# Patient Record
Sex: MALE | Race: WHITE | NOT HISPANIC OR LATINO | Employment: OTHER | URBAN - METROPOLITAN AREA
[De-identification: names, ages, dates, MRNs, and addresses within clinical notes are randomized per-mention and may not be internally consistent; named-entity substitution may affect disease eponyms.]

---

## 2018-01-08 ENCOUNTER — OFFICE VISIT CONVERTED (OUTPATIENT)
Dept: CARDIOLOGY | Facility: CLINIC | Age: 78
End: 2018-01-08
Attending: INTERNAL MEDICINE

## 2018-01-08 ENCOUNTER — CONVERSION ENCOUNTER (OUTPATIENT)
Dept: CARDIOLOGY | Facility: CLINIC | Age: 78
End: 2018-01-08

## 2018-02-09 ENCOUNTER — OFFICE VISIT CONVERTED (OUTPATIENT)
Dept: CARDIOLOGY | Facility: CLINIC | Age: 78
End: 2018-02-09
Attending: INTERNAL MEDICINE

## 2018-02-12 ENCOUNTER — OFFICE VISIT CONVERTED (OUTPATIENT)
Dept: SURGERY | Facility: CLINIC | Age: 78
End: 2018-02-12
Attending: PHYSICIAN ASSISTANT

## 2018-03-16 ENCOUNTER — PROCEDURE VISIT CONVERTED (OUTPATIENT)
Dept: UROLOGY | Facility: CLINIC | Age: 78
End: 2018-03-16
Attending: UROLOGY

## 2018-04-13 ENCOUNTER — OFFICE VISIT CONVERTED (OUTPATIENT)
Dept: PULMONOLOGY | Facility: CLINIC | Age: 78
End: 2018-04-13
Attending: INTERNAL MEDICINE

## 2018-05-23 ENCOUNTER — CONVERSION ENCOUNTER (OUTPATIENT)
Dept: OTHER | Facility: HOSPITAL | Age: 78
End: 2018-05-23

## 2018-06-18 ENCOUNTER — CONVERSION ENCOUNTER (OUTPATIENT)
Dept: CARDIOLOGY | Facility: CLINIC | Age: 78
End: 2018-06-18

## 2018-06-18 ENCOUNTER — OFFICE VISIT CONVERTED (OUTPATIENT)
Dept: CARDIOLOGY | Facility: CLINIC | Age: 78
End: 2018-06-18
Attending: INTERNAL MEDICINE

## 2018-06-19 ENCOUNTER — OFFICE VISIT CONVERTED (OUTPATIENT)
Dept: CARDIOLOGY | Facility: CLINIC | Age: 78
End: 2018-06-19
Attending: INTERNAL MEDICINE

## 2018-07-16 ENCOUNTER — OFFICE VISIT CONVERTED (OUTPATIENT)
Dept: CARDIOLOGY | Facility: CLINIC | Age: 78
End: 2018-07-16
Attending: INTERNAL MEDICINE

## 2018-07-16 ENCOUNTER — CONVERSION ENCOUNTER (OUTPATIENT)
Dept: CARDIOLOGY | Facility: CLINIC | Age: 78
End: 2018-07-16

## 2018-10-22 ENCOUNTER — OFFICE VISIT CONVERTED (OUTPATIENT)
Dept: CARDIOLOGY | Facility: CLINIC | Age: 78
End: 2018-10-22
Attending: INTERNAL MEDICINE

## 2018-10-23 ENCOUNTER — OFFICE VISIT CONVERTED (OUTPATIENT)
Dept: PULMONOLOGY | Facility: CLINIC | Age: 78
End: 2018-10-23
Attending: INTERNAL MEDICINE

## 2018-11-08 ENCOUNTER — CONVERSION ENCOUNTER (OUTPATIENT)
Dept: OTHER | Facility: HOSPITAL | Age: 78
End: 2018-11-08

## 2018-11-08 ENCOUNTER — OFFICE VISIT CONVERTED (OUTPATIENT)
Dept: OTHER | Facility: HOSPITAL | Age: 78
End: 2018-11-08
Attending: NURSE PRACTITIONER

## 2018-11-12 ENCOUNTER — CONVERSION ENCOUNTER (OUTPATIENT)
Dept: OTHER | Facility: HOSPITAL | Age: 78
End: 2018-11-12

## 2018-11-12 ENCOUNTER — OFFICE VISIT CONVERTED (OUTPATIENT)
Dept: OTHER | Facility: HOSPITAL | Age: 78
End: 2018-11-12
Attending: NURSE PRACTITIONER

## 2018-12-10 ENCOUNTER — OFFICE VISIT CONVERTED (OUTPATIENT)
Dept: CARDIOLOGY | Facility: CLINIC | Age: 78
End: 2018-12-10
Attending: INTERNAL MEDICINE

## 2019-01-07 ENCOUNTER — OFFICE VISIT CONVERTED (OUTPATIENT)
Dept: CARDIOLOGY | Facility: CLINIC | Age: 79
End: 2019-01-07
Attending: INTERNAL MEDICINE

## 2019-01-08 ENCOUNTER — CONVERSION ENCOUNTER (OUTPATIENT)
Dept: SURGERY | Facility: CLINIC | Age: 79
End: 2019-01-08

## 2019-01-08 ENCOUNTER — OFFICE VISIT CONVERTED (OUTPATIENT)
Dept: SURGERY | Facility: CLINIC | Age: 79
End: 2019-01-08
Attending: PHYSICIAN ASSISTANT

## 2019-01-28 ENCOUNTER — OFFICE VISIT CONVERTED (OUTPATIENT)
Dept: OTHER | Facility: HOSPITAL | Age: 79
End: 2019-01-28
Attending: NURSE PRACTITIONER

## 2019-02-04 ENCOUNTER — OFFICE VISIT CONVERTED (OUTPATIENT)
Dept: OTHER | Facility: HOSPITAL | Age: 79
End: 2019-02-04
Attending: NURSE PRACTITIONER

## 2019-02-04 ENCOUNTER — CONVERSION ENCOUNTER (OUTPATIENT)
Dept: OTHER | Facility: HOSPITAL | Age: 79
End: 2019-02-04

## 2019-02-08 ENCOUNTER — OFFICE VISIT CONVERTED (OUTPATIENT)
Dept: OTHER | Facility: HOSPITAL | Age: 79
End: 2019-02-08
Attending: NURSE PRACTITIONER

## 2019-02-08 ENCOUNTER — CONVERSION ENCOUNTER (OUTPATIENT)
Dept: SURGERY | Facility: CLINIC | Age: 79
End: 2019-02-08

## 2019-02-08 ENCOUNTER — HOSPITAL ENCOUNTER (OUTPATIENT)
Dept: OTHER | Facility: HOSPITAL | Age: 79
Discharge: HOME | End: 2019-02-08
Attending: UROLOGY

## 2019-02-08 ENCOUNTER — OFFICE VISIT CONVERTED (OUTPATIENT)
Dept: UROLOGY | Facility: CLINIC | Age: 79
End: 2019-02-08
Attending: UROLOGY

## 2019-02-08 ENCOUNTER — HOSPITAL ENCOUNTER (OUTPATIENT)
Dept: SURGERY | Facility: CLINIC | Age: 79
Discharge: HOME | End: 2019-02-08
Attending: UROLOGY

## 2019-02-08 LAB
ANION GAP SERPL CALC-SCNC: 16 MMOL/L (ref 8–19)
APPEARANCE UR: ABNORMAL
BILIRUB UR QL: NEGATIVE
BUN SERPL-MCNC: 16 MG/DL (ref 5–25)
BUN/CREAT SERPL: 10 {RATIO} (ref 6–20)
CALCIUM SERPL-MCNC: 8.5 MG/DL (ref 8.7–10.4)
CHLORIDE SERPL-SCNC: 103 MMOL/L (ref 99–111)
COLOR UR: ABNORMAL
CONV BACTERIA: NEGATIVE
CONV CO2: 23 MMOL/L (ref 22–32)
CONV COLLECTION SOURCE (UA): ABNORMAL
CONV HYALINE CASTS IN URINE MICRO: ABNORMAL /[LPF]
CONV UROBILINOGEN IN URINE BY AUTOMATED TEST STRIP: 0.2 {EHRLICHU}/DL (ref 0.1–1)
CONV YEAST, UA: ABNORMAL
CREAT UR-MCNC: 1.61 MG/DL (ref 0.7–1.2)
GFR SERPLBLD BASED ON 1.73 SQ M-ARVRAT: 41 ML/MIN/{1.73_M2}
GLUCOSE SERPL-MCNC: 101 MG/DL (ref 70–99)
GLUCOSE UR QL: NEGATIVE MG/DL
HGB UR QL STRIP: ABNORMAL
KETONES UR QL STRIP: NEGATIVE MG/DL
LEUKOCYTE ESTERASE UR QL STRIP: ABNORMAL
NITRITE UR QL STRIP: NEGATIVE
OSMOLALITY SERPL CALC.SUM OF ELEC: 287 MOSM/KG (ref 273–304)
PH UR STRIP.AUTO: 5 [PH] (ref 5–8)
POTASSIUM SERPL-SCNC: 4.2 MMOL/L (ref 3.5–5.3)
PROT UR QL: 30 MG/DL
RBC #/AREA URNS HPF: ABNORMAL /[HPF]
SODIUM SERPL-SCNC: 138 MMOL/L (ref 135–147)
SP GR UR: 1.02 (ref 1–1.03)
WBC #/AREA URNS HPF: ABNORMAL /[HPF]

## 2019-02-10 LAB — BACTERIA UR CULT: NORMAL

## 2019-02-22 ENCOUNTER — HOSPITAL ENCOUNTER (OUTPATIENT)
Dept: CT IMAGING | Facility: HOSPITAL | Age: 79
Discharge: HOME | End: 2019-02-22
Attending: UROLOGY

## 2019-02-27 ENCOUNTER — PROCEDURE VISIT CONVERTED (OUTPATIENT)
Dept: UROLOGY | Facility: CLINIC | Age: 79
End: 2019-02-27
Attending: UROLOGY

## 2019-03-11 ENCOUNTER — HOSPITAL ENCOUNTER (OUTPATIENT)
Dept: PREADMISSION TESTING | Facility: HOSPITAL | Age: 79
Discharge: HOME | End: 2019-03-11
Attending: UROLOGY

## 2019-03-11 LAB
ANION GAP SERPL CALC-SCNC: 16 MMOL/L (ref 8–19)
BASOPHILS # BLD AUTO: 0.04 10*3/UL (ref 0–0.2)
BASOPHILS NFR BLD AUTO: 0.4 % (ref 0–3)
BUN SERPL-MCNC: 16 MG/DL (ref 5–25)
BUN/CREAT SERPL: 14 {RATIO} (ref 6–20)
CALCIUM SERPL-MCNC: 9 MG/DL (ref 8.7–10.4)
CHLORIDE SERPL-SCNC: 102 MMOL/L (ref 99–111)
CONV ABS IMM GRAN: 0.05 10*3/UL (ref 0–0.2)
CONV CO2: 26 MMOL/L (ref 22–32)
CONV IMMATURE GRAN: 0.4 % (ref 0–1.8)
CREAT UR-MCNC: 1.16 MG/DL (ref 0.7–1.2)
DEPRECATED RDW RBC AUTO: 51.9 FL (ref 35.1–43.9)
EOSINOPHIL # BLD AUTO: 0.2 10*3/UL (ref 0–0.7)
EOSINOPHIL # BLD AUTO: 1.8 % (ref 0–7)
ERYTHROCYTE [DISTWIDTH] IN BLOOD BY AUTOMATED COUNT: 16.7 % (ref 11.6–14.4)
GFR SERPLBLD BASED ON 1.73 SQ M-ARVRAT: >60 ML/MIN/{1.73_M2}
GLUCOSE SERPL-MCNC: 99 MG/DL (ref 70–99)
HBA1C MFR BLD: 13.5 G/DL (ref 14–18)
HCT VFR BLD AUTO: 43.3 % (ref 42–52)
LYMPHOCYTES # BLD AUTO: 2.33 10*3/UL (ref 1–5)
MCH RBC QN AUTO: 26.7 PG (ref 27–31)
MCHC RBC AUTO-ENTMCNC: 31.2 G/DL (ref 33–37)
MCV RBC AUTO: 85.7 FL (ref 80–96)
MONOCYTES # BLD AUTO: 0.86 10*3/UL (ref 0.2–1.2)
MONOCYTES NFR BLD AUTO: 7.6 % (ref 3–10)
NEUTROPHILS # BLD AUTO: 7.82 10*3/UL (ref 2–8)
NEUTROPHILS NFR BLD AUTO: 69.2 % (ref 30–85)
NRBC CBCN: 0 % (ref 0–0.7)
OSMOLALITY SERPL CALC.SUM OF ELEC: 291 MOSM/KG (ref 273–304)
PLATELET # BLD AUTO: 222 10*3/UL (ref 130–400)
PMV BLD AUTO: 10.1 FL (ref 9.4–12.4)
POTASSIUM SERPL-SCNC: 4.1 MMOL/L (ref 3.5–5.3)
RBC # BLD AUTO: 5.05 10*6/UL (ref 4.7–6.1)
SODIUM SERPL-SCNC: 140 MMOL/L (ref 135–147)
VARIANT LYMPHS NFR BLD MANUAL: 20.6 % (ref 20–45)
WBC # BLD AUTO: 11.3 10*3/UL (ref 4.8–10.8)

## 2019-03-19 ENCOUNTER — HOSPITAL ENCOUNTER (OUTPATIENT)
Dept: PERIOP | Facility: HOSPITAL | Age: 79
Setting detail: HOSPITAL OUTPATIENT SURGERY
Discharge: HOME | End: 2019-03-19
Attending: UROLOGY

## 2019-04-08 ENCOUNTER — CONVERSION ENCOUNTER (OUTPATIENT)
Dept: OTHER | Facility: HOSPITAL | Age: 79
End: 2019-04-08

## 2019-04-08 ENCOUNTER — OFFICE VISIT CONVERTED (OUTPATIENT)
Dept: CARDIOLOGY | Facility: CLINIC | Age: 79
End: 2019-04-08
Attending: INTERNAL MEDICINE

## 2019-04-10 ENCOUNTER — OFFICE VISIT CONVERTED (OUTPATIENT)
Dept: UROLOGY | Facility: CLINIC | Age: 79
End: 2019-04-10
Attending: UROLOGY

## 2019-06-03 ENCOUNTER — OFFICE VISIT CONVERTED (OUTPATIENT)
Dept: OTHER | Facility: HOSPITAL | Age: 79
End: 2019-06-03
Attending: NURSE PRACTITIONER

## 2019-06-03 ENCOUNTER — CONVERSION ENCOUNTER (OUTPATIENT)
Dept: OTHER | Facility: HOSPITAL | Age: 79
End: 2019-06-03

## 2019-06-07 ENCOUNTER — OFFICE VISIT CONVERTED (OUTPATIENT)
Dept: PULMONOLOGY | Facility: CLINIC | Age: 79
End: 2019-06-07
Attending: PHYSICIAN ASSISTANT

## 2019-06-18 ENCOUNTER — OFFICE VISIT CONVERTED (OUTPATIENT)
Dept: CARDIOLOGY | Facility: CLINIC | Age: 79
End: 2019-06-18
Attending: INTERNAL MEDICINE

## 2019-06-19 ENCOUNTER — OFFICE VISIT CONVERTED (OUTPATIENT)
Dept: GASTROENTEROLOGY | Facility: CLINIC | Age: 79
End: 2019-06-19
Attending: INTERNAL MEDICINE

## 2019-07-31 ENCOUNTER — OFFICE VISIT CONVERTED (OUTPATIENT)
Dept: UROLOGY | Facility: CLINIC | Age: 79
End: 2019-07-31
Attending: UROLOGY

## 2019-08-01 ENCOUNTER — CONVERSION ENCOUNTER (OUTPATIENT)
Dept: OTHER | Facility: HOSPITAL | Age: 79
End: 2019-08-01

## 2019-08-01 ENCOUNTER — OFFICE VISIT CONVERTED (OUTPATIENT)
Dept: OTHER | Facility: HOSPITAL | Age: 79
End: 2019-08-01
Attending: NURSE PRACTITIONER

## 2019-10-01 ENCOUNTER — OFFICE VISIT CONVERTED (OUTPATIENT)
Dept: OTHER | Facility: HOSPITAL | Age: 79
End: 2019-10-01
Attending: NURSE PRACTITIONER

## 2019-10-01 ENCOUNTER — CONVERSION ENCOUNTER (OUTPATIENT)
Dept: OTHER | Facility: HOSPITAL | Age: 79
End: 2019-10-01

## 2019-10-03 ENCOUNTER — OFFICE VISIT CONVERTED (OUTPATIENT)
Dept: PULMONOLOGY | Facility: CLINIC | Age: 79
End: 2019-10-03
Attending: INTERNAL MEDICINE

## 2019-10-11 ENCOUNTER — OFFICE VISIT CONVERTED (OUTPATIENT)
Dept: OTHER | Facility: HOSPITAL | Age: 79
End: 2019-10-11
Attending: NURSE PRACTITIONER

## 2019-10-11 ENCOUNTER — CONVERSION ENCOUNTER (OUTPATIENT)
Dept: OTHER | Facility: HOSPITAL | Age: 79
End: 2019-10-11

## 2019-10-14 ENCOUNTER — HOSPITAL ENCOUNTER (OUTPATIENT)
Dept: LAB | Facility: HOSPITAL | Age: 79
Discharge: HOME | End: 2019-10-14
Attending: INTERNAL MEDICINE

## 2019-10-14 ENCOUNTER — OFFICE VISIT CONVERTED (OUTPATIENT)
Dept: CARDIOLOGY | Facility: CLINIC | Age: 79
End: 2019-10-14
Attending: INTERNAL MEDICINE

## 2019-10-14 ENCOUNTER — OFFICE VISIT CONVERTED (OUTPATIENT)
Dept: GASTROENTEROLOGY | Facility: CLINIC | Age: 79
End: 2019-10-14
Attending: PHYSICIAN ASSISTANT

## 2019-10-14 ENCOUNTER — HOSPITAL ENCOUNTER (OUTPATIENT)
Dept: GENERAL RADIOLOGY | Facility: HOSPITAL | Age: 79
Discharge: HOME | End: 2019-10-14
Attending: INTERNAL MEDICINE

## 2019-10-14 LAB
ANION GAP SERPL CALC-SCNC: 22 MMOL/L (ref 8–19)
BASOPHILS # BLD AUTO: 0.03 10*3/UL (ref 0–0.2)
BASOPHILS NFR BLD AUTO: 0.4 % (ref 0–3)
BUN SERPL-MCNC: 9 MG/DL (ref 5–25)
BUN/CREAT SERPL: 6 {RATIO} (ref 6–20)
CALCIUM SERPL-MCNC: 8.7 MG/DL (ref 8.7–10.4)
CHLORIDE SERPL-SCNC: 99 MMOL/L (ref 99–111)
CONV ABS IMM GRAN: 0.03 10*3/UL (ref 0–0.2)
CONV CO2: 24 MMOL/L (ref 22–32)
CONV IMMATURE GRAN: 0.4 % (ref 0–1.8)
CREAT BLD-MCNC: 1.3 MG/DL (ref 0.6–1.4)
CREAT UR-MCNC: 1.39 MG/DL (ref 0.7–1.2)
DEPRECATED RDW RBC AUTO: 51.1 FL (ref 35.1–43.9)
EOSINOPHIL # BLD AUTO: 0.07 10*3/UL (ref 0–0.7)
EOSINOPHIL # BLD AUTO: 1 % (ref 0–7)
ERYTHROCYTE [DISTWIDTH] IN BLOOD BY AUTOMATED COUNT: 16.7 % (ref 11.6–14.4)
GFR SERPLBLD BASED ON 1.73 SQ M-ARVRAT: 48 ML/MIN/{1.73_M2}
GFR SERPLBLD BASED ON 1.73 SQ M-ARVRAT: 52 ML/MIN/{1.73_M2}
GLUCOSE SERPL-MCNC: 124 MG/DL (ref 70–99)
HCT VFR BLD AUTO: 43.6 % (ref 42–52)
HGB BLD-MCNC: 13.4 G/DL (ref 14–18)
LYMPHOCYTES # BLD AUTO: 2.24 10*3/UL (ref 1–5)
LYMPHOCYTES NFR BLD AUTO: 33.3 % (ref 20–45)
MAGNESIUM SERPL-MCNC: 2.02 MG/DL (ref 1.6–2.3)
MCH RBC QN AUTO: 25.9 PG (ref 27–31)
MCHC RBC AUTO-ENTMCNC: 30.7 G/DL (ref 33–37)
MCV RBC AUTO: 84.3 FL (ref 80–96)
MONOCYTES # BLD AUTO: 0.81 10*3/UL (ref 0.2–1.2)
MONOCYTES NFR BLD AUTO: 12 % (ref 3–10)
NEUTROPHILS # BLD AUTO: 3.55 10*3/UL (ref 2–8)
NEUTROPHILS NFR BLD AUTO: 52.9 % (ref 30–85)
NRBC CBCN: 0 % (ref 0–0.7)
OSMOLALITY SERPL CALC.SUM OF ELEC: 292 MOSM/KG (ref 273–304)
PLATELET # BLD AUTO: 200 10*3/UL (ref 130–400)
PMV BLD AUTO: 10.7 FL (ref 9.4–12.4)
POTASSIUM SERPL-SCNC: 3.6 MMOL/L (ref 3.5–5.3)
RBC # BLD AUTO: 5.17 10*6/UL (ref 4.7–6.1)
SODIUM SERPL-SCNC: 141 MMOL/L (ref 135–147)
WBC # BLD AUTO: 6.73 10*3/UL (ref 4.8–10.8)

## 2019-10-31 ENCOUNTER — OFFICE VISIT CONVERTED (OUTPATIENT)
Dept: OTHER | Facility: HOSPITAL | Age: 79
End: 2019-10-31
Attending: NURSE PRACTITIONER

## 2019-10-31 ENCOUNTER — CONVERSION ENCOUNTER (OUTPATIENT)
Dept: OTHER | Facility: HOSPITAL | Age: 79
End: 2019-10-31

## 2019-11-05 ENCOUNTER — CONVERSION ENCOUNTER (OUTPATIENT)
Dept: GASTROENTEROLOGY | Facility: CLINIC | Age: 79
End: 2019-11-05

## 2019-11-05 ENCOUNTER — OFFICE VISIT CONVERTED (OUTPATIENT)
Dept: GASTROENTEROLOGY | Facility: CLINIC | Age: 79
End: 2019-11-05
Attending: INTERNAL MEDICINE

## 2019-11-13 ENCOUNTER — HOSPITAL ENCOUNTER (OUTPATIENT)
Dept: PHYSICAL THERAPY | Facility: CLINIC | Age: 79
Setting detail: RECURRING SERIES
Discharge: HOME | End: 2019-12-11
Attending: INTERNAL MEDICINE

## 2019-11-20 ENCOUNTER — HOSPITAL ENCOUNTER (OUTPATIENT)
Dept: OTHER | Facility: HOSPITAL | Age: 79
Discharge: HOME | End: 2019-11-20

## 2019-11-20 ENCOUNTER — OFFICE VISIT CONVERTED (OUTPATIENT)
Dept: OTHER | Facility: HOSPITAL | Age: 79
End: 2019-11-20
Attending: NURSE PRACTITIONER

## 2019-11-20 ENCOUNTER — CONVERSION ENCOUNTER (OUTPATIENT)
Dept: OTHER | Facility: HOSPITAL | Age: 79
End: 2019-11-20

## 2019-11-20 LAB — PSA SERPL-MCNC: 1.29 NG/ML (ref 0–4)

## 2019-11-26 ENCOUNTER — HOSPITAL ENCOUNTER (OUTPATIENT)
Dept: OTHER | Facility: HOSPITAL | Age: 79
Discharge: HOME | End: 2019-11-26
Attending: INTERNAL MEDICINE

## 2019-11-26 LAB
ANION GAP SERPL CALC-SCNC: 18 MMOL/L (ref 8–19)
BUN SERPL-MCNC: 12 MG/DL (ref 5–25)
BUN/CREAT SERPL: 9 {RATIO} (ref 6–20)
CALCIUM SERPL-MCNC: 9.3 MG/DL (ref 8.7–10.4)
CHLORIDE SERPL-SCNC: 99 MMOL/L (ref 99–111)
CONV CO2: 25 MMOL/L (ref 22–32)
CREAT UR-MCNC: 1.28 MG/DL (ref 0.7–1.2)
GFR SERPLBLD BASED ON 1.73 SQ M-ARVRAT: 53 ML/MIN/{1.73_M2}
GLUCOSE SERPL-MCNC: 115 MG/DL (ref 70–99)
OSMOLALITY SERPL CALC.SUM OF ELEC: 287 MOSM/KG (ref 273–304)
POTASSIUM SERPL-SCNC: 3.8 MMOL/L (ref 3.5–5.3)
SODIUM SERPL-SCNC: 138 MMOL/L (ref 135–147)

## 2020-01-10 ENCOUNTER — OFFICE VISIT CONVERTED (OUTPATIENT)
Dept: OTOLARYNGOLOGY | Facility: CLINIC | Age: 80
End: 2020-01-10
Attending: OTOLARYNGOLOGY

## 2020-02-17 ENCOUNTER — OFFICE VISIT CONVERTED (OUTPATIENT)
Dept: CARDIOLOGY | Facility: CLINIC | Age: 80
End: 2020-02-17
Attending: INTERNAL MEDICINE

## 2020-02-20 ENCOUNTER — OFFICE VISIT CONVERTED (OUTPATIENT)
Dept: FAMILY MEDICINE CLINIC | Facility: CLINIC | Age: 80
End: 2020-02-20
Attending: PHYSICIAN ASSISTANT

## 2020-04-08 ENCOUNTER — OFFICE VISIT CONVERTED (OUTPATIENT)
Dept: PULMONOLOGY | Facility: CLINIC | Age: 80
End: 2020-04-08
Attending: INTERNAL MEDICINE

## 2020-08-10 ENCOUNTER — HOSPITAL ENCOUNTER (OUTPATIENT)
Dept: OTHER | Facility: HOSPITAL | Age: 80
Discharge: HOME | End: 2020-08-10
Attending: INTERNAL MEDICINE

## 2020-08-10 LAB
ALBUMIN SERPL-MCNC: 4 G/DL (ref 3.5–5)
ALBUMIN/GLOB SERPL: 1.3 {RATIO} (ref 1.4–2.6)
ALP SERPL-CCNC: 54 U/L (ref 56–155)
ALT SERPL-CCNC: 15 U/L (ref 10–40)
ANION GAP SERPL CALC-SCNC: 14 MMOL/L (ref 8–19)
AST SERPL-CCNC: 18 U/L (ref 15–50)
BILIRUB SERPL-MCNC: 0.4 MG/DL (ref 0.2–1.3)
BUN SERPL-MCNC: 11 MG/DL (ref 5–25)
BUN/CREAT SERPL: 7 {RATIO} (ref 6–20)
CALCIUM SERPL-MCNC: 9.6 MG/DL (ref 8.7–10.4)
CHLORIDE SERPL-SCNC: 102 MMOL/L (ref 99–111)
CHOLEST SERPL-MCNC: 138 MG/DL (ref 107–200)
CHOLEST/HDLC SERPL: 4.5 {RATIO} (ref 3–6)
CONV CO2: 29 MMOL/L (ref 22–32)
CONV TOTAL PROTEIN: 7.1 G/DL (ref 6.3–8.2)
CREAT UR-MCNC: 1.53 MG/DL (ref 0.7–1.2)
GFR SERPLBLD BASED ON 1.73 SQ M-ARVRAT: 43 ML/MIN/{1.73_M2}
GLOBULIN UR ELPH-MCNC: 3.1 G/DL (ref 2–3.5)
GLUCOSE SERPL-MCNC: 116 MG/DL (ref 70–99)
HDLC SERPL-MCNC: 31 MG/DL (ref 40–60)
LDLC SERPL CALC-MCNC: 77 MG/DL (ref 70–100)
OSMOLALITY SERPL CALC.SUM OF ELEC: 292 MOSM/KG (ref 273–304)
POTASSIUM SERPL-SCNC: 4.3 MMOL/L (ref 3.5–5.3)
SODIUM SERPL-SCNC: 141 MMOL/L (ref 135–147)
TRIGL SERPL-MCNC: 152 MG/DL (ref 40–150)
VLDLC SERPL-MCNC: 30 MG/DL (ref 5–37)

## 2020-08-17 ENCOUNTER — OFFICE VISIT CONVERTED (OUTPATIENT)
Dept: CARDIOLOGY | Facility: CLINIC | Age: 80
End: 2020-08-17
Attending: INTERNAL MEDICINE

## 2020-08-17 ENCOUNTER — HOSPITAL ENCOUNTER (OUTPATIENT)
Dept: LAB | Facility: HOSPITAL | Age: 80
Discharge: HOME | End: 2020-08-17
Attending: PHYSICIAN ASSISTANT

## 2020-08-17 LAB
25(OH)D3 SERPL-MCNC: 47.9 NG/ML (ref 30–100)
ALBUMIN SERPL-MCNC: 4 G/DL (ref 3.5–5)
ALBUMIN/GLOB SERPL: 1.4 {RATIO} (ref 1.4–2.6)
ALP SERPL-CCNC: 52 U/L (ref 56–155)
ALT SERPL-CCNC: 16 U/L (ref 10–40)
ANION GAP SERPL CALC-SCNC: 22 MMOL/L (ref 8–19)
APPEARANCE UR: CLEAR
AST SERPL-CCNC: 18 U/L (ref 15–50)
BASOPHILS # BLD AUTO: 0.04 10*3/UL (ref 0–0.2)
BASOPHILS NFR BLD AUTO: 0.6 % (ref 0–3)
BILIRUB SERPL-MCNC: 0.44 MG/DL (ref 0.2–1.3)
BILIRUB UR QL: NEGATIVE
BUN SERPL-MCNC: 15 MG/DL (ref 5–25)
BUN/CREAT SERPL: 10 {RATIO} (ref 6–20)
CALCIUM SERPL-MCNC: 9.5 MG/DL (ref 8.7–10.4)
CHLORIDE SERPL-SCNC: 104 MMOL/L (ref 99–111)
CHOLEST SERPL-MCNC: 129 MG/DL (ref 107–200)
CHOLEST/HDLC SERPL: 4.4 {RATIO} (ref 3–6)
COLOR UR: YELLOW
CONV ABS IMM GRAN: 0.02 10*3/UL (ref 0–0.2)
CONV CO2: 23 MMOL/L (ref 22–32)
CONV COLLECTION SOURCE (UA): NORMAL
CONV IMMATURE GRAN: 0.3 % (ref 0–1.8)
CONV TOTAL PROTEIN: 6.9 G/DL (ref 6.3–8.2)
CONV UROBILINOGEN IN URINE BY AUTOMATED TEST STRIP: 0.2 {EHRLICHU}/DL (ref 0.1–1)
CREAT UR-MCNC: 1.45 MG/DL (ref 0.7–1.2)
DEPRECATED RDW RBC AUTO: 50.8 FL (ref 35.1–43.9)
EOSINOPHIL # BLD AUTO: 0.33 10*3/UL (ref 0–0.7)
EOSINOPHIL # BLD AUTO: 5.1 % (ref 0–7)
ERYTHROCYTE [DISTWIDTH] IN BLOOD BY AUTOMATED COUNT: 15.4 % (ref 11.6–14.4)
GFR SERPLBLD BASED ON 1.73 SQ M-ARVRAT: 46 ML/MIN/{1.73_M2}
GLOBULIN UR ELPH-MCNC: 2.9 G/DL (ref 2–3.5)
GLUCOSE SERPL-MCNC: 121 MG/DL (ref 70–99)
GLUCOSE UR QL: NEGATIVE MG/DL
HCT VFR BLD AUTO: 45.5 % (ref 42–52)
HDLC SERPL-MCNC: 29 MG/DL (ref 40–60)
HGB BLD-MCNC: 14.1 G/DL (ref 14–18)
HGB UR QL STRIP: NEGATIVE
KETONES UR QL STRIP: NEGATIVE MG/DL
LDLC SERPL CALC-MCNC: 72 MG/DL (ref 70–100)
LEUKOCYTE ESTERASE UR QL STRIP: NEGATIVE
LYMPHOCYTES # BLD AUTO: 2.23 10*3/UL (ref 1–5)
LYMPHOCYTES NFR BLD AUTO: 34.4 % (ref 20–45)
MCH RBC QN AUTO: 28 PG (ref 27–31)
MCHC RBC AUTO-ENTMCNC: 31 G/DL (ref 33–37)
MCV RBC AUTO: 90.5 FL (ref 80–96)
MONOCYTES # BLD AUTO: 0.76 10*3/UL (ref 0.2–1.2)
MONOCYTES NFR BLD AUTO: 11.7 % (ref 3–10)
NEUTROPHILS # BLD AUTO: 3.11 10*3/UL (ref 2–8)
NEUTROPHILS NFR BLD AUTO: 47.9 % (ref 30–85)
NITRITE UR QL STRIP: NEGATIVE
NRBC CBCN: 0 % (ref 0–0.7)
OSMOLALITY SERPL CALC.SUM OF ELEC: 302 MOSM/KG (ref 273–304)
PH UR STRIP.AUTO: 6.5 [PH] (ref 5–8)
PLATELET # BLD AUTO: 200 10*3/UL (ref 130–400)
PMV BLD AUTO: 10.4 FL (ref 9.4–12.4)
POTASSIUM SERPL-SCNC: 4.3 MMOL/L (ref 3.5–5.3)
PROT UR QL: NEGATIVE MG/DL
RBC # BLD AUTO: 5.03 10*6/UL (ref 4.7–6.1)
SODIUM SERPL-SCNC: 145 MMOL/L (ref 135–147)
SP GR UR: 1.01 (ref 1–1.03)
T4 FREE SERPL-MCNC: 1.5 NG/DL (ref 0.9–1.8)
TRIGL SERPL-MCNC: 142 MG/DL (ref 40–150)
TSH SERPL-ACNC: 1.29 M[IU]/L (ref 0.27–4.2)
VLDLC SERPL-MCNC: 28 MG/DL (ref 5–37)
WBC # BLD AUTO: 6.49 10*3/UL (ref 4.8–10.8)

## 2020-08-18 LAB — URATE SERPL-MCNC: 8.7 MG/DL (ref 3.5–8.5)

## 2020-08-19 LAB
EST. AVERAGE GLUCOSE BLD GHB EST-MCNC: 126 MG/DL
HBA1C MFR BLD: 6 % (ref 3.5–5.7)

## 2020-08-27 ENCOUNTER — OFFICE VISIT CONVERTED (OUTPATIENT)
Dept: FAMILY MEDICINE CLINIC | Facility: CLINIC | Age: 80
End: 2020-08-27
Attending: PHYSICIAN ASSISTANT

## 2020-08-27 ENCOUNTER — OFFICE VISIT CONVERTED (OUTPATIENT)
Dept: UROLOGY | Facility: CLINIC | Age: 80
End: 2020-08-27
Attending: NURSE PRACTITIONER

## 2020-09-09 ENCOUNTER — HOSPITAL ENCOUNTER (OUTPATIENT)
Dept: CT IMAGING | Facility: HOSPITAL | Age: 80
Discharge: HOME | End: 2020-09-09
Attending: NURSE PRACTITIONER

## 2020-10-05 ENCOUNTER — OFFICE VISIT CONVERTED (OUTPATIENT)
Dept: ORTHOPEDIC SURGERY | Facility: CLINIC | Age: 80
End: 2020-10-05
Attending: ORTHOPAEDIC SURGERY

## 2020-10-16 ENCOUNTER — PROCEDURE VISIT CONVERTED (OUTPATIENT)
Dept: UROLOGY | Facility: CLINIC | Age: 80
End: 2020-10-16
Attending: UROLOGY

## 2021-02-04 ENCOUNTER — CONVERSION ENCOUNTER (OUTPATIENT)
Dept: FAMILY MEDICINE CLINIC | Facility: CLINIC | Age: 81
End: 2021-02-04

## 2021-02-04 ENCOUNTER — TELEMEDICINE CONVERTED (OUTPATIENT)
Dept: FAMILY MEDICINE CLINIC | Facility: CLINIC | Age: 81
End: 2021-02-04
Attending: PHYSICIAN ASSISTANT

## 2021-02-05 ENCOUNTER — OFFICE VISIT CONVERTED (OUTPATIENT)
Dept: PULMONOLOGY | Facility: CLINIC | Age: 81
End: 2021-02-05
Attending: NURSE PRACTITIONER

## 2021-02-08 ENCOUNTER — TELEMEDICINE CONVERTED (OUTPATIENT)
Dept: FAMILY MEDICINE CLINIC | Facility: CLINIC | Age: 81
End: 2021-02-08
Attending: PHYSICIAN ASSISTANT

## 2021-02-18 ENCOUNTER — TELEMEDICINE CONVERTED (OUTPATIENT)
Dept: FAMILY MEDICINE CLINIC | Facility: CLINIC | Age: 81
End: 2021-02-18
Attending: PHYSICIAN ASSISTANT

## 2021-02-18 ENCOUNTER — OFFICE VISIT CONVERTED (OUTPATIENT)
Dept: PULMONOLOGY | Facility: CLINIC | Age: 81
End: 2021-02-18
Attending: NURSE PRACTITIONER

## 2021-02-23 ENCOUNTER — CONVERSION ENCOUNTER (OUTPATIENT)
Dept: OTHER | Facility: HOSPITAL | Age: 81
End: 2021-02-23

## 2021-02-23 ENCOUNTER — HOSPITAL ENCOUNTER (OUTPATIENT)
Dept: GENERAL RADIOLOGY | Facility: HOSPITAL | Age: 81
Discharge: HOME | End: 2021-02-23
Attending: NURSE PRACTITIONER

## 2021-02-23 ENCOUNTER — OFFICE VISIT CONVERTED (OUTPATIENT)
Dept: CARDIOLOGY | Facility: CLINIC | Age: 81
End: 2021-02-23
Attending: INTERNAL MEDICINE

## 2021-02-23 ENCOUNTER — HOSPITAL ENCOUNTER (OUTPATIENT)
Dept: LAB | Facility: HOSPITAL | Age: 81
Discharge: HOME | End: 2021-02-23
Attending: INTERNAL MEDICINE

## 2021-02-23 LAB
ANION GAP SERPL CALC-SCNC: 17 MMOL/L (ref 8–19)
BNP SERPL-MCNC: 446 PG/ML (ref 0–1800)
BUN SERPL-MCNC: 19 MG/DL (ref 5–25)
BUN/CREAT SERPL: 16 {RATIO} (ref 6–20)
CALCIUM SERPL-MCNC: 8.9 MG/DL (ref 8.7–10.4)
CHLORIDE SERPL-SCNC: 103 MMOL/L (ref 99–111)
CONV CO2: 23 MMOL/L (ref 22–32)
CREAT UR-MCNC: 1.16 MG/DL (ref 0.7–1.2)
GFR SERPLBLD BASED ON 1.73 SQ M-ARVRAT: 60 ML/MIN/{1.73_M2}
GLUCOSE SERPL-MCNC: 117 MG/DL (ref 70–99)
MAGNESIUM SERPL-MCNC: 1.82 MG/DL (ref 1.6–2.3)
OSMOLALITY SERPL CALC.SUM OF ELEC: 291 MOSM/KG (ref 273–304)
POTASSIUM SERPL-SCNC: 3.6 MMOL/L (ref 3.5–5.3)
SODIUM SERPL-SCNC: 139 MMOL/L (ref 135–147)

## 2021-03-01 ENCOUNTER — TELEMEDICINE CONVERTED (OUTPATIENT)
Dept: FAMILY MEDICINE CLINIC | Facility: CLINIC | Age: 81
End: 2021-03-01
Attending: PHYSICIAN ASSISTANT

## 2021-03-03 ENCOUNTER — CONVERSION ENCOUNTER (OUTPATIENT)
Dept: CARDIOLOGY | Facility: CLINIC | Age: 81
End: 2021-03-03
Attending: INTERNAL MEDICINE

## 2021-03-18 ENCOUNTER — TELEMEDICINE CONVERTED (OUTPATIENT)
Dept: FAMILY MEDICINE CLINIC | Facility: CLINIC | Age: 81
End: 2021-03-18
Attending: PHYSICIAN ASSISTANT

## 2021-04-08 ENCOUNTER — CONVERSION ENCOUNTER (OUTPATIENT)
Dept: FAMILY MEDICINE CLINIC | Facility: CLINIC | Age: 81
End: 2021-04-08

## 2021-04-08 ENCOUNTER — OFFICE VISIT CONVERTED (OUTPATIENT)
Dept: FAMILY MEDICINE CLINIC | Facility: CLINIC | Age: 81
End: 2021-04-08
Attending: PHYSICIAN ASSISTANT

## 2021-04-09 ENCOUNTER — OFFICE VISIT CONVERTED (OUTPATIENT)
Dept: PULMONOLOGY | Facility: CLINIC | Age: 81
End: 2021-04-09
Attending: NURSE PRACTITIONER

## 2021-04-20 ENCOUNTER — HOSPITAL ENCOUNTER (OUTPATIENT)
Dept: GENERAL RADIOLOGY | Facility: HOSPITAL | Age: 81
Discharge: HOME | End: 2021-04-20
Attending: NURSE PRACTITIONER

## 2021-04-20 LAB
CREAT BLD-MCNC: 1.1 MG/DL (ref 0.6–1.4)
GFR SERPLBLD BASED ON 1.73 SQ M-ARVRAT: >60 ML/MIN/{1.73_M2}

## 2021-05-10 NOTE — PROCEDURES
Procedure Note      Patient Name: Layo Cee   Patient ID: [id number]   Sex: Male   YOB: 1940    Primary Care Provider: Sam Rhodes PA-C    Visit Date: October 16, 2020    Provider: Harley Linares MD   Location: [facility]   Location Address: [address] [place] [address]  [id number]   Location Phone: [phone]          Cystoscopy Procedure:  The patients urine was viewe d under a microscope during his clinical visit: no RBC present, no WBC present, no Bacteria present.          PROCEDURE: Flexible cystoscope was passed per urethra into the bladder without difficulty after proper consent.    4 cm prostate with a TUR defect.  There is some regrowth on the right lateral side and about a centimeter above the verumontanum.  Still pretty open    Mild trabeculations throughout.  No diverticulum or other abnormality     The bladder was inspected in a systematic meridian fashion. There were no tumors, lesions, stones, or other abnormalities noted within the bladder. Of note, there was no increased vascularity as well. Both ureteral orifices were identified and were normal in appearance. The flexible cystoscope was removed. The patient tolerated the procedure well.           Assessment  · Bladder wall thickening     596.89/N32.89      Plan  · Orders  o Cystoscopy (78917) - 596.89/N32.89 - 10/16/2020  · Medications  o Medications have been Reconciled  o Transition of Care or Provider Policy  · Instructions  o We will follow up in one year or sooner if needed.  o Electronically Identified Patient Education Materials Provided Electronically       He has been doing well, no gross hematuria for a few months.  Cystoscopy negative today.  Patient given reassurance.    At this time after discussion he will stay on Flomax and finasteride and follow-up in 1 year or less he starts having more trouble             Electronically Signed by: Harley Linares MD -Author on October 17, 2020  06:38:10 AM

## 2021-05-10 NOTE — H&P
History and Physical      Patient Name: Layo Cee   Patient ID: [id number]   Sex: Male   YOB: 1940    Primary Care Provider: Sam Rhodes PA-C    Visit Date: October 5, 2020    Provider: Douglas Pike MD   Location: [facility]   Location Address: [address] [place] [address]  [id number]   Location Phone: [phone]          Chief Complaint  · Bilateral knee pain       History Of Present Illness  Layo Cee is a 80 year old /White male who presents today to [facility].      Patient presents today with a chief complaint of bilateral knee pain. Patient states that bilateral knee pain started 2 weeks ago. Pain has been on and off for few months but recently flared up and has been causing him pain when trying to walk. Patient denies any trauma or injury. Patient states he had not done anything different to flare his knee up. Patient states that both knee bother him equally.                       Past Medical History  Arthritis; BPH; CAD (coronary artery disease); CHF (congestive heart failure); COPD; Diverticulitis; Dysphagia; Essential hypertension; GERD (gastroesophageal reflux disease); Gross hematuria; High blood pressure; High cholesterol; Lung disease; MI (myocardial infarction); Monitoring long-term use medication; OCD (obsessive compulsive disorder); Paroxysmal atrial fibrillation; Renal insufficiency; Rhinitis, Allergic; Screening for colon cancer; Sore throat; Stable angina; Vertigo         Past Surgical History  Bladder Surgery; CABG; Cardiac Catherization; Colonoscopy; Cystoscopy with bilateral retrograde pyelography; EGD; Heart Bypass; Prostate Surgery         Medication List  Arnuity Ellipta 200 mcg/actuation inhalation blister with device; Aspirin Low Dose 81 mg oral tablet,delayed release (DR/EC); azelastine 137 mcg (0.1 %) nasal aerosol,spray; Calcium 600 + D(3) 600 mg(1,500mg) -400 unit oral tablet; cetirizine 10 mg oral tablet; Co Q-10 50 mg  oral capsule; Entresto 24-26 mg oral tablet; famotidine 10 mg oral tablet; famotidine 40 mg oral tablet; finasteride 5 mg oral tablet; Flomax 0.4 mg oral capsule; furosemide 20 mg oral tablet; Havrix (PF) 1,440 DALILA unit/mL intramuscular suspension; isosorbide mononitrate 60 mg oral tablet extended release 24 hr; Livalo 1 mg oral tablet; metoprolol succinate 50 mg oral tablet extended release 24 hr; nitroglycerin 0.4 mg sublingual tablet, sublingual; pantoprazole 40 mg oral tablet,delayed release (DR/EC); Shingrix (PF) 50 mcg/0.5 mL intramuscular suspension for reconstitution; Stiolto Respimat 2.5-2.5 mcg/actuation inhalation mist; Tylenol Arthritis Pain 650 mg oral tablet extended release; Ventolin HFA 90 mcg/actuation inhalation HFA aerosol inhaler; vitamin B complex oral tablet; Xarelto 15 mg oral tablet         Allergy List  Levaquin; NSAIDS; Ranexa       Allergies Reconciled  Family Medical History  - No Family History of Colorectal Cancer; Family history of heart disease; Family history of Gastrointestinal Cancer         Social History  Alcohol (Never); Denies illicit substance abuse; Denies substance abuse; lives with spouse; ; No known infection risk; Retired; Tobacco (Former)         Immunizations  Name Date Admin   Influenza 10/08/2019   Influenza 10/01/2018         Review of Systems  · Constitutional  o Denies  o : fever, chills, weight loss  · Cardiovascular  o Denies  o : chest pain, shortness of breath  · Gastrointestinal  o Denies  o : liver disease, heartburn, nausea, blood in stools  · Genitourinary  o Denies  o : painful urination, blood in urine  · Integument  o Denies  o : rash, itching  · Neurologic  o Denies  o : headache, weakness, loss of consciousness  · Musculoskeletal  o Denies  o : painful, swollen joints  · Psychiatric  o Denies  o : drug/alcohol addiction, anxiety, depression      Vitals  Date Time BP Position Site L\R Cuff Size HR RR TEMP (F) WT  HT  BMI kg/m2 BSA m2 O2 Sat FR  "L/min FiO2 HC       10/05/2020 09:58 AM      64 - R   234lbs 2oz 5'  10\" 33.59 2.29 96 %            Physical Examination  · Constitutional  o Appearance  o : well developed, well-nourished, no obvious deformities present  · Head and Face  o Head  o :   § Inspection  § : normocephalic  o Face  o :   § Inspection  § : no facial lesions  · Eyes  o Conjunctivae  o : conjunctivae normal  o Sclerae  o : sclerae white  · Ears, Nose, Mouth and Throat  o Ears  o :   § External Ears  § : appearance within normal limits  § Hearing  § : intact  o Nose  o :   § External Nose  § : appearance normal  · Neck  o Inspection/Palpation  o : normal appearance  o Range of Motion  o : full range of motion  · Respiratory  o Respiratory Effort  o : breathing unlabored  o Inspection of Chest  o : normal appearance  o Auscultation of Lungs  o : no audible wheezing or rales  · Cardiovascular  o Heart  o : regular rate  · Gastrointestinal  o Abdominal Examination  o : soft and non-tender  · Skin and Subcutaneous Tissue  o General Inspection  o : intact, no rashes  · Psychiatric  o General  o : Alert and oriented x3  o Judgement and Insight  o : judgment and insight intact  o Mood and Affect  o : mood normal, affect appropriate  · Extremities  o Extremities  o : BILATERAL KNEES: Sensation grossly intact. Neurovascular intact. Pulses normal. Stable gait. Skin intact. Stable to valgus/varus stress. Good strength in quadriceps, hamstrings, dorsiflexors, and plantar flexors. Patella tendon tenderness. Non-tender medial joint line. Non-tender lateral joint line. No swelling, skin discoloration or atrophy.   · Injection Note/Aspiration Note  o Site  o : bilateral knees   o Procedure  o : Procedure: After educating the patient, patient gave consent for procedure. After using Chloraprep, the joint space was injected. The patient tolerated the procedure well.   o Medication  o : 80 mg of DepoMedrol with 9cc of 1% Lidocaine  · In Office " Procedures  o View  o : LAT/SUNRISE/STANDING  o Site  o : bilateral, knee  o Indication  o : Bilateral knee pain   o Study  o : X-rays ordered, taken in the office, and reviewed today.  o Xray  o : No acute osseous abnormality. Moderate degenerative changes are present.  o Comparative Data  o : No comparative data found          Assessment  · Primary osteoarthritis of right knee     715.16/M17.11  · Primary osteoarthritis of left knee     715.16/M17.12  · Pain in both knees, unspecified chronicity       Pain in right knee     719.46/M25.561  Pain in left knee     719.46/M25.562      Plan  · Orders  o 2 - Depo-Medrol injection 80mg () - - 10/05/2020   Lot [id number] Exp 08 2021 [employer] Administered by DAKOTAH Pike MD  o 2 - Knee Intra-articular Injection without US Guidance [facility] (42310) - - 10/05/2020   Lot [id number] Exp 08 01 2021 [employer] Administered by DAKOTAH Pike MD  o Knee (Left) [facility] (90737-QT) - 719.46/M25.562 - 10/05/2020  o Knee (Right) [facility] (27305-XI) - 719.46/M25.561 - 10/05/2020  · Medications  o Medications have been Reconciled  o Transition of Care or Provider Policy  · Instructions  o Dr. Pike saw and examined the patient and agrees with plan.   o X-rays reviewed by Dr. Pike.  o Reviewed the patient's Past Medical, Social, and Family history as well as the ROS at today's visit, no changes.  o Call or return if worsening symptoms.  o Follow Up PRN.  o This note was transcribed by Talya Connolly. cornelia  o Discussed diagnosis and treatment options with the patient. Patient opted for an injection in bilateral knees and tolerated it well. If pain is persistent or worsens patient will consider getting an MRI.            Electronically Signed by: Talya Connolly-, Other -Author on October 8, 2020 09:54:37 AM  Electronically Co-signed by: Douglas Pike MD -Reviewer on October 9, 2020 06:30:15 PM

## 2021-05-10 NOTE — PROCEDURES
"   Procedure Note      Patient Name: Layo Cee   Patient ID: [id number]   Sex: Male   YOB: 1940    Primary Care Provider: Sam Rhodes PA-C    Visit Date: March 3, 2021    Provider: Darnell Stevenson MD   Location: [facility]   Location Address: [address] [place] [address], [address]   [place], [place]  [id number]   Location Phone: [phone]          FINAL REPORT   TRANSTHORACIC ECHOCARDIOGRAM REPORT    Diagnosis: Shortness of breath and CAD   Height: 5'10\" Weight: 221 B/P: 170/90 BSA: 2.2   Tech: BNS   MEASUREMENTS:  RVID (Diastole) : RVID. (NORMAL: 0.7 to 2.4 cm max)   LVID (Systole): 2.5 cm (Diastole): 4.1 cm . (NORMAL: 3.7 - 5.4 cm)   Posterior Wall Thickness (Diastole): 1.5 cm. (NORMAL: 0.8 - 1.1 cm)   Septal Thickness (Diastole): 1.5 cm. (NORMAL: 0.7 - 1.2 cm)   LAID (Systole): 4.3 cm. (NORMAL: 1.9 - 3.8 cm)   Aortic Root Diameter (Diastole): 3.4 cm. (NORMAL: 2.0 - 3.7 cm)   COMMENTS:  The patient underwent 2-D, M-Mode, and Doppler examination, including pulse-wave, continuous-wave, and color-flow analysis; the study is technically adequate.   FINDINGS:  MITRAL VALVE: There is mild mitral annular calcification, leaflets open well. No evidence of mitral valve prolapse. No mitral stenosis. There is trace mitral regurgitation.   AORTIC VALVE: Mild calcification of aortic valve leaflets noted. Trileaflet aortic valve, which appears to open well. No hemodynamically significant aortic stenosis or regurgitation noted.   TRICUSPID VALVE: Normal in appearance, opens well. Trace tricuspid regurgitation. Unable to calcualte pulmonary artery systolic pressure due to incomplete TR Doppler envelope.   PULMONIC VALVE: Grossly normal. Trace pulmonic insufficiency noted.   AORTIC ROOT: Normal in size with adequate motion.   LEFT ATRIUM: Normal in size. No intracavitary masses or clots seen. LA volume index is 23 mL/m2.   LEFT VENTRICLE: The left ventricular chamber size is normal. There is mild concentric left " ventricular hypertrophy. Left ventricular systolic function is normal. Estimated LV ejection fraction is 55%. There are no regional wall motion abnormalities. There is grade 1 diastolic dysfunction, impaired relaxation of the left ventricle.   RIGHT VENTRICLE: Normal size and function.   RIGHT ATRIUM: Normal in size.   PERICARDIUM: No evidence of pericardial effusion.   INFERIOR VENA CAVA: Measures 2.1 cm in diameter and there is more than 50% collapse during inspiration.   DOPPLER: E/A ratio is 0.5.DT= 176 msec. IVRT is 85 msec. E/E' is 8.   Faxed: 03/14/2021      CONCLUSION:  1.  Preserved left ventricular systolic function with estimated LV ejection fraction of 55%.   2.  Mild concentric left ventricular hypertrophy.   3.  Grade 1 diastolic dysfunction of the left ventricle.   4.  Aortic valvular sclerosis with no hemodynamically significant stenosis.    Darnell Stevenson MD   JV/rt                 Electronically Signed by: Berna Gil-, Other -Author on March 14, 2021 01:52:51 PM  Electronically Co-signed by: Darnell Stevenson MD -Reviewer on March 14, 2021 02:25:10 PM

## 2021-05-13 NOTE — PROGRESS NOTES
Progress Note      Patient Name: Layo Cee   Patient ID: [id number]   Sex: Male   YOB: 1940    Primary Care Provider: Sam Rhodes PA-C    Visit Date: August 27, 2020    Provider: Sam Rhodes PA-C   Location: [facility]   Location Address: [address] [place] [address], [address]  [place], [place]  [id number]   Location Phone: [phone]          Chief Complaint  · 6 month follow up  · lab results      History Of Present Illness  Layo Cee is a 80 year old /White male who presents for evaluation and treatment of: 6 month follow up.      pt presents today for 6 month follow up and to go over recent lab.    no new issues or complaints to discuss    labs 8/20  cln 8/11    no refills needed today    Renal insuff.  Discussed at length    Cardio - decreased his Lasix.       Past Medical History  Disease Name Date Onset Notes   Arthritis --  --    BPH --  --    CAD (coronary artery disease) --  --    CHF (congestive heart failure) --  --    COPD --  --    Diverticulitis 10/11/2019 --    Dysphagia --  --    Essential hypertension 08/27/2020 --    GERD (gastroesophageal reflux disease) --  --    Gross hematuria --  --    High blood pressure --  --    High cholesterol --  --    Lung disease --  --    MI (myocardial infarction) 7/2016 --    Monitoring long-term use medication --  --    OCD (obsessive compulsive disorder) --  --    Paroxysmal atrial fibrillation 08/12/2019 --    Renal insufficiency 08/27/2020 --    Rhinitis, Allergic 06/05/2018 --    Screening for colon cancer [facility]   Sore throat --  --    Stable angina --  --    Vertigo --  --          Past Surgical History  Procedure Name Date Notes   Bladder Surgery --  --    CABG --  --    Cardiac Catherization 7/2016 --    Colonoscopy 2011 --    Cystoscopy with bilateral retrograde pyelography 3-19-19 --    EGD 2016 --    Heart Bypass --  --    Prostate Surgery --  --          Medication List  Name Date Started Instructions   Arnuity Ellipta  200 mcg/actuation inhalation blister with device  inhale 1 puff (200 mcg) by inhalation route once daily at the same time each day   Aspirin Low Dose 81 mg oral tablet,delayed release (DR/EC)  take 1 tablet (81 mg) by oral route once daily   azelastine 137 mcg (0.1 %) nasal aerosol,spray  spray 2 sprays in each nostril by intranasal route 2 times per day   Calcium 600 + D(3) 600 mg(1,500mg) -400 unit oral tablet  take 1 tablet by oral route daily   cetirizine 10 mg oral tablet  take 1 tablet (10 mg) by oral route once daily   Co Q-10 50 mg oral capsule  take 1 capsule by oral route once a day (in the morning)   Entresto 24-26 mg oral tablet 05/20/2020 take 1 tablet by oral route 2 times a day for 30 days   famotidine 10 mg oral tablet  take 1 tablet (10 mg) by oral route once daily as needed   famotidine 40 mg oral tablet 06/02/2020 take 1 tablet (40 mg) by oral route once daily at bedtime for 30 days   finasteride 5 mg oral tablet 03/03/2020 take 1 tablet (5 mg) by oral route once daily for 90 days   Flomax 0.4 mg oral capsule 02/07/2020 Take 1 capsule (0.4 mg) by oral route once daily 1/2 hour following the same meal each day for 30 days   furosemide 20 mg oral tablet 08/27/2020 take 1 tablet (20 mg) by oral route once daily for 30 days   Havrix (PF) 1,440 DALILA unit/mL intramuscular suspension 02/20/2020 Inject 1 milliliter by intramuscular route today; repeat in 6 months   isosorbide mononitrate 60 mg oral tablet extended release 24 hr 10/23/2018 Take 1 tablet by mouth once daily for heart   Livalo 1 mg oral tablet  take 1 tablet (1 mg) by oral route once daily   metoprolol succinate 50 mg oral tablet extended release 24 hr  take 1 tablet (50 mg) by oral route once daily   nitroglycerin 0.4 mg sublingual tablet, sublingual 06/03/2019 place 1 tablet (0.4 mg) by buccal route at the first sign of an attack; no more than 3 tabs are recommended within a 15 minute period. for 30 days   pantoprazole 40 mg oral  tablet,delayed release (DR/EC) 06/19/2019 Take 1 tablet by mouth every day for 30 days   Shingrix (PF) 50 mcg/0.5 mL intramuscular suspension for reconstitution 02/20/2020 inject 0.5 milliliter (50 mcg) by intramuscular route once repeat 0.5 mL dose 2 to 6 months after the first dose (total of 2 doses)   Stiolto Respimat 2.5-2.5 mcg/actuation inhalation mist  inhale 2 puffs by inhalation route once daily at the same time each day   Tylenol Arthritis Pain 650 mg oral tablet extended release  take 2 tablets (1,300 mg) by oral route every 8 hours swallowing whole with water. Do not break, crush, dissolve and/or chew.   Ventolin HFA 90 mcg/actuation inhalation HFA aerosol inhaler  inhale 2 puffs (180 mcg) by inhalation route every 4-6 hours as needed   vitamin B complex oral tablet  take 1 tablet by oral route daily   Xarelto 15 mg oral tablet  take 1 tablet (15 mg) by oral route once daily with the evening meal         Allergy List  Allergen Name Date Reaction Notes   Levaquin --  taken with Rocephin causes SOA and Pruritis --    Ranexa --  dizziness --          Family Medical History  Disease Name Relative/Age Notes   - No Family History of Colorectal Cancer  --    Family history of heart disease Father/   Father   Family history of Gastrointestinal Cancer Brother/   Brother         Social History  Finding Status Start/Stop Quantity Notes   Alcohol Never --/-- --  does not drink  5/23/2018   Denies illicit substance abuse --  --/-- --  5/23/2018   Denies substance abuse --  --/-- --  5/23/2018   lives with spouse --  --/-- --  --     --  --/-- --  --    No known infection risk --  --/-- --  --    Retired --  --/-- --  --    Tobacco Former 15/56 0.5 PPD former smoker         Immunizations  NameDate Admin Mfg Trade Name Lot Number Route Inj VIS Given VIS Publication   Wouznhbwf19/08/2019 [facility] Fluzone Quadrivalent [id number] IM LD 10/08/2019    Comments: patient tolerated well   Qhwzvnkql62/01/2018 NE Not Entered  NE  "NE     Comments:          Review of Systems  · Constitutional  o Denies  o : fever, fatigue, weight loss, weight gain  · Cardiovascular  o Denies  o : lower extremity edema, claudication, chest pressure, palpitations  · Respiratory  o Denies  o : shortness of breath, wheezing, cough, hemoptysis, dyspnea on exertion  · Gastrointestinal  o Denies  o : nausea, vomiting, diarrhea, constipation, abdominal pain      Vitals  Date Time BP Position Site L\R Cuff Size HR RR TEMP (F) WT  HT  BMI kg/m2 BSA m2 O2 Sat        08/27/2020 01:25 /68 Sitting    97 - R   227lbs 2oz 5'  10\" 32.59 2.26 76 %          Physical Examination  · Constitutional  o Appearance  o : well developed, well-nourished, no acute distress  · Head and Face  o Head  o : normocephalic, atraumatic  · Neck  o Inspection/Palpation  o : normal appearance, no masses or tenderness, trachea midline  o Thyroid  o : gland size normal, nontender, no nodules or masses present on palpation  · Respiratory  o Respiratory Effort  o : breathing unlabored  o Inspection of Chest  o : chest rise symmetric bilaterally  o Auscultation of Lungs  o : clear to auscultation bilaterally throughout inspiration and expiration  · Cardiovascular  o Heart  o :   § Auscultation of Heart  § : regular rate and rhythm, no murmurs, gallops or rubs  o Peripheral Vascular System  o :   § Extremities  § : mild lower extremity edema present, no cyanosis, no distal hair loss, normal capillary refill  · Lymphatic  o Neck  o : no cervical lymphadenopathy, no supraclavicular lymphadenopathy  · Psychiatric  o Mood and Affect  o : mood normal, affect appropriate          Assessment  · CHF (congestive heart failure)     428.0/I50.9  · COPD (chronic obstructive pulmonary disease)     496/J44.9  · Essential hypertension     401.9/I10  · GERD (gastroesophageal reflux disease)     530.81/K21.9  · Renal insufficiency     593.9/N28.9      Plan  · Orders  o ACO-39: Current medications updated and " reviewed () - - 08/27/2020  o ACO-15: Pneumococcal Vaccine Administered or Previously Received (4040F) - - 08/27/2020  o ACO-14: Influenza immunization administered or previously received () - - 08/27/2020  · Medications  o furosemide 20 mg oral tablet   SIG: take 1 tablet (20 mg) by oral route once daily for 30 days   DISP: (30) tablets with 0 refills  Adjusted on 08/27/2020     o Medications have been Reconciled  o Transition of Care or Provider Policy  · Instructions  o Patient advised to monitor blood pressure (B/P) at home and journal readings. Patient informed that a B/P reading at home of more than 130/80 is considered hypertension. For readings greater rlft500/90 or higher patient is advised to follow up in the office with readings for management. Patient advised to limit sodium intake.  o Maintain a healthy weight. Avoid tight fitting clothes. Avoid fried, fatty foods, tomato sauce, chocolate, mint, garlic, onion, alcohol. caffeine. Eat smaller meals, dont lie down after a meal, dont smoke. Elevate the head of your bed 6-9 inches.  o Take all medications as prescribed/directed.  o Patient instructed/educated on their diet and exercise program.  o Patient was educated/instructed on their diagnosis, treatment and medications prior to discharge from the clinic today.  o Patient counseled to reduce calorie intake.  o Patient was instructed to exercise regularly.  o Discussed Covid-19 precautions including, but not limited to, social distancing, avoid touching your face, and hand washing.   · Disposition  o Call or Return if symptoms worsen or persist.  o F/U in 4-6 months  o Care Transition            Electronically Signed by: Sam Rhodes PA-C -Author on August 27, 2020 09:55:58 PM

## 2021-05-13 NOTE — PROGRESS NOTES
Progress Note      Patient Name: Layo Cee   Patient ID: [id number]   Sex: Male   YOB: 1940    Primary Care Provider: Sam Rhodes PA-C   Referring Provider: Sam Rhodes PA-C    Visit Date: August 17, 2020    Provider: Darnell Stevenson MD   Location: [facility]   Location Address: [address] [place] [address], [address]   [place], [place]  [id number]   Location Phone: [phone]          Chief Complaint     Followup visit for coronary artery disease and atrial flutter.       History Of Present Illness  REFERRING CARE PROVIDER: Sam Rhodes PA-C   Layo Cee is a 80 year old /White male with coronary artery disease, previous CABG, paroxysmal atrial flutter status post ablation, and chronic kidney disease who is here for a followup visit. He had 1 episode of skipped beats and fluttering associated with chest tightness, which happened 2 months back. Overall feeling fine. No exertional angina-like chest discomfort. Shortness of breath is at baseline. He has dizziness, but much improved since previous visit. No recent falls. No symptoms suggestive of orthopnea or PND.   PAST MEDICAL HISTORY: 1) Coronary artery disease, status post coronary artery bypass grafting in the past. Last cardiac catheterization done in July 2016 showed patent left internal mammary graft to the LAD and occluded saphenous venous graft. Native LAD is 100% occluded in the mid portion. Native circumflex artery has no significant disease. Native right coronary artery is also 100% occluded and it is a chronic total occlusion. The right coronary artery is reconstituted with collaterals from the left side; 2) Ischemic cardiomyopathy with recovery of LV function. Ejection fraction is 60% per echocardiogram in June 2018; 3) Chronic kidney disease, stage 3; 4) Chronic obstructive pulmonary disease, not an exacerbation; 5) Hypertension; 6) Hyperlipidemia; 7) Very frequent PVCs, constituting 11.8% of all the beats  "per 24-hour Holter monitor study in June of 2018; 8) Typical atrial flutter status post radiofrequency ablation by Dr. Aguilar on 11/30/2018.   PSYCHOSOCIAL HISTORY: Previously smoked, but quit. Denies alcohol use. Admits mood changes and depression.   CURRENT MEDICATIONS: Isosorbide mononitrate 60 mg daily; Entresto 24-26 mg b.i.d.; metoprolol succinate 50 mg daily; Xarelto 15 mg daily; famotidine 10 mg 2 q. h.s.; furosemide 40 mg daily; Livalo 1 mg q. h.s.; aspirin 81 mg daily; B complex daily; Co-Q10 daily; pantoprazole 40 mg daily; Arnuity Ellipta 200 mg d; d Tylenol Arthritis p.r.n.; Stiolto Respimat; Azelastine nasal spray; Flomax 0.4 mg daily; finasteride 5 mg daily; calcium +D 600 mg daily; cetirizine 10 mg daily.       Review of Systems  · Cardiovascular  o Admits  o : palpitations (fast, fluttering, or skipping beats), swelling (feet, ankles, hands), shortness of breath while walking or lying flat  o Denies  o : chest pain or angina pectoris   · Respiratory  o Denies  o : chronic or frequent cough, asthma or wheezing      Vitals  Date Time BP Position Site L\R Cuff Size HR RR TEMP (F) WT  HT  BMI kg/m2 BSA m2 O2 Sat HC       08/17/2020 10:50 /60 Sitting    86 - R   227lbs 0oz 5'  10\" 32.57 2.26           Physical Examination  · Respiratory  o Auscultation of Lungs  o : Clear to auscultation bilaterally. No crackles or rhonchi.  · Cardiovascular  o Heart  o : Tachycardic, regular. No murmur, rubs, or gallops.   · Gastrointestinal  o Abdominal Examination  o : Soft, nontender, nondistended. No free fluid. Bowel sounds heard in all four quadrants.  · Extremities  o Extremities  o : Trace pitting pedal edema bilaterally. Distal pulses present.  · Labs  o Labs  o : 08/10/2020, BUN 11, creatinine 1.53, sodium 141, potassium 4.3, chloride 102, bicarb 29, total protein 7.1, albumin 4.0, globulin 3.1, AST 18, ALT 15, triglycerides 152, total cholesterol 138, HDL 31, LDL 77, bilirubin " 0.40.              Assessment     ASSESSMENT & PLAN:     1.  Coronary artery disease.  Previous bypass grafting stable with no angina.  Preserved LV function.  Continue        aspirin, statin, and beta-blocker at the current dose.    2.  Cardiomyopathy.  Recovery of LV function on medical therapy.  Recent labs showed a creatinine of 1.5,        which is above his baseline.  Will decrease Lasix to 20 mg daily.  Continue Entresto and metoprolol as they        are.    3.  Hyperlipidemia.  Continue Livalo.  LDL 77, close to goal.  4.  Paroxysmal atrial flutter.  One episode of palpitations.  Previous ablation.  Currently in sinus rhythm.         Continue metoprolol and Xarelto.    5.  Dizziness.  Long-standing problem.  Significantly improved over the past several months.  No recent falls.  6.  Follow up in 6 months.        MD JACEY Huston:vm             Electronically Signed by: Keyona Yuan-, Other -Author on August 24, 2020 09:17:47 AM  Electronically Co-signed by: Darnell Stevenson MD -Reviewer on August 24, 2020 12:44:01 PM

## 2021-05-13 NOTE — PROGRESS NOTES
Progress Note      Patient Name: Layo Cee   Patient ID: [id number]   Sex: Male   YOB: 1940    Primary Care Provider: Sam Rhodes PA-C    Visit Date: August 27, 2020    Provider: ARACELI Russell   Location: [facility]   Location Address: [address] [place] [address]  [id number]   Location Phone: [phone]          Chief Complaint  · pt here for urological concerns      History Of Present Illness     80-year-old  male Patient presents for an annual follow-up on BPH.  The patient continues to take Flomax and finasteride his urinary symptoms are unchanged from previous.      He continues with a good stream and no straining to void.    He restarted his blood thinners after his last visit with urology on 7/31/2019.  The patient reports that he has had approximately 3-4 episodes of gross hematuria with blood clots last anywhere from 2 to 3 days.    Patient has a history of a CABG with atrial fibrillation.    The patient is a non-smoker    The patient is currently on Xarelto and 81 mg aspirin.    Cystoscopy in 3/ 2019 with bilateral retrogrades revealed TUR defect with some mild prostatic regrowth, large bladder with some moderate trabeculations.  Normal bladder otherwise.  Bilateral retrogrades normal.    Patient was admitted in 2019 with gross hematuria and clot retention and was on CBI for 48 hours.    PSA 11/2019 1.29         Past Medical History  Arthritis; BPH; CAD (coronary artery disease); CHF (congestive heart failure); COPD; Diverticulitis; Dysphagia; Essential hypertension; GERD (gastroesophageal reflux disease); Gross hematuria; High blood pressure; High cholesterol; Lung disease; MI (myocardial infarction); Monitoring long-term use medication; OCD (obsessive compulsive disorder); Paroxysmal atrial fibrillation; Renal insufficiency; Rhinitis, Allergic; Screening for colon cancer; Sore throat; Stable angina; Vertigo         Past Surgical History  Bladder Surgery;  CABG; Cardiac Catherization; Colonoscopy; Cystoscopy with bilateral retrograde pyelography; EGD; Heart Bypass; Prostate Surgery         Medication List  Arnuity Ellipta 200 mcg/actuation inhalation blister with device; Aspirin Low Dose 81 mg oral tablet,delayed release (DR/EC); azelastine 137 mcg (0.1 %) nasal aerosol,spray; Calcium 600 + D(3) 600 mg(1,500mg) -400 unit oral tablet; cetirizine 10 mg oral tablet; Co Q-10 50 mg oral capsule; Entresto 24-26 mg oral tablet; famotidine 10 mg oral tablet; famotidine 40 mg oral tablet; finasteride 5 mg oral tablet; Flomax 0.4 mg oral capsule; furosemide 20 mg oral tablet; Havrix (PF) 1,440 DALILA unit/mL intramuscular suspension; isosorbide mononitrate 60 mg oral tablet extended release 24 hr; Livalo 1 mg oral tablet; metoprolol succinate 50 mg oral tablet extended release 24 hr; nitroglycerin 0.4 mg sublingual tablet, sublingual; pantoprazole 40 mg oral tablet,delayed release (DR/EC); Shingrix (PF) 50 mcg/0.5 mL intramuscular suspension for reconstitution; Stiolto Respimat 2.5-2.5 mcg/actuation inhalation mist; Tylenol Arthritis Pain 650 mg oral tablet extended release; Ventolin HFA 90 mcg/actuation inhalation HFA aerosol inhaler; vitamin B complex oral tablet; Xarelto 15 mg oral tablet         Allergy List  Levaquin; NSAIDS; Ranexa         Family Medical History  - No Family History of Colorectal Cancer; Family history of heart disease; Family history of Gastrointestinal Cancer         Social History  Alcohol (Never); Denies illicit substance abuse; Denies substance abuse; lives with spouse; ; No known infection risk; Retired; Tobacco (Former)         Immunizations  Name Date Admin   Influenza    Influenza          Review of Systems  · Constitutional  o Denies  o : chills, fever  · Gastrointestinal  o Denies  o : nausea, vomiting, flank pain  · Genitourinary  o Admits  o : blood in urine  o Denies  o : burning with urination, frequency of urine, urgency with urine,  "urinary leakage      Vitals  Date Time BP Position Site L\R Cuff Size HR RR TEMP (F) WT  HT  BMI kg/m2 BSA m2 O2 Sat HC       08/27/2020 01:25 /68 Sitting    97 - R   227lbs 2oz 5'  10\" 32.59 2.26 76 %    08/27/2020 02:24 PM         227lbs 0oz 5'  7\" 35.55 2.21           Physical Examination  · Constitutional  o Appearance  o : well-nourished, well developed, alert, in no acute distress  · Head and Face  o Head  o :   § Inspection  § : atraumatic, normocephalic  o Face  o :   § Inspection  § : no facial lesions  · Eyes  o Sclerae  o : sclerae white  · Ears, Nose, Mouth and Throat  o Ears  o :   § External Ears  § : appearance within normal limits, no lesions present  o Nose  o :   § External Nose  § : appearance normal  · Neck  o Inspection/Palpation  o : normal appearance, trachea midline  · Respiratory  o Respiratory Effort  o : breathing unlabored  o Inspection of Chest  o : normal appearance, no retractions  · Skin and Subcutaneous Tissue  o General Inspection  o : no rashes or lesions present, no lesions present, no areas of discoloration  · Neurologic  o Mental Status Examination  o :   § Orientation  § : grossly oriented to person, place and time  § Speech/Language  § : communication ability within normal limits  o Gait and Station  o : normal gait, able to stand without difficulty  · Psychiatric  o Judgement and Insight  o : judgment and insight intact, judgement for everyday activities and social situations within normal limits, insight intact  o Mood and Affect  o : mood normal, affect appropriate          Results  · In-Office Procedures  o Lab procedure  § Automated dipstick urinalysis with microscopy (70810)   § Color Ur: Yellow   § Clarity Ur: Clear   § Glucose Ur Ql Strip: Negative   § Bilirub Ur Ql Strip: Negative   § Ketones Ur Ql Strip: Negative   § Sp Gr Ur Qn: 1.010   § Hgb Ur Ql Strip: Trace-Intact   § pH Ur-LsCnc: 5.0   § Prot Ur Ql Strip: Negative   § Urobilinogen Ur Strip-mCnc: 0.2 " E.U./dL   § Nitrite Ur Ql Strip: Negative   § WBC Est Ur Ql Strip: Negative   § RBC UrnS Qn HPF: 1-2   § WBC UrnS Qn HPF: 0   § Bacteria UrnS Qn HPF: 0   § Crystals UrnS Qn HPF: 0   § Epithelial Cells (non renal): 0 /HPF  § Epithelial Cells (renal): 0   o Surgical procedure  § IOP - Bladder Scan/Residual Urine (74909)   § Specimen vol Ur: 8       Assessment  · BPH     600.00  · Gross hematuria     599.71/R31.0    Problems Reconciled  Plan  · Orders  o CT Abdomen and Pelvis (with and without Contrast) with Bosniak Class and delayed images (71453-ZD) - 600.00, 599.71/R31.0 - 08/28/2020  o Cystoscopy (92841) - 600.00, 599.71/R31.0 - 08/28/2020  · Medications  o Medications have been Reconciled  o Transition of Care or Provider Policy  · Instructions  o hematuria: Case discussed with DR. Linares. As patient continued to have gross hematuria may need repeat imaging and hematuria workup. patient is agreeable and will proceed with CT scan with and without IV contrast urologic protocol and in office cystoscopy  o BPH: Patient doing well in regards to symptoms. Dr. Linares had discussed previously with the patient repeating TURP although patient is still not agreeable with this plan as Dr. Linares had explained it may or may not help his hematuria episodes.            Electronically Signed by: ARACELI Russell -Author on August 28, 2020 01:27:26 PM

## 2021-05-14 VITALS — BODY MASS INDEX: 33.52 KG/M2 | WEIGHT: 234.12 LBS | HEIGHT: 70 IN | OXYGEN SATURATION: 96 % | HEART RATE: 64 BPM

## 2021-05-14 VITALS
WEIGHT: 233.19 LBS | HEART RATE: 88 BPM | OXYGEN SATURATION: 94 % | HEIGHT: 70 IN | BODY MASS INDEX: 33.38 KG/M2 | DIASTOLIC BLOOD PRESSURE: 68 MMHG | SYSTOLIC BLOOD PRESSURE: 128 MMHG

## 2021-05-14 VITALS
SYSTOLIC BLOOD PRESSURE: 144 MMHG | HEIGHT: 70 IN | BODY MASS INDEX: 35.63 KG/M2 | WEIGHT: 227.12 LBS | DIASTOLIC BLOOD PRESSURE: 68 MMHG | HEART RATE: 97 BPM | HEIGHT: 67 IN | WEIGHT: 227 LBS | BODY MASS INDEX: 32.51 KG/M2 | OXYGEN SATURATION: 76 %

## 2021-05-14 VITALS
WEIGHT: 221 LBS | HEART RATE: 74 BPM | DIASTOLIC BLOOD PRESSURE: 90 MMHG | SYSTOLIC BLOOD PRESSURE: 170 MMHG | HEIGHT: 70 IN | BODY MASS INDEX: 31.64 KG/M2

## 2021-05-14 VITALS — OXYGEN SATURATION: 96 %

## 2021-05-14 NOTE — PROGRESS NOTES
Progress Note      Patient Name: Layo Cee   Patient ID: [id number]   Sex: Male   YOB: 1940    Primary Care Provider: Sam Rhodes PA-C    Visit Date: February 8, 2021    Provider: Sam Rhodes PA-C   Location: [facility]   Location Address: [address] [place] [address], [address]  [place], [place]  [id number]   Location Phone: [phone]          Chief Complaint  · follow up on Covid-19      History Of Present Illness  Video Conferencing Visit  Layo Cee is a 80 year old /White male who is presenting for evaluation via video conferencing via [employer]. Verbal consent obtained before beginning visit.   The following staff were present during this visit: Kayli Coe CMA/Sam Rhodes PA-C   Layo Cee is a 80 year old /White male who presents for evaluation and treatment of: follow up on Covid-19.      pt presents today for follow up on Covid-19 via [employer].    pt tested positive for Covid on 1/22/21 @ [facility].  pt states he is feeling much better. pt states he is still feeling weak, fatigue and some coughing off and on.    pt states he does monitor his O2 and it has been running between 93-96.    Pt is sleeping good.    Pt is drinking well, staying hydrating.    Coughing, non prod  Using nebulizer - Duoneb    Some SOA, but improving  No fever, chills         Past Medical History  Disease Name Date Onset Notes   Arthritis --  --    BPH --  --    CAD (coronary artery disease) --  --    CHF (congestive heart failure) --  --    COPD --  --    COVID-19 02/04/2021 --    Diverticulitis 10/11/2019 --    Dysphagia --  --    Essential hypertension 08/27/2020 --    GERD (gastroesophageal reflux disease) --  --    Gross hematuria --  --    High blood pressure --  --    High cholesterol --  --    Lung disease --  --    MI (myocardial infarction) 7/2016 --    Monitoring long-term use medication --  --    OCD (obsessive compulsive disorder) --  --     Paroxysmal atrial fibrillation 08/12/2019 --    Renal insufficiency 08/27/2020 --    Rhinitis, Allergic 06/05/2018 --    Screening for colon cancer [facility]   Sore throat --  --    Stable angina --  --    Vertigo --  --          Past Surgical History  Procedure Name Date Notes   Bladder Surgery --  --    CABG --  --    Cardiac Catherization 7/2016 --    Colonoscopy 2011 --    Cystoscopy with bilateral retrograde pyelography 3-19-19 --    EGD 2016 --    Heart Bypass --  --    Prostate Surgery --  --          Medication List  Name Date Started Instructions   Arnuity Ellipta 200 mcg/actuation inhalation blister with device  inhale 1 puff (200 mcg) by inhalation route once daily at the same time each day   Aspirin Low Dose 81 mg oral tablet,delayed release (DR/EC)  take 1 tablet (81 mg) by oral route once daily   azelastine 137 mcg (0.1 %) nasal aerosol,spray  spray 2 sprays in each nostril by intranasal route 2 times per day   Calcium 600 + D(3) 600 mg(1,500mg) -400 unit oral tablet  take 1 tablet by oral route daily   cetirizine 10 mg oral tablet  take 1 tablet (10 mg) by oral route once daily   Co Q-10 50 mg oral capsule  take 1 capsule by oral route once a day (in the morning)   dexamethasone oral  --    Entresto 24-26 mg oral tablet 11/20/2020 Take 1 tablet by mouth twice daily   famotidine 10 mg oral tablet  take 1 tablet (10 mg) by oral route once daily as needed   famotidine 40 mg oral tablet 06/02/2020 take 1 tablet (40 mg) by oral route once daily at bedtime for 30 days   finasteride 5 mg oral tablet 09/01/2020 take 1 tablet (5 mg) by oral route once daily for 90 days   Flomax 0.4 mg oral capsule 09/15/2020 Take 1 capsule (0.4 mg) by oral route once daily 1/2 hour following the same meal each day for 30 days   furosemide 20 mg oral tablet 08/27/2020 take 1 tablet (20 mg) by oral route once daily for 30 days   isosorbide mononitrate 60 mg oral tablet extended release 24 hr 10/23/2018 Take 1 tablet by mouth  once daily for heart   Livalo 1 mg oral tablet  take 1 tablet (1 mg) by oral route once daily   metoprolol succinate 50 mg oral tablet extended release 24 hr 12/29/2020 take 1 tablet (50 mg) by oral route once daily   nitroglycerin 0.4 mg sublingual tablet, sublingual 06/03/2019 place 1 tablet (0.4 mg) by buccal route at the first sign of an attack; no more than 3 tabs are recommended within a 15 minute period. for 30 days   pantoprazole 40 mg oral tablet,delayed release (DR/EC) 06/19/2019 Take 1 tablet by mouth every day for 30 days   Stiolto Respimat 2.5-2.5 mcg/actuation inhalation mist  inhale 2 puffs by inhalation route once daily at the same time each day   Tylenol Arthritis Pain 650 mg oral tablet extended release  take 2 tablets (1,300 mg) by oral route every 8 hours swallowing whole with water. Do not break, crush, dissolve and/or chew.   Ventolin HFA 90 mcg/actuation inhalation HFA aerosol inhaler  inhale 2 puffs (180 mcg) by inhalation route every 4-6 hours as needed   vitamin B complex oral tablet  take 1 tablet by oral route daily   Xarelto 15 mg oral tablet  take 1 tablet (15 mg) by oral route once daily with the evening meal         Allergy List  Allergen Name Date Reaction Notes   Levaquin --  taken with Rocephin causes SOA and Pruritis --    NSAIDS --  --  --    Ranexa --  dizziness --          Family Medical History  Disease Name Relative/Age Notes   - No Family History of Colorectal Cancer  --    Family history of heart disease Father/   Father   Family history of Gastrointestinal Cancer Brother/   Brother         Social History  Finding Status Start/Stop Quantity Notes   Alcohol Never --/-- --  does not drink  5/23/2018   Denies illicit substance abuse --  --/-- --  5/23/2018   Denies substance abuse --  --/-- --  5/23/2018   lives with spouse --  --/-- --  --     --  --/-- --  --    No known infection risk --  --/-- --  --    Retired --  --/-- --  --    Tobacco Former 15/56 0.5 PPD  former smoker         Immunizations  NameDate Admin Mfg Trade Name Lot Number Route Inj VIS Given VIS Publication   [id number]/08/2019 [facility] Fluzone Quadrivalent [id number] IM LD 10/08/2019    Comments: patient tolerated well         Review of Systems  · Constitutional  o Admits  o : fatigue  o Denies  o : fever, weight loss, weight gain  · Cardiovascular  o Denies  o : lower extremity edema, claudication, chest pressure, palpitations  · Respiratory  o Admits  o : shortness of breath, cough  o Denies  o : wheezing, hemoptysis, dyspnea on exertion  · Gastrointestinal  o Denies  o : nausea, vomiting, diarrhea, constipation, abdominal pain      Physical Examination  · Constitutional  o Appearance  o : well-nourished, no acute distress  · Head and Face  o Head  o :   § Inspection  § : atraumatic, normocephalic  · Respiratory  o Respiratory Effort  o : breathing comfortably, cough  · Skin and Subcutaneous Tissue  o General Inspection  o : no visible rash, no lesions  · Neurologic  o Mental Status Examination  o :   § Orientation  § : grossly oriented to person, place and time, no facial droop          Assessment  · Cough     786.2/R05  · Fatigue     780.79/R53.83  · Need for influenza vaccination     V04.81/Z23  · COVID-19     079.89/U07.1      Plan  · Orders  o ACO-14: Influenza immunization was not administered for reasons documented () - V04.81/Z23 - 02/08/2021   telehealth  o ACO-39: Current medications updated and reviewed (, 1159F) - - 02/08/2021  · Medications  o Medications have been Reconciled  o Transition of Care or Provider Policy  · Instructions  o Flu vaccine declined.  o Patient was educated/instructed on their diagnosis, treatment and medications prior to discharge from the clinic today.            Electronically Signed by: Sam Rhodes PA-C -Author on February 8, 2021 11:44:38 AM

## 2021-05-14 NOTE — PROGRESS NOTES
Progress Note      Patient Name: Layo Cee   Patient ID: [id number]   Sex: Male   YOB: 1940    Primary Care Provider: Sam Rhodes PA-C    Visit Date: February 23, 2021    Provider: Darnell Stevenson MD   Location: [facility]   Location Address: [address] [place] [address], [facility]   [place], [place]  [id number]   Location Phone: [phone]          Chief Complaint     Follow-up visit. The patient reports shortness of breath and some chest discomfort.       History Of Present Illness  REFERRING CARE PROVIDER: Sam Rhodes PA-C   Layo Cee is a 80 year old /White male with coronary artery disease, previous bypass grafting and paroxysmal atrial fibrillation, status post ablation, chronic kidney disease who is here for follow-up visit. The patient had a COVID 19 infection 2 months back which was treated as outpatient He had a visit to the emergency room at [facility] during the infection. He is recovering slowly but still significantly short of breath on minimal exertion. He also feels some chest tightness, palpitations are worse than before. Dizziness is completely resolved. No swelling of the feet. He is going to have a pulmonology appointment later this week.   PAST MEDICAL HISTORY: (1) Coronary artery disease, status post coronary artery bypass grafting in the past. Last cardiac catheterization done in July 2016 showed patent left internal mammary graft to the LAD and occluded saphenous venous graft. Native LAD is 100% occluded in the mid portion. Native circumflex artery has no significant disease. Native right coronary artery is also 100% occluded and it is a chronic total occlusion. The right coronary artery is reconstituted with collaterals from the left side. (2) Ischemic cardiomyopathy with recovery of cardiac function. Last SPECT stress test in August 2016 showed an LV ejection fraction of 68%. (3) Chronic kidney disease, stage 3. (4) Chronic obstructive pulmonary disease, not  "an exacerbation. (5) Hypertension. (6) Hyperlipidemia. 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 (8) Typical atrial flutter status post radiofrequency ablation by Dr. Aguilar on 11/30/2018   PSYCHOSOCIAL HISTORY: Denies alcohol use. Previous use of tobacco, but quit.   CURRENT MEDICATIONS: Medication list was reviewed and is as documented.      ALLERGIES: No known drug allergies.       Review of Systems  · Cardiovascular  o Admits  o : palpitations (fast, fluttering, or skipping beats), shortness of breath while walking or lying flat, chest pain or angina pectoris   o Denies  o : swelling (feet, ankles, hands)  · Respiratory  o Admits  o : chronic or frequent cough  o Denies  o : asthma or wheezing      Vitals  Date Time BP Position Site L\R Cuff Size HR RR TEMP (F) WT  HT  BMI kg/m2 BSA m2 O2 Sat FR L/min FiO2 HC       02/23/2021 11:00 /90 Sitting    74 - R   221lbs 0oz 5'  10\" 31.71 2.23       02/23/2021 11:00 /78 Sitting    74 - R                   Physical Examination  · Respiratory  o Auscultation of Lungs  o : Bilateral faint wheezing, no crackles.  · Cardiovascular  o Heart  o : S1, S2 is normally heard. No S3. No murmur, rubs, or gallops.  · Gastrointestinal  o Abdominal Examination  o : Soft, nontender, nondistended. No free fluid. Bowel sounds heard in all four quadrants.  · Extremities  o Extremities  o : Warm and well perfused. Trace pitting pedal edema bilaterally. Distal pulses present.  · Labs  o Labs  o : Done at [facility] on 02/17 shows BUN of 15, creatinine of 1.25, sodium 138, potassium is 4.4, AST is 20, ALT is 20, total cholesterol is 125, triglycerides 121, LDL is 72, HDL is 29.           Assessment     1.  Shortness of breath/chest tightness. Recent COVID-19 infection, previous on oxygen but he weaned himself off. He has a follow-up appointment with pulmonology. Of note, the patient has a history of " cardiomyopathy and left ventricular function, recovered since then. From a cardiac standpoint we will start the workup with an echocardiogram to reassess the left ventricular function. Continue Lasix 40 mg p.o. daily. We will do proBNP levels. He is going to have a chest x-ray with pulmonology, which we will follow-up as well.   2.  Coronary artery disease. Previous bypass grafting. Currently, the patient reports some chest tightness when he is short of breath. We will start the workup with echocardiogram and if unremarkable the patient will eventually need another stress testing but we will wait for another 2-3 weeks until the patient recovers well from the recent respiratory illness.   3.  Paroxysmal atrial flutter: Status post ablation, currently in sinus rhythm. Continue Xarelto for anticoagulation along with the metoprolol.   4.  Cardiomyopathy. Recovery of left ventricular function with medical therapy. Continue Entresto and metoprolol.     FOLLOW-UP:   We will follow with echocardiogram and lab reports.        Darnell Stevenson MD  JANA/vh               Electronically Signed by: Darnell Stevenson MD -Author on March 7, 2021 03:54:51 PM

## 2021-05-14 NOTE — PROGRESS NOTES
Progress Note      Patient Name: Layo Cee   Patient ID: [id number]   Sex: Male   YOB: 1940    Primary Care Provider: Sam Rhodes PA-C    Visit Date: March 1, 2021    Provider: Sam Rhodes PA-C   Location: [facility]   Location Address: [address] [place] [address], [address]  [place], [place]  [id number]   Location Phone: [phone]          Chief Complaint  · 6 month follow up      History Of Present Illness  Video Conferencing Visit  Layo Cee is a 80 year old /White male who is presenting for evaluation via video conferencing via [employer]. Verbal consent obtained before beginning visit.   The following staff were present during this visit: Kayli Coe CMA/Sam Rhodes PA-C   Layo Cee is a 80 year old /White male who presents for evaluation and treatment of: 6 month follow up.      pt presents today for 6 month follow up.    pt had recent appt on 2/18 for gout flare up. pt states his Lt toe is feeling much better, still has some soreness but nothing like it was.    pt was recently Covid positive, pt states he is doing well, is still having some soa.    pt recently had CT chest 2/23/21 by Dr. Groves and would like to discuss results.    requesting refill of furosemide 20mg    Labs 2/21  flu 10/20    Gout - colcrys  and prednisone  CT Chest - covid pneumonia  Pulse ox - 93-95    ECHO - Wednesday  Reviewed BNP - normal         Past Medical History  Disease Name Date Onset Notes   Arthritis --  --    BPH --  --    CAD (coronary artery disease) --  --    CHF (congestive heart failure) --  --    COPD --  --    COVID-19 02/04/2021 --    Diverticulitis 10/11/2019 --    Dysphagia --  --    Essential hypertension 08/27/2020 --    GERD (gastroesophageal reflux disease) --  --    Gross hematuria --  --    High blood pressure --  --    High cholesterol --  --    Lung disease --  --    MI (myocardial infarction) 7/2016 --    Monitoring long-term use medication --   --    OCD (obsessive compulsive disorder) --  --    Paroxysmal atrial fibrillation 08/12/2019 --    Renal insufficiency 08/27/2020 --    Rhinitis, Allergic 06/05/2018 --    Screening for colon cancer [facility]   Sore throat --  --    Stable angina --  --    Vertigo --  --          Past Surgical History  Procedure Name Date Notes   Bladder Surgery --  --    CABG --  --    Cardiac Catherization 7/2016 --    Colonoscopy 2011 --    Cystoscopy with bilateral retrograde pyelography 3-19-19 --    EGD 2016 --    Heart Bypass --  --    Prostate Surgery --  --          Medication List  Name Date Started Instructions   Arnuity Ellipta 200 mcg/actuation inhalation blister with device  inhale 1 puff (200 mcg) by inhalation route once daily at the same time each day   Aspirin Low Dose 81 mg oral tablet,delayed release (DR/EC)  take 1 tablet (81 mg) by oral route once daily   azelastine 137 mcg (0.1 %) nasal aerosol,spray  spray 2 sprays in each nostril by intranasal route 2 times per day   Calcium 600 + D(3) 600 mg(1,500mg) -400 unit oral tablet  take 1 tablet by oral route daily   cetirizine 10 mg oral tablet  take 1 tablet (10 mg) by oral route once daily   Co Q-10 50 mg oral capsule  take 1 capsule by oral route once a day (in the morning)   colchicine 0.6 mg oral tablet 02/18/2021 take 2 tablets (1.2 mg) by oral route initially, then take 1 tab (0.6 mg ) in 1 hr   dexamethasone oral  --    Entresto 24-26 mg oral tablet 11/20/2020 Take 1 tablet by mouth twice daily   famotidine 10 mg oral tablet  take 1 tablet (10 mg) by oral route once daily as needed   famotidine 40 mg oral tablet 06/02/2020 take 1 tablet (40 mg) by oral route once daily at bedtime for 30 days   finasteride 5 mg oral tablet 09/01/2020 take 1 tablet (5 mg) by oral route once daily for 90 days   Flomax 0.4 mg oral capsule 09/15/2020 Take 1 capsule (0.4 mg) by oral route once daily 1/2 hour following the same meal each day for 30 days   furosemide 20 mg oral  tablet 03/01/2021 take 1 tablet (20 mg) by oral route once daily for 30 days   isosorbide mononitrate 60 mg oral tablet extended release 24 hr 10/23/2018 Take 1 tablet by mouth once daily for heart   Livalo 1 mg oral tablet  take 1 tablet (1 mg) by oral route once daily   metoprolol succinate 50 mg oral tablet extended release 24 hr 12/29/2020 take 1 tablet (50 mg) by oral route once daily   nitroglycerin 0.4 mg sublingual tablet, sublingual 06/03/2019 place 1 tablet (0.4 mg) by buccal route at the first sign of an attack; no more than 3 tabs are recommended within a 15 minute period. for 30 days   pantoprazole 40 mg oral tablet,delayed release (/EC) 06/19/2019 Take 1 tablet by mouth every day for 30 days   Stiolto Respimat 2.5-2.5 mcg/actuation inhalation mist  inhale 2 puffs by inhalation route once daily at the same time each day   triamcinolone acetonide 0.1 % topical ointment 02/18/2021 apply a thin layer to the affected area(s) by topical route 2 times per day   Tylenol Arthritis Pain 650 mg oral tablet extended release  take 2 tablets (1,300 mg) by oral route every 8 hours swallowing whole with water. Do not break, crush, dissolve and/or chew.   Ventolin HFA 90 mcg/actuation inhalation HFA aerosol inhaler  inhale 2 puffs (180 mcg) by inhalation route every 4-6 hours as needed   vitamin B complex oral tablet  take 1 tablet by oral route daily   Xarelto 15 mg oral tablet  take 1 tablet (15 mg) by oral route once daily with the evening meal         Allergy List  Allergen Name Date Reaction Notes   Levaquin --  taken with Rocephin causes SOA and Pruritis --    NSAIDS --  --  --    Ranexa --  dizziness --        Allergies Reconciled  Family Medical History  Disease Name Relative/Age Notes   - No Family History of Colorectal Cancer  --    Family history of heart disease Father/   Father   Family history of Gastrointestinal Cancer Brother/   Brother         Social History  Finding Status Start/Stop Quantity Notes    Alcohol Never --/-- --  does not drink  5/23/2018   Denies illicit substance abuse --  --/-- --  5/23/2018   Denies substance abuse --  --/-- --  5/23/2018   lives with spouse --  --/-- --  --     --  --/-- --  --    No known infection risk --  --/-- --  --    Retired --  --/-- --  --    Tobacco Former 15/56 0.5 PPD former smoker         Immunizations  NameDate Admin Mfg Trade Name Lot Number Route Inj VIS Given VIS Publication   [id number]/2019 [facility] Fluzone Quadrivalent [id number] IM LD 10/08/2019    Comments: patient tolerated well         Review of Systems  · Constitutional  o Denies  o : fever, fatigue, weight loss, weight gain  · Cardiovascular  o Denies  o : lower extremity edema, claudication, chest pressure, palpitations  · Respiratory  o Denies  o : shortness of breath, wheezing, cough, hemoptysis, dyspnea on exertion  · Gastrointestinal  o Denies  o : nausea, vomiting, diarrhea, constipation, abdominal pain      Physical Examination  · Constitutional  o Appearance  o : well-nourished, no acute distress  · Head and Face  o Head  o :   § Inspection  § : atraumatic, normocephalic  · Respiratory  o Respiratory Effort  o : breathing comfortably, no cough  · Skin and Subcutaneous Tissue  o General Inspection  o : no visible rash, no lesions  · Neurologic  o Mental Status Examination  o :   § Orientation  § : grossly oriented to person, place and time, no facial droop          Assessment  · Essential hypertension     401.9/I10  · Hyperlipidemia     272.4/E78.5  · Need for influenza vaccination     V04.81/Z23  · COVID-19     079.89/U07.1  · Paroxysmal atrial fibrillation     427.31/I48.0  · Renal insufficiency     593.9/N28.9      Plan  · Orders  o ACO-14: Influenza immunization was not administered for reasons documented () - V04.81/Z23 - 03/01/2021   rcvd 10/20  o ACO-39: Current medications updated and reviewed (1159F, ) - - 03/01/2021  · Medications  o furosemide 20 mg oral tablet   SIG:  take 1 tablet (20 mg) by oral route once daily for 30 days   DISP: (30) Tablet with 2 refills  Refilled on 03/01/2021     o Medications have been Reconciled  o Transition of Care or Provider Policy  · Instructions  o Patient advised to monitor blood pressure (B/P) at home and journal readings. Patient informed that a B/P reading at home of more than 130/80 is considered hypertension. For readings greater [id number]/90 or higher patient is advised to follow up in the office with readings for management. Patient advised to limit sodium intake.  o Patient was educated and given low cholesterol diet information.  o Recommended exercise program to assist with cholesterol, weight loss and overall health improvement.  o Advised that cheeses and other sources of dairy fats, animal fats, fast food, and the extras (candy, pastries, pies, doughnuts and cookies) all contain LDL raising nutrients. Advised to increase fruits, vegetables, whole grains, and to monitor portion sizes.   o Flu vaccine declined.  o Take all medications as prescribed/directed.  o Patient instructed/educated on their diet and exercise program.  o Rest. Increase Fluids.  o Patient was educated/instructed on their diagnosis, treatment and medications prior to discharge from the clinic today.  o Discussed Covid-19 precautions including, but not limited to, social distancing, avoid touching your face, and hand washing.   · Disposition  o Call or Return if symptoms worsen or persist.  o F/U 2 weeks  o Care Transition            Electronically Signed by: Sam Rhodes PA-C -Author on March 3, 2021 07:33:59 PM

## 2021-05-14 NOTE — PROGRESS NOTES
Progress Note      Patient Name: Layo Cee   Patient ID: [id number]   Sex: Male   YOB: 1940    Primary Care Provider: Sam Rhodes PA-C    Visit Date: April 8, 2021    Provider: Sam Rhodes PA-C   [place] [facility] [place] [address] [place] [address], [address]  [place]  [id number]   Location Phone: [phone]          Chief Complaint  · Follow up  · Gout  · SOA      History Of Present Illness  Layo Cee is a 81 year old /White male who presents for evaluation and treatment of: follow up      Pt presents today for a follow up.     Pt c/o Gout flare up on and off since Feb in (L) foot. Pt notes it has gotten better at times then comes back, he says his toe is very sore.     Pt also c/o soa since Jan. Pt tested positive for COVID-19 in Jan. He notes he has an appt with Dr. Luna ([facility]) tomorrow.     Labs-2/2021  Colonoscopy-8/2011    Pt is due his annual wellness today    ECHO normal stable with cardio  Seeing pulmo tomorrow    He cont to have SOA, pmh of COPD, seems worsened since having covid.  Renal insuff - fair control  Gout - fair control           Past Medical History  Disease Name Date Onset Notes   Arthritis --  --    BPH --  --    CAD (coronary artery disease) --  --    CHF (congestive heart failure) --  --    COPD --  --    COVID-19 02/04/2021 --    Diverticulitis 10/11/2019 --    Dysphagia --  --    Essential hypertension 08/27/2020 --    GERD (gastroesophageal reflux disease) --  --    Gross hematuria --  --    High blood pressure --  --    High cholesterol --  --    Lung disease --  --    MI (myocardial infarction) 7/2016 --    Monitoring long-term use medication --  --    OCD (obsessive compulsive disorder) --  --    Paroxysmal atrial fibrillation 08/12/2019 --    Renal insufficiency 08/27/2020 --    Rhinitis, Allergic 06/05/2018 --    Screening for colon cancer 2011 [facility]   Sore throat --  --    Stable angina --  --    Vertigo --   --          Past Surgical History  Procedure Name Date Notes   Bladder Surgery --  --    CABG --  --    Cardiac Catherization 7/2016 --    Colonoscopy 2011 --    Cystoscopy with bilateral retrograde pyelography 3-19-19 --    EGD 2016 --    Heart Bypass --  --    Prostate Surgery --  --          Medication List  Name Date Started Instructions   Arnuity Ellipta 200 mcg/actuation inhalation blister with device  inhale 1 puff (200 mcg) by inhalation route once daily at the same time each day   Aspirin Low Dose 81 mg oral tablet,delayed release (DR/EC)  take 1 tablet (81 mg) by oral route once daily   azelastine 137 mcg (0.1 %) nasal aerosol,spray  spray 2 sprays in each nostril by intranasal route 2 times per day   Calcium 600 + D(3) 600 mg(1,500mg) -400 unit oral tablet  take 1 tablet by oral route daily   cetirizine 10 mg oral tablet  take 1 tablet (10 mg) by oral route once daily   Co Q-10 50 mg oral capsule  take 1 capsule by oral route once a day (in the morning)   colchicine 0.6 mg oral tablet 04/08/2021 take 2 tablets (1.2 mg) by oral route initially, then take 1 tab (0.6 mg ) in 1 hr   Entresto 24-26 mg oral tablet 11/20/2020 Take 1 tablet by mouth twice daily   famotidine 10 mg oral tablet  take 1 tablet (10 mg) by oral route once daily as needed   famotidine 40 mg oral tablet 06/02/2020 take 1 tablet (40 mg) by oral route once daily at bedtime for 30 days   finasteride 5 mg oral tablet 09/01/2020 take 1 tablet (5 mg) by oral route once daily for 90 days   Flomax 0.4 mg oral capsule 09/15/2020 Take 1 capsule (0.4 mg) by oral route once daily 1/2 hour following the same meal each day for 30 days   furosemide 20 mg oral tablet 03/01/2021 take 1 tablet (20 mg) by oral route once daily for 30 days   isosorbide mononitrate 60 mg oral tablet extended release 24 hr 10/23/2018 Take 1 tablet by mouth once daily for heart   Livalo 1 mg oral tablet  take 1 tablet (1 mg) by oral route once daily   metoprolol succinate 50  mg oral tablet extended release 24 hr 12/29/2020 take 1 tablet (50 mg) by oral route once daily   nitroglycerin 0.4 mg sublingual tablet, sublingual 06/03/2019 place 1 tablet (0.4 mg) by buccal route at the first sign of an attack; no more than 3 tabs are recommended within a 15 minute period. for 30 days   pantoprazole 40 mg oral tablet,delayed release (DR/EC) 06/19/2019 Take 1 tablet by mouth every day for 30 days   Stiolto Respimat 2.5-2.5 mcg/actuation inhalation mist  inhale 2 puffs by inhalation route once daily at the same time each day   Tylenol Arthritis Pain 650 mg oral tablet extended release  take 2 tablets (1,300 mg) by oral route every 8 hours swallowing whole with water. Do not break, crush, dissolve and/or chew.   Ventolin HFA 90 mcg/actuation inhalation HFA aerosol inhaler  inhale 2 puffs (180 mcg) by inhalation route every 4-6 hours as needed   vitamin B complex oral tablet  take 1 tablet by oral route daily   Xarelto 15 mg oral tablet  take 1 tablet (15 mg) by oral route once daily with the evening meal         Allergy List  Allergen Name Date Reaction Notes   Levaquin --  taken with Rocephin causes SOA and Pruritis --    NSAIDS --  --  --    Ranexa --  dizziness --          Family Medical History  Disease Name Relative/Age Notes   - No Family History of Colorectal Cancer  --    Family history of heart disease Father/   Father   Family history of Gastrointestinal Cancer Brother/   Brother         Social History  Finding Status Start/Stop Quantity Notes   Alcohol Never --/-- --  does not drink  5/23/2018   Denies illicit substance abuse --  --/-- --  5/23/2018   Denies substance abuse --  --/-- --  5/23/2018   lives with spouse --  --/-- --  --     --  --/-- --  --    No known infection risk --  --/-- --  --    Retired --  --/-- --  --    Tobacco Former 15/56 0.5 PPD former smoker         Immunizations  NameDate Admin Mfg Trade Name Lot Number Route Inj VIS Given VIS Publication  "  [id number]/08/2019 [facility] Fluzone Quadrivalent [id number] IM LD 10/08/2019    Comments: patient tolerated well         Review of Systems  · Constitutional  o Denies  o : fever, fatigue, weight loss, weight gain  · Cardiovascular  o Denies  o : lower extremity edema, claudication, chest pressure, palpitations  · Respiratory  o Denies  o : shortness of breath, wheezing, cough, hemoptysis, dyspnea on exertion  · Gastrointestinal  o Denies  o : nausea, vomiting, diarrhea, constipation, abdominal pain      Vitals  Date Time BP Position Site L\R Cuff Size HR RR TEMP (F) WT  HT  BMI kg/m2 BSA m2 O2 Sat FR L/min FiO2 HC       04/08/2021 10:08 /68 Sitting    88 - R   233lbs 3.2oz 5'  10\" 33.46 2.29 94 %            Physical Examination  · Constitutional  o Appearance  o : overweight, well developed, alert, in no acute distress  · Head and Face  o Head  o : normocephalic, atraumatic  · Neck  o Inspection/Palpation  o : normal appearance, no masses or tenderness, trachea midline  o Thyroid  o : gland size normal, nontender, no nodules or masses present on palpation  · Respiratory  o Respiratory Effort  o : breathing unlabored  o Inspection of Chest  o : chest rise symmetric bilaterally  o Auscultation of Lungs  o : diminished breath sounds present    · Cardiovascular  o Heart  o :   § Auscultation of Heart  § : regular rate and rhythm, no murmurs, gallops or rubs  o Peripheral Vascular System  o :   § Extremities  § : no edema  · Lymphatic  o Neck  o : no cervical lymphadenopathy, no supraclavicular lymphadenopathy  · Psychiatric  o Mood and Affect  o : mood normal, affect appropriate     LEFT Great toe - erythematous, swollen, tender to touch, full rom           Assessment  · Essential hypertension     401.9/I10  · GERD (gastroesophageal reflux disease)     530.81/K21.9  · Renal insufficiency     593.9/N28.9  · Rhinitis, Allergic     477.9/J30.9  · COPD     496  · MI (myocardial infarction)     410.90/I21.3  · CAD (coronary " artery disease)     414.00/I25.10  · Shortness of breath     786.05/R06.02  · Gout     274.9/M10.9      Plan  · Orders  o Uric Acid (Serum) (60778) - 401.9/I10 - 04/08/2021  o ACO-13: Fall Risk Screening with 2 or more falls in past year or any fall with injury in the past year (1100F) - - 04/08/2021  o ACO - Pt declines to or was not able to provide an Advance Care Plan or name a Surrogate Decision Maker (1124F) - - 04/08/2021  o ACO-39: Current medications updated and reviewed (1159F, ) - - 04/08/2021  · Medications  o allopurinol 100 mg oral tablet   SIG: take 1 tablet (100 mg) by oral route once daily   DISP: (30) Tablet with 2 refills  Prescribed on 04/08/2021     o colchicine 0.6 mg oral tablet   SIG: take 2 tablets (1.2 mg) by oral route initially, then take 1 tab (0.6 mg ) in 1 hr   DISP: (6) Tablet with 0 refills  Refilled on 04/08/2021     o dexamethasone oral   SIG: ---   DISP: (0) Tablet with 0 refills  Discontinued on 04/08/2021     o triamcinolone acetonide 0.1 % topical ointment   SIG: apply a thin layer to the affected area(s) by topical route 2 times per day   DISP: (1) Tube with 0 refills  Discontinued on 04/08/2021     o Medications have been Reconciled  o Transition of Care or Provider Policy  · Instructions  o Patient advised to monitor blood pressure (B/P) at home and journal readings. Patient informed that a B/P reading at home of more than 130/80 is considered hypertension. For readings greater ziwt133/90 or higher patient is advised to follow up in the office with readings for management. Patient advised to limit sodium intake.  o Maintain a healthy weight. Avoid tight fitting clothes. Avoid fried, fatty foods, tomato sauce, chocolate, mint, garlic, onion, alcohol. caffeine. Eat smaller meals, dont lie down after a meal, dont smoke. Elevate the head of your bed 6-9 inches.  o Take all medications as prescribed/directed.  o Patient instructed/educated on their diet and exercise  program.  o Patient was educated/instructed on their diagnosis, treatment and medications prior to discharge from the clinic today.  o Patient counseled to reduce calorie intake.  o Patient was instructed to exercise regularly.  o Discussed Covid-19 precautions including, but not limited to, social distancing, avoid touching your face, and hand washing.   o Low purine diet  · Disposition  o Call or Return if symptoms worsen or persist.  o F/U in clinic in 1 month.  o F/U in 3 months.  o Care Transition            Electronically Signed by: Sam Rhodes PA-C -Author on April 8, 2021 09:26:19 PM

## 2021-05-14 NOTE — PROGRESS NOTES
Progress Note      Patient Name: Layo Cee   Patient ID: [id number]   Sex: Male   YOB: 1940    Primary Care Provider: Sam Rhodes PA-C    Visit Date: March 18, 2021    Provider: Sam Rhodes PA-C   Location: [facility]   Location Address: [address] [place] [address], [address]  [place], [place]  [id number]   Location Phone: [phone]          Chief Complaint  · 2 week follow up      History Of Present Illness  Video Conferencing Visit  Layo Cee is a 81 year old /White male who is presenting for evaluation via video conferencing via [employer]. Verbal consent obtained before beginning visit.   The following staff were present during this visit: Michael Cullen MA/Sam Rhodes PA-C   Layo Cee is a 81 year old /White male who presents for evaluation and treatment of: 2 week follow up      Pt presents today via [employer] for a 2 week follow up.     Pt was seen for Gout flare up 2/18/21, he notes the (L) toe is feeling much better but still has some soreness on occasion.     Pt tested positive for COVID-19 1/2021, he is still having soa. Pt has follow up appt with Dr. Luna 4/2021.    Pt notes he had recent ECHO with Dr. Stevenson, per pt the echo was normal. Pt stated Dr. Stevenson said the soa was not from cardiac stand point.     Labs-2/2021  Flu-10/2020 [facility]- [place]    CT Chest - subtle ground glass bilat classic for covid    Gout - doing better, he is feeling better overall    Still coughing, non prod at times productive, no fever, chills  COPD - fair control - seeing pulmo       Past Medical History  Disease Name Date Onset Notes   Arthritis --  --    BPH --  --    CAD (coronary artery disease) --  --    CHF (congestive heart failure) --  --    COPD --  --    COVID-19 02/04/2021 --    Diverticulitis 10/11/2019 --    Dysphagia --  --    Essential hypertension 08/27/2020 --    GERD (gastroesophageal reflux disease) --  --    Gross hematuria --  --     High blood pressure --  --    High cholesterol --  --    Lung disease --  --    MI (myocardial infarction) 7/2016 --    Monitoring long-term use medication --  --    OCD (obsessive compulsive disorder) --  --    Paroxysmal atrial fibrillation 08/12/2019 --    Renal insufficiency 08/27/2020 --    Rhinitis, Allergic 06/05/2018 --    Screening for colon cancer 2011 [facility]   Sore throat --  --    Stable angina --  --    Vertigo --  --          Past Surgical History  Procedure Name Date Notes   Bladder Surgery --  --    CABG --  --    Cardiac Catherization 7/2016 --    Colonoscopy 2011 --    Cystoscopy with bilateral retrograde pyelography 3-19-19 --    EGD 2016 --    Heart Bypass --  --    Prostate Surgery --  --          Medication List  Name Date Started Instructions   Arnuity Ellipta 200 mcg/actuation inhalation blister with device  inhale 1 puff (200 mcg) by inhalation route once daily at the same time each day   Aspirin Low Dose 81 mg oral tablet,delayed release (DR/EC)  take 1 tablet (81 mg) by oral route once daily   azelastine 137 mcg (0.1 %) nasal aerosol,spray  spray 2 sprays in each nostril by intranasal route 2 times per day   Calcium 600 + D(3) 600 mg(1,500mg) -400 unit oral tablet  take 1 tablet by oral route daily   cetirizine 10 mg oral tablet  take 1 tablet (10 mg) by oral route once daily   Co Q-10 50 mg oral capsule  take 1 capsule by oral route once a day (in the morning)   colchicine 0.6 mg oral tablet 02/18/2021 take 2 tablets (1.2 mg) by oral route initially, then take 1 tab (0.6 mg ) in 1 hr   dexamethasone oral  --    Entresto 24-26 mg oral tablet 11/20/2020 Take 1 tablet by mouth twice daily   famotidine 10 mg oral tablet  take 1 tablet (10 mg) by oral route once daily as needed   famotidine 40 mg oral tablet 06/02/2020 take 1 tablet (40 mg) by oral route once daily at bedtime for 30 days   finasteride 5 mg oral tablet 09/01/2020 take 1 tablet (5 mg) by oral route once daily for 90 days   Flomax  0.4 mg oral capsule 09/15/2020 Take 1 capsule (0.4 mg) by oral route once daily 1/2 hour following the same meal each day for 30 days   furosemide 20 mg oral tablet 03/01/2021 take 1 tablet (20 mg) by oral route once daily for 30 days   isosorbide mononitrate 60 mg oral tablet extended release 24 hr 10/23/2018 Take 1 tablet by mouth once daily for heart   Livalo 1 mg oral tablet  take 1 tablet (1 mg) by oral route once daily   metoprolol succinate 50 mg oral tablet extended release 24 hr 12/29/2020 take 1 tablet (50 mg) by oral route once daily   nitroglycerin 0.4 mg sublingual tablet, sublingual 06/03/2019 place 1 tablet (0.4 mg) by buccal route at the first sign of an attack; no more than 3 tabs are recommended within a 15 minute period. for 30 days   pantoprazole 40 mg oral tablet,delayed release (DR/EC) 06/19/2019 Take 1 tablet by mouth every day for 30 days   Stiolto Respimat 2.5-2.5 mcg/actuation inhalation mist  inhale 2 puffs by inhalation route once daily at the same time each day   triamcinolone acetonide 0.1 % topical ointment 02/18/2021 apply a thin layer to the affected area(s) by topical route 2 times per day   Tylenol Arthritis Pain 650 mg oral tablet extended release  take 2 tablets (1,300 mg) by oral route every 8 hours swallowing whole with water. Do not break, crush, dissolve and/or chew.   Ventolin HFA 90 mcg/actuation inhalation HFA aerosol inhaler  inhale 2 puffs (180 mcg) by inhalation route every 4-6 hours as needed   vitamin B complex oral tablet  take 1 tablet by oral route daily   Xarelto 15 mg oral tablet  take 1 tablet (15 mg) by oral route once daily with the evening meal         Allergy List  Allergen Name Date Reaction Notes   Levaquin --  taken with Rocephin causes SOA and Pruritis --    NSAIDS --  --  --    Ranexa --  dizziness --          Family Medical History  Disease Name Relative/Age Notes   - No Family History of Colorectal Cancer  --    Family history of heart disease  Father/   Father   Family history of Gastrointestinal Cancer Brother/   Brother         Social History  Finding Status Start/Stop Quantity Notes   Alcohol Never --/-- --  does not drink  5/23/2018   Denies illicit substance abuse --  --/-- --  5/23/2018   Denies substance abuse --  --/-- --  5/23/2018   lives with spouse --  --/-- --  --     --  --/-- --  --    No known infection risk --  --/-- --  --    Retired --  --/-- --  --    Tobacco Former 15/56 0.5 PPD former smoker         Immunizations  NameDate Admin Mfg Trade Name Lot Number Route Inj VIS Given VIS Publication   [id number]/08/2019 [facility] Fluzone Quadrivalent [id number] IM LD 10/08/2019    Comments: patient tolerated well         Review of Systems  · Constitutional  o Denies  o : fever, fatigue, weight loss, weight gain  · Cardiovascular  o Denies  o : lower extremity edema, claudication, chest pressure, palpitations  · Respiratory  o Denies  o : shortness of breath, wheezing, cough, hemoptysis, dyspnea on exertion  · Gastrointestinal  o Denies  o : nausea, vomiting, diarrhea, constipation, abdominal pain      Physical Examination  · Constitutional  o Appearance  o : well-nourished, no acute distress  · Head and Face  o Head  o :   § Inspection  § : atraumatic, normocephalic  · Respiratory  o Respiratory Effort  o : breathing unlabored  · Skin and Subcutaneous Tissue  o General Inspection  o : no visible rash, no lesions  · Neurologic  o Mental Status Examination  o :   § Orientation  § : grossly oriented to person, place and time, no facial droop          Assessment  · CHF (congestive heart failure)     428.0/I50.9  · Essential hypertension     401.9/I10  · Need for influenza vaccination     V04.81/Z23  · COVID-19     079.89/U07.1  · Renal insufficiency     593.9/N28.9  · COPD     496  · CAD (coronary artery disease)     414.00/I25.10  · History of COVID-19     V12.09/Z86.16      Plan  · Orders  o ACO-14: Influenza immunization administered or  previously received () - V04.81/Z23 - 03/18/2021   10/2020 Christo Henry  o ACO-39: Current medications updated and reviewed (, 1159F) - - 03/18/2021  · Medications  o Medications have been Reconciled  o Transition of Care or Provider Policy  · Instructions  o Patient advised to monitor blood pressure (B/P) at home and journal readings. Patient informed that a B/P reading at home of more than 130/80 is considered hypertension. For readings greater gyuk486/90 or higher patient is advised to follow up in the office with readings for management. Patient advised to limit sodium intake.  o Patient was educated/instructed on their diagnosis, treatment and medications prior to discharge from the clinic today.  o Discussed Covid-19 precautions including, but not limited to, social distancing, avoid touching your face, and hand washing.   · Disposition  o Call or Return if symptoms worsen or persist.  o F/U in clinic in 1 month.  o Care Transition            Electronically Signed by: Sam Rhodes PA-C -Author on March 18, 2021 09:23:26 AM

## 2021-05-14 NOTE — PROGRESS NOTES
Progress Note      Patient Name: Layo Cee   Patient ID: [id number]   Sex: Male   YOB: 1940    Primary Care Provider: Sam Rhodes PA-C    Visit Date: February 4, 2021    Provider: Sam Rhodes PA-C   Location: [facility]   Location Address: [address] [place] [address], [address]  [place], [place]  [id number]   Location Phone: [phone]          Chief Complaint  · COVID-19      History Of Present Illness  Video Conferencing Visit  Layo Cee is a 80 year old /White male who is presenting for evaluation via video conferencing via [employer]. Verbal consent obtained before beginning visit.   The following staff were present during this visit: Michael Cullen MA/Sam Rhodes PA-C   Layo Cee is a 80 year old /White male who presents for evaluation and treatment of: COVID-19      Pt presents today to follow up on COVID-19.     Pt was dx with COVID-19 1/22/21 at [facility] in [place].     Pt c/o productive cough with clear sputum, fatigue, weakness, soa. Pt notes he had loss of taste but it is starting to come back.     Pt went to ER at [facility] 2/2/21, per Pt CXR showed fluid on lungs. Requested records from [facility]. Pt was given Z Pack and Dexamethasone. He stated he is starting to feel better but gets soa easily.    Pt went to ER after urgent care  EKG, CT Chest, D-dimer - normal per pt    Fluid in his lungs per pt  Some chest heaviness  96% - Pulse ox  CHF - poor control       Past Medical History  Disease Name Date Onset Notes   Arthritis --  --    BPH --  --    CAD (coronary artery disease) --  --    CHF (congestive heart failure) --  --    COPD --  --    Diverticulitis 10/11/2019 --    Dysphagia --  --    Essential hypertension 08/27/2020 --    GERD (gastroesophageal reflux disease) --  --    Gross hematuria --  --    High blood pressure --  --    High cholesterol --  --    Lung disease --  --    MI (myocardial infarction) 7/2016 --     Monitoring long-term use medication --  --    OCD (obsessive compulsive disorder) --  --    Paroxysmal atrial fibrillation 08/12/2019 --    Renal insufficiency 08/27/2020 --    Rhinitis, Allergic 06/05/2018 --    Screening for colon cancer [facility]   Sore throat --  --    Stable angina --  --    Vertigo --  --          Past Surgical History  Procedure Name Date Notes   Bladder Surgery --  --    CABG --  --    Cardiac Catherization 7/2016 --    Colonoscopy 2011 --    Cystoscopy with bilateral retrograde pyelography 3-19-19 --    EGD 2016 --    Heart Bypass --  --    Prostate Surgery --  --          Medication List  Name Date Started Instructions   Arnuity Ellipta 200 mcg/actuation inhalation blister with device  inhale 1 puff (200 mcg) by inhalation route once daily at the same time each day   Aspirin Low Dose 81 mg oral tablet,delayed release (DR/EC)  take 1 tablet (81 mg) by oral route once daily   azelastine 137 mcg (0.1 %) nasal aerosol,spray  spray 2 sprays in each nostril by intranasal route 2 times per day   Calcium 600 + D(3) 600 mg(1,500mg) -400 unit oral tablet  take 1 tablet by oral route daily   cetirizine 10 mg oral tablet  take 1 tablet (10 mg) by oral route once daily   Co Q-10 50 mg oral capsule  take 1 capsule by oral route once a day (in the morning)   dexamethasone oral  --    Entresto 24-26 mg oral tablet 11/20/2020 Take 1 tablet by mouth twice daily   famotidine 10 mg oral tablet  take 1 tablet (10 mg) by oral route once daily as needed   famotidine 40 mg oral tablet 06/02/2020 take 1 tablet (40 mg) by oral route once daily at bedtime for 30 days   finasteride 5 mg oral tablet 09/01/2020 take 1 tablet (5 mg) by oral route once daily for 90 days   Flomax 0.4 mg oral capsule 09/15/2020 Take 1 capsule (0.4 mg) by oral route once daily 1/2 hour following the same meal each day for 30 days   furosemide 20 mg oral tablet 08/27/2020 take 1 tablet (20 mg) by oral route once daily for 30 days    isosorbide mononitrate 60 mg oral tablet extended release 24 hr 10/23/2018 Take 1 tablet by mouth once daily for heart   Livalo 1 mg oral tablet  take 1 tablet (1 mg) by oral route once daily   metoprolol succinate 50 mg oral tablet extended release 24 hr 12/29/2020 take 1 tablet (50 mg) by oral route once daily   nitroglycerin 0.4 mg sublingual tablet, sublingual 06/03/2019 place 1 tablet (0.4 mg) by buccal route at the first sign of an attack; no more than 3 tabs are recommended within a 15 minute period. for 30 days   pantoprazole 40 mg oral tablet,delayed release (DR/EC) 06/19/2019 Take 1 tablet by mouth every day for 30 days   Stiolto Respimat 2.5-2.5 mcg/actuation inhalation mist  inhale 2 puffs by inhalation route once daily at the same time each day   Tylenol Arthritis Pain 650 mg oral tablet extended release  take 2 tablets (1,300 mg) by oral route every 8 hours swallowing whole with water. Do not break, crush, dissolve and/or chew.   Ventolin HFA 90 mcg/actuation inhalation HFA aerosol inhaler  inhale 2 puffs (180 mcg) by inhalation route every 4-6 hours as needed   vitamin B complex oral tablet  take 1 tablet by oral route daily   Xarelto 15 mg oral tablet  take 1 tablet (15 mg) by oral route once daily with the evening meal   Zithromax Z-Massimo 250 mg oral tablet  take 2 tablets (500 mg) by oral route once daily for 1 day then 1 tablet (250 mg) by oral route once daily for 4 days         Allergy List  Allergen Name Date Reaction Notes   Levaquin --  taken with Rocephin causes SOA and Pruritis --    NSAIDS --  --  --    Ranexa --  dizziness --          Family Medical History  Disease Name Relative/Age Notes   - No Family History of Colorectal Cancer  --    Family history of heart disease Father/   Father   Family history of Gastrointestinal Cancer Brother/   Brother         Social History  Finding Status Start/Stop Quantity Notes   Alcohol Never --/-- --  does not drink  5/23/2018   Denies illicit  substance abuse --  --/-- --  5/23/2018   Denies substance abuse --  --/-- --  5/23/2018   lives with spouse --  --/-- --  --     --  --/-- --  --    No known infection risk --  --/-- --  --    Retired --  --/-- --  --    Tobacco Former 15/56 0.5 PPD former smoker         Immunizations  NameDate Admin Mfg Trade Name Lot Number Route Inj VIS Given VIS Publication   Oroclurdv04/08/2019 [employer] Fluzone Quadrivalent [id number] IM LD 10/08/2019    Comments: patient tolerated well         Review of Systems  · Constitutional  o Admits  o : fatigue  o Denies  o : fever, weight loss, weight gain  · Cardiovascular  o Denies  o : lower extremity edema, claudication, chest pressure, palpitations  · Respiratory  o Admits  o : shortness of breath, wheezing, cough  o Denies  o : hemoptysis, dyspnea on exertion  · Gastrointestinal  o Denies  o : nausea, vomiting, diarrhea, constipation, abdominal pain      Vitals  Date Time BP Position Site L\R Cuff Size HR RR TEMP (F) WT  HT  BMI kg/m2 BSA m2 O2 Sat FR L/min FiO2 HC       02/04/2021 09:31 AM             96 %            Physical Examination  · Constitutional  o Appearance  o : well-nourished, no acute distress  · Head and Face  o Head  o :   § Inspection  § : atraumatic, normocephalic  · Respiratory  o Respiratory Effort  o : breathing comfortably, cough  · Skin and Subcutaneous Tissue  o General Inspection  o : no visible rash, no lesions  · Neurologic  o Mental Status Examination  o :   § Orientation  § : grossly oriented to person, place and time, no facial droop          Assessment  · CHF (congestive heart failure)     428.0/I50.9  · Cough     786.2/R05  · Fatigue     780.79/R53.83  · COVID-19     079.89/U07.1  1/22/20  · Shortness of breath     786.05/R06.02  · Weakness     780.79/R53.1      Plan  · Orders  o ACO-39: Current medications updated and reviewed (1159F, ) - - 02/04/2021  · Medications  o Medications have been Reconciled  o Transition of Care or Provider  Policy  · Instructions  o Take all medications as prescribed/directed.  o Patient instructed/educated on their diet and exercise program.  o Rest. Increase Fluids.  o Patient was educated/instructed on their diagnosis, treatment and medications prior to discharge from the clinic today.  o Discussed Covid-19 precautions including, but not limited to, social distancing, avoid touching your face, and hand washing.   o Increase Lasix to 40mg for 3-4 days  · Disposition  o Call or Return if symptoms worsen or persist.  o F/U 1 week  o Care Transition            Electronically Signed by: Sam Rhodes PA-C -Author on February 4, 2021 09:53:16 AM

## 2021-05-14 NOTE — PROGRESS NOTES
Progress Note      Patient Name: Layo Cee   Patient ID: [id number]   Sex: Male   YOB: 1940    Primary Care Provider: Sam Rhodes PA-C    Visit Date: April 8, 2021    Provider: Sam Rhodes PA-C   Location: [facility]   Location Address: [address] [place] [address], [address]  [place], [place]  [id number]   Location Phone: [phone]          Chief Complaint  · Annual Wellness Exam      History Of Present Illness  The patient is a 81 year old /White male who has come to this office for his Annual Wellness Visit.   His Primary Care Provider is Sam Rhodes PA-C. His comprehensive Care Team list, including suppliers, has been updated on the Facesheet. His medical/family history, height, weight, BMI, and blood pressure have been reviewed and are in the chart. The Health Risk Assessment has been completed and scanned in the chart.   Medications are listed in the medication list.   The active problem list includes: Arthritis, BPH, CAD (coronary artery disease), CHF (congestive heart failure), COPD, COVID-19, Diverticulitis, Dysphagia, Essential hypertension, GERD (gastroesophageal reflux disease), Gross hematuria, High blood pressure, High cholesterol, Lung disease, MI (myocardial infarction), Monitoring long-term use medication, OCD (obsessive compulsive disorder), Paroxysmal atrial fibrillation, Renal insufficiency, Rhinitis, Allergic, Screening for colon cancer, Sore throat, Stable angina, and Vertigo   The patient does not have a history of substance use.   Patient reports his diet is adequate.   The Mini-Cog has been administered and is scanned in chart. The results are negative. His cognitive function is without limitation.   A hearing loss screen was completed today and the result is negative.   Patient does not have any risk factors for depression. Patient completed the PHQ-9 today and it has been scanned in the chart. The total score is 1-4.   The Get Up and Go screen  was administered today and the result is negative.   The Hernandez Index of [place] in ADLs indicated full function (score of 6).   A Falls Risk Assessment has been completed, including a review of home fall hazards and medication review.   Overall, the patient's functional ability is noted by this provider to be within normal limits. His level of safety is noted to be within normal limits. His balance/gait is within normal limits. There have been no falls in the past year. Patient-specific home safety recommendations have been reviewed and a copy has been given to patient.   He denies issues with leaking urine.   There are no additional risk factors identified.   Living Will/Advanced Directive has not previously been completed.   Personalized health advice was given to the patient and a written health screening schedule was established; see Plan for details.   Layo Cee is a 81 year old /White male who presents for evaluation and treatment of:       Past Medical History  Disease Name Date Onset Notes   Arthritis --  --    BPH --  --    CAD (coronary artery disease) --  --    CHF (congestive heart failure) --  --    COPD --  --    COVID-19 02/04/2021 --    Diverticulitis 10/11/2019 --    Dysphagia --  --    Essential hypertension 08/27/2020 --    GERD (gastroesophageal reflux disease) --  --    Gross hematuria --  --    High blood pressure --  --    High cholesterol --  --    Lung disease --  --    MI (myocardial infarction) 7/2016 --    Monitoring long-term use medication --  --    OCD (obsessive compulsive disorder) --  --    Paroxysmal atrial fibrillation 08/12/2019 --    Renal insufficiency 08/27/2020 --    Rhinitis, Allergic 06/05/2018 --    Screening for colon cancer 2011 [facility]   Sore throat --  --    Stable angina --  --    Vertigo --  --          Past Surgical History  Procedure Name Date Notes   Bladder Surgery --  --    CABG --  --    Cardiac Catherization 7/2016 --    Colonoscopy 2011 --     Cystoscopy with bilateral retrograde pyelography 3-19-19 --    EGD 2016 --    Heart Bypass --  --    Prostate Surgery --  --          Medication List  Name Date Started Instructions   allopurinol 100 mg oral tablet 04/08/2021 take 1 tablet (100 mg) by oral route once daily   Arnuity Ellipta 200 mcg/actuation inhalation blister with device  inhale 1 puff (200 mcg) by inhalation route once daily at the same time each day   Aspirin Low Dose 81 mg oral tablet,delayed release (DR/EC)  take 1 tablet (81 mg) by oral route once daily   azelastine 137 mcg (0.1 %) nasal aerosol,spray  spray 2 sprays in each nostril by intranasal route 2 times per day   Calcium 600 + D(3) 600 mg(1,500mg) -400 unit oral tablet  take 1 tablet by oral route daily   cetirizine 10 mg oral tablet  take 1 tablet (10 mg) by oral route once daily   Co Q-10 50 mg oral capsule  take 1 capsule by oral route once a day (in the morning)   colchicine 0.6 mg oral tablet 04/08/2021 take 2 tablets (1.2 mg) by oral route initially, then take 1 tab (0.6 mg ) in 1 hr   Entresto 24-26 mg oral tablet 11/20/2020 Take 1 tablet by mouth twice daily   famotidine 10 mg oral tablet  take 1 tablet (10 mg) by oral route once daily as needed   famotidine 40 mg oral tablet 06/02/2020 take 1 tablet (40 mg) by oral route once daily at bedtime for 30 days   finasteride 5 mg oral tablet 09/01/2020 take 1 tablet (5 mg) by oral route once daily for 90 days   Flomax 0.4 mg oral capsule 09/15/2020 Take 1 capsule (0.4 mg) by oral route once daily 1/2 hour following the same meal each day for 30 days   furosemide 20 mg oral tablet 03/01/2021 take 1 tablet (20 mg) by oral route once daily for 30 days   isosorbide mononitrate 60 mg oral tablet extended release 24 hr 10/23/2018 Take 1 tablet by mouth once daily for heart   Livalo 1 mg oral tablet  take 1 tablet (1 mg) by oral route once daily   metoprolol succinate 50 mg oral tablet extended release 24 hr 12/29/2020 take 1 tablet (50  mg) by oral route once daily   nitroglycerin 0.4 mg sublingual tablet, sublingual 06/03/2019 place 1 tablet (0.4 mg) by buccal route at the first sign of an attack; no more than 3 tabs are recommended within a 15 minute period. for 30 days   pantoprazole 40 mg oral tablet,delayed release (DR/EC) 06/19/2019 Take 1 tablet by mouth every day for 30 days   Stiolto Respimat 2.5-2.5 mcg/actuation inhalation mist  inhale 2 puffs by inhalation route once daily at the same time each day   Tylenol Arthritis Pain 650 mg oral tablet extended release  take 2 tablets (1,300 mg) by oral route every 8 hours swallowing whole with water. Do not break, crush, dissolve and/or chew.   Ventolin HFA 90 mcg/actuation inhalation HFA aerosol inhaler  inhale 2 puffs (180 mcg) by inhalation route every 4-6 hours as needed   vitamin B complex oral tablet  take 1 tablet by oral route daily   Xarelto 15 mg oral tablet  take 1 tablet (15 mg) by oral route once daily with the evening meal         Allergy List  Allergen Name Date Reaction Notes   Levaquin --  taken with Rocephin causes SOA and Pruritis --    NSAIDS --  --  --    Ranexa --  dizziness --          Family Medical History  Disease Name Relative/Age Notes   - No Family History of Colorectal Cancer  --    Family history of heart disease Father/   Father   Family history of Gastrointestinal Cancer Brother/   Brother         Social History  Finding Status Start/Stop Quantity Notes   Alcohol Never --/-- --  does not drink  5/23/2018   Denies illicit substance abuse --  --/-- --  5/23/2018   Denies substance abuse --  --/-- --  5/23/2018   lives with spouse --  --/-- --  --     --  --/-- --  --    No known infection risk --  --/-- --  --    Retired --  --/-- --  --    Tobacco Former 15/56 0.5 PPD former smoker         Immunizations  NameDate Admin Mfg Trade Name Lot Number Route Inj VIS Given VIS Publication   Daypkpcea34/08/2019 [facility] Fluzone Quadrivalent [id number] IM LD 10/08/2019   "  Comments: patient tolerated well         Vitals  Date Time BP Position Site L\R Cuff Size HR RR TEMP (F) WT  HT  BMI kg/m2 BSA m2 O2 Sat FR L/min FiO2 HC       04/08/2021 10:08 /68 Sitting    88 - R   233lbs 3.2oz 5'  10\" 33.46 2.29 94 %            Physical Examination  · Head and Face  o Head  o : normocephalic, atraumatic  · Ears, Nose, Mouth and Throat  o Ears  o :   § External Ears  § : external auditory canal appearance normal, no discharge present  § Otoscopic Examination  § : tympanic membranes pearly white/gray bilaterally  o Nose  o :   § External Nose  § : no lesions noted  § Nasopharynx  § : no discharge present  o Oral Cavity  o :   § Oral Mucosa  § : oral mucosa light pink  o Throat  o :   § Oropharynx  § : tonsils without exudate, no palatal petechiae  · Neck  o Inspection/Palpation  o : normal appearance, no masses or tenderness, trachea midline  o Thyroid  o : gland size normal, nontender, no nodules or masses present on palpation  · Respiratory  o Respiratory Effort  o : breathing unlabored  o Inspection of Chest  o : chest rise symmetric bilaterally  o Auscultation of Lungs  o : clear to auscultation bilaterally throughout inspiration and expiration  · Cardiovascular  o Heart  o :   § Auscultation of Heart  § : regular rate and rhythm, no murmurs, gallops or rubs  o Peripheral Vascular System  o :   § Extremities  § : no edema  · Lymphatic  o Neck  o : no cervical lymphadenopathy, no supraclavicular lymphadenopathy  · Psychiatric  o Mood and Affect  o : mood normal, affect appropriate          Assessment  · Encounter for Medicare annual wellness exam     V70.0/Z00.00  · Screening for depression     V79.0/Z13.89  · Screening for alcoholism     V79.1/Z13.39  · Advanced care planning/counseling discussion     V65.49/Z71.89  · Annual physical exam     V70.0/Z00.00  · Need for pneumococcal vaccination     V03.82/Z23  · Renal insufficiency     593.9/N28.9  · High blood " pressure     401.9/I10  · High cholesterol     272.0/E78.0  · COPD     496  · MI (myocardial infarction)     410.90/I21.3  · CAD (coronary artery disease)     414.00/I25.10  · OCD (obsessive compulsive disorder)     300.3/F42.9  · Lung disease     518.89/J98.4  · Arthritis     V13.4/V13.4      Plan  · Orders  o Falls Risk Assessment Completed (3288F) - V70.0/Z00.00 - 04/08/2021  o Brief hearing screening (written) [facility] () - V70.0/Z00.00 - 04/08/2021  o Annual Wellness Visit-includes a Personalized Prevention Plan of Service (PPS), SUBSEQUENT VISIT (Medicare) () - V70.0/Z00.00 - 04/08/2021  o Presence or absence of urinary incontinence assessed (ANKUR) (1090F) - V70.0/Z00.00 - 04/08/2021  o Annual depression screening using the PHQ-9 tool, 15 minutes (, [address]18) - V79.0/Z13.89 - 04/08/2021  o Annual alcohol screening using the AUDIT-C tool, 15 minutes [facility] ([address], ) - V79.1/Z13.39 - 04/08/2021  o Negative alcohol screening () - V79.1/Z13.39 - 04/08/2021  o Advance care planning, 30 minutes [facility] ([address]) - V65.49/Z71.89 - 04/08/2021  o ACO-19: Colorectal cancer screening results documented and reviewed (3017F) - - 04/08/2021  o Influenza immunization was not ordered or administered for reasons documented by clinician () - - 04/08/2021  o ACO-15: Pneumococcal Vaccine Administered or Previously Received (4040F) - - 04/08/2021  o ACO-39: Current medications updated and reviewed (, 1159F) - - 04/08/2021  o ACO-18: Negative screen for clinical depression using a standardized tool () - - 04/08/2021  o ACO-13: Fall Risk Screening with no falls in past year or only one fall without injury in the past year (1101F) - - 04/08/2021  o ACO - Pt declines to or was not able to provide an Advance Care Plan or name a Surrogate Decision Maker (1124F) - - 04/08/2021  · Medications  o Medications have been Reconciled  o Transition of Care or Provider Policy  · Instructions  o Health Risk Assessment has  been reviewed with the patient.  o Written health screening schedule for next 5-10 years was established with patient; information scanned in chart and given/mailed to patient.  o Fall prevention methods discussed and a copy of recommendations given/mailed to patient.  o Today's PHQ-9 score is: __4_  o Depression Screen completed and scanned into the EMR under the designated folder within the patient's documents.  o Audit-C Questionnaire completed and scanned into the EMR under the designated folder within the patient's documents.  o Audit-C score of 0-4 - Negative Screen - Brief Discussion  o Face-to-face Advanced Care Planning discussed for a minimum of 16 minutes.  o Reviewed health maintenance flowsheet and updated information. Orders were placed and/or patient's response was documented.  o Patient was educated/instructed on their diagnosis, treatment and medications prior to discharge from the clinic today.            Electronically Signed by: Sam Rhodes PA-C -Author on April 8, 2021 07:52:58 PM

## 2021-05-14 NOTE — PROGRESS NOTES
Progress Note      Patient Name: Layo Cee   Patient ID: [id number]   Sex: Male   YOB: 1940    Primary Care Provider: Sam Rhodes PA-C    Visit Date: February 18, 2021    Provider: Sam Rhodes PA-C   Location: [facility]   Location Address: [address] [place] [address], [address]  [place], [place]  [id number]   Location Phone: [phone]          Chief Complaint  · Toe swelling left      History Of Present Illness  Video Conferencing Visit  Layo Cee is a 80 year old /White male who is presenting for evaluation via video conferencing via [employer]. Verbal consent obtained before beginning visit.   The following staff were present during this visit: hector   Layo Cee is a 80 year old /White male who presents for evaluation and treatment of:      Left Great toe started swelling, turning erythematous very painful began yesterday  No trauma    Recent hx of covid  Some fatigue    No fever, chills    Unable to put on socks or shoes.    Renal insuff - stable, likely cause of elevated uric acid       Review of Systems  · Constitutional  o Denies  o : fever, fatigue, weight loss, weight gain  · Cardiovascular  o Denies  o : pedal edema, claudication, chest pressure, palpitations  · Respiratory  o Denies  o : shortness of breath, wheezing, cough, hemoptysis, dyspnea on exertion  · Gastrointestinal  o Denies  o : nausea, vomiting, diarrhea, constipation, abdominal pain      Physical Examination  · Constitutional  o Appearance  o : well-nourished, no acute distress  · Head and Face  o Head  o :   § Inspection  § : atraumatic, normocephalic  · Respiratory  o Respiratory Effort  o : breathing comfortably, no cough  · Skin and Subcutaneous Tissue  o General Inspection  o : no visible rash, no lesions  · Neurologic  o Mental Status Examination  o :   § Orientation  § : grossly oriented to person, place and time, no facial droop     Left great toe - edematous, erythema            Assessment  · Gout attack     274.01/M10.9  · COVID-19     079.89/U07.1  · Renal insufficiency     593.9/N28.9      Plan  · Orders  o ACO-39: Current medications updated and reviewed (, 1159F) - - 02/18/2021  · Medications  o triamcinolone acetonide 0.1 % topical ointment   SIG: apply a thin layer to the affected area(s) by topical route 2 times per day   DISP: (1) Tube with 0 refills  Prescribed on 02/18/2021     o colchicine 0.6 mg oral tablet   SIG: take 2 tablets (1.2 mg) by oral route initially, then take 1 tab (0.6 mg ) in 1 hr   DISP: (6) Tablet with 0 refills  Prescribed on 02/18/2021     o Medications have been Reconciled  o Transition of Care or Provider Policy  · Instructions  o Take all medications as prescribed/directed.  o Patient instructed/educated on their diet and exercise program.  o Patient was educated/instructed on their diagnosis, treatment and medications prior to discharge from the clinic today.  o Low purine diet  · Disposition  o Call or Return if symptoms worsen or persist.  o F/U 1 week  o Care Transition            Electronically Signed by: Sam Rhodes PA-C -Author on February 18, 2021 07:22:14 PM

## 2021-05-15 VITALS
OXYGEN SATURATION: 94 % | SYSTOLIC BLOOD PRESSURE: 146 MMHG | DIASTOLIC BLOOD PRESSURE: 94 MMHG | WEIGHT: 229 LBS | TEMPERATURE: 97.8 F | RESPIRATION RATE: 18 BRPM | BODY MASS INDEX: 33.92 KG/M2 | HEIGHT: 69 IN | HEART RATE: 73 BPM

## 2021-05-15 VITALS
DIASTOLIC BLOOD PRESSURE: 67 MMHG | HEART RATE: 75 BPM | TEMPERATURE: 97.9 F | OXYGEN SATURATION: 96 % | HEIGHT: 69 IN | SYSTOLIC BLOOD PRESSURE: 128 MMHG | WEIGHT: 233 LBS | BODY MASS INDEX: 34.51 KG/M2 | RESPIRATION RATE: 16 BRPM

## 2021-05-15 VITALS
SYSTOLIC BLOOD PRESSURE: 144 MMHG | TEMPERATURE: 100.2 F | WEIGHT: 231 LBS | HEIGHT: 70 IN | DIASTOLIC BLOOD PRESSURE: 70 MMHG | HEART RATE: 96 BPM | RESPIRATION RATE: 20 BRPM | OXYGEN SATURATION: 94 % | BODY MASS INDEX: 33.07 KG/M2

## 2021-05-15 VITALS
BODY MASS INDEX: 32.78 KG/M2 | SYSTOLIC BLOOD PRESSURE: 140 MMHG | HEIGHT: 70 IN | OXYGEN SATURATION: 98 % | HEART RATE: 96 BPM | RESPIRATION RATE: 18 BRPM | WEIGHT: 229 LBS | DIASTOLIC BLOOD PRESSURE: 50 MMHG | TEMPERATURE: 97.6 F

## 2021-05-15 VITALS
OXYGEN SATURATION: 98 % | WEIGHT: 228.19 LBS | SYSTOLIC BLOOD PRESSURE: 150 MMHG | HEART RATE: 74 BPM | BODY MASS INDEX: 33.8 KG/M2 | RESPIRATION RATE: 12 BRPM | HEIGHT: 69 IN | DIASTOLIC BLOOD PRESSURE: 64 MMHG

## 2021-05-15 VITALS
HEIGHT: 70 IN | DIASTOLIC BLOOD PRESSURE: 68 MMHG | WEIGHT: 234 LBS | SYSTOLIC BLOOD PRESSURE: 136 MMHG | BODY MASS INDEX: 33.5 KG/M2 | HEART RATE: 76 BPM

## 2021-05-15 VITALS
OXYGEN SATURATION: 97 % | BODY MASS INDEX: 34.8 KG/M2 | TEMPERATURE: 97.8 F | DIASTOLIC BLOOD PRESSURE: 66 MMHG | WEIGHT: 235 LBS | HEIGHT: 69 IN | HEART RATE: 77 BPM | SYSTOLIC BLOOD PRESSURE: 148 MMHG | RESPIRATION RATE: 20 BRPM

## 2021-05-15 VITALS
BODY MASS INDEX: 33.64 KG/M2 | HEART RATE: 86 BPM | HEIGHT: 70 IN | DIASTOLIC BLOOD PRESSURE: 65 MMHG | WEIGHT: 235 LBS | SYSTOLIC BLOOD PRESSURE: 150 MMHG | OXYGEN SATURATION: 99 %

## 2021-05-15 VITALS
BODY MASS INDEX: 32.5 KG/M2 | WEIGHT: 227 LBS | HEART RATE: 86 BPM | SYSTOLIC BLOOD PRESSURE: 138 MMHG | DIASTOLIC BLOOD PRESSURE: 60 MMHG | HEIGHT: 70 IN

## 2021-05-15 VITALS
DIASTOLIC BLOOD PRESSURE: 64 MMHG | SYSTOLIC BLOOD PRESSURE: 110 MMHG | BODY MASS INDEX: 33.64 KG/M2 | WEIGHT: 235 LBS | HEIGHT: 70 IN | HEART RATE: 102 BPM

## 2021-05-15 VITALS
DIASTOLIC BLOOD PRESSURE: 72 MMHG | WEIGHT: 233 LBS | BODY MASS INDEX: 33.36 KG/M2 | HEART RATE: 90 BPM | SYSTOLIC BLOOD PRESSURE: 126 MMHG | HEIGHT: 70 IN

## 2021-05-15 VITALS
BODY MASS INDEX: 33.64 KG/M2 | DIASTOLIC BLOOD PRESSURE: 78 MMHG | SYSTOLIC BLOOD PRESSURE: 150 MMHG | HEART RATE: 84 BPM | WEIGHT: 235 LBS | HEIGHT: 70 IN

## 2021-05-15 VITALS
TEMPERATURE: 98.4 F | DIASTOLIC BLOOD PRESSURE: 60 MMHG | RESPIRATION RATE: 20 BRPM | BODY MASS INDEX: 33.36 KG/M2 | HEIGHT: 70 IN | SYSTOLIC BLOOD PRESSURE: 124 MMHG | HEART RATE: 70 BPM | OXYGEN SATURATION: 94 % | WEIGHT: 233 LBS

## 2021-05-15 VITALS — DIASTOLIC BLOOD PRESSURE: 80 MMHG | SYSTOLIC BLOOD PRESSURE: 142 MMHG

## 2021-05-15 VITALS
DIASTOLIC BLOOD PRESSURE: 70 MMHG | RESPIRATION RATE: 18 BRPM | WEIGHT: 231 LBS | BODY MASS INDEX: 33.07 KG/M2 | HEIGHT: 70 IN | TEMPERATURE: 97.8 F | SYSTOLIC BLOOD PRESSURE: 164 MMHG | OXYGEN SATURATION: 97 % | HEART RATE: 101 BPM

## 2021-05-15 VITALS — WEIGHT: 233 LBS | BODY MASS INDEX: 33.36 KG/M2 | HEIGHT: 70 IN | RESPIRATION RATE: 14 BRPM

## 2021-05-15 VITALS
HEART RATE: 99 BPM | DIASTOLIC BLOOD PRESSURE: 55 MMHG | BODY MASS INDEX: 32.93 KG/M2 | WEIGHT: 230 LBS | HEIGHT: 70 IN | SYSTOLIC BLOOD PRESSURE: 139 MMHG | OXYGEN SATURATION: 98 %

## 2021-05-15 VITALS — SYSTOLIC BLOOD PRESSURE: 140 MMHG | DIASTOLIC BLOOD PRESSURE: 74 MMHG | HEART RATE: 84 BPM

## 2021-05-16 VITALS
DIASTOLIC BLOOD PRESSURE: 50 MMHG | HEIGHT: 70 IN | BODY MASS INDEX: 33.07 KG/M2 | SYSTOLIC BLOOD PRESSURE: 114 MMHG | HEART RATE: 50 BPM | WEIGHT: 231 LBS

## 2021-05-16 VITALS
HEART RATE: 72 BPM | SYSTOLIC BLOOD PRESSURE: 116 MMHG | HEIGHT: 70 IN | WEIGHT: 232 LBS | BODY MASS INDEX: 33.21 KG/M2 | DIASTOLIC BLOOD PRESSURE: 58 MMHG

## 2021-05-16 VITALS
RESPIRATION RATE: 20 BRPM | SYSTOLIC BLOOD PRESSURE: 112 MMHG | DIASTOLIC BLOOD PRESSURE: 61 MMHG | TEMPERATURE: 97.3 F | OXYGEN SATURATION: 96 % | WEIGHT: 233.06 LBS | HEIGHT: 70 IN | HEART RATE: 49 BPM | BODY MASS INDEX: 33.36 KG/M2

## 2021-05-16 VITALS
SYSTOLIC BLOOD PRESSURE: 124 MMHG | TEMPERATURE: 97.8 F | DIASTOLIC BLOOD PRESSURE: 60 MMHG | RESPIRATION RATE: 18 BRPM | OXYGEN SATURATION: 96 % | WEIGHT: 238 LBS | HEIGHT: 69 IN | BODY MASS INDEX: 35.25 KG/M2 | HEART RATE: 78 BPM

## 2021-05-16 VITALS
BODY MASS INDEX: 33.93 KG/M2 | DIASTOLIC BLOOD PRESSURE: 68 MMHG | HEART RATE: 72 BPM | WEIGHT: 237 LBS | SYSTOLIC BLOOD PRESSURE: 134 MMHG | HEIGHT: 70 IN

## 2021-05-16 VITALS
DIASTOLIC BLOOD PRESSURE: 82 MMHG | WEIGHT: 239.12 LBS | OXYGEN SATURATION: 96 % | HEIGHT: 70 IN | HEART RATE: 107 BPM | RESPIRATION RATE: 16 BRPM | TEMPERATURE: 98.4 F | BODY MASS INDEX: 34.23 KG/M2 | SYSTOLIC BLOOD PRESSURE: 155 MMHG

## 2021-05-16 VITALS
WEIGHT: 233 LBS | HEART RATE: 105 BPM | TEMPERATURE: 97.6 F | OXYGEN SATURATION: 96 % | BODY MASS INDEX: 33.36 KG/M2 | HEIGHT: 70 IN | DIASTOLIC BLOOD PRESSURE: 62 MMHG | SYSTOLIC BLOOD PRESSURE: 128 MMHG | RESPIRATION RATE: 18 BRPM

## 2021-05-16 VITALS — HEIGHT: 70 IN | BODY MASS INDEX: 32.94 KG/M2 | RESPIRATION RATE: 12 BRPM | WEIGHT: 230.12 LBS

## 2021-05-16 VITALS
BODY MASS INDEX: 33.36 KG/M2 | SYSTOLIC BLOOD PRESSURE: 126 MMHG | WEIGHT: 233 LBS | HEIGHT: 70 IN | HEART RATE: 76 BPM | DIASTOLIC BLOOD PRESSURE: 66 MMHG

## 2021-05-16 VITALS
HEIGHT: 70 IN | BODY MASS INDEX: 33.79 KG/M2 | WEIGHT: 236 LBS | DIASTOLIC BLOOD PRESSURE: 60 MMHG | HEART RATE: 76 BPM | SYSTOLIC BLOOD PRESSURE: 98 MMHG

## 2021-05-16 VITALS
BODY MASS INDEX: 33.79 KG/M2 | SYSTOLIC BLOOD PRESSURE: 122 MMHG | WEIGHT: 236 LBS | TEMPERATURE: 97.4 F | HEART RATE: 90 BPM | DIASTOLIC BLOOD PRESSURE: 50 MMHG | RESPIRATION RATE: 18 BRPM | OXYGEN SATURATION: 96 % | HEIGHT: 70 IN

## 2021-05-16 VITALS
SYSTOLIC BLOOD PRESSURE: 102 MMHG | WEIGHT: 235 LBS | HEIGHT: 70 IN | BODY MASS INDEX: 33.64 KG/M2 | DIASTOLIC BLOOD PRESSURE: 78 MMHG | HEART RATE: 70 BPM

## 2021-05-16 VITALS
WEIGHT: 236 LBS | DIASTOLIC BLOOD PRESSURE: 60 MMHG | OXYGEN SATURATION: 97 % | RESPIRATION RATE: 20 BRPM | SYSTOLIC BLOOD PRESSURE: 118 MMHG | HEART RATE: 59 BPM | TEMPERATURE: 98.2 F | HEIGHT: 70 IN | BODY MASS INDEX: 33.79 KG/M2

## 2021-05-16 VITALS — BODY MASS INDEX: 34.07 KG/M2 | WEIGHT: 238 LBS | RESPIRATION RATE: 14 BRPM | HEIGHT: 70 IN

## 2021-05-16 VITALS — RESPIRATION RATE: 16 BRPM | WEIGHT: 238 LBS | BODY MASS INDEX: 34.07 KG/M2 | HEIGHT: 70 IN

## 2021-05-16 VITALS
BODY MASS INDEX: 34.5 KG/M2 | WEIGHT: 241 LBS | HEART RATE: 60 BPM | HEIGHT: 70 IN | SYSTOLIC BLOOD PRESSURE: 130 MMHG | DIASTOLIC BLOOD PRESSURE: 56 MMHG

## 2021-05-23 ENCOUNTER — TRANSCRIBE ORDERS (OUTPATIENT)
Dept: ADMINISTRATIVE | Facility: HOSPITAL | Age: 81
End: 2021-05-23

## 2021-05-23 DIAGNOSIS — U07.1 DYSPNEA DUE TO COVID-19: ICD-10-CM

## 2021-05-23 DIAGNOSIS — J44.9 CHRONIC OBSTRUCTIVE PULMONARY DISEASE, UNSPECIFIED COPD TYPE (HCC): Primary | ICD-10-CM

## 2021-05-23 DIAGNOSIS — R06.00 DYSPNEA DUE TO COVID-19: ICD-10-CM

## 2021-05-28 VITALS
BODY MASS INDEX: 46.42 KG/M2 | SYSTOLIC BLOOD PRESSURE: 142 MMHG | HEART RATE: 47 BPM | HEIGHT: 60 IN | DIASTOLIC BLOOD PRESSURE: 84 MMHG | SYSTOLIC BLOOD PRESSURE: 148 MMHG | DIASTOLIC BLOOD PRESSURE: 73 MMHG | OXYGEN SATURATION: 99 % | TEMPERATURE: 98.7 F | HEIGHT: 70 IN | DIASTOLIC BLOOD PRESSURE: 77 MMHG | BODY MASS INDEX: 33.82 KG/M2 | OXYGEN SATURATION: 98 % | OXYGEN SATURATION: 96 % | RESPIRATION RATE: 14 BRPM | TEMPERATURE: 98.2 F | TEMPERATURE: 98.2 F | WEIGHT: 236.25 LBS | HEART RATE: 71 BPM | RESPIRATION RATE: 12 BRPM | HEIGHT: 70 IN | BODY MASS INDEX: 32.58 KG/M2 | SYSTOLIC BLOOD PRESSURE: 117 MMHG | WEIGHT: 236.44 LBS | HEART RATE: 70 BPM | RESPIRATION RATE: 14 BRPM | WEIGHT: 227.56 LBS

## 2021-05-28 VITALS
BODY MASS INDEX: 34.28 KG/M2 | RESPIRATION RATE: 15 BRPM | HEART RATE: 88 BPM | WEIGHT: 231.44 LBS | TEMPERATURE: 98.3 F | OXYGEN SATURATION: 97 % | HEIGHT: 69 IN | SYSTOLIC BLOOD PRESSURE: 107 MMHG | DIASTOLIC BLOOD PRESSURE: 62 MMHG

## 2021-05-28 VITALS
WEIGHT: 233 LBS | TEMPERATURE: 98.3 F | OXYGEN SATURATION: 96 % | HEIGHT: 70 IN | DIASTOLIC BLOOD PRESSURE: 72 MMHG | RESPIRATION RATE: 14 BRPM | BODY MASS INDEX: 33.36 KG/M2 | HEART RATE: 80 BPM | SYSTOLIC BLOOD PRESSURE: 164 MMHG

## 2021-05-28 NOTE — PROGRESS NOTES
Patient: LUISA WHITING     Acct: [id number]     Report: #[id number]  UNIT #: [id number]     : 1940    Encounter Date:2019  PRIMARY CARE: [facility]  ***Signed***  --------------------------------------------------------------------------------------------------------------------  Chief Complaint      Encounter Date      2019            Primary Care Provider      TAMMY ROBERTS [facility]            Referring Provider      KLAUDIA VIERA            Patient Complaint      Patient is complaining of      Patient here today for [facility] follow up            VITALS      Height 5 ft 9 in / 175.26 cm      Weight 231 lbs 7 oz / 104.740874 kg      BSA 2.20 m2      BMI 34.2 kg/m2      Temperature 98.3 F / 36.83 C - Oral      Pulse 88      Respirations 15      Blood Pressure 107/62 Sitting, Left Arm      Pulse Oximetry 97%, room air            HPI      The patient is a very pleasant 79 year old white male patient of Dr. Luna's     who was recently hospitalized at [facility] with worsening dyspnea    and chest pain. He was seen by Dr. Eli and Dr. Luna during his     hospitalization. He had a CT scan of the chest that was grossly negative for any    acute infiltrate but did show some old calcified granulomas. He was treated for     bronchitis with antibiotics, steroids and breathing treatments and gradually     improved. His sputum culture only grew yeast. The patient states he is improved     and is barely coughing at all. He denies any productive cough or  purulent     sputum production. He denies hemoptysis, fever or chills. His dyspnea has     improved and he is almost back to his baseline. He was discharged on Brovana and    Pulmicort nebulizer which he has been using as prescribed but he prefers to use     inhalers. He was on Stiolto and arnuity previously.             I reviewed her Review of Systems, medical, surgical and family history and agree    with those as entered.      Copies  To:   AURORA FARMER ;            LINCOLN      Constitutional:  Denies: Fatigue, Fever, Weight gain, Weight loss, Chills,     Insomnia, Other      Respiratory/Breathing:  Complains of: Shortness of air; Denies: Wheezing, Cough,    Hemoptysis, Pleuritic pain, Other      Endocrine:  Denies: Polydipsia, Polyuria, Heat/cold intolerance, Diabetes, Other      Eyes:  Denies: Blurred vision, Vision Changes, Other      Ears, nose, mouth, throat:  Denies: Congestion, Dysphagia, Hearing Changes, Nose    Bleeding, Nasal Discharge, Throat pain, Tinnitus, Other      Gastrointestinal:  Denies: Abdominal pain/cramping, Bloody stools, Constipation,    Diarrhea, Melena, Nausea, Vomiting, Other      Genitourinary:  Denies: Dysuria, Urinary frequency, Incontinence, Hematuria,     Urgency, Other      Musculoskeletal:  Denies: Joint Pain, Joint Stiffness, Joint Swelling, Myalgias,    Other      Hematologic/lymphatic:  DENIES: Lymphadenopathy, Bruising, Bleeding tendencies,     Other      Neurologic:  Denies: Headache, Numbness, Weakness, Seizures, Other      Psychiatric:  Denies: Anxiety, Appropriate Effect, Depression, Other      Sleep:  No: Excessive daytime sleep, Morning Headache?, Snoring, Insomnia?, Stop    breathing at sleep?, Other      Integumentary:  Denies: Rash, Dry skin, Skin Warm to Touch, Other            FAMILY/SOCIAL/MEDICAL HX      Surgical History:  Yes: Appendectomy (CHILDHOOD), Bladder Surgery (TURP ), Bowel    Surgery (COLONOSCOPY), CABG (1999 4 VESSEL ), Head Surgery (ALICIA. CATARACT     EXTRACTION W/ LENS IMPLANT), Other Surgeries; No: Abdominal Surgery, Cholecy    stectomy, Oral Surgery, Orthopedic Surgery, Vascular Surgery      Heart - Family Hx:  Father      Is Father Still Living?:  No      Is Mother Still Living?:  No       Family History:  Yes      Social History:  No Tobacco Use, No Alcohol Use, No Recreational Drug use      Smoking status:  Former smoker (.5 ppd x 40 y quit 1989)      Anticoagulation Therapy:   Yes      Antibiotic Prophylaxis:  No      Medical History:  Yes: Arthritis (KNEES/BACK), Chemotherapy/Cancer (SKIN CANCER     ON HEAD AND EAR DIAGNOSED 3/2016- RESOLVED ), Chronic Bronchitis/COPD (CHRONIC     SOA MAINLY WITH EXERTION ), Heart Attack (2016, HX 4 VESSEL CABG 1999),     Hemorrhoids/Rectal Prob (ACID REFLUX, H. PYLORI, DIVERTICULITIS), High Blood     Pressure (ON MEDS), Shortness Of Breath, Miscellaneous Medical/oth (prostate     problems); No: Asthma, Blood Disease, Congestive Heart Failu, Deafness or     Ringing Ears, Diabetes, Seizures, Sinus Trouble      Psychiatric History      none            PREVENTION      Hx Influenza Vaccination:  Yes      Date Influenza Vaccine Given:  Oct 23, 2018      Influenza Vaccine Declined:  No      2 or More Falls Past Year?:  No      Fall Past Year with Injury?:  No      Hx Pneumococcal Vaccination:  Yes      Encouraged to follow-up with:  PCP regarding preventative exams.      Chart initiated by      Renay Dickey CMA            ALLERGIES/MEDICATIONS      Allergies:        Coded Allergies:             NO KNOWN DRUG ALLERGIES (Verified  Allergy, Unknown, 6/7/19)      Medications    Last Reconciled on 6/7/19 08:53 by DARIAN BERTRAND      Fluticasone Furoate (Arnuity Ellipta) 200 Mcg Blst.w.dev      1 PUFF INH RTQDAY, #1 INH 11 Refills         Prov: Brenda Grant PA-C         6/7/19       Tiotropium Br/Olodaterol HCl (Stiolto Respimat Inhal Spray) 4 Gm Mist.inhal      2 PUFFS INH RTQDAY, #1 INH 11 Refills         Prov: Brenda Grant PA-C         6/7/19       Al Hydrox/Mg Hydrox/Simeth (Al-Mag Hydrox-Simeth) 30 Ml Oral.susp      30 ML PO QID PRN for INDIGESTION/HEARTBURN, #12 OZ         Prov: Badiwala,Anesh         5/30/19       Metoprolol Succinate (Metoprolol Succinate*) 50 Mg Tab.er.24h      50 MG PO QDAY for 30 Days, #30 TAB.ER         Prov: Badiwala,Anesh         5/30/19       Albuterol/Ipratropium (Duoneb) 3 Ml Ampul.neb      3 ML INH Q6H PRN for  DYSPNEA/WHEEZING, #120 NEB         Prov: Abhishek Laurent         5/30/19       MDI-Albuterol (Ventolin HFA) 8 Gm Hfa.aer.ad      1 PUFFS INH Q4H PRN for SHORTNESS OF BREATH, #1 MDI 0 Refills         Reported         3/11/19       Calcium Carb/Vit D3 (CALCIUM 600-VIT D3 400 TABLET) 1 Each Tablet      1 EACH PO QDAY, #60 TAB 0 Refills         Reported         3/11/19       Aspirin Chew (Aspirin Baby) 81 Mg Tab.chew      81 MG PO QDAY, #30 TAB.CHEW 0 Refills         Reported         3/11/19       Isosorbide Mononitrate ER (Isosorbide Mononitrate ER) 30 Mg Tab.er.24h      60 MG PO QDAY, TAB.ER.24H         Reported         3/11/19       Cetirizine Hcl (CETIRIZINE HCL) 10 Mg Tablet      10 MG PO HS, #30 TAB 0 Refills         Reported         11/29/18       Azelastine Hcl (Azelastine Nasal) 137 Mcg/0.137 Ml Spray.pump      1 PUFFS NARE EACH PRN for CONGESTION, #1 BOTTLE         Reported         11/29/18       Acetaminophen Er (ARTHRITIS PAIN RELIEF) 650 Mg Tablet.er      1300 MG PO PRN for PAIN, #100 TAB.ER         Reported         11/29/18       Furosemide* (Lasix*) 40 Mg Tablet      40 MG PO QAM, #30 TAB 0 Refills         Reported         11/29/18       Pravastatin Sod (Pravastatin*) 10 Mg Tablet      10 MG PO MoFr MDD MONDAY AND FRIDAY, TAB         Reported         4/13/18       Tamsulosin HCL (Tamsulosin) 0.4 Mg Cap.er.24h      0.4 MG PO HS, #30 CAP 0 Refills         Reported         4/13/18       Finasteride (Finasteride*) 5 Mg Tablet      5 MG PO QDAY, #30 TAB 0 Refills         Reported         4/13/18       Rivaroxaban (Xarelto) 15 Mg Tablet      15 MG PO QPM, #30 TAB 5 Refills         Reported         11/1/17       Ranitidine Hcl (Zantac*) 300 Mg Tablet      300 MG PO HS for 30 Days, #30 TAB         Reported         11/1/17       Sacubitril/Valsartan (Entresto 24/26 mg) 1 Tab Tablet      1 TAB PO BID, #60 TAB 5 Refills         Prov: RISHI MCGEE         8/6/16       Ubidecarenone (Co Q-10) 50 Mg Cap      50 MG PO QAM,  CAP         Reported         16       Pantoprazole (Protonix*) 40 Mg Tablet      40 MG PO QDAY@07, #30 TAB 5 Refills         Prov: Cas Patel         16       Vitamin B Complex (B Complex-Vitamin B-12) 1 Tab Tablet      1 TAB PO QAM, #30 TAB         Reported         3/24/16      Current Medications      Current Medications Reviewed 19            EXAM      Vital Signs Reviewed      Gen: WDWN, Alert, NAD.        HEENT:  PERRL, EOMI.  OP, nares clear, no sinus tenderness.      Neck:  Supple, no JVD, no thyromegaly.      Lymph: No axillary, cervical, supraclavicular lymphadenopathy noted bilaterally.      Chest:  Grossly clear to auscultation, no wheezes, rhonchi or crackles     appreciated, normal work of breathing noted.        CV:  RRR, no MGR, pulses 2+, equal.      Abd:  Soft, NT, ND, + BS, no HSM.      EXT:  No clubbing, no cyanosis, no edema, no joint tenderness.       Neuro:  A  Skin: No rashes or lesions.      Vitals      Vitals:             Height 5 ft 9 in / 175.26 cm           Weight 231 lbs 7 oz / 104.322387 kg           BSA 2.20 m2           BMI 34.2 kg/m2           Temperature 98.3 F / 36.83 C - Oral           Pulse 88           Respirations 15           Blood Pressure 107/62 Sitting, Left Arm           Pulse Oximetry 97%, room air            REVIEW      Results Reviewed      PCCS Results Reviewed?:  Yes Prev Lab Results, Yes Prev Radiology Results, Yes     Previous Mecial Records (I personally reviewed the patient's most recent p    ulmonary consultation, progress notes and discharge summary.)      Radiographic Results               [facility]                PACS RADIOLOGY REPORT            Patient: LUISA WHITING   Acct: #[id number]   Report: #[id number]            UNIT #: [id number]    DOS: 19 1045      INSURANCE:MEDICARE PART A   LOCATION:[facility]  220   : 1940            PROVIDERS      ADMITTING:   Abhishek Laurent   ATTENDING: Abhishek Laurent      FAMILY:  NONE,MD   ORDERING:  Abhishek Laurent         OTHER:    DICTATING:  Darek Alejo MD, IV            REQ #:[id number]   EXAM:[facility] - CT CHEST without CONTRAST      REASON FOR EXAM:  fever, chest pain      REASON FOR VISIT:  COPD EXACERBATION,CHEST PAIN            *******Signed******         PROCEDURE:   CT CHEST WITHOUT CONTRAST             COMPARISON:   [facility], , CHEST AP/PA 1 VIEW, 5/25/2019,     23:00.             INDICATIONS:   fever, chest pain             PROTOCOL:     Standard imaging protocol performed                RADIATION:     DLP: 566mGy*cm          Automated exposure control was utilized to minimize radiation dose.              CONTRAST:   100cc Isovue 370 I.V.             TECHNIQUE:   Axial images of the chest without intravenous contrast.             FINDINGS:      Scattered calcified granulomas are present.  No evidence of pneumonia.  No     evidence of pneumothorax       or pleural effusion.  There are no enlarged mediastinal, axillary or hilar lymph    nodes.  The       patient is status post median sternotomy and CABG procedure.  Degenerative     changes are present in       the thoracic spine.  Images of the upper abdomen are unremarkable.             IMPRESSION:  No acute findings are identified.             DAREK ALEJO MD             Electronically Signed and Approved By: DAREK ALEJO MD on 5/27/2019 at 12:31                           Until signed, this is an unconfirmed preliminary report that may contain      errors and is subject to change.                                              DOMINIKJE:      D:05/27/19 1231            Assessment      Moderate COPD (chronic obstructive pulmonary disease) - J44.9            Notes      New Medications      * Fluticasone Furoate (Arnuity Ellipta) 200 MCG BLST.W.DEV: 1 PUFF INH RTQDAY #1      * Tiotropium Br/Olodaterol HCl (Stiolto Respimat Inhal Spray) 4 GM MIST.INHAL           Sample - Qty 2      Renewed Medications      * Tiotropium Br/Olodaterol HCl (Stiolto Respimat Inhal [place]) 4 GM MIST.INHAL: 2      PUFFS INH RTQDAY #1      Discontinued Medications      * ARFORMOTEROL TARTRATE (Brovana) 15 MCG/2 ML VIAL.NEB: 15 MCG INH RTBID 30 Days      #60         Instructions: DIAGNOSIS CODE REQUIRED PRIOR TO PRESCRIBING.         Dx: Moderate COPD (chronic obstructive pulmonary disease) - J44.9      * Neb-Budesonide (Budesonide) 0.5 MG/2 ML AMPUL.NEB: 0.5 MG INH RTBID 30 Days       #60         Instructions: DIAGNOSIS CODE REQUIRED PRIOR TO PRESCRIBING.         Dx: Moderate COPD (chronic obstructive pulmonary disease) - J44.9      * predniSONE* 20 MG TABLET: 40 MG PO QDAY 2 Days #4      * CEFDINIR 300 MG CAP: 300 MG PO BID 4 Days #8      New Office Procedures      * Pneumovax-23, As Soon As Possible         Pneumococcal Vaccine Polyvalen (Pneumovax-23) 25 MCG/0.5 ML VIAL: 25 MI       CROGRAM INTRAMUSCULARLY Qty 25 MCG         Dx: Moderate COPD (chronic obstructive pulmonary disease) - J44.9      ASSESSMENT:       1. Recent bronchitis clinically resolving.       2. Moderate chronic obstructive pulmonary disease with recent acute exacerbation    resolving.         3. Seasonal allergies well controlled.       4. Tobacco abuse of cigarettes in remission.      5. 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       6. Obesity with BMI 34.2.                 PLAN:      1. I have discussed with the patient about his current nebulizer regimen and wh    ile his preference would be on staying on nebulizers versus switching back to     inhalers. He prefers to be on inhalers so after he finishes his current month     supply of Brovana and Pulmicort nebulizer he will switch back to  Arnuity and     Stiolto. I have given him samples and showed him how to use them.        2. Continue Singulair and allergy shots.       3. Up to date with flu, Prevnar, and I will give him Pneumovax today.      4. I have  discussed pulmonary rehab in detail with him today and offered that to    him as I think he would benefit from this to help increase his exercise     tolerance. He feels like he has busy month coming up and after that he will call    our office if he wants to be referred to pulmonary rehab. I have encouraged him     to be as active as possible and try to lose weight using diet and exercise.  I     discussed this for 3 minutes with him today.       5. Follow up in 4-6 months with Dr. Luna, sooner if needed.            Patient Education      Patient Education Provided:  COPD, How to use an Inhaler, How to use a Nebulizer      Time Spent:  > 50% /[facility]                 Disclaimer: Converted document may not contain table formatting or lab diagrams. Please see [email or web address] for the authenticated document.

## 2021-05-28 NOTE — PROGRESS NOTES
Patient: LUISA WHITING     Acct: [id number]     Report: #[id number]  UNIT #: [id number]     : 1940    Encounter Date:2021  PRIMARY CARE: MILDRED NORTON  ***Signed***  --------------------------------------------------------------------------------------------------------------------  History of Present Illness            Chief Complaint: Pt here for 2w f/u, Joannrobert Whiting is presenting for evaluation via Telehealth visit. Verbal consent    obtained before beginning visit.            PAST MEDICAL HISTORY/OVERVIEW OF PATIENT SYMPTOMS            Symptoms: Soa            Any known Exposure to COVID-19: Covid +            Provider spent 12 minutes with the patient during telehealth visit.            The following staff were present during this visit: Senait Izquierdo MA, Maribell CHARLES            Former smoker (.5 ppd x 40 y quit )            [facility]            The patient is a 80 year old male patient of Dr. Luna's with a history of     systolic heart failure, COPD and COVID19 pneumonia who presents for a TeleHealth    follow up visit today.  The patient did have a TeleHealth visit with me on     2021 after being diagnosed with COVID19 two weeks prior.  The patient st    ates that since last TeleHealth visit his breathing has improved, however he is     still short of breath that is worse with exertion, moderate in severity and     improved with rest. The patient states he is still having fatigue.  The patient     states he is not able to taste and smell.  The patient currently denies any     wheezing, fever, chills, night sweats, hemoptysis, purulent sputum production,     swollen glands in the head and neck, chest pain, chest tightness, abdominal     pain, nausea, vomiting, diarrhea. The patient denies any headaches or myalgias.     The patient denies any leg swelling, orthopnea or paroxysmal nocturnal dyspnea.     The patient denies any nocturnal awakenings.  The patient states he is scheduled     to see Dr. Stevenson next Tuesday, 02/23/2021 and he does have a history of     congestive heart failure and coronary artery disease as well as a four vessel     bypass.  The patient states he is taking Arnuity and Stiolto everyday as     prescribed and is using DuoNebs as needed. The patient states she is able to     perform ADLs without difficulty.            I have personally reviewed the review of systems, past family, social, surgical     and medical histories and I agree with those as entered in the chart.              Physical exam deferred due to TeleHealth visit.              I reviewed my last TeleHealth visit note.              Allergies and Medications      Allergies:        Coded Allergies:             NO KNOWN DRUG ALLERGIES (Verified  Allergy, Unknown, 2/18/21)      Medications    Last Reconciled on 2/18/21 12:09 by LEONARDO LYN       Albuterol/Ipratropium (Duoneb) 3 Ml Ampul.neb      3 ML INH Q4H PRN for SHORTNESS OF BREATH, #120 NEB 5 Refills         Prov: LEONARDO LYN PCCS         2/5/21       Tiotropium Br/Olodaterol HCl (Stiolto Respimat Inhal Spray) 4 Gm Mist.inhal      2 PUFFS INH QDAY, #1 INH 11 Refills         Prov: AURORA FARMER         10/28/20       Fluticasone Furoate (Arnuity Ellipta) 200 Mcg Blst.w.dev      1 PUFF INH RTQDAY, #1 INH 11 Refills         Prov: AURORA FARMER         10/28/20       Rivaroxaban (Xarelto) 15 Mg Tablet      15 MG PO QDAY, #30 TAB 5 Refills         Reported         10/14/19       Aspirin Chew (Aspirin Baby) 81 Mg Tab.chew      81 MG PO QDAY, #30 TAB.CHEW 0 Refills         Reported         10/14/19       Isosorbide Mononitrate ER (Isosorbide Mononitrate ER) 60 Mg Tab.er.24h      60 MG PO QDAY, TAB.ER.24H         Reported         10/14/19       Pitavastatin Calcium (Livalo) 1 Mg Tab      1 MG PO MOWEFR, TAB         Reported         7/26/19       Al Hydrox/Mg Hydrox/Simeth (Al-Mag Hydrox-Simeth) 30 Ml Oral.susp       30 ML PO QID PRN for INDIGESTION/HEARTBURN, #12 OZ         Prov: Abhishek Laurent         5/30/19       Metoprolol Succinate (Metoprolol Succinate) 50 Mg Tab.er.24h      50 MG PO QDAY for 30 Days, #30 TAB.ER         Prov: Abhishek Laurent         5/30/19       MDI-Albuterol (Ventolin HFA) 8 Gm Hfa.aer.ad      1 PUFFS INH Q4H PRN for SHORTNESS OF BREATH, #1 MDI 0 Refills         Reported         3/11/19       Calcium Carbonate/Vitamin D3 (Calcium 600-Vit D3 400 Tablet) 1 Each Tablet      1 EACH PO QDAY, #60 TAB 0 Refills         Reported         3/11/19       Cetirizine Hcl (CETIRIZINE HCL) 10 Mg Tablet      10 MG PO HS PRN for ALLERGIES, #30 TAB 0 Refills         Reported         11/29/18       Azelastine Hcl (Azelastine Nasal) 137 Mcg/0.137 Ml Spray.pump      1 PUFFS NARE EACH PRN for CONGESTION, #1 BOTTLE         Reported         11/29/18       Acetaminophen Er (ARTHRITIS PAIN RELIEF) 650 Mg Tablet.er      1300 MG PO PRN for PAIN, #100 TAB.ER         Reported         11/29/18       Furosemide (Lasix) 40 Mg Tablet      40 MG PO QAM, #30 TAB 0 Refills         Reported         11/29/18       Tamsulosin HCL (Tamsulosin HCL) 0.4 Mg Cap.er.24h      0.4 MG PO HS, #30 CAP 0 Refills         Reported         4/13/18       Finasteride (Finasteride*) 5 Mg Tablet      5 MG PO QDAY, #30 TAB 0 Refills         Reported         4/13/18       raNITIdine HCL (raNITIdine HCL) 300 Mg Tablet      300 MG PO HS for 30 Days, #30 TAB         Reported         11/1/17       Sacubitril/Valsartan (Entresto 24/26 mg) 1 Tab Tablet      1 TAB PO BID, #60 TAB 5 Refills         Prov: RISHI MCGEE         8/6/16       Ubidecarenone (Co Q-10) 50 Mg Cap      50 MG PO QAM, CAP         Reported         8/5/16       Pantoprazole (Protonix) 40 Mg Tablet      40 MG PO QDAY@07, #30 TAB 5 Refills         Prov: Cas Patel         4/4/16       Vitamin B Complex (B Complex-Vitamin B-12) 1 Tab Tablet      1 TAB PO QAM, #30 TAB         Reported         3/24/16             Assessment      Shortness of Air  R06.02            Plan      Orders:  Phone Eval 11-20 mi [id number]      Instructions      * Chronic conditions reviewed and taken in consideration for today's treatment       plan.      * Plan Of Care: ()      * Patient instructed to seek medical attention urgently for new or worsening       symptoms.      * Patient was educated/instructed on their diagnosis, treatment and medications       today.      * Recommend self monitoring. Instructions given.      * Recommend self quarantine for 14 days.      * Recommend self quarantine until without fever for 72 hours without using fever       reducing medications.      * Recommends over the counter medications for symptom management.            ASSESSMENT:       1. COVID19 pneumonia diagnosed on 01/26/2021 and ER visit at [facility]     in [place].      2. Dyspnea.      3. Cough.      4.  Wheezing, resolved.      5. Seasonal allergies.      6. Allergic rhinitis.      7. Chronic compensated systolic congestive heart failure and coronary artery     disease, patient under the care of Dr. Stevenson.      8. Known granulomatous disease.      9. Tobacco abuse with cigarettes in remission.            PLAN:      1. The patient to continue Arnuity and Stiolto everyday as prescribed and rinse     his mouth out after each use.      2. The patient to continue albuterol inhaler and DuoNebs as needed.      3. I will check a chest CT scan to insure resolution of COVID19 pneumonia as     patient is still short of breath.       4. Up-to-date with flu, Prevnar and Pneumovax.  The patient is advised to     continue to follow CDC recommendations of social distancing, wearing a mask and     washing hands for at least 20 seconds.        5.  Patient is advised to call the office, 911 or go to the ER with any new or     worsening symptoms.      6.  Follow up in 3-4 weeks, sooner if needed.            Electronically signed by LEONARDO LYN PCCS   02/22/2021 13:21       Disclaimer: Converted document may not contain table formatting or lab diagrams. Please see [employer] for the authenticated document.

## 2021-05-28 NOTE — PROGRESS NOTES
Patient: LUISA WHITING     Acct: [id number]     Report: #[id number]  UNIT #: [id number]     : 1940    Encounter Date:10/03/2019  PRIMARY CARE: MILDRED NORTON  ***Signed***  --------------------------------------------------------------------------------------------------------------------  Chief Complaint      Encounter Date      Oct 3, 2019            Primary Care Provider      TAMMY ROBERTS M[facility] with [facility]            Referring Provider      KLAUDIA VIERA            Patient Complaint      Patient is complaining of      F/U COPD            VITALS      Height 5 ft 10 in / 177.8 cm      Weight 227 lbs 9 oz / 103.86289 kg      BSA 2.21 m2      BMI 32.7 kg/m2      Temperature 98.2 F / 36.78 C - Oral      Pulse 47      Respirations 14      Blood Pressure 148/73 Sitting, Right Arm      Pulse Oximetry 98%, ROOMAIR            HPI      The patient is a very pleasant 79 year old  male with chronic systolic     heart failure and COPD here for follow up. Since last visit he has been doing we    ll. He is back on Stiolto with albuterol as needed.  His heart failure is well-    controlled. He gets short of breath with walking about 600 feet. 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 He is doing well and is at baseline.  He is     trying to be more active. He denies any leg swelling, orthopnea or paroxysmals     nocturnal dyspnea. His dyspnea is moderate in severity, worse with exertion and     relieved with rest. He has no associated coughing, wheezing, headaches, chest     pain or hemoptysis.  He is able to perform ADLs without difficulty.  Denies any     swollen glands or lymph nodes of the head and neck.            I have personally reviewed the review of systems, past family, social, surgical     and medical histories and I agree with the findings.            ROS      Constitutional:  Denies: Fatigue, Fever, Weight gain, Weight  loss, Chills,     Insomnia, Other      Respiratory/Breathing:  Complains of: Shortness of air, Wheezing, Cough; Denies:    Hemoptysis, Pleuritic pain, Other      Endocrine:  Denies: Polydipsia, Polyuria, Heat/cold intolerance, Diabetes, Other      Ears, nose, mouth, throat:  Denies: Congestion, Dysphagia, Hearing Changes, Nose    Bleeding, Nasal Discharge, Throat pain, Tinnitus, Other      Cardiovascular:  Denies: Chest Pain, Exertional dyspnea, Peripheral Edema,     Palpitations, Syncope, Wake up Gasping for air, Orthopnea, Tachycardia, Other      Gastrointestinal:  Denies: Abdominal pain/cramping, Bloody stools, Constipation,    Diarrhea, Melena, Nausea, Vomiting, Other      Genitourinary:  Denies: Dysuria, Urinary frequency, Incontinence, Hematuria,     Urgency, Other      Musculoskeletal:  Denies: Joint Pain, Joint Stiffness, Joint Swelling, Myalgias,    Other      Hematologic/lymphatic:  DENIES: Lymphadenopathy, Bruising, Bleeding tendencies,     Other      Neurologic:  Denies: Headache, Numbness, Weakness, Seizures, Other      Psychiatric:  Denies: Anxiety, Appropriate Effect, Depression, Other      Sleep:  No: Excessive daytime sleep, Morning Headache?, Snoring, Insomnia?, Stop    breathing at sleep?, Other            FAMILY/SOCIAL/MEDICAL HX      Surgical History:  Yes: Appendectomy (CHILDHOOD), Bladder Surgery (TURP ), Bowel    Surgery (COLONOSCOPY), CABG (1999 4 VESSEL ), Head Surgery (ALICIA. CATARACT     EXTRACTION W/ LENS IMPLANT), Other Surgeries; No: Abdominal Surgery,     Cholecystectomy, Oral Surgery, Orthopedic Surgery, Vascular Surgery      Heart - Family Hx:  Father      Is Father Still Living?:  No      Is Mother Still Living?:  No       Family History:  Yes      Social History:  No Tobacco Use, No Alcohol Use, No Recreational Drug use      Smoking status:  Former smoker (.5 ppd x 40 y quit 1989)      Anticoagulation Therapy:  Yes      Antibiotic Prophylaxis:  No      Medical History:  Yes:  Arthritis (KNEES/BACK), Chemotherapy/Cancer (SKIN CANCER     ON HEAD AND EAR DIAGNOSED 3/2016- RESOLVED ), Chronic Bronchitis/COPD (CHRONIC     SOA MAINLY WITH EXERTION ), Heart Attack (2016, HX 4 VESSEL CABG 1999),     Hemorrhoids/Rectal Prob (ACID REFLUX, H. PYLORI, DIVERTICULITIS), High Blood     Pressure (ON MEDS), Shortness Of Breath, Miscellaneous Medical/oth (prostate     problems); No: Asthma, Blood Disease, Congestive Heart Failu, Deafness or     Ringing Ears, Diabetes, Seizures, Sinus Trouble      Psychiatric History      NONE            PREVENTION      Hx Influenza Vaccination:  Yes      Date Influenza Vaccine Given:  Oct 23, 2018      Influenza Vaccine Declined:  No      2 or More Falls Past Year?:  No      Fall Past Year with Injury?:  No      Hx Pneumococcal Vaccination:  Yes      Encouraged to follow-up with:  PCP regarding preventative exams.      Chart initiated by      DYLAN            ALLERGIES/MEDICATIONS      Allergies:        Coded Allergies:             NO KNOWN DRUG ALLERGIES (Verified  Allergy, Unknown, 10/3/19)      Medications    Last Reconciled on 10/3/19 11:47 by AURORA FARMER MD      MDI-Albuterol (Ventolin HFA) 18 Gm Hfa.aer.ad      2 PUFFS INH QID PRN for DYSPNEA/WHEEZING, #1 MDI 3 Refills         Prov: AURORA FARMER         10/3/19       Tiotropium Br/Olodaterol HCl (Stiolto Respimat Inhal Spray) 4 Gm Mist.inhal      2 PUFFS INH QDAY, #1 INH 4 Refills         Prov: AURORA FARMER         10/3/19       Fluticasone Furoate (Arnuity Ellipta) 200 Mcg Blst.w.dev      1 PUFF INH RTQDAY, #1 INH 11 Refills         Prov: AURORA FARMER         10/3/19       Fluticasone Furoate (Arnuity Ellipta) 100 Mcg Blst.w.dev               Prov: AURORA FARMER         9/10/19       Pitavastatin Calcium (Livalo) 1 Mg Tab      1 MG PO MOWEFR, TAB         Reported         7/26/19       Tiotropium Br/Olodaterol HCl (Stiolto Respimat Inhal Spray) 4 Gm Mist.inhal               Prov: Brenda Grant  NAHID         6/7/19       Al Hydrox/Mg Hydrox/Simeth (Al-Mag Hydrox-Simeth) 30 Ml Oral.susp      30 ML PO QID PRN for INDIGESTION/HEARTBURN, #12 OZ         Prov: Abhishek Laurent         5/30/19       Metoprolol Succinate (Metoprolol Succinate) 50 Mg Tab.er.24h      50 MG PO QDAY for 30 Days, #30 TAB.ER         Prov: Abhishek Laurent         5/30/19       Albuterol/Ipratropium (Duoneb) 3 Ml Ampul.neb      3 ML INH Q6H PRN for DYSPNEA/WHEEZING, #120 NEB         Prov: Abhishek Laurent         5/30/19       MDI-Albuterol (Ventolin HFA) 8 Gm Hfa.aer.ad      1 PUFFS INH Q4H PRN for SHORTNESS OF BREATH, #1 MDI 0 Refills         Reported         3/11/19       Calcium Carb/Vit D3 (CALCIUM 600-VIT D3 400 TABLET) 1 Each Tablet      1 EACH PO QDAY, #60 TAB 0 Refills         Reported         3/11/19       Isosorbide Mononitrate ER (Isosorbide Mononitrate ER) 30 Mg Tab.er.24h      60 MG PO QDAY, TAB.ER.24H         Reported         3/11/19       Cetirizine Hcl (CETIRIZINE HCL) 10 Mg Tablet      10 MG PO HS PRN for ALLERGIES, #30 TAB 0 Refills         Reported         11/29/18       Azelastine Hcl (Azelastine Nasal) 137 Mcg/0.137 Ml Spray.pump      1 PUFFS NARE EACH PRN for CONGESTION, #1 BOTTLE         Reported         11/29/18       Acetaminophen Er (ARTHRITIS PAIN RELIEF) 650 Mg Tablet.er      1300 MG PO PRN for PAIN, #100 TAB.ER         Reported         11/29/18       Furosemide* (Lasix*) 40 Mg Tablet      40 MG PO QAM, #30 TAB 0 Refills         Reported         11/29/18       Tamsulosin HCL (Tamsulosin HCL) 0.4 Mg Cap.er.24h      0.4 MG PO HS, #30 CAP 0 Refills         Reported         4/13/18       Finasteride (Finasteride*) 5 Mg Tablet      5 MG PO QDAY, #30 TAB 0 Refills         Reported         4/13/18       Ranitidine HCl (Ranitidine HCl) 300 Mg Tablet      300 MG PO HS for 30 Days, #30 TAB         Reported         11/1/17       Sacubitril/Valsartan (Entresto 24/26 mg) 1 Tab Tablet      1 TAB PO BID, #60 TAB 5 Refills          Prov: RISHI MCGEE         8/6/16       Ubidecarenone (Co Q-10) 50 Mg Cap      50 MG PO QAM, CAP         Reported         8/5/16       Pantoprazole (Protonix*) 40 Mg Tablet      40 MG PO QDAY@07, #30 TAB 5 Refills         Prov: Cas Patel         4/4/16       Vitamin B Complex (B Complex-Vitamin B-12) 1 Tab Tablet      1 TAB PO QAM, #30 TAB         Reported         3/24/16      Current Medications      Current Medications Reviewed 10/3/19            EXAM      Vital Signs Reviewed.      General:  WDWN, Alert, NAD.      HEENT: PERRL, EOMI.  OP, nares clear, no sinus tenderness.      Chest: Good aeration, clear to auscultation bilaterally, tympanic to percussion     bilaterally, no work of breathing noted.      CV: RRR, no MGR, pulses 2+, equal.        Abd: Obese, soft, NT, ND, +BS, no HSM.      EXT: No clubbing, no cyanosis, no edema.        Neuro:  A  Skin: No rashes or lesions.      Vitals      Vitals:             Height 5 ft 10 in / 177.8 cm           Weight 227 lbs 9 oz / 103.06307 kg           BSA 2.21 m2           BMI 32.7 kg/m2           Temperature 98.2 F / 36.78 C - Oral           Pulse 47           Respirations 14           Blood Pressure 148/73 Sitting, Right Arm           Pulse Oximetry 98%, ROOMAIR            REVIEW      Results Reviewed      PCCS Results Reviewed?:  Yes Prev Lab Results, Yes Prev Radiology Results, Yes     Previous Mecial Records      Lab Results      I reviewed my last office note. I also personally reviewed Samara Rausch     last office note.            Assessment      Notes      New Medications      * MDI-Albuterol (Ventolin HFA) 18 GM HFA.AER.AD: 2 PUFFS INH QID PRN       DYSPNEA/WHEEZING #1      * Tiotropium Br/Olodaterol HCl (Stiolto Respimat Inhal [place]) 4 GM MIST.INHAL: 2      PUFFS INH QDAY #1      Renewed Medications      * Fluticasone Furoate (Arnuity Ellipta) 200 MCG BLST.W.DEV: 1 PUFF INH RTQDAY #1      IMPRESSION:      1.  Moderate COPD, alpha 1 antitrypsin testing  unremarkable. COPD assessment     test score is 7 signifying great control of underlying disease on current     therapies. He has Gold stage C COPD.      2. Seasonal allergies, well-controlled.      3. Tobacco abuse with cigarettes in remission.      4. Chronic systolic congestive heart failure, well-compensated.      5. Granulomatous lung disease.      6.  Obesity, BMI 32.7.            PLAN:      1.  Continue Arnuity and Stiolto with albuterol as needed.      2. Continue Singulair.      3. Up-to-date with flu, Prevnar and Pneumovax.      4. Still refuses pulmonary rehab.      5.  I spent 4 minutes counseling the patient on diet and exercise.  I     recommended 30 minutes of daily exercise and a 1800 calorie a day low fat diet.     The patient verbalized understanding and will make attempts to lose weight.        6. Follow up with me in one year.            Patient Education      ACO BMI High above 25:  Counseling Given, Encouraged weight loss, Encourage     dietary changes      Patient Education Provided:  COPD                 Disclaimer: Converted document may not contain table formatting or lab diagrams. Please see [employer] for the authenticated document.

## 2021-05-28 NOTE — PROGRESS NOTES
Patient: LUISA WHITING     Acct: [id number]     Report: #[id number]  UNIT #: [id number]     : 1940    Encounter Date:2018  PRIMARY CARE: MILDRED NORTON  ***Signed***  --------------------------------------------------------------------------------------------------------------------  Chief Complaint      Encounter Date      2018            Referring Provider      KLAUDIA VIERA            Patient Complaint      Patient is complaining of      f/u            VITALS      Height 5 ft 0 in / 152.4 cm      Weight 236 lbs 7 oz / 107.172478 kg      BSA 2.20 m2      BMI 46.2 kg/m2      Temperature 98.7 F / 37.06 C - Oral      Pulse 70      Respirations 14      Blood Pressure 117/84 Sitting, Left Arm      Pulse Oximetry 96%, roomair            HPI      The patient is a very pleasant 78 year old  male former cigarette     smoker with heart failure with reduced ejection fraction and moderate chronic     obstructive pulmonary disease here for follow up.             He has no change since his last office visit on Singulair, arnuity and Anoro.     He gets short of breath walking about 500-600 feet.  Inhalers help but he is     wondering if he could try Stiolto to see if that would help him more than the     Anoro. He denies any coughing or wheezing. 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 He denies any orthopnea    , paroxysmal nocturnal dyspnea or weight gain. He states for the last 1-2 weeks     he has had some dizziness. He has no hypoxia with exertion. He states his blood     pressures have been stable. He is going to discuss his dizziness with Dr. Stevenson.  He denies any loss of consciousness or room spinning, most just kind     of tunnel vision and presyncopal symptoms. He denies any nausea and vomiting,     fevers or chills, headaches or hemoptysis or chest pain. He is able to perform     all his activities of daily living without  difficulty and denies any swollen     lymph nodes or glands in her head and neck.             I have personally reviewed the Review of Systems, past family, social, surgical     and medical histories and I agree with the findings.            ROS      Constitutional:  Denies: Fatigue, Fever, Weight gain, Weight loss, Chills,     Insomnia, Other      Respiratory/Breathing:  Complains of: Shortness of air, Denies: Wheezing, Cough    , Hemoptysis, Pleuritic pain, Other      Endocrine:  Denies: Polydipsia, Polyuria, Heat/cold intolerance, Diabetes, Other      Eyes:  Denies: Blurred vision, Vision Changes, Other      Ears, nose, mouth, throat:  Denies: Mouth lesions, Thrush, Throat pain,     Hoarseness, Allergies/Hay Fever, Post Nasal Drip, Headaches, Recent Head Injury    , Nose Bleeding, Neck Stiffness, Thyroid Mass, Hearing Loss, Ear Fullness, Dry     Mouth, Nasal or Sinus Pain, Dry Lips, Nasal discharge, Nasal congestion, Other      Cardiovascular:  Denies: Palpitations, Syncope, Claudication, Chest Pain, Wake     up Gasping for air, Leg Swelling, Irregular Heart Rate, Cyanosis, Dyspnea on     Exertion, Other      Gastrointestinal:  Denies: Nausea, Constipation, Diarrhea, Abdominal pain,     Vomiting, Difficulty Swallowing, Reflux/Heartburn, Dysphagia, Jaundice, Bloating    , Melena, Bloody stools, Other      Genitourinary:  Denies: Urinary frequency, Incontinence, Hematuria, Urgency,     Nocturia, Dysuria, Testicular problems, Other      Musculoskeletal:  Denies: Joint Pain, Joint Stiffness, Joint Swelling, Myalgias    , Other      Hematologic/lymphatic:  DENIES: Lymphadenopathy, Bruising, Bleeding tendencies,     Other      Neurological:  Complains of: Other (dizziness), Denies: Headache, Numbness,     Weakness, Seizures      Psychiatric:  Denies: Anxiety, Appropriate Effect, Depression, Other      Sleep:  No: Excessive daytime sleep, Morning Headache?, Snoring, Insomnia?,     Stop breathing at sleep?, Other       Integumentary:  Denies: Rash, Dry skin, Skin Warm to Touch, Other      Immunologic/Allergic:  Denies: Latex allergy, Seasonal allergies, Asthma,     Urticaria, Eczema, Other      Immunization status:  No: Up to date            FAMILY/SOCIAL/MEDICAL HX      Surgical History:  Yes: Appendectomy (CHILDHOOD), Bladder Surgery (PROSTATE SX)    , Bowel Surgery (COLONOSCOPY), CABG (2000 4 vessel), Other Surgeries, No:     Abdominal Surgery, Cholecystectomy, Head Surgery, Oral Surgery, Orthopedic     Surgery, Vascular Surgery      Heart - Family Hx:  Father      Is Father Still Living?:  No      Is Mother Still Living?:  No       Family History:  Yes      Social History:  No Tobacco Use, No Alcohol Use, No Recreational Drug use      Smoking status:  Former smoker (.5 ppd x 40 y quit 1989)      Anticoagulation Therapy:  Yes      Antibiotic Prophylaxis:  No      Medical History:  Yes: Arthritis (KNEES/BACK), Chemotherapy/Cancer (SKIN CANCER     ON HEAD AND EAR DIAGNOSED 3/2016), Heart Attack, Hemorrhoids/Rectal Prob (HX H.     PYLORI, ACID REFLUX), High Blood Pressure, Shortness Of Breath (SOA),     Miscellaneous Medical/oth (prostate problems), No: Asthma, Blood Disease,     Chronic Bronchitis/COPD, Congestive Heart Failu, Deafness or Ringing Ears,     Diabetes, Seizures, Sinus Trouble      Psychiatric History      none            PREVENTION      Hx Influenza Vaccination:  Yes (NOVEMBER 2015)      Date Influenza Vaccine Given:  Oct 1, 2017      Influenza Vaccine Declined:  No      2 or More Falls Past Year?:  No      Fall Past Year with Injury?:  No      Hx Pneumococcal Vaccination:  No      Encouraged to follow-up with:  PCP regarding preventative exams.      Chart initiated by      shana/ ma            ALLERGIES/MEDICATIONS      Allergies:        Coded Allergies:             NO KNOWN DRUG ALLERGIES (Verified  Allergy, Unknown, 4/13/18)      Medications    Last Reconciled on 4/13/18 08:39 by AURORA FARMER MD       Fluticasone Furoate (Arnuity Ellipta) 100 Mcg Blst.w.dev      1 PUFF INH RTQDAY, #1 INH 5 Refills         Prov: AURORA FARMER         4/13/18       Umeclidinium/Vilanterol 62.5-25 Mcg Inh (Anoro Ellipta 62.5-25 Mcg Inh) 1 Each     Blst.w.dev      1 PUFF INH QDAY for 30 Days, #1 INH 6 Refills         Prov: AURORA FARMER         4/13/18       Pravastatin Sod (Pravastatin*) 10 Mg Tablet      10 MG PO HS, TAB         Reported         4/13/18       Fluticasone Furoate (Arnuity Ellipta) 200 Mcg Blst.w.dev      1 PUFF INH RTQDAY, #1 INH         Reported         4/13/18       Tamsulosin HCL (Tamsulosin*) 0.4 Mg Cap.er.24h      0.4 MG PO HS, #30 CAP 0 Refills         Reported         4/13/18       Finasteride (Finasteride*) 5 Mg Tablet      5 MG PO QDAY, #30 TAB 0 Refills         Reported         4/13/18       Metoprolol Succinate (Metoprolol Succinate*) 50 Mg Tab.er.24h      25 MG PO BID, #30 TAB.SR.24H 0 Refills         Reported         4/13/18       Rivaroxaban (Xarelto) 15 Mg Tablet      15 MG PO QDAY, #30 TAB 5 Refills         Reported         11/1/17       Acetaminophen* (Tylenol Extra Strength*) 500 Mg Tablet      1000 MG PO Q4-6H Y for PAIN OR FEVER, #100 TAB 0 Refills         Reported         11/1/17       Ranitidine Hcl (Zantac*) 300 Mg Tablet      300 MG PO HS for 30 Days, #30 TAB         Reported         11/1/17       MDI-Albuterol (Ventolin HFA*) 18 Gm Hfa.aer.ad      2 PUFFS INH Q4-6H Y for SHORTNESS OF BREATH, #1 MDI 6 Refills         Prov: AURORA FARMER         2/7/17       Furosemide (Furosemide) 40 Mg Tablet      40 MG PO QDAY, #30 TAB 5 Refills         Prov: RISHI MCGEE         8/6/16       Sacubitril/Valsartan (Entresto 24/26 mg) 1 Tab Tablet      1 TAB PO BID, #60 TAB 5 Refills         Prov: RISHI MCGEE         8/6/16       Ubidecarenone (Co Q-10) 50 Mg Cap      50 MG PO QDAY, CAP         Reported         8/5/16       Ranolazine (Ranexa) 500 Mg Tab      500 MG PO BID, TAB.SR         Reported          7/24/16       Isosorbide Mononitrate ER (Isosorbide Mononitrate ER) 60 Mg Tab.er.24h      60 MG PO QDAY, TAB         Reported         7/24/16       Pantoprazole (Protonix*) 40 Mg Tablet      40 MG PO QDAY@07, #30 TAB 5 Refills         Prov: Cas Patel         4/4/16       Aspirin (Aspirin*) 81 Mg Tab.chew      81 MG PO QDAY, #30 TAB.CHEW 0 Refills         Reported         4/4/16       Calcium Carb/Vit D3 (Calcium Carb 600 + D) 600 Mg Tablet      600 MG PO QDAY, #30 TAB 0 Refills         Reported         3/24/16       Vitamin B Complex (B Complex-Vitamin B-12*) 1 Tab Tablet      1 TAB PO QDAY, #30 TAB         Reported         3/24/16       amLODIPine (Norvasc) 10 Mg Tablet      10 MG PO HS, TAB         Reported         3/24/16      Current Medications      Current Medications Reviewed 4/13/18            EXAM      Vital Signs Reviewed      Gen: WDWN, Alert, NAD.        HEENT:  PERRL, EOMI.  OP, nares clear, no sinus tenderness.      Neck:  Supple, no JVD, no thyromegaly.      Lymph: No axillary, cervical, supraclavicular lymphadenopathy noted bilaterally.      Chest:  Good aeration, clear to auscultation bilaterally, tympanic to     percussion bilaterally, no work of breathing noted.      CV:  RRR, no MGR, pulses 2+, equal.      Abd:  Soft, NT, ND, + BS, no HSM.      EXT:  No clubbing, no cyanosis, no edema, no joint tenderness.       Neuro:  A  Skin: No rashes or lesions.      Vtials      Vitals:             Height 5 ft 0 in / 152.4 cm           Weight 236 lbs 7 oz / 107.657236 kg           BSA 2.20 m2           BMI 46.2 kg/m2           Temperature 98.7 F / 37.06 C - Oral           Pulse 70           Respirations 14           Blood Pressure 117/84 Sitting, Left Arm           Pulse Oximetry 96%, roomair            REVIEW      Results Reviewed      PCCS Results Reviewed?:  Yes Previous [facility] (I personally reviewed Dr. Stevenson's office notes.  )            PLAN      Assessment      Moderate COPD (chronic  obstructive pulmonary disease) - J44.9            Notes      New Medications      * Metoprolol Succinate (Metoprolol Succinate*) 50 MG TAB.ER.24H: 25 MG PO BID #    30      * Finasteride (Finasteride*) 5 MG TABLET: 5 MG PO QDAY #30      * Tamsulosin HCL (Tamsulosin*) 0.4 MG CAP.ER.24H: 0.4 MG PO HS #30      * Fluticasone Furoate (Arnuity Ellipta) 200 MCG BLST.W.DEV: 1 PUFF INH RTQDAY #1      * Pravastatin Sod (Pravastatin*) 10 MG TABLET: 10 MG PO HS      * Fluticasone Furoate (Arnuity Ellipta) 100 MCG BLST.W.DEV: 1 PUFF INH RTQDAY #1      Renewed Medications      * Umeclidinium/Vilanterol 62.5-25 Mcg Inh (Anoro Ellipta 62.5-25 Mcg Inh) 1     EACH BLST.W.DEV: 1 PUFF INH QDAY 30 Days #1       Instructions: to replace stiolto      Discontinued Medications      * Beclomethasone Dipropionate (QVAR 80 Mcg) 8.7 GM AER.W.ADAP: 1 PUFFS INH BID #    1      New Office Procedures      * Prevnar 0.5 PCV13, As Soon As Possible       Pneumoc 13-Citlaly Conj-Dip CRm/Pf (Prevnar 13 Syringe) 0.5 ML SYRINGE: 0.5     MILLILITER INTRAMUSCULARLY Qty 1 SYRINGE       Dx: Moderate COPD (chronic obstructive pulmonary disease) - J44.9      IMPRESSION:      1. Dyspnea on exertion unchanged.       2. Moderate chronic obstructive pulmonary disease with FEV1 625 predicted. No     desaturations on six minute walk test. Alpha-1 antitrypsin genotype MM.      Chronic obstructive pulmonary disease assessment test score is 12 today     signifying borderline control he would greatly benefit from pulmonary     rehabilitation.       3. Seasonal allergies well controlled with Singulair and allergy immunotherapy.       4.Dizziness            PLAN:       1. Continue Arnuity at current dose. We will try 2 months of Stiolto instead of     Anoro and see which one works best. Whichever one works best he will prescribe.       2. Again, I offered pulmonary rehabilitation but he declined. He lives near     [facility] and it is not offered there and he does not  want to come to     [place].       3. Continue allergy shots and Singulair.       4. Up to date with flu vaccine. We will do Prevnar today and Pneumovax in 1     year.       5. Follow up in 6 months.            Patient Education            Patient Education:        COPD                 Disclaimer: Converted document may not contain table formatting or lab diagrams. Please see [employer] for the authenticated document.

## 2021-05-28 NOTE — PROGRESS NOTES
Patient: LUISA WHITING     [id number]     Report: #[id number]  UNIT #: [id number]     : 1940    Encounter Date:2021  PRIMARY CARE: MILDRED NORTON  ***Signed***  --------------------------------------------------------------------------------------------------------------------  History of Present Illness      Chief Complaint: Covid +            Luisa Whiting is presenting for evaluation via Telehealth visit. Verbal consent    obtained before beginning visit.            PAST MEDICAL HISTORY/OVERVIEW OF PATIENT SYMPTOMS            Hx: Covid (+), Alpha 1 deficiency, COPD            Former smoker (.5 ppd x 40 y quit )            Flu vaccine- current            Pneumonia vaccine- current            Symptoms: SOA, cough, wheezing, fatigue            Provider spent 14 minutes with the patient during telehealth visit.            The following staff were present during this visit: Maribell CHARLES, Brandi Lu CMA            The patient is a 80 year old male patient of Dr. Luna's with history of     systolic heart failure and chronic obstructive pulmonary disease  who presents     for Telehealth visit today. The patient states 2 weeks ago he was diagnosed with    COVID-19. The patient states he started having worsening shortness of breath,     coughing and wheezing and went to the ER in [place] this past Tuesday on     21 and was prescribed steroids and a Z-pack. The patient states he is on     his second Z-pack and he is starting to feel better. The patient states he still    short of air that is moderate in severity, worse with exertion, improved with     rest. The patient states he has been checking his oxygen saturation at home and     they are running between 93-96% on room air. The patient states he is now able     to smell and taste better however they are not fully back yet. The patient     states he has a productive cough with clear sputum and still has fatigue and      body aches. The patient denies any fever or chills, night sweats, hemoptysis,      purulent sputum production, swollen glands in head and neck, unintentional     weight loss, chest pain or chest tightness, abdominal pain, nausea or vomiting     or diarrhea. The patient denies any headaches, leg swelling, orthopnea or     paroxysmal nocturnal dyspnea.  The patient states he is under the care of Dr. Stevenson for history of congestive heart failure and coronary artery disease as     well as 4 vessel bypass. The patient states he is taking arnuity and Stiolto     everyday as prescribed and was using some old nebulizer treatments. The patient     states he is able to perform his activities of daily living.             I reviewed the Review of Systems, medical, surgical and family history and agree    with those as entered.               Physical exam is deferred due to Telehealth visit.            I personally reviewed Dr. Luna's last Telehealth visit note.             Allergies and Medications      Allergies:        Coded Allergies:             NO KNOWN DRUG ALLERGIES (Verified  Allergy, Unknown, 2/5/21)      Medications    Last Reconciled on 2/5/21 08:39 by LEONARDO LYN       Albuterol/Ipratropium (Duoneb) 3 Ml Ampul.neb      3 ML INH Q4H PRN for SHORTNESS OF BREATH, #120 NEB 5 Refills         Prov: LEONARDO LYN PCCS         2/5/21       Tiotropium Br/Olodaterol HCl (Stiolto Respimat Inhal Spray) 4 Gm Mist.inhal      2 PUFFS INH QDAY, #1 INH 11 Refills         Prov: AURORA LUNA         10/28/20       Fluticasone Furoate (Arnuity Ellipta) 200 Mcg Blst.w.dev      1 PUFF INH RTQDAY, #1 INH 11 Refills         Prov: AURORA LUNA         10/28/20       Rivaroxaban (Xarelto) 15 Mg Tablet      15 MG PO QDAY, #30 TAB 5 Refills         Reported         10/14/19       Aspirin Chew (Aspirin Baby) 81 Mg Tab.chew      81 MG PO QDAY, #30 TAB.CHEW 0 Refills         Reported         10/14/19       Isosorbide  Mononitrate ER (Isosorbide Mononitrate ER) 60 Mg Tab.er.24h      60 MG PO QDAY, TAB.ER.24H         Reported         10/14/19       Pitavastatin Calcium (Livalo) 1 Mg Tab      1 MG PO MOWEFR, TAB         Reported         7/26/19       Al Hydrox/Mg Hydrox/Simeth (Al-Mag Hydrox-Simeth) 30 Ml Oral.susp      30 ML PO QID PRN for INDIGESTION/HEARTBURN, #12 OZ         Prov: Alla Laurentsh         5/30/19       Metoprolol Succinate (Metoprolol Succinate) 50 Mg Tab.er.24h      50 MG PO QDAY for 30 Days, #30 TAB.ER         Prov: Abhishek Laurent         5/30/19       MDI-Albuterol (Ventolin HFA) 8 Gm Hfa.aer.ad      1 PUFFS INH Q4H PRN for SHORTNESS OF BREATH, #1 MDI 0 Refills         Reported         3/11/19       Calcium Carbonate/Vitamin D3 (Calcium 600-Vit D3 400 Tablet) 1 Each Tablet      1 EACH PO QDAY, #60 TAB 0 Refills         Reported         3/11/19       Cetirizine Hcl (CETIRIZINE HCL) 10 Mg Tablet      10 MG PO HS PRN for ALLERGIES, #30 TAB 0 Refills         Reported         11/29/18       Azelastine Hcl (Azelastine Nasal) 137 Mcg/0.137 Ml Spray.pump      1 PUFFS NARE EACH PRN for CONGESTION, #1 BOTTLE         Reported         11/29/18       Acetaminophen Er (ARTHRITIS PAIN RELIEF) 650 Mg Tablet.er      1300 MG PO PRN for PAIN, #100 TAB.ER         Reported         11/29/18       Furosemide* (Lasix*) 40 Mg Tablet      40 MG PO QAM, #30 TAB 0 Refills         Reported         11/29/18       Tamsulosin HCL (Tamsulosin HCL) 0.4 Mg Cap.er.24h      0.4 MG PO HS, #30 CAP 0 Refills         Reported         4/13/18       Finasteride (Finasteride*) 5 Mg Tablet      5 MG PO QDAY, #30 TAB 0 Refills         Reported         4/13/18       raNITIdine HCL (raNITIdine HCL) 300 Mg Tablet      300 MG PO HS for 30 Days, #30 TAB         Reported         11/1/17       Sacubitril/Valsartan (Entresto 24/26 mg) 1 Tab Tablet      1 TAB PO BID, #60 TAB 5 Refills         Prov: MCGEE,RISHI         8/6/16       Ubidecarenone (Co Q-10) 50 Mg Cap       50 MG PO QAM, CAP         Reported         8/5/16       Pantoprazole (Protonix) 40 Mg Tablet      40 MG PO QDAY@07, #30 TAB 5 Refills         Prov: Cas Patel         4/4/16       Vitamin B Complex (B Complex-Vitamin B-12) 1 Tab Tablet      1 TAB PO QAM, #30 TAB         Reported         3/24/16            Assessment      Fatigue R53.83, Shortness of Air  R06.02            Plan      Orders:  Phone Eval 11-20 mi [id number]      Instructions      * Chronic conditions reviewed and taken in consideration for today's treatment       plan.      * Plan Of Care: ()      * Patient instructed to seek medical attention urgently for new or worsening       symptoms.      * Patient was educated/instructed on their diagnosis, treatment and medications       today.      * Recommend self monitoring. Instructions given.      * Recommend self quarantine for 14 days.      * Recommend self quarantine until without fever for 72 hours without using fever       reducing medications.      * Recommends over the counter medications for symptom management.            ASSESSMENT:      1. COVID-19 pneumonia diagnosed 2 weeks ago. The patient also had an ER visit at     [facility] in [place].       2. Dyspnea.       3. Cough.       4. Wheezing.       5. Seasonal allergies.       6. Chronic compensated systolic congestive heart failure and coronary artery     disease under the care of Dr. Stevenson.       7. Known granulomatous lung disease.       8. Tobacco abuse of cigarettes in remission.            PLAN:      1. Continue Z-pack and prednisone prescribed by the ER.       2. Continue Stiolto and arnuity everyday as prescribed and rinse his mouth after     each use.        3. I will send a prescription for DuoNeb for the patient to use up to 4 times a     day as needed.       4. The patient reports he is up to date with  flu, Prevnar and Pneumovax. The     patient is advised to follow CDC recommendations such as social distancing,      wearing a mask and washing hand for at least 20 seconds.      5. Follow up for Telehealth visit in 2 weeks, sooner if needed.            Electronically signed by LEONARDO LYN [facility]  02/08/2021 16:32       Disclaimer: Converted document may not contain table formatting or lab diagrams. Please see [employer] System for the authenticated document.

## 2021-05-28 NOTE — PROGRESS NOTES
Patient: LUISA WHITING     Acct: [id number]     Report: #[id number]  UNIT #: [id number]     : 1940    Encounter Date:2021  PRIMARY CARE: MILDRED NORTON  ***Signed***  --------------------------------------------------------------------------------------------------------------------  Chief Complaint      Encounter Date      2021            Primary Care Provider      MILDRED NORTON            Referring Provider      MILDRED NORTON            Patient Complaint      Patient is complaining of      Patient is here today for a 1 year follow up            HPI      The patient is a 81 year old male patient of Dr. Luna's with history of     systolic heart failure, chronic obstructive pulmonary disease, COVID-19     pneumonia who presents for follow up today. The patient's son is present in the     office today.             The patient had COVID-19 diagnosed in 2021 and had a telehealth visit     with myself after diagnosis. The patient states he is still short of breath that    is moderate in severity, worse with exertion, improved with rest. The patient     denies any wheezing,  fever or chills, night sweats, hemoptysis,  purulent sp    utum production, swollen glands in head and neck, unintentional weight loss,     chest pain or chest tightness, abdominal pain, nausea or vomiting or diarrhea.     The patient denies  any headaches, myalgias, changes in sense of taste and smell    any coronavirus or flu like symptoms. The patient denies any leg swelling,     orthopnea or paroxysmal nocturnal dyspnea. The patient denies any history of any    blood clotting disorders. The patient denies any recent travel or any recent     surgeries. The patient states he is active and does not lead a sedentary     lifestyle. The patient states ever since starting DuoNeb it has helped his lung     pain. The patient states he is taking arnuity and Stiolto everyday as prescribed    and uses albuterol inhaler and  DuoNeb as needed. The patient had a CT scan of     the chest on 02/23/21 that showed subtle faint peripheral ground glass opacities    representing typical findings of COVID-19 pneumonia. The patient is scheduled     for a follow up CT scan of the chest on 04/21/21 at [facility]. The patient states he     was seen by Dr. Stevenson cardiologist 3 weeks ago and had an echocardiogram and     was told his echocardiogram was normal unfortunately he does not have a copy of     that report with him today. The patient denies he is able to perform his     activities of daily living.             I reviewed the Review of Systems, medical, surgical and family history and agree    with those as entered.            ROS      Constitutional:  Denies: Fatigue, Fever, Weight gain, Weight loss, Chills,     Insomnia, Other      Respiratory/Breathing:  Complains of: Shortness of air, Cough; Denies: Wheezing,    Hemoptysis, Pleuritic pain, Other      Endocrine:  Denies: Polydipsia, Polyuria, Heat/cold intolerance, Diabetes, Other      Eyes:  Denies: Blurred vision, Vision Changes, Other      Ears, nose, mouth, throat:  Denies: Mouth lesions, Thrush, Throat pain,     Hoarseness, Allergies/Hay Fever, Post Nasal Drip, Headaches, Recent Head Injury,    Nose Bleeding, Neck Stiffness, Thyroid Mass, Hearing Loss, Ear Fullness, Dry     Mouth, Nasal or Sinus Pain, Dry Lips, Nasal discharge, Nasal congestion, Other      Cardiovascular:  Denies: Palpitations, Syncope, Claudication, Chest Pain, Wake     up Gasping for air, Leg Swelling, Irregular Heart Rate, Cyanosis, Dyspnea on     Exertion, Other      Gastrointestinal:  Denies: Nausea, Constipation, Diarrhea, Abdominal pain,     Vomiting, Difficulty Swallowing, Reflux/Heartburn, Dysphagia, Jaundice,     Bloating, Melena, Bloody stools, Other      Genitourinary:  Denies: Urinary frequency, Incontinence, Hematuria, Urgency,     Nocturia, Dysuria, Testicular problems, Other      Musculoskeletal:  Denies:  Joint Pain, Joint Stiffness, Joint Swelling, Myalgias,    Other      Hematologic/lymphatic:  DENIES: Lymphadenopathy, Bruising, Bleeding tendencies,     Other      Neurological:  Denies: Headache, Numbness, Weakness, Seizures, Other      Psychiatric:  Denies: Anxiety, Appropriate Effect, Depression, Other      Sleep:  No: Excessive daytime sleep, Morning Headache?, Snoring, Insomnia?, Stop    breathing at sleep?, Other      Integumentary:  Denies: Rash, Dry skin, Skin Warm to Touch, Other      Immunologic/Allergic:  Denies: Latex allergy, Seasonal allergies, Asthma,     Urticaria, Eczema, Other      Immunization status:  No: Up to date            VITALS      Height 5 ft 10 in / 177.8 cm      Weight 233 lbs 0 oz / 105.814721 kg      BSA 2.23 m2      BMI 33.4 kg/m2      Temperature 98.3 F / 36.83 C - Tympanic      Pulse 80      Respirations 14      Blood Pressure 164/72 Sitting, Left Arm      Pulse Oximetry 96%, room air            FAMILY/SOCIAL/MEDICAL HX      Surgical History:  Yes: Appendectomy (CHILDHOOD), Bladder Surgery (TURP ), Bowel    Surgery (COLONOSCOPY), CABG (1999 4 VESSEL ), Head Surgery (ALICIA. CATARACT     EXTRACTION W/ LENS IMPLANT), Other Surgeries; No: AAA Repair, Abdominal Surgery,    Adenoids, Angioplasty, Back Surgery, Breast Surgery, Carotid Stenosis,     Cholecystectomy, Ear Surgery, Eye Surgery, Hernia Surgery, Kidney Surgery, Nose     Surgery, Oral Surgery, Orthopedic Surgery, Prostatectomy, Rectal Surgery, Spinal    Surgery, Testicular Surgery, Throat Surgery, Tonsils, Valve Replacement,     Vascular Surgery      Heart - Family Hx:  Father       Family History:  Yes      Social History:  No Tobacco Use, No Alcohol Use, No Recreational Drug use      Smoking status:  Former smoker      Anticoagulation Therapy:  Yes      Medical History:  Yes: Arthritis (KNEES/BACK), Chemotherapy/Cancer (SKIN CANCER     ON HEAD AND EAR DIAGNOSED 3/2016- RESOLVED ), Chronic Bronchitis/COPD (CHRONIC     SOA MAINLY  WITH EXERTION ), Heart Attack (2016, HX 4 VESSEL CABG 1999),     Hemorrhoids/Rectal Prob (ACID REFLUX, H. PYLORI, DIVERTICULITIS), High Blood     Pressure (ON MEDS), Shortness Of Breath, Miscellaneous Medical/oth (prostate     problems); No: Alcoholism, Allergies, Anemia, Asthma, Atrial Fibrillation, Blood    Disease, Broken Bones, Cataracts, Chemical Dependency, Emphysema, Chronic Liver     Disease, Colon Trouble, Colitis, Diverticulitis, Congestive Heart Failu,     Deafness or Ringing Ears, Convulsions, Depression, Anxiety, Bipolar Disorder,     PTSD, Diabetes, Epilepsy, Seizures, Forgetfullness, Glaucoma, Gall Stones, Gout,    Head Injury, Heart Murmur, GERD, Hepatitis, Hiatal Hernia, High Cholesterol, HIV    (Do not ask - volu, Jaundice, Kidney or Bladder Disease, Kidney Stones, Migrane     Headaches, Mitral Valve Prolapse, Night sweats, Phlebitis, Psychiatric Care,     Reflux Disease, Rheumatic Fever, Sexually Transmitted Dis, Sinus Trouble, Skin     Disease/Psoriais/Ecz, Stroke, Thyroid Problem, Tuberculosis or Pos TB Te      Psychiatric History      none            PREVENTION      Hx Influenza Vaccination:  Yes      Date Influenza Vaccine Given:  Oct 1, 2021      Influenza Vaccine Declined:  No      2 or More Falls in Past Year?:  No      Fall Past Year with Injury?:  No      Hx Pneumococcal Vaccination:  Yes      Encouraged to follow-up with:  PCP regarding preventative exams.      Chart initiated by      Renay Wong MA            ALLERGIES/MEDICATIONS      Allergies:        Coded Allergies:             NO KNOWN DRUG ALLERGIES (Verified  Allergy, Unknown, 4/9/21)      Medications    Last Reconciled on 4/9/21 11:29 by LEONARDO LYN       Albuterol/Ipratropium (Duoneb) 3 Ml Ampul.neb      3 ML INH Q4H PRN for SHORTNESS OF BREATH, #120 NEB 5 Refills         Prov: LEONARDO LYN PCCS         2/5/21       Tiotropium Br/Olodaterol HCl (Stiolto Respimat Inhal Spray) 4 Gm Mist.inhal      2 PUFFS INH  QDAY, #1 INH 11 Refills         Prov: AURORA FARMER         10/28/20       Fluticasone Furoate (Arnuity Ellipta) 200 Mcg Blst.w.dev      1 PUFF INH RTQDAY, #1 INH 11 Refills         Prov: AURORA FARMER         10/28/20       Rivaroxaban (Xarelto) 15 Mg Tablet      15 MG PO QDAY, #30 TAB 5 Refills         Reported         10/14/19       Aspirin Chew (Aspirin Baby) 81 Mg Tab.chew      81 MG PO QDAY, #30 TAB.CHEW 0 Refills         Reported         10/14/19       Isosorbide Mononitrate ER (Isosorbide Mononitrate ER) 60 Mg Tab.er.24h      60 MG PO QDAY, TAB.ER.24H         Reported         10/14/19       Pitavastatin Calcium (Livalo) 1 Mg Tab      1 MG PO MOWEFR, TAB         Reported         7/26/19       Al Hydrox/Mg Hydrox/Simeth (Al-Mag Hydrox-Simeth) 30 Ml Oral.susp      30 ML PO QID PRN for INDIGESTION/HEARTBURN, #12 OZ         Prov: Abhishek Laurent         5/30/19       Metoprolol Succinate (Metoprolol Succinate) 50 Mg Tab.er.24h      50 MG PO QDAY for 30 Days, #30 TAB.ER         Prov: Abhishek Laurent         5/30/19       MDI-Albuterol (Ventolin HFA) 8 Gm Hfa.aer.ad      1 PUFFS INH Q4H PRN for SHORTNESS OF BREATH, #1 MDI 0 Refills         Reported         3/11/19       Calcium Carbonate/Vitamin D3 (Calcium 600-Vit D3 400 Tablet) 1 Each Tablet      1 EACH PO QDAY, #60 TAB 0 Refills         Reported         3/11/19       Cetirizine Hcl (CETIRIZINE HCL) 10 Mg Tablet      10 MG PO HS PRN for ALLERGIES, #30 TAB 0 Refills         Reported         11/29/18       Azelastine Hcl (Azelastine Nasal) 137 Mcg/0.137 Ml Spray.pump      1 PUFFS NARE EACH PRN for CONGESTION, #1 BOTTLE         Reported         11/29/18       Acetaminophen Er (ARTHRITIS PAIN RELIEF) 650 Mg Tablet.er      1300 MG PO PRN for PAIN, #100 TAB.ER         Reported         11/29/18       Furosemide (Lasix) 40 Mg Tablet      40 MG PO QAM, #30 TAB 0 Refills         Reported         11/29/18       Tamsulosin HCL (Tamsulosin HCL) 0.4 Mg Cap.er.24h      0.4 MG PO  HS, #30 CAP 0 Refills         Reported         4/13/18       Finasteride (Finasteride*) 5 Mg Tablet      5 MG PO QDAY, #30 TAB 0 Refills         Reported         4/13/18       raNITIdine HCL (raNITIdine HCL) 300 Mg Tablet      300 MG PO HS for 30 Days, #30 TAB         Reported         11/1/17       Sacubitril/Valsartan (Entresto 24/26 mg) 1 Tab Tablet      1 TAB PO BID, #60 TAB 5 Refills         Prov: RISHI MCGEE         8/6/16       Ubidecarenone (Co Q-10) 50 Mg Cap      50 MG PO QAM, CAP         Reported         8/5/16       Pantoprazole (Protonix) 40 Mg Tablet      40 MG PO QDAY@07, #30 TAB 5 Refills         Prov: Cas Patel         4/4/16       Vitamin B Complex (B Complex-Vitamin B-12) 1 Tab Tablet      1 TAB PO QAM, #30 TAB         Reported         3/24/16      Current Medications      Current Medications Reviewed 4/9/21            EXAM      Vital Signs Reviewed      Gen: WDWN, Alert, NAD.        HEENT:  PERRL, EOMI.  OP, nares clear, no sinus tenderness.      Neck:  Supple, no JVD, no thyromegaly.      Lymph: No axillary, cervical, supraclavicular lymphadenopathy noted bilaterally.      Chest: Mildly decreased breath sounds throughout, no wheezes, rhonchi or     crackles, normal work of breathing noted.  The patient is able to speak full     sentences without difficulty.       CV:  RRR, no MGR, pulses 2+, equal.      Abd:  Soft, NT, ND, + BS, no HSM.      EXT:  No clubbing, no cyanosis, no edema, no joint tenderness.       Neuro:  A  Skin: No rashes or lesions.      Vitals      Vitals:             Height 5 ft 10 in / 177.8 cm           Weight 233 lbs 0 oz / 105.565451 kg           BSA 2.23 m2           BMI 33.4 kg/m2           Temperature 98.3 F / 36.83 C - Tympanic           Pulse 80           Respirations 14           Blood Pressure 164/72 Sitting, Left Arm           Pulse Oximetry 96%, room air            REVIEW      Results Reviewed      PCCS Results Reviewed?:  Yes Prev Lab Results, Yes Prev Radiology  Results, Yes     Previous Mecial Records      Lab Results      I personally reviewed my last Telehealth visit note.      Radiographic Results               [facility] Diagnostic Img                PACS RADIOLOGY REPORT            Patient: LUISA WHITING   Acct: #[id number]   Report: #[id number]            UNIT #: [id number]    DOS: 21 1545      INSURANCE:MEDICARE PART A   LOCATION:OPAL     : 1940            PROVIDERS      ADMITTING:     ATTENDING: LEONARDO LYN      FAMILY:  MILDRED NORTON   ORDERING:  LEONARDO LYN         OTHER:    DICTATING:  Jun Camacho MD            REQ #:[id number]   EXAM:CH[facility] - CT CHEST without CONTRAST      REASON FOR EXAM:        REASON FOR VISIT:  HX OF COVID 19/ DIZZINESS            *******Signed******         PROCEDURE:   CT CHEST WITHOUT CONTRAST             COMPARISON:   [facility], CT, CHEST W/O CONTRAST, 2019,     12:02.             INDICATIONS:   FATIGUE AND SHORTNESS OF BREATH SINCE COVID POSITIVE IN JANUARY             TECHNIQUE:   CT images were created without the administration of contrast     material.               PROTOCOL:     Standard imaging protocol performed                RADIATION:     DLP: 545.4mGy*cm          Automated exposure control was utilized to minimize radiation dose.              FINDINGS:         Lung window images reveal subtle faint peripheral ground-glass opacities in the     lungs bilaterally       representing typical findings of COVID-19 pneumonia.             No consolidation or mass is evident.  Scattered tiny calcified granulomas are     unchanged.             Mediastinal windows reveal no mediastinal, hilar, or axillary adenopathy.      Extensive coronary       artery calcifications are evident.  Changes of coronary artery bypass are     evident.             Mild degenerative spurring is seen in the thoracic spine.             CONCLUSION:         CT  scan of the chest without IV contrast demonstrating subtle faint peripheral     ground-glass       opacities representing typical findings of COVID-19 pneumonia              SHILPI HSU MD             Electronically Signed and Approved By: SHILPI HSU MD on 2/23/2021 at 16:01                                  Until signed, this is an unconfirmed preliminary report that may contain      errors and is subject to change.                                              COUST:      D:02/23/21 1601            Assessment      Moderate COPD (chronic obstructive pulmonary disease) - J44.9            Notes      New Diagnostics      * PFT Comp PrePost DLCO BodBx sx, 1 DAY         Dx: Moderate COPD (chronic obstructive pulmonary disease) - J44.9      * Chest W/ Cont CT, SCHEDULED PROCEDURE         Dx: Dyspnea - R06.00      ASSESSMENT:      1. COVID-19 pneumonia diagnosed on 01/26/21 in the ER at [facility].       2. Dyspnea.       3. Cough.       4. Wheezing resolved.       5. Seasonal allergies/allergic rhinitis.       6. Chronic compensated systolic heart failure and coronary artery disease under     the care of Dr. Stevenson.       7. Known granulomatous disease.        8. Tobacco abuse of cigarettes in remission.             PLAN:      1. The patient is scheduled to have a follow up CT scan of the chest. I will     switch that to with contrast as the patient is still having shortness of breath.    I offered to have this scheduled sooner however the patient declines stating his    wife is having knee replacement surgery. Risks of delaying CT scan of the chest     was discussed and the patient verbalized understanding.       2. I will order pulmonary function test.       3. 6 minute walk test was performed in the office today. The patient's resting     SPO2 is 97% on room air and upon ambulation it dropped to 94% therefore the     patient does not quality for oxygen.       4. Continue arnuity and Stiolto everyday as  prescribed and rinse his mouth after    each use.       5. Continue albuterol inhaler and DuoNeb as needed.       6. The patient is advised to call the office, call 911 or go to the ER for any     new or worsening symptoms.       7. I will request a copy of an echocardiogram done at Dr. Stevenson's office.       8. The patient reports he is up to date with  flu, Prevnar and Pneumovax. The     patient is advised to receive the COVID-19 vaccine when available.  The patient     is advised to follow CDC recommendations such as social distancing, wearing a     mask and washing hand for at least 20 seconds.      9. Follow up with Dr. Luna as scheduled, sooner if needed.            Patient Education      Patient Education Provided:  COPD      Time Spent:  > 50% /Coord Care            Electronically signed by LEONARDO LYN [facility]  04/14/2021 09:03       Disclaimer: Converted document may not contain table formatting or lab diagrams. Please see [employer] for the authenticated document.

## 2021-05-28 NOTE — PROGRESS NOTES
Patient: LUISA WHITING     Acct: [id number]     Report: #[id number]  UNIT #: [id number]     : 1940    Encounter Date:10/23/2018  PRIMARY CARE: MILDRED NORTON  ***Signed***  --------------------------------------------------------------------------------------------------------------------  Chief Complaint      Encounter Date      Oct 23, 2018            Primary Care Provider      [facility]            Referring Provider      KLAUDIA VIERA            Patient Complaint      Patient is complaining of      f/u copd            VITALS      Height 5 ft 10 in / 177.8 cm      Weight 236 lbs 4 oz / 107.020741 kg      BSA 2.24 m2      BMI 33.9 kg/m2      Temperature 98.2 F / 36.78 C - Oral      Pulse 71      Respirations 12      Blood Pressure 142/77 Sitting, Left Arm      Pulse Oximetry 99%, room air            HPI      The patient is a very pleasant 78 year old  male former cigarette     smoker with heart failure with reduced ejection fraction and moderate chronic     obstructive pulmonary disease here for follow up.             He has been doing great since his last office visit. He is on Singulair, Stiolto    and Arnuity. He gets short of breath walking about 1000 feet, worse with     exertion, mild to moderate in severity and relieved with rest. He denies any     wheezing, coughing, chest pain or hemoptysis. He denies any other nausea and     vomiting, fevers or chills or headaches. He denies any leg swelling, orthopnea,     paroxysmal nocturnal dyspnea and Dr. Stevenson is managing his heart failure. He     quit smoking in . He denies any other respiratory symptoms. He denies any     chest pain or hemoptysis. He is able to perform his activities of daily living     without difficulty and denies any swollen lymph nodes or glands in his head and     neck.             I have personally reviewed the Review of Systems, past family, social, surgical     and medical histories and I agree with the  findings.            ROS      Constitutional:  Denies: Fatigue, Fever, Weight gain, Weight loss, Chills,     Insomnia, Other      Respiratory/Breathing:  Complains of: Cough; Denies: Shortness of air, Wheezing,    Hemoptysis, Pleuritic pain, Other      Endocrine:  Denies: Polydipsia, Polyuria, Heat/cold intolerance, Diabetes, Other      Eyes:  Denies: Blurred vision, Vision Changes, Other      Ears, nose, mouth, throat:  Denies: Congestion, Dysphagia, Hearing Changes, Nose    Bleeding, Nasal Discharge, Throat pain, Tinnitus, Other      Cardiovascular:  Denies: Chest Pain, Exertional dyspnea, Peripheral Edema,     Palpitations, Syncope, Wake up Gasping for air, Orthopnea, Tachycardia, Other      Gastrointestinal:  Denies: Abdominal pain/cramping, Bloody stools, Constipation,    Diarrhea, Melena, Nausea, Vomiting, Other      Genitourinary:  Denies: Dysuria, Urinary frequency, Incontinence, Hematuria,     Urgency, Other      Musculoskeletal:  Denies: Joint Pain, Joint Stiffness, Joint Swelling, Myalgias,    Other      Hematologic/lymphatic:  DENIES: Lymphadenopathy, Bruising, Bleeding tendencies,     Other      Neurologic:  Denies: Headache, Numbness, Weakness, Seizures, Other      Psychiatric:  Denies: Anxiety, Appropriate Effect, Depression, Other      Sleep:  No: Excessive daytime sleep, Morning Headache?, Snoring, Insomnia?, Stop    breathing at sleep?, Other      Integumentary:  Denies: Rash, Dry skin, Skin Warm to Touch, Other            FAMILY/SOCIAL/MEDICAL HX      Surgical History:  Yes: Appendectomy (CHILDHOOD), Bladder Surgery (PROSTATE SX),    Bowel Surgery (COLONOSCOPY), CABG (2000 4 vessel), Other Surgeries; No:     Abdominal Surgery, Cholecystectomy, Head Surgery, Oral Surgery, Orthopedic     Surgery, Vascular Surgery      Heart - Family Hx:  Father      Is Father Still Living?:  No      Is Mother Still Living?:  No       Family History:  Yes      Social History:  No Tobacco Use, No Alcohol Use, No  Recreational Drug use      Smoking status:  Former smoker (.5 ppd x 40 y quit 1989)      Anticoagulation Therapy:  Yes      Antibiotic Prophylaxis:  No      Medical History:  Yes: Arthritis (KNEES/BACK), Chemotherapy/Cancer (SKIN CANCER     ON HEAD AND EAR DIAGNOSED 3/2016), Heart Attack, Hemorrhoids/Rectal Prob (HX H.     PYLORI, ACID REFLUX), High Blood Pressure, Shortness Of Breath (SOA),     Miscellaneous Medical/oth (prostate problems); No: Asthma, Blood Disease,     Chronic Bronchitis/COPD, Congestive Heart Failu, Deafness or Ringing Ears, Diabe    atruro, Seizures, Sinus Trouble      Psychiatric History      none            PREVENTION      Hx Influenza Vaccination:  Yes (NOVEMBER 2015)      Date Influenza Vaccine Given:  Oct 23, 2018      Influenza Vaccine Declined:  No      2 or More Falls Past Year?:  No      Fall Past Year with Injury?:  No      Hx Pneumococcal Vaccination:  No      Encouraged to follow-up with:  PCP regarding preventative exams.      Chart initiated by      shana/ ma            ALLERGIES/MEDICATIONS      Allergies:        Coded Allergies:             NO KNOWN DRUG ALLERGIES (Verified  Allergy, Unknown, 10/23/18)      Medications    Last Reconciled on 10/23/18 11:19 by AURORA FARMER MD      Fluticasone Furoate (Arnuity Ellipta) 100 Mcg Blst.w.dev      1 PUFF INH RTQDAY, #1 INH 5 Refills         Prov: AURORA FARMER         10/23/18       Umeclidinium/Vilanterol 62.5-25 Mcg Inh (Anoro Ellipta 62.5-25 Mcg Inh) 1 Each     Blst.w.dev      1 PUFF INH QDAY for 30 Days, #1 INH 6 Refills         Prov: AURORA FARMER         10/23/18       MDI-Albuterol (Ventolin HFA*) 18 Gm Hfa.aer.ad      2 PUFFS INH Q4-6H PRN for SHORTNESS OF BREATH, #1 MDI 6 Refills         Prov: AURORA FARMER         10/23/18       Pravastatin Sod (Pravastatin*) 10 Mg Tablet      10 MG PO HS, TAB         Reported         4/13/18       Fluticasone Furoate (Arnuity Ellipta) 200 Mcg Blst.w.dev      1 PUFF INH RTQDAY, #1 INH          Reported         4/13/18       Tamsulosin HCL (Tamsulosin) 0.4 Mg Cap.er.24h      0.4 MG PO HS, #30 CAP 0 Refills         Reported         4/13/18       Finasteride (Finasteride*) 5 Mg Tablet      5 MG PO QDAY, #30 TAB 0 Refills         Reported         4/13/18       Metoprolol Succinate (Metoprolol Succinate*) 50 Mg Tab.er.24h      25 MG PO BID, #30 TAB.SR.24H 0 Refills         Reported         4/13/18       Rivaroxaban (Xarelto) 15 Mg Tablet      15 MG PO QDAY, #30 TAB 5 Refills         Reported         11/1/17       Acetaminophen Es (Tylenol Extra Strength) 500 Mg Tablet      1000 MG PO Q4-6H PRN for PAIN OR FEVER, #100 TAB 0 Refills         Reported         11/1/17       Ranitidine Hcl (Zantac*) 300 Mg Tablet      300 MG PO HS for 30 Days, #30 TAB         Reported         11/1/17       Furosemide (Furosemide) 40 Mg Tablet      40 MG PO QDAY, #30 TAB 5 Refills         Prov: RISHI MCGEE         8/6/16       Sacubitril/Valsartan (Entresto 24/26 mg) 1 Tab Tablet      1 TAB PO BID, #60 TAB 5 Refills         Prov: RISHI MCGEE         8/6/16       Ubidecarenone (Co Q-10) 50 Mg Cap      50 MG PO QDAY, CAP         Reported         8/5/16       Ranolazine (Ranexa) 500 Mg Tab      500 MG PO BID, TAB.SR         Reported         7/24/16       Isosorbide Mononitrate ER (Isosorbide Mononitrate ER) 60 Mg Tab.er.24h      60 MG PO QDAY, TAB         Reported         7/24/16       Pantoprazole (Protonix*) 40 Mg Tablet      40 MG PO QDAY@07, #30 TAB 5 Refills         Prov: Cas Patel         4/4/16       Aspirin Chew (Aspirin Chew) 81 Mg Tab.chew      81 MG PO QDAY, #30 TAB.CHEW 0 Refills         Reported         4/4/16       Calcium Carb/Vit D3 (CALCIUM 600-VIT D3 400 TABLET) 600 Mg Tablet      600 MG PO QDAY, #30 TAB 0 Refills         Reported         3/24/16       Vitamin B Complex (B Complex-Vitamin B-12) 1 Tab Tablet      1 TAB PO QDAY, #30 TAB         Reported         3/24/16       amLODIPine (Norvasc) 10 Mg Tablet       10 MG PO HS, TAB         Reported         3/24/16      Current Medications      Current Medications Reviewed 10/23/18            EXAM      Vital Signs Reviewed      Gen: WDWN, Alert, NAD.        HEENT:  PERRL, EOMI.  OP, nares clear.      Neck:  Supple, no JVD, no thyromegaly.      Lymph: No axillary, cervical, supraclavicular lymphadenopathy noted bilaterally.      Chest:  Good aeration, clear to auscultation bilaterally, tympanic to percussion    bilaterally, no work of breathing noted.      CV:  RRR, no MGR, pulses 2+, equal.      Abd:  Soft, NT, ND, + BS, no HSM. Obese.        EXT:  No clubbing, no cyanosis, no edema.       Neuro:  A  Skin: No rashes or lesions.      Vitals      Vitals:             Height 5 ft 10 in / 177.8 cm           Weight 236 lbs 4 oz / 107.986320 kg           BSA 2.24 m2           BMI 33.9 kg/m2           Temperature 98.2 F / 36.78 C - Oral           Pulse 71           Respirations 12           Blood Pressure 142/77 Sitting, Left Arm           Pulse Oximetry 99%, room air            REVIEW      Results Reviewed      [facility] Reviewed?:  [facility]            Assessment      Notes      Renewed Medications      * MDI-Albuterol (Ventolin HFA*) 18 GM HFA.AER.AD: 2 PUFFS INH Q4-6H PRN       SHORTNESS OF BREATH #1      * Umeclidinium/Vilanterol 62.5-25 Mcg Inh (Anoro Ellipta 62.5-25 Mcg Inh) 1 EACH      BLST.W.DEV: 1 PUFF INH QDAY 30 Days #1         Instructions: to replace stiolto      * Fluticasone Furoate (Arnuity Ellipta) 100 MCG BLST.W.DEV: 1 PUFF INH RTQDAY #1      New Office Procedures      * Fluzone Vaccine High-Dose, As Soon As Possible         Flu Vacc [id number](65Yr Up)/Pf (Fluzone High-Dose 2018-19 Syringe) 180        MCG/0.5 ML SYRINGE: 180 MICROGRAM INTRAMUSCULARLY Qty 1 SYRINGE      IMPRESSION:      1. Dyspnea on exertion improved.       2. Moderate chronic obstructive pulmonary disease with FEV1 62% predicted.     Alpha-1 antitrypsin genotype MM. Chronic  obstructive pulmonary disease     assessment test score is 8 today on triple inhaler therapy. He still refuses     pulmonary rehabilitation.        3. Seasonal allergies well controlled.       4. Tobacco abuse of cigarettes in remission.      5. Obesity with BMI 33.9.               PLAN:      1.  Continue Arnuity and Stiolto at current doses.       2. The patient refuses pulmonary rehabilitation.       3. Continue allergy shots and Singulair.       4. Up to date with flu, Prevnar, he will need Pneumovax in 6 months.       5. I spent 4 minutes today counseling the patient on diet and exercise today.  I    discussed 9053-4307 calorie a day low fat diet and 30 minutes of daily exercise.    The patient verbalized understanding and will make attempts to do this.       6. Follow up in 1 year.            Patient Education      ACO BMI High above 25:  Counseling Given, Encouraged weight loss, Encourage     dietary changes      Patient Education Provided:  COPD                 Disclaimer: Converted document may not contain table formatting or lab diagrams. Please see [employer] [facility] for the authenticated document.

## 2021-05-28 NOTE — PROGRESS NOTES
"Patient: LUISA WHITING     Acct: [id number]     Report: #[id number]  UNIT #: [id number]     : 1940    Encounter Date:2020  PRIMARY CARE: MILDRED NORTON  ***Signed***  --------------------------------------------------------------------------------------------------------------------  TELEHEALTH NOTE      History of Present Illness      Chief Complaint: f/u copd            Luisa Whiting is presenting for evaluation via Telehealth visit. Verbal consent    obtained before beginning visit.            Provider spent 13 minutes with the patient during telehealth visit including     discussing the case with the patient over the phone and personally reviewing all    pertinent labs, imaging and provider notes             The following staff were present during the visit: shana/ ailyn boyd/ md            The patient is a 80 year old  male with chronic systolic heart failure     here for follow up. He has been doing well with Stiolto and albuterol as needed.    He denies any leg swelling, orthopnea or paroxysmal nocturnal dyspnea. He denies    any coughing, wheezing, headaches and hemoptysis or chest pain. His dyspnea is     at baseline. 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 He usually can talk and walk for about 20 minutes     and has to take a 10 minute break. He denies any fever or chills, night sweats,     weight loss or hemoptysis. He denies any sick contacts. He is able to perform     his ADL's without difficulty and denies any swollen glands in his lymph nodes,     head or neck.            I personally reviewed Review of Systems, family, social, surgical and medical     history and agree with their findings.               Physical exam is deferred due to Telehealth visit.            I personally reviewed office notes from his last office visit.                          Overview of Symptoms      pt states \" I am soa " "all the time and still coughing and wheezing\"            Plan/Instructions      * Plan Of Care: ()            * Chronic conditions reviewed and taken into consideration for today's treatment       plan.      * Patient instructed to seek medical attention urgently for new or worsening       symptoms.      * Patient was educated/instructed on their diagnosis, treatment and medications       prior to discharge from the clinic today.            IMPRESSION:      1. Moderate chronic obstructive pulmonary disease. Alpha 1 antitrypsin testing     unremarkable. Chronic obstructive pulmonary disease assessment test score is 6     today doing very well on Stiolto. GOLD stage C chronic obstructive pulmonary     disease.       2. Chronic dyspnea.       3. Seasonal allergies well controlled.       4. Tobacco abuse of cigarettes in remission.       5. Chronic compensated systolic and congestive heart failure.       6. Known granulomatous lung disease.             PLAN:      1. Continue Stiolto, arnuity and albuterol as needed.       2. Continue Singulair.       3. Up to date with  flu, Prevnar and Pneumovax.         4. Refuses pulmonary rehab but encouraged ambulation.       5. Follow up with us annually.      Codes:  Phone Eval 11-20 mi [id number]            Electronically signed by AURORA FARMER  05/05/2020 12:51       Disclaimer: Converted document may not contain table formatting or lab diagrams. Please see [email or web address] [facility] for the authenticated document.  "

## 2021-06-04 RX ORDER — TAMSULOSIN HYDROCHLORIDE 0.4 MG/1
1 CAPSULE ORAL DAILY
COMMUNITY
End: 2021-10-19 | Stop reason: SDUPTHER

## 2021-06-04 RX ORDER — FAMOTIDINE 10 MG
10 TABLET ORAL NIGHTLY PRN
COMMUNITY
End: 2022-06-02 | Stop reason: SDUPTHER

## 2021-06-04 RX ORDER — ASPIRIN 81 MG/1
81 TABLET ORAL DAILY
COMMUNITY
End: 2021-08-23

## 2021-06-04 RX ORDER — NITROGLYCERIN 0.4 MG/1
0.4 TABLET SUBLINGUAL
COMMUNITY
End: 2021-10-28

## 2021-06-04 RX ORDER — PHENOL 1.4 %
600 AEROSOL, SPRAY (ML) MUCOUS MEMBRANE NIGHTLY
COMMUNITY

## 2021-06-04 RX ORDER — METOPROLOL SUCCINATE 50 MG/1
50 TABLET, EXTENDED RELEASE ORAL
COMMUNITY
Start: 2021-08-23 | End: 2022-01-19 | Stop reason: SDUPTHER

## 2021-06-04 RX ORDER — GLUCOSAMINE HCL 500 MG
100 TABLET ORAL DAILY
COMMUNITY

## 2021-06-04 RX ORDER — ISOSORBIDE MONONITRATE 60 MG/1
60 TABLET, EXTENDED RELEASE ORAL NIGHTLY
COMMUNITY
End: 2022-06-01

## 2021-06-04 RX ORDER — FINASTERIDE 5 MG/1
5 TABLET, FILM COATED ORAL DAILY
COMMUNITY
End: 2021-09-03 | Stop reason: SDUPTHER

## 2021-06-04 RX ORDER — AZELASTINE 1 MG/ML
2 SPRAY, METERED NASAL 2 TIMES DAILY
COMMUNITY
End: 2021-10-28

## 2021-06-04 RX ORDER — CETIRIZINE HYDROCHLORIDE 10 MG/1
10 TABLET ORAL DAILY
COMMUNITY
End: 2021-08-23

## 2021-06-04 RX ORDER — FUROSEMIDE 20 MG/1
20 TABLET ORAL DAILY
COMMUNITY
End: 2021-07-12

## 2021-06-21 ENCOUNTER — OFFICE VISIT (OUTPATIENT)
Dept: CARDIOLOGY | Facility: CLINIC | Age: 81
End: 2021-06-21

## 2021-06-21 VITALS
HEIGHT: 70 IN | BODY MASS INDEX: 33.5 KG/M2 | DIASTOLIC BLOOD PRESSURE: 58 MMHG | SYSTOLIC BLOOD PRESSURE: 154 MMHG | HEART RATE: 78 BPM | WEIGHT: 234 LBS

## 2021-06-21 DIAGNOSIS — I48.92 PAROXYSMAL ATRIAL FLUTTER (HCC): ICD-10-CM

## 2021-06-21 DIAGNOSIS — R07.9 CHRONIC CHEST PAIN WITH HIGH RISK FOR CAD: Primary | ICD-10-CM

## 2021-06-21 DIAGNOSIS — Z91.89 CHRONIC CHEST PAIN WITH HIGH RISK FOR CAD: Primary | ICD-10-CM

## 2021-06-21 DIAGNOSIS — I20.9 ANGINA PECTORIS (HCC): ICD-10-CM

## 2021-06-21 DIAGNOSIS — G89.29 CHRONIC CHEST PAIN WITH HIGH RISK FOR CAD: Primary | ICD-10-CM

## 2021-06-21 PROCEDURE — 99214 OFFICE O/P EST MOD 30 MIN: CPT | Performed by: INTERNAL MEDICINE

## 2021-06-21 RX ORDER — TIOTROPIUM BROMIDE AND OLODATEROL 3.124; 2.736 UG/1; UG/1
2 SPRAY, METERED RESPIRATORY (INHALATION) DAILY
COMMUNITY
Start: 2021-03-17 | End: 2021-10-28

## 2021-06-21 RX ORDER — ACETAMINOPHEN 325 MG/1
650 TABLET ORAL EVERY 6 HOURS PRN
COMMUNITY

## 2021-06-21 RX ORDER — RIVAROXABAN 15 MG/1
15 TABLET, FILM COATED ORAL DAILY
COMMUNITY
Start: 2021-06-15 | End: 2022-03-21

## 2021-06-21 NOTE — PROGRESS NOTES
CARDIOLOGY FOLLOW-UP PROGRESS NOTE        Chief Complaint  Follow-up, Coronary Artery Disease, Atrial Fibrillation (paroxysmal), and Rapid Heart Rate    Subjective            Layo Cee presents to [facility] CARDIOLOGY  History of Present Illness  This is a 81-year-old male coronary artery disease, previous bypass grafting, atrial flutter, status post ablation, COPD, recent COVID-19 infection who is here for follow-up visit.  He was seen in the office in February because of worsening shortness of breath.  Echocardiogram done since then showed preserved LV function.  Today, he reports episodes of chest pain.  The pain is more when he is getting up in the morning.  He also gets similar symptoms during activities.  He also noticed that his heart rate is usually above 100 when he gets the chest pain.  Shortness of breath is not improving he is not on oxygen at this time.  Denies having any cough, fever or chills.  The dizziness is improved from before and currently denies having any syncopal episodes.    Past History:    (1) Coronary artery disease, status post coronary artery bypass grafting in the past. Last cardiac catheterization done in July 2016 showed patent left internal mammary graft to the LAD and occluded saphenous venous graft. Native LAD is 100% occluded in the mid portion. Native circumflex artery has no significant disease. Native right coronary artery is also 100% occluded and it is a chronic total occlusion. The right coronary artery is reconstituted with collaterals from the left side. (2) Ischemic cardiomyopathy with recovery of cardiac function. Last SPECT stress test in August 2016 showed an LV ejection fraction of 68%. (3) Chronic kidney disease, stage 3. (4) Chronic obstructive pulmonary disease, not an exacerbation. (5) Hypertension. (6) Hyperlipidemia. 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 (8) Typical  atrial flutter status post radiofrequency ablation by Dr. Aguilar on 11/30/2018     Medical History: has a past medical history of Arthritis, BPH (benign prostatic hyperplasia), CAD (coronary artery disease), CHF (congestive heart failure) (CMS/[facility]), COVID-19 (02/04/2021), Diverticulitis (10/11/2019), Dysphagia, Essential hypertension (08/27/2020), GERD (gastroesophageal reflux disease), Gross hematuria, HBP (high blood pressure), High cholesterol, Long-term use of high-risk medication, Lung disease, Myocardial infarction (CMS/[facility]) (07/2016), OCD (obsessive compulsive disorder), Paroxysmal atrial fibrillation (CMS/[facility]) (08/12/2019), Renal insufficiency (08/27/2020), Rhinitis, allergic (06/05/2018), Sore throat, Stable angina (CMS/HCC), and Vertigo.     Surgical History: has a past surgical history that includes Bladder surgery; Coronary artery bypass graft; Cardiac catheterization (07/2016); Colonoscopy (2011); Cystoscopy (03/19/2019); Esophagogastroduodenoscopy (2016); Cardiac surgery; and Prostate surgery.     Family History: family history includes Heart disease in his father; Other in his brother.     Social History: reports that he has quit smoking. His smoking use included cigarettes. He smoked 0.50 packs per day. He has never used smokeless tobacco. He reports previous alcohol use. He reports that he does not use drugs.    Allergies: Patient has no known allergies.    Current Outpatient Medications on File Prior to Visit   Medication Sig   • acetaminophen (TYLENOL) 325 MG tablet Take 650 mg by mouth Every 6 (Six) Hours As Needed for Mild Pain .   • aspirin 81 MG EC tablet Take 81 mg by mouth Daily.   • azelastine (ASTELIN) 0.1 % nasal spray 2 sprays into the nostril(s) as directed by provider 2 (Two) Times a Day. Use in each nostril as directed   • calcium carbonate (OS-LIANNA) 600 MG tablet Take 600 mg by mouth Daily.   • cetirizine (zyrTEC) 10 MG tablet Take 10 mg by mouth Daily.   • coenzyme Q10 50 MG capsule  "capsule Take 50 mg by mouth Daily.   • famotidine (PEPCID) 10 MG tablet Take 10 mg by mouth Daily.   • finasteride (PROSCAR) 5 MG tablet Take 5 mg by mouth Daily.   • Fluticasone Furoate 200 MCG/ACT aerosol powder  Inhale 200 mcg Daily.   • furosemide (LASIX) 20 MG tablet Take 20 mg by mouth Daily.   • isosorbide mononitrate (IMDUR) 60 MG 24 hr tablet Take 60 mg by mouth Daily.   • metoprolol succinate XL (TOPROL-XL) 50 MG 24 hr tablet Take 50 mg by mouth Daily.   • nitroglycerin (NITROSTAT) 0.4 MG SL tablet Place 0.4 mg under the tongue Every 5 (Five) Minutes As Needed. Take no more than 3 doses in 15 minutes.   • pitavastatin calcium (LIVALO) 1 MG tablet tablet Take 1 mg by mouth Every Night.   • sacubitril-valsartan (ENTRESTO) 24-26 MG tablet Take 1 tablet by mouth 2 (Two) Times a Day.   • Stiolto Respimat 2.5-2.5 MCG/ACT aerosol solution inhaler    • tamsulosin (FLOMAX) 0.4 MG capsule 24 hr capsule Take 1 capsule by mouth Daily.   • VITAMIN B COMPLEX-C PO Take  by mouth.   • Xarelto 15 MG tablet Take 15 mg by mouth Daily.     No current facility-administered medications on file prior to visit.          Review of Systems   Respiratory: Positive for shortness of breath. Negative for cough and wheezing.    Cardiovascular: Positive for chest pain. Negative for palpitations and leg swelling.   Gastrointestinal: Negative for nausea and vomiting.   Neurological: Negative for dizziness and syncope.        Objective     /58   Pulse 78   Ht 177.8 cm (70\")   Wt 106 kg (234 lb)   BMI 33.58 kg/m²       Physical Exam  General : Alert, awake, no acute distress  CVS : Regular rate and rhythm, no murmur, rubs or gallops  Lungs: Bilateral faint wheezing heard, no crackles.  Abdomen: Soft, nontender, bowel sounds heard in all 4 quadrants  Extremities: Warm, well-perfused, no pedal edema    Result Review :     The following data was reviewed by: Darnell Stevenson MD on 06/21/2021:    CMP    CMP 8/10/20 8/17/20 2/23/21 "   Glucose 116 (A) 121 (A) 117 (A)   BUN 11 15 19   Creatinine 1.53 (A) 1.45 (A) 1.16   Sodium 141 145 139   Potassium 4.3 4.3 3.6   Chloride 102 104 103   Calcium 9.6 9.5 8.9   Albumin 4.0 4.0    Total Bilirubin 0.40 0.44    Alkaline Phosphatase 54 (A) 52 (A)    AST (SGOT) 18 18    ALT (SGPT) 15 16    (A) Abnormal value            CBC    CBC 8/17/20   WBC 6.49   RBC 5.03   Hemoglobin 14.1   Hematocrit 45.5   MCV 90.5   MCH 28.0   MCHC 31.0 (A)   RDW 15.4 (A)   Platelets 200   (A) Abnormal value            TSH    TSH 8/17/20   TSH 1.290           Lipid Panel    Lipid Panel 8/10/20 8/17/20   Total Cholesterol 138 129   Triglycerides 152 (A) 142   HDL Cholesterol 31 (A) 29 (A)   VLDL Cholesterol 30 28   LDL Cholesterol  77 72   (A) Abnormal value       Comments are available for some flowsheets but are not being displayed.                     Assessment and Plan        Diagnoses and all orders for this visit:    1. Chronic chest pain with high risk for CAD (Primary)  -     Stress Test With Myocardial Perfusion One Day; Future    2. Angina pectoris (CMS/HCC)    Symptoms are new and concerning for angina.  No ischemic evaluation has been performed over the past 5 years.  He has history of previous bypass grafting.  We will proceed with a SPECT stress test to rule out reversible ischemia SPECT as needed since patient unable to exercise due to COPD and poor exercise tolerance.  Recommend to continue current medication which includes aspirin, statin and beta-blocker.  We will increase the dose of metoprolol for antianginal effects.    3. Paroxysmal atrial flutter (CMS/HCC)    Currently in sinus rhythm.  Reports episodes of tachycardia associated with chest pain.  Will change the dose of metoprolol succinate ER to 25 mg in the morning and 50 mg at night.    We will follow closely with SPECT stress reports.  He is advised to go to the ER for any worsening or recurrent chest pain while waiting for the test.      Follow Up      Return will make f/u appointment once stress test is done and after revewing the results.    Patient was given instructions and counseling regarding his condition or for health maintenance advice. Please see specific information pulled into the AVS if appropriate.

## 2021-06-25 ENCOUNTER — TRANSCRIBE ORDERS (OUTPATIENT)
Dept: ADMINISTRATIVE | Facility: HOSPITAL | Age: 81
End: 2021-06-25

## 2021-06-25 DIAGNOSIS — J44.9 CHRONIC OBSTRUCTIVE PULMONARY DISEASE, UNSPECIFIED COPD TYPE (HCC): Primary | ICD-10-CM

## 2021-07-08 ENCOUNTER — HOSPITAL ENCOUNTER (OUTPATIENT)
Dept: NUCLEAR MEDICINE | Facility: HOSPITAL | Age: 81
Discharge: HOME | End: 2021-07-08

## 2021-07-08 DIAGNOSIS — R07.9 CHRONIC CHEST PAIN WITH HIGH RISK FOR CAD: ICD-10-CM

## 2021-07-08 DIAGNOSIS — G89.29 CHRONIC CHEST PAIN WITH HIGH RISK FOR CAD: ICD-10-CM

## 2021-07-08 DIAGNOSIS — Z91.89 CHRONIC CHEST PAIN WITH HIGH RISK FOR CAD: ICD-10-CM

## 2021-07-08 PROCEDURE — A9502 TC99M TETROFOSMIN: HCPCS | Performed by: INTERNAL MEDICINE

## 2021-07-08 PROCEDURE — 93016 CV STRESS TEST SUPVJ ONLY: CPT | Performed by: NURSE PRACTITIONER

## 2021-07-08 PROCEDURE — 78452 HT MUSCLE IMAGE SPECT MULT: CPT | Performed by: INTERNAL MEDICINE

## 2021-07-08 PROCEDURE — 0 TECHNETIUM TETROFOSMIN KIT: Performed by: INTERNAL MEDICINE

## 2021-07-08 PROCEDURE — 78452 HT MUSCLE IMAGE SPECT MULT: CPT

## 2021-07-08 PROCEDURE — 25010000002 REGADENOSON 0.4 MG/5ML SOLUTION

## 2021-07-08 PROCEDURE — 93017 CV STRESS TEST TRACING ONLY: CPT

## 2021-07-08 PROCEDURE — 93018 CV STRESS TEST I&R ONLY: CPT | Performed by: INTERNAL MEDICINE

## 2021-07-08 RX ADMIN — REGADENOSON 0.4 MG: 0.08 INJECTION, SOLUTION INTRAVENOUS at 09:44

## 2021-07-08 RX ADMIN — TETROFOSMIN 1 DOSE: 1.38 INJECTION, POWDER, LYOPHILIZED, FOR SOLUTION INTRAVENOUS at 09:48

## 2021-07-08 RX ADMIN — TETROFOSMIN 1 DOSE: 1.38 INJECTION, POWDER, LYOPHILIZED, FOR SOLUTION INTRAVENOUS at 08:14

## 2021-07-09 LAB
BH CV IMMEDIATE POST RECOVERY TECH DATA SYMPTOMS: NORMAL
BH CV IMMEDIATE POST TECH DATA BLOOD PRESSURE: NORMAL MMHG
BH CV IMMEDIATE POST TECH DATA HEART RATE: 103 BPM
BH CV IMMEDIATE POST TECH DATA OXYGEN SATS: 98 %
BH CV REST NUCLEAR ISOTOPE DOSE: 10.2 MCI
BH CV SIX MINUTE RECOVERY TECH DATA BLOOD PRESSURE: NORMAL
BH CV SIX MINUTE RECOVERY TECH DATA HEART RATE: 81 BPM
BH CV SIX MINUTE RECOVERY TECH DATA OXYGEN SATURATION: 98 %
BH CV STRESS BP STAGE 1: NORMAL
BH CV STRESS COMMENTS STAGE 1: NORMAL
BH CV STRESS DOSE REGADENOSON STAGE 1: 0.4
BH CV STRESS DURATION MIN STAGE 1: 0
BH CV STRESS DURATION SEC STAGE 1: 10
BH CV STRESS HR STAGE 1: 71
BH CV STRESS NUCLEAR ISOTOPE DOSE: 38.1 MCI
BH CV STRESS O2 STAGE 1: 96
BH CV STRESS PROTOCOL 1: NORMAL
BH CV STRESS RECOVERY BP: NORMAL MMHG
BH CV STRESS RECOVERY HR: 81 BPM
BH CV STRESS RECOVERY O2: 98 %
BH CV STRESS STAGE 1: 1
BH CV THREE MINUTE POST TECH DATA BLOOD PRESSURE: NORMAL MMHG
BH CV THREE MINUTE POST TECH DATA HEART RATE: 94 BPM
BH CV THREE MINUTE POST TECH DATA OXYGEN SATURATION: 98 %
BH CV THREE MINUTE RECOVERY TECH DATA SYMPTOM: NORMAL
LV EF NUC BP: 49 %
MAXIMAL PREDICTED HEART RATE: 141 BPM
PERCENT MAX PREDICTED HR: 72.34 %
STRESS BASELINE BP: NORMAL MMHG
STRESS BASELINE HR: 76 BPM
STRESS O2 SAT REST: 94 %
STRESS PERCENT HR: 85 %
STRESS POST O2 SAT PEAK: 98 %
STRESS POST PEAK BP: NORMAL MMHG
STRESS POST PEAK HR: 102 BPM
STRESS TARGET HR: 120 BPM

## 2021-07-12 ENCOUNTER — TELEPHONE (OUTPATIENT)
Dept: CARDIOLOGY | Facility: CLINIC | Age: 81
End: 2021-07-12

## 2021-07-12 DIAGNOSIS — R60.9 WATER RETENTION: Primary | ICD-10-CM

## 2021-07-12 DIAGNOSIS — M10.9 GOUT, UNSPECIFIED CAUSE, UNSPECIFIED CHRONICITY, UNSPECIFIED SITE: ICD-10-CM

## 2021-07-12 RX ORDER — ALLOPURINOL 100 MG/1
TABLET ORAL
Qty: 30 TABLET | Refills: 1 | Status: SHIPPED | OUTPATIENT
Start: 2021-07-12 | End: 2021-07-13 | Stop reason: SDUPTHER

## 2021-07-12 RX ORDER — FUROSEMIDE 20 MG/1
TABLET ORAL
Qty: 30 TABLET | Refills: 1 | Status: SHIPPED | OUTPATIENT
Start: 2021-07-12 | End: 2021-07-13 | Stop reason: SDUPTHER

## 2021-07-12 NOTE — TELEPHONE ENCOUNTER
----- Message from Darnell Stevenson MD sent at 7/10/2021  5:58 PM EDT -----  The test shows some segments of the heart wall not getting enough blood supply. This likely represents some blockages. However, it is in the bottom wall of the heart. From cardiac cath in 2016, we know that graft to bottom wall of the heart is not working well.     A cardiac cath is certainly an option at this time if he continues to have CP/SOB. We are unable to increase medications at this time due to dizziness (stopped ranexa previously due to severe dizziness).     If he prefers to have cardiac cath , will plan for first week of August.

## 2021-07-12 NOTE — TELEPHONE ENCOUNTER
S/W patient regarding results of SPECT. Discussed option of cardiac cath for continued CP/SOB. Patient would like to proceed with cardiac cath. Advised Dr. Stevenson could complete on August 5th. Patient is in agreement.     Patient has not been vaccinated against COVID. Advised will need COVID screen prior to heart cath.     Advised patient that Dr. Stevenson is out of the office but once he returns, orders will be entered and will call with exact date/times of procedure. Patient verbalized understanding.    Please enter cardiac cath orders.

## 2021-07-13 ENCOUNTER — HOSPITAL ENCOUNTER (OUTPATIENT)
Dept: RESPIRATORY THERAPY | Facility: HOSPITAL | Age: 81
Discharge: HOME | End: 2021-07-13

## 2021-07-13 ENCOUNTER — LAB (OUTPATIENT)
Dept: LAB | Facility: HOSPITAL | Age: 81
End: 2021-07-13

## 2021-07-13 ENCOUNTER — OFFICE VISIT (OUTPATIENT)
Dept: FAMILY MEDICINE CLINIC | Facility: CLINIC | Age: 81
End: 2021-07-13

## 2021-07-13 VITALS
SYSTOLIC BLOOD PRESSURE: 154 MMHG | OXYGEN SATURATION: 97 % | WEIGHT: 238.8 LBS | HEIGHT: 70 IN | BODY MASS INDEX: 34.19 KG/M2 | HEART RATE: 66 BPM | DIASTOLIC BLOOD PRESSURE: 70 MMHG

## 2021-07-13 DIAGNOSIS — J44.9 CHRONIC OBSTRUCTIVE PULMONARY DISEASE, UNSPECIFIED COPD TYPE (HCC): ICD-10-CM

## 2021-07-13 DIAGNOSIS — Z20.822 CLOSE EXPOSURE TO COVID-19 VIRUS: ICD-10-CM

## 2021-07-13 DIAGNOSIS — M79.10 MYALGIA: ICD-10-CM

## 2021-07-13 DIAGNOSIS — M10.9 GOUT, UNSPECIFIED CAUSE, UNSPECIFIED CHRONICITY, UNSPECIFIED SITE: ICD-10-CM

## 2021-07-13 DIAGNOSIS — R60.9 WATER RETENTION: ICD-10-CM

## 2021-07-13 DIAGNOSIS — M10.9 GOUT, UNSPECIFIED CAUSE, UNSPECIFIED CHRONICITY, UNSPECIFIED SITE: Primary | ICD-10-CM

## 2021-07-13 DIAGNOSIS — I50.9 HEART FAILURE, UNSPECIFIED HF CHRONICITY, UNSPECIFIED HEART FAILURE TYPE (HCC): ICD-10-CM

## 2021-07-13 LAB
CK SERPL-CCNC: 88 U/L (ref 20–200)
URATE SERPL-MCNC: 6.6 MG/DL (ref 3.4–7)

## 2021-07-13 PROCEDURE — 86769 SARS-COV-2 COVID-19 ANTIBODY: CPT

## 2021-07-13 PROCEDURE — 94060 EVALUATION OF WHEEZING: CPT

## 2021-07-13 PROCEDURE — 99213 OFFICE O/P EST LOW 20 MIN: CPT | Performed by: PHYSICIAN ASSISTANT

## 2021-07-13 PROCEDURE — 84550 ASSAY OF BLOOD/URIC ACID: CPT

## 2021-07-13 PROCEDURE — 36415 COLL VENOUS BLD VENIPUNCTURE: CPT | Performed by: PHYSICIAN ASSISTANT

## 2021-07-13 PROCEDURE — 82550 ASSAY OF CK (CPK): CPT | Performed by: PHYSICIAN ASSISTANT

## 2021-07-13 PROCEDURE — 94726 PLETHYSMOGRAPHY LUNG VOLUMES: CPT

## 2021-07-13 PROCEDURE — 94060 EVALUATION OF WHEEZING: CPT | Performed by: INTERNAL MEDICINE

## 2021-07-13 PROCEDURE — 94729 DIFFUSING CAPACITY: CPT | Performed by: INTERNAL MEDICINE

## 2021-07-13 PROCEDURE — 94726 PLETHYSMOGRAPHY LUNG VOLUMES: CPT | Performed by: INTERNAL MEDICINE

## 2021-07-13 PROCEDURE — 94729 DIFFUSING CAPACITY: CPT

## 2021-07-13 RX ORDER — ALLOPURINOL 100 MG/1
100 TABLET ORAL DAILY
Qty: 30 TABLET | Refills: 3 | Status: SHIPPED | OUTPATIENT
Start: 2021-07-13 | End: 2021-08-23

## 2021-07-13 RX ORDER — FUROSEMIDE 20 MG/1
20 TABLET ORAL DAILY
Qty: 30 TABLET | Refills: 1 | Status: SHIPPED | OUTPATIENT
Start: 2021-07-13 | End: 2021-11-15

## 2021-07-13 RX ORDER — ALBUTEROL SULFATE 2.5 MG/3ML
2.5 SOLUTION RESPIRATORY (INHALATION) ONCE
Status: COMPLETED | OUTPATIENT
Start: 2021-07-13 | End: 2021-07-13

## 2021-07-13 RX ADMIN — ALBUTEROL SULFATE 2.5 MG: 2.5 SOLUTION RESPIRATORY (INHALATION) at 16:00

## 2021-07-13 NOTE — PROGRESS NOTES
"Chief Complaint  Hypertension (3 month follow up)    Subjective          Layo Cee presents to [facility]  Patient is here for a three month follow up. He is doing okay, but having the issue with the gout. He does need a refill on his allopurinol and Furosemide.       Pt is seeing cardio and pulmo    He is scheduled for stress test, cardiac cath by cardio  He is having some fatigue.    Pt had covid this past winter  3:00 today - PFT by pulmo  CT Chest repeat in August 2021    Gout - fair control with allopurinol 100mg QD  CHF - stable with lasix, toprol, imdur    Objective   Vital Signs:   /67   Pulse 66   Ht 177.8 cm (70\")   Wt 108 kg (238 lb 12.8 oz)   SpO2 97%   BMI 34.26 kg/m²     Physical Exam  Vitals and nursing note reviewed.   Constitutional:       Appearance: Normal appearance. He is obese.   HENT:      Head: Normocephalic and atraumatic.   Cardiovascular:      Rate and Rhythm: Normal rate and regular rhythm.   Pulmonary:      Effort: Pulmonary effort is normal.      Breath sounds: Normal breath sounds.   Musculoskeletal:      Cervical back: Neck supple.   Neurological:      Mental Status: He is alert.   Psychiatric:         Mood and Affect: Mood normal.         Behavior: Behavior normal.        Result Review :     Common labs    Common Labsle 8/10/20 8/10/20 8/17/20 8/17/20 8/17/20 8/17/20 2/23/21    1041 1041 0945 0945 0945 0945    Glucose 116 (A)   121 (A)   117 (A)   BUN 11   15   19   Creatinine 1.53 (A)   1.45 (A)   1.16   Sodium 141   145   139   Potassium 4.3   4.3   3.6   Chloride 102   104   103   Calcium 9.6   9.5   8.9   Albumin 4.0   4.0      Total Bilirubin 0.40   0.44      Alkaline Phosphatase 54 (A)   52 (A)      AST (SGOT) 18   18      ALT (SGPT) 15   16      WBC   6.49       Hemoglobin   14.1       Hematocrit   45.5       Platelets   200       Total Cholesterol  138  129      Triglycerides  152 (A)  142      HDL Cholesterol  31 (A)  29 (A)  "     LDL Cholesterol   77  72      Hemoglobin A1C      6.0 (A)    Uric Acid     8.7 (A)     (A) Abnormal value       Comments are available for some flowsheets but are not being displayed.                     Assessment and Plan    Diagnoses and all orders for this visit:    1. Gout, unspecified cause, unspecified chronicity, unspecified site (Primary)  -     Ambulatory Referral to Chronic Care Management  -     Uric acid; Future  -     allopurinol (ZYLOPRIM) 100 MG tablet; Take 1 tablet by mouth Daily.  Dispense: 30 tablet; Refill: 3    2. Chronic obstructive pulmonary disease, unspecified COPD type (CMS/[facility])  -     Ambulatory Referral to Chronic Care Management    3. Heart failure, unspecified HF chronicity, unspecified heart failure type (CMS/[facility])  -     Ambulatory Referral to Chronic Care Management    4. Myalgia  -     CK    5. Close exposure to COVID-19 virus  -     SARS-CoV-2 Antibodies (Roche); Future    6. Water retention  -     furosemide (LASIX) 20 MG tablet; Take 1 tablet by mouth Daily.  Dispense: 30 tablet; Refill: 1        Follow Up   Return in about 3 months (around 10/13/2021).  Patient was given instructions and counseling regarding his condition or for health maintenance advice. Please see specific information pulled into the AVS if appropriate.

## 2021-07-14 ENCOUNTER — REFERRAL TRIAGE (OUTPATIENT)
Dept: CASE MANAGEMENT | Facility: OTHER | Age: 81
End: 2021-07-14

## 2021-07-14 ENCOUNTER — TELEPHONE (OUTPATIENT)
Dept: FAMILY MEDICINE CLINIC | Facility: CLINIC | Age: 81
End: 2021-07-14

## 2021-07-14 ENCOUNTER — TELEPHONE (OUTPATIENT)
Dept: CASE MANAGEMENT | Facility: OTHER | Age: 81
End: 2021-07-14

## 2021-07-14 LAB — SARS-COV-2 AB SERPL QL IA: POSITIVE

## 2021-07-14 NOTE — TELEPHONE ENCOUNTER
I reviewed patients chart and attempted to call patient for CCM services. He did not answer so I LMTRC.      Conchita Rodriguez RN  Ambulatory Case Management    7/14/2021, 14:13 EDT

## 2021-07-14 NOTE — TELEPHONE ENCOUNTER
----- Message from DARIAN Short sent at 7/14/2021 12:44 PM EDT -----  Normal uric acid  Positive covid antibodies.

## 2021-07-19 ENCOUNTER — PATIENT OUTREACH (OUTPATIENT)
Dept: CASE MANAGEMENT | Facility: OTHER | Age: 81
End: 2021-07-19

## 2021-07-19 DIAGNOSIS — J44.9 CHRONIC OBSTRUCTIVE PULMONARY DISEASE, UNSPECIFIED COPD TYPE (HCC): Primary | ICD-10-CM

## 2021-07-19 PROCEDURE — 99490 CHRNC CARE MGMT STAFF 1ST 20: CPT | Performed by: PHYSICIAN ASSISTANT

## 2021-07-19 NOTE — OUTREACH NOTE
Ambulatory Case Management Note      Care Plan: COPD   Updates made since 7/19/2021 12:00 AM      Problem: INSUFFICIENT KNOWLEDGE ABOUT COPD       Goal: Patient will have sufficient knowledge about COPD       Task: Educate patient on the COPD disease process    Responsible User: Conchita Rodriguez RN      Task: Patient is able to teach back an understanding of COPD and how to manage symptoms with diet modifications, medications, and lifestyle choices    Responsible User: Conchita Rodriguez RN      Task: Review more information about COPD and the stoplight tool available at lung.org (American Lung Association)    Responsible User: Conchita Rodriguez RN      Problem: COPD IS NOT SELF-MANAGED       Goal: Patient will adhere to COPD action plan and avoid hospitalizations for the next 6 months.       Task: Schedule a follow up provider appointment if patient has not been seen within the past 6 months    Responsible User: Conchita Rodriguez RN      Task: Encourage patient to work with provider to create an action plan for monitoring and managing symptoms of COPD    Responsible User: Conchita Rodriguez RN      Task: Discuss qualification for pulmonary rehabilitation with provider and/or patient. Pulmonary rehabilitation consists of exercise, education, and support for learning about the disease    Responsible User: Conchita Rodriguez RN      Task: Task: Refer patient to remote patient monitoring program if provider is agreeable    Responsible User: Conchita Rodriguez RN      Problem: INABILITY TO PERFORM ADLS       Goal: Maintain ability to perform ADLs without difficulty       Task: Refer to OT/PT if provider is agreeable    Responsible User: Conchita Rodriguez RN      Task: Refer to pulmonary rehab if provider is agreeable    Responsible User: Conchita Rodriguez RN      Task: Provide resources for help in the home    Responsible User: Conchita Rodriguez RN      Task: Provide education on regular exercise to keep the body healthy    Responsible User: Conchita Rodriguez RN       Task: Provide education on daily breathing exercises to provide symptom relief    Responsible User: Conchita Rodriguez RN      Task: Refer to Home Care if provider is agreeable    Responsible User: Conchita Rodriguez RN      Problem: HOSPITAL ADMISSIONS       Goal: Reduce hospital admissions for COPD exacerbation       Task: Teach use of stop light tool (American Lung Association)    Responsible User: Conchita Rodriguez RN      Task: Teach to report any increased inhaler use as this may indicate worsening progression of COPD    Responsible User: Conchita Rodriguez RN      Task: Review exacerbation history with patient with severity    Responsible User: Conchita Rodriguez RN      Task: Educate on COPD triggers    Responsible User: Conchita Rodriguez RN      Task: Educate patient on appropriate use of fast acting versus maintenance inhalers for symptom management    Responsible User: Conchita Rodriguez RN      Task: Discuss advanced care planning    Responsible User: Conchita Rodriguez RN      Task: RN and patient review stop light tool    Responsible User: Conchita Rodriguez RN      Task: Consider referral for pulmonary rehabilitation    Responsible User: Conchita Rodriguez RN      Problem: MEDICATION ADHERENCE       Goal: Patient will adhere to medication regimen       Task: RN to educate inhaler technique with patient    Responsible User: Conchita Rodriguez RN      Task: Perform a full medication reconciliation    Responsible User: Conchita Rodriguez RN      Task: Assess affordability of medications    Responsible User: Conchita Rodriguez RN      Task: Assess and treat any underlying depression    Responsible User: Conchita Rodriguez RN      Task: Assist patients in obtaining medications    Responsible User: Conchita Rodriguez RN      Task: Provide education on the importance of medication adherence    Responsible User: Conchita Rodriguez RN      Problem: EXACERBATION OF COPD SYMPTOMS       Goal: Symptom management       Task: Review the stoplight tool available at lung.org (American Lung  Association)    Responsible User: Conchita Rodriguez RN      Task: Provide education on daily breathing exercises to provide symptom relief    Responsible User: Conchita Rodriguez RN      Task: Review pursed lip breathing and diaphragmatic breathing techniques    Responsible User: Conchita Rodriguez RN      Task: Provide education on proactive hand-washing and hand  use, especially when in public    Responsible User: Conchita Rodriguez RN      Task: Provide education on avoiding crowds during flu season and asking family/friends to stay away when sick, keeping breathing equipment clean, and avoiding outside activity during weather alerts    Responsible User: Conchita Rodriguez RN      Task: Yearly influenza vaccination    Responsible User: Conchita Rodriguez RN      Task: Pneumococcal vaccination    Responsible User: Conchita Rodriguez RN      Problem: USE OF TOBACCO PRODUCTS       Goal: Tobacco cessation       Task: Assess tobacco use Completed 7/19/2021   Responsible User: Conchita Rodriguez RN        CCM Interim Update    I called patient to discuss CCM services with him. He did end up consenting. We discussed care plans and SDOH. Our main focus right now includes focusing on his breathing and COPD. He states that he also has to have a cardiac cath next week to see if there is a cardiac reason he is having breathing issues. 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 He states he will work for a few hours and it takes a couple of days to recover. He denied any needs at this time.     Chart and labs reviewed, care plan initiated, Plan of care sent to [facility] for review.     Conchita Rodriguez RN  Ambulatory Case Management    7/19/2021, 10:33 EDT

## 2021-07-26 ENCOUNTER — PATIENT OUTREACH (OUTPATIENT)
Dept: CASE MANAGEMENT | Facility: OTHER | Age: 81
End: 2021-07-26

## 2021-07-26 DIAGNOSIS — J44.9 CHRONIC OBSTRUCTIVE PULMONARY DISEASE, UNSPECIFIED COPD TYPE (HCC): Primary | ICD-10-CM

## 2021-07-26 DIAGNOSIS — I50.9 CHRONIC CONGESTIVE HEART FAILURE, UNSPECIFIED HEART FAILURE TYPE (HCC): ICD-10-CM

## 2021-07-26 NOTE — OUTREACH NOTE
Ambulatory Case Management Note    [facility] End of Month Documentation    This Chronic Medical Management Care Plan for Layo Cee, 81 y.o. male, has been a new plan of care implemented and a new plan of care implemented for the month of July.  A cumulative time of 22  minutes was spent on this patient record this month, including chart review;phone call with patient;electronic communication with primary care provider.    Regarding the patient's problems: does not have a problem list on file., the following items were addressed: medical records;changes to medical care and any changes can be found within the plan section of the note.  A detailed listing of time spent for chronic care management is tracked within each outreach encounter.  Current medications include:  has a current medication list which includes the following prescription(s): acetaminophen, allopurinol, aspirin, azelastine, calcium carbonate, cetirizine, coenzyme q10, famotidine, finasteride, fluticasone furoate, furosemide, isosorbide mononitrate, metoprolol succinate xl, nitroglycerin, pitavastatin calcium, sacubitril-valsartan, stiolto respimat, tamsulosin, b complex-c, and xarelto. and the patient is reported to be patient is compliant with medication protocol,  Medications are reported to be effective.  Regarding these diagnoses, no new referrals were made at this time.  All notes on chart for PCP to review.    The patient was monitored remotely for activity level;mood & behavior, breathing.    The patient's physical needs include:  needs met, Patient is followed by cardiology and pulmonology.     The patient's mental support needs include:  needs met    The patient's cognitive support needs include:  needs met    The patient's psychosocial support needs include:  needs met    The patient's functional needs include: needs met    The patient's environmental needs include:  No data recorded    Care Plan overall comments:  No data recorded    Refer  to previous outreach notes for more information on the areas listed above.    Monthly Billing Diagnoses  (J44.9) Chronic obstructive pulmonary disease, unspecified COPD type (CMS/[facility])    (I50.9) Chronic congestive heart failure, unspecified heart failure type (CMS/[facility])    Medications   · Medications have been reconciled    Care Plan progress this month:      Recently Modified Care Plans Updates made since 6/25/2021 12:00 AM     COPD         Problem Priority Last Modified     INSUFFICIENT KNOWLEDGE ABOUT COPD --  7/19/2021 10:24 AM by Conchita Rodriguez RN              Goal Recent Progress Last Modified     Patient will have sufficient knowledge about COPD --  7/19/2021 10:24 AM by Conchita Rodriguez RN              Task Due Date Last Modified     Educate patient on the COPD disease process --  7/19/2021 10:24 AM by Conchita Rodriguez RN        Patient is able to teach back an understanding of COPD and how to manage symptoms with diet modifications, medications, and lifestyle choices --  7/19/2021 10:24 AM by Conchita Rodriguez RN        Review more information about COPD and the stoplight tool available at lung.org (American Lung Association) --  7/19/2021 10:24 AM by Conchita Rodriguez RN              Problem Priority Last Modified     COPD IS NOT SELF-MANAGED --  7/19/2021 10:24 AM by Conchita Rodriguez RN              Goal Recent Progress Last Modified     Patient will adhere to COPD action plan and avoid hospitalizations for the next 6 months. --  7/19/2021 10:24 AM by Conchita Rodriguez RN              Task Due Date Last Modified     Schedule a follow up provider appointment if patient has not been seen within the past 6 months --  7/19/2021 10:24 AM by Conchita Rodriguez RN        Encourage patient to work with provider to create an action plan for monitoring and managing symptoms of COPD --  7/19/2021 10:24 AM by Conchita Rodriguez RN        Discuss qualification for pulmonary rehabilitation with provider and/or patient. Pulmonary rehabilitation  consists of exercise, education, and support for learning about the disease --  7/19/2021 10:24 AM by Conchita Rodriguez RN        Task: Refer patient to remote patient monitoring program if provider is agreeable --  7/19/2021 10:24 AM by Conchita Rodriguez RN              Problem Priority Last Modified     INABILITY TO PERFORM ADLS --  7/19/2021 10:24 AM by Conchita Rodriguez RN              Goal Recent Progress Last Modified     Maintain ability to perform ADLs without difficulty --  7/19/2021 10:24 AM by Conchita Rodriguez RN              Task Due Date Last Modified     Refer to OT/PT if provider is agreeable --  7/19/2021 10:24 AM by Conchita Rodriguez RN        Refer to pulmonary rehab if provider is agreeable --  7/19/2021 10:24 AM by Conchita Rodriguez RN        Provide resources for help in the home --  7/19/2021 10:24 AM by Conchita Rodriguez RN        Provide education on regular exercise to keep the body healthy --  7/19/2021 10:24 AM by Conchita Rodriguez RN        Provide education on daily breathing exercises to provide symptom relief --  7/19/2021 10:24 AM by Conchita Rodriguez RN        Refer to Home Care if provider is agreeable --  7/19/2021 10:24 AM by Conchita Rodriguez RN              Problem Priority Last Modified     HOSPITAL ADMISSIONS --  7/19/2021 10:24 AM by Conchita Rodriguez RN              Goal Recent Progress Last Modified     Reduce hospital admissions for COPD exacerbation --  7/19/2021 10:24 AM by Conchita Rodriguez RN              Task Due Date Last Modified     Teach use of stop light tool (American Lung Association) --  7/19/2021 10:24 AM by Conchita Rodriguez RN        Teach to report any increased inhaler use as this may indicate worsening progression of COPD --  7/19/2021 10:24 AM by Conchita Rodriguez RN        Review exacerbation history with patient with severity --  7/19/2021 10:24 AM by Conchita Rodriguez RN        Educate on COPD triggers --  7/19/2021 10:24 AM by Conchita Rodriguez, BRENDON        Educate patient on appropriate use of fast acting versus  maintenance inhalers for symptom management --  7/19/2021 10:24 AM by Conchita Rodriguez RN        Discuss advanced care planning --  7/19/2021 10:24 AM by Conchita Rodriguez RN        RN and patient review stop light tool --  7/19/2021 10:24 AM by Conchita Rodriguez RN        Consider referral for pulmonary rehabilitation --  7/19/2021 10:24 AM by Conchita Rodriguez RN              Problem Priority Last Modified     MEDICATION ADHERENCE --  7/19/2021 10:24 AM by Conchita Rodriguez RN              Goal Recent Progress Last Modified     Patient will adhere to medication regimen --  7/19/2021 10:24 AM by Conchita Rodriguez RN              Task Due Date Last Modified     RN to educate inhaler technique with patient --  7/19/2021 10:24 AM by Conchita Rodriguez RN        Perform a full medication reconciliation --  7/19/2021 10:24 AM by Conchita Rodriguez RN        Assess affordability of medications --  7/19/2021 10:24 AM by Conchita Rodriguez RN        Assess and treat any underlying depression --  7/19/2021 10:24 AM by Conchita Rodriguez RN        Assist patients in obtaining medications --  7/19/2021 10:24 AM by Conchita Rodriguez RN        Provide education on the importance of medication adherence --  7/19/2021 10:24 AM by Cnochita Rodriguez RN              Problem Priority Last Modified     EXACERBATION OF COPD SYMPTOMS --  7/19/2021 10:24 AM by Conchita Rodriguez RN              Goal Recent Progress Last Modified     Symptom management --  7/19/2021 10:24 AM by Conchita Rodriguez RN              Task Due Date Last Modified     Review the stoplight tool available at lung.org (American Lung Association) --  7/19/2021 10:24 AM by Conchita Rodriguez RN        Provide education on daily breathing exercises to provide symptom relief --  7/19/2021 10:24 AM by Conchita Rodriguez RN        Review pursed lip breathing and diaphragmatic breathing techniques --  7/19/2021 10:24 AM by Conchita Rodriguez, BRENDON        Provide education on proactive hand-washing and hand  use, especially when in public --   7/19/2021 10:24 AM by Conchita Rodriguez RN        Provide education on avoiding crowds during flu season and asking family/friends to stay away when sick, keeping breathing equipment clean, and avoiding outside activity during weather alerts --  7/19/2021 10:24 AM by Conchita Rodriguez RN        Yearly influenza vaccination --  7/19/2021 10:24 AM by Conchita Rodriguez RN        Pneumococcal vaccination --  7/19/2021 10:24 AM by Conchita Rodriguez RN              Problem Priority Last Modified     USE OF TOBACCO PRODUCTS --  7/19/2021 10:24 AM by Conchita Rodriguez RN              Goal Recent Progress Last Modified     Tobacco cessation --  7/19/2021 10:24 AM by Conchita Rodriguez RN              Task Due Date Last Modified     Assess tobacco use --  7/19/2021 10:25 AM by Conchita Rodriguez RN                      · Current Specialty Plan of Care Status signed by both patient and provider    Instructions   · Patient was provided an electronic copy of care plan  · CCM services were explained and offered and patient has accepted these services.  · Patient has given their written consent to receive CCM services and understands that this includes the authorization of electronic communication of medical information with the other treating providers.  · Patient understands that they may stop CCM services at any time and these changes will be effective at the end of the calendar month and will effectively revocate the agreement of CCM services.  · Patient understands that only one practitioner can furnish and be paid for CCM services during one calendar month.  Patient also understands that there may be co-payment or deductible fees in association with CCM services.  · Patient will continue with at least monthly follow-up calls with the Nurse Navigator.    Rajani Mccauley RN  Ambulatory Case Management    7/26/2021, 16:21 EDT    There are no recently modified care plans to display for this patient.      Rajani Mccauley RN  Ambulatory Case  Management    7/26/2021, 16:21 EDT

## 2021-07-27 ENCOUNTER — TRANSCRIBE ORDERS (OUTPATIENT)
Dept: ADMINISTRATIVE | Facility: HOSPITAL | Age: 81
End: 2021-07-27

## 2021-07-27 ENCOUNTER — PREP FOR SURGERY (OUTPATIENT)
Dept: OTHER | Facility: HOSPITAL | Age: 81
End: 2021-07-27

## 2021-07-27 DIAGNOSIS — Z01.818 OTHER SPECIFIED PRE-OPERATIVE EXAMINATION: Primary | ICD-10-CM

## 2021-07-27 DIAGNOSIS — I20.0 UNSTABLE ANGINA (HCC): Primary | ICD-10-CM

## 2021-07-27 RX ORDER — SODIUM CHLORIDE 9 MG/ML
75 INJECTION, SOLUTION INTRAVENOUS CONTINUOUS
Status: CANCELLED | OUTPATIENT
Start: 2021-07-27

## 2021-07-27 RX ORDER — NITROGLYCERIN 0.4 MG/1
0.4 TABLET SUBLINGUAL
Status: CANCELLED | OUTPATIENT
Start: 2021-07-27

## 2021-08-02 ENCOUNTER — LAB (OUTPATIENT)
Dept: LAB | Facility: HOSPITAL | Age: 81
End: 2021-08-02

## 2021-08-02 ENCOUNTER — APPOINTMENT (OUTPATIENT)
Dept: LAB | Facility: HOSPITAL | Age: 81
End: 2021-08-02

## 2021-08-02 DIAGNOSIS — I20.0 UNSTABLE ANGINA (HCC): ICD-10-CM

## 2021-08-02 PROCEDURE — U0005 INFEC AGEN DETEC AMPLI PROBE: HCPCS

## 2021-08-02 PROCEDURE — U0003 INFECTIOUS AGENT DETECTION BY NUCLEIC ACID (DNA OR RNA); SEVERE ACUTE RESPIRATORY SYNDROME CORONAVIRUS 2 (SARS-COV-2) (CORONAVIRUS DISEASE [COVID-19]), AMPLIFIED PROBE TECHNIQUE, MAKING USE OF HIGH THROUGHPUT TECHNOLOGIES AS DESCRIBED BY CMS-2020-01-R: HCPCS

## 2021-08-02 PROCEDURE — C9803 HOPD COVID-19 SPEC COLLECT: HCPCS

## 2021-08-03 LAB — SARS-COV-2 RNA RESP QL NAA+PROBE: NOT DETECTED

## 2021-08-05 ENCOUNTER — HOSPITAL ENCOUNTER (OUTPATIENT)
Facility: HOSPITAL | Age: 81
Setting detail: HOSPITAL OUTPATIENT SURGERY
Discharge: HOME | End: 2021-08-05
Attending: INTERNAL MEDICINE | Admitting: INTERNAL MEDICINE

## 2021-08-05 VITALS
TEMPERATURE: 98.3 F | DIASTOLIC BLOOD PRESSURE: 75 MMHG | RESPIRATION RATE: 16 BRPM | WEIGHT: 230 LBS | HEIGHT: 70 IN | HEART RATE: 60 BPM | SYSTOLIC BLOOD PRESSURE: 173 MMHG | BODY MASS INDEX: 32.93 KG/M2 | OXYGEN SATURATION: 95 %

## 2021-08-05 DIAGNOSIS — I20.0 UNSTABLE ANGINA (HCC): ICD-10-CM

## 2021-08-05 LAB
ANION GAP SERPL CALCULATED.3IONS-SCNC: 7.3 MMOL/L (ref 5–15)
APTT PPP: 25.3 SECONDS (ref 22.2–34.2)
BASOPHILS # BLD AUTO: 0.05 10*3/MM3 (ref 0–0.2)
BASOPHILS NFR BLD AUTO: 0.6 % (ref 0–1.5)
BUN SERPL-MCNC: 16 MG/DL (ref 8–23)
BUN/CREAT SERPL: 12.3 (ref 7–25)
CALCIUM SPEC-SCNC: 9.3 MG/DL (ref 8.6–10.5)
CHLORIDE SERPL-SCNC: 104 MMOL/L (ref 98–107)
CO2 SERPL-SCNC: 27.7 MMOL/L (ref 22–29)
CREAT SERPL-MCNC: 1.3 MG/DL (ref 0.76–1.27)
DEPRECATED RDW RBC AUTO: 50.3 FL (ref 37–54)
EOSINOPHIL # BLD AUTO: 0.4 10*3/MM3 (ref 0–0.4)
EOSINOPHIL NFR BLD AUTO: 4.9 % (ref 0.3–6.2)
ERYTHROCYTE [DISTWIDTH] IN BLOOD BY AUTOMATED COUNT: 15.3 % (ref 12.3–15.4)
GFR SERPL CREATININE-BSD FRML MDRD: 53 ML/MIN/1.73
GLUCOSE SERPL-MCNC: 115 MG/DL (ref 65–99)
HCT VFR BLD AUTO: 43.1 % (ref 37.5–51)
HGB BLD-MCNC: 14.1 G/DL (ref 13–17.7)
IMM GRANULOCYTES # BLD AUTO: 0.03 10*3/MM3 (ref 0–0.05)
IMM GRANULOCYTES NFR BLD AUTO: 0.4 % (ref 0–0.5)
INR PPP: 0.9 (ref 2–3)
LYMPHOCYTES # BLD AUTO: 2.85 10*3/MM3 (ref 0.7–3.1)
LYMPHOCYTES NFR BLD AUTO: 34.7 % (ref 19.6–45.3)
MCH RBC QN AUTO: 29 PG (ref 26.6–33)
MCHC RBC AUTO-ENTMCNC: 32.7 G/DL (ref 31.5–35.7)
MCV RBC AUTO: 88.5 FL (ref 79–97)
MONOCYTES # BLD AUTO: 0.92 10*3/MM3 (ref 0.1–0.9)
MONOCYTES NFR BLD AUTO: 11.2 % (ref 5–12)
NEUTROPHILS NFR BLD AUTO: 3.96 10*3/MM3 (ref 1.7–7)
NEUTROPHILS NFR BLD AUTO: 48.2 % (ref 42.7–76)
NRBC BLD AUTO-RTO: 0 /100 WBC (ref 0–0.2)
PLATELET # BLD AUTO: 184 10*3/MM3 (ref 140–450)
PMV BLD AUTO: 9.9 FL (ref 6–12)
POTASSIUM SERPL-SCNC: 4.4 MMOL/L (ref 3.5–5.2)
PROTHROMBIN TIME: 10 SECONDS (ref 9.4–12)
RBC # BLD AUTO: 4.87 10*6/MM3 (ref 4.14–5.8)
SODIUM SERPL-SCNC: 139 MMOL/L (ref 136–145)
WBC # BLD AUTO: 8.21 10*3/MM3 (ref 3.4–10.8)

## 2021-08-05 PROCEDURE — 80048 BASIC METABOLIC PNL TOTAL CA: CPT | Performed by: INTERNAL MEDICINE

## 2021-08-05 PROCEDURE — 85025 COMPLETE CBC W/AUTO DIFF WBC: CPT | Performed by: INTERNAL MEDICINE

## 2021-08-05 PROCEDURE — 85730 THROMBOPLASTIN TIME PARTIAL: CPT | Performed by: INTERNAL MEDICINE

## 2021-08-05 PROCEDURE — 93005 ELECTROCARDIOGRAM TRACING: CPT | Performed by: INTERNAL MEDICINE

## 2021-08-05 PROCEDURE — 85610 PROTHROMBIN TIME: CPT | Performed by: INTERNAL MEDICINE

## 2021-08-05 PROCEDURE — G0463 HOSPITAL OUTPT CLINIC VISIT: HCPCS

## 2021-08-05 RX ORDER — SODIUM CHLORIDE 9 MG/ML
75 INJECTION, SOLUTION INTRAVENOUS CONTINUOUS
Status: DISCONTINUED | OUTPATIENT
Start: 2021-08-05 | End: 2021-08-05 | Stop reason: HOSPADM

## 2021-08-05 RX ORDER — NITROGLYCERIN 0.4 MG/1
0.4 TABLET SUBLINGUAL
Status: DISCONTINUED | OUTPATIENT
Start: 2021-08-05 | End: 2021-08-05 | Stop reason: HOSPADM

## 2021-08-05 NOTE — PERIOPERATIVE NURSING NOTE
Procedure cancelled due to patient taking blood thinner medication yesterday. Procedure rescheduled for Monday August 9th.

## 2021-08-06 ENCOUNTER — PREP FOR SURGERY (OUTPATIENT)
Dept: OTHER | Facility: HOSPITAL | Age: 81
End: 2021-08-06

## 2021-08-06 DIAGNOSIS — I20.0 UNSTABLE ANGINA (HCC): Primary | ICD-10-CM

## 2021-08-06 RX ORDER — NITROGLYCERIN 0.4 MG/1
0.4 TABLET SUBLINGUAL
Status: CANCELLED | OUTPATIENT
Start: 2021-08-06

## 2021-08-06 RX ORDER — SODIUM CHLORIDE 9 MG/ML
75 INJECTION, SOLUTION INTRAVENOUS CONTINUOUS
Status: CANCELLED | OUTPATIENT
Start: 2021-08-06

## 2021-08-09 ENCOUNTER — HOSPITAL ENCOUNTER (OUTPATIENT)
Facility: HOSPITAL | Age: 81
Discharge: HOME | End: 2021-08-10
Attending: INTERNAL MEDICINE | Admitting: INTERNAL MEDICINE

## 2021-08-09 DIAGNOSIS — I20.0 UNSTABLE ANGINA (HCC): ICD-10-CM

## 2021-08-09 PROBLEM — I25.118 CORONARY ARTERY DISEASE OF NATIVE ARTERY OF NATIVE HEART WITH STABLE ANGINA PECTORIS: Status: ACTIVE | Noted: 2021-08-09

## 2021-08-09 PROBLEM — Z95.5 S/P CORONARY ARTERY STENT PLACEMENT: Status: ACTIVE | Noted: 2021-08-09

## 2021-08-09 LAB
ACT BLD: 142 SECONDS (ref 89–137)
ACT BLD: 208 SECONDS (ref 89–137)
ACT BLD: 241 SECONDS (ref 89–137)
ANION GAP SERPL CALCULATED.3IONS-SCNC: 9.9 MMOL/L (ref 5–15)
APTT PPP: 26.4 SECONDS (ref 22.2–34.2)
BASOPHILS # BLD AUTO: 0.04 10*3/MM3 (ref 0–0.2)
BASOPHILS NFR BLD AUTO: 0.5 % (ref 0–1.5)
BUN SERPL-MCNC: 12 MG/DL (ref 8–23)
BUN/CREAT SERPL: 8.8 (ref 7–25)
CALCIUM SPEC-SCNC: 9.1 MG/DL (ref 8.6–10.5)
CHLORIDE SERPL-SCNC: 102 MMOL/L (ref 98–107)
CO2 SERPL-SCNC: 27.1 MMOL/L (ref 22–29)
CREAT SERPL-MCNC: 1.36 MG/DL (ref 0.76–1.27)
DEPRECATED RDW RBC AUTO: 49.7 FL (ref 37–54)
EOSINOPHIL # BLD AUTO: 0.37 10*3/MM3 (ref 0–0.4)
EOSINOPHIL NFR BLD AUTO: 4.6 % (ref 0.3–6.2)
ERYTHROCYTE [DISTWIDTH] IN BLOOD BY AUTOMATED COUNT: 15.2 % (ref 12.3–15.4)
GFR SERPL CREATININE-BSD FRML MDRD: 51 ML/MIN/1.73
GLUCOSE SERPL-MCNC: 124 MG/DL (ref 65–99)
HCT VFR BLD AUTO: 46.3 % (ref 37.5–51)
HGB BLD-MCNC: 15.1 G/DL (ref 13–17.7)
IMM GRANULOCYTES # BLD AUTO: 0.05 10*3/MM3 (ref 0–0.05)
IMM GRANULOCYTES NFR BLD AUTO: 0.6 % (ref 0–0.5)
INR PPP: 0.89 (ref 2–3)
LYMPHOCYTES # BLD AUTO: 2.78 10*3/MM3 (ref 0.7–3.1)
LYMPHOCYTES NFR BLD AUTO: 34.7 % (ref 19.6–45.3)
MCH RBC QN AUTO: 29.4 PG (ref 26.6–33)
MCHC RBC AUTO-ENTMCNC: 32.6 G/DL (ref 31.5–35.7)
MCV RBC AUTO: 90.1 FL (ref 79–97)
MONOCYTES # BLD AUTO: 0.84 10*3/MM3 (ref 0.1–0.9)
MONOCYTES NFR BLD AUTO: 10.5 % (ref 5–12)
NEUTROPHILS NFR BLD AUTO: 3.94 10*3/MM3 (ref 1.7–7)
NEUTROPHILS NFR BLD AUTO: 49.1 % (ref 42.7–76)
NRBC BLD AUTO-RTO: 0 /100 WBC (ref 0–0.2)
PLATELET # BLD AUTO: 185 10*3/MM3 (ref 140–450)
PMV BLD AUTO: 10.2 FL (ref 6–12)
POTASSIUM SERPL-SCNC: 4.5 MMOL/L (ref 3.5–5.2)
PROTHROMBIN TIME: 9.9 SECONDS (ref 9.4–12)
RBC # BLD AUTO: 5.14 10*6/MM3 (ref 4.14–5.8)
SODIUM SERPL-SCNC: 139 MMOL/L (ref 136–145)
WBC # BLD AUTO: 8.02 10*3/MM3 (ref 3.4–10.8)

## 2021-08-09 PROCEDURE — 85347 COAGULATION TIME ACTIVATED: CPT

## 2021-08-09 PROCEDURE — 93459 L HRT ART/GRFT ANGIO: CPT | Performed by: INTERNAL MEDICINE

## 2021-08-09 PROCEDURE — C1874 STENT, COATED/COV W/DEL SYS: HCPCS | Performed by: INTERNAL MEDICINE

## 2021-08-09 PROCEDURE — C1769 GUIDE WIRE: HCPCS | Performed by: INTERNAL MEDICINE

## 2021-08-09 PROCEDURE — 85730 THROMBOPLASTIN TIME PARTIAL: CPT | Performed by: INTERNAL MEDICINE

## 2021-08-09 PROCEDURE — 80048 BASIC METABOLIC PNL TOTAL CA: CPT | Performed by: INTERNAL MEDICINE

## 2021-08-09 PROCEDURE — C9600 PERC DRUG-EL COR STENT SING: HCPCS | Performed by: INTERNAL MEDICINE

## 2021-08-09 PROCEDURE — C1894 INTRO/SHEATH, NON-LASER: HCPCS | Performed by: INTERNAL MEDICINE

## 2021-08-09 PROCEDURE — 25010000002 ONDANSETRON PER 1 MG: Performed by: INTERNAL MEDICINE

## 2021-08-09 PROCEDURE — 25010000002 MORPHINE PER 10 MG: Performed by: INTERNAL MEDICINE

## 2021-08-09 PROCEDURE — 99152 MOD SED SAME PHYS/QHP 5/>YRS: CPT | Performed by: INTERNAL MEDICINE

## 2021-08-09 PROCEDURE — 99153 MOD SED SAME PHYS/QHP EA: CPT | Performed by: INTERNAL MEDICINE

## 2021-08-09 PROCEDURE — 85610 PROTHROMBIN TIME: CPT | Performed by: INTERNAL MEDICINE

## 2021-08-09 PROCEDURE — 0 IOPAMIDOL PER 1 ML: Performed by: INTERNAL MEDICINE

## 2021-08-09 PROCEDURE — 25010000002 HEPARIN (PORCINE) PER 1000 UNITS: Performed by: INTERNAL MEDICINE

## 2021-08-09 PROCEDURE — C1725 CATH, TRANSLUMIN NON-LASER: HCPCS | Performed by: INTERNAL MEDICINE

## 2021-08-09 PROCEDURE — 25010000002 HYDRALAZINE PER 20 MG: Performed by: INTERNAL MEDICINE

## 2021-08-09 PROCEDURE — 25010000002 FENTANYL CITRATE (PF) 50 MCG/ML SOLUTION: Performed by: INTERNAL MEDICINE

## 2021-08-09 PROCEDURE — 25010000003 MEPERIDINE PER 100 MG: Performed by: INTERNAL MEDICINE

## 2021-08-09 PROCEDURE — 92978 ENDOLUMINL IVUS OCT C 1ST: CPT | Performed by: INTERNAL MEDICINE

## 2021-08-09 PROCEDURE — 93005 ELECTROCARDIOGRAM TRACING: CPT | Performed by: INTERNAL MEDICINE

## 2021-08-09 PROCEDURE — 92928 PRQ TCAT PLMT NTRAC ST 1 LES: CPT | Performed by: INTERNAL MEDICINE

## 2021-08-09 PROCEDURE — 25010000002 MIDAZOLAM PER 1MG: Performed by: INTERNAL MEDICINE

## 2021-08-09 PROCEDURE — C1887 CATHETER, GUIDING: HCPCS | Performed by: INTERNAL MEDICINE

## 2021-08-09 PROCEDURE — 85025 COMPLETE CBC W/AUTO DIFF WBC: CPT | Performed by: INTERNAL MEDICINE

## 2021-08-09 PROCEDURE — 93571 IV DOP VEL&/PRESS C FLO 1ST: CPT | Performed by: INTERNAL MEDICINE

## 2021-08-09 DEVICE — XIENCE SIERRA™ EVEROLIMUS ELUTING CORONARY STENT SYSTEM 2.75 MM X 33 MM / RAPID-EXCHANGE
Type: IMPLANTABLE DEVICE | Status: FUNCTIONAL
Brand: XIENCE SIERRA™

## 2021-08-09 RX ORDER — SODIUM CHLORIDE 9 MG/ML
75 INJECTION, SOLUTION INTRAVENOUS CONTINUOUS
Status: DISCONTINUED | OUTPATIENT
Start: 2021-08-09 | End: 2021-08-10 | Stop reason: HOSPADM

## 2021-08-09 RX ORDER — NITROGLYCERIN 0.4 MG/1
0.4 TABLET SUBLINGUAL
Status: DISCONTINUED | OUTPATIENT
Start: 2021-08-09 | End: 2021-08-09 | Stop reason: HOSPADM

## 2021-08-09 RX ORDER — FENTANYL CITRATE 50 UG/ML
INJECTION, SOLUTION INTRAMUSCULAR; INTRAVENOUS AS NEEDED
Status: DISCONTINUED | OUTPATIENT
Start: 2021-08-09 | End: 2021-08-09 | Stop reason: HOSPADM

## 2021-08-09 RX ORDER — SODIUM CHLORIDE 9 MG/ML
200 INJECTION, SOLUTION INTRAVENOUS CONTINUOUS
Status: ACTIVE | OUTPATIENT
Start: 2021-08-09 | End: 2021-08-09

## 2021-08-09 RX ORDER — NALOXONE HCL 0.4 MG/ML
0.4 VIAL (ML) INJECTION
Status: DISCONTINUED | OUTPATIENT
Start: 2021-08-09 | End: 2021-08-10 | Stop reason: HOSPADM

## 2021-08-09 RX ORDER — MORPHINE SULFATE 2 MG/ML
1 INJECTION, SOLUTION INTRAMUSCULAR; INTRAVENOUS EVERY 4 HOURS PRN
Status: DISCONTINUED | OUTPATIENT
Start: 2021-08-09 | End: 2021-08-10 | Stop reason: HOSPADM

## 2021-08-09 RX ORDER — NITROGLYCERIN 5 MG/ML
INJECTION, SOLUTION INTRAVENOUS AS NEEDED
Status: DISCONTINUED | OUTPATIENT
Start: 2021-08-09 | End: 2021-08-09 | Stop reason: HOSPADM

## 2021-08-09 RX ORDER — ASPIRIN 81 MG/1
81 TABLET ORAL DAILY
Status: DISCONTINUED | OUTPATIENT
Start: 2021-08-10 | End: 2021-08-10 | Stop reason: HOSPADM

## 2021-08-09 RX ORDER — LIDOCAINE HYDROCHLORIDE 20 MG/ML
INJECTION, SOLUTION INFILTRATION; PERINEURAL AS NEEDED
Status: DISCONTINUED | OUTPATIENT
Start: 2021-08-09 | End: 2021-08-09 | Stop reason: HOSPADM

## 2021-08-09 RX ORDER — MIDAZOLAM HYDROCHLORIDE 2 MG/2ML
INJECTION, SOLUTION INTRAMUSCULAR; INTRAVENOUS AS NEEDED
Status: DISCONTINUED | OUTPATIENT
Start: 2021-08-09 | End: 2021-08-09 | Stop reason: HOSPADM

## 2021-08-09 RX ORDER — MIDAZOLAM HYDROCHLORIDE 2 MG/2ML
2 INJECTION, SOLUTION INTRAMUSCULAR; INTRAVENOUS ONCE
Status: COMPLETED | OUTPATIENT
Start: 2021-08-09 | End: 2021-08-09

## 2021-08-09 RX ORDER — MEPERIDINE HYDROCHLORIDE 25 MG/ML
25 INJECTION INTRAMUSCULAR; INTRAVENOUS; SUBCUTANEOUS EVERY 6 HOURS PRN
Status: DISPENSED | OUTPATIENT
Start: 2021-08-09 | End: 2021-08-10

## 2021-08-09 RX ORDER — HEPARIN SODIUM 1000 [USP'U]/ML
INJECTION, SOLUTION INTRAVENOUS; SUBCUTANEOUS AS NEEDED
Status: DISCONTINUED | OUTPATIENT
Start: 2021-08-09 | End: 2021-08-09 | Stop reason: HOSPADM

## 2021-08-09 RX ORDER — ONDANSETRON 4 MG/1
4 TABLET, FILM COATED ORAL EVERY 6 HOURS PRN
Status: DISCONTINUED | OUTPATIENT
Start: 2021-08-09 | End: 2021-08-10 | Stop reason: HOSPADM

## 2021-08-09 RX ORDER — HYDRALAZINE HYDROCHLORIDE 20 MG/ML
INJECTION INTRAMUSCULAR; INTRAVENOUS AS NEEDED
Status: DISCONTINUED | OUTPATIENT
Start: 2021-08-09 | End: 2021-08-09 | Stop reason: HOSPADM

## 2021-08-09 RX ORDER — ACETAMINOPHEN 325 MG/1
650 TABLET ORAL EVERY 4 HOURS PRN
Status: DISCONTINUED | OUTPATIENT
Start: 2021-08-09 | End: 2021-08-10 | Stop reason: HOSPADM

## 2021-08-09 RX ORDER — ONDANSETRON 2 MG/ML
4 INJECTION INTRAMUSCULAR; INTRAVENOUS EVERY 6 HOURS PRN
Status: DISCONTINUED | OUTPATIENT
Start: 2021-08-09 | End: 2021-08-10 | Stop reason: HOSPADM

## 2021-08-09 RX ORDER — MEPERIDINE HYDROCHLORIDE 50 MG/ML
50 INJECTION INTRAMUSCULAR; INTRAVENOUS; SUBCUTANEOUS ONCE
Status: DISCONTINUED | OUTPATIENT
Start: 2021-08-09 | End: 2021-08-09

## 2021-08-09 RX ORDER — CLOPIDOGREL BISULFATE 75 MG/1
75 TABLET ORAL DAILY
Status: DISCONTINUED | OUTPATIENT
Start: 2021-08-10 | End: 2021-08-10 | Stop reason: HOSPADM

## 2021-08-09 RX ORDER — ONDANSETRON 2 MG/ML
INJECTION INTRAMUSCULAR; INTRAVENOUS AS NEEDED
Status: DISCONTINUED | OUTPATIENT
Start: 2021-08-09 | End: 2021-08-09 | Stop reason: HOSPADM

## 2021-08-09 RX ADMIN — MORPHINE SULFATE 1 MG: 2 INJECTION, SOLUTION INTRAMUSCULAR; INTRAVENOUS at 18:02

## 2021-08-09 RX ADMIN — MEPERIDINE HYDROCHLORIDE 25 MG: 25 INJECTION INTRAMUSCULAR; INTRAVENOUS; SUBCUTANEOUS at 20:58

## 2021-08-09 RX ADMIN — MIDAZOLAM HYDROCHLORIDE 2 MG: 1 INJECTION, SOLUTION INTRAMUSCULAR; INTRAVENOUS at 20:58

## 2021-08-09 RX ADMIN — SODIUM CHLORIDE 75 ML/HR: 9 INJECTION, SOLUTION INTRAVENOUS at 20:57

## 2021-08-09 NOTE — PLAN OF CARE
Goal Outcome Evaluation:  Plan of Care Reviewed With: patient, spouse. REMAIN ON BEDREST 6 HOURS POST HEMOSTASIS. VITAL SIGNS MONITORED Q 15 MINUTES UNTIL 1 HOUR POST HEMOSTASIS.

## 2021-08-09 NOTE — H&P
[facility]   HISTORY AND PHYSICAL    Patient Name: Layo Cee  : 1940  MRN: [id number]  Primary Care Physician:  Kevyn Rhodes PA  Date of admission: 2021    Subjective   Subjective     Chief Complaint: chest pain    HPI:  Layo Cee is a 81 y.o. male with a history of CAD s/p CABG, atrial flutter s/p RFA, COPD, and COVID-19 infection several months ago.  He is followed chronically by Dr. Stevenson.  During his last office visit, the patient complained of substernal chest pain with activity, so a SPECT study was obtained.  It was abnormal and showed an inferior infarct with significant mansoor-infarct ischemia and a calculated ejection fraction of 49%.  Accordingly, a LHC/graft study was recommended.  His last LHC in  apparently showed a patent LIMA-LAD and an occluded SVG.  He does not report any new symptoms today.     Review of Systems   Constitutional: Positive for fatigue. Negative for activity change, chills, fever and unexpected weight change.   HENT: Negative for hearing loss, sore throat and trouble swallowing.    Eyes: Negative for pain and visual disturbance.   Respiratory: Positive for shortness of breath. Negative for chest tightness and wheezing.    Cardiovascular: Positive for chest pain. Negative for palpitations and leg swelling.   Gastrointestinal: Negative for abdominal distention, abdominal pain, blood in stool and vomiting.   Endocrine: Negative for cold intolerance and heat intolerance.   Genitourinary: Negative for dysuria and hematuria.   Musculoskeletal: Positive for arthralgias. Negative for joint swelling.   Skin: Negative for color change, pallor and rash.   Neurological: Negative for syncope, speech difficulty, light-headedness and headaches.   Hematological: Negative for adenopathy. Does not bruise/bleed easily.        Personal History     Past Medical History:   Diagnosis Date   • Arthritis    • BPH (benign prostatic hyperplasia)    • CAD (coronary artery disease)     • CHF (congestive heart failure) (CMS/[facility])    • COPD (chronic obstructive pulmonary disease) (CMS/[facility])    • COVID-19 02/04/2021   • Diverticulitis 10/11/2019   • Dysphagia    • Elevated cholesterol    • Essential hypertension 08/27/2020   • GERD (gastroesophageal reflux disease)    • Gross hematuria    • HBP (high blood pressure)    • High cholesterol    • Long-term use of high-risk medication    • Lung disease    • Myocardial infarction (CMS/[facility]) 07/2016   • OCD (obsessive compulsive disorder)    • Paroxysmal atrial fibrillation (CMS/[facility]) 08/12/2019   • Renal insufficiency 08/27/2020   • Rhinitis, allergic 06/05/2018   • Sore throat    • Stable angina (CMS/[facility])    • Vertigo        Past Surgical History:   Procedure Laterality Date   • BLADDER SURGERY     • CARDIAC CATHETERIZATION  07/2016   • CARDIAC SURGERY      HEART BYPASS   • COLONOSCOPY  2011   • CORONARY ARTERY BYPASS GRAFT     • CYSTOSCOPY  03/19/2019    WITH BILATERAL RETROGRADE PYELOGRAPHY   • ENDOSCOPY  2016   • PROSTATE SURGERY         Family History: family history includes Heart disease in his father; Other in his brother. Otherwise pertinent FHx was reviewed and not pertinent to current issue.    Social History:  reports that he has quit smoking. His smoking use included cigarettes. He has a 20.00 pack-year smoking history. He has never used smokeless tobacco. He reports previous alcohol use. He reports that he does not use drugs.    Home Medications:  B Complex-C, Fluticasone Furoate, acetaminophen, allopurinol, aspirin, azelastine, calcium carbonate, cetirizine, coenzyme Q10, famotidine, finasteride, furosemide, isosorbide mononitrate, metoprolol succinate XL, nitroglycerin, pitavastatin calcium, rivaroxaban, sacubitril-valsartan, tamsulosin, and tiotropium bromide-olodaterol    Allergies:  No Known Allergies    Objective    Objective     Vitals:   Temp:  [97.3 °F (36.3 °C)] 97.3 °F (36.3 °C)  Heart Rate:  [77] 77  Resp:  [16] 16  BP: (171)/(81)  171/81    Physical Exam  Constitutional:       General: He is not in acute distress.     Appearance: Normal appearance.   HENT:      Head: Atraumatic.      Mouth/Throat:      Mouth: Mucous membranes are moist.      Pharynx: Oropharynx is clear. No oropharyngeal exudate.   Eyes:      General: No scleral icterus.     Conjunctiva/sclera: Conjunctivae normal.   Neck:      Vascular: No carotid bruit or JVD.   Cardiovascular:      Rate and Rhythm: Normal rate and regular rhythm.      Chest Wall: PMI is not displaced.      Pulses: Normal pulses.           Radial pulses are 2+ on the right side and 2+ on the left side.      Heart sounds: S1 normal and S2 normal. No murmur heard.   No friction rub. No gallop.    Pulmonary:      Effort: Pulmonary effort is normal. No tachypnea or respiratory distress.      Breath sounds: No decreased breath sounds, wheezing, rhonchi or rales.   Chest:      Chest wall: No tenderness.   Abdominal:      General: Bowel sounds are normal. There is no distension.      Palpations: Abdomen is soft. There is no mass.      Tenderness: There is no abdominal tenderness.      Comments: Obese.   Musculoskeletal:         General: No swelling, tenderness or deformity.      Cervical back: Neck supple. No tenderness.      Right lower leg: No edema.      Left lower leg: No edema.   Skin:     General: Skin is warm and dry.      Coloration: Skin is not jaundiced.      Findings: No erythema or rash.      Nails: There is no clubbing.   Neurological:      General: No focal deficit present.      Mental Status: He is alert and oriented to person, place, and time.      Motor: No weakness.   Psychiatric:         Mood and Affect: Mood normal.         Behavior: Behavior normal.         Result Review    Result Review:  I have personally reviewed the results from the time of this admission to 8/9/2021 09:53 EDT and agree with these findings:  [x]  Laboratory  []  Microbiology  []  Radiology  [x]  EKG/Telemetry   []   Cardiology/Vascular   []  Pathology  []  Old records  []  Other:  Most notable findings include: Cr=1.36 (chronic baseline is 1.2-1.3), glucose = 124    Assessment/Plan   Assessment / Plan     Brief Patient Summary:  Layo Cee is a 81 y.o. male with:  -CAD s/p CABG and recurrent stable angina  -Recent abnormal SPECT study, as detailed above  -Paroxysmal atrial flutter, on chronic anticoagulation   -Hyperlipidemia  -Essential hypertension    Active Hospital Problems:  Active Hospital Problems    Diagnosis    • **Unstable angina (CMS/[facility])      Added automatically from request for surgery [id number]         Plan:   -Will proceed with LHC/graft study (and possible PCI) today for definitive evaluation.  The risks (death, heart attack, stroke, loss of limb, infection, arterial dissection, coronary artery perforation and pericardial tamponade, severe bleeding and potential need for blood transfusion, renal failure and potential need for permanent dialysis, anaphylaxis) and benefits of the procedure were reviewed in detail with Mr. Cee.  He expressed understanding and wishes to proceed.  Continue current medications pending results of the procedure.    DVT prophylaxis:  No DVT prophylaxis order currently exists.    CODE STATUS:    Full code       Electronically signed by Jack Ladd MD, 08/09/21, 9:53 AM EDT.

## 2021-08-10 ENCOUNTER — READMISSION MANAGEMENT (OUTPATIENT)
Dept: CALL CENTER | Facility: HOSPITAL | Age: 81
End: 2021-08-10

## 2021-08-10 VITALS
HEART RATE: 83 BPM | BODY MASS INDEX: 32.93 KG/M2 | HEIGHT: 70 IN | SYSTOLIC BLOOD PRESSURE: 160 MMHG | WEIGHT: 230 LBS | OXYGEN SATURATION: 97 % | TEMPERATURE: 98.78 F | RESPIRATION RATE: 16 BRPM | DIASTOLIC BLOOD PRESSURE: 77 MMHG

## 2021-08-10 LAB
ANION GAP SERPL CALCULATED.3IONS-SCNC: 10 MMOL/L (ref 5–15)
BUN SERPL-MCNC: 12 MG/DL (ref 8–23)
BUN/CREAT SERPL: 10.4 (ref 7–25)
CALCIUM SPEC-SCNC: 8.4 MG/DL (ref 8.6–10.5)
CHLORIDE SERPL-SCNC: 106 MMOL/L (ref 98–107)
CO2 SERPL-SCNC: 22 MMOL/L (ref 22–29)
CREAT SERPL-MCNC: 1.15 MG/DL (ref 0.76–1.27)
DEPRECATED RDW RBC AUTO: 49.5 FL (ref 37–54)
ERYTHROCYTE [DISTWIDTH] IN BLOOD BY AUTOMATED COUNT: 15.4 % (ref 12.3–15.4)
GFR SERPL CREATININE-BSD FRML MDRD: 61 ML/MIN/1.73
GLUCOSE SERPL-MCNC: 107 MG/DL (ref 65–99)
HCT VFR BLD AUTO: 41.2 % (ref 37.5–51)
HGB BLD-MCNC: 13.7 G/DL (ref 13–17.7)
MCH RBC QN AUTO: 29.5 PG (ref 26.6–33)
MCHC RBC AUTO-ENTMCNC: 33.3 G/DL (ref 31.5–35.7)
MCV RBC AUTO: 88.8 FL (ref 79–97)
PLATELET # BLD AUTO: 163 10*3/MM3 (ref 140–450)
PMV BLD AUTO: 10 FL (ref 6–12)
POTASSIUM SERPL-SCNC: 4.2 MMOL/L (ref 3.5–5.2)
QT INTERVAL: 391 MS
QT INTERVAL: 421 MS
QT INTERVAL: 435 MS
RBC # BLD AUTO: 4.64 10*6/MM3 (ref 4.14–5.8)
SODIUM SERPL-SCNC: 138 MMOL/L (ref 136–145)
WBC # BLD AUTO: 8.83 10*3/MM3 (ref 3.4–10.8)

## 2021-08-10 PROCEDURE — 63710000001 ISOSORBIDE MONONITRATE 30 MG TABLET SUSTAINED-RELEASE 24 HOUR: Performed by: INTERNAL MEDICINE

## 2021-08-10 PROCEDURE — A9270 NON-COVERED ITEM OR SERVICE: HCPCS | Performed by: INTERNAL MEDICINE

## 2021-08-10 PROCEDURE — 63710000001 CETIRIZINE 10 MG TABLET: Performed by: INTERNAL MEDICINE

## 2021-08-10 PROCEDURE — 63710000001 FINASTERIDE 5 MG TABLET: Performed by: INTERNAL MEDICINE

## 2021-08-10 PROCEDURE — 85027 COMPLETE CBC AUTOMATED: CPT | Performed by: INTERNAL MEDICINE

## 2021-08-10 PROCEDURE — 63710000001 FAMOTIDINE 10 MG TABLET: Performed by: INTERNAL MEDICINE

## 2021-08-10 PROCEDURE — 63710000001 ALLOPURINOL 100 MG TABLET: Performed by: INTERNAL MEDICINE

## 2021-08-10 PROCEDURE — 63710000001 METOPROLOL SUCCINATE XL 50 MG TABLET SUSTAINED-RELEASE 24 HOUR: Performed by: INTERNAL MEDICINE

## 2021-08-10 PROCEDURE — 63710000001 CLOPIDOGREL 75 MG TABLET: Performed by: INTERNAL MEDICINE

## 2021-08-10 PROCEDURE — 63710000001 SACUBITRIL-VALSARTAN 24-26 MG TABLET: Performed by: INTERNAL MEDICINE

## 2021-08-10 PROCEDURE — 63710000001 ASPIRIN 81 MG TABLET DELAYED-RELEASE: Performed by: INTERNAL MEDICINE

## 2021-08-10 PROCEDURE — 99217 PR OBSERVATION CARE DISCHARGE MANAGEMENT: CPT | Performed by: INTERNAL MEDICINE

## 2021-08-10 PROCEDURE — 80048 BASIC METABOLIC PNL TOTAL CA: CPT | Performed by: INTERNAL MEDICINE

## 2021-08-10 PROCEDURE — 93005 ELECTROCARDIOGRAM TRACING: CPT | Performed by: INTERNAL MEDICINE

## 2021-08-10 RX ORDER — METOPROLOL SUCCINATE 50 MG/1
50 TABLET, EXTENDED RELEASE ORAL DAILY
Status: DISCONTINUED | OUTPATIENT
Start: 2021-08-10 | End: 2021-08-10 | Stop reason: HOSPADM

## 2021-08-10 RX ORDER — ISOSORBIDE MONONITRATE 30 MG/1
60 TABLET, EXTENDED RELEASE ORAL DAILY
Status: DISCONTINUED | OUTPATIENT
Start: 2021-08-10 | End: 2021-08-10 | Stop reason: HOSPADM

## 2021-08-10 RX ORDER — PANTOPRAZOLE SODIUM 40 MG/1
40 TABLET, DELAYED RELEASE ORAL DAILY
Status: ON HOLD | COMMUNITY
End: 2021-08-10

## 2021-08-10 RX ORDER — CLOPIDOGREL BISULFATE 75 MG/1
75 TABLET ORAL DAILY
Qty: 90 TABLET | Refills: 3 | Status: SHIPPED | OUTPATIENT
Start: 2021-08-10 | End: 2022-08-15

## 2021-08-10 RX ORDER — ALLOPURINOL 100 MG/1
100 TABLET ORAL DAILY
Status: DISCONTINUED | OUTPATIENT
Start: 2021-08-10 | End: 2021-08-10 | Stop reason: HOSPADM

## 2021-08-10 RX ORDER — CETIRIZINE HYDROCHLORIDE 10 MG/1
10 TABLET ORAL DAILY
Status: DISCONTINUED | OUTPATIENT
Start: 2021-08-10 | End: 2021-08-10 | Stop reason: HOSPADM

## 2021-08-10 RX ORDER — FAMOTIDINE 10 MG
10 TABLET ORAL DAILY
Status: DISCONTINUED | OUTPATIENT
Start: 2021-08-10 | End: 2021-08-10 | Stop reason: HOSPADM

## 2021-08-10 RX ORDER — FINASTERIDE 5 MG/1
5 TABLET, FILM COATED ORAL DAILY
Status: DISCONTINUED | OUTPATIENT
Start: 2021-08-10 | End: 2021-08-10 | Stop reason: HOSPADM

## 2021-08-10 RX ADMIN — FINASTERIDE 5 MG: 5 TABLET, FILM COATED ORAL at 08:50

## 2021-08-10 RX ADMIN — ISOSORBIDE MONONITRATE 60 MG: 30 TABLET ORAL at 08:49

## 2021-08-10 RX ADMIN — METOPROLOL SUCCINATE 50 MG: 50 TABLET, EXTENDED RELEASE ORAL at 08:50

## 2021-08-10 RX ADMIN — CLOPIDOGREL BISULFATE 75 MG: 75 TABLET ORAL at 08:50

## 2021-08-10 RX ADMIN — ALLOPURINOL 100 MG: 100 TABLET ORAL at 08:51

## 2021-08-10 RX ADMIN — FAMOTIDINE 10 MG: 10 TABLET ORAL at 08:50

## 2021-08-10 RX ADMIN — CETIRIZINE HYDROCHLORIDE 10 MG: 10 TABLET, FILM COATED ORAL at 08:51

## 2021-08-10 RX ADMIN — SACUBITRIL AND VALSARTAN 1 TABLET: 24; 26 TABLET, FILM COATED ORAL at 08:51

## 2021-08-10 RX ADMIN — ASPIRIN 81 MG: 81 TABLET, COATED ORAL at 08:49

## 2021-08-10 NOTE — OUTREACH NOTE
Prep Survey      Responses   [facility]?  [facility]   Is LACE score < 7 ?  Yes   Emergency Room discharge w/ pulse ox?  No   Eligibility  [facility] Back   Date of Admission  08/09/21   Date of Discharge  08/10/21   Discharge Disposition  Home or Self Care   Discharge diagnosis  Unstable angina/Heart cath   Does the patient have one of the following disease processes/diagnoses(primary or secondary)?  Other   Prep survey completed?  Yes          Kimmy Sanz RN

## 2021-08-10 NOTE — PLAN OF CARE
Goal Outcome Evaluation:  Plan of Care Reviewed With: patient, spouse        Progress: improving

## 2021-08-10 NOTE — SIGNIFICANT NOTE
08/10/21 0900   Plan   Final Discharge Disposition Code 01 - home or self-care   Final Note Met with patient and spouse.  Plans to return home with no needs.  Spouse will provide transportation

## 2021-08-10 NOTE — NURSING NOTE
Cardiac Rehab Phase I - Occurrence #1    Education Topics:  Cardiac Rehab yes   No Phase I need assessed no     Topic Components:  Exercise yes     Teaching Aids:  Phase 2 Brochure yes     Teaching Method:  Written Instruction yes     Teaching Recipient:  Patient yes       Recovery/Discharge Instructions:  Cardiac Rehab Phase 2 yes     Cardiac Rehab Phase I Comm   Phase 2 cardiac rehab information mailed to patient.Cardiac Rehab Phase I - Occurrence #1    Education Topics:  Cardiac Rehab yes   No Phase I need assessed no     Topic Components:  Exercise yes     Teaching Aids:  Phase 2 Brochure yes     Teaching Method:  Written Instruction yes     Teaching Recipient:  Patient yes       Recovery/Discharge Instructions:  Cardiac Rehab Phase 2 yes     Cardiac Rehab Phase I Comm   Phase 2 cardiac rehab information mailed to patient.

## 2021-08-10 NOTE — PLAN OF CARE
Goal Outcome Evaluation:  Plan of Care Reviewed With: patient, spouse. PATIENT STATES HE FEELS GOOD THIS MORNING AND IS HOPING TO GO HOME. HOME MEDICATIONS RESTARTED THIS MORNING

## 2021-08-10 NOTE — DISCHARGE SUMMARY
[facility]         CARDIOLOGY DISCHARGE SUMMARY    Patient Name: Layo Cee  : 1940  MRN: [id number]    Date of Admission: 2021  Date of Discharge: 8/10/2021  Primary Care Physician: Kevyn Rhodes PA      Presenting Problem:   Unstable angina (CMS/HCC) [I20.0], elective cardiac catheterization    Active and Resolved Hospital Problems:  Active Hospital Problems    Diagnosis POA   • **Unstable angina (CMS/HCC) [I20.0] Unknown   • S/P coronary artery stent placement [Z95.5] Not Applicable   • Coronary artery disease of native artery of native heart with stable angina pectoris (CMS/HCC) [I25.118] Yes      Resolved Hospital Problems   No resolved problems to display.     Procedures performed during hospital stay    1. Cardiac catheterization and coronary angiography  2.  Angioplasty and drug-eluting stent placement to diagonal branch    Hospital Course     Hospital Course:  Layo Cee is a 81 y.o. male with coronary artery disease, previous bypass grafting, hypertension, proximal atrial fibrillation.  Patient is complaining of exertional chest discomfort and also significant increase in shortness of breath for the past several weeks.  As part of work-up, a SPECT stress test was performed as an outpatient which showed mansoor-infarct ischemia involving the inferior wall.  He is already on maximal medical therapy.  He was brought to the hospital for elective cardiac catheterization.  Cardiac cath done on 2021 showed tight stenosis involving the diagonal branch.  Other lesions were similar to his previous angiogram in 2016.  Patient underwent angioplasty and stent placement to the diagonal branch by drug-eluting stent without complications.  He was observed overnight with gentle IV fluid hydration.  Rest of the hospital stay was uneventful.  He remained chest pain-free.  There were no femoral arterial access site issues.  There were no events noted on telemetry as well.  He was  continued on dual endplate therapy.  Xarelto was restarted the next day.  He was discharged home in a stable condition on 8/10/2021.  He will be on aspirin Plavix and Xarelto for first 1 month after which aspirin will be discontinued.  Other cardiac medications will be continued without any changes.        DISCHARGE Follow Up Recommendations for labs and diagnostics: None      Day of Discharge     Vital Signs:  Temp:  [97.3 °F (36.3 °C)-98.4 °F (36.9 °C)] 98.4 °F (36.9 °C)  Heart Rate:  [] 100  Resp:  [16-18] 16  BP: (137-171)/(59-81) 146/63  Flow (L/min):  [2] 2  Physical Exam:    General : Alert, awake, no acute distress  HEENT: No pallor, anicteric, moist mucous membranes  Neck: Supple, there is no JVD, no carotid bruit  CVS : Regular rate and rhythm, no murmur, rubs or gallops  Lungs: Clear to auscultation bilaterally, no crackles or rhonchi  Abdomen: Soft, nontender, bowel sounds heard in all 4 quadrants  Right groin: No palpable hematoma, no audible bruit  Extremities: Warm, well-perfused, no pedal edema  Neuro: Alert, awake, oriented x3, no focal logical deficits  Skin: No skin rashes    Pertinent  and/or Most Recent Results     LAB RESULTS:      Lab 08/10/21  0442 08/09/21  0810 08/05/21  0730   WBC 8.83 8.02 8.21   HEMOGLOBIN 13.7 15.1 14.1   HEMATOCRIT 41.2 46.3 43.1   PLATELETS 163 185 184   NEUTROS ABS  --  3.94 3.96   IMMATURE GRANS (ABS)  --  0.05 0.03   LYMPHS ABS  --  2.78 2.85   MONOS ABS  --  0.84 0.92*   EOS ABS  --  0.37 0.40   MCV 88.8 90.1 88.5   PROTIME  --  9.9 10.0   APTT  --  26.4 25.3         Lab 08/10/21  0442 08/09/21  0810 08/05/21  0730   SODIUM 138 139 139   POTASSIUM 4.2 4.5 4.4   CHLORIDE 106 102 104   CO2 22.0 27.1 27.7   ANION GAP 10.0 9.9 7.3   BUN 12 12 16   CREATININE 1.15 1.36* 1.30*   GLUCOSE 107* 124* 115*   CALCIUM 8.4* 9.1 9.3             Lab 08/09/21  0810 08/05/21  0730   PROTIME 9.9 10.0   INR 0.89* 0.90*                               Discharge Details         Discharge Medications      New Medications      Instructions Start Date   clopidogrel 75 MG tablet  Commonly known as: PLAVIX   75 mg, Oral, Daily         Continue These Medications      Instructions Start Date   acetaminophen 325 MG tablet  Commonly known as: TYLENOL   650 mg, Oral, Every 6 Hours PRN      allopurinol 100 MG tablet  Commonly known as: ZYLOPRIM   100 mg, Oral, Daily      aspirin 81 MG EC tablet   81 mg, Oral, Daily      azelastine 0.1 % nasal spray  Commonly known as: ASTELIN   2 sprays, Nasal, 2 Times Daily, Use in each nostril as directed      calcium carbonate 600 MG tablet  Commonly known as: OS-LIANNA   600 mg, Oral, Daily      cetirizine 10 MG tablet  Commonly known as: zyrTEC   10 mg, Oral, Daily      coenzyme Q10 50 MG capsule capsule   50 mg, Oral, Daily      famotidine 10 MG tablet  Commonly known as: PEPCID   10 mg, Oral, Daily      finasteride 5 MG tablet  Commonly known as: PROSCAR   5 mg, Oral, Daily      Fluticasone Furoate 200 MCG/ACT aerosol powder    200 mcg, Inhalation, Daily      furosemide 20 MG tablet  Commonly known as: LASIX   20 mg, Oral, Daily      isosorbide mononitrate 60 MG 24 hr tablet  Commonly known as: IMDUR   60 mg, Oral, Daily      metoprolol succinate XL 50 MG 24 hr tablet  Commonly known as: TOPROL-XL   50 mg, Oral, Daily, Half QAM and half QHS      nitroglycerin 0.4 MG SL tablet  Commonly known as: NITROSTAT   0.4 mg, Sublingual, Every 5 Minutes PRN, Take no more than 3 doses in 15 minutes.      pitavastatin calcium 1 MG tablet tablet  Commonly known as: LIVALO   1 mg, Oral, Nightly      sacubitril-valsartan 24-26 MG tablet  Commonly known as: ENTRESTO   1 tablet, Oral, 2 Times Daily      Stiolto Respimat 2.5-2.5 MCG/ACT aerosol solution inhaler  Generic drug: tiotropium bromide-olodaterol   No dose, route, or frequency recorded.      tamsulosin 0.4 MG capsule 24 hr capsule  Commonly known as: FLOMAX   1 capsule, Oral, Daily      VITAMIN B COMPLEX-C PO   Oral       Xarelto 15 MG tablet  Generic drug: rivaroxaban   15 mg, Oral, Daily             No Known Allergies      Discharge Disposition:  Home or Self Care    Diet:  Hospital:  Diet Order   Procedures   • Diet Regular; Cardiac         Discharge Activity:   Activity Instructions     Activity as Tolerated              CODE STATUS:  There are no questions and answers to display.         Future Appointments   Date Time Provider Department [place]   10/25/2021  2:00 PM [facility] [place] [address] [facility] ETWCT [facility] [place]   10/28/2021 10:45 AM Kolton Brink MD [facility] PCC ETW [facility] [place]   10/28/2021  2:15 PM Kevyn Rhodes PA [facility] PC CSPRG [facility] [place]       Additional Instructions for the Follow-ups that You Need to Schedule     Discharge Follow-up with PCP   As directed       Currently Documented PCP:    Kevyn Rhodes PA    PCP Phone Number:    [phone]     Follow Up Details: 2 weeks               Time spent on Discharge including face to face service:  25 minutes    Electronically signed by Darnell Stevenson MD, 08/10/21, 7:58 AM EDT.

## 2021-08-11 ENCOUNTER — TRANSITIONAL CARE MANAGEMENT TELEPHONE ENCOUNTER (OUTPATIENT)
Dept: CALL CENTER | Facility: HOSPITAL | Age: 81
End: 2021-08-11

## 2021-08-11 NOTE — OUTREACH NOTE
Call Center TCM Note      Responses   [facility] patient discharged from?  [place]   Does the patient have one of the following disease processes/diagnoses(primary or secondary)?  Other   TCM attempt successful?  Yes   Call start time  1234   Call end time  1242   Discharge diagnosis  Unstable angina/Heart cath   Medication alerts for this patient  Plavix   Meds reviewed with patient/caregiver?  Yes   Is the patient having any side effects they believe may be caused by any medication additions or changes?  No   Does the patient have all medications ordered at discharge?  Yes   Is the patient taking all medications as directed (includes completed medication regime)?  Yes   Comments regarding appointments  Cardiology 8/23/21   Does the patient have a primary care provider?   Yes   Does the patient have an appointment with their PCP within 7 days of discharge?  Yes   Comments regarding PCP  Hospital D/C follow-up scheduled for 8/23/21@11:45am   Has the patient kept scheduled appointments due by today?  N/A   Has home health visited the patient within 72 hours of discharge?  N/A   Psychosocial issues?  No   Did the patient receive a copy of their discharge instructions?  Yes   Nursing interventions  Reviewed instructions with patient   What is the patient's perception of their health status since discharge?  Same [Soa but normal for him..no chest pain.  Noted that pt on Lasix/Entresto..disc'd QD weights, pt unaware of need to weigh. Reviewed weight gain parameters of 3# day/5 # week require MD notification-encouraged to weigh daily and record to take to MD appts ]   Is the patient/caregiver able to teach back signs and symptoms related to disease process for when to call PCP?  Yes   Is the patient/caregiver able to teach back signs and symptoms related to disease process for when to call 911?  Yes   If the patient is a current smoker, are they able to teach back resources for cessation?  Not a smoker   Additional  teach back comments  Pt was aware of need to comply with low sodium diet   TCM call completed?  Yes          Carly Marte RN    8/11/2021, 12:42 EDT

## 2021-08-11 NOTE — OUTREACH NOTE
Call Center TCM Note      Responses   [facility] discharged from?  [facility]   Does the patient have one of the following disease processes/diagnoses(primary or secondary)?  Other   TCM attempt successful?  No [verbal release on file]   Unsuccessful attempts  Attempt 1          Carly Marte RN    8/11/2021, 09:44 EDT

## 2021-08-16 ENCOUNTER — PATIENT OUTREACH (OUTPATIENT)
Dept: CASE MANAGEMENT | Facility: OTHER | Age: 81
End: 2021-08-16

## 2021-08-16 DIAGNOSIS — J44.9 CHRONIC OBSTRUCTIVE PULMONARY DISEASE, UNSPECIFIED COPD TYPE (HCC): ICD-10-CM

## 2021-08-16 DIAGNOSIS — I20.0 UNSTABLE ANGINA (HCC): Primary | ICD-10-CM

## 2021-08-16 PROCEDURE — 99490 CHRNC CARE MGMT STAFF 1ST 20: CPT | Performed by: PHYSICIAN ASSISTANT

## 2021-08-16 NOTE — OUTREACH NOTE
Ambulatory Case Management Note    CCM Interim Update    Called patient for his monthly CCM outreach. Patient had Angioplasty and stent placement on 8/9, he will be on ASA, Plavix and Xarelto for one month, then stop the ASA. Reviewed DC summary, and reviewed medications, no other changes noted.   Asked patient if he has had his pneumonia vaccine and he said he thought he did, it wasn't in [employer], but found it in [employer]. Patient said that he is till debating on getting the COVID vaccine, his PCP did an antibody test and he was positive in July, so he wanted to wait. Patient states that he still has some slight chest discomfort and very fatigued. He said that his breathing is a little better, he said  wasn't sure if this procedure would help with the SOA related to his COPD, but it has. Susie said that he has been getting plenty of rest, fluids and getting well.  He said that he wasn't supposed to be lifting anything for at least one week, but in a couple days [place] and try to do more. Patient aware of his upcoming appointments and told his if he is still as fatigued next Monday to mention Cardiac Rehab to cardiologist or PCP and see if they thought it would be a good idea. Updated contacts, speciality providers, general assessment, and care evaluation.Denies any needs at this time.     Care Evaluation    Questions/Answers      Most Recent Value   Suggested Appointments  -- [N/A]   Annual Wellness Visit:   Patient Has Completed [4/8/21]   Care Gaps Addressed  Colon Cancer Screening, Pneumonia Vaccine   Colon Cancer Screening Type  Colonoscopy   Colonoscopy Status  Up to Date (< 10 yrs)   Colon Cancer Screening Completion at [facility] or Other  [facility]   Colon Cancer Screening Comments  8/11/2011-    Pneumonia Vaccine Status  -- [Needs both]   Other Patient Education/Resources   -- [N/A]   Advanced Directives:  Send Materials   Medication Adherence  Medications  understood   Healthy Lifestyle (Self-Efficacy)  correctly recognizes signs/symptoms of pulmonary distress, self-monitors vital signs appropriately, correctly recognizes signs/symptoms of cardiac distress, recognizes when to stop activity, recognizes when to contact medical assistance, self-reports important symptoms to medical professional        General & Health Literacy Assessment    Questions/Answers      Most Recent Value   Assessment Completed With  Patient   Living Arrangement  Spouse   Type of Residence  Private Residence   Home Care Services  No   Equiptment Used at Home  None   Communication Device  No   Other Issues  -- [Physical]   Bed or Wheelchair Confined  No   Difficulty Keeping Appointments  No   Tenriism or Spiritual Beliefs that Impact Treatment  No   How often do you have someone help you read hospital materials?  Never   How often do you have problems learning about your medical condition because of difficulty understanding written information?  Never   How often do you have a problem understanding what is told to you about your medical condition?  Never   How confident are you filling out medical forms by yourself?  Extremely   Health Literacy  Good        There are no recently modified care plans to display for this patient.      Rajani Mccauley RN  Ambulatory Case Management    8/16/2021, 11:31 EDT

## 2021-08-23 ENCOUNTER — OFFICE VISIT (OUTPATIENT)
Dept: FAMILY MEDICINE CLINIC | Facility: CLINIC | Age: 81
End: 2021-08-23

## 2021-08-23 ENCOUNTER — OFFICE VISIT (OUTPATIENT)
Dept: CARDIOLOGY | Facility: CLINIC | Age: 81
End: 2021-08-23

## 2021-08-23 VITALS
OXYGEN SATURATION: 97 % | HEIGHT: 70 IN | HEART RATE: 67 BPM | BODY MASS INDEX: 33.9 KG/M2 | SYSTOLIC BLOOD PRESSURE: 119 MMHG | WEIGHT: 236.8 LBS | DIASTOLIC BLOOD PRESSURE: 71 MMHG

## 2021-08-23 VITALS
HEIGHT: 70 IN | DIASTOLIC BLOOD PRESSURE: 58 MMHG | WEIGHT: 234 LBS | HEART RATE: 70 BPM | SYSTOLIC BLOOD PRESSURE: 132 MMHG | BODY MASS INDEX: 33.5 KG/M2

## 2021-08-23 DIAGNOSIS — Z95.5 S/P CORONARY ARTERY STENT PLACEMENT: Primary | Chronic | ICD-10-CM

## 2021-08-23 DIAGNOSIS — R06.02 SOB (SHORTNESS OF BREATH) ON EXERTION: ICD-10-CM

## 2021-08-23 DIAGNOSIS — M10.9 GOUT, UNSPECIFIED CAUSE, UNSPECIFIED CHRONICITY, UNSPECIFIED SITE: ICD-10-CM

## 2021-08-23 DIAGNOSIS — I10 ESSENTIAL HYPERTENSION: ICD-10-CM

## 2021-08-23 DIAGNOSIS — I49.3 FREQUENT PVCS: ICD-10-CM

## 2021-08-23 DIAGNOSIS — E66.09 CLASS 1 OBESITY DUE TO EXCESS CALORIES WITH SERIOUS COMORBIDITY AND BODY MASS INDEX (BMI) OF 33.0 TO 33.9 IN ADULT: Chronic | ICD-10-CM

## 2021-08-23 DIAGNOSIS — E78.2 MIXED HYPERLIPIDEMIA: ICD-10-CM

## 2021-08-23 DIAGNOSIS — I48.3 TYPICAL ATRIAL FLUTTER (HCC): ICD-10-CM

## 2021-08-23 DIAGNOSIS — Z95.5 S/P CORONARY ARTERY STENT PLACEMENT: Primary | ICD-10-CM

## 2021-08-23 DIAGNOSIS — J44.9 CHRONIC OBSTRUCTIVE PULMONARY DISEASE, UNSPECIFIED COPD TYPE (HCC): ICD-10-CM

## 2021-08-23 PROBLEM — E66.811 CLASS 1 OBESITY DUE TO EXCESS CALORIES WITH SERIOUS COMORBIDITY AND BODY MASS INDEX (BMI) OF 33.0 TO 33.9 IN ADULT: Chronic | Status: ACTIVE | Noted: 2021-08-23

## 2021-08-23 PROCEDURE — 99214 OFFICE O/P EST MOD 30 MIN: CPT | Performed by: INTERNAL MEDICINE

## 2021-08-23 PROCEDURE — 99496 TRANSJ CARE MGMT HIGH F2F 7D: CPT | Performed by: PHYSICIAN ASSISTANT

## 2021-08-23 PROCEDURE — 1111F DSCHRG MED/CURRENT MED MERGE: CPT | Performed by: PHYSICIAN ASSISTANT

## 2021-08-23 RX ORDER — BUDESONIDE, GLYCOPYRROLATE, AND FORMOTEROL FUMARATE 160; 9; 4.8 UG/1; UG/1; UG/1
2 AEROSOL, METERED RESPIRATORY (INHALATION) 2 TIMES DAILY
Qty: 10.7 G | Refills: 5 | COMMUNITY
Start: 2021-08-23 | End: 2021-10-28 | Stop reason: SDUPTHER

## 2021-08-23 RX ORDER — ALLOPURINOL 100 MG/1
200 TABLET ORAL DAILY
Qty: 30 TABLET | Refills: 3 | Status: SHIPPED | OUTPATIENT
Start: 2021-08-23 | End: 2021-08-23 | Stop reason: SDUPTHER

## 2021-08-23 RX ORDER — ALLOPURINOL 100 MG/1
200 TABLET ORAL DAILY
Qty: 60 TABLET | Refills: 5 | Status: SHIPPED | OUTPATIENT
Start: 2021-08-23 | End: 2021-10-28

## 2021-08-23 NOTE — PROGRESS NOTES
Transitional Care Follow Up Visit  Subjective     Layo Cee is a 81 y.o. male who presents for a transitional care management visit.    Within 48 business hours after discharge our office contacted him via telephone to coordinate his care and needs.      I reviewed and discussed the details of that call along with the discharge summary, hospital problems, inpatient lab results, inpatient diagnostic studies, and consultation reports with Layo   Pt was admitted into [facility] on 8/9/-8/10 for cardiac cath by . pt rcvd 1 stent.   Pt states in some ways he is feeling better. Pt states he doesn't have the chest pressure as much, however he is still SOA.   Pt had a f/u with  this morning. He changed his metoprolol to 1/2 twice a day.    Nov f/u with pulmo    Pt is s/p cardiac stent placement    Adjusted the metoprolol to 1/2 tab BID    Pt is seeing Kaini - pulmo in October  Pt has not felt well since having covid.     Current outpatient and discharge medications have been reconciled for the patient.  Reviewed by: DARIAN Short      Date of TCM Phone Call 8/10/2021   [facility]   Date of Admission 8/9/2021   Date of Discharge 8/10/2021   Discharge Disposition Home or Self Care     Risk for Readmission (LACE) Score: 1 (8/10/2021  6:01 AM)      History of Present Illness   Course During Hospital Stay:  Pt was admitted for CAD - had cardiac stent placement, the CP is better but his SOA continues.       Objective   Physical Exam  Vitals and nursing note reviewed.   Constitutional:       Appearance: Normal appearance.   HENT:      Head: Normocephalic and atraumatic.   Cardiovascular:      Rate and Rhythm: Normal rate and regular rhythm.   Pulmonary:      Effort: Pulmonary effort is normal.      Breath sounds: Normal breath sounds.   Musculoskeletal:      Cervical back: Neck supple.   Neurological:      Mental Status: He is alert.   Psychiatric:         Mood and Affect: Mood normal.          Behavior: Behavior normal.       Assessment/Plan       Diagnoses and all orders for this visit:    1. S/P coronary artery stent placement (Primary)  Comments:  Stable, seeing cardio.    2. Gout, unspecified cause, unspecified chronicity, unspecified site  -     Discontinue: allopurinol (ZYLOPRIM) 100 MG tablet; Take 2 tablets by mouth Daily. (200mg daily)  Dispense: 30 tablet; Refill: 3  -     allopurinol (ZYLOPRIM) 100 MG tablet; Take 2 tablets by mouth Daily. (200mg daily)  Dispense: 60 tablet; Refill: 5    3. SOB (shortness of breath) on exertion    4. Class 1 obesity due to excess calories with serious comorbidity and body mass index (BMI) of 33.0 to 33.9 in adult  Comments:  Disc diet and exercise  Overview:  Disc diet and exercise      5. Chronic obstructive pulmonary disease, unspecified COPD type (CMS/HCC)  -     Budeson-Glycopyrrol-Formoterol (Breztri Aerosphere) 160-9-4.8 MCG/ACT aerosol inhaler; Inhale 2 puffs 2 (Two) Times a Day.  Dispense: 10.7 g; Refill: 5  Pt will see Kaini - Pulmo   Breztri (srikanth)    I have reviewed information obtained and documented by others and I have confirmed the accuracy of this documented note.    F/U 4 mos PRN    DARIAN Short

## 2021-08-24 DIAGNOSIS — K21.9 GASTROESOPHAGEAL REFLUX DISEASE, UNSPECIFIED WHETHER ESOPHAGITIS PRESENT: Primary | ICD-10-CM

## 2021-08-24 RX ORDER — PANTOPRAZOLE SODIUM 40 MG/1
40 TABLET, DELAYED RELEASE ORAL DAILY
Qty: 30 TABLET | Refills: 3 | Status: SHIPPED | OUTPATIENT
Start: 2021-08-24 | End: 2021-12-27

## 2021-08-24 RX ORDER — PANTOPRAZOLE SODIUM 40 MG/1
40 TABLET, DELAYED RELEASE ORAL DAILY
COMMUNITY
End: 2021-08-24 | Stop reason: SDUPTHER

## 2021-08-28 PROBLEM — I10 ESSENTIAL HYPERTENSION: Status: ACTIVE | Noted: 2021-08-28

## 2021-08-28 PROBLEM — E78.2 MIXED HYPERLIPIDEMIA: Status: ACTIVE | Noted: 2021-08-28

## 2021-08-28 PROBLEM — I49.3 FREQUENT PVCS: Status: ACTIVE | Noted: 2021-08-28

## 2021-08-28 PROBLEM — I48.3 TYPICAL ATRIAL FLUTTER: Status: ACTIVE | Noted: 2021-08-28

## 2021-08-28 NOTE — PROGRESS NOTES
CARDIOLOGY FOLLOW-UP PROGRESS NOTE        Chief Complaint  Hospital Follow Up Visit and Coronary Artery Disease, recent angioplasty    Subjective            Layo Cee presents to [facility] CARDIOLOGY  History of Present Illness    This is a 81-year-old male coronary artery disease, previous bypass grafting, atrial flutter status post ablation, COPD, recent COVID-19 infection is here for follow-up visit.  During most recent visit in July of this year, he reported having chest tightness on activities.  Subsequent SPECT stress test was positive for ischemia.  Patient subsequently underwent cardiac catheterization on 8/9/2021 was noted to have tight lesion involving diagonal branch for which he underwent angioplasty and stent placement.  Today patient reports that the chest tightness and pain are much better but the shortness of breath still persisting.  He feels very weak and tired.  Symptoms has not really improved ever since he had Covid infection.  No major dizziness.  Patient and wife are asking whether some of his medications can be cut down.       Past History:    (1) Coronary artery disease, status post coronary artery bypass grafting in the past. Last cardiac catheterization done in July 2016 showed patent left internal mammary graft to the LAD and occluded saphenous venous graft. Native LAD is 100% occluded in the mid portion. Native circumflex artery has no significant disease. Native right coronary artery is also 100% occluded and it is a chronic total occlusion. The right coronary artery is reconstituted with collaterals from the left side.  Patient underwent repeat cardiac catheterization in June, 2021 and underwent angioplasty stent placement to diagonal branch.  (2) Ischemic cardiomyopathy with recovery of cardiac function. Last SPECT stress test in August 2016 showed an LV ejection fraction of 68%. (3) Chronic kidney disease, stage 3. (4) Chronic obstructive pulmonary disease,  not an exacerbation. (5) Hypertension. (6) Hyperlipidemia. 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 (8) Typical atrial flutter status post radiofrequency ablation by Dr. Aguilar on 11/30/2018     Medical History:  Past Medical History:   Diagnosis Date   • Arthritis    • BPH (benign prostatic hyperplasia)    • CAD (coronary artery disease)    • CHF (congestive heart failure) (CMS/[facility])    • COPD (chronic obstructive pulmonary disease) (CMS/[facility])    • COVID-19 02/04/2021   • Diverticulitis 10/11/2019   • Dysphagia    • Essential hypertension 08/27/2020   • Frequent PVCs 8/28/2021   • GERD (gastroesophageal reflux disease)    • Gross hematuria    • HBP (high blood pressure)    • Long-term use of high-risk medication    • Lung disease    • Mixed hyperlipidemia 8/28/2021   • Myocardial infarction (CMS/[facility]) 07/2016   • OCD (obsessive compulsive disorder)    • Renal insufficiency 08/27/2020   • Rhinitis, allergic 06/05/2018   • Sore throat    • Stable angina (CMS/[facility])    • Typical atrial flutter (CMS/[facility]) 11/30/2018    s/p ablation by Dr. Aguilar    • Vertigo        Surgical History: has a past surgical history that includes Bladder surgery; Coronary artery bypass graft; Cardiac catheterization (07/2016); Colonoscopy (2011); Cystoscopy (03/19/2019); Esophagogastroduodenoscopy (2016); Cardiac surgery; Prostate surgery; and Cardiac catheterization (N/A, 8/9/2021).     Family History: family history includes Heart disease in his father; Other in his brother.     Social History: reports that he has quit smoking. His smoking use included cigarettes. He has a 20.00 pack-year smoking history. He has never used smokeless tobacco. He reports previous alcohol use. He reports that he does not use drugs.    Allergies: Levaquin [levofloxacin]    Current Outpatient Medications on File Prior to Visit   Medication Sig   • acetaminophen (TYLENOL) 325 MG tablet Take 650 mg by  "mouth Every 6 (Six) Hours As Needed for Mild Pain .   • azelastine (ASTELIN) 0.1 % nasal spray 2 sprays into the nostril(s) as directed by provider 2 (Two) Times a Day. Use in each nostril as directed   • calcium carbonate (OS-LIANNA) 600 MG tablet Take 600 mg by mouth Daily.   • clopidogrel (PLAVIX) 75 MG tablet Take 1 tablet by mouth Daily.   • Coenzyme Q10 100 MG tablet Take 100 mg by mouth Daily.   • famotidine (PEPCID) 10 MG tablet Take 10 mg by mouth Daily.   • finasteride (PROSCAR) 5 MG tablet Take 5 mg by mouth Daily.   • Fluticasone Furoate 200 MCG/ACT aerosol powder  Inhale 200 mcg As Needed.   • furosemide (LASIX) 20 MG tablet Take 1 tablet by mouth Daily.   • isosorbide mononitrate (IMDUR) 60 MG 24 hr tablet Take 60 mg by mouth Daily.   • metoprolol succinate XL (TOPROL-XL) 50 MG 24 hr tablet Take 150 mg by mouth Daily. Taking 1/2 twice a day   • nitroglycerin (NITROSTAT) 0.4 MG SL tablet Place 0.4 mg under the tongue Every 5 (Five) Minutes As Needed. Take no more than 3 doses in 15 minutes.   • pitavastatin calcium (LIVALO) 1 MG tablet tablet Take 1 mg by mouth Every Night.   • Stiolto Respimat 2.5-2.5 MCG/ACT aerosol solution inhaler Inhale 2 puffs Daily.   • tamsulosin (FLOMAX) 0.4 MG capsule 24 hr capsule Take 1 capsule by mouth Daily. 1/2 hours following the same meal each day   • VITAMIN B COMPLEX-C PO Take 1 tablet by mouth Daily.   • Xarelto 15 MG tablet Take 15 mg by mouth Daily.     No current facility-administered medications on file prior to visit.          Review of Systems   Respiratory: Positive for shortness of breath. Negative for cough and wheezing.    Cardiovascular: Negative for chest pain, palpitations and leg swelling.   Gastrointestinal: Negative for nausea and vomiting.   Neurological: Positive for dizziness (Mild). Negative for syncope.        Objective     /58   Pulse 70   Ht 177.8 cm (70\")   Wt 106 kg (234 lb)   BMI 33.58 kg/m²       Physical Exam    General : Alert, " awake, no acute distress  Neck : Supple, no carotid bruit, no jugular venous distention  CVS : Regular rate and rhythm, no murmur, rubs or gallops  Lungs: Clear to auscultation bilaterally, no crackles or rhonchi  Abdomen: Soft, nontender, bowel sounds heard in all 4 quadrants  Extremities: Warm, well-perfused, no pedal edema    Result Review :     The following data was reviewed by: Darnell Stevenson MD on 08/23/2021:    CMP    CMP 8/5/21 8/9/21 8/10/21   Glucose 115 (A) 124 (A) 107 (A)   BUN 16 12 12   Creatinine 1.30 (A) 1.36 (A) 1.15   eGFR Non African Am 53 (A) 51 (A) 61   Sodium 139 139 138   Potassium 4.4 4.5 4.2   Chloride 104 102 106   Calcium 9.3 9.1 8.4 (A)   (A) Abnormal value       Comments are available for some flowsheets but are not being displayed.           CBC    CBC 8/5/21 8/9/21 8/10/21   WBC 8.21 8.02 8.83   RBC 4.87 5.14 4.64   Hemoglobin 14.1 15.1 13.7   Hematocrit 43.1 46.3 41.2   MCV 88.5 90.1 88.8   MCH 29.0 29.4 29.5   MCHC 32.7 32.6 33.3   RDW 15.3 15.2 15.4   Platelets 184 185 163                      Data reviewed: Cardiology studies     Echo on March 14, 2021 showed    1.  Preserved left ventricular systolic function with estimated LV ejection fraction of 55%.   2.  Mild concentric left ventricular hypertrophy.   3.  Grade 1 diastolic dysfunction of the left ventricle.   4.  Aortic valvular sclerosis with no hemodynamically significant stenosis.    Cardiac cath done on 8/9/2021    1. Successful iFR-guided PCI to the diagonal branch of the LAD using a Xience Salome 2.75/33 mm BAKARI, post-dilated with a NC Trek 3.0/20 mm balloon up to 16 elli.  Pre-procedure diffuse 70% stenosis angiographically with iFR=0.79, post-procedure 0% residual stenosis.  FRED-3 flow was present pre- and post-procedure.  Type C lesion.  No evidence of complications.  2. Widely patent LIMA-LAD.  Chronically occluded saphenous vein grafts.  Residual 70% stenosis in the proximal segment of the left-sided PDA.  Severe  diffuse disease in the native RCA with chronic occlusion distally.  3. Normal left ventricular systolic function with an estimated ejection fraction of 65-70% and mild inferior wall hypokinesis.  4. Normal LVEDP.             Assessment and Plan        Diagnoses and all orders for this visit:    1. S/P coronary artery stent placement (Primary)    Recent angioplasty.  Currently no anginal-like chest pain.  Continue Plavix statin beta-blocker.  He is not on aspirin, which was discontinued after 10 days of angioplasty to avoid triple therapy.    2. Typical atrial flutter (CMS/HCC)    He is currently in sinus rhythm.  Had previous ablation in 2018.  We will decrease the dose of metoprolol to 50 mg half tablet twice daily since he is reporting significant weakness and tiredness along with shortness of breath.    3. Frequent PVCs    No significant PVC burden on auscultation.  Metoprolol dose change as mentioned above    4. Essential hypertension/cardiomyopathy    Well-controlled.  He has history of cardiomyopathy with recovery of LV function on medical therapy.  Continue metoprolol and Entresto.  Continue low-dose Lasix as well.      5. Mixed hyperlipidemia    Continue pitavastatin.  Will check lipid panel before next visit.    Other orders  -     sacubitril-valsartan (ENTRESTO) 24-26 MG tablet; Take 1 tablet by mouth 2 (Two) Times a Day.  Dispense: 60 tablet; Refill: 11      Follow Up     Return in about 4 weeks (around 9/20/2021) for Recheck.    Patient was given instructions and counseling regarding his condition or for health maintenance advice. Please see specific information pulled into the AVS if appropriate.

## 2021-08-31 ENCOUNTER — PATIENT OUTREACH (OUTPATIENT)
Dept: CASE MANAGEMENT | Facility: OTHER | Age: 81
End: 2021-08-31

## 2021-08-31 DIAGNOSIS — J44.9 CHRONIC OBSTRUCTIVE PULMONARY DISEASE, UNSPECIFIED COPD TYPE (HCC): Primary | ICD-10-CM

## 2021-08-31 DIAGNOSIS — I50.9 CHRONIC CONGESTIVE HEART FAILURE, UNSPECIFIED HEART FAILURE TYPE (HCC): ICD-10-CM

## 2021-08-31 NOTE — OUTREACH NOTE
[facility] End of Month Documentation    This Chronic Medical Management Care Plan for Layo Cee, 81 y.o. male, has been monitored and managed;reviewed and a new plan of care implemented for the month of August.  A cumulative time of 20  minutes was spent on this patient record this month, including chart review;phone call with patient.    Regarding the patient's problems: has Unstable angina (CMS/HCC); S/P coronary artery stent placement; Coronary artery disease of native artery of native heart with stable angina pectoris (CMS/HCC); Class 1 obesity due to excess calories with serious comorbidity and body mass index (BMI) of 33.0 to 33.9 in adult; Gout; Typical atrial flutter (CMS/HCC); Essential hypertension; Frequent PVCs; and Mixed hyperlipidemia on their problem list., the following items were addressed: medical records;changes to medical care;medications and any changes can be found within the plan section of the note.  A detailed listing of time spent for chronic care management is tracked within each outreach encounter.  Current medications include:  has a current medication list which includes the following prescription(s): acetaminophen, allopurinol, azelastine, breztri aerosphere, calcium carbonate, clopidogrel, coenzyme q10, famotidine, finasteride, fluticasone furoate, furosemide, isosorbide mononitrate, metoprolol succinate xl, nitroglycerin, pantoprazole, pitavastatin calcium, sacubitril-valsartan, stiolto respimat, tamsulosin, b complex-c, and xarelto. and the patient is reported to be patient is compliant with medication protocol,  Medications are reported to be effective.  Regarding these diagnoses, no new referrals were made.  All notes on chart for PCP to review.    The patient was monitored remotely for activity level;mood & behavior;medications;pain.    The patient's physical needs include:  needs met;physical healthcare;medication education.     The patient's mental support needs include:  needs  met    The patient's cognitive support needs include:  needs met    The patient's psychosocial support needs include:  needs met    The patient's functional needs include: needs met;medication education;physical healthcare    The patient's environmental needs include:  No data recorded    Care Plan overall comments:  N/A    Refer to previous outreach notes for more information on the areas listed above.    Monthly Billing Diagnoses  (J44.9) Chronic obstructive pulmonary disease, unspecified COPD type (CMS/[facility])    (I50.9) Chronic congestive heart failure, unspecified heart failure type (CMS/[facility])    Medications   · Medications have been reconciled    Care Plan progress this month:      Recently Modified Care Plans Updates made since 7/31/2021 12:00 AM    No recently modified care plans.        Instructions   · Patient was provided an electronic copy of care plan  · CCM services were explained and offered and patient has accepted these services.  · Patient has given their written consent to receive CCM services and understands that this includes the authorization of electronic communication of medical information with the other treating providers.  · Patient understands that they may stop CCM services at any time and these changes will be effective at the end of the calendar month and will effectively revocate the agreement of CCM services.  · Patient understands that only one practitioner can furnish and be paid for CCM services during one calendar month.  Patient also understands that there may be co-payment or deductible fees in association with CCM services.  · Patient will continue with at least monthly follow-up calls with the Nurse Navigator.    Rajani Mccauley RN  Ambulatory Case Management    8/31/2021, 10:32 EDT

## 2021-09-03 ENCOUNTER — TELEPHONE (OUTPATIENT)
Dept: UROLOGY | Facility: CLINIC | Age: 81
End: 2021-09-03

## 2021-09-03 DIAGNOSIS — N40.0 BENIGN PROSTATIC HYPERPLASIA, UNSPECIFIED WHETHER LOWER URINARY TRACT SYMPTOMS PRESENT: Primary | ICD-10-CM

## 2021-09-03 RX ORDER — FINASTERIDE 5 MG/1
5 TABLET, FILM COATED ORAL DAILY
Qty: 60 TABLET | Refills: 0 | Status: SHIPPED | OUTPATIENT
Start: 2021-09-03 | End: 2021-10-26 | Stop reason: SDUPTHER

## 2021-09-03 NOTE — TELEPHONE ENCOUNTER
Pt called requesting a refill on his Finesteride.  [facility] in [place] is the pharmacy he uses.

## 2021-09-21 ENCOUNTER — OFFICE VISIT (OUTPATIENT)
Dept: ORTHOPEDIC SURGERY | Facility: CLINIC | Age: 81
End: 2021-09-21

## 2021-09-21 VITALS — HEART RATE: 80 BPM | BODY MASS INDEX: 33.79 KG/M2 | HEIGHT: 70 IN | WEIGHT: 236 LBS | OXYGEN SATURATION: 97 %

## 2021-09-21 DIAGNOSIS — M75.50 BURSITIS AND TENDINITIS OF SHOULDER REGION: ICD-10-CM

## 2021-09-21 DIAGNOSIS — M25.512 LEFT SHOULDER PAIN, UNSPECIFIED CHRONICITY: ICD-10-CM

## 2021-09-21 DIAGNOSIS — M75.90 BURSITIS AND TENDINITIS OF SHOULDER REGION: ICD-10-CM

## 2021-09-21 DIAGNOSIS — M77.10 LATERAL EPICONDYLITIS, UNSPECIFIED LATERALITY: ICD-10-CM

## 2021-09-21 DIAGNOSIS — M25.522 LEFT ELBOW PAIN: Primary | ICD-10-CM

## 2021-09-21 PROCEDURE — 20550 NJX 1 TENDON SHEATH/LIGAMENT: CPT | Performed by: ORTHOPAEDIC SURGERY

## 2021-09-21 PROCEDURE — 20610 DRAIN/INJ JOINT/BURSA W/O US: CPT | Performed by: ORTHOPAEDIC SURGERY

## 2021-09-21 PROCEDURE — 99213 OFFICE O/P EST LOW 20 MIN: CPT | Performed by: ORTHOPAEDIC SURGERY

## 2021-09-21 RX ADMIN — LIDOCAINE HYDROCHLORIDE 1 ML: 10 INJECTION, SOLUTION INFILTRATION; PERINEURAL at 13:49

## 2021-09-21 RX ADMIN — METHYLPREDNISOLONE ACETATE 80 MG: 80 INJECTION, SUSPENSION INTRA-ARTICULAR; INTRALESIONAL; INTRAMUSCULAR; SOFT TISSUE at 13:48

## 2021-09-21 RX ADMIN — LIDOCAINE HYDROCHLORIDE 9 ML: 10 INJECTION, SOLUTION INFILTRATION; PERINEURAL at 13:48

## 2021-09-21 RX ADMIN — METHYLPREDNISOLONE ACETATE 80 MG: 80 INJECTION, SUSPENSION INTRA-ARTICULAR; INTRALESIONAL; INTRAMUSCULAR; SOFT TISSUE at 13:49

## 2021-09-21 NOTE — PROGRESS NOTES
"Chief Complaint  Pain and Initial Evaluation of the Left Shoulder and Pain and Initial Evaluation of the Left Elbow     Subjective      Layo Cee presents to [facility] for evaluation of left shoulder and left elbow pain. Reports pain started about a 10 days ago. Elbow started hurting first and then shoulder started a week later. Retired farmer. Denies any repetitive movement or trauma that would have caused the pain. Denies diabetes. Previous right bicep tendon repair.     Allergies   Allergen Reactions   • Levaquin [Levofloxacin] Unknown - Low Severity        Social History     Socioeconomic History   • Marital status:      Spouse name: Not on file   • Number of children: Not on file   • Years of education: Not on file   • Highest education level: Not on file   Tobacco Use   • Smoking status: Former Smoker     Packs/day: 0.50     Years: 40.00     Pack years: 20.00     Types: Cigarettes   • Smokeless tobacco: Never Used   Vaping Use   • Vaping Use: Never used   Substance and Sexual Activity   • Alcohol use: Not Currently   • Drug use: Never   • Sexual activity: Defer        Review of Systems     Objective   Vital Signs:   Pulse 80   Ht 177.8 cm (70\")   Wt 107 kg (236 lb)   SpO2 97%   BMI 33.86 kg/m²       Physical Exam  Constitutional:       Appearance: Normal appearance. The patient is well-developed and normal weight.   HENT:      Head: Normocephalic.      Right Ear: Hearing and external ear normal.      Left Ear: Hearing and external ear normal.      Nose: Nose normal.   Eyes:      Conjunctiva/sclera: Conjunctivae normal.   Cardiovascular:      Rate and Rhythm: Normal rate.   Pulmonary:      Effort: Pulmonary effort is normal.      Breath sounds: No wheezing or rales.   Abdominal:      Palpations: Abdomen is soft.      Tenderness: There is no abdominal tenderness.   Musculoskeletal:      Cervical back: Normal range of motion.   Skin:     Findings: No rash. "   Neurological:      Mental Status: The patient is alert and oriented to person, place, and time.   Psychiatric:         Mood and Affect: Mood and affect normal.         Judgment: Judgment normal.       Ortho Exam      Left upper extremity: Full active shoulder elevation. 5/5 rotator cuff testing. Full elbow flexion and extension. Nontender over acromioclavicular joint. Sensation intact in the axillary, median, radial, ulnar nerve distributions. Palpable radial pulse.      Large Joint Arthrocentesis  Date/Time: 9/21/2021 1:48 PM  Consent given by: patient  Site marked: site marked  Timeout: Immediately prior to procedure a time out was called to verify the correct patient, procedure, equipment, support staff and site/side marked as required   Supporting Documentation  Indications: pain   Procedure Details  Location: shoulder (LEFT) -   Needle gauge: 21 G.  Medications administered: 9 mL lidocaine 1 %; 80 mg methylPREDNISolone acetate 80 MG/ML  Patient tolerance: patient tolerated the procedure well with no immediate complications    Small Joint Arthrocentesis  Consent given by: patient  Site marked: site marked  Timeout: Immediately prior to procedure a time out was called to verify the correct patient, procedure, equipment, support staff and site/side marked as required   Supporting Documentation  Indications: pain   Procedure Details  Location: LEFT ELBOW.  Preparation: Patient was prepped and draped in the usual sterile fashion  Needle gauge: 23 G.  Medications administered: 1 mL lidocaine 1 %; 80 mg methylPREDNISolone acetate 80 MG/ML  Patient tolerance: patient tolerated the procedure well with no immediate complications          Imaging Results (Most Recent)     Procedure Component Value Units Date/Time    XR Elbow 2 View Left [[id number]] Resulted: 09/21/21 1307     Updated: 09/21/21 1314    XR Scapula Left [[id number]] Resulted: 09/21/21 1307     Updated: 09/21/21 1314           Result Review :       No  results found.      X-Ray Report:  Left shoulder(s) X-Ray  Indication: Evaluation of left shoulder pain  AP and Lateral view(s)  Findings: moderate acromioclavicular joint arthritis  Prior studies available for comparison: no        Assessment and Plan     DX: Rotator cuff bursitis. lateral epicondylitis     Discussed nonoperative management. Discsused injections and therapy exercises. Patient expressed wanting to proceed with left shoulder injection and left elbow injection. Patient received a left shoulder steroid injectoin and left elbow injection today. Patient tolerated both injections well with no complications. Discussed potential MRI if symptoms persist.       Follow Up     3-4 weeks      Patient was given instructions and counseling regarding his condition or for health maintenance advice. Please see specific information pulled into the AVS if appropriate.     Scribed for Douglas Pike MD by Paula Marcelino MA.  09/21/21   13:25 EDT    I have personally performed the services described in this document as scribed by the above individual and it is both accurate and complete. Douglas Pike MD 09/23/21

## 2021-09-23 RX ORDER — LIDOCAINE HYDROCHLORIDE 10 MG/ML
9 INJECTION, SOLUTION INFILTRATION; PERINEURAL
Status: COMPLETED | OUTPATIENT
Start: 2021-09-21 | End: 2021-09-21

## 2021-09-23 RX ORDER — LIDOCAINE HYDROCHLORIDE 10 MG/ML
1 INJECTION, SOLUTION INFILTRATION; PERINEURAL
Status: COMPLETED | OUTPATIENT
Start: 2021-09-21 | End: 2021-09-21

## 2021-09-23 RX ORDER — METHYLPREDNISOLONE ACETATE 80 MG/ML
80 INJECTION, SUSPENSION INTRA-ARTICULAR; INTRALESIONAL; INTRAMUSCULAR; SOFT TISSUE
Status: COMPLETED | OUTPATIENT
Start: 2021-09-21 | End: 2021-09-21

## 2021-09-27 ENCOUNTER — TELEPHONE (OUTPATIENT)
Dept: FAMILY MEDICINE CLINIC | Facility: CLINIC | Age: 81
End: 2021-09-27

## 2021-09-27 NOTE — TELEPHONE ENCOUNTER
Caller: Layo Cee    Relationship to patient: Self    Best call back number: [phone]    Chief complaint: HEAD CONGESTION AND COUGHING WITH PHLEGM     Type of visit: OFFICE    Requested date: ASAP     Additional notes:THE PATIENT STATED HE HAS BEEN HAVING CONGESTION FOR ABOUT FIVE DAYS AND OVER THE COUNTER MEDICATION IS NOT HELPING. HE WOULD LIKE A CALL BACK ASAP TO SCHEDULE WITH PCP

## 2021-09-29 ENCOUNTER — PATIENT OUTREACH (OUTPATIENT)
Dept: CASE MANAGEMENT | Facility: OTHER | Age: 81
End: 2021-09-29

## 2021-09-29 DIAGNOSIS — J44.9 CHRONIC OBSTRUCTIVE PULMONARY DISEASE, UNSPECIFIED COPD TYPE (HCC): Primary | ICD-10-CM

## 2021-09-29 PROCEDURE — 99490 CHRNC CARE MGMT STAFF 1ST 20: CPT | Performed by: PHYSICIAN ASSISTANT

## 2021-09-29 NOTE — OUTREACH NOTE
Ambulatory Case Management Note    CCM Interim Update    Reviewed chart prior to calling for CCM outreach. Patient was scheduled for an appointment with PCP yesterday for cough, congestion for 5 days, however it was cancelled. Called his house and his wife said that he was out getting his allergy injection. She said that he went to an acute care clinic in [place] instead and they gave him a Rocephin injection along with a prescription for Amoxicillin. Riana his wife said that he was feeling some better.   Asked if he had or was planning on getting the COVID vaccine and she said that they did lab work yesterday to see if he still had the antibodies, and wanted to wait and see what  thought about getting it. I noticed a letter in patients chart about rescheduling his appointment pulmonologist, and she said that they were not aware of that, and was a little upset by it. I said I could try to get a sooner appointment with the APRN possible, but she said that she would talk with him about it, because they were looking forward to seeing .   Asked Riana if they had a living will or any advance directives and she said no, mailed info in August.  Patients wife said she could not think of anything he needed at this time.  There are no recently modified care plans to display for this patient.    Rajani Mccauley RN  Ambulatory Case Management  9/29/2021, 09:28 EDT

## 2021-09-29 NOTE — TELEPHONE ENCOUNTER
Updating medication list per 8/23 cardiology OV note    Typical atrial flutter (CMS/HCC)  He is currently in sinus rhythm.  Had previous ablation in 2018. We will decrease the dose of metoprolol to 50 mg half tablet twice daily since he is reporting significant weakness and tiredness along with shortness of breath.

## 2021-09-30 ENCOUNTER — PATIENT OUTREACH (OUTPATIENT)
Dept: CASE MANAGEMENT | Facility: OTHER | Age: 81
End: 2021-09-30

## 2021-09-30 DIAGNOSIS — I10 ESSENTIAL HYPERTENSION: ICD-10-CM

## 2021-09-30 DIAGNOSIS — J44.9 CHRONIC OBSTRUCTIVE PULMONARY DISEASE, UNSPECIFIED COPD TYPE (HCC): Primary | ICD-10-CM

## 2021-09-30 NOTE — OUTREACH NOTE
[facility] End of Month Documentation    This Chronic Medical Management Care Plan for Layo Cee, 81 y.o. male, has been monitored and managed;reviewed and a new plan of care implemented for the month of September.  A cumulative time of 20  minutes was spent on this patient record this month, including chart review;phone call with relative.    Regarding the patient's problems: has Unstable angina (CMS/HCC); S/P coronary artery stent placement; Coronary artery disease of native artery of native heart with stable angina pectoris (CMS/HCC); Class 1 obesity due to excess calories with serious comorbidity and body mass index (BMI) of 33.0 to 33.9 in adult; Gout; Typical atrial flutter (CMS/HCC); Essential hypertension; Frequent PVCs; Mixed hyperlipidemia; Rotator cuff Bursitis of shoulder region; and Lateral epicondylitis on their problem list., the following items were addressed: medical records;changes to medical care;medications and any changes can be found within the plan section of the note.  A detailed listing of time spent for chronic care management is tracked within each outreach encounter.  Current medications include:  has a current medication list which includes the following prescription(s): acetaminophen, allopurinol, azelastine, breztri aerosphere, calcium carbonate, clopidogrel, coenzyme q10, famotidine, finasteride, fluticasone furoate, furosemide, isosorbide mononitrate, metoprolol succinate xl, nitroglycerin, pantoprazole, pitavastatin calcium, sacubitril-valsartan, stiolto respimat, tamsulosin, b complex-c, and xarelto. and the patient is reported to be patient is compliant with medication protocol,  Medications are reported to be effective.  Regarding these diagnoses no new referrals were made.  All notes on chart for PCP to review.    The patient was monitored remotely for mood & behavior;medications;pain.    The patient's physical needs include:  needs met;physical healthcare;medication education.      The patient's mental support needs include:  needs met    The patient's cognitive support needs include:  needs met    The patient's psychosocial support needs include:  needs met    The patient's functional needs include: medication education;physical healthcare    The patient's environmental needs include:  N/A    Care Plan overall comments:  N/A    Refer to previous outreach notes for more information on the areas listed above.    Monthly Billing Diagnoses  (J44.9) Chronic obstructive pulmonary disease, unspecified COPD type (HCC)    (I10) Essential hypertension    Medications   · Medications have been reconciled    Care Plan progress this month:      Recently Modified Care Plans Updates made since 8/30/2021 12:00 AM    No recently modified care plans.        Instructions   · Patient was provided an electronic copy of care plan  · CCM services were explained and offered and patient has accepted these services.  · Patient has given their written consent to receive CCM services and understands that this includes the authorization of electronic communication of medical information with the other treating providers.  · Patient understands that they may stop CCM services at any time and these changes will be effective at the end of the calendar month and will effectively revocate the agreement of CCM services.  · Patient understands that only one practitioner can furnish and be paid for CCM services during one calendar month.  Patient also understands that there may be co-payment or deductible fees in association with CCM services.  · Patient will continue with at least monthly follow-up calls with the Nurse Navigator.    Rajani Mccauley RN  Ambulatory Case Management    9/30/2021, 12:20 EDT

## 2021-10-19 DIAGNOSIS — N20.0 KIDNEY STONES: Primary | ICD-10-CM

## 2021-10-19 RX ORDER — TAMSULOSIN HYDROCHLORIDE 0.4 MG/1
1 CAPSULE ORAL DAILY
Qty: 30 CAPSULE | Refills: 3 | Status: SHIPPED | OUTPATIENT
Start: 2021-10-19 | End: 2021-10-26 | Stop reason: SDUPTHER

## 2021-10-25 ENCOUNTER — OFFICE VISIT (OUTPATIENT)
Dept: ORTHOPEDIC SURGERY | Facility: CLINIC | Age: 81
End: 2021-10-25

## 2021-10-25 ENCOUNTER — HOSPITAL ENCOUNTER (OUTPATIENT)
Dept: CT IMAGING | Facility: HOSPITAL | Age: 81
Discharge: HOME | End: 2021-10-25
Admitting: NURSE PRACTITIONER

## 2021-10-25 VITALS — RESPIRATION RATE: 16 BRPM | WEIGHT: 231 LBS | HEIGHT: 70 IN | BODY MASS INDEX: 33.07 KG/M2

## 2021-10-25 DIAGNOSIS — J44.9 CHRONIC OBSTRUCTIVE PULMONARY DISEASE, UNSPECIFIED COPD TYPE (HCC): ICD-10-CM

## 2021-10-25 DIAGNOSIS — M75.50 BURSITIS AND TENDINITIS OF SHOULDER REGION: ICD-10-CM

## 2021-10-25 DIAGNOSIS — R06.00 DYSPNEA DUE TO COVID-19: ICD-10-CM

## 2021-10-25 DIAGNOSIS — U07.1 DYSPNEA DUE TO COVID-19: ICD-10-CM

## 2021-10-25 DIAGNOSIS — M75.90 BURSITIS AND TENDINITIS OF SHOULDER REGION: ICD-10-CM

## 2021-10-25 DIAGNOSIS — M77.10 LATERAL EPICONDYLITIS, UNSPECIFIED LATERALITY: Primary | ICD-10-CM

## 2021-10-25 PROBLEM — R35.0 BENIGN PROSTATIC HYPERPLASIA WITH URINARY FREQUENCY: Status: ACTIVE | Noted: 2021-10-25

## 2021-10-25 PROBLEM — Z87.448 HISTORY OF HEMATURIA: Status: ACTIVE | Noted: 2021-10-25

## 2021-10-25 PROBLEM — N40.1 BENIGN PROSTATIC HYPERPLASIA WITH URINARY FREQUENCY: Status: ACTIVE | Noted: 2021-10-25

## 2021-10-25 PROCEDURE — 99212 OFFICE O/P EST SF 10 MIN: CPT | Performed by: PHYSICIAN ASSISTANT

## 2021-10-25 PROCEDURE — 71250 CT THORAX DX C-: CPT

## 2021-10-25 NOTE — PROGRESS NOTES
Chief Complaint    Urologic complaint    Subjective          Layo Cee presents to [facility] UROLOGY  History of Present Illness     81-year-old  male Patient presents for an annual follow-up on BPH.    Patient also with a history of    Patient had no gross hematuria/dysuria for the last 6 months.    Patient is voiding okay with tamsulosin 0.4 mg and finasteride 5 mg daily.  Patient can tell a difference if he misses doses.  As long as he takes his he does okay.  Nocturia x1.  Not bothered.     history of a CABG with atrial fibrillation.  Had coronary stent placed in 7/21 and is back on Plavix currently.  He supposed to be on this until 7/22, also on aspirin 81.    Patient with a hydrocele on the left side.  He has has gotten just a little bigger.    1/19 scrotal ultrasound-large left hydrocele, normal testicles, no testicular torsion.    Hematuria last year    10/20 cystoscopy-4 cm prostate with a TUR defect.  Some regrowth on the right lateral side about a centimeter above the verumontanum.  Still pretty open.  Mild trabeculations throughout.  Negative otherwise  9/20 CT urogram-bladder wall thickening along the base and right lateral wall.  Likely related to prostate.  Bilateral simple renal cyst.  Negative otherwise    non-smoker    Previous    History of TURP around 2015, Dr. Shi    Patient was admitted in 2019 with gross hematuria and clot retention and was on CBI for 48 hours.    PSA 11/2019 1.29    Past History:  Medical History: has a past medical history of Arthritis, BPH (benign prostatic hyperplasia), CAD (coronary artery disease), CHF (congestive heart failure) (CMS/[facility]), COPD (chronic obstructive pulmonary disease) (CMS/[facility]), COVID-19 (02/04/2021), Diverticulitis (10/11/2019), Dysphagia, Essential hypertension (08/27/2020), Frequent PVCs (8/28/2021), GERD (gastroesophageal reflux disease), Gross hematuria, HBP (high blood pressure), Long-term use of high-risk  medication, Lung disease, Mixed hyperlipidemia (8/28/2021), Myocardial infarction (CMS/HCC) (07/2016), OCD (obsessive compulsive disorder), Renal insufficiency (08/27/2020), Rhinitis, allergic (06/05/2018), Sore throat, Stable angina (CMS/[facility]), Typical atrial flutter (CMS/[facility]) (11/30/2018), and Vertigo.   Surgical History: has a past surgical history that includes Bladder surgery; Coronary artery bypass graft; Cardiac catheterization (07/2016); Colonoscopy (2011); Cystoscopy (03/19/2019); Esophagogastroduodenoscopy (2016); Cardiac surgery; Prostate surgery; and Cardiac catheterization (N/A, 8/9/2021).   Family History: family history includes Heart disease in his father; Other in his brother.   Social History: reports that he has quit smoking. His smoking use included cigarettes. He has a 20.00 pack-year smoking history. He has never used smokeless tobacco. He reports previous alcohol use. He reports that he does not use drugs.  Allergies: Levaquin [levofloxacin]       Current Outpatient Medications:   •  acetaminophen (TYLENOL) 325 MG tablet, Take 650 mg by mouth Every 6 (Six) Hours As Needed for Mild Pain ., Disp: , Rfl:   •  allopurinol (ZYLOPRIM) 100 MG tablet, Take 2 tablets by mouth Daily. (200mg daily), Disp: 60 tablet, Rfl: 5  •  azelastine (ASTELIN) 0.1 % nasal spray, 2 sprays into the nostril(s) as directed by provider 2 (Two) Times a Day. Use in each nostril as directed, Disp: , Rfl:   •  Budeson-Glycopyrrol-Formoterol (Breztri Aerosphere) 160-9-4.8 MCG/ACT aerosol inhaler, Inhale 2 puffs 2 (Two) Times a Day., Disp: 10.7 g, Rfl: 5  •  calcium carbonate (OS-LIANNA) 600 MG tablet, Take 600 mg by mouth Daily., Disp: , Rfl:   •  clopidogrel (PLAVIX) 75 MG tablet, Take 1 tablet by mouth Daily., Disp: 90 tablet, Rfl: 3  •  Coenzyme Q10 100 MG tablet, Take 100 mg by mouth Daily., Disp: , Rfl:   •  famotidine (PEPCID) 10 MG tablet, Take 10 mg by mouth Daily., Disp: , Rfl:   •  finasteride (PROSCAR) 5 MG tablet, Take  1 tablet by mouth Daily., Disp: 60 tablet, Rfl: 0  •  Fluticasone Furoate 200 MCG/ACT aerosol powder , Inhale 200 mcg As Needed., Disp: , Rfl:   •  furosemide (LASIX) 20 MG tablet, Take 1 tablet by mouth Daily., Disp: 30 tablet, Rfl: 1  •  isosorbide mononitrate (IMDUR) 60 MG 24 hr tablet, Take 60 mg by mouth Daily., Disp: , Rfl:   •  metoprolol succinate XL (TOPROL-XL) 50 MG 24 hr tablet, Take 50 mg by mouth 2 (Two) Times a Day. Taking 1/2 twice a day, Disp: , Rfl:   •  nitroglycerin (NITROSTAT) 0.4 MG SL tablet, Place 0.4 mg under the tongue Every 5 (Five) Minutes As Needed. Take no more than 3 doses in 15 minutes., Disp: , Rfl:   •  pantoprazole (PROTONIX) 40 MG EC tablet, Take 1 tablet by mouth Daily., Disp: 30 tablet, Rfl: 3  •  pitavastatin calcium (LIVALO) 1 MG tablet tablet, Take 1 mg by mouth Every Night., Disp: , Rfl:   •  sacubitril-valsartan (ENTRESTO) 24-26 MG tablet, Take 1 tablet by mouth 2 (Two) Times a Day., Disp: 60 tablet, Rfl: 11  •  Stiolto Respimat 2.5-2.5 MCG/ACT aerosol solution inhaler, Inhale 2 puffs Daily., Disp: , Rfl:   •  tamsulosin (FLOMAX) 0.4 MG capsule 24 hr capsule, Take 1 capsule by mouth Daily. 1/2 hours following the same meal each day, Disp: 30 capsule, Rfl: 3  •  VITAMIN B COMPLEX-C PO, Take 1 tablet by mouth Daily., Disp: , Rfl:   •  Xarelto 15 MG tablet, Take 15 mg by mouth Daily., Disp: , Rfl:      Physical exam       Alert and orient x3  Well appearing, well developed, in no acute distress   Unlabored respirations  Nontender/nondistended    Uncircumcised phallus, normal foreskin retracts easily.  Right side descended testicle without mass or tenderness.  Left side scrotum enlarged with hydrocele unable to palpate the testicle very easily.  No masses.    Grossly oriented to person, place and time, judgment is intact, normal mood and affect         Objective     Vital Signs:   There were no vitals taken for this visit.             Assessment and Plan    Diagnoses and all  orders for this visit:    1. History of hematuria (Primary)    2. Benign prostatic hyperplasia with urinary frequency      BPH    Continue Flomax and finasteride, refilled today      History of gross hematuria    Doing well currently.  Continue finasteride      Left hydrocele    Patient moderately bothered, it is getting bigger.  Because he had a recent coronary stent placed he cannot stop anticoagulation until next year so we would not consider doing anything about this until next year.   he will let me know if he is having more trouble in the future    Risk and benefits of hydrocelectomy were discussed today.  Patient understands he never has to have this treated if he does not get any worse.        Follow-up in 1 year with nurse practitioner for med refill    PVR at f/u

## 2021-10-25 NOTE — PATIENT INSTRUCTIONS
Elbow and shoulder pain greatly improved following steroid injection in the elbow and shoulder.  Patient would like to continue with home exercises and Tylenol as needed at this time.  If his shoulder or elbow symptoms return or worsen he may consider another injection.  Patient may need shoulder MRI if no improvement in right shoulder pain.

## 2021-10-25 NOTE — PROGRESS NOTES
"Chief Complaint  Pain of the Left Shoulder and Pain of the Left Elbow    Subjective          Layo Cee presents to [facility] for follow-up on left elbow and left shoulder pain.  At his last visit he was diagnosed with left shoulder bursitis and left lateral epicondylitis.  He had steroid injections in the shoulder and elbow that he states greatly helped with his pain, he is not currently having any pain.  He states he is moving his shoulder and elbow better.  He occasionally takes Tylenol as needed.  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  He has a history of right bicep tendon repair.    Objective   Allergies   Allergen Reactions   • Levaquin [Levofloxacin] Unknown - Low Severity       Vital Signs:   Resp 16   Ht 177.8 cm (70\")   Wt 105 kg (231 lb)   BMI 33.15 kg/m²       Physical Exam  Constitutional:       Appearance: Normal appearance. Patient is well-developed and normal weight.   HENT:      Head: Normocephalic.      Right Ear: Hearing and external ear normal.      Left Ear: Hearing and external ear normal.      Nose: Nose normal.   Eyes:      Conjunctiva/sclera: Conjunctivae normal.   Cardiovascular:      Rate and Rhythm: Normal rate.   Pulmonary:      Effort: Pulmonary effort is normal.      Breath sounds: No wheezing or rales.   Abdominal:      Palpations: Abdomen is soft.      Tenderness: There is no abdominal tenderness.   Musculoskeletal:      Cervical back: Normal range of motion.   Skin:     Findings: No rash.   Neurological:      Mental Status: Patient is alert and oriented to person, place, and time.   Psychiatric:         Mood and Affect: Mood and affect normal.         Judgment: Judgment normal.     Ortho Exam  Left shoulder: Skin intact without swelling, no tenderness to palpation, flexion 150 abduction 140 and internal rotation to the jeans pocket.  Sensation light touch intact, good range of motion left elbow wrist and digits, radial pulses " 2+.  Left elbow: Mild tenderness over the lateral epicondyle, no swelling, skin intact, full flexion extension supination and pronation, radial and ulnar pulses 2+, good range of motion of the wrist and able to make a tight fist.  No pain with resisted wrist flexion or extension.  Negative Tinel's at the wrist and elbow.  Result Review :            Imaging Results (Most Recent)     None                Assessment and Plan    Problem List Items Addressed This Visit        Musculoskeletal and Injuries    Lateral epicondylitis - Primary       Other    Rotator cuff Bursitis of shoulder region    Current Assessment & Plan     Elbow and shoulder pain greatly improved following steroid injection in the elbow and shoulder.  Patient would like to continue with home exercises and Tylenol as needed at this time.  If his shoulder or elbow symptoms return or worsen he may consider another injection.  Patient may need shoulder MRI if no improvement in left shoulder pain.               Follow Up   Return if symptoms worsen or fail to improve.  Patient Instructions   Elbow and shoulder pain greatly improved following steroid injection in the elbow and shoulder.  Patient would like to continue with home exercises and Tylenol as needed at this time.  If his shoulder or elbow symptoms return or worsen he may consider another injection.  Patient may need shoulder MRI if no improvement in right shoulder pain.    Patient was given instructions and counseling regarding his condition or for health maintenance advice. Please see specific information pulled into the AVS if appropriate.

## 2021-10-25 NOTE — ASSESSMENT & PLAN NOTE
Elbow and shoulder pain greatly improved following steroid injection in the elbow and shoulder.  Patient would like to continue with home exercises and Tylenol as needed at this time.  If his shoulder or elbow symptoms return or worsen he may consider another injection.  Patient may need shoulder MRI if no improvement in left shoulder pain.

## 2021-10-26 ENCOUNTER — OFFICE VISIT (OUTPATIENT)
Dept: UROLOGY | Facility: CLINIC | Age: 81
End: 2021-10-26

## 2021-10-26 VITALS — WEIGHT: 232.4 LBS | HEIGHT: 70 IN | BODY MASS INDEX: 33.27 KG/M2

## 2021-10-26 DIAGNOSIS — N20.0 KIDNEY STONES: ICD-10-CM

## 2021-10-26 DIAGNOSIS — R35.0 BENIGN PROSTATIC HYPERPLASIA WITH URINARY FREQUENCY: ICD-10-CM

## 2021-10-26 DIAGNOSIS — N43.3 HYDROCELE IN ADULT: ICD-10-CM

## 2021-10-26 DIAGNOSIS — N40.0 BENIGN PROSTATIC HYPERPLASIA, UNSPECIFIED WHETHER LOWER URINARY TRACT SYMPTOMS PRESENT: ICD-10-CM

## 2021-10-26 DIAGNOSIS — N40.1 BENIGN PROSTATIC HYPERPLASIA WITH URINARY FREQUENCY: ICD-10-CM

## 2021-10-26 DIAGNOSIS — Z87.448 HISTORY OF HEMATURIA: Primary | ICD-10-CM

## 2021-10-26 LAB
BILIRUB BLD-MCNC: NEGATIVE MG/DL
CLARITY, POC: CLEAR
COLOR UR: YELLOW
EXPIRATION DATE: 1122
GLUCOSE UR STRIP-MCNC: NEGATIVE MG/DL
KETONES UR QL: NEGATIVE
LEUKOCYTE EST, POC: NEGATIVE
Lab: ABNORMAL
NITRITE UR-MCNC: NEGATIVE MG/ML
PH UR: 6 [PH] (ref 5–8)
PROT UR STRIP-MCNC: NEGATIVE MG/DL
RBC # UR STRIP: ABNORMAL /UL
SP GR UR: 1.02 (ref 1–1.03)
UROBILINOGEN UR QL: NORMAL

## 2021-10-26 PROCEDURE — 81003 URINALYSIS AUTO W/O SCOPE: CPT | Performed by: UROLOGY

## 2021-10-26 PROCEDURE — 99214 OFFICE O/P EST MOD 30 MIN: CPT | Performed by: UROLOGY

## 2021-10-26 RX ORDER — TAMSULOSIN HYDROCHLORIDE 0.4 MG/1
1 CAPSULE ORAL DAILY
Qty: 90 CAPSULE | Refills: 4 | Status: SHIPPED | OUTPATIENT
Start: 2021-10-26 | End: 2022-12-15

## 2021-10-26 RX ORDER — FINASTERIDE 5 MG/1
5 TABLET, FILM COATED ORAL DAILY
Qty: 90 TABLET | Refills: 4 | Status: SHIPPED | OUTPATIENT
Start: 2021-10-26 | End: 2022-05-03 | Stop reason: ALTCHOICE

## 2021-10-28 ENCOUNTER — OFFICE VISIT (OUTPATIENT)
Dept: PULMONOLOGY | Facility: CLINIC | Age: 81
End: 2021-10-28

## 2021-10-28 VITALS
HEIGHT: 70 IN | DIASTOLIC BLOOD PRESSURE: 83 MMHG | WEIGHT: 232 LBS | BODY MASS INDEX: 33.21 KG/M2 | SYSTOLIC BLOOD PRESSURE: 138 MMHG | HEART RATE: 80 BPM | RESPIRATION RATE: 14 BRPM | OXYGEN SATURATION: 99 %

## 2021-10-28 DIAGNOSIS — R05.9 COUGH: ICD-10-CM

## 2021-10-28 DIAGNOSIS — Z86.16 HISTORY OF COVID-19: Primary | ICD-10-CM

## 2021-10-28 DIAGNOSIS — R06.00 DYSPNEA, UNSPECIFIED TYPE: ICD-10-CM

## 2021-10-28 DIAGNOSIS — F17.201 TOBACCO ABUSE, IN REMISSION: ICD-10-CM

## 2021-10-28 DIAGNOSIS — J30.2 SEASONAL ALLERGIES: ICD-10-CM

## 2021-10-28 DIAGNOSIS — J44.9 CHRONIC OBSTRUCTIVE PULMONARY DISEASE, UNSPECIFIED COPD TYPE (HCC): ICD-10-CM

## 2021-10-28 DIAGNOSIS — J30.9 ALLERGIC RHINITIS, UNSPECIFIED SEASONALITY, UNSPECIFIED TRIGGER: ICD-10-CM

## 2021-10-28 DIAGNOSIS — I50.22 CHRONIC SYSTOLIC CONGESTIVE HEART FAILURE (HCC): ICD-10-CM

## 2021-10-28 PROCEDURE — 99214 OFFICE O/P EST MOD 30 MIN: CPT | Performed by: NURSE PRACTITIONER

## 2021-10-28 RX ORDER — ASPIRIN 81 MG/81MG
CAPSULE ORAL
COMMUNITY
End: 2021-11-01

## 2021-10-28 RX ORDER — BUDESONIDE, GLYCOPYRROLATE, AND FORMOTEROL FUMARATE 160; 9; 4.8 UG/1; UG/1; UG/1
2 AEROSOL, METERED RESPIRATORY (INHALATION) 2 TIMES DAILY
Qty: 10.7 G | Refills: 11 | COMMUNITY
Start: 2021-10-28 | End: 2021-11-02 | Stop reason: SDUPTHER

## 2021-10-28 RX ORDER — IPRATROPIUM BROMIDE AND ALBUTEROL SULFATE 2.5; .5 MG/3ML; MG/3ML
3 SOLUTION RESPIRATORY (INHALATION) EVERY 4 HOURS PRN
COMMUNITY
End: 2022-05-12 | Stop reason: HOSPADM

## 2021-10-28 RX ORDER — ALBUTEROL SULFATE 90 UG/1
2 AEROSOL, METERED RESPIRATORY (INHALATION) EVERY 4 HOURS PRN
COMMUNITY
End: 2022-06-02 | Stop reason: ALTCHOICE

## 2021-10-28 NOTE — PATIENT INSTRUCTIONS
Things to Know about the COVID-19 Pandemic  Things to Know about the COVID-19 Pandemic  Important Ways to Slow the Spread  · Wear a mask that covers your nose and mouth to help protect yourself and others.  · Stay 6 feet apart from others who don't live with you.  · Get a COVID-19 vaccine when it is available to you.  · Avoid crowds and poorly ventilated indoor spaces.  · Wash your hands often with soap and water. Use hand  if soap and water aren't available.  If you are at risk of getting very sick  · People of any age, even healthy young adults and children, can get COVID-19.  · People who are older or have certain underlying medical conditions are at higher risk of getting very sick from COVID-19.  · Other groups may be at higher risk for getting COVID-19 or having more severe illness.  Getting a COVID-19 Vaccine  · Authorized COVID-19 vaccines can help protect you from COVID-19.  · You should get a COVID-19 vaccine when it is available to you.  · Once you are fully vaccinated, you may be able to start doing some things that you had stopped doing because of the pandemic.  What to do if you're sick  · Stay home except to get medical care. If you have symptoms of COVID-19, contact your healthcare provider and get tested.  · Isolate yourself from others, including those living in your household, to prevent spread to them and the people that they may have contact with, like grandparents.  · Call 911 if you are having emergency warning signs, like trouble breathing, pain or pressure in chest.  How to get a test for current infection  · Visit your state, [place], local, and territorial health department'swebsite to look for the latest local information on testing.  · Talk to your healthcare provider about getting tested. You and your healthcare provider might consider either in-person testing, an at-home collection kit, or an at-home test.  · If you have symptoms of COVID-19, or if you have not been vaccinated  and have been in close contact with someone with COVID-19, it is still important to stay home even if you are not tested.  What symptoms to watch for  The most common symptoms of COVID-19 are  · Fever  · Cough  · Headaches  · Fatigue  · Muscle or body aches  · Loss of taste or smell  · Sore throat  · Nausea  · Diarrhea  Other symptoms are signs of serious illness. If someone has trouble breathing, chest pain or pressure, or difficulty staying awake, get medical care immediately.  I wear a mask because...  CDC staff give their reasons for wearing a mask.  Wear a mask because  Content source: National Center for Immunization and Respiratory Diseases (NCIRD), Division of Viral Diseases  03/17/2021  This information is not intended to replace advice given to you by your health care provider. Make sure you discuss any questions you have with your health care provider.  Document Revised: 04/19/2021 Document Reviewed: 04/19/2021  Elsevier Patient Education © 2021 [employer]

## 2021-10-28 NOTE — PROGRESS NOTES
Primary Care Provider  Kevyn Rhodes PA     Referring Provider  No ref. provider found     Chief Complaint  Results (chest ct) and Follow-up (6month follow up )    Subjective          History of Presenting Illness  Patient is a 81-year-old male, patient of Dr. Kitchen of Dr. Downey with a history of systolic heart failure, chronic obstructive pulmonary disease, COVID-19 pneumonia who presents for follow-up visit today.  Patient states that since last visit his breathing is at baseline. Patient states that he will get short of breath that is worse with exertion, moderate severity, and improved with rest.  Patient states that his primary care provider gave him samples of Breztri and he feels that helps him better than the Stiolto and the Arnuity.  Patient like to have refills of the Breztri today. Since last office visit patient had chest CT scan completed on 10/25/2021.  Chest CT scan showed no significant change in comparison to 4/20/2021.  Patient also had pulmonary function test completed on 7/13/2021.  Pulmonary function test report showed mild airflow obstruction with no bronchodilator response present.  Air trapping present.  Diffusion capacity moderately reduced.  Compared to testing done in January 2017, there has been a significant increase in the diffusing capacity by 17%.  Otherwise, no significant changes. Patient denies fever, chills, night sweats, swollen glands in the head and neck, unintentional weight loss, hemoptysis, purulent sputum production, dysphagia, chest pain, palpitations, chest tightness, abdominal pain, nausea, vomiting, and diarrhea.  Patient also denies any myalgias, changes in sense of taste and/or smell, sore throat, any other coronavirus or flu-like symptoms.  Patient denies any leg swelling, orthopnea, paroxysmal nocturnal dyspnea.  Patient is able to perform activities of daily living.    Review of Systems   Constitutional: Negative for activity change, appetite change, chills,  diaphoresis, fatigue, fever, unexpected weight gain and unexpected weight loss.        Negative for Insomnia   HENT: Negative for congestion (Nasal), mouth sores, nosebleeds, postnasal drip, sore throat, swollen glands and trouble swallowing.         Negative for Thrush  Negative for Hoarseness  Negative for Allergies/Hay Fever  Negative for Recent Head injury  Negative for Ear Fullness  Negative for Nasal or Sinus pain  Negative for Dry lips  Negative for Nasal discharge   Respiratory: Positive for shortness of breath. Negative for apnea, cough, chest tightness and wheezing.         Negative for Hemoptysis  Negative for Pleuritic pain   Cardiovascular: Negative for chest pain, palpitations and leg swelling.        Negative for Claudication  Negative for Cyanosis  Negative for Dyspnea on exertion   Gastrointestinal: Negative for abdominal pain, diarrhea, nausea, vomiting and GERD.   Musculoskeletal: Negative for joint swelling and myalgias.        Negative for Joint pain  Negative for Joint stiffness   Skin: Negative for color change, dry skin, pallor and rash.   Neurological: Negative for syncope, weakness and headache.   Hematological: Negative for adenopathy. Does not bruise/bleed easily.        Family History   Problem Relation Age of Onset   • Heart disease Father    • Other Brother         GASTROINTESTINAL CANCER        Social History     Socioeconomic History   • Marital status:    Tobacco Use   • Smoking status: Former Smoker     Packs/day: 0.50     Years: 40.00     Pack years: 20.00     Types: Cigarettes   • Smokeless tobacco: Never Used   Vaping Use   • Vaping Use: Never used   Substance and Sexual Activity   • Alcohol use: Not Currently   • Drug use: Never   • Sexual activity: Defer        Past Medical History:   Diagnosis Date   • Arthritis    • BPH (benign prostatic hyperplasia)    • CAD (coronary artery disease)    • CHF (congestive heart failure) [facility])    • COPD (chronic obstructive pulmonary  disease) ([facility])    • COVID-19 02/04/2021   • Diverticulitis 10/11/2019   • Dysphagia    • Essential hypertension 08/27/2020   • Frequent PVCs 8/28/2021   • GERD (gastroesophageal reflux disease)    • Gross hematuria    • HBP (high blood pressure)    • Long-term use of high-risk medication    • Lung disease    • Mixed hyperlipidemia 8/28/2021   • Myocardial infarction ([facility]) 07/2016   • OCD (obsessive compulsive disorder)    • Renal insufficiency 08/27/2020   • Rhinitis, allergic 06/05/2018   • Sore throat    • Stable angina ([facility])    • Typical atrial flutter ([facility]) 11/30/2018    s/p ablation by Dr. Aguilar    • Vertigo         Immunization History   Administered Date(s) Administered   • Flu Vaccine Quad PF >18YRS 10/08/2019   • Fluzone High-Dose 65+yrs 09/28/2021   • Influenza Quad Vaccine (Inpatient) 10/08/2019   • Influenza, Unspecified 10/08/2019   • Shingrix 04/29/2021       Allergies   Allergen Reactions   • Levaquin [Levofloxacin] Unknown - Low Severity          Current Outpatient Medications:   •  acetaminophen (TYLENOL) 325 MG tablet, Take 650 mg by mouth Every 6 (Six) Hours As Needed for Mild Pain ., Disp: , Rfl:   •  albuterol sulfate  (90 Base) MCG/ACT inhaler, Inhale 2 puffs Every 4 (Four) Hours As Needed., Disp: , Rfl:   •  Aspirin (Vazalore) 81 MG capsule, Take  by mouth., Disp: , Rfl:   •  calcium carbonate (OS-LIANNA) 600 MG tablet, Take 600 mg by mouth Daily., Disp: , Rfl:   •  clopidogrel (PLAVIX) 75 MG tablet, Take 1 tablet by mouth Daily., Disp: 90 tablet, Rfl: 3  •  Coenzyme Q10 100 MG tablet, Take 100 mg by mouth Daily., Disp: , Rfl:   •  famotidine (PEPCID) 10 MG tablet, Take 10 mg by mouth Daily., Disp: , Rfl:   •  finasteride (PROSCAR) 5 MG tablet, Take 1 tablet by mouth Daily., Disp: 90 tablet, Rfl: 4  •  furosemide (LASIX) 20 MG tablet, Take 1 tablet by mouth Daily., Disp: 30 tablet, Rfl: 1  •  ipratropium-albuterol (DUO-NEB) 0.5-2.5 mg/3 ml nebulizer, Take 3 mL by nebulization Every 4 (Four)  "Hours As Needed., Disp: , Rfl:   •  isosorbide mononitrate (IMDUR) 60 MG 24 hr tablet, Take 60 mg by mouth Daily., Disp: , Rfl:   •  metoprolol succinate XL (TOPROL-XL) 50 MG 24 hr tablet, Take 50 mg by mouth 2 (Two) Times a Day. Taking 1/2 twice a day, Disp: , Rfl:   •  pantoprazole (PROTONIX) 40 MG EC tablet, Take 1 tablet by mouth Daily., Disp: 30 tablet, Rfl: 3  •  pitavastatin calcium (LIVALO) 1 MG tablet tablet, Take 1 mg by mouth Every Night., Disp: , Rfl:   •  sacubitril-valsartan (ENTRESTO) 24-26 MG tablet, Take 1 tablet by mouth 2 (Two) Times a Day., Disp: 60 tablet, Rfl: 11  •  tamsulosin (FLOMAX) 0.4 MG capsule 24 hr capsule, Take 1 capsule by mouth Daily. 1/2 hours following the same meal each day, Disp: 90 capsule, Rfl: 4  •  VITAMIN B COMPLEX-C PO, Take 1 tablet by mouth Daily., Disp: , Rfl:   •  Xarelto 15 MG tablet, Take 15 mg by mouth Daily., Disp: , Rfl:   •  Budeson-Glycopyrrol-Formoterol (Breztri Aerosphere) 160-9-4.8 MCG/ACT aerosol inhaler, Inhale 2 puffs 2 (Two) Times a Day. Rinse mouth out after each use, Disp: 10.7 g, Rfl: 11     Objective     Physical Exam  Vital Signs Reviewed  WDWN, Alert, NAD.    HEENT:  PERRL, EOMI.  OP, nares clear, no sinus tenderness  Neck:  Supple, no JVD, no thyromegaly.  Lymph: no axillary, cervical, supraclavicular lymphadenopathy noted bilaterally  Chest: Mildly decreased breath sounds throughout. No wheezes, rales, or rhonchi appreciated.  Normal work of breathing noted.  Patient is able speak full sentences without difficulty.  CV: RRR, no MGR, pulses 2+, equal.  Abd:  Soft, NT, ND, + BS, no HSM  EXT:  no clubbing, no cyanosis, no edema, no joint tenderness  Neuro:  A&Ox3, CN grossly intact, no focal deficits.  Skin: No rashes or lesions noted.    Vital Signs:   /83   Pulse 80   Resp 14   Ht 177.8 cm (70\")   Wt 105 kg (232 lb)   SpO2 99% Comment: room air  BMI 33.29 kg/m²         Result Review :   I have personally reviewed my last office visit " note.  I also reviewed patient's chest CT report dated from 10/25/2021.  I also reviewed patient's pulmonary function test report dated from 7/13/2021.         Assessment and Plan      Assessment:  1. COVID19 pneumonia back in January, 2021.     2. Dyspnea.      3. Cough.      4.  Wheezing, resolved.      5. Seasonal allergies.      6. Allergic rhinitis.      7. Chronic compensated systolic congestive heart failure and coronary artery disease, patient under the care of Dr. Stevenson.      8. Known granulomatous disease.      9. Tobacco abuse with cigarettes in remission.      10. Chest CT scan showing mild reticulation in the periphery of the lower lobes consistent with UIP pattern.    Plan:  1.  We will stop Arnuity and Stiolto.  Patient to continue Breztri as prescribed and rinse mouth out after each use.  Prescription sent to pharmacy today.     2. The patient to continue albuterol inhaler and DuoNebs as needed. 3.  We will repeat chest CT scan again in 4 months.  Order placed today.     4. Up-to-date with flu,  Prevnar and Pneumovax.  The patient is advised to continue to follow CDC recommendations of social distancing, wearing a mask and washing hands for at least 20 seconds.  Patient is advised receive the Covid vaccines as soon as possible.       5.  Patient is advised to call the office, 911 or go to the ER with any new or worsening symptoms.      6.  Follow up in March with Dr. Brink after chest CT scan completed, sooner if needed.        Follow Up   Return for with Dr. Brink in March after CT scan.  Patient was given instructions and counseling regarding his condition or for health maintenance advice. Please see specific information pulled into the AVS if appropriate.

## 2021-11-01 ENCOUNTER — OFFICE VISIT (OUTPATIENT)
Dept: CARDIOLOGY | Facility: CLINIC | Age: 81
End: 2021-11-01

## 2021-11-01 ENCOUNTER — TELEPHONE (OUTPATIENT)
Dept: PULMONOLOGY | Facility: CLINIC | Age: 81
End: 2021-11-01

## 2021-11-01 VITALS
DIASTOLIC BLOOD PRESSURE: 92 MMHG | HEIGHT: 70 IN | SYSTOLIC BLOOD PRESSURE: 160 MMHG | HEART RATE: 78 BPM | WEIGHT: 226 LBS | BODY MASS INDEX: 32.35 KG/M2

## 2021-11-01 DIAGNOSIS — Z95.5 S/P CORONARY ARTERY STENT PLACEMENT: Primary | ICD-10-CM

## 2021-11-01 DIAGNOSIS — I48.3 TYPICAL ATRIAL FLUTTER (HCC): ICD-10-CM

## 2021-11-01 DIAGNOSIS — E78.2 MIXED HYPERLIPIDEMIA: ICD-10-CM

## 2021-11-01 DIAGNOSIS — I50.22 CHRONIC SYSTOLIC CONGESTIVE HEART FAILURE (HCC): ICD-10-CM

## 2021-11-01 DIAGNOSIS — I10 ESSENTIAL HYPERTENSION: ICD-10-CM

## 2021-11-01 PROCEDURE — 99213 OFFICE O/P EST LOW 20 MIN: CPT | Performed by: INTERNAL MEDICINE

## 2021-11-01 NOTE — PROGRESS NOTES
CARDIOLOGY FOLLOW-UP PROGRESS NOTE        Chief Complaint  Coronary Artery Disease and S/P coronary artery stent placement    Subjective            Layo Cee presents to [facility] CARDIOLOGY  History of Present Illness    This is a 81-year-old male coronary artery disease, previous bypass grafting, atrial fibrillation, status post ablation, COVID-19 infection in January and COPD.  He underwent repeat cardiac catheterization followed by angioplasty and placement of diagonal branch in August of this year.  During last visit to our office on 8/23/2021, the dose of Toprol was decreased 25 mg twice daily because of severe weakness and tiredness.  Today, he reports feeling much better.  Weakness and tiredness improved.  He denies having any chest pain now.  Denies any pedal edema or palpitations.  He is fairly active.  He stopped taking aspirin as prescribed earlier.  Denies any dizziness.  Overall feeling better.      Past History:    (1) Coronary artery disease, status post coronary artery bypass grafting in the past. Last cardiac catheterization done in July 2016 showed patent left internal mammary graft to the LAD and occluded saphenous venous graft. Native LAD is 100% occluded in the mid portion. Native circumflex artery has no significant disease. Native right coronary artery is also 100% occluded and it is a chronic total occlusion. The right coronary artery is reconstituted with collaterals from the left side.  Patient underwent repeat cardiac catheterization in June, 2021 and underwent angioplasty stent placement to diagonal branch.  (2) Ischemic cardiomyopathy with recovery of cardiac function. Last SPECT stress test in August 2016 showed an LV ejection fraction of 68%. (3) Chronic kidney disease, stage 3. (4) Chronic obstructive pulmonary disease, not an exacerbation. (5) Hypertension. (6) Hyperlipidemia. (7) Very frequent PVCs constituting 11.8% of all the beats per 24-hour Holter  monitor study in June of 2018. (8) Typical atrial flutter status post radiofrequency ablation by Dr. Aguilar on 11/30/2018     Medical History:  Past Medical History:   Diagnosis Date   • Arthritis    • BPH (benign prostatic hyperplasia)    • CAD (coronary artery disease)    • CHF (congestive heart failure) ([facility])    • COPD (chronic obstructive pulmonary disease) ([facility])    • COVID-19 02/04/2021   • Diverticulitis 10/11/2019   • Dysphagia    • Essential hypertension 08/27/2020   • Frequent PVCs 8/28/2021   • GERD (gastroesophageal reflux disease)    • Gross hematuria    • HBP (high blood pressure)    • Long-term use of high-risk medication    • Lung disease    • Mixed hyperlipidemia 8/28/2021   • Myocardial infarction ([facility]) 07/2016   • OCD (obsessive compulsive disorder)    • Renal insufficiency 08/27/2020   • Rhinitis, allergic 06/05/2018   • Sore throat    • Stable angina ([facility])    • Typical atrial flutter ([facility]) 11/30/2018    s/p ablation by Dr. Aguilar    • Vertigo        Surgical History: has a past surgical history that includes Bladder surgery; Coronary artery bypass graft; Cardiac catheterization (07/2016); Colonoscopy (2011); Cystoscopy (03/19/2019); Esophagogastroduodenoscopy (2016); Cardiac surgery; Prostate surgery; and Cardiac catheterization (N/A, 8/9/2021).     Family History: family history includes Heart disease in his father; Other in his brother.     Social History: reports that he has quit smoking. His smoking use included cigarettes. He has a 20.00 pack-year smoking history. He has never used smokeless tobacco. He reports previous alcohol use. He reports that he does not use drugs.    Allergies: Levaquin [levofloxacin]    Current Outpatient Medications on File Prior to Visit   Medication Sig   • acetaminophen (TYLENOL) 325 MG tablet Take 650 mg by mouth Every 6 (Six) Hours As Needed for Mild Pain .   • albuterol sulfate  (90 Base) MCG/ACT inhaler Inhale 2 puffs Every 4 (Four) Hours As Needed.   •  "Budeson-Glycopyrrol-Formoterol (Breztri Aerosphere) 160-9-4.8 MCG/ACT aerosol inhaler Inhale 2 puffs 2 (Two) Times a Day. Rinse mouth out after each use   • calcium carbonate (OS-LIANNA) 600 MG tablet Take 600 mg by mouth Daily.   • clopidogrel (PLAVIX) 75 MG tablet Take 1 tablet by mouth Daily.   • Coenzyme Q10 100 MG tablet Take 100 mg by mouth Daily.   • famotidine (PEPCID) 10 MG tablet Take 10 mg by mouth Daily.   • finasteride (PROSCAR) 5 MG tablet Take 1 tablet by mouth Daily.   • furosemide (LASIX) 20 MG tablet Take 1 tablet by mouth Daily.   • ipratropium-albuterol (DUO-NEB) 0.5-2.5 mg/3 ml nebulizer Take 3 mL by nebulization Every 4 (Four) Hours As Needed.   • isosorbide mononitrate (IMDUR) 60 MG 24 hr tablet Take 60 mg by mouth Daily.   • metoprolol succinate XL (TOPROL-XL) 50 MG 24 hr tablet Take 50 mg by mouth. Taking 1/2 twice a day   • pantoprazole (PROTONIX) 40 MG EC tablet Take 1 tablet by mouth Daily.   • pitavastatin calcium (LIVALO) 1 MG tablet tablet Take 1 mg by mouth Every Night.   • sacubitril-valsartan (ENTRESTO) 24-26 MG tablet Take 1 tablet by mouth 2 (Two) Times a Day.   • tamsulosin (FLOMAX) 0.4 MG capsule 24 hr capsule Take 1 capsule by mouth Daily. 1/2 hours following the same meal each day   • VITAMIN B COMPLEX-C PO Take 1 tablet by mouth Daily.   • Xarelto 15 MG tablet Take 15 mg by mouth Daily.   • [DISCONTINUED] Aspirin (Vazalore) 81 MG capsule Take  by mouth.     No current facility-administered medications on file prior to visit.          Review of Systems   Respiratory: Positive for shortness of breath (On moderate exertion.). Negative for cough and wheezing.    Cardiovascular: Negative for chest pain, palpitations and leg swelling.   Gastrointestinal: Negative for nausea and vomiting.   Neurological: Negative for dizziness and syncope.        Objective     /92   Pulse 78   Ht 177.8 cm (70\")   Wt 103 kg (226 lb)   BMI 32.43 kg/m²       Physical Exam    General : Alert, " awake, no acute distress  Neck : Supple, no carotid bruit, no jugular venous distention  CVS : Regular rate and rhythm, no murmur, rubs or gallops  Lungs: Clear to auscultation bilaterally, no crackles or rhonchi  Abdomen: Soft, nontender, bowel sounds heard in all 4 quadrants  Extremities: Warm, well-perfused, no pedal edema    Result Review :     The following data was reviewed by: Darnell Stevenson MD on 11/01/2021:    CMP    CMP 8/5/21 8/9/21 8/10/21   Glucose 115 (A) 124 (A) 107 (A)   BUN 16 12 12   Creatinine 1.30 (A) 1.36 (A) 1.15   eGFR Non African Am 53 (A) 51 (A) 61   Sodium 139 139 138   Potassium 4.4 4.5 4.2   Chloride 104 102 106   Calcium 9.3 9.1 8.4 (A)   (A) Abnormal value       Comments are available for some flowsheets but are not being displayed.           CBC    CBC 8/5/21 8/9/21 8/10/21   WBC 8.21 8.02 8.83   RBC 4.87 5.14 4.64   Hemoglobin 14.1 15.1 13.7   Hematocrit 43.1 46.3 41.2   MCV 88.5 90.1 88.8   MCH 29.0 29.4 29.5   MCHC 32.7 32.6 33.3   RDW 15.3 15.2 15.4   Platelets 184 185 163                    Assessment and Plan        Diagnoses and all orders for this visit:    1. S/P coronary artery stent placement (Primary)  Assessment & Plan:  Remote bypass grafting and recent angioplasty stent placement to diagonal branch.  Currently, he denies any angina-like symptoms.  Continue Plavix, statins and beta-blockers at the current dose.  He is not on aspirin to avoid triple therapy.  Continue isosorbide mononitrate as well.    Orders:  -     Lipid Panel; Future  -     Comprehensive Metabolic Panel; Future    2. Essential hypertension  Assessment & Plan:  Blood pressure on the higher side in the office today.  He has not taken his morning medications.  Counseled regarding medication compliance.  He will keep a home blood pressure log.  Continue the current medicines for now.      3. Mixed hyperlipidemia  Assessment & Plan:  We will check the lipid panel before next visit.  Continue Livalo  without changes.    Orders:  -     Lipid Panel; Future  -     Comprehensive Metabolic Panel; Future    4. Typical atrial flutter (HCC)  Assessment & Plan:  In sinus rhythm since ablation.  Continue Xarelto along with metoprolol.      5. Chronic systolic congestive heart failure (HCC)  Assessment & Plan:  LV function recovered on medical therapy.  Continue Entresto along with metoprolol.  If blood pressure remains elevated, the dose of Entresto may be increased.              Follow Up     Return in about 6 months (around 5/1/2022) for Recheck, Next scheduled follow up.    Patient was given instructions and counseling regarding his condition or for health maintenance advice. Please see specific information pulled into the AVS if appropriate.

## 2021-11-01 NOTE — ASSESSMENT & PLAN NOTE
Remote bypass grafting and recent angioplasty stent placement to diagonal branch.  Currently, he denies any angina-like symptoms.  Continue Plavix, statins and beta-blockers at the current dose.  He is not on aspirin to avoid triple therapy.  Continue isosorbide mononitrate as well.

## 2021-11-01 NOTE — ASSESSMENT & PLAN NOTE
Blood pressure on the higher side in the office today.  He has not taken his morning medications.  Counseled regarding medication compliance.  He will keep a home blood pressure log.  Continue the current medicines for now.

## 2021-11-01 NOTE — ASSESSMENT & PLAN NOTE
LV function recovered on medical therapy.  Continue Entresto along with metoprolol.  If blood pressure remains elevated, the dose of Entresto may be increased.

## 2021-11-02 DIAGNOSIS — J44.9 CHRONIC OBSTRUCTIVE PULMONARY DISEASE, UNSPECIFIED COPD TYPE (HCC): ICD-10-CM

## 2021-11-02 RX ORDER — BUDESONIDE, GLYCOPYRROLATE, AND FORMOTEROL FUMARATE 160; 9; 4.8 UG/1; UG/1; UG/1
2 AEROSOL, METERED RESPIRATORY (INHALATION) 2 TIMES DAILY
Qty: 1 EACH | Refills: 3 | Status: SHIPPED | OUTPATIENT
Start: 2021-11-02 | End: 2022-03-01

## 2021-11-15 DIAGNOSIS — R60.9 WATER RETENTION: ICD-10-CM

## 2021-11-15 RX ORDER — FUROSEMIDE 20 MG/1
20 TABLET ORAL DAILY
Qty: 30 TABLET | Refills: 1 | Status: SHIPPED | OUTPATIENT
Start: 2021-11-15 | End: 2022-02-14 | Stop reason: SDUPTHER

## 2021-11-16 ENCOUNTER — PATIENT OUTREACH (OUTPATIENT)
Dept: CASE MANAGEMENT | Facility: OTHER | Age: 81
End: 2021-11-16

## 2021-11-16 DIAGNOSIS — I10 ESSENTIAL HYPERTENSION: Primary | ICD-10-CM

## 2021-11-16 NOTE — OUTREACH NOTE
Ambulatory Case Management Note  CCM Interim Update    Reviewed chart prior to calling for outreach, called the cell number and his voicemail has not been set up, and then the house number no one answered, not able to leave a message there either.  Seen ortho for tennis elbow on 10/25, no changes, continue home exercise and OTC pain reliever.   Seen urology on 10/26 for follow-up on BPH, no changes.   Seen pulmonology on 10/28 they stopped Arnuity and Stiolto, will just be on Breztri,and then Duo-nebs and inhaler as needed. Will repeat chest x-ray in another 4 months, to monitor the UIP pattern.   Seen cardiology on 11/1 and his blood pressure was elevated at that OV, and they wanted him to keep a log at home, but no changes at that time.   There are no recently modified care plans to display for this patient.    Rajani Mccauley RN  Ambulatory Case Management  11/16/2021, 14:26 EST

## 2021-12-01 ENCOUNTER — PATIENT OUTREACH (OUTPATIENT)
Dept: CASE MANAGEMENT | Facility: OTHER | Age: 81
End: 2021-12-01

## 2021-12-01 DIAGNOSIS — I10 ESSENTIAL HYPERTENSION: Primary | ICD-10-CM

## 2021-12-01 PROCEDURE — 99490 CHRNC CARE MGMT STAFF 1ST 20: CPT | Performed by: PHYSICIAN ASSISTANT

## 2021-12-01 NOTE — OUTREACH NOTE
Ambulatory Case Management Note    CCM Interim Update    Patient returned my phone call from earlier. Patient said that he was doing okay, but he did have an episode Sunday where his chest was hurting, his heart rate was 85. He said that he went ahead and took his bedtime Metoprolol, and that helped. Advised to let me know if this happens more often, if so, we will need to let Dr. Stevenson aware. Patient said that he doesn't check his BP every day, and he thinks that it was high when he was at cardiology office because he didn't take his morning dose of medicine. Advised the patient to take his blood pressure daily for the next 2 weeks and I was going to call him back to get those. When he went to cardiology on 11/1 his BP was 160/92, and Dr. Stevenson wanted him to start logging them.   Patient said that he wasn't having any more trouble breathing than usual. He said that he just got the Breztri filled, PCP had given him a sample, and he said that he felt like it wasn't helping much. He said that he was rinsing his mouth out afterwards. Encouraged to continue to use, and advised he can use the duo-neb or albuterol inhaler if ne needed in between. He said that his SOA is worse when he is up moving.   Asked if he had his COVID vaccine and said that he did about 6 weeks ago at the health department in [place]. Called and got that brand and date, updated chart.   Patient states that his shoulder is feeling better, it took about a week, but the shot finally started to work  Asked if he was voiding okay, and he said that he doesn't have any problems as long as he takes his medication. No other questions or concerns at this time.  There are no recently modified care plans to display for this patient.    Rajani Mccauley RN  Ambulatory Case Management  12/1/2021, 09:36 EST

## 2021-12-21 ENCOUNTER — PATIENT OUTREACH (OUTPATIENT)
Dept: CASE MANAGEMENT | Facility: OTHER | Age: 81
End: 2021-12-21

## 2021-12-21 DIAGNOSIS — J44.9 CHRONIC OBSTRUCTIVE PULMONARY DISEASE, UNSPECIFIED COPD TYPE (HCC): ICD-10-CM

## 2021-12-21 DIAGNOSIS — I10 ESSENTIAL HYPERTENSION: Primary | ICD-10-CM

## 2021-12-21 NOTE — OUTREACH NOTE
[facility] End of Month Documentation    This Chronic Medical Management Care Plan for Layo Cee, 81 y.o. male, has been monitored and managed; reviewed and a new plan of care implemented for the month of December.  A cumulative time of 31  minutes was spent on this patient record this month, including chart review; phone call with relative.    Regarding the patient's problems: has Unstable angina (HCC); S/P coronary artery stent placement; Coronary artery disease of native artery of native heart with stable angina pectoris (HCC); Class 1 obesity due to excess calories with serious comorbidity and body mass index (BMI) of 33.0 to 33.9 in adult; Gout; Typical atrial flutter (HCC); Essential hypertension; Frequent PVCs; Mixed hyperlipidemia; Rotator cuff Bursitis of shoulder region; Lateral epicondylitis; History of hematuria; Benign prostatic hyperplasia with urinary frequency; Hydrocele in adult; History of COVID-19; Allergic rhinitis; Seasonal allergies; Cough; Dyspnea; Chronic systolic congestive heart failure (HCC); and Tobacco abuse, in remission on their problem list., the following items were addressed: medical records; medications; changes to medical care and any changes can be found within the plan section of the note.  A detailed listing of time spent for chronic care management is tracked within each outreach encounter.  Current medications include:  has a current medication list which includes the following prescription(s): acetaminophen, albuterol sulfate hfa, breztri aerosphere, calcium carbonate, clopidogrel, coenzyme q10, famotidine, finasteride, furosemide, ipratropium-albuterol, isosorbide mononitrate, metoprolol succinate xl, pantoprazole, pitavastatin calcium, sacubitril-valsartan, tamsulosin, b complex-c, and xarelto. and the patient is reported to be patient is compliant with medication protocol,  Medications are reported to be effective.  Regarding these diagnoses no new referrals were made.  All  notes on chart for PCP to review.    The patient was monitored remotely for mood & behavior; medications; pain; blood pressure, breathing.    The patient's physical needs include:  needs met; physical healthcare; medication education; help taking medications as prescribed.     The patient's mental support needs include:  needs met    The patient's cognitive support needs include:  needs met; health care    The patient's psychosocial support needs include:  needs met    The patient's functional needs include: medication education; physical healthcare    The patient's environmental needs include:  N/A    Care Plan overall comments:  N/A    Refer to previous outreach notes for more information on the areas listed above.    Monthly Billing Diagnoses  (I10) Essential hypertension    (J44.9) Chronic obstructive pulmonary disease, unspecified COPD type (HCC)    Medications   · Medications have been reconciled    Care Plan progress this month:      Recently Modified Care Plans Updates made since 11/20/2021 12:00 AM    No recently modified care plans.        Instructions   · Patient was provided an electronic copy of care plan  · CCM services were explained and offered and patient has accepted these services.  · Patient has given their written consent to receive CCM services and understands that this includes the authorization of electronic communication of medical information with the other treating providers.  · Patient understands that they may stop CCM services at any time and these changes will be effective at the end of the calendar month and will effectively revocate the agreement of CCM services.  · Patient understands that only one practitioner can furnish and be paid for CCM services during one calendar month.  Patient also understands that there may be co-payment or deductible fees in association with CCM services.  · Patient will continue with at least monthly follow-up calls with the Nurse  Navigator.    Rajani Mccauley RN  Ambulatory Case Management    12/21/2021, 16:40 EST

## 2021-12-27 DIAGNOSIS — K21.9 GASTROESOPHAGEAL REFLUX DISEASE, UNSPECIFIED WHETHER ESOPHAGITIS PRESENT: ICD-10-CM

## 2021-12-27 RX ORDER — PANTOPRAZOLE SODIUM 40 MG/1
40 TABLET, DELAYED RELEASE ORAL DAILY
Qty: 30 TABLET | Refills: 3 | Status: SHIPPED | OUTPATIENT
Start: 2021-12-27 | End: 2022-06-02 | Stop reason: SDUPTHER

## 2022-01-18 ENCOUNTER — OFFICE VISIT (OUTPATIENT)
Dept: FAMILY MEDICINE CLINIC | Facility: CLINIC | Age: 82
End: 2022-01-18

## 2022-01-18 VITALS
SYSTOLIC BLOOD PRESSURE: 130 MMHG | HEIGHT: 70 IN | HEART RATE: 82 BPM | WEIGHT: 231 LBS | DIASTOLIC BLOOD PRESSURE: 77 MMHG | BODY MASS INDEX: 33.07 KG/M2 | OXYGEN SATURATION: 97 %

## 2022-01-18 DIAGNOSIS — Z13.29 SCREENING FOR THYROID DISORDER: ICD-10-CM

## 2022-01-18 DIAGNOSIS — K21.00 GASTROESOPHAGEAL REFLUX DISEASE WITH ESOPHAGITIS WITHOUT HEMORRHAGE: Chronic | ICD-10-CM

## 2022-01-18 DIAGNOSIS — J34.89 INTERNAL NASAL LESION: ICD-10-CM

## 2022-01-18 DIAGNOSIS — E55.9 VITAMIN D DEFICIENCY: ICD-10-CM

## 2022-01-18 DIAGNOSIS — Z11.59 NEED FOR HEPATITIS C SCREENING TEST: ICD-10-CM

## 2022-01-18 DIAGNOSIS — I10 ESSENTIAL HYPERTENSION: Primary | Chronic | ICD-10-CM

## 2022-01-18 DIAGNOSIS — E66.09 CLASS 1 OBESITY DUE TO EXCESS CALORIES WITH SERIOUS COMORBIDITY AND BODY MASS INDEX (BMI) OF 33.0 TO 33.9 IN ADULT: Chronic | ICD-10-CM

## 2022-01-18 DIAGNOSIS — E78.2 MIXED HYPERLIPIDEMIA: ICD-10-CM

## 2022-01-18 DIAGNOSIS — Z12.5 SCREENING FOR PROSTATE CANCER: ICD-10-CM

## 2022-01-18 DIAGNOSIS — R35.0 BENIGN PROSTATIC HYPERPLASIA WITH URINARY FREQUENCY: Chronic | ICD-10-CM

## 2022-01-18 DIAGNOSIS — N40.1 BENIGN PROSTATIC HYPERPLASIA WITH URINARY FREQUENCY: Chronic | ICD-10-CM

## 2022-01-18 PROCEDURE — 99214 OFFICE O/P EST MOD 30 MIN: CPT | Performed by: PHYSICIAN ASSISTANT

## 2022-01-18 RX ORDER — SACUBITRIL AND VALSARTAN 49; 51 MG/1; MG/1
1 TABLET, FILM COATED ORAL 2 TIMES DAILY
Qty: 60 TABLET | Refills: 0 | Status: SHIPPED | OUTPATIENT
Start: 2022-01-18 | End: 2022-01-18

## 2022-01-18 RX ORDER — SACUBITRIL AND VALSARTAN 49; 51 MG/1; MG/1
1 TABLET, FILM COATED ORAL 2 TIMES DAILY
Qty: 60 TABLET | Refills: 2 | Status: SHIPPED | OUTPATIENT
Start: 2022-01-18 | End: 2022-05-31

## 2022-01-18 NOTE — PROGRESS NOTES
Chief Complaint  Hyperlipidemia (4 month follow up), Hypertension, and Congestive Heart Failure    Subjective          Layo Cee presents to [facility]  History of Present Illness  Pt presents today for 4 month follow up.    Pt states since the last 2/3 days he wakes up with sinus congestion. Pt states when he blows his nose he will have a little blood in the mix.    Pt states he was seen @ [facility] on 12/28 for sore throat, congestion and cough.  Pt states he was tested for strep, covid and flu; negative for all. Pt states he is doing better; rarely has a cough.  Pt was covid/pneumonia positive last Jan/202.    Cardio-Valayam last f/u 11/21  pulm -Kaini last f/u 11/21    Pt brought a BP log with him. bp has been elevated lately. Today in office BP was 130/77.    Labs 8/17/20  A1C 6.0    EKG, CXR, Flu, RSS, Covid - normal per pt at [facility]    Past Medical History:   Diagnosis Date   • Arthritis    • BPH (benign prostatic hyperplasia)    • CAD (coronary artery disease)    • CHF (congestive heart failure) ([facility])    • COPD (chronic obstructive pulmonary disease) ([facility])    • COVID-19 02/04/2021   • Diverticulitis 10/11/2019   • Dysphagia    • Essential hypertension 08/27/2020   • Frequent PVCs 8/28/2021   • GERD (gastroesophageal reflux disease)    • Gross hematuria    • HBP (high blood pressure)    • Long-term use of high-risk medication    • Lung disease    • Mixed hyperlipidemia 8/28/2021   • Myocardial infarction ([facility]) 07/2016   • OCD (obsessive compulsive disorder)    • Renal insufficiency 08/27/2020   • Rhinitis, allergic 06/05/2018   • Sore throat    • Stable angina ([facility])    • Typical atrial flutter ([facility]) 11/30/2018    s/p ablation by Dr. Aguilar    • Vertigo       Family History   Problem Relation Age of Onset   • Heart disease Father    • Other Brother         GASTROINTESTINAL CANCER      Past Surgical History:   Procedure Laterality Date   • BLADDER SURGERY     •  CARDIAC CATHETERIZATION  07/2016   • CARDIAC CATHETERIZATION N/A 8/9/2021    Procedure: Left Heart Cath with possible angioplasty;  Surgeon: Jack Ladd MD;  Location: [facility] CATH INVASIVE LOCATION;  Service: Cardiovascular;  Laterality: N/A;  Please schedule the procedure with Dr. Jack Ladd MD on 8/9/2021   • CARDIAC SURGERY      HEART BYPASS   • COLONOSCOPY  2011   • CORONARY ARTERY BYPASS GRAFT     • CYSTOSCOPY  03/19/2019    WITH BILATERAL RETROGRADE PYELOGRAPHY   • ENDOSCOPY  2016   • PROSTATE SURGERY          Current Outpatient Medications:   •  acetaminophen (TYLENOL) 325 MG tablet, Take 650 mg by mouth Every 6 (Six) Hours As Needed for Mild Pain ., Disp: , Rfl:   •  albuterol sulfate  (90 Base) MCG/ACT inhaler, Inhale 2 puffs Every 4 (Four) Hours As Needed., Disp: , Rfl:   •  Budeson-Glycopyrrol-Formoterol (Breztri Aerosphere) 160-9-4.8 MCG/ACT aerosol inhaler, Inhale 2 puffs 2 (Two) Times a Day. Rinse mouth out after each use, Disp: 1 each, Rfl: 3  •  calcium carbonate (OS-LIANNA) 600 MG tablet, Take 600 mg by mouth Daily., Disp: , Rfl:   •  clopidogrel (PLAVIX) 75 MG tablet, Take 1 tablet by mouth Daily., Disp: 90 tablet, Rfl: 3  •  Coenzyme Q10 100 MG tablet, Take 100 mg by mouth Daily., Disp: , Rfl:   •  famotidine (PEPCID) 10 MG tablet, Take 10 mg by mouth Daily., Disp: , Rfl:   •  finasteride (PROSCAR) 5 MG tablet, Take 1 tablet by mouth Daily., Disp: 90 tablet, Rfl: 4  •  furosemide (LASIX) 20 MG tablet, Take 1 tablet by mouth daily., Disp: 30 tablet, Rfl: 1  •  ipratropium-albuterol (DUO-NEB) 0.5-2.5 mg/3 ml nebulizer, Take 3 mL by nebulization Every 4 (Four) Hours As Needed., Disp: , Rfl:   •  isosorbide mononitrate (IMDUR) 60 MG 24 hr tablet, Take 60 mg by mouth Daily., Disp: , Rfl:   •  metoprolol succinate XL (TOPROL-XL) 50 MG 24 hr tablet, Take 50 mg by mouth. Taking 1/2 twice a day, Disp: , Rfl:   •  pantoprazole (PROTONIX) 40 MG EC tablet, Take 1 tablet by mouth daily., Disp: 30  "tablet, Rfl: 3  •  pitavastatin calcium (LIVALO) 1 MG tablet tablet, Take 1 mg by mouth Every Night., Disp: , Rfl:   •  tamsulosin (FLOMAX) 0.4 MG capsule 24 hr capsule, Take 1 capsule by mouth Daily. 1/2 hours following the same meal each day, Disp: 90 capsule, Rfl: 4  •  VITAMIN B COMPLEX-C PO, Take 1 tablet by mouth Daily., Disp: , Rfl:   •  Xarelto 15 MG tablet, Take 15 mg by mouth Daily., Disp: , Rfl:   •  mupirocin (Bactroban Nasal) 2 % nasal ointment, into the nostril(s) as directed by provider 2 (Two) Times a Day., Disp: 1 g, Rfl: 1  •  sacubitril-valsartan (Entresto) 49-51 MG tablet, Take 1 tablet by mouth 2 (Two) Times a Day., Disp: 60 tablet, Rfl: 2    Objective     Vital Signs:     /77 (BP Location: Right arm)   Pulse 82   Ht 177.8 cm (70\")   Wt 105 kg (231 lb)   SpO2 97%   BMI 33.15 kg/m²    Estimated body mass index is 33.15 kg/m² as calculated from the following:    Height as of this encounter: 177.8 cm (70\").    Weight as of this encounter: 105 kg (231 lb).     Wt Readings from Last 3 Encounters:   01/18/22 105 kg (231 lb)   11/01/21 103 kg (226 lb)   10/28/21 105 kg (232 lb)     BP Readings from Last 3 Encounters:   01/18/22 130/77   11/01/21 160/92   10/28/21 138/83       Physical Exam  Vitals and nursing note reviewed.   Constitutional:       Appearance: Normal appearance.   HENT:      Head: Normocephalic and atraumatic.   Cardiovascular:      Rate and Rhythm: Normal rate and regular rhythm.   Pulmonary:      Effort: Pulmonary effort is normal.      Breath sounds: Normal breath sounds.   Musculoskeletal:      Cervical back: Neck supple.   Neurological:      Mental Status: He is alert.   Psychiatric:         Mood and Affect: Mood normal.         Behavior: Behavior normal.        Result Review :     Common labs    Common Labsle 8/5/21 8/5/21 8/9/21 8/9/21 8/10/21 8/10/21    0730 0730 0810 0810 [id number]   Glucose  115 (A)  124 (A)  107 (A)   BUN  16  12  12   Creatinine  1.30 (A)  1.36 " (A)  1.15   eGFR Non African Am  53 (A)  51 (A)  61   Sodium  139  139  138   Potassium  4.4  4.5  4.2   Chloride  104  102  106   Calcium  9.3  9.1  8.4 (A)   WBC 8.21  8.02  8.83    Hemoglobin 14.1  15.1  13.7    Hematocrit 43.1  46.3  41.2    Platelets 184  185  163    (A) Abnormal value       Comments are available for some flowsheets but are not being displayed.                         Assessment and Plan      Diagnoses and all orders for this visit:    1. Essential hypertension (Primary)  Comments:  Poor control increase dose of entresto   Orders:  -     CBC w AUTO Differential; Future  -     Discontinue: sacubitril-valsartan (Entresto) 49-51 MG tablet; Take 1 tablet by mouth 2 (Two) Times a Day.  Dispense: 60 tablet; Refill: 0  -     sacubitril-valsartan (Entresto) 49-51 MG tablet; Take 1 tablet by mouth 2 (Two) Times a Day.  Dispense: 60 tablet; Refill: 2    2. Mixed hyperlipidemia  -     CBC w AUTO Differential; Future    3. Benign prostatic hyperplasia with urinary frequency  Comments:  Stable on flomax 0.4mg daily  Orders:  -     Urinalysis With Culture If Indicated -; Future    4. Screening for thyroid disorder  -     T4, free; Future  -     TSH; Future    5. Need for hepatitis C screening test  -     Hepatitis C antibody; Future    6. Vitamin D deficiency  -     Vitamin D 25 hydroxy; Future    7. Screening for prostate cancer  -     PSA SCREENING; Future    8. Internal nasal lesion  -     mupirocin (Bactroban Nasal) 2 % nasal ointment; into the nostril(s) as directed by provider 2 (Two) Times a Day.  Dispense: 1 g; Refill: 1    9. Class 1 obesity due to excess calories with serious comorbidity and body mass index (BMI) of 33.0 to 33.9 in adult  Comments:  Disc diet and exercise  Overview:  Disc diet and exercise      10. Gastroesophageal reflux disease with esophagitis without hemorrhage  Comments:  Stable on protonix 40mg daily  Overview:  Stable on protonix 40mg daily       Follow Up     Return in  about 4 months (around 5/18/2022).    Patient was given instructions and counseling regarding his condition or for health maintenance advice. Please see specific information pulled into the AVS if appropriate.     I have reviewed information obtained and documented by others and I have confirmed the accuracy of this documented note.    DARIAN Short

## 2022-01-19 PROBLEM — K21.00 GASTROESOPHAGEAL REFLUX DISEASE WITH ESOPHAGITIS WITHOUT HEMORRHAGE: Chronic | Status: ACTIVE | Noted: 2022-01-19

## 2022-01-19 RX ORDER — METOPROLOL SUCCINATE 50 MG/1
25 TABLET, EXTENDED RELEASE ORAL DAILY
Qty: 45 TABLET | Refills: 3 | Status: SHIPPED | OUTPATIENT
Start: 2022-01-19 | End: 2022-02-14

## 2022-01-24 ENCOUNTER — LAB (OUTPATIENT)
Dept: LAB | Facility: HOSPITAL | Age: 82
End: 2022-01-24

## 2022-01-24 DIAGNOSIS — R35.0 BENIGN PROSTATIC HYPERPLASIA WITH URINARY FREQUENCY: ICD-10-CM

## 2022-01-24 DIAGNOSIS — Z11.59 NEED FOR HEPATITIS C SCREENING TEST: ICD-10-CM

## 2022-01-24 DIAGNOSIS — N40.1 BENIGN PROSTATIC HYPERPLASIA WITH URINARY FREQUENCY: ICD-10-CM

## 2022-01-24 DIAGNOSIS — Z13.29 SCREENING FOR THYROID DISORDER: ICD-10-CM

## 2022-01-24 DIAGNOSIS — I10 ESSENTIAL HYPERTENSION: ICD-10-CM

## 2022-01-24 DIAGNOSIS — E55.9 VITAMIN D DEFICIENCY: ICD-10-CM

## 2022-01-24 DIAGNOSIS — Z12.5 SCREENING FOR PROSTATE CANCER: ICD-10-CM

## 2022-01-24 DIAGNOSIS — E78.2 MIXED HYPERLIPIDEMIA: ICD-10-CM

## 2022-01-24 LAB
25(OH)D3 SERPL-MCNC: 39.9 NG/ML (ref 30–100)
AMORPH URATE CRY URNS QL MICRO: ABNORMAL /HPF
BACTERIA UR QL AUTO: ABNORMAL /HPF
BASOPHILS # BLD AUTO: 0.03 10*3/MM3 (ref 0–0.2)
BASOPHILS NFR BLD AUTO: 0.5 % (ref 0–1.5)
BILIRUB UR QL STRIP: NEGATIVE
CLARITY UR: CLEAR
COLOR UR: ABNORMAL
DEPRECATED RDW RBC AUTO: 43.8 FL (ref 37–54)
EOSINOPHIL # BLD AUTO: 0.37 10*3/MM3 (ref 0–0.4)
EOSINOPHIL NFR BLD AUTO: 5.9 % (ref 0.3–6.2)
ERYTHROCYTE [DISTWIDTH] IN BLOOD BY AUTOMATED COUNT: 13.9 % (ref 12.3–15.4)
GLUCOSE UR STRIP-MCNC: NEGATIVE MG/DL
HCT VFR BLD AUTO: 44.2 % (ref 37.5–51)
HCV AB SER DONR QL: NORMAL
HGB BLD-MCNC: 14.7 G/DL (ref 13–17.7)
HGB UR QL STRIP.AUTO: NEGATIVE
HYALINE CASTS UR QL AUTO: ABNORMAL /LPF
IMM GRANULOCYTES # BLD AUTO: 0.04 10*3/MM3 (ref 0–0.05)
IMM GRANULOCYTES NFR BLD AUTO: 0.6 % (ref 0–0.5)
KETONES UR QL STRIP: NEGATIVE
LEUKOCYTE ESTERASE UR QL STRIP.AUTO: ABNORMAL
LYMPHOCYTES # BLD AUTO: 1.85 10*3/MM3 (ref 0.7–3.1)
LYMPHOCYTES NFR BLD AUTO: 29.6 % (ref 19.6–45.3)
MCH RBC QN AUTO: 29 PG (ref 26.6–33)
MCHC RBC AUTO-ENTMCNC: 33.3 G/DL (ref 31.5–35.7)
MCV RBC AUTO: 87.2 FL (ref 79–97)
MONOCYTES # BLD AUTO: 0.68 10*3/MM3 (ref 0.1–0.9)
MONOCYTES NFR BLD AUTO: 10.9 % (ref 5–12)
NEUTROPHILS NFR BLD AUTO: 3.28 10*3/MM3 (ref 1.7–7)
NEUTROPHILS NFR BLD AUTO: 52.5 % (ref 42.7–76)
NITRITE UR QL STRIP: NEGATIVE
NRBC BLD AUTO-RTO: 0 /100 WBC (ref 0–0.2)
PH UR STRIP.AUTO: 6 [PH] (ref 5–8)
PLATELET # BLD AUTO: 261 10*3/MM3 (ref 140–450)
PMV BLD AUTO: 9.7 FL (ref 6–12)
PROT UR QL STRIP: NEGATIVE
PSA SERPL-MCNC: 3.01 NG/ML (ref 0–4)
RBC # BLD AUTO: 5.07 10*6/MM3 (ref 4.14–5.8)
RBC # UR STRIP: ABNORMAL /HPF
REF LAB TEST METHOD: ABNORMAL
SP GR UR STRIP: 1.02 (ref 1–1.03)
SQUAMOUS #/AREA URNS HPF: ABNORMAL /HPF
T4 FREE SERPL-MCNC: 1.44 NG/DL (ref 0.93–1.7)
TSH SERPL DL<=0.05 MIU/L-ACNC: 1.27 UIU/ML (ref 0.27–4.2)
UROBILINOGEN UR QL STRIP: ABNORMAL
WBC # UR STRIP: ABNORMAL /HPF
WBC NRBC COR # BLD: 6.25 10*3/MM3 (ref 3.4–10.8)

## 2022-01-24 PROCEDURE — 84439 ASSAY OF FREE THYROXINE: CPT

## 2022-01-24 PROCEDURE — 82306 VITAMIN D 25 HYDROXY: CPT

## 2022-01-24 PROCEDURE — 86803 HEPATITIS C AB TEST: CPT

## 2022-01-24 PROCEDURE — 81001 URINALYSIS AUTO W/SCOPE: CPT

## 2022-01-24 PROCEDURE — 36415 COLL VENOUS BLD VENIPUNCTURE: CPT

## 2022-01-24 PROCEDURE — 84443 ASSAY THYROID STIM HORMONE: CPT

## 2022-01-24 PROCEDURE — G0103 PSA SCREENING: HCPCS

## 2022-01-24 PROCEDURE — 85025 COMPLETE CBC W/AUTO DIFF WBC: CPT

## 2022-01-25 ENCOUNTER — TELEPHONE (OUTPATIENT)
Dept: FAMILY MEDICINE CLINIC | Facility: CLINIC | Age: 82
End: 2022-01-25

## 2022-02-01 ENCOUNTER — PATIENT OUTREACH (OUTPATIENT)
Dept: CASE MANAGEMENT | Facility: OTHER | Age: 82
End: 2022-02-01

## 2022-02-01 DIAGNOSIS — I10 ESSENTIAL HYPERTENSION: Primary | ICD-10-CM

## 2022-02-01 PROCEDURE — 99490 CHRNC CARE MGMT STAFF 1ST 20: CPT | Performed by: PHYSICIAN ASSISTANT

## 2022-02-01 NOTE — OUTREACH NOTE
Ambulatory Case Management Note  CCM Interim Update    Reviewed chart prior to calling patient for outreach. Patient said that he is doing well.   Patient seen his PCP on 1/18, and he increased his Entresto because his blood pressure has been elevated. A few readings from this past week was 193/80, 165/77, 155/76,157/71. Advised to take his BP every day after resting for 15-20 minutes. Make sure that the cuff is against skin, both feet flat on the floor, no activity for about 20 minutes prior too. Patient is going to check this for 2 weeks and then drop off his log at the office.   He seen Cardiology last on 11/1 and then it was 160/92. He was supposed to keep a log then, he said in his plan that if continues will increase Entresto, which is what PCP did. Educated on eating a low sodium diet. Asked if he was able to exercise any and he states that because of his COPD he can't exercise much at all, he gets really SOA. However, he states [place].   Advised that he should be weighing himself every day because of his CHF with the same clothes on and to watch for weight gain of 3 lbs in 1 day or 5 lbs in a week, he would need to let PCP know. He states that he doesn't have any trouble with sweeling in his legs unless he has been up on them all day.      Care Plan: COPD   Updates made since 2/1/2022 12:00 AM      Problem: HOSPITAL ADMISSIONS       Goal: Reduce hospital admissions for COPD exacerbation       Task: Educate on COPD triggers Completed 2/1/2022   Responsible User: Conchita Rodriguez RN      Task: RN and patient review stop light tool Completed 2/1/2022   Responsible User: Conchita Rodriguez RN      Problem: MEDICATION ADHERENCE       Goal: Patient will adhere to medication regimen       Task: Assist patients in obtaining medications Completed 2/1/2022   Responsible User: Conchita Rodriguez RN      Problem: EXACERBATION OF COPD SYMPTOMS       Goal: Symptom management       Task: Yearly  influenza vaccination Completed 2/1/2022   Responsible User: Conchita Rodriguez, RN      Task: Pneumococcal vaccination Completed 2/1/2022   Responsible User: Conchita Rodriguez, BRENDON Mccauley RN  Ambulatory Case Management    2/1/2022, 13:34 EST

## 2022-02-14 ENCOUNTER — OFFICE VISIT (OUTPATIENT)
Dept: CARDIOLOGY | Facility: CLINIC | Age: 82
End: 2022-02-14

## 2022-02-14 VITALS
SYSTOLIC BLOOD PRESSURE: 156 MMHG | DIASTOLIC BLOOD PRESSURE: 77 MMHG | HEIGHT: 70 IN | BODY MASS INDEX: 33.21 KG/M2 | HEART RATE: 58 BPM | WEIGHT: 232 LBS

## 2022-02-14 DIAGNOSIS — I10 ESSENTIAL HYPERTENSION: Primary | ICD-10-CM

## 2022-02-14 DIAGNOSIS — I25.5 ISCHEMIC CARDIOMYOPATHY: ICD-10-CM

## 2022-02-14 DIAGNOSIS — I25.118 CORONARY ARTERY DISEASE OF NATIVE ARTERY OF NATIVE HEART WITH STABLE ANGINA PECTORIS: ICD-10-CM

## 2022-02-14 DIAGNOSIS — I49.3 FREQUENT PVCS: ICD-10-CM

## 2022-02-14 PROCEDURE — 99214 OFFICE O/P EST MOD 30 MIN: CPT | Performed by: INTERNAL MEDICINE

## 2022-02-14 PROCEDURE — 93000 ELECTROCARDIOGRAM COMPLETE: CPT | Performed by: INTERNAL MEDICINE

## 2022-02-14 RX ORDER — FUROSEMIDE 20 MG/1
20 TABLET ORAL DAILY
Qty: 90 TABLET | Refills: 2 | Status: SHIPPED | OUTPATIENT
Start: 2022-02-14 | End: 2022-06-21 | Stop reason: DRUGHIGH

## 2022-02-14 RX ORDER — CARVEDILOL 12.5 MG/1
12.5 TABLET ORAL 2 TIMES DAILY
Qty: 60 TABLET | Refills: 2 | Status: SHIPPED | OUTPATIENT
Start: 2022-02-14 | End: 2022-02-28

## 2022-02-14 NOTE — ASSESSMENT & PLAN NOTE
Longstanding problem, previously well controlled.  Today's EKG again showed significant PVC burden with a PVC triplet.  Changing metoprolol to Coreg as mentioned above.  Labs done in December showed normal potassium and magnesium levels.

## 2022-02-14 NOTE — ASSESSMENT & PLAN NOTE
LV function recovered on treatment.  Clinically not volume overloaded.  Continue Lasix.  Continue Entresto and beta-blockers.

## 2022-02-14 NOTE — ASSESSMENT & PLAN NOTE
She reports some chest pain, especially when her blood pressure is high.  Continue medical management for now with increasing dose of beta-blockers.  Continue Plavix and statins and long-acting nitrates as it is.

## 2022-02-14 NOTE — PROGRESS NOTES
CARDIOLOGY FOLLOW-UP PROGRESS NOTE        Chief Complaint  Hypertension (Follow-up) and Coronary Artery Disease    Subjective            Layo Cee presents to [facility] CARDIOLOGY  History of Present Illness    Mr. Mcgovern is here for an unscheduled visit due to uncontrolled blood pressure.  He was last seen in the office in November of last year.  Since then home blood pressure readings showed significant elevated blood pressure with systolic at times going to 190s and low 200s.  The dose of Entresto was appropriately increased by primary care provider.  However he continued to have significantly high blood pressure.  He at times feels chest heaviness and tightness intermittently and especially when blood pressure is high.  He feels some palpitations.  Denies any dizziness at this time.  He is taking all the medicines as prescribed.       Past History:    (1) Coronary artery disease, status post coronary artery bypass grafting in the past. Cardiac catheterization done in July 2016 showed patent left internal mammary graft to the LAD and occluded saphenous venous graft. Native LAD is 100% occluded in the mid portion. Native circumflex artery has no significant disease. Native right coronary artery is also 100% occluded and it is a chronic total occlusion. The right coronary artery is reconstituted with collaterals from the left side.  Patient underwent repeat cardiac catheterization in June, 2021 and underwent angioplasty stent placement to diagonal branch.  (2) Ischemic cardiomyopathy with recovery of cardiac function. Last SPECT stress test in August 2016 showed an LV ejection fraction of 68%. (3) Chronic kidney disease, stage 3. (4) Chronic obstructive pulmonary disease, not an exacerbation. (5) Hypertension. (6) Hyperlipidemia. 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 (8) Typical atrial flutter status post  radiofrequency ablation by Dr. Aguilar on 11/30/2018    Medical History:  Past Medical History:   Diagnosis Date   • Arthritis    • BPH (benign prostatic hyperplasia)    • CAD (coronary artery disease)    • CHF (congestive heart failure) ([facility])    • COPD (chronic obstructive pulmonary disease) ([facility])    • COVID-19 02/04/2021   • Diverticulitis 10/11/2019   • Dysphagia    • Essential hypertension 08/27/2020   • Frequent PVCs 8/28/2021   • GERD (gastroesophageal reflux disease)    • Gross hematuria    • HBP (high blood pressure)    • Long-term use of high-risk medication    • Lung disease    • Mixed hyperlipidemia 8/28/2021   • Myocardial infarction ([facility]) 07/2016   • OCD (obsessive compulsive disorder)    • Renal insufficiency 08/27/2020   • Rhinitis, allergic 06/05/2018   • Sore throat    • Stable angina ([facility])    • Typical atrial flutter ([facility]) 11/30/2018    s/p ablation by Dr. Aguilar    • Vertigo        Surgical History: has a past surgical history that includes Bladder surgery; Coronary artery bypass graft; Cardiac catheterization (07/2016); Colonoscopy (2011); Cystoscopy (03/19/2019); Esophagogastroduodenoscopy (2016); Cardiac surgery; Prostate surgery; and Cardiac catheterization (N/A, 8/9/2021).     Family History: family history includes Heart disease in his father; Other in his brother.     Social History: reports that he has quit smoking. His smoking use included cigarettes. He has a 20.00 pack-year smoking history. He has never used smokeless tobacco. He reports previous alcohol use. He reports that he does not use drugs.    Allergies: Levaquin [levofloxacin]    Current Outpatient Medications on File Prior to Visit   Medication Sig   • acetaminophen (TYLENOL) 325 MG tablet Take 650 mg by mouth Every 6 (Six) Hours As Needed for Mild Pain .   • albuterol sulfate  (90 Base) MCG/ACT inhaler Inhale 2 puffs Every 4 (Four) Hours As Needed.   • Budeson-Glycopyrrol-Formoterol (Breztri Aerosphere) 160-9-4.8 MCG/ACT  "aerosol inhaler Inhale 2 puffs 2 (Two) Times a Day. Rinse mouth out after each use   • calcium carbonate (OS-LIANNA) 600 MG tablet Take 600 mg by mouth Daily.   • clopidogrel (PLAVIX) 75 MG tablet Take 1 tablet by mouth Daily.   • Coenzyme Q10 100 MG tablet Take 100 mg by mouth Daily.   • famotidine (PEPCID) 10 MG tablet Take 10 mg by mouth Daily.   • finasteride (PROSCAR) 5 MG tablet Take 1 tablet by mouth Daily.   • ipratropium-albuterol (DUO-NEB) 0.5-2.5 mg/3 ml nebulizer Take 3 mL by nebulization Every 4 (Four) Hours As Needed.   • isosorbide mononitrate (IMDUR) 60 MG 24 hr tablet Take 60 mg by mouth Daily.   • mupirocin (Bactroban Nasal) 2 % nasal ointment into the nostril(s) as directed by provider 2 (Two) Times a Day.   • pantoprazole (PROTONIX) 40 MG EC tablet Take 1 tablet by mouth daily.   • pitavastatin calcium (LIVALO) 1 MG tablet tablet Take 1 mg by mouth Every Night.   • sacubitril-valsartan (Entresto) 49-51 MG tablet Take 1 tablet by mouth 2 (Two) Times a Day.   • tamsulosin (FLOMAX) 0.4 MG capsule 24 hr capsule Take 1 capsule by mouth Daily. 1/2 hours following the same meal each day   • VITAMIN B COMPLEX-C PO Take 1 tablet by mouth Daily.   • Xarelto 15 MG tablet Take 15 mg by mouth Daily.   •  furosemide (LASIX) 20 MG tablet Take 1 tablet by mouth daily.   • metoprolol succinate XL (TOPROL-XL) 50 MG 24 hr tablet Take 0.5 tablets by mouth Daily.       Review of Systems   Respiratory: Positive for chest tightness. Negative for cough, shortness of breath and wheezing.    Cardiovascular: Negative for chest pain, palpitations and leg swelling.   Gastrointestinal: Negative for nausea and vomiting.   Neurological: Negative for dizziness and syncope.        Objective     /77 (BP Location: Left arm, Patient Position: Sitting)   Pulse 58   Ht 177.8 cm (70\")   Wt 105 kg (232 lb)   BMI 33.29 kg/m²       Physical Exam    General : Alert, awake, no acute distress  Neck : Supple, no carotid bruit, no " jugular venous distention  CVS : Regular rate and rhythm, no murmur, rubs or gallops  Lungs: Clear to auscultation bilaterally, no crackles or rhonchi  Abdomen: Soft, nontender, bowel sounds heard in all 4 quadrants  Extremities: Warm, well-perfused, no pedal edema    Result Review :     The following data was reviewed by: Darnell Stevenson MD on 02/14/2022:    CMP    CMP 8/5/21 8/9/21 8/10/21   Glucose 115 (A) 124 (A) 107 (A)   BUN 16 12 12   Creatinine 1.30 (A) 1.36 (A) 1.15   eGFR Non African Am 53 (A) 51 (A) 61   Sodium 139 139 138   Potassium 4.4 4.5 4.2   Chloride 104 102 106   Calcium 9.3 9.1 8.4 (A)   (A) Abnormal value       Comments are available for some flowsheets but are not being displayed.           CBC    CBC 8/9/21 8/10/21 1/24/22   WBC 8.02 8.83 6.25   RBC 5.14 4.64 5.07   Hemoglobin 15.1 13.7 14.7   Hematocrit 46.3 41.2 44.2   MCV 90.1 88.8 87.2   MCH 29.4 29.5 29.0   MCHC 32.6 33.3 33.3   RDW 15.2 15.4 13.9   Platelets 185 163 261           TSH    TSH 1/24/22   TSH 1.270                  Data reviewed: Cardiology studies            Results for orders placed during the hospital encounter of 07/08/21    Stress Test With Myocardial Perfusion One Day    Interpretation Summary  · Myocardial perfusion imaging indicates a small-sized infarct located in the inferior wall with mild mansoor-infarct ischemia.  · Left ventricular ejection fraction is borderline normal. (Calculated EF = 49%).  · Diaphragmatic attenuation artifact is present.  · Findings consistent with a normal ECG stress test.  · Occasional isolated PVCs are noted at rest and also during stress.  · Impressions are consistent with an intermediate risk study.        ECG 12 Lead    Date/Time: 2/14/2022 1:35 PM  Performed by: Darnell Stevenosn MD  Authorized by: Darnell Stevenson MD   Comparison: compared with previous ECG from 8/9/2021  Similar to previous ECG  Comparison to previous ECG: When compared to previous EKG, there is a significant  increase in rate.  Frequent PVCs are noted in today's EKG which were absent in previous EKG.  Rhythm: sinus tachycardia  Ectopy: unifocal PVCs  Ectopy comments: PVC triplet noted  Rate: tachycardic  QRS axis: normal  Other findings comments: LVH with repolarization abnormalities noted    Clinical impression: abnormal EKG                Assessment and Plan        Diagnoses and all orders for this visit:    1. Essential hypertension (Primary)  Assessment & Plan:  Blood pressure significantly elevated in per home blood pressure log.  It is high in the office today as well.  The dose of Entresto was increased recently which I will continue.  We will also continue Imdur at the current dose.  We will discontinue metoprolol and instead, start Coreg 12 point milligrams twice daily.  He will continue to keep home blood pressure log.  We will give a close follow-up in the next 2 weeks.    Orders:  -     carvedilol (COREG) 12.5 MG tablet; Take 1 tablet by mouth 2 (Two) Times a Day.  Dispense: 60 tablet; Refill: 2    2. Frequent PVCs  Assessment & Plan:  Longstanding problem, previously well controlled.  Today's EKG again showed significant PVC burden with a PVC triplet.  Changing metoprolol to Coreg as mentioned above.  Labs done in December showed normal potassium and magnesium levels.    Orders:  -     carvedilol (COREG) 12.5 MG tablet; Take 1 tablet by mouth 2 (Two) Times a Day.  Dispense: 60 tablet; Refill: 2  -     ECG 12 Lead    3. Coronary artery disease of native artery of native heart with stable angina pectoris (HCC)  Assessment & Plan:  She reports some chest pain, especially when her blood pressure is high.  Continue medical management for now with increasing dose of beta-blockers.  Continue Plavix and statins and long-acting nitrates as it is.      4. Ischemic cardiomyopathy  Assessment & Plan:  LV function recovered on treatment.  Clinically not volume overloaded.  Continue Lasix.  Continue Entresto and  beta-blockers.    Orders:  -     furosemide (LASIX) 20 MG tablet; Take 1 tablet by mouth Daily.  Dispense: 90 tablet; Refill: 2            Follow Up     Return in about 2 weeks (around 2/28/2022) for Please schedule the follow up on 2/28/2022 . Okay to double book in the afternoon .    Patient was given instructions and counseling regarding his condition or for health maintenance advice. Please see specific information pulled into the AVS if appropriate.

## 2022-02-14 NOTE — ASSESSMENT & PLAN NOTE
Blood pressure significantly elevated in per home blood pressure log.  It is high in the office today as well.  The dose of Entresto was increased recently which I will continue.  We will also continue Imdur at the current dose.  We will discontinue metoprolol and instead, start Coreg 12 point milligrams twice daily.  He will continue to keep home blood pressure log.  We will give a close follow-up in the next 2 weeks.

## 2022-02-18 ENCOUNTER — TELEPHONE (OUTPATIENT)
Dept: CARDIOLOGY | Facility: CLINIC | Age: 82
End: 2022-02-18

## 2022-02-18 RX ORDER — AMLODIPINE BESYLATE 5 MG/1
5 TABLET ORAL DAILY
Qty: 90 TABLET | Refills: 3 | OUTPATIENT
Start: 2022-02-18 | End: 2022-05-02

## 2022-02-18 NOTE — TELEPHONE ENCOUNTER
Carvedilol rarely causes such side effects.  However, we will stop the medication as a precaution.    He can go back to metoprolol succinate at his previous dose instead.  Continue Entresto at the current dose.    We will start amlodipine 5 mg p.o. daily for high blood pressure.  Please send the prescription if the patient is agreeable    Continue home blood pressure log.      Electronically signed by Darnell Stevesnon MD, 02/18/22, 1:31 PM EST.

## 2022-02-18 NOTE — TELEPHONE ENCOUNTER
Received VM from patient's wife.    Returned call. Patient stated that his BP is continuing to be elevated. Stated that his BP this morning 2 hours after taking meds was 178/120. Continuing to complain of being SOB with exertion. Denied swelling or weight gain.    Advised I would discuss with Dr. Stevenson and call back with his recommendations.

## 2022-02-18 NOTE — TELEPHONE ENCOUNTER
S/W patient and made aware of Dr. Stevenson's recommendations. Stated that since starting carvedilol he has had issues with what feels like his throat is closing. Advised patient to stop carvedilol and I will discuss with Dr. Stevenson.

## 2022-02-18 NOTE — TELEPHONE ENCOUNTER
S/W patient's wife and made aware of Dr. Stevenson's recommendations. Stated that he has plenty of metoprolol. Amlodipine sent to pharmacy.

## 2022-02-18 NOTE — TELEPHONE ENCOUNTER
Recommend to increase the dose of carvedilol to 25 mg twice daily from today. (Coreg 12.5 mg twice daily was started during last office visit.)    Continue all other cardiac medications as it is    Continue keeping blood pressure log at home.

## 2022-02-24 ENCOUNTER — PATIENT OUTREACH (OUTPATIENT)
Dept: CASE MANAGEMENT | Facility: OTHER | Age: 82
End: 2022-02-24

## 2022-02-24 DIAGNOSIS — I10 ESSENTIAL HYPERTENSION: Primary | ICD-10-CM

## 2022-02-24 DIAGNOSIS — I50.9 CHRONIC CONGESTIVE HEART FAILURE, UNSPECIFIED HEART FAILURE TYPE: ICD-10-CM

## 2022-02-24 NOTE — OUTREACH NOTE
[facility] End of Month Documentation    This Chronic Medical Management Care Plan for Layo Cee, 81 y.o. male, has been monitored and managed; reviewed; revised and a new plan of care implemented for the month of February.  A cumulative time of 20  minutes was spent on this patient record this month, including chart review; phone call with relative.    Regarding the patient's problems: has Unstable angina (HCC); S/P coronary artery stent placement; Coronary artery disease of native artery of native heart with stable angina pectoris (HCC); Class 1 obesity due to excess calories with serious comorbidity and body mass index (BMI) of 33.0 to 33.9 in adult; Gout; Typical atrial flutter (HCC); Essential hypertension; Frequent PVCs; Mixed hyperlipidemia; Rotator cuff Bursitis of shoulder region; Lateral epicondylitis; History of hematuria; Benign prostatic hyperplasia with urinary frequency; Hydrocele in adult; History of COVID-19; Allergic rhinitis; Seasonal allergies; Cough; Dyspnea; Chronic systolic congestive heart failure (HCC); Tobacco abuse, in remission; Vitamin D deficiency; Gastroesophageal reflux disease with esophagitis without hemorrhage; and Ischemic cardiomyopathy on their problem list., the following items were addressed: medical records; medications; changes to medical care and any changes can be found within the plan section of the note.  A detailed listing of time spent for chronic care management is tracked within each outreach encounter.  Current medications include:  has a current medication list which includes the following prescription(s): acetaminophen, albuterol sulfate hfa, amlodipine, breztri aerosphere, calcium carbonate, carvedilol, clopidogrel, coenzyme q10, famotidine, finasteride, furosemide, ipratropium-albuterol, isosorbide mononitrate, mupirocin, pantoprazole, pitavastatin calcium, entresto, tamsulosin, b complex-c, and xarelto. and the patient is reported to be patient is compliant with  medication protocol,  Medications are reported to be non-effective in controlling symptoms and changes have been made to the medication protocol, His BP has been elevated.  Regarding these diagnoses,no new referrals were made.  All notes on chart for PCP to review.    The patient was monitored remotely for mood & behavior; medications; blood pressure; activity level, Breathing.    The patient's physical needs include:  needs met; physical healthcare; medication education; help taking medications as prescribed.     The patient's mental support needs include:  needs met    The patient's cognitive support needs include:  needs met; health care    The patient's psychosocial support needs include:  needs met    The patient's functional needs include: medication education; physical healthcare    The patient's environmental needs include:  N/A    Care Plan overall comments:  N/A    Refer to previous outreach notes for more information on the areas listed above.    Monthly Billing Diagnoses  (I10) Essential hypertension    (I50.9) Chronic congestive heart failure, unspecified heart failure type (HCC)    Medications   · Medications have been reconciled    Care Plan progress this month:      Recently Modified Care Plans Updates made since 1/24/2022 12:00 AM     COPD         Problem Priority Last Modified     HOSPITAL ADMISSIONS --  7/19/2021 10:24 AM by Conchita Rodriguez RN              Goal Recent Progress Last Modified     Reduce hospital admissions for COPD exacerbation --  7/19/2021 10:24 AM by Conchita Rodriguez RN              Task Due Date Last Modified     Educate on COPD triggers --  2/1/2022  1:32 PM by Rajani Mccauley, RN        RN and patient review stop light tool --  2/1/2022  1:32 PM by Rajani Mccauley, [place]              Problem Priority Last Modified     MEDICATION ADHERENCE --  7/19/2021 10:24 AM by Conchita Rodriguez RN              Goal Recent Progress Last Modified     Patient will adhere to medication regimen  --  7/19/2021 10:24 AM by Conchita Rodriguez RN              Task Due Date Last Modified     Assist patients in obtaining medications --  2/1/2022  1:34 PM by Rajani Mccauley RN              Problem Priority Last Modified     EXACERBATION OF COPD SYMPTOMS --  7/19/2021 10:24 AM by Conchita Rodriguez RN              Goal Recent Progress Last Modified     Symptom management --  7/19/2021 10:24 AM by Conchita Rodriguez RN              Task Due Date Last Modified     Yearly influenza vaccination --  2/1/2022  1:34 PM by Rajani Mccauley RN        Pneumococcal vaccination --  2/1/2022  1:34 PM by Rajani Mccauley RN                    Instructions   · Patient was provided an electronic copy of care plan  · CCM services were explained and offered and patient has accepted these services.  · Patient has given their written consent to receive CCM services and understands that this includes the authorization of electronic communication of medical information with the other treating providers.  · Patient understands that they may stop CCM services at any time and these changes will be effective at the end of the calendar month and will effectively revocate the agreement of CCM services.  · Patient understands that only one practitioner can furnish and be paid for CCM services during one calendar month.  Patient also understands that there may be co-payment or deductible fees in association with CCM services.  · Patient will continue with at least monthly follow-up calls with the Nurse Navigator.    Rajani Mccauley RN  Ambulatory Case Management    2/24/2022, 14:57 EST

## 2022-02-28 ENCOUNTER — OFFICE VISIT (OUTPATIENT)
Dept: CARDIOLOGY | Facility: CLINIC | Age: 82
End: 2022-02-28

## 2022-02-28 VITALS
DIASTOLIC BLOOD PRESSURE: 77 MMHG | BODY MASS INDEX: 32.78 KG/M2 | WEIGHT: 229 LBS | HEART RATE: 68 BPM | SYSTOLIC BLOOD PRESSURE: 178 MMHG | HEIGHT: 70 IN

## 2022-02-28 DIAGNOSIS — I10 ESSENTIAL HYPERTENSION: Primary | ICD-10-CM

## 2022-02-28 DIAGNOSIS — I25.118 CORONARY ARTERY DISEASE OF NATIVE ARTERY OF NATIVE HEART WITH STABLE ANGINA PECTORIS: ICD-10-CM

## 2022-02-28 PROCEDURE — 99213 OFFICE O/P EST LOW 20 MIN: CPT | Performed by: INTERNAL MEDICINE

## 2022-02-28 RX ORDER — METOPROLOL SUCCINATE 25 MG/1
25 TABLET, EXTENDED RELEASE ORAL 2 TIMES DAILY
COMMUNITY
End: 2022-05-03 | Stop reason: SDUPTHER

## 2022-03-01 RX ORDER — FLUTICASONE FUROATE 100 UG/1
1 POWDER RESPIRATORY (INHALATION) DAILY
Qty: 1 EACH | Refills: 3 | Status: SHIPPED | OUTPATIENT
Start: 2022-03-01 | End: 2022-04-21 | Stop reason: SDUPTHER

## 2022-03-01 RX ORDER — TIOTROPIUM BROMIDE AND OLODATEROL 3.124; 2.736 UG/1; UG/1
2 SPRAY, METERED RESPIRATORY (INHALATION)
Qty: 1 EACH | Refills: 3 | Status: SHIPPED | OUTPATIENT
Start: 2022-03-01 | End: 2022-04-06 | Stop reason: ALTCHOICE

## 2022-03-01 NOTE — TELEPHONE ENCOUNTER
Pt called stating he thinks the Breztri he causing his B/P to go up. Pt requested too sunshine put back on Arnuity 100MCg and Stiolto 2.5 MCG.   I have informed Maribell of pt concerns and she stated it was ok to switch him back to the Arnuity and Stiolto.

## 2022-03-03 ENCOUNTER — PATIENT OUTREACH (OUTPATIENT)
Dept: CASE MANAGEMENT | Facility: OTHER | Age: 82
End: 2022-03-03

## 2022-03-03 DIAGNOSIS — I10 ESSENTIAL HYPERTENSION: Primary | ICD-10-CM

## 2022-03-03 PROCEDURE — 99490 CHRNC CARE MGMT STAFF 1ST 20: CPT | Performed by: PHYSICIAN ASSISTANT

## 2022-03-03 NOTE — OUTREACH NOTE
"Ambulatory Case Management Note    CCM Interim Update    Reviewed chart prior to calling patient for outreach. Patient went to the ER on 2/19, he was having some chest pain,SOA and his blood pressure was high. They diagnosed him with COPD exacerbation, and gave him a steroid pack. Patient was outside on the tractor but I talked with Riana, his wife. She said that he told her that he was feeling\" right smart better\", so she said that for him to come out and say that, she knows he must be doing better.   He seen Cardiologist on 2/18, and they wanted to stop his Metoprolol and put him on Coreg, however he had a reaction to that.  put him back on the Metoprolol, and added Norvasc to his medications. Riana said she figured out that the Breztri was causing his blood pressure to be elevated. So she called the pulmonologist and got them to switch his inhalers back to what he had been taking and she thinks it has been better since then.   No needs at this time  There are no recently modified care plans to display for this patient.    Rajani Mccauley RN  Ambulatory Case Management  3/3/2022, 13:59 EST    "

## 2022-03-13 NOTE — PROGRESS NOTES
CARDIOLOGY FOLLOW-UP PROGRESS NOTE        Chief Complaint  Coronary Artery Disease and Hypertension    Subjective            Layo Cee presents to [facility] CARDIOLOGY  History of Present Illness      Mr Cee here for a 2-week follow-up visit.  He was recently seen in the office for some chest discomfort and uncontrolled blood pressure.  Blood pressure has been persistently running in 170s and 180s at home.  The dose of Entresto was already increased by primary care provider, however continued.  High blood pressure.  During last visit metoprolol was changed to carvedilol for better blood pressure control.  4 days later, he called us back, reporting that he feels shortness of breath and difficulty in breathing along with a tightness in the throat ever since he started taking carvedilol.  The medication was discontinued and he was started on amlodipine 5 mg p.o. daily.  Today he reports that the chest pain episodes and throat tightness completely resolved.  However the blood pressure remains elevated with normal medical changes.  There are 3 days in his blood pressure log which showed that systolic blood pressure is 140s.  Patient reported that, during those days he was not taking a new inhaler which makes him wonder the new inhaler is the culprit for high blood pressure.       Past History:    (1) Coronary artery disease, status post coronary artery bypass grafting in the past. Cardiac catheterization done in July 2016 showed patent left internal mammary graft to the LAD and occluded saphenous venous graft. Native LAD is 100% occluded in the mid portion. Native circumflex artery has no significant disease. Native right coronary artery is also 100% occluded and it is a chronic total occlusion. The right coronary artery is reconstituted with collaterals from the left side.  Patient underwent repeat cardiac catheterization in June, 2021 and underwent angioplasty stent placement to diagonal  loretta.  (2) Ischemic cardiomyopathy with recovery of cardiac function. Last SPECT stress test in August 2016 showed an LV ejection fraction of 68%. (3) Chronic kidney disease, stage 3. (4) Chronic obstructive pulmonary disease, not an exacerbation. (5) Hypertension. (6) Hyperlipidemia. 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 (8) Typical atrial flutter status post radiofrequency ablation by Dr. Aguilar on 11/30/2018    Medical History:  Past Medical History:   Diagnosis Date   • Arthritis    • BPH (benign prostatic hyperplasia)    • CAD (coronary artery disease)    • CHF (congestive heart failure) (Formerly [facility])    • COPD (chronic obstructive pulmonary disease) (Formerly [facility])    • COVID-19 02/04/2021   • Diverticulitis 10/11/2019   • Dysphagia    • Essential hypertension 08/27/2020   • Frequent PVCs 8/28/2021   • GERD (gastroesophageal reflux disease)    • Gross hematuria    • HBP (high blood pressure)    • Long-term use of high-risk medication    • Lung disease    • Mixed hyperlipidemia 8/28/2021   • Myocardial infarction (Formerly [facility]) 07/2016   • OCD (obsessive compulsive disorder)    • Renal insufficiency 08/27/2020   • Rhinitis, allergic 06/05/2018   • Sore throat    • Stable angina (Formerly [facility])    • Typical atrial flutter (Formerly [facility]) 11/30/2018    s/p ablation by Dr. Aguilar    • Vertigo        Surgical History: has a past surgical history that includes Bladder surgery; Coronary artery bypass graft; Cardiac catheterization (07/2016); Colonoscopy (2011); Cystoscopy (03/19/2019); Esophagogastroduodenoscopy (2016); Cardiac surgery; Prostate surgery; and Cardiac catheterization (N/A, 8/9/2021).     Family History: family history includes Heart disease in his father; Other in his brother.     Social History: reports that he has quit smoking. His smoking use included cigarettes. He has a 20.00 pack-year smoking history. He has never used smokeless tobacco. He reports previous alcohol use. He  reports that he does not use drugs.    Allergies: Levaquin [levofloxacin]    Current Outpatient Medications on File Prior to Visit   Medication Sig   • acetaminophen (TYLENOL) 325 MG tablet Take 650 mg by mouth Every 6 (Six) Hours As Needed for Mild Pain .   • albuterol sulfate  (90 Base) MCG/ACT inhaler Inhale 2 puffs Every 4 (Four) Hours As Needed.   • amLODIPine (NORVASC) 5 MG tablet Take 1 tablet by mouth Daily.   • calcium carbonate (OS-LIANNA) 600 MG tablet Take 600 mg by mouth Daily.   • clopidogrel (PLAVIX) 75 MG tablet Take 1 tablet by mouth Daily.   • Coenzyme Q10 100 MG tablet Take 100 mg by mouth Daily.   • famotidine (PEPCID) 10 MG tablet Take 10 mg by mouth Daily.   • finasteride (PROSCAR) 5 MG tablet Take 1 tablet by mouth Daily.   • furosemide (LASIX) 20 MG tablet Take 1 tablet by mouth Daily.   • ipratropium-albuterol (DUO-NEB) 0.5-2.5 mg/3 ml nebulizer Take 3 mL by nebulization Every 4 (Four) Hours As Needed.   • isosorbide mononitrate (IMDUR) 60 MG 24 hr tablet Take 60 mg by mouth Daily.   • metoprolol succinate XL (TOPROL-XL) 25 MG 24 hr tablet Take 25 mg by mouth 2 (Two) Times a Day.   • mupirocin (Bactroban Nasal) 2 % nasal ointment into the nostril(s) as directed by provider 2 (Two) Times a Day.   • pantoprazole (PROTONIX) 40 MG EC tablet Take 1 tablet by mouth daily.   • pitavastatin calcium (LIVALO) 1 MG tablet tablet Take 1 mg by mouth Every Night.   • sacubitril-valsartan (Entresto) 49-51 MG tablet Take 1 tablet by mouth 2 (Two) Times a Day.   • tamsulosin (FLOMAX) 0.4 MG capsule 24 hr capsule Take 1 capsule by mouth Daily. 1/2 hours following the same meal each day   • VITAMIN B COMPLEX-C PO Take 1 tablet by mouth Daily.   • Xarelto 15 MG tablet Take 15 mg by mouth Daily.     No current facility-administered medications on file prior to visit.          Review of Systems   Respiratory: Positive for shortness of breath. Negative for cough and wheezing.    Cardiovascular: Negative for  "chest pain, palpitations and leg swelling.   Gastrointestinal: Negative for nausea and vomiting.   Neurological: Negative for dizziness and syncope.        Objective     /77 (BP Location: Right arm, Patient Position: Sitting, Cuff Size: Adult)   Pulse 68   Ht 177.8 cm (70\")   Wt 104 kg (229 lb)   BMI 32.86 kg/m²       Physical Exam    General : Alert, awake, no acute distress  Neck : Supple, no carotid bruit, no jugular venous distention  CVS : Regular rate and rhythm, no murmur, rubs or gallops  Lungs: Clear to auscultation bilaterally, no crackles or rhonchi  Abdomen: Soft, nontender, bowel sounds heard in all 4 quadrants  Extremities: Warm, well-perfused, no pedal edema    Result Review :     The following data was reviewed by: Darnell Stevenson MD on 02/28/2022:    CMP    CMP 8/5/21 8/9/21 8/10/21   Glucose 115 (A) 124 (A) 107 (A)   BUN 16 12 12   Creatinine 1.30 (A) 1.36 (A) 1.15   eGFR Non African Am 53 (A) 51 (A) 61   Sodium 139 139 138   Potassium 4.4 4.5 4.2   Chloride 104 102 106   Calcium 9.3 9.1 8.4 (A)   (A) Abnormal value       Comments are available for some flowsheets but are not being displayed.           CBC    CBC 8/9/21 8/10/21 1/24/22   WBC 8.02 8.83 6.25   RBC 5.14 4.64 5.07   Hemoglobin 15.1 13.7 14.7   Hematocrit 46.3 41.2 44.2   MCV 90.1 88.8 87.2   MCH 29.4 29.5 29.0   MCHC 32.6 33.3 33.3   RDW 15.2 15.4 13.9   Platelets 185 163 261           TSH    TSH 1/24/22   TSH 1.270                  Data reviewed: Cardiology studies            Results for orders placed during the hospital encounter of 07/08/21    Stress Test With Myocardial Perfusion One Day    Interpretation Summary  · Myocardial perfusion imaging indicates a small-sized infarct located in the inferior wall with mild mansoor-infarct ischemia.  · Left ventricular ejection fraction is borderline normal. (Calculated EF = 49%).  · Diaphragmatic attenuation artifact is present.  · Findings consistent with a normal ECG stress " test.  · Occasional isolated PVCs are noted at rest and also during stress.  · Impressions are consistent with an intermediate risk study.               Assessment and Plan        Diagnoses and all orders for this visit:    1. Essential hypertension (Primary)  Assessment & Plan:  Blood pressure remains elevated, but there are some days when his systolic pressures mostly in 140s.  We will increase the dose of amlodipine to 10 mg now, continue Entresto and metoprolol at the current dose.  He will continue to keep blood pressure log.  He is also planning to contact the pulmonology office to see whether the inhaler can be changed.  His blood pressure is significantly coming down, he can go back to 5 mg amlodipine daily.      2. Coronary artery disease of native artery of native heart with stable angina pectoris (HCC)  Assessment & Plan:  Currently denies any angina-like symptoms.  Current regimen.  Will check lipid panel before next visit.    Orders:  -     Comprehensive Metabolic Panel; Future  -     Lipid Panel; Future            Follow Up     Return in about 4 months (around 6/28/2022) for Next scheduled follow up.    Patient was given instructions and counseling regarding his condition or for health maintenance advice. Please see specific information pulled into the AVS if appropriate.

## 2022-03-13 NOTE — ASSESSMENT & PLAN NOTE
Currently denies any angina-like symptoms.  Current regimen.  Will check lipid panel before next visit.

## 2022-03-13 NOTE — ASSESSMENT & PLAN NOTE
Blood pressure remains elevated, but there are some days when his systolic pressures mostly in 140s.  We will increase the dose of amlodipine to 10 mg now, continue Entresto and metoprolol at the current dose.  He will continue to keep blood pressure log.  He is also planning to contact the pulmonology office to see whether the inhaler can be changed.  His blood pressure is significantly coming down, he can go back to 5 mg amlodipine daily.

## 2022-03-28 ENCOUNTER — HOSPITAL ENCOUNTER (OUTPATIENT)
Dept: CT IMAGING | Facility: HOSPITAL | Age: 82
Discharge: HOME | End: 2022-03-28
Admitting: NURSE PRACTITIONER

## 2022-03-28 DIAGNOSIS — J30.2 SEASONAL ALLERGIES: ICD-10-CM

## 2022-03-28 DIAGNOSIS — R06.00 DYSPNEA, UNSPECIFIED TYPE: ICD-10-CM

## 2022-03-28 DIAGNOSIS — R05.9 COUGH: ICD-10-CM

## 2022-03-28 DIAGNOSIS — J44.9 CHRONIC OBSTRUCTIVE PULMONARY DISEASE, UNSPECIFIED COPD TYPE: ICD-10-CM

## 2022-03-28 DIAGNOSIS — K76.89 LIVER NODULE: Primary | ICD-10-CM

## 2022-03-28 DIAGNOSIS — J30.9 ALLERGIC RHINITIS, UNSPECIFIED SEASONALITY, UNSPECIFIED TRIGGER: ICD-10-CM

## 2022-03-28 DIAGNOSIS — Z86.16 HISTORY OF COVID-19: ICD-10-CM

## 2022-03-28 DIAGNOSIS — I50.22 CHRONIC SYSTOLIC CONGESTIVE HEART FAILURE: ICD-10-CM

## 2022-03-28 DIAGNOSIS — F17.201 TOBACCO ABUSE, IN REMISSION: ICD-10-CM

## 2022-03-28 PROCEDURE — 71250 CT THORAX DX C-: CPT

## 2022-03-28 RX ORDER — RIVAROXABAN 15 MG/1
TABLET, FILM COATED ORAL
Qty: 90 TABLET | Refills: 1 | Status: SHIPPED | OUTPATIENT
Start: 2022-03-28 | End: 2023-01-23

## 2022-03-28 RX ORDER — PITAVASTATIN CALCIUM 1.04 MG/1
TABLET, FILM COATED ORAL
Qty: 90 TABLET | Refills: 1 | Status: SHIPPED | OUTPATIENT
Start: 2022-03-28 | End: 2022-06-24 | Stop reason: DRUGHIGH

## 2022-03-30 ENCOUNTER — PATIENT OUTREACH (OUTPATIENT)
Dept: CASE MANAGEMENT | Facility: OTHER | Age: 82
End: 2022-03-30

## 2022-03-30 DIAGNOSIS — I10 ESSENTIAL HYPERTENSION: Primary | ICD-10-CM

## 2022-03-30 DIAGNOSIS — J44.9 CHRONIC OBSTRUCTIVE PULMONARY DISEASE, UNSPECIFIED COPD TYPE: ICD-10-CM

## 2022-03-30 NOTE — OUTREACH NOTE
[facility] End of Month Documentation    This Chronic Medical Management Care Plan for Layo Cee, 82 y.o. male, has been No data recorded and a new plan of care implemented for the month of No data recorded.  A cumulative time of 21  minutes was spent on this patient record this month, including No data recorded.    Regarding the patient's problems: has Unstable angina (HCC); S/P coronary artery stent placement; Coronary artery disease of native artery of native heart with stable angina pectoris (HCC); Class 1 obesity due to excess calories with serious comorbidity and body mass index (BMI) of 33.0 to 33.9 in adult; Gout; Typical atrial flutter (HCC); Essential hypertension; Frequent PVCs; Mixed hyperlipidemia; Rotator cuff Bursitis of shoulder region; Lateral epicondylitis; History of hematuria; Benign prostatic hyperplasia with urinary frequency; Hydrocele in adult; History of COVID-19; Allergic rhinitis; Seasonal allergies; Cough; Dyspnea; Chronic systolic congestive heart failure (HCC); Tobacco abuse, in remission; Vitamin D deficiency; Gastroesophageal reflux disease with esophagitis without hemorrhage; and Ischemic cardiomyopathy on their problem list., the following items were addressed: No data recorded and any changes can be found within the plan section of the note.  A detailed listing of time spent for chronic care management is tracked within each outreach encounter.  Current medications include:  has a current medication list which includes the following prescription(s): acetaminophen, albuterol sulfate hfa, amlodipine, calcium carbonate, clopidogrel, coenzyme q10, famotidine, finasteride, arnuity ellipta, furosemide, ipratropium-albuterol, isosorbide mononitrate, livalo, metoprolol succinate xl, mupirocin, pantoprazole, entresto, tamsulosin, stiolto respimat, b complex-c, and xarelto. and the patient is reported to be No data recorded,  Medications are reported to be No data recorded.  Regarding these  diagnoses, referrals were made to the following provider(s): Gastroenterology.  All notes on chart for PCP to review.    The patient was monitored remotely for No data recorded.    The patient's physical needs include:  No data recorded.     The patient's mental support needs include:  No data recorded    The patient's cognitive support needs include:  No data recorded    The patient's psychosocial support needs include:  No data recorded    The patient's functional needs include: No data recorded    The patient's environmental needs include:  No data recorded    Care Plan overall comments:  No data recorded    Refer to previous outreach notes for more information on the areas listed above.    Monthly Billing Diagnoses  (I10) Essential hypertension    (J44.9) Chronic obstructive pulmonary disease, unspecified COPD type (HCC)    Medications   · Medications have been reconciled    Care Plan progress this month:      Recently Modified Care Plans Updates made since 2/27/2022 12:00 AM    No recently modified care plans.        Instructions   · Patient was provided an electronic copy of care plan  · CCM services were explained and offered and patient has accepted these services.  · Patient has given their written consent to receive CCM services and understands that this includes the authorization of electronic communication of medical information with the other treating providers.  · Patient understands that they may stop CCM services at any time and these changes will be effective at the end of the calendar month and will effectively revocate the agreement of CCM services.  · Patient understands that only one practitioner can furnish and be paid for CCM services during one calendar month.  Patient also understands that there may be co-payment or deductible fees in association with CCM services.  · Patient will continue with at least monthly follow-up calls with the Nurse Navigator.    ANASTACIA Dunn  Management    3/30/2022, 16:11 EDT

## 2022-04-06 ENCOUNTER — OFFICE VISIT (OUTPATIENT)
Dept: PULMONOLOGY | Facility: CLINIC | Age: 82
End: 2022-04-06

## 2022-04-06 VITALS
OXYGEN SATURATION: 98 % | BODY MASS INDEX: 34.95 KG/M2 | SYSTOLIC BLOOD PRESSURE: 141 MMHG | WEIGHT: 244.1 LBS | RESPIRATION RATE: 20 BRPM | HEART RATE: 73 BPM | DIASTOLIC BLOOD PRESSURE: 59 MMHG | HEIGHT: 70 IN | TEMPERATURE: 97.7 F

## 2022-04-06 DIAGNOSIS — K21.00 GASTROESOPHAGEAL REFLUX DISEASE WITH ESOPHAGITIS WITHOUT HEMORRHAGE: Chronic | ICD-10-CM

## 2022-04-06 DIAGNOSIS — J30.9 ALLERGIC RHINITIS, UNSPECIFIED SEASONALITY, UNSPECIFIED TRIGGER: Primary | ICD-10-CM

## 2022-04-06 DIAGNOSIS — J30.2 SEASONAL ALLERGIES: ICD-10-CM

## 2022-04-06 DIAGNOSIS — F17.201 TOBACCO ABUSE, IN REMISSION: ICD-10-CM

## 2022-04-06 DIAGNOSIS — Z86.16 HISTORY OF COVID-19: ICD-10-CM

## 2022-04-06 DIAGNOSIS — J43.2 CENTRILOBULAR EMPHYSEMA: ICD-10-CM

## 2022-04-06 PROCEDURE — 99214 OFFICE O/P EST MOD 30 MIN: CPT | Performed by: INTERNAL MEDICINE

## 2022-04-06 RX ORDER — PREDNISONE 20 MG/1
TABLET ORAL
COMMUNITY
Start: 2022-02-19 | End: 2022-05-02

## 2022-04-06 RX ORDER — TRAMADOL HYDROCHLORIDE 50 MG/1
TABLET ORAL
COMMUNITY
Start: 2022-03-10 | End: 2022-05-02

## 2022-04-06 RX ORDER — FLUTICASONE PROPIONATE 50 MCG
1 SPRAY, SUSPENSION (ML) NASAL NIGHTLY
COMMUNITY
Start: 2021-12-28 | End: 2022-12-29

## 2022-04-06 NOTE — PROGRESS NOTES
Primary Care Provider  Kevyn Rhodes PA     Referring Provider  No ref. provider found     Chief Complaint  Follow-up (5 month follow up/ allergic rhinitis/ chest ct results), Shortness of Breath, and Wheezing    Subjective          Layo Cee presents to [facility] PULMONARY [facility] CR[facility]ICAL [facility]ARE MEDICINE  History of Present Illness  Layo Cee is a 82 y.o. male patient with history of COPD, seasonal allergies, wheezing, cough, chronic compensated systolic heart failure, known granulomatous disease, lung nodules, tobacco abuse of cigarettes in remission, COVID-19 infection in January 2021.  He is here for follow-up.  Since his last office visit he had repeat CT scan of the chest which did not show any worsening of the peripheral scarring.  He was on Breztri inhaler 2 puffs twice daily, but had significant worsening hypertension.  His cardiologist is stopped the inhaler.  He went back to his Stiolto and Arnuity.  However currently his insurance is not covering the Stiolto and he wants the inhaler changed.  He has shortness of breath with activities he has some leg swelling.  He has no chest pain and chest tightness.  No nausea or vomiting.  He has intermittent wheezing.  Leg swelling is about the same.  I reviewed the previous CT scan of the chest and pulmonary function test with him today.    Review of Systems     General:  Fatigue, No Fever No weight loss or loss of appetite  HEENT: No dysphagia, No Visual Changes, no rhinorrhea  Respiratory:  + cough,+Dyspnea, intermittent phlegm, No Pleuritic Pain, no wheezing, no hemoptysis  Cardiovascular: Denies chest pain, denies palpitations,+HALL, + improving chest Pressure  Gastrointestinal:  No Abdominal Pain, No Nausea, No Vomiting, No Diarrhea  Genitourinary:  No Dysuria, No Frequency, No Hesitancy  Musculoskeletal: No muscle pain or swelling  Endocrine:  No Heat Intolerance, No Cold Intolerance  Hematologic:  No Bleeding, No Bruising  Psychiatric:   No Anxiety, No Depression  Neurologic:  No Confusion, no Dysarthria, No Headaches  Skin:  No Rash, No Open Wounds    Family History   Problem Relation Age of Onset   • Heart disease Father    • Other Brother         GASTROINTESTINAL CANCER        Social History     Socioeconomic History   • Marital status:    Tobacco Use   • Smoking status: Former Smoker     Packs/day: 0.50     Years: 40.00     Pack years: 20.00     Types: Cigarettes   • Smokeless tobacco: Never Used   Vaping Use   • Vaping Use: Never used   Substance and Sexual Activity   • Alcohol use: Not Currently   • Drug use: Never   • Sexual activity: Defer        Past Medical History:   Diagnosis Date   • Arthritis    • BPH (benign prostatic hyperplasia)    • CAD (coronary artery disease)    • CHF (congestive heart failure) ([facility])    • COPD (chronic obstructive pulmonary disease) ([facility])    • COVID-19 02/04/2021   • Diverticulitis 10/11/2019   • Dysphagia    • Essential hypertension 08/27/2020   • Frequent PVCs 8/28/2021   • GERD (gastroesophageal reflux disease)    • Gross hematuria    • HBP (high blood pressure)    • Long-term use of high-risk medication    • Lung disease    • Mixed hyperlipidemia 8/28/2021   • Myocardial infarction ([facility]) 07/2016   • OCD (obsessive compulsive disorder)    • Renal insufficiency 08/27/2020   • Rhinitis, allergic 06/05/2018   • Sore throat    • Stable angina ([facility])    • Typical atrial flutter ([facility]) 11/30/2018    s/p ablation by Dr. Aguilar    • Vertigo         Immunization History   Administered Date(s) Administered   • COVID-19 ([employer]) 11/04/2021   • Fluzone High-Dose 65+yrs 09/28/2021   • Fluzone Split Quad (Multi-dose) 10/08/2019   • Influenza Quad Vaccine (Inpatient) 10/08/2019   • Influenza, Unspecified 10/08/2019   • Shingrix 04/29/2021, 11/22/2021         Allergies   Allergen Reactions   • Carvedilol Swelling   • Levaquin [Levofloxacin] Unknown - Low Severity          Current Outpatient Medications:   •   acetaminophen (TYLENOL) 325 MG tablet, Take 650 mg by mouth Every 6 (Six) Hours As Needed for Mild Pain ., Disp: , Rfl:   •  albuterol sulfate  (90 Base) MCG/ACT inhaler, Inhale 2 puffs Every 4 (Four) Hours As Needed., Disp: , Rfl:   •  amLODIPine (NORVASC) 5 MG tablet, Take 1 tablet by mouth Daily., Disp: 90 tablet, Rfl: 3  •  calcium carbonate (OS-LIANNA) 600 MG tablet, Take 600 mg by mouth Daily., Disp: , Rfl:   •  clopidogrel (PLAVIX) 75 MG tablet, Take 1 tablet by mouth Daily., Disp: 90 tablet, Rfl: 3  •  Coenzyme Q10 100 MG tablet, Take 100 mg by mouth Daily., Disp: , Rfl:   •  famotidine (PEPCID) 10 MG tablet, Take 10 mg by mouth Daily., Disp: , Rfl:   •  finasteride (PROSCAR) 5 MG tablet, Take 1 tablet by mouth Daily., Disp: 90 tablet, Rfl: 4  •  fluticasone (FLONASE) 50 MCG/ACT nasal spray, 1 spray into the nostril(s) as directed by provider Daily., Disp: , Rfl:   •  Fluticasone Furoate (Arnuity Ellipta) 100 MCG/ACT aerosol powder , Inhale 1 puff Daily., Disp: 1 each, Rfl: 3  •  furosemide (LASIX) 20 MG tablet, Take 1 tablet by mouth Daily., Disp: 90 tablet, Rfl: 2  •  ipratropium-albuterol (DUO-NEB) 0.5-2.5 mg/3 ml nebulizer, Take 3 mL by nebulization Every 4 (Four) Hours As Needed., Disp: , Rfl:   •  isosorbide mononitrate (IMDUR) 60 MG 24 hr tablet, Take 60 mg by mouth Daily., Disp: , Rfl:   •  Livalo 1 MG tablet tablet, Take 1 tablet by mouth every day, Disp: 90 tablet, Rfl: 1  •  metoprolol succinate XL (TOPROL-XL) 25 MG 24 hr tablet, Take 25 mg by mouth 2 (Two) Times a Day., Disp: , Rfl:   •  mupirocin (Bactroban Nasal) 2 % nasal ointment, into the nostril(s) as directed by provider 2 (Two) Times a Day., Disp: 1 g, Rfl: 1  •  mupirocin (BACTROBAN) 2 % ointment, , Disp: , Rfl:   •  pantoprazole (PROTONIX) 40 MG EC tablet, Take 1 tablet by mouth daily., Disp: 30 tablet, Rfl: 3  •  predniSONE (DELTASONE) 20 MG tablet, , Disp: , Rfl:   •  sacubitril-valsartan (Entresto) 49-51 MG tablet, Take 1 tablet  "by mouth 2 (Two) Times a Day., Disp: 60 tablet, Rfl: 2  •  tamsulosin (FLOMAX) 0.4 MG capsule 24 hr capsule, Take 1 capsule by mouth Daily. 1/2 hours following the same meal each day, Disp: 90 capsule, Rfl: 4  •  traMADol (ULTRAM) 50 MG tablet, , Disp: , Rfl:   •  VITAMIN B COMPLEX-C PO, Take 1 tablet by mouth Daily., Disp: , Rfl:   •  Xarelto 15 MG tablet, Take 1 tablet by mouth every day, Disp: 90 tablet, Rfl: 1  •  Fluticasone-Umeclidin-Vilant (Trelegy Ellipta) 100-62.5-25 MCG/INH inhaler, Inhale 1 puff Daily., Disp: 1 each, Rfl: 11     Objective   Vital Signs:   /59 (BP Location: Right arm, Patient Position: Sitting, Cuff Size: Adult)   Pulse 73   Temp 97.7 °F (36.5 °C) (Tympanic)   Resp 20   Ht 177.8 cm (70\")   Wt 111 kg (244 lb 1.6 oz)   SpO2 98% Comment: room air  BMI 35.02 kg/m²     Mallampatti classification : 1  Physical Exam  Vital Signs Reviewed  Obese, elderly male, pleasant, in no acute distress, normal conversant  HEENT:  PERRL, EOMI.  OP, nares clear, no sinus tenderness  Neck:  Supple, no JVD, no thyromegaly  Lymph: no axillary, cervical, supraclavicular lymphadenopathy noted bilaterally  Chest:  good aeration, bilateral diminished breath sounds, no wheezing, cracklesi, scattered rhonchi at bases, resonant to percussion b/l  CV: RRR, no MGR, pulses 2+, equal  Abd:  Soft, NT, ND, + BS, no HSM  EXT:  no clubbing, no cyanosis, trace BLE edema  Neuro:  A&Ox3, CN grossly intact, no focal deficits  Skin: No rashes or lesions noted     Result Review :   The following data was reviewed by: Kolton Brink MD on 04/06/2022:  Common labs    Common Labsle 8/9/21 8/9/21 8/10/21 8/10/21 1/24/22 1/24/22    0810 0810 [id number] 1018 1018   Glucose  124 (A)  107 (A)     BUN  12  12     Creatinine  1.36 (A)  1.15     eGFR Non African Am  51 (A)  61     Sodium  139  138     Potassium  4.5  4.2     Chloride  102  106     Calcium  9.1  8.4 (A)     WBC 8.02  8.83  6.25    Hemoglobin 15.1  13.7  14.7  "   Hematocrit 46.3  41.2  44.2    Platelets 185  163  261    PSA      3.010   (A) Abnormal value       Comments are available for some flowsheets but are not being displayed.           CMP    CMP 8/5/21 8/9/21 8/10/21   Glucose 115 (A) 124 (A) 107 (A)   BUN 16 12 12   Creatinine 1.30 (A) 1.36 (A) 1.15   eGFR Non African Am 53 (A) 51 (A) 61   Sodium 139 139 138   Potassium 4.4 4.5 4.2   Chloride 104 102 106   Calcium 9.3 9.1 8.4 (A)   (A) Abnormal value       Comments are available for some flowsheets but are not being displayed.           CBC    CBC 8/9/21 8/10/21 1/24/22   WBC 8.02 8.83 6.25   RBC 5.14 4.64 5.07   Hemoglobin 15.1 13.7 14.7   Hematocrit 46.3 41.2 44.2   MCV 90.1 88.8 87.2   MCH 29.4 29.5 29.0   MCHC 32.6 33.3 33.3   RDW 15.2 15.4 13.9   Platelets 185 163 261           CBC w/diff    CBC w/Diff 8/9/21 8/10/21 1/24/22   WBC 8.02 8.83 6.25   RBC 5.14 4.64 5.07   Hemoglobin 15.1 13.7 14.7   Hematocrit 46.3 41.2 44.2   MCV 90.1 88.8 87.2   MCH 29.4 29.5 29.0   MCHC 32.6 33.3 33.3   RDW 15.2 15.4 13.9   Platelets 185 163 261   Neutrophil Rel % 49.1  52.5   Immature Granulocyte Rel % 0.6 (A)  0.6 (A)   Lymphocyte Rel % 34.7  29.6   Monocyte Rel % 10.5  10.9   Eosinophil Rel % 4.6  5.9   Basophil Rel % 0.5  0.5   (A) Abnormal value            Data reviewed: Radiologic studies CT scan of the chest from October 2021 was reviewed.  Shows some chronic interstitial changes with emphysema.            Assessment and Plan    Diagnoses and all orders for this visit:    1. Allergic rhinitis, unspecified seasonality, unspecified trigger (Primary)  -     Fluticasone-Umeclidin-Vilant (Trelegy Ellipta) 100-62.5-25 MCG/INH inhaler; Inhale 1 puff Daily.  Dispense: 1 each; Refill: 11  -     CT Chest Without Contrast Diagnostic; Future    2. Seasonal allergies  -     Fluticasone-Umeclidin-Vilant (Trelegy Ellipta) 100-62.5-25 MCG/INH inhaler; Inhale 1 puff Daily.  Dispense: 1 each; Refill: 11  -     CT Chest Without Contrast  Diagnostic; Future    3. Tobacco abuse, in remission  -     Fluticasone-Umeclidin-Vilant (Trelegy Ellipta) 100-62.5-25 MCG/INH inhaler; Inhale 1 puff Daily.  Dispense: 1 each; Refill: 11  -     CT Chest Without Contrast Diagnostic; Future    4. Gastroesophageal reflux disease with esophagitis without hemorrhage  -     Fluticasone-Umeclidin-Vilant (Trelegy Ellipta) 100-62.5-25 MCG/INH inhaler; Inhale 1 puff Daily.  Dispense: 1 each; Refill: 11  -     CT Chest Without Contrast Diagnostic; Future    5. History of COVID-19  -     Fluticasone-Umeclidin-Vilant (Trelegy Ellipta) 100-62.5-25 MCG/INH inhaler; Inhale 1 puff Daily.  Dispense: 1 each; Refill: 11  -     CT Chest Without Contrast Diagnostic; Future    6. 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  He is close to his baseline status.    COPD: Continue with triple inhaler therapy, switched to Trelegy Ellipta 100 mg once daily.  He was not able to tolerate Briztri, and Stiolto is not covered by the insurance.  He was supposed to be on his Stiolto and Arnuity.  Continue with albuterol and DuoNeb as needed.    I advised him regarding pulmonary rehab.  Patient is not able to navigate 30 miles travel every 2 to 3 days.  Wants to keep himself active at home.    Congestive heart failure: Continue with diuretics through Dr. Alba.  Continue with Xarelto and Plavix.    Lung nodules: Likely related to granulomatous lung disease.  Repeat CT scan of the chest in October 2022.    Up-to-date on flu and pneumonia vaccine.  He has had J & J vaccine.  I advised him to take Moderna for booster.    Follow Up   No follow-ups on file.  Patient was given instructions and counseling regarding his condition or for health maintenance advice. Please see specific information pulled into the AVS if appropriate.       Electronically signed by Kolton Brink MD, 4/6/2022, 15:23 EDT.

## 2022-04-07 ENCOUNTER — PATIENT OUTREACH (OUTPATIENT)
Dept: CASE MANAGEMENT | Facility: OTHER | Age: 82
End: 2022-04-07

## 2022-04-07 DIAGNOSIS — I10 ESSENTIAL HYPERTENSION: Primary | ICD-10-CM

## 2022-04-07 PROCEDURE — 99490 CHRNC CARE MGMT STAFF 1ST 20: CPT | Performed by: PHYSICIAN ASSISTANT

## 2022-04-07 NOTE — OUTREACH NOTE
AMBULATORY CASE MANAGEMENT NOTE    Name and Relationship of Patient/Support Person: Layo Cee H - Self    CCM Interim Update  Reviewed chart prior to calling patient for outreach. Patient said that he was doing alright.   He went to the ER on 3/10 because he fell and hurt his hand on 3/9. He said that it is a little sore, but nothing was broken.   He seen pulmonologist on 4/6, switched Stiolto inhaler to Trelegy because insurance wasn't wanting to pay for it. Patient said that he doesn't have any issues typically affording his medications. Reminded him about his appointment with GI in June.  Asked how his BP was doing and he said that it was doing great, he said that he only checks it once in a while, a few readings he had was 151/74,131/61,147/66. Encouraged to try to take a little more often a couple of those are a little too high. Encouraged to bring with him to his next appointment with PCP in May.   Asked patient if he planned on getting his COVID booster and he said that he wasn't sure yet. Advised pulmonologist suggested it to him, however it is his decision.   Will move patient to monitoring status. No questions or concerns at this time.   Education Documentation  Unresolved/Worsening Symptoms, taught by Rajani Mccauley, RN at 4/7/2022 10:04 AM.  Learner: Patient  Readiness: Acceptance  Method: Explanation  Response: Verbalizes Understanding    Medication Management, taught by Rajani Mccauley, RN at 4/7/2022  9:56 AM.  Learner: Patient  Readiness: Acceptance  Method: Explanation  Response: Verbalizes Understanding    Blood Pressure Monitoring, taught by Rajani Mccauley, RN at 4/7/2022  9:56 AM.  Learner: Patient  Readiness: Acceptance  Method: Explanation  Response: Verbalizes Understanding    Hypertension, taught by Rajani Mccauley, RN at 4/7/2022  9:56 AM.  Learner: Patient  Readiness: Acceptance  Method: Explanation  Response: Verbalizes Understanding    Signs/Symptoms, taught by  Anastacia Mccauley, RN at 4/7/2022  9:56 AM.  Learner: Patient  Readiness: Acceptance  Method: Explanation  Response: Verbalizes Understanding        ANASTACIA LOVE  Ambulatory Case Management  4/7/2022, 10:04 EDT

## 2022-04-19 ENCOUNTER — TELEPHONE (OUTPATIENT)
Dept: PULMONOLOGY | Facility: CLINIC | Age: 82
End: 2022-04-19

## 2022-04-20 NOTE — PROGRESS NOTES
Primary Care Provider  Kevyn Rhodes PA     Referring Provider  No ref. provider found     Chief Complaint  COPD and Emphysema    Subjective          History of Presenting Illness  Patient is an 82-year-old male, patient Dr. Brink's who was recently hospitalized at [facility] from 4/13/2022 to 4/17/2022 for COPD exacerbation, acute on chronic diastolic congestive heart failure, and and atrial fibrillation.  Patient presents for follow-up visit today.  Patient's wife is present with the patient in the office today.  Per discharge summary report, patient was admitted to the hospital with atrial fibrillation with rapid ventricular rate and shortness of breath with having combination of COPD exacerbation atrial fibrillation with rapid ventricular rate and acute on chronic diastolic congestive heart failure.  Patient was treated with antibiotics and steroids and diuretics and overall improved.  Patient states that he was started on Cardizem in addition to his Toprol and is scheduled to follow-up with Dr. Stevenson, cardiologist next Thursday, May 5, 2022.  Patient states that his cough has improved and is now productive with clear sputum.  Patient states he is currently taking Arnuity and Stiolto as prescribed and use albuterol inhaler and DuoNeb nebulizer treatments as needed. Patient denies fever, chills, night sweats, swollen glands in the head and neck, unintentional weight loss, hemoptysis, purulent sputum production, dysphagia, chest pain, palpitations, chest tightness, abdominal pain, nausea, vomiting, and diarrhea. Patient also denies any myalgias, changes in sense of taste and/or smell, sore throat, any other coronavirus or flu-like symptoms.  Patient denies any leg swelling, orthopnea, paroxysmal nocturnal dyspnea.  Patient is able to perform activities of daily living.    Review of Systems   Constitutional: Negative for activity change, appetite change, chills, diaphoresis, fatigue, fever, unexpected weight  gain and unexpected weight loss.        Negative for Insomnia   HENT: Negative for congestion (Nasal), mouth sores, nosebleeds, postnasal drip, sore throat, swollen glands and trouble swallowing.         Negative for Thrush  Negative for Hoarseness  Negative for Allergies/Hay Fever  Negative for Recent Head injury  Negative for Ear Fullness  Negative for Nasal or Sinus pain  Negative for Dry lips  Negative for Nasal discharge   Respiratory: Positive for cough and shortness of breath. Negative for apnea, chest tightness and wheezing.         Negative for Hemoptysis  Negative for Pleuritic pain   Cardiovascular: Negative for chest pain, palpitations and leg swelling.        Negative for Claudication  Negative for Cyanosis  Negative for Dyspnea on exertion   Gastrointestinal: Negative for abdominal pain, diarrhea, nausea, vomiting and GERD.   Musculoskeletal: Negative for joint swelling and myalgias.        Negative for Joint pain  Negative for Joint stiffness   Skin: Negative for color change, dry skin, pallor and rash.   Neurological: Negative for syncope, weakness and headache.   Hematological: Negative for adenopathy. Does not bruise/bleed easily.        Family History   Problem Relation Age of Onset   • Heart disease Father    • Other Brother         GASTROINTESTINAL CANCER        Social History     Socioeconomic History   • Marital status:    Tobacco Use   • Smoking status: Former Smoker     Packs/day: 0.50     Years: 40.00     Pack years: 20.00     Types: Cigarettes     Start date:      Quit date:      Years since quittin.3   • Smokeless tobacco: Never Used   Vaping Use   • Vaping Use: Never used   Substance and Sexual Activity   • Alcohol use: Not Currently   • Drug use: Never   • Sexual activity: Defer        Past Medical History:   Diagnosis Date   • Arthritis    • BPH (benign prostatic hyperplasia)    • CAD (coronary artery disease)    • CHF (congestive heart failure) ([facility])    • COPD  (chronic obstructive pulmonary disease) (formerly [facility])    • COVID-19 02/04/2021   • Diverticulitis 10/11/2019   • Dysphagia    • Essential hypertension 08/27/2020   • Frequent PVCs 8/28/2021   • GERD (gastroesophageal reflux disease)    • Gross hematuria    • HBP (high blood pressure)    • Long-term use of high-risk medication    • Lung disease    • Mixed hyperlipidemia 8/28/2021   • Myocardial infarction (formerly [facility]) 07/2016   • OCD (obsessive compulsive disorder)    • Renal insufficiency 08/27/2020   • Rhinitis, allergic 06/05/2018   • Sore throat    • Stable angina (formerly [facility])    • Typical atrial flutter (formerly [facility]) 11/30/2018    s/p ablation by Dr. Aguilar    • Vertigo         Immunization History   Administered Date(s) Administered   • COVID-19 ([employer]) 11/04/2021   • Fluzone High-Dose 65+yrs 09/28/2021   • Fluzone Split Quad (Multi-dose) 10/08/2019   • Influenza Quad Vaccine (Inpatient) 10/08/2019   • Influenza, Unspecified 10/08/2019   • Shingrix 04/29/2021, 11/22/2021       Allergies   Allergen Reactions   • Carvedilol Swelling   • Levaquin [Levofloxacin] Unknown - Low Severity          Current Outpatient Medications:   •  acetaminophen (TYLENOL) 325 MG tablet, Take 650 mg by mouth Every 6 (Six) Hours As Needed for Mild Pain ., Disp: , Rfl:   •  albuterol sulfate  (90 Base) MCG/ACT inhaler, Inhale 2 puffs Every 4 (Four) Hours As Needed., Disp: , Rfl:   •  amLODIPine (NORVASC) 5 MG tablet, Take 1 tablet by mouth Daily. (Patient taking differently: Take 5 mg by mouth Daily. Waiting to see if he needs to continue), Disp: 90 tablet, Rfl: 3  •  calcium carbonate (OS-LIANNA) 600 MG tablet, Take 600 mg by mouth Daily., Disp: , Rfl:   •  cefdinir (OMNICEF) 300 MG capsule, , Disp: , Rfl:   •  clopidogrel (PLAVIX) 75 MG tablet, Take 1 tablet by mouth Daily., Disp: 90 tablet, Rfl: 3  •  Coenzyme Q10 100 MG tablet, Take 100 mg by mouth Daily., Disp: , Rfl:   •  dilTIAZem LA (CARDIZEM LA) 240 MG 24 hr tablet, Take 240 mg by mouth Daily., Disp: ,  Rfl:   •  famotidine (PEPCID) 10 MG tablet, Take 10 mg by mouth Daily., Disp: , Rfl:   •  finasteride (PROSCAR) 5 MG tablet, Take 1 tablet by mouth Daily., Disp: 90 tablet, Rfl: 4  •  fluticasone (FLONASE) 50 MCG/ACT nasal spray, 1 spray into the nostril(s) as directed by provider Daily., Disp: , Rfl:   •  Fluticasone Furoate (Arnuity Ellipta) 100 MCG/ACT aerosol powder , Inhale 1 puff Daily., Disp: 1 each, Rfl: 3  •  furosemide (LASIX) 20 MG tablet, Take 1 tablet by mouth Daily., Disp: 90 tablet, Rfl: 2  •  ipratropium-albuterol (DUO-NEB) 0.5-2.5 mg/3 ml nebulizer, Take 3 mL by nebulization Every 4 (Four) Hours As Needed., Disp: , Rfl:   •  isosorbide mononitrate (IMDUR) 60 MG 24 hr tablet, Take 60 mg by mouth Daily., Disp: , Rfl:   •  Livalo 1 MG tablet tablet, Take 1 tablet by mouth every day, Disp: 90 tablet, Rfl: 1  •  metoprolol succinate XL (TOPROL-XL) 25 MG 24 hr tablet, Take 25 mg by mouth 2 (Two) Times a Day., Disp: , Rfl:   •  mupirocin (Bactroban Nasal) 2 % nasal ointment, into the nostril(s) as directed by provider 2 (Two) Times a Day., Disp: 1 g, Rfl: 1  •  mupirocin (BACTROBAN) 2 % ointment, , Disp: , Rfl:   •  pantoprazole (PROTONIX) 40 MG EC tablet, Take 1 tablet by mouth daily., Disp: 30 tablet, Rfl: 3  •  sacubitril-valsartan (Entresto) 49-51 MG tablet, Take 1 tablet by mouth 2 (Two) Times a Day., Disp: 60 tablet, Rfl: 2  •  tamsulosin (FLOMAX) 0.4 MG capsule 24 hr capsule, Take 1 capsule by mouth Daily. 1/2 hours following the same meal each day, Disp: 90 capsule, Rfl: 4  •  tiotropium bromide-olodaterol (Stiolto Respimat) 2.5-2.5 MCG/ACT aerosol solution inhaler, Inhale 2 puffs Daily for 30 days., Disp: 2 each, Rfl: 0  •  traMADol (ULTRAM) 50 MG tablet, , Disp: , Rfl:   •  VITAMIN B COMPLEX-C PO, Take 1 tablet by mouth Daily., Disp: , Rfl:   •  Xarelto 15 MG tablet, Take 1 tablet by mouth every day, Disp: 90 tablet, Rfl: 1  •  predniSONE (DELTASONE) 20 MG tablet, , Disp: , Rfl:      Objective  "    Physical Exam  Vital Signs Reviewed  WDWN, Alert, NAD.    HEENT:  PERRL, EOMI.  OP, nares clear, no sinus tenderness  Neck:  Supple, no JVD, no thyromegaly.  Lymph: no axillary, cervical, supraclavicular lymphadenopathy noted bilaterally  Chest:  good aeration, clear to auscultation bilaterally, tympanic to percussion bilaterally, no work of breathing noted  CV: RRR, no MGR, pulses 2+, equal.  Abd:  Soft, NT, ND, + BS, no HSM  EXT:  no clubbing, no cyanosis, no edema, no joint tenderness  Neuro:  A&Ox3, CN grossly intact, no focal deficits.  Skin: No rashes or lesions noted.    Vital Signs:   /50   Pulse 107   Resp 14   Ht 177.8 cm (70\")   Wt 105 kg (231 lb)   SpO2 97% Comment: room air  BMI 33.15 kg/m²         Result Review :   I have reviewed discharge summary and imaging study reports from recent hospital stay.  See scanned reports.         Assessment and Plan      Assessment:  1.  COPD with acute exacerbation with recent hospitalization.  2. Centrilobular emphysema.  3. Dyspnea.      4. Allergic rhinitis.      5. Seasonal allergies.    6. Chronic compensated systolic congestive heart failure and coronary artery disease, patient under the care of Dr. Stevenson.      7. GERD.     8. Tobacco abuse with cigarettes in remission.            Plan:  1.  Continue Arnuity and Stiolto as prescribed and rinse mouth out after each use. Samples of Stiolto given to the patient the office today.  2.  Continue albuterol inhaler and DuoNeb nebulizer treatments as needed.   3.    For  GERD,  patient to continue PPI and Pepcid.   Patient is also advised to sleep with the head of the bed elevated and do not eat 3-4 hours prior to bedtime.    4.  Follow-up with cardiologist as scheduled.  5.  Vaccination status: patient reports they are up-to-date with flu, pneumonia, and Covid vaccines.  Patient is advised to continue to follow CDC recommendations such as social distancing wearing a mask and washing hands for at least " 20 seconds.  6.  Smoking status: Patient is a former cigarette smoker. 7. Patient is advised to call the office, 911 or go to the ER with any new or worsening symptoms.      8.  Follow up in  4 to 6 weeks, sooner if needed.            Follow Up   Return for 4-6 weeks with Dr. Brink.  Patient was given instructions and counseling regarding his condition or for health maintenance advice. Please see specific information pulled into the AVS if appropriate.

## 2022-04-21 DIAGNOSIS — J44.9 CHRONIC OBSTRUCTIVE PULMONARY DISEASE, UNSPECIFIED COPD TYPE: Primary | ICD-10-CM

## 2022-04-21 DIAGNOSIS — I50.22 CHRONIC SYSTOLIC CONGESTIVE HEART FAILURE: ICD-10-CM

## 2022-04-21 DIAGNOSIS — Z86.16 HISTORY OF COVID-19: ICD-10-CM

## 2022-04-21 DIAGNOSIS — J43.2 CENTRILOBULAR EMPHYSEMA: ICD-10-CM

## 2022-04-21 DIAGNOSIS — R06.00 DYSPNEA, UNSPECIFIED TYPE: ICD-10-CM

## 2022-04-21 DIAGNOSIS — F17.201 TOBACCO ABUSE, IN REMISSION: ICD-10-CM

## 2022-04-21 DIAGNOSIS — J30.2 SEASONAL ALLERGIES: ICD-10-CM

## 2022-04-21 DIAGNOSIS — K76.89 LIVER NODULE: ICD-10-CM

## 2022-04-21 DIAGNOSIS — R05.9 COUGH: ICD-10-CM

## 2022-04-21 DIAGNOSIS — J30.9 ALLERGIC RHINITIS, UNSPECIFIED SEASONALITY, UNSPECIFIED TRIGGER: ICD-10-CM

## 2022-04-21 RX ORDER — FLUTICASONE FUROATE 100 UG/1
1 POWDER RESPIRATORY (INHALATION) DAILY
Qty: 1 EACH | Refills: 3 | Status: SHIPPED | OUTPATIENT
Start: 2022-04-21 | End: 2022-12-30 | Stop reason: SDUPTHER

## 2022-04-27 ENCOUNTER — OFFICE VISIT (OUTPATIENT)
Dept: PULMONOLOGY | Facility: CLINIC | Age: 82
End: 2022-04-27

## 2022-04-27 ENCOUNTER — PATIENT OUTREACH (OUTPATIENT)
Dept: CASE MANAGEMENT | Facility: OTHER | Age: 82
End: 2022-04-27

## 2022-04-27 VITALS
HEIGHT: 70 IN | DIASTOLIC BLOOD PRESSURE: 50 MMHG | HEART RATE: 107 BPM | RESPIRATION RATE: 14 BRPM | SYSTOLIC BLOOD PRESSURE: 106 MMHG | OXYGEN SATURATION: 97 % | BODY MASS INDEX: 33.07 KG/M2 | WEIGHT: 231 LBS

## 2022-04-27 DIAGNOSIS — F17.201 TOBACCO ABUSE, IN REMISSION: ICD-10-CM

## 2022-04-27 DIAGNOSIS — J30.9 ALLERGIC RHINITIS, UNSPECIFIED SEASONALITY, UNSPECIFIED TRIGGER: ICD-10-CM

## 2022-04-27 DIAGNOSIS — J44.9 CHRONIC OBSTRUCTIVE PULMONARY DISEASE, UNSPECIFIED COPD TYPE: ICD-10-CM

## 2022-04-27 DIAGNOSIS — I50.22 CHRONIC SYSTOLIC CONGESTIVE HEART FAILURE: ICD-10-CM

## 2022-04-27 DIAGNOSIS — J43.2 CENTRILOBULAR EMPHYSEMA: Primary | ICD-10-CM

## 2022-04-27 DIAGNOSIS — R06.00 DYSPNEA, UNSPECIFIED TYPE: ICD-10-CM

## 2022-04-27 DIAGNOSIS — J44.1 CHRONIC OBSTRUCTIVE PULMONARY DISEASE WITH ACUTE EXACERBATION: ICD-10-CM

## 2022-04-27 DIAGNOSIS — I10 ESSENTIAL HYPERTENSION: Primary | ICD-10-CM

## 2022-04-27 DIAGNOSIS — J30.2 SEASONAL ALLERGIES: ICD-10-CM

## 2022-04-27 DIAGNOSIS — K21.9 GASTROESOPHAGEAL REFLUX DISEASE, UNSPECIFIED WHETHER ESOPHAGITIS PRESENT: ICD-10-CM

## 2022-04-27 PROCEDURE — 99214 OFFICE O/P EST MOD 30 MIN: CPT | Performed by: NURSE PRACTITIONER

## 2022-04-27 RX ORDER — TIOTROPIUM BROMIDE AND OLODATEROL 3.124; 2.736 UG/1; UG/1
2 SPRAY, METERED RESPIRATORY (INHALATION)
Qty: 2 EACH | Refills: 0 | COMMUNITY
Start: 2022-04-27 | End: 2022-05-27

## 2022-04-27 RX ORDER — DILTIAZEM HYDROCHLORIDE EXTENDED-RELEASE TABLETS 240 MG/1
240 TABLET, EXTENDED RELEASE ORAL EVERY EVENING
COMMUNITY
Start: 2022-04-17 | End: 2022-05-12 | Stop reason: HOSPADM

## 2022-04-27 RX ORDER — TIOTROPIUM BROMIDE AND OLODATEROL 3.124; 2.736 UG/1; UG/1
SPRAY, METERED RESPIRATORY (INHALATION)
COMMUNITY
End: 2022-04-27 | Stop reason: SDUPTHER

## 2022-04-27 RX ORDER — CEFDINIR 300 MG/1
CAPSULE ORAL
COMMUNITY
Start: 2022-04-18 | End: 2022-05-02

## 2022-04-27 NOTE — OUTREACH NOTE
[facility] End of Month Documentation    This Chronic Medical Management Care Plan for Layo Cee, 82 y.o. male, has been No data recorded and a new plan of care implemented for the month of No data recorded.  A cumulative time of 23  minutes was spent on this patient record this month, including No data recorded.    Regarding the patient's problems: has Unstable angina (HCC); S/P coronary artery stent placement; Coronary artery disease of native artery of native heart with stable angina pectoris (HCC); Class 1 obesity due to excess calories with serious comorbidity and body mass index (BMI) of 33.0 to 33.9 in adult; Gout; Typical atrial flutter (HCC); Essential hypertension; Frequent PVCs; Mixed hyperlipidemia; Rotator cuff Bursitis of shoulder region; Lateral epicondylitis; History of hematuria; Benign prostatic hyperplasia with urinary frequency; Hydrocele in adult; History of COVID-19; Allergic rhinitis; Seasonal allergies; Cough; Dyspnea; Chronic systolic congestive heart failure (HCC); Tobacco abuse, in remission; Vitamin D deficiency; Gastroesophageal reflux disease with esophagitis without hemorrhage; Ischemic cardiomyopathy; Centrilobular emphysema (HCC); Chronic obstructive pulmonary disease with acute exacerbation (HCC); and Gastroesophageal reflux disease on their problem list., the following items were addressed: No data recorded and any changes can be found within the plan section of the note.  A detailed listing of time spent for chronic care management is tracked within each outreach encounter.  Current medications include:  has a current medication list which includes the following prescription(s): acetaminophen, albuterol sulfate hfa, calcium carbonate, clopidogrel, coenzyme q10, diltiazem la, famotidine, finasteride, fluticasone, arnuity ellipta, furosemide, ipratropium-albuterol, isosorbide mononitrate, livalo, metoprolol succinate xl, mupirocin, mupirocin, pantoprazole, entresto, tamsulosin,  stiolto respimat, b complex-c, and xarelto. and the patient is reported to be No data recorded,  Medications are reported to be No data recorded.  Regarding these diagnoses, referrals were made to the following provider(s):  Gastroenterology.  All notes on chart for PCP to review.    The patient was monitored remotely for No data recorded.    The patient's physical needs include:  No data recorded.     The patient's mental support needs include:  No data recorded    The patient's cognitive support needs include:  No data recorded    The patient's psychosocial support needs include:  No data recorded    The patient's functional needs include: No data recorded    The patient's environmental needs include:  No data recorded    Care Plan overall comments:  No data recorded    Refer to previous outreach notes for more information on the areas listed above.    Monthly Billing Diagnoses  (I10) Essential hypertension    (J44.9) Chronic obstructive pulmonary disease, unspecified COPD type (HCC)    Medications   · Medications have been reconciled    Care Plan progress this month:      Recently Modified Care Plans Updates made since 3/27/2022 12:00 AM     Heart Failure (Adult)         Problem Priority Last Modified     ELS SYMPTOM EXACERBATION (HEART FAILURE) (ADULT) --  4/7/2022  9:25 AM by Rajani Mccauley, BRENDON              Goal Recent Progress Last Modified     Symptom Exacerbation Prevented or Minimized --  4/7/2022  9:25 AM by Rajani Mccauley, RN     Evidence-based guidance:   Perform or review cognitive and/or health literacy screening.   Assess understanding of adherence and barriers to treatment plan, as well as lifestyle changes; develop strategies to address barriers.   Establish a [id number] early intervention process to communicate with primary care provider when signs/symptoms worsen.   Facilitate timely posthospital discharge or emergency department treatment that includes intensive follow-up  via telephone calls, home visit, telehealth monitoring and care at multidisciplinary heart failure clinic.   Adjust frequency and intensity of follow-up based on presentation, number of emergency department visits, hospital admissions and frequency and severity of symptom exacerbation.   Facilitate timely visit, usually within 1 week, with primary care provider following hospital discharge.   Collaborate with clinical pharmacist to address adverse drug reactions, drug interactions, subtherapeutic dosage, patient and family education.   Regularly screen for presence of depressive symptoms using a validated tool; consider pharmacologic therapy and/or referral for cognitive behavioral therapy when present.   Refer to community-based services, such as a heart failure support group, community health worker or peer support program.   Review immunization status; arrange receipt of needed vaccinations.   Prepare patient for home oxygen use based on signs/symptoms.    Notes:              Task Due Date Last Modified     Identify and Minimize Risk of Heart Failure Exacerbation --  4/7/2022  9:25 AM by Rajani Mccauley RN     Care Management Activities:      - not discussed during this outreach      Notes:              Problem Priority Last Modified     ELS DISEASE PROGRESSION (HEART FAILURE) (ADULT) --  4/7/2022  9:25 AM by Rajani Mccauley RN              Goal Recent Progress Last Modified     Comorbidities Identified and Managed --  4/7/2022  9:25 AM by Rajani Mccauley, RN     Evidence-based guidance:   Assess and address signs/symptoms of comorbidity, including dyslipidemia, diabetes, iron deficiency, gout, arthritis, dysrhythmia, hypertension, cachexia, coronary artery disease, kidney dysfunction and lung disease.   Prepare patient for laboratory and diagnostic exams based on risk and presentation.   Prepare for use of pharmacologic therapy that may include statin, angiotensin converting enzyme (ACE)  inhibitor, angiotensin receptor blocker (ARB), beta-blocker, digoxin, antidysrhythmic, diuretic or omega-3 fatty acid.   Monitor side effects and anticipate need for periodic adjustments.   Prepare patient for potential invasive treatment, such as implantable cardioverter-defibrillator, cardiac resynchronization therapy or heart transplant as disease progresses.    Notes:              Task Due Date Last Modified     Identify and Manage Comorbidities --  4/7/2022  9:25 AM by Rajani Mccauley, BRENDON     Care Management Activities:      - not discussed during this outreach      Notes:              Goal Recent Progress Last Modified     Health Optimized --  4/7/2022  9:25 AM by Rajani Mccauley, RN     Evidence-based guidance:   Use brief intervention, such as 5 A's (Ask, Advise, Assess, Assist, Arrange) to encourage smoking cessation; refer to smoking cessation program, if ready for more intensive intervention.   Perform or refer to a registered dietitian for a nutrition assessment and nutrition-focused physical exam.    Identify potential micronutrient deficiencies, such as iron, vitamin D and thiamin.   Assess need for potential diet and fluid modification, such as reduced sodium or fluid intake.   Minimize unnecessary dietary restrictions to increase oral intake. Note: Sodium restriction should be individualized to the patient and clinical status.   Facilitate home monitoring of weight.    Notes:              Task Due Date Last Modified     Optimize Health --  4/7/2022  9:25 AM by Rajani Mccauley, BRENDON     Care Management Activities:      - home monitoring of blood pressure encouraged  - not discussed during this outreach      Notes:              Problem Priority Last Modified     ELS ACTIVITY TOLERANCE (HEART FAILURE) (ADULT) --  4/7/2022  9:25 AM by Rajani Mccauley, RN              Goal Recent Progress Last Modified     Activity Tolerance Optimized --  4/7/2022  9:25 AM by Rajani Mccauley, RN      Evidence-based guidance:   Promote daily physical activity that improves functional ability, cognition and quality of life.   Encourage reduction in sedentary time.    Encourage optimal, safe functional mobility and self-care performance based on ability and tolerance.    Promote breathing and energy conservation techniques, such as pursed-lip breathing, preplanning and pacing of activity, balancing activity and rest.   Encourage participation in cardiac rehabilitation services.    Notes:              Task Due Date Last Modified     Maintain Strength and Functional Ability --  4/7/2022  9:25 AM by Rajani Mccauley RN     Care Management Activities:      - not discussed during this outreach      Notes:              Problem Priority Last Modified     ELS OBSTRUCTIVE SLEEP APNEA (HEART FAILURE) (ADULT) --  4/7/2022  9:25 AM by Rajani Mccauley RN              Goal Recent Progress Last Modified     Effective Breathing Pattern During Sleep --  4/7/2022  9:25 AM by Rajani Mccauley RN     Evidence-based guidance:   Use a validated screening tool to identify risk for obstructive sleep apnea (ANDIE).   Encourage a nonsupine sleep position, such as side-lying, head of bed elevated, multiple pillows, to prevent airway collapse.   Encourage the use of sleep hygiene techniques.   Anticipate a referral for sleep study (polysomnography).   If ANDIE is identified, prepare patient for treatment options, such as nighttime oxygen therapy and CPAP (continuous positive airway pressure) or BiPAP (bilevel positive airway pressure).    Notes:              Task Due Date Last Modified     Monitor and Manage Obstructive Sleep Apnea (ANDIE) --  4/7/2022  9:25 AM by Rajani Mccauley RN     Care Management Activities:      - not discussed during this outreach      Notes:                 Hypertension (Adult)         Problem Priority Last Modified     ELS HYPERTENSION (HYPERTENSION) (ADULT) --  4/7/2022  9:25 AM by Parisa  "BRENODN Gerard              Goal Recent Progress Last Modified     Hypertension Monitored --  4/7/2022  9:25 AM by Rajani Mccauley RN     Evidence-based guidance:   Promote initial use of ambulatory blood pressure measurements (for 3 days) to rule out \"white-coat\" effect; identify masked hypertension and presence or absence of nocturnal \"dipping\" of blood pressure.    Encourage continued use of home blood pressure monitoring and recording in blood pressure log; include symptoms of hypotension or potential medication side effects in log.   Review blood pressure measurements taken inside and outside of the provider office; establish baseline and monitor trends; compare to target ranges or patient goal.   Share overall cardiovascular risk with patient; encourage changes to lifestyle risk factors, including alcohol consumption, smoking, inadequate exercise, poor dietary habits and stress.    Notes:              Task Due Date Last Modified     Identify and Monitor Blood Pressure Elevation --  4/7/2022  9:25 AM by Rajani Mccauley RN     Care Management Activities:      - blood pressure equipment and technique reviewed  - blood pressure trends reviewed  - home or ambulatory blood pressure monitoring encouraged      Notes:              Problem Priority Last Modified     ELS DISEASE PROGRESSION (HYPERTENSION) (ADULT) --  4/7/2022  9:25 AM by Rajani Mccauley RN              Goal Recent Progress Last Modified     Disease Progression Prevented or Minimized --  4/7/2022  9:25 AM by Rajani Mccauley RN     Evidence-based guidance:   Tailor lifestyle advice to individual; review progress regularly; give frequent encouragement and respond positively to incremental successes.   Assess for and promote awareness of worsening disease or development of comorbidity.   Prepare patient for laboratory and diagnostic exams based on risk and presentation.   Prepare patient for use of pharmacologic therapy that may " include diuretic, beta-blocker, beta-blocker/thiazide combination, angiotensin-converting enzyme inhibitor, renin-angiotensin blocker or calcium-channel blocker.   Expect periodic adjustments to pharmacologic therapy; manage side effects.   Promote a healthy diet that includes primarily plant-based foods, such as fruits, vegetables, whole grains, beans and legumes, low-fat dairy and lean meats.    Consider moderate reduction in sodium intake by avoiding the addition of salt to prepared foods and limiting processed meats, canned soup, frozen meals and salty snacks.    Promote a regular, daily exercise goal of 150 minutes per week of moderate exercise based on tolerance, ability and patient choice; consider referral to physical therapist, community wellness and/or activity program.   Encourage the avoidance of no more than 2 hours per day of sedentary activity, such as recreational screen time.   Review sources of stress; explore current coping strategies and encourage use of mindfulness, yoga, meditation or exercise to manage stress.    Notes:              Task Due Date Last Modified     Alleviate Barriers to Hypertension Treatment --  4/7/2022  9:25 AM by Rajani Mccauley RN     Care Management Activities:      - not discussed during this outreach      Notes:              Problem Priority Last Modified     ELS RESISTANT HYPERTENSION (HYPERTENSION) (ADULT) --  4/7/2022  9:25 AM by Rajani Mccauley RN              Goal Recent Progress Last Modified     Response to Treatment Maximized --  4/7/2022  9:25 AM by Rajani Mccauley RN     Evidence-based guidance:   Assess patient response to treatment, including presence or absence of medication side effects, degree of blood pressure control and patient satisfaction.   Assess technique (including cuff size and placement), measurement times, condition and calibration of blood pressure cuff set (both at-home and in-office equipment).   Assess factors that may  influence response to treatment, including nonadherence to pharmacologic treatment plan, diet or activity changes and/or presence of pain, stress or sleep disturbance.   Screen for signs and symptoms of depression; if present, refer for or complete a comprehensive assessment.   Evaluate social and economic barriers that may affect adherence to treatment plan   Address pharmacologic nonadherence by simplifying dosing regimen, counseling or support by pharmacist, financial assistance, self-monitoring of blood pressure, use of motivational interviewing, voice or text messages.   Encourage behavioral adherence strategies, like habit-based interventions that link medication taking with existing daily routines.   Assess barriers to regular, daily physical activity; support family or support person-oriented activity changes and utilization of community activity or sports program.   Address barriers to dietary changes, especially sodium restriction, with referrals to community programs, like cooking classes, meal services or intensive education when available.   Refer to community-based peer support program or nurse home-visiting program.   Assess for chronic pain; when present add additional goals (Chronic Pain Care Plan Guide) as needed.   Provide frequent follow-up by telephone, telemonitoring, patient-practice portal or with home visit.   Review alcohol use screen; address using brief intervention beginning with risk that interferes with blood pressure control; refer for treatment when excessive alcohol use is noted.   Screen for obstructive sleep apnea; prepare patient for polysomnography based on risk and presentation and use of noninvasive ventilation to relieve obstructive sleep apnea when present.    Notes:              Task Due Date Last Modified     Facilitate Adherence to Lifestyle Change --  4/7/2022  9:25 AM by Rajani Mccauley, BRENDON     Care Management Activities:      - home blood pressure monitoring  technique reviewed  - home blood pressure monitoring equipment use demonstrated      Notes:                    Instructions   · Patient was provided an electronic copy of care plan  · CCM services were explained and offered and patient has accepted these services.  · Patient has given their written consent to receive CCM services and understands that this includes the authorization of electronic communication of medical information with the other treating providers.  · Patient understands that they may stop CCM services at any time and these changes will be effective at the end of the calendar month and will effectively revocate the agreement of CCM services.  · Patient understands that only one practitioner can furnish and be paid for CCM services during one calendar month.  Patient also understands that there may be co-payment or deductible fees in association with CCM services.  · Patient will continue with at least monthly follow-up calls with the Nurse Navigator.    ANASTACIA LOVE  Ambulatory Case Management    5/2/2022, 08:38 EDT

## 2022-05-01 ENCOUNTER — APPOINTMENT (OUTPATIENT)
Dept: GENERAL RADIOLOGY | Facility: HOSPITAL | Age: 82
End: 2022-05-01

## 2022-05-01 ENCOUNTER — HOSPITAL ENCOUNTER (EMERGENCY)
Facility: HOSPITAL | Age: 82
Discharge: HOME | End: 2022-05-02
Attending: EMERGENCY MEDICINE | Admitting: EMERGENCY MEDICINE

## 2022-05-01 DIAGNOSIS — I48.0 PAROXYSMAL ATRIAL FIBRILLATION: Primary | ICD-10-CM

## 2022-05-01 LAB
ALBUMIN SERPL-MCNC: 4 G/DL (ref 3.5–5.2)
ALBUMIN/GLOB SERPL: 1.3 G/DL
ALP SERPL-CCNC: 55 U/L (ref 39–117)
ALT SERPL W P-5'-P-CCNC: 17 U/L (ref 1–41)
ANION GAP SERPL CALCULATED.3IONS-SCNC: 11.1 MMOL/L (ref 5–15)
AST SERPL-CCNC: 11 U/L (ref 1–40)
BASOPHILS # BLD AUTO: 0.03 10*3/MM3 (ref 0–0.2)
BASOPHILS NFR BLD AUTO: 0.4 % (ref 0–1.5)
BILIRUB SERPL-MCNC: 0.5 MG/DL (ref 0–1.2)
BUN SERPL-MCNC: 14 MG/DL (ref 8–23)
BUN/CREAT SERPL: 9.9 (ref 7–25)
CALCIUM SPEC-SCNC: 9 MG/DL (ref 8.6–10.5)
CHLORIDE SERPL-SCNC: 103 MMOL/L (ref 98–107)
CO2 SERPL-SCNC: 25.9 MMOL/L (ref 22–29)
CREAT SERPL-MCNC: 1.41 MG/DL (ref 0.76–1.27)
DEPRECATED RDW RBC AUTO: 48.9 FL (ref 37–54)
EGFRCR SERPLBLD CKD-EPI 2021: 50.4 ML/MIN/1.73
EOSINOPHIL # BLD AUTO: 0.24 10*3/MM3 (ref 0–0.4)
EOSINOPHIL NFR BLD AUTO: 3.4 % (ref 0.3–6.2)
ERYTHROCYTE [DISTWIDTH] IN BLOOD BY AUTOMATED COUNT: 14.5 % (ref 12.3–15.4)
GLOBULIN UR ELPH-MCNC: 3.1 GM/DL
GLUCOSE SERPL-MCNC: 98 MG/DL (ref 65–99)
HCT VFR BLD AUTO: 40.3 % (ref 37.5–51)
HGB BLD-MCNC: 13 G/DL (ref 13–17.7)
HOLD SPECIMEN: NORMAL
HOLD SPECIMEN: NORMAL
IMM GRANULOCYTES # BLD AUTO: 0.03 10*3/MM3 (ref 0–0.05)
IMM GRANULOCYTES NFR BLD AUTO: 0.4 % (ref 0–0.5)
LYMPHOCYTES # BLD AUTO: 1.93 10*3/MM3 (ref 0.7–3.1)
LYMPHOCYTES NFR BLD AUTO: 27.1 % (ref 19.6–45.3)
MCH RBC QN AUTO: 30 PG (ref 26.6–33)
MCHC RBC AUTO-ENTMCNC: 32.3 G/DL (ref 31.5–35.7)
MCV RBC AUTO: 92.9 FL (ref 79–97)
MONOCYTES # BLD AUTO: 0.82 10*3/MM3 (ref 0.1–0.9)
MONOCYTES NFR BLD AUTO: 11.5 % (ref 5–12)
NEUTROPHILS NFR BLD AUTO: 4.06 10*3/MM3 (ref 1.7–7)
NEUTROPHILS NFR BLD AUTO: 57.2 % (ref 42.7–76)
NRBC BLD AUTO-RTO: 0 /100 WBC (ref 0–0.2)
NT-PROBNP SERPL-MCNC: 1411 PG/ML (ref 0–1800)
PLATELET # BLD AUTO: 153 10*3/MM3 (ref 140–450)
PMV BLD AUTO: 9.6 FL (ref 6–12)
POTASSIUM SERPL-SCNC: 3.9 MMOL/L (ref 3.5–5.2)
PROT SERPL-MCNC: 7.1 G/DL (ref 6–8.5)
RBC # BLD AUTO: 4.34 10*6/MM3 (ref 4.14–5.8)
SODIUM SERPL-SCNC: 140 MMOL/L (ref 136–145)
TROPONIN T SERPL-MCNC: <0.01 NG/ML (ref 0–0.03)
WBC NRBC COR # BLD: 7.11 10*3/MM3 (ref 3.4–10.8)
WHOLE BLOOD HOLD SPECIMEN: NORMAL
WHOLE BLOOD HOLD SPECIMEN: NORMAL

## 2022-05-01 PROCEDURE — 71045 X-RAY EXAM CHEST 1 VIEW: CPT

## 2022-05-01 PROCEDURE — 36415 COLL VENOUS BLD VENIPUNCTURE: CPT

## 2022-05-01 PROCEDURE — 93005 ELECTROCARDIOGRAM TRACING: CPT

## 2022-05-01 PROCEDURE — 93010 ELECTROCARDIOGRAM REPORT: CPT | Performed by: INTERNAL MEDICINE

## 2022-05-01 PROCEDURE — 84484 ASSAY OF TROPONIN QUANT: CPT

## 2022-05-01 PROCEDURE — 85025 COMPLETE CBC W/AUTO DIFF WBC: CPT

## 2022-05-01 PROCEDURE — 96374 THER/PROPH/DIAG INJ IV PUSH: CPT

## 2022-05-01 PROCEDURE — 80053 COMPREHEN METABOLIC PANEL: CPT

## 2022-05-01 PROCEDURE — 83880 ASSAY OF NATRIURETIC PEPTIDE: CPT

## 2022-05-01 PROCEDURE — 99284 EMERGENCY DEPT VISIT MOD MDM: CPT

## 2022-05-01 PROCEDURE — 93005 ELECTROCARDIOGRAM TRACING: CPT | Performed by: EMERGENCY MEDICINE

## 2022-05-01 RX ORDER — SODIUM CHLORIDE 0.9 % (FLUSH) 0.9 %
10 SYRINGE (ML) INJECTION AS NEEDED
Status: DISCONTINUED | OUTPATIENT
Start: 2022-05-01 | End: 2022-05-02 | Stop reason: HOSPADM

## 2022-05-01 RX ADMIN — SODIUM CHLORIDE 1000 ML: 9 INJECTION, SOLUTION INTRAVENOUS at 22:14

## 2022-05-01 RX ADMIN — METOPROLOL TARTRATE 5 MG: 1 INJECTION, SOLUTION INTRAVENOUS at 22:14

## 2022-05-01 NOTE — ED TRIAGE NOTES
Pt to ED triage with c/o increased hr over the last couple days. Pt reports has been over 100 and taking medications to help control it. Pt reports hx of afib and was recently in the hospital at [place].   Pt reports decreased energy and sob w/ any exertion and at rest.  Pt presents labored breathing at rest and can only talk in short sentences.

## 2022-05-02 VITALS
HEIGHT: 70 IN | BODY MASS INDEX: 33.87 KG/M2 | SYSTOLIC BLOOD PRESSURE: 149 MMHG | DIASTOLIC BLOOD PRESSURE: 73 MMHG | WEIGHT: 236.55 LBS | RESPIRATION RATE: 18 BRPM | OXYGEN SATURATION: 94 % | HEART RATE: 90 BPM | TEMPERATURE: 97.3 F

## 2022-05-02 NOTE — ED PROVIDER NOTES
Subjective   Layo Cee is a  82 y.o. male with hx of A-fib that presents to the ED accompanied by spouse for palpitations described as elevated HR (as high as 140s) since yesterday. This is still present and unchanged. Nothing improves or worsens the symptoms.Spouse did give pt an extra half of a metoprolol tab with no improvement of symptoms.     He states that he has CP across his chest and describes it as an ache and c/o fatigue.       History provided by:  Patient  Palpitations  Palpitations quality:  Fast  Onset quality:  Sudden  Duration:  1 day  Timing:  Constant  Progression:  Unchanged  Relieved by:  Nothing  Worsened by:  Nothing  Ineffective treatments: metoprolol.  Associated symptoms: chest pain (ache)    Associated symptoms: no back pain, no diaphoresis, no nausea, no shortness of breath and no vomiting        Review of Systems   Constitutional: Positive for fatigue. Negative for chills, diaphoresis and fever.   HENT: Negative for ear discharge and nosebleeds.    Eyes: Negative for photophobia.   Respiratory: Negative for shortness of breath.    Cardiovascular: Positive for chest pain (ache) and palpitations.   Gastrointestinal: Negative for diarrhea, nausea and vomiting.   Genitourinary: Negative for dysuria.   Musculoskeletal: Negative for back pain and neck pain.   Skin: Negative for rash.   Neurological: Negative for headaches.   All other systems reviewed and are negative.      Past Medical History:   Diagnosis Date   • Arthritis    • BPH (benign prostatic hyperplasia)    • CAD (coronary artery disease)    • CHF (congestive heart failure) ([facility])    • COPD (chronic obstructive pulmonary disease) ([facility])    • COVID-19 02/04/2021   • Diverticulitis 10/11/2019   • Dysphagia    • Essential hypertension 08/27/2020   • Frequent PVCs 8/28/2021   • GERD (gastroesophageal reflux disease)    • Gross hematuria    • HBP (high blood pressure)    • Long-term use of high-risk medication    • Lung disease    •  Mixed hyperlipidemia 2021   • Myocardial infarction (HCC) 2016   • OCD (obsessive compulsive disorder)    • Renal insufficiency 2020   • Rhinitis, allergic 2018   • Sore throat    • Stable angina ([facility])    • Typical atrial flutter ([facility]) 2018    s/p ablation by Dr. Aguilar    • Vertigo        Allergies   Allergen Reactions   • Carvedilol Swelling   • Levaquin [Levofloxacin] Unknown - Low Severity       Past Surgical History:   Procedure Laterality Date   • BLADDER SURGERY     • CARDIAC CATHETERIZATION  2016   • CARDIAC CATHETERIZATION N/A 2021    Procedure: Left Heart Cath with possible angioplasty;  Surgeon: Jack Ladd MD;  Location: [facility] CATH INVASIVE LOCATION;  Service: Cardiovascular;  Laterality: N/A;  Please schedule the procedure with Dr. Jack Ladd MD on 2021   • CARDIAC SURGERY      HEART BYPASS   • COLONOSCOPY     • CORONARY ARTERY BYPASS GRAFT     • CYSTOSCOPY  2019    WITH BILATERAL RETROGRADE PYELOGRAPHY   • ENDOSCOPY     • PROSTATE SURGERY         Family History   Problem Relation Age of Onset   • Heart disease Father    • Other Brother         GASTROINTESTINAL CANCER       Social History     Socioeconomic History   • Marital status:    Tobacco Use   • Smoking status: Former Smoker     Packs/day: 0.50     Years: 40.00     Pack years: 20.00     Types: Cigarettes     Start date:      Quit date:      Years since quittin.3   • Smokeless tobacco: Never Used   Vaping Use   • Vaping Use: Never used   Substance and Sexual Activity   • Alcohol use: Not Currently   • Drug use: Never   • Sexual activity: Defer         Objective   Physical Exam  Vitals and nursing note reviewed.   Constitutional:       General: He is not in acute distress.     Appearance: Normal appearance.   HENT:      Head: Normocephalic and atraumatic.      Nose: Nose normal.   Eyes:      General: No scleral icterus.  Cardiovascular:      Rate and Rhythm: Tachycardia  present. Rhythm irregular.   Pulmonary:      Effort: Pulmonary effort is normal. No respiratory distress.   Abdominal:      General: There is no distension.   Musculoskeletal:         General: Normal range of motion.      Cervical back: Neck supple.      Right lower leg: No edema.      Left lower leg: No edema.   Skin:     General: Skin is warm and dry.   Neurological:      General: No focal deficit present.      Mental Status: He is alert and oriented to person, place, and time.      Sensory: No sensory deficit.      Motor: No weakness.         Procedures         ED Course  ED Course as of 05/02/22 0559   [place] May 01, 2022   2138 EKG: A-fib with a rate of 87. Normal QRS and axis. Normal QTc. Nonspecific ST changes that are unchanged from prior EKG. A-fib is new when compared to previously. Interpreted by me.  [KJ]   [place] May 02, 2022   0118 Pt ambulated in the ED with no difficulty and an O2 sat of 94%.  [KJ]      ED Course User Index  [KJ] Olga Samson                                            [facility]  Number of Diagnoses or Management Options     Amount and/or Complexity of Data Reviewed  Clinical lab tests: reviewed  Tests in the radiology section of CPT®: reviewed  Tests in the medicine section of CPT®: reviewed  Decide to obtain previous medical records or to obtain history from someone other than the patient: yes  Review and summarize past medical records: yes        Final diagnoses:   Paroxysmal atrial fibrillation (HCC)       Documentation assistance provided by michael Samson.  Information recorded by the scribe was done at my direction and has been verified and validated by me.     Olga Samson  05/01/22 2148       Shayne Chu DO  05/02/22 0559

## 2022-05-03 ENCOUNTER — PREP FOR SURGERY (OUTPATIENT)
Dept: OTHER | Facility: HOSPITAL | Age: 82
End: 2022-05-03

## 2022-05-03 ENCOUNTER — OFFICE VISIT (OUTPATIENT)
Dept: CARDIOLOGY | Facility: CLINIC | Age: 82
End: 2022-05-03

## 2022-05-03 VITALS
SYSTOLIC BLOOD PRESSURE: 127 MMHG | HEIGHT: 70 IN | WEIGHT: 236 LBS | OXYGEN SATURATION: 98 % | DIASTOLIC BLOOD PRESSURE: 60 MMHG | BODY MASS INDEX: 33.79 KG/M2

## 2022-05-03 DIAGNOSIS — I48.0 PAROXYSMAL ATRIAL FIBRILLATION: Primary | ICD-10-CM

## 2022-05-03 DIAGNOSIS — I48.3 TYPICAL ATRIAL FLUTTER: ICD-10-CM

## 2022-05-03 DIAGNOSIS — I10 ESSENTIAL HYPERTENSION: ICD-10-CM

## 2022-05-03 DIAGNOSIS — I25.5 ISCHEMIC CARDIOMYOPATHY: ICD-10-CM

## 2022-05-03 DIAGNOSIS — I48.19 ATRIAL FIBRILLATION, PERSISTENT: Primary | ICD-10-CM

## 2022-05-03 DIAGNOSIS — I25.118 CORONARY ARTERY DISEASE OF NATIVE ARTERY OF NATIVE HEART WITH STABLE ANGINA PECTORIS: ICD-10-CM

## 2022-05-03 PROBLEM — Z95.5 S/P CORONARY ARTERY STENT PLACEMENT: Status: RESOLVED | Noted: 2021-08-09 | Resolved: 2022-05-03

## 2022-05-03 PROCEDURE — 99214 OFFICE O/P EST MOD 30 MIN: CPT | Performed by: INTERNAL MEDICINE

## 2022-05-03 RX ORDER — SODIUM CHLORIDE 0.9 % (FLUSH) 0.9 %
10 SYRINGE (ML) INJECTION AS NEEDED
Status: CANCELLED | OUTPATIENT
Start: 2022-05-03

## 2022-05-03 RX ORDER — AMLODIPINE BESYLATE 5 MG/1
5 TABLET ORAL DAILY
COMMUNITY
End: 2022-05-03 | Stop reason: ALTCHOICE

## 2022-05-03 RX ORDER — METOPROLOL SUCCINATE 25 MG/1
25 TABLET, EXTENDED RELEASE ORAL 2 TIMES DAILY
Qty: 180 TABLET | Refills: 2 | Status: SHIPPED | OUTPATIENT
Start: 2022-05-03 | End: 2022-05-03 | Stop reason: SDUPTHER

## 2022-05-03 RX ORDER — SODIUM CHLORIDE 0.9 % (FLUSH) 0.9 %
3 SYRINGE (ML) INJECTION EVERY 12 HOURS SCHEDULED
Status: CANCELLED | OUTPATIENT
Start: 2022-05-03

## 2022-05-03 RX ORDER — METOPROLOL SUCCINATE 25 MG/1
25 TABLET, EXTENDED RELEASE ORAL 2 TIMES DAILY
Qty: 180 TABLET | Refills: 3 | Status: SHIPPED | OUTPATIENT
Start: 2022-05-03 | End: 2022-05-12 | Stop reason: HOSPADM

## 2022-05-03 NOTE — H&P (VIEW-ONLY)
CARDIOLOGY FOLLOW-UP PROGRESS NOTE        Chief Complaint  Irregular Heart Beat, Palpitations, Shortness of Breath, and ER Follow up    Subjective            Layo Cee presents to [facility] CARDIOLOGY  History of Present Illness    Mr Cee is here for an unscheduled visit.  He was admitted to [facility] on 4/13/2022, when he presented with increasing shortness of breath.  He was noted to be in atrial fibrillation with RVR and also combination of COPD and CHF exacerbation.  He was treated with IV diuretics and bronchodilators for 3 days and was discharged home on oral Cardizem along with increased dose of Lasix.  Patient reports that he did not feel well since then.  His shortness of breath slightly improved but feels very weak tired and fatigued all the time.  His heart rate is mostly close to 100 and at [id number] without any activity.  He went back to Lasix 20 mg once daily a week back since swelling completely subsided and he feels even weaker.  He had a second visit to the emergency room yesterday to our hospital due to increasing heart rate of 130 at home and fatigue along shortness of breath.  In the ER, he was noted to be in A. fib with controlled ventricular rates.  1 dose of IV Lopressor was given.  He denies having any chest pain currently.  Dizziness is better than before.  No syncopal episodes.  He lost around 8 pounds in the past 1 month.       Past History:    (1) Coronary artery disease, status post coronary artery bypass grafting in the past. Cardiac catheterization done in July 2016 showed patent left internal mammary graft to the LAD and occluded saphenous venous graft. Native LAD is 100% occluded in the mid portion. Native circumflex artery has no significant disease. Native right coronary artery is also 100% occluded and it is a chronic total occlusion. The right coronary artery is reconstituted with collaterals from the left side.  Patient underwent repeat  cardiac catheterization in June, 2021 and underwent angioplasty stent placement to diagonal branch.  (2) Ischemic cardiomyopathy with recovery of cardiac function. Last SPECT stress test in August 2016 showed an LV ejection fraction of 68%. (3) Chronic kidney disease, stage 3. (4) Chronic obstructive pulmonary disease, not an exacerbation. (5) Hypertension. (6) Hyperlipidemia. 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 (8) Typical atrial flutter status post radiofrequency ablation by Dr. Aguilar on 11/30/2018        Medical History:  Past Medical History:   Diagnosis Date   • Arthritis    • BPH (benign prostatic hyperplasia)    • CAD (coronary artery disease)    • CHF (congestive heart failure) ([facility])    • COPD (chronic obstructive pulmonary disease) ([facility])    • COVID-19 02/04/2021   • Diverticulitis 10/11/2019   • Dysphagia    • Essential hypertension 08/27/2020   • Frequent PVCs 8/28/2021   • GERD (gastroesophageal reflux disease)    • Gross hematuria    • HBP (high blood pressure)    • Long-term use of high-risk medication    • Lung disease    • Mixed hyperlipidemia 8/28/2021   • Myocardial infarction ([facility]) 07/2016   • OCD (obsessive compulsive disorder)    • Renal insufficiency 08/27/2020   • Rhinitis, allergic 06/05/2018   • Sore throat    • Stable angina ([facility])    • Typical atrial flutter ([facility]) 11/30/2018    s/p ablation by Dr. Aguilar    • Vertigo        Surgical History: has a past surgical history that includes Bladder surgery; Coronary artery bypass graft; Cardiac catheterization (07/2016); Colonoscopy (2011); Cystoscopy (03/19/2019); Esophagogastroduodenoscopy (2016); Cardiac surgery; Prostate surgery; and Cardiac catheterization (N/A, 8/9/2021).     Family History: family history includes Heart disease in his father; Other in his brother.     Social History: reports that he quit smoking about 24 years ago. His smoking use included cigarettes. He  started smoking about 64 years ago. He has a 20.00 pack-year smoking history. He has never used smokeless tobacco. He reports previous alcohol use. He reports that he does not use drugs.    Allergies: Carvedilol and Levaquin [levofloxacin]    Current Outpatient Medications on File Prior to Visit   Medication Sig   • acetaminophen (TYLENOL) 325 MG tablet Take 650 mg by mouth Every 6 (Six) Hours As Needed for Mild Pain .   • albuterol sulfate  (90 Base) MCG/ACT inhaler Inhale 2 puffs Every 4 (Four) Hours As Needed.   • calcium carbonate (OS-LIANNA) 600 MG tablet Take 600 mg by mouth Daily.   • clopidogrel (PLAVIX) 75 MG tablet Take 1 tablet by mouth Daily.   • Coenzyme Q10 100 MG tablet Take 100 mg by mouth Daily.   • dilTIAZem LA (CARDIZEM LA) 240 MG 24 hr tablet Take 240 mg by mouth Daily.   • famotidine (PEPCID) 10 MG tablet Take 10 mg by mouth Daily.   • fluticasone (FLONASE) 50 MCG/ACT nasal spray 1 spray into the nostril(s) as directed by provider Daily.   • Fluticasone Furoate (Arnuity Ellipta) 100 MCG/ACT aerosol powder  Inhale 1 puff Daily.   • furosemide (LASIX) 20 MG tablet Take 1 tablet by mouth Daily.   • ipratropium-albuterol (DUO-NEB) 0.5-2.5 mg/3 ml nebulizer Take 3 mL by nebulization Every 4 (Four) Hours As Needed.   • isosorbide mononitrate (IMDUR) 60 MG 24 hr tablet Take 60 mg by mouth Daily.   • Livalo 1 MG tablet tablet Take 1 tablet by mouth every day   • mupirocin (Bactroban Nasal) 2 % nasal ointment into the nostril(s) as directed by provider 2 (Two) Times a Day.   • mupirocin (BACTROBAN) 2 % ointment    • pantoprazole (PROTONIX) 40 MG EC tablet Take 1 tablet by mouth daily.   • sacubitril-valsartan (Entresto) 49-51 MG tablet Take 1 tablet by mouth 2 (Two) Times a Day.   • tamsulosin (FLOMAX) 0.4 MG capsule 24 hr capsule Take 1 capsule by mouth Daily. 1/2 hours following the same meal each day   • tiotropium bromide-olodaterol (Stiolto Respimat) 2.5-2.5 MCG/ACT aerosol solution inhaler  "Inhale 2 puffs Daily for 30 days.   • VITAMIN B COMPLEX-C PO Take 1 tablet by mouth Daily.   • Xarelto 15 MG tablet Take 1 tablet by mouth every day   • metoprolol succinate XL (TOPROL-XL) 25 MG 24 hr tablet Take 25 mg by mouth 2 (Two) Times a Day. 1/2 tab qd       Review of Systems   Constitutional: Positive for fatigue.   Respiratory: Positive for shortness of breath. Negative for cough and wheezing.    Cardiovascular: Positive for palpitations and leg swelling. Negative for chest pain.   Gastrointestinal: Negative for nausea and vomiting.   Neurological: Positive for dizziness. Negative for syncope.        Objective     /60   Ht 177.8 cm (70\")   Wt 107 kg (236 lb)   SpO2 98%   BMI 33.86 kg/m²       Physical Exam    General : Alert, awake, no acute distress  Neck : Supple, no carotid bruit, no jugular venous distention  CVS : Irregularly irregular, no murmur, rubs or gallops  Lungs: Clear to auscultation bilaterally, no crackles or rhonchi  Abdomen: Soft, nontender, bowel sounds heard in all 4 quadrants  Extremities: Warm, well-perfused, trace edema bilaterally  Result Review :     The following data was reviewed by: Darnell Stevenson MD on 05/03/2022:    CMP    CMP 8/9/21 8/10/21 5/1/22   Glucose 124 (A) 107 (A) 98   BUN 12 12 14   Creatinine 1.36 (A) 1.15 1.41 (A)   eGFR Non African Am 51 (A) 61    Sodium 139 138 140   Potassium 4.5 4.2 3.9   Chloride 102 106 103   Calcium 9.1 8.4 (A) 9.0   Albumin   4.00   Total Bilirubin   0.5   Alkaline Phosphatase   55   AST (SGOT)   11   ALT (SGPT)   17   (A) Abnormal value       Comments are available for some flowsheets but are not being displayed.           CBC    CBC 8/10/21 1/24/22 5/1/22   WBC 8.83 6.25 7.11   RBC 4.64 5.07 4.34   Hemoglobin 13.7 14.7 13.0   Hematocrit 41.2 44.2 40.3   MCV 88.8 87.2 92.9   MCH 29.5 29.0 30.0   MCHC 33.3 33.3 32.3   RDW 15.4 13.9 14.5   Platelets 163 261 153           TSH    TSH 1/24/22   TSH 1.270                  Data " reviewed: Cardiology studies            Results for orders placed during the hospital encounter of 07/08/21    Stress Test With Myocardial Perfusion One Day    Interpretation Summary  · Myocardial perfusion imaging indicates a small-sized infarct located in the inferior wall with mild mansoor-infarct ischemia.  · Left ventricular ejection fraction is borderline normal. (Calculated EF = 49%).  · Diaphragmatic attenuation artifact is present.  · Findings consistent with a normal ECG stress test.  · Occasional isolated PVCs are noted at rest and also during stress.  · Impressions are consistent with an intermediate risk study.      Echocardiogram done on March 3, 2021 showed    1.  Preserved left ventricular systolic function with estimated LV ejection fraction of 55%.   2.  Mild concentric left ventricular hypertrophy.   3.  Grade 1 diastolic dysfunction of the left ventricle.   4.  Aortic valvular sclerosis with no hemodynamically significant stenosis.              Assessment and Plan        Diagnoses and all orders for this visit:    1. Atrial fibrillation, persistent (HCC) (Primary)  Assessment & Plan:  New diagnosis, detected during recent hospital admission at [facility] in mid April.  He still remains in atrial fibrillation and ventricular rates not well controlled per home blood pressure log.  He had a second ER visit yesterday with high heart rate increasing shortness of breath.  His major problem is fatigue and shortness of breath during activities.  He is already on metoprolol twice daily along with Cardizem CD which will be continued.  Xarelto 50 mg to be continued for anticoagulation.  Various management options were discussed with the patient.  Since patient highly symptomatic when in atrial fibrillation, will consider electrical cardioversion under moderate sedation.  The risks, benefits and alternatives explained to patient and he agreed to proceed.  We will schedule the procedure in this  week.    Orders:  -     metoprolol succinate XL (TOPROL-XL) 25 MG 24 hr tablet; Take 1 tablet by mouth 2 (Two) Times a Day.  Dispense: 180 tablet; Refill: 3    2. Coronary artery disease of native artery of native heart with stable angina pectoris (HCC)  Assessment & Plan:  He currently denies any angina-like symptoms.  Continue Plavix, statins and beta-blockers along with long-acting nitrates.  LV ejection fraction is 50%.      3. Typical atrial flutter (HCC)  Assessment & Plan:  No recurrence since 2018.  He currently has atrial fibrillation.  On metoprolol and Xarelto which will be continued.      4. Ischemic cardiomyopathy  Assessment & Plan:  He reports increasing shortness of breath.  Mild volume overload on physical examination.  The exacerbations likely triggered by atrial fibrillation.  Recommend to take 40 mg of Lasix daily for now.  Continue metoprolol and Entresto.    Orders:  -     metoprolol succinate XL (TOPROL-XL) 25 MG 24 hr tablet; Take 1 tablet by mouth 2 (Two) Times a Day.  Dispense: 180 tablet; Refill: 3    5. Essential hypertension  Assessment & Plan:  Blood pressure reasonably well controlled.  Amlodipine was held during recent admission since he was started on Cardizem CD.  Continue Entresto, metoprolol at the current dose.            Follow Up     Return for Will schedule cardioversion on 5/5/2022.    Patient was given instructions and counseling regarding his condition or for health maintenance advice. Please see specific information pulled into the AVS if appropriate.

## 2022-05-03 NOTE — ASSESSMENT & PLAN NOTE
He reports increasing shortness of breath.  Mild volume overload on physical examination.  The exacerbations likely triggered by atrial fibrillation.  Recommend to take 40 mg of Lasix daily for now.  Continue metoprolol and Entresto.

## 2022-05-03 NOTE — PROGRESS NOTES
CARDIOLOGY FOLLOW-UP PROGRESS NOTE        Chief Complaint  Irregular Heart Beat, Palpitations, Shortness of Breath, and ER Follow up    Subjective            Layo Cee presents to [facility] CARDIOLOGY  History of Present Illness    Mr Cee is here for an unscheduled visit.  He was admitted to [facility] on 4/13/2022, when he presented with increasing shortness of breath.  He was noted to be in atrial fibrillation with RVR and also combination of COPD and CHF exacerbation.  He was treated with IV diuretics and bronchodilators for 3 days and was discharged home on oral Cardizem along with increased dose of Lasix.  Patient reports that he did not feel well since then.  His shortness of breath slightly improved but feels very weak tired and fatigued all the time.  His heart rate is mostly close to 100 and at [id number] without any activity.  He went back to Lasix 20 mg once daily a week back since swelling completely subsided and he feels even weaker.  He had a second visit to the emergency room yesterday to our hospital due to increasing heart rate of 130 at home and fatigue along shortness of breath.  In the ER, he was noted to be in A. fib with controlled ventricular rates.  1 dose of IV Lopressor was given.  He denies having any chest pain currently.  Dizziness is better than before.  No syncopal episodes.  He lost around 8 pounds in the past 1 month.       Past History:    (1) Coronary artery disease, status post coronary artery bypass grafting in the past. Cardiac catheterization done in July 2016 showed patent left internal mammary graft to the LAD and occluded saphenous venous graft. Native LAD is 100% occluded in the mid portion. Native circumflex artery has no significant disease. Native right coronary artery is also 100% occluded and it is a chronic total occlusion. The right coronary artery is reconstituted with collaterals from the left side.  Patient underwent repeat  cardiac catheterization in June, 2021 and underwent angioplasty stent placement to diagonal branch.  (2) Ischemic cardiomyopathy with recovery of cardiac function. Last SPECT stress test in August 2016 showed an LV ejection fraction of 68%. (3) Chronic kidney disease, stage 3. (4) Chronic obstructive pulmonary disease, not an exacerbation. (5) Hypertension. (6) Hyperlipidemia. 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 (8) Typical atrial flutter status post radiofrequency ablation by Dr. Aguilar on 11/30/2018        Medical History:  Past Medical History:   Diagnosis Date   • Arthritis    • BPH (benign prostatic hyperplasia)    • CAD (coronary artery disease)    • CHF (congestive heart failure) ([facility])    • COPD (chronic obstructive pulmonary disease) ([facility])    • COVID-19 02/04/2021   • Diverticulitis 10/11/2019   • Dysphagia    • Essential hypertension 08/27/2020   • Frequent PVCs 8/28/2021   • GERD (gastroesophageal reflux disease)    • Gross hematuria    • HBP (high blood pressure)    • Long-term use of high-risk medication    • Lung disease    • Mixed hyperlipidemia 8/28/2021   • Myocardial infarction ([facility]) 07/2016   • OCD (obsessive compulsive disorder)    • Renal insufficiency 08/27/2020   • Rhinitis, allergic 06/05/2018   • Sore throat    • Stable angina ([facility])    • Typical atrial flutter ([facility]) 11/30/2018    s/p ablation by Dr. Aguilar    • Vertigo        Surgical History: has a past surgical history that includes Bladder surgery; Coronary artery bypass graft; Cardiac catheterization (07/2016); Colonoscopy (2011); Cystoscopy (03/19/2019); Esophagogastroduodenoscopy (2016); Cardiac surgery; Prostate surgery; and Cardiac catheterization (N/A, 8/9/2021).     Family History: family history includes Heart disease in his father; Other in his brother.     Social History: reports that he quit smoking about 24 years ago. His smoking use included cigarettes. He  started smoking about 64 years ago. He has a 20.00 pack-year smoking history. He has never used smokeless tobacco. He reports previous alcohol use. He reports that he does not use drugs.    Allergies: Carvedilol and Levaquin [levofloxacin]    Current Outpatient Medications on File Prior to Visit   Medication Sig   • acetaminophen (TYLENOL) 325 MG tablet Take 650 mg by mouth Every 6 (Six) Hours As Needed for Mild Pain .   • albuterol sulfate  (90 Base) MCG/ACT inhaler Inhale 2 puffs Every 4 (Four) Hours As Needed.   • calcium carbonate (OS-LIANNA) 600 MG tablet Take 600 mg by mouth Daily.   • clopidogrel (PLAVIX) 75 MG tablet Take 1 tablet by mouth Daily.   • Coenzyme Q10 100 MG tablet Take 100 mg by mouth Daily.   • dilTIAZem LA (CARDIZEM LA) 240 MG 24 hr tablet Take 240 mg by mouth Daily.   • famotidine (PEPCID) 10 MG tablet Take 10 mg by mouth Daily.   • fluticasone (FLONASE) 50 MCG/ACT nasal spray 1 spray into the nostril(s) as directed by provider Daily.   • Fluticasone Furoate (Arnuity Ellipta) 100 MCG/ACT aerosol powder  Inhale 1 puff Daily.   • furosemide (LASIX) 20 MG tablet Take 1 tablet by mouth Daily.   • ipratropium-albuterol (DUO-NEB) 0.5-2.5 mg/3 ml nebulizer Take 3 mL by nebulization Every 4 (Four) Hours As Needed.   • isosorbide mononitrate (IMDUR) 60 MG 24 hr tablet Take 60 mg by mouth Daily.   • Livalo 1 MG tablet tablet Take 1 tablet by mouth every day   • mupirocin (Bactroban Nasal) 2 % nasal ointment into the nostril(s) as directed by provider 2 (Two) Times a Day.   • mupirocin (BACTROBAN) 2 % ointment    • pantoprazole (PROTONIX) 40 MG EC tablet Take 1 tablet by mouth daily.   • sacubitril-valsartan (Entresto) 49-51 MG tablet Take 1 tablet by mouth 2 (Two) Times a Day.   • tamsulosin (FLOMAX) 0.4 MG capsule 24 hr capsule Take 1 capsule by mouth Daily. 1/2 hours following the same meal each day   • tiotropium bromide-olodaterol (Stiolto Respimat) 2.5-2.5 MCG/ACT aerosol solution inhaler  "Inhale 2 puffs Daily for 30 days.   • VITAMIN B COMPLEX-C PO Take 1 tablet by mouth Daily.   • Xarelto 15 MG tablet Take 1 tablet by mouth every day   • metoprolol succinate XL (TOPROL-XL) 25 MG 24 hr tablet Take 25 mg by mouth 2 (Two) Times a Day. 1/2 tab qd       Review of Systems   Constitutional: Positive for fatigue.   Respiratory: Positive for shortness of breath. Negative for cough and wheezing.    Cardiovascular: Positive for palpitations and leg swelling. Negative for chest pain.   Gastrointestinal: Negative for nausea and vomiting.   Neurological: Positive for dizziness. Negative for syncope.        Objective     /60   Ht 177.8 cm (70\")   Wt 107 kg (236 lb)   SpO2 98%   BMI 33.86 kg/m²       Physical Exam    General : Alert, awake, no acute distress  Neck : Supple, no carotid bruit, no jugular venous distention  CVS : Irregularly irregular, no murmur, rubs or gallops  Lungs: Clear to auscultation bilaterally, no crackles or rhonchi  Abdomen: Soft, nontender, bowel sounds heard in all 4 quadrants  Extremities: Warm, well-perfused, trace edema bilaterally  Result Review :     The following data was reviewed by: Darnell Stevenson MD on 05/03/2022:    CMP    CMP 8/9/21 8/10/21 5/1/22   Glucose 124 (A) 107 (A) 98   BUN 12 12 14   Creatinine 1.36 (A) 1.15 1.41 (A)   eGFR Non African Am 51 (A) 61    Sodium 139 138 140   Potassium 4.5 4.2 3.9   Chloride 102 106 103   Calcium 9.1 8.4 (A) 9.0   Albumin   4.00   Total Bilirubin   0.5   Alkaline Phosphatase   55   AST (SGOT)   11   ALT (SGPT)   17   (A) Abnormal value       Comments are available for some flowsheets but are not being displayed.           CBC    CBC 8/10/21 1/24/22 5/1/22   WBC 8.83 6.25 7.11   RBC 4.64 5.07 4.34   Hemoglobin 13.7 14.7 13.0   Hematocrit 41.2 44.2 40.3   MCV 88.8 87.2 92.9   MCH 29.5 29.0 30.0   MCHC 33.3 33.3 32.3   RDW 15.4 13.9 14.5   Platelets 163 261 153           TSH    TSH 1/24/22   TSH 1.270                  Data " reviewed: Cardiology studies            Results for orders placed during the hospital encounter of 07/08/21    Stress Test With Myocardial Perfusion One Day    Interpretation Summary  · Myocardial perfusion imaging indicates a small-sized infarct located in the inferior wall with mild mansoor-infarct ischemia.  · Left ventricular ejection fraction is borderline normal. (Calculated EF = 49%).  · Diaphragmatic attenuation artifact is present.  · Findings consistent with a normal ECG stress test.  · Occasional isolated PVCs are noted at rest and also during stress.  · Impressions are consistent with an intermediate risk study.      Echocardiogram done on March 3, 2021 showed    1.  Preserved left ventricular systolic function with estimated LV ejection fraction of 55%.   2.  Mild concentric left ventricular hypertrophy.   3.  Grade 1 diastolic dysfunction of the left ventricle.   4.  Aortic valvular sclerosis with no hemodynamically significant stenosis.              Assessment and Plan        Diagnoses and all orders for this visit:    1. Atrial fibrillation, persistent (HCC) (Primary)  Assessment & Plan:  New diagnosis, detected during recent hospital admission at [facility] in mid April.  He still remains in atrial fibrillation and ventricular rates not well controlled per home blood pressure log.  He had a second ER visit yesterday with high heart rate increasing shortness of breath.  His major problem is fatigue and shortness of breath during activities.  He is already on metoprolol twice daily along with Cardizem CD which will be continued.  Xarelto 50 mg to be continued for anticoagulation.  Various management options were discussed with the patient.  Since patient highly symptomatic when in atrial fibrillation, will consider electrical cardioversion under moderate sedation.  The risks, benefits and alternatives explained to patient and he agreed to proceed.  We will schedule the procedure in this  week.    Orders:  -     metoprolol succinate XL (TOPROL-XL) 25 MG 24 hr tablet; Take 1 tablet by mouth 2 (Two) Times a Day.  Dispense: 180 tablet; Refill: 3    2. Coronary artery disease of native artery of native heart with stable angina pectoris (HCC)  Assessment & Plan:  He currently denies any angina-like symptoms.  Continue Plavix, statins and beta-blockers along with long-acting nitrates.  LV ejection fraction is 50%.      3. Typical atrial flutter (HCC)  Assessment & Plan:  No recurrence since 2018.  He currently has atrial fibrillation.  On metoprolol and Xarelto which will be continued.      4. Ischemic cardiomyopathy  Assessment & Plan:  He reports increasing shortness of breath.  Mild volume overload on physical examination.  The exacerbations likely triggered by atrial fibrillation.  Recommend to take 40 mg of Lasix daily for now.  Continue metoprolol and Entresto.    Orders:  -     metoprolol succinate XL (TOPROL-XL) 25 MG 24 hr tablet; Take 1 tablet by mouth 2 (Two) Times a Day.  Dispense: 180 tablet; Refill: 3    5. Essential hypertension  Assessment & Plan:  Blood pressure reasonably well controlled.  Amlodipine was held during recent admission since he was started on Cardizem CD.  Continue Entresto, metoprolol at the current dose.            Follow Up     Return for Will schedule cardioversion on 5/5/2022.    Patient was given instructions and counseling regarding his condition or for health maintenance advice. Please see specific information pulled into the AVS if appropriate.

## 2022-05-03 NOTE — ASSESSMENT & PLAN NOTE
No recurrence since 2018.  He currently has atrial fibrillation.  On metoprolol and Xarelto which will be continued.

## 2022-05-03 NOTE — ASSESSMENT & PLAN NOTE
Blood pressure reasonably well controlled.  Amlodipine was held during recent admission since he was started on Cardizem CD.  Continue Entresto, metoprolol at the current dose.

## 2022-05-03 NOTE — ASSESSMENT & PLAN NOTE
He currently denies any angina-like symptoms.  Continue Plavix, statins and beta-blockers along with long-acting nitrates.  LV ejection fraction is 50%.

## 2022-05-03 NOTE — ASSESSMENT & PLAN NOTE
New diagnosis, detected during recent hospital admission at [facility] in mid April.  He still remains in atrial fibrillation and ventricular rates not well controlled per home blood pressure log.  He had a second ER visit yesterday with high heart rate increasing shortness of breath.  His major problem is fatigue and shortness of breath during activities.  He is already on metoprolol twice daily along with Cardizem CD which will be continued.  Xarelto 50 mg to be continued for anticoagulation.  Various management options were discussed with the patient.  Since patient highly symptomatic when in atrial fibrillation, will consider electrical cardioversion under moderate sedation.  The risks, benefits and alternatives explained to patient and he agreed to proceed.  We will schedule the procedure in this week.

## 2022-05-05 ENCOUNTER — HOSPITAL ENCOUNTER (OUTPATIENT)
Dept: CARDIOLOGY | Facility: HOSPITAL | Age: 82
Discharge: HOME | End: 2022-05-05
Admitting: INTERNAL MEDICINE

## 2022-05-05 VITALS
WEIGHT: 230 LBS | OXYGEN SATURATION: 93 % | BODY MASS INDEX: 32.93 KG/M2 | SYSTOLIC BLOOD PRESSURE: 107 MMHG | RESPIRATION RATE: 20 BRPM | HEIGHT: 70 IN | HEART RATE: 92 BPM | DIASTOLIC BLOOD PRESSURE: 77 MMHG

## 2022-05-05 DIAGNOSIS — I48.0 PAROXYSMAL ATRIAL FIBRILLATION: ICD-10-CM

## 2022-05-05 LAB
ALBUMIN SERPL-MCNC: 3.5 G/DL (ref 3.5–5.2)
ALP SERPL-CCNC: 54 U/L (ref 39–117)
ALT SERPL W P-5'-P-CCNC: 14 U/L (ref 1–41)
ANION GAP SERPL CALCULATED.3IONS-SCNC: 11.4 MMOL/L (ref 5–15)
AST SERPL-CCNC: 12 U/L (ref 1–40)
BILIRUB CONJ SERPL-MCNC: 0.2 MG/DL (ref 0–0.3)
BILIRUB INDIRECT SERPL-MCNC: 0.2 MG/DL
BILIRUB SERPL-MCNC: 0.4 MG/DL (ref 0–1.2)
BUN SERPL-MCNC: 16 MG/DL (ref 8–23)
BUN/CREAT SERPL: 12.7 (ref 7–25)
CALCIUM SPEC-SCNC: 9.3 MG/DL (ref 8.6–10.5)
CHLORIDE SERPL-SCNC: 104 MMOL/L (ref 98–107)
CO2 SERPL-SCNC: 22.6 MMOL/L (ref 22–29)
CREAT SERPL-MCNC: 1.26 MG/DL (ref 0.76–1.27)
EGFRCR SERPLBLD CKD-EPI 2021: 57.7 ML/MIN/1.73
GLUCOSE SERPL-MCNC: 164 MG/DL (ref 65–99)
INR PPP: 2.05 (ref 0.86–1.15)
MAXIMAL PREDICTED HEART RATE: 140 BPM
POTASSIUM SERPL-SCNC: 3.9 MMOL/L (ref 3.5–5.2)
PROT SERPL-MCNC: 6.5 G/DL (ref 6–8.5)
PROTHROMBIN TIME: 23.5 SECONDS (ref 11.8–14.9)
SODIUM SERPL-SCNC: 138 MMOL/L (ref 136–145)
STRESS TARGET HR: 119 BPM

## 2022-05-05 PROCEDURE — 93010 ELECTROCARDIOGRAM REPORT: CPT | Performed by: INTERNAL MEDICINE

## 2022-05-05 PROCEDURE — 92960 CARDIOVERSION ELECTRIC EXT: CPT | Performed by: INTERNAL MEDICINE

## 2022-05-05 PROCEDURE — 93005 ELECTROCARDIOGRAM TRACING: CPT | Performed by: INTERNAL MEDICINE

## 2022-05-05 PROCEDURE — 0 MEPERIDINE PER 100 MG: Performed by: INTERNAL MEDICINE

## 2022-05-05 PROCEDURE — 92960 CARDIOVERSION ELECTRIC EXT: CPT

## 2022-05-05 PROCEDURE — 80076 HEPATIC FUNCTION PANEL: CPT | Performed by: INTERNAL MEDICINE

## 2022-05-05 PROCEDURE — 25010000002 MIDAZOLAM HCL (PF) 10 MG/2ML SOLUTION: Performed by: INTERNAL MEDICINE

## 2022-05-05 PROCEDURE — 80048 BASIC METABOLIC PNL TOTAL CA: CPT | Performed by: INTERNAL MEDICINE

## 2022-05-05 PROCEDURE — 85610 PROTHROMBIN TIME: CPT | Performed by: INTERNAL MEDICINE

## 2022-05-05 RX ORDER — MEPERIDINE HYDROCHLORIDE 25 MG/ML
INJECTION INTRAMUSCULAR; INTRAVENOUS; SUBCUTANEOUS
Status: COMPLETED | OUTPATIENT
Start: 2022-05-05 | End: 2022-05-05

## 2022-05-05 RX ORDER — MIDAZOLAM HYDROCHLORIDE 10 MG/2ML
INJECTION, SOLUTION INTRAMUSCULAR; INTRAVENOUS
Status: COMPLETED | OUTPATIENT
Start: 2022-05-05 | End: 2022-05-05

## 2022-05-05 RX ORDER — SODIUM CHLORIDE 0.9 % (FLUSH) 0.9 %
10 SYRINGE (ML) INJECTION AS NEEDED
Status: DISCONTINUED | OUTPATIENT
Start: 2022-05-05 | End: 2022-05-06 | Stop reason: HOSPADM

## 2022-05-05 RX ORDER — SODIUM CHLORIDE 0.9 % (FLUSH) 0.9 %
3 SYRINGE (ML) INJECTION EVERY 12 HOURS SCHEDULED
Status: DISCONTINUED | OUTPATIENT
Start: 2022-05-05 | End: 2022-05-06 | Stop reason: HOSPADM

## 2022-05-05 RX ADMIN — MIDAZOLAM HYDROCHLORIDE 1 MG: 5 INJECTION, SOLUTION INTRAMUSCULAR; INTRAVENOUS at 08:59

## 2022-05-05 RX ADMIN — MEPERIDINE HYDROCHLORIDE 25 MG: 25 INJECTION INTRAMUSCULAR; INTRAVENOUS; SUBCUTANEOUS at 08:57

## 2022-05-05 RX ADMIN — MIDAZOLAM HYDROCHLORIDE 4 MG: 5 INJECTION, SOLUTION INTRAMUSCULAR; INTRAVENOUS at 08:54

## 2022-05-05 NOTE — DISCHARGE INSTRUCTIONS
Echo Lab Phone Number: [phone]    A responsible adult should drive you home and stay with you today and tonight.  Do not drive a car or operate any hazardous machinery for 24 hours.  Do not drink any alcoholic beverages for 24 hours.  Do not make any legal decisions for 24 hours after sedation.  Do not engage in any strenuous activity.  Resume your medications, following prescribed instructions.  Contact your caregiver if you have questions or concerns about your care.    In the event of an emergency, call 911 or go to your nearest emergency room.    You received the following medications during your procedure:  Versed and Demerol.

## 2022-05-05 NOTE — INTERVAL H&P NOTE
H&P reviewed. The patient was examined and there are no changes to the H&P.      Patient remains in atrial fibrillation and is highly symptomatic.    We will proceed with electrical cardioversion after reviewing labs.    The risks, benefits and alternatives again explained to the patient and he agreed to proceed      Electronically signed by Darnell Stevenson MD, 05/05/22, 8:02 AM EDT.

## 2022-05-06 ENCOUNTER — APPOINTMENT (OUTPATIENT)
Dept: CARDIOLOGY | Facility: HOSPITAL | Age: 82
End: 2022-05-06

## 2022-05-06 ENCOUNTER — APPOINTMENT (OUTPATIENT)
Dept: CT IMAGING | Facility: HOSPITAL | Age: 82
End: 2022-05-06

## 2022-05-06 ENCOUNTER — HOSPITAL ENCOUNTER (INPATIENT)
Facility: HOSPITAL | Age: 82
LOS: 4 days | Discharge: HOME | End: 2022-05-12
Attending: EMERGENCY MEDICINE | Admitting: INTERNAL MEDICINE

## 2022-05-06 ENCOUNTER — APPOINTMENT (OUTPATIENT)
Dept: GENERAL RADIOLOGY | Facility: HOSPITAL | Age: 82
End: 2022-05-06

## 2022-05-06 DIAGNOSIS — J44.1 COPD EXACERBATION: Primary | ICD-10-CM

## 2022-05-06 PROBLEM — J44.9 COPD (CHRONIC OBSTRUCTIVE PULMONARY DISEASE): Status: ACTIVE | Noted: 2022-05-06

## 2022-05-06 LAB
ALBUMIN SERPL-MCNC: 3.8 G/DL (ref 3.5–5.2)
ALBUMIN/GLOB SERPL: 1.4 G/DL
ALP SERPL-CCNC: 54 U/L (ref 39–117)
ALT SERPL W P-5'-P-CCNC: 15 U/L (ref 1–41)
ANION GAP SERPL CALCULATED.3IONS-SCNC: 11 MMOL/L (ref 5–15)
AST SERPL-CCNC: 14 U/L (ref 1–40)
BASE EXCESS BLDA CALC-SCNC: 0.3 MMOL/L (ref -2–2)
BASOPHILS # BLD AUTO: 0.02 10*3/MM3 (ref 0–0.2)
BASOPHILS NFR BLD AUTO: 0.3 % (ref 0–1.5)
BDY SITE: ABNORMAL
BILIRUB SERPL-MCNC: 0.4 MG/DL (ref 0–1.2)
BUN SERPL-MCNC: 14 MG/DL (ref 8–23)
BUN/CREAT SERPL: 11.7 (ref 7–25)
CALCIUM SPEC-SCNC: 9.5 MG/DL (ref 8.6–10.5)
CHLORIDE SERPL-SCNC: 104 MMOL/L (ref 98–107)
CHOLEST SERPL-MCNC: 120 MG/DL (ref 0–200)
CO2 SERPL-SCNC: 25 MMOL/L (ref 22–29)
COHGB MFR BLD: 0.6 % (ref 0–1.5)
CREAT SERPL-MCNC: 1.2 MG/DL (ref 0.76–1.27)
D-LACTATE SERPL-SCNC: 1.1 MMOL/L (ref 0.5–2)
DEPRECATED RDW RBC AUTO: 48.6 FL (ref 37–54)
EGFRCR SERPLBLD CKD-EPI 2021: 61.1 ML/MIN/1.73
EOSINOPHIL # BLD AUTO: 0.46 10*3/MM3 (ref 0–0.4)
EOSINOPHIL NFR BLD AUTO: 6.7 % (ref 0.3–6.2)
ERYTHROCYTE [DISTWIDTH] IN BLOOD BY AUTOMATED COUNT: 14.6 % (ref 12.3–15.4)
FHHB: 8.8 % (ref 0–5)
GLOBULIN UR ELPH-MCNC: 2.7 GM/DL
GLUCOSE SERPL-MCNC: 126 MG/DL (ref 65–99)
HBA1C MFR BLD: 6.1 % (ref 4.8–5.6)
HCO3 BLDA-SCNC: 25.6 MMOL/L (ref 22–26)
HCT VFR BLD AUTO: 35.1 % (ref 37.5–51)
HDLC SERPL-MCNC: 32 MG/DL (ref 40–60)
HGB BLD-MCNC: 11.6 G/DL (ref 13–17.7)
HGB BLDA-MCNC: 12.9 G/DL (ref 13.8–16.4)
HOLD SPECIMEN: NORMAL
HOLD SPECIMEN: NORMAL
IMM GRANULOCYTES # BLD AUTO: 0.02 10*3/MM3 (ref 0–0.05)
IMM GRANULOCYTES NFR BLD AUTO: 0.3 % (ref 0–0.5)
INR PPP: 2.24 (ref 0.86–1.15)
LACTATE BLDA-SCNC: ABNORMAL MMOL/L
LDLC SERPL CALC-MCNC: 72 MG/DL (ref 0–100)
LDLC/HDLC SERPL: 2.24 {RATIO}
LYMPHOCYTES # BLD AUTO: 1.32 10*3/MM3 (ref 0.7–3.1)
LYMPHOCYTES NFR BLD AUTO: 19.1 % (ref 19.6–45.3)
MCH RBC QN AUTO: 30.1 PG (ref 26.6–33)
MCHC RBC AUTO-ENTMCNC: 33 G/DL (ref 31.5–35.7)
MCV RBC AUTO: 91.2 FL (ref 79–97)
METHGB BLD QL: 0.4 % (ref 0–1.5)
MODALITY: ABNORMAL
MONOCYTES # BLD AUTO: 0.54 10*3/MM3 (ref 0.1–0.9)
MONOCYTES NFR BLD AUTO: 7.8 % (ref 5–12)
NEUTROPHILS NFR BLD AUTO: 4.55 10*3/MM3 (ref 1.7–7)
NEUTROPHILS NFR BLD AUTO: 65.8 % (ref 42.7–76)
NRBC BLD AUTO-RTO: 0 /100 WBC (ref 0–0.2)
NT-PROBNP SERPL-MCNC: 876.3 PG/ML (ref 0–1800)
OXYHGB MFR BLDV: 90.2 % (ref 94–99)
PCO2 BLDA: 43.9 MM HG (ref 35–45)
PH BLDA: 7.38 PH UNITS (ref 7.35–7.45)
PLATELET # BLD AUTO: 224 10*3/MM3 (ref 140–450)
PMV BLD AUTO: 9.2 FL (ref 6–12)
PO2 BLDA: 64.9 MM HG (ref 80–100)
POTASSIUM SERPL-SCNC: 4.1 MMOL/L (ref 3.5–5.2)
PROT SERPL-MCNC: 6.5 G/DL (ref 6–8.5)
PROTHROMBIN TIME: 25.2 SECONDS (ref 11.8–14.9)
QT INTERVAL: 296 MS
QT INTERVAL: 360 MS
RBC # BLD AUTO: 3.85 10*6/MM3 (ref 4.14–5.8)
SAO2 % BLDCOA: 91.1 % (ref 95–99)
SODIUM SERPL-SCNC: 140 MMOL/L (ref 136–145)
TRIGL SERPL-MCNC: 82 MG/DL (ref 0–150)
TROPONIN T SERPL-MCNC: <0.01 NG/ML (ref 0–0.03)
VLDLC SERPL-MCNC: 16 MG/DL (ref 5–40)
WBC NRBC COR # BLD: 6.91 10*3/MM3 (ref 3.4–10.8)
WHOLE BLOOD HOLD SPECIMEN: NORMAL
WHOLE BLOOD HOLD SPECIMEN: NORMAL

## 2022-05-06 PROCEDURE — 84484 ASSAY OF TROPONIN QUANT: CPT | Performed by: EMERGENCY MEDICINE

## 2022-05-06 PROCEDURE — 94664 DEMO&/EVAL PT USE INHALER: CPT

## 2022-05-06 PROCEDURE — 99223 1ST HOSP IP/OBS HIGH 75: CPT | Performed by: INTERNAL MEDICINE

## 2022-05-06 PROCEDURE — 94799 UNLISTED PULMONARY SVC/PX: CPT

## 2022-05-06 PROCEDURE — 83036 HEMOGLOBIN GLYCOSYLATED A1C: CPT | Performed by: INTERNAL MEDICINE

## 2022-05-06 PROCEDURE — 93005 ELECTROCARDIOGRAM TRACING: CPT | Performed by: EMERGENCY MEDICINE

## 2022-05-06 PROCEDURE — 93005 ELECTROCARDIOGRAM TRACING: CPT | Performed by: PHYSICIAN ASSISTANT

## 2022-05-06 PROCEDURE — 85025 COMPLETE CBC W/AUTO DIFF WBC: CPT

## 2022-05-06 PROCEDURE — 83605 ASSAY OF LACTIC ACID: CPT | Performed by: EMERGENCY MEDICINE

## 2022-05-06 PROCEDURE — 71045 X-RAY EXAM CHEST 1 VIEW: CPT

## 2022-05-06 PROCEDURE — 25010000002 DIGOXIN PER 500 MCG: Performed by: INTERNAL MEDICINE

## 2022-05-06 PROCEDURE — 25010000002 METHYLPREDNISOLONE PER 125 MG: Performed by: EMERGENCY MEDICINE

## 2022-05-06 PROCEDURE — 36600 WITHDRAWAL OF ARTERIAL BLOOD: CPT | Performed by: EMERGENCY MEDICINE

## 2022-05-06 PROCEDURE — 82375 ASSAY CARBOXYHB QUANT: CPT | Performed by: EMERGENCY MEDICINE

## 2022-05-06 PROCEDURE — 85610 PROTHROMBIN TIME: CPT | Performed by: INTERNAL MEDICINE

## 2022-05-06 PROCEDURE — 82805 BLOOD GASES W/O2 SATURATION: CPT | Performed by: EMERGENCY MEDICINE

## 2022-05-06 PROCEDURE — G0378 HOSPITAL OBSERVATION PER HR: HCPCS

## 2022-05-06 PROCEDURE — 99284 EMERGENCY DEPT VISIT MOD MDM: CPT

## 2022-05-06 PROCEDURE — 63710000001 PREDNISONE PER 1 MG: Performed by: INTERNAL MEDICINE

## 2022-05-06 PROCEDURE — 94640 AIRWAY INHALATION TREATMENT: CPT

## 2022-05-06 PROCEDURE — 83050 HGB METHEMOGLOBIN QUAN: CPT | Performed by: EMERGENCY MEDICINE

## 2022-05-06 PROCEDURE — 80053 COMPREHEN METABOLIC PANEL: CPT | Performed by: EMERGENCY MEDICINE

## 2022-05-06 PROCEDURE — 93010 ELECTROCARDIOGRAM REPORT: CPT | Performed by: INTERNAL MEDICINE

## 2022-05-06 PROCEDURE — 87040 BLOOD CULTURE FOR BACTERIA: CPT

## 2022-05-06 PROCEDURE — 83880 ASSAY OF NATRIURETIC PEPTIDE: CPT | Performed by: EMERGENCY MEDICINE

## 2022-05-06 PROCEDURE — 93005 ELECTROCARDIOGRAM TRACING: CPT

## 2022-05-06 PROCEDURE — 71260 CT THORAX DX C+: CPT

## 2022-05-06 PROCEDURE — 99222 1ST HOSP IP/OBS MODERATE 55: CPT | Performed by: INTERNAL MEDICINE

## 2022-05-06 PROCEDURE — 0 IOPAMIDOL PER 1 ML: Performed by: EMERGENCY MEDICINE

## 2022-05-06 PROCEDURE — 25010000002 FUROSEMIDE PER 20 MG: Performed by: INTERNAL MEDICINE

## 2022-05-06 PROCEDURE — 80061 LIPID PANEL: CPT | Performed by: INTERNAL MEDICINE

## 2022-05-06 RX ORDER — METOPROLOL SUCCINATE 50 MG/1
50 TABLET, EXTENDED RELEASE ORAL EVERY 12 HOURS SCHEDULED
Status: DISCONTINUED | OUTPATIENT
Start: 2022-05-06 | End: 2022-05-08

## 2022-05-06 RX ORDER — ALBUTEROL SULFATE 2.5 MG/3ML
7.5 SOLUTION RESPIRATORY (INHALATION) ONCE
Status: COMPLETED | OUTPATIENT
Start: 2022-05-06 | End: 2022-05-06

## 2022-05-06 RX ORDER — SODIUM CHLORIDE 0.9 % (FLUSH) 0.9 %
10 SYRINGE (ML) INJECTION AS NEEDED
Status: DISCONTINUED | OUTPATIENT
Start: 2022-05-06 | End: 2022-05-12 | Stop reason: HOSPADM

## 2022-05-06 RX ORDER — BISACODYL 5 MG/1
5 TABLET, DELAYED RELEASE ORAL DAILY PRN
Status: DISCONTINUED | OUTPATIENT
Start: 2022-05-06 | End: 2022-05-12 | Stop reason: HOSPADM

## 2022-05-06 RX ORDER — DIGOXIN 0.25 MG/ML
125 INJECTION INTRAMUSCULAR; INTRAVENOUS ONCE
Status: COMPLETED | OUTPATIENT
Start: 2022-05-06 | End: 2022-05-06

## 2022-05-06 RX ORDER — CHOLECALCIFEROL (VITAMIN D3) 125 MCG
5 CAPSULE ORAL NIGHTLY PRN
Status: DISCONTINUED | OUTPATIENT
Start: 2022-05-06 | End: 2022-05-12 | Stop reason: HOSPADM

## 2022-05-06 RX ORDER — DILTIAZEM HCL IN NACL,ISO-OSM 125 MG/125
5-15 PLASTIC BAG, INJECTION (ML) INTRAVENOUS
Status: DISCONTINUED | OUTPATIENT
Start: 2022-05-07 | End: 2022-05-09

## 2022-05-06 RX ORDER — BUDESONIDE 0.5 MG/2ML
0.5 INHALANT ORAL
Status: DISCONTINUED | OUTPATIENT
Start: 2022-05-06 | End: 2022-05-12 | Stop reason: HOSPADM

## 2022-05-06 RX ORDER — METOPROLOL SUCCINATE 25 MG/1
25 TABLET, EXTENDED RELEASE ORAL 2 TIMES DAILY
Status: DISCONTINUED | OUTPATIENT
Start: 2022-05-06 | End: 2022-05-06

## 2022-05-06 RX ORDER — FUROSEMIDE 10 MG/ML
40 INJECTION INTRAMUSCULAR; INTRAVENOUS ONCE
Status: COMPLETED | OUTPATIENT
Start: 2022-05-06 | End: 2022-05-06

## 2022-05-06 RX ORDER — LEVALBUTEROL INHALATION SOLUTION 1.25 MG/3ML
1.25 SOLUTION RESPIRATORY (INHALATION)
Status: DISCONTINUED | OUTPATIENT
Start: 2022-05-06 | End: 2022-05-12 | Stop reason: HOSPADM

## 2022-05-06 RX ORDER — FLUTICASONE PROPIONATE 50 MCG
1 SPRAY, SUSPENSION (ML) NASAL DAILY
Status: DISCONTINUED | OUTPATIENT
Start: 2022-05-06 | End: 2022-05-12 | Stop reason: HOSPADM

## 2022-05-06 RX ORDER — MULTIPLE VITAMINS W/ MINERALS TAB 9MG-400MCG
1 TAB ORAL DAILY
Status: DISCONTINUED | OUTPATIENT
Start: 2022-05-06 | End: 2022-05-12 | Stop reason: HOSPADM

## 2022-05-06 RX ORDER — TAMSULOSIN HYDROCHLORIDE 0.4 MG/1
0.4 CAPSULE ORAL DAILY
Status: DISCONTINUED | OUTPATIENT
Start: 2022-05-06 | End: 2022-05-12 | Stop reason: HOSPADM

## 2022-05-06 RX ORDER — ONDANSETRON 2 MG/ML
4 INJECTION INTRAMUSCULAR; INTRAVENOUS EVERY 6 HOURS PRN
Status: DISCONTINUED | OUTPATIENT
Start: 2022-05-06 | End: 2022-05-12 | Stop reason: HOSPADM

## 2022-05-06 RX ORDER — METHYLPREDNISOLONE SODIUM SUCCINATE 125 MG/2ML
125 INJECTION, POWDER, LYOPHILIZED, FOR SOLUTION INTRAMUSCULAR; INTRAVENOUS ONCE
Status: COMPLETED | OUTPATIENT
Start: 2022-05-06 | End: 2022-05-06

## 2022-05-06 RX ORDER — BISACODYL 10 MG
10 SUPPOSITORY, RECTAL RECTAL DAILY PRN
Status: DISCONTINUED | OUTPATIENT
Start: 2022-05-06 | End: 2022-05-12 | Stop reason: HOSPADM

## 2022-05-06 RX ORDER — ALBUTEROL SULFATE 2.5 MG/3ML
2.5 SOLUTION RESPIRATORY (INHALATION) EVERY 6 HOURS PRN
Status: DISCONTINUED | OUTPATIENT
Start: 2022-05-06 | End: 2022-05-12 | Stop reason: HOSPADM

## 2022-05-06 RX ORDER — DILTIAZEM HYDROCHLORIDE EXTENDED-RELEASE TABLETS 240 MG/1
240 TABLET, EXTENDED RELEASE ORAL DAILY
Status: DISCONTINUED | OUTPATIENT
Start: 2022-05-06 | End: 2022-05-06

## 2022-05-06 RX ORDER — SODIUM CHLORIDE 0.9 % (FLUSH) 0.9 %
10 SYRINGE (ML) INJECTION EVERY 12 HOURS SCHEDULED
Status: DISCONTINUED | OUTPATIENT
Start: 2022-05-06 | End: 2022-05-12 | Stop reason: HOSPADM

## 2022-05-06 RX ORDER — DILTIAZEM HYDROCHLORIDE 240 MG/1
240 CAPSULE, COATED, EXTENDED RELEASE ORAL
Status: DISCONTINUED | OUTPATIENT
Start: 2022-05-06 | End: 2022-05-07

## 2022-05-06 RX ORDER — PREDNISONE 20 MG/1
40 TABLET ORAL
Status: DISCONTINUED | OUTPATIENT
Start: 2022-05-06 | End: 2022-05-11

## 2022-05-06 RX ORDER — ARFORMOTEROL TARTRATE 15 UG/2ML
15 SOLUTION RESPIRATORY (INHALATION)
Status: DISCONTINUED | OUTPATIENT
Start: 2022-05-06 | End: 2022-05-12 | Stop reason: HOSPADM

## 2022-05-06 RX ORDER — IPRATROPIUM BROMIDE AND ALBUTEROL SULFATE 2.5; .5 MG/3ML; MG/3ML
3 SOLUTION RESPIRATORY (INHALATION)
Status: DISCONTINUED | OUTPATIENT
Start: 2022-05-06 | End: 2022-05-06 | Stop reason: SDUPTHER

## 2022-05-06 RX ORDER — ACETAMINOPHEN 325 MG/1
650 TABLET ORAL EVERY 6 HOURS PRN
Status: DISCONTINUED | OUTPATIENT
Start: 2022-05-06 | End: 2022-05-12 | Stop reason: HOSPADM

## 2022-05-06 RX ORDER — ISOSORBIDE MONONITRATE 30 MG/1
60 TABLET, EXTENDED RELEASE ORAL DAILY
Status: DISCONTINUED | OUTPATIENT
Start: 2022-05-06 | End: 2022-05-07

## 2022-05-06 RX ORDER — AMOXICILLIN 250 MG
2 CAPSULE ORAL 2 TIMES DAILY
Status: DISCONTINUED | OUTPATIENT
Start: 2022-05-06 | End: 2022-05-12 | Stop reason: HOSPADM

## 2022-05-06 RX ORDER — CLOPIDOGREL BISULFATE 75 MG/1
75 TABLET ORAL DAILY
Status: DISCONTINUED | OUTPATIENT
Start: 2022-05-06 | End: 2022-05-12 | Stop reason: HOSPADM

## 2022-05-06 RX ORDER — POLYETHYLENE GLYCOL 3350 17 G/17G
17 POWDER, FOR SOLUTION ORAL DAILY PRN
Status: DISCONTINUED | OUTPATIENT
Start: 2022-05-06 | End: 2022-05-12 | Stop reason: HOSPADM

## 2022-05-06 RX ADMIN — DIGOXIN 125 MCG: 250 INJECTION, SOLUTION INTRAMUSCULAR; INTRAVENOUS; PARENTERAL at 22:54

## 2022-05-06 RX ADMIN — MULTIPLE VITAMINS W/ MINERALS TAB 1 TABLET: TAB at 15:06

## 2022-05-06 RX ADMIN — METOPROLOL SUCCINATE 50 MG: 50 TABLET, EXTENDED RELEASE ORAL at 21:31

## 2022-05-06 RX ADMIN — DILTIAZEM HYDROCHLORIDE 240 MG: 240 CAPSULE, COATED, EXTENDED RELEASE ORAL at 14:40

## 2022-05-06 RX ADMIN — METOPROLOL SUCCINATE 25 MG: 25 TABLET, FILM COATED, EXTENDED RELEASE ORAL at 20:03

## 2022-05-06 RX ADMIN — ISOSORBIDE MONONITRATE 60 MG: 30 TABLET, EXTENDED RELEASE ORAL at 15:06

## 2022-05-06 RX ADMIN — RIVAROXABAN 15 MG: 15 TABLET, FILM COATED ORAL at 18:15

## 2022-05-06 RX ADMIN — IOPAMIDOL 100 ML: 755 INJECTION, SOLUTION INTRAVENOUS at 12:41

## 2022-05-06 RX ADMIN — ALBUTEROL SULFATE 7.5 MG: 2.5 SOLUTION RESPIRATORY (INHALATION) at 06:55

## 2022-05-06 RX ADMIN — IPRATROPIUM BROMIDE AND ALBUTEROL SULFATE 3 ML: .5; 3 SOLUTION RESPIRATORY (INHALATION) at 15:20

## 2022-05-06 RX ADMIN — Medication 5 MG/HR: at 23:50

## 2022-05-06 RX ADMIN — SACUBITRIL AND VALSARTAN 1 TABLET: 49; 51 TABLET, FILM COATED ORAL at 20:00

## 2022-05-06 RX ADMIN — ARFORMOTEROL TARTRATE 15 MCG: 15 SOLUTION RESPIRATORY (INHALATION) at 20:15

## 2022-05-06 RX ADMIN — METHYLPREDNISOLONE SODIUM SUCCINATE 125 MG: 125 INJECTION, POWDER, FOR SOLUTION INTRAMUSCULAR; INTRAVENOUS at 06:41

## 2022-05-06 RX ADMIN — CLOPIDOGREL BISULFATE 75 MG: 75 TABLET ORAL at 15:07

## 2022-05-06 RX ADMIN — METOPROLOL TARTRATE 5 MG: 5 INJECTION INTRAVENOUS at 20:46

## 2022-05-06 RX ADMIN — Medication 10 ML: at 14:41

## 2022-05-06 RX ADMIN — BUDESONIDE 0.5 MG: 0.5 SUSPENSION RESPIRATORY (INHALATION) at 20:15

## 2022-05-06 RX ADMIN — TAMSULOSIN HYDROCHLORIDE 0.4 MG: 0.4 CAPSULE ORAL at 15:07

## 2022-05-06 RX ADMIN — IPRATROPIUM BROMIDE AND ALBUTEROL SULFATE 3 ML: .5; 3 SOLUTION RESPIRATORY (INHALATION) at 20:15

## 2022-05-06 RX ADMIN — SENNOSIDES AND DOCUSATE SODIUM 2 TABLET: 50; 8.6 TABLET ORAL at 20:00

## 2022-05-06 RX ADMIN — SACUBITRIL AND VALSARTAN 1 TABLET: 49; 51 TABLET, FILM COATED ORAL at 14:40

## 2022-05-06 RX ADMIN — Medication 10 ML: at 20:06

## 2022-05-06 RX ADMIN — PREDNISONE 40 MG: 20 TABLET ORAL at 14:40

## 2022-05-06 RX ADMIN — FUROSEMIDE 40 MG: 10 INJECTION, SOLUTION INTRAMUSCULAR; INTRAVENOUS at 15:06

## 2022-05-07 LAB
ALBUMIN SERPL-MCNC: 3.7 G/DL (ref 3.5–5.2)
ALBUMIN/GLOB SERPL: 1.2 G/DL
ALP SERPL-CCNC: 56 U/L (ref 39–117)
ALT SERPL W P-5'-P-CCNC: 15 U/L (ref 1–41)
ANION GAP SERPL CALCULATED.3IONS-SCNC: 13.4 MMOL/L (ref 5–15)
AST SERPL-CCNC: 13 U/L (ref 1–40)
BACTERIA SPEC RESP CULT: NORMAL
BASOPHILS # BLD AUTO: 0.01 10*3/MM3 (ref 0–0.2)
BASOPHILS NFR BLD AUTO: 0.1 % (ref 0–1.5)
BILIRUB SERPL-MCNC: 0.3 MG/DL (ref 0–1.2)
BUN SERPL-MCNC: 20 MG/DL (ref 8–23)
BUN/CREAT SERPL: 16.4 (ref 7–25)
CALCIUM SPEC-SCNC: 9.8 MG/DL (ref 8.6–10.5)
CHLORIDE SERPL-SCNC: 100 MMOL/L (ref 98–107)
CO2 SERPL-SCNC: 23.6 MMOL/L (ref 22–29)
CREAT SERPL-MCNC: 1.22 MG/DL (ref 0.76–1.27)
DEPRECATED RDW RBC AUTO: 44.6 FL (ref 37–54)
EGFRCR SERPLBLD CKD-EPI 2021: 59.9 ML/MIN/1.73
EOSINOPHIL # BLD AUTO: 0 10*3/MM3 (ref 0–0.4)
EOSINOPHIL NFR BLD AUTO: 0 % (ref 0.3–6.2)
ERYTHROCYTE [DISTWIDTH] IN BLOOD BY AUTOMATED COUNT: 13.9 % (ref 12.3–15.4)
GLOBULIN UR ELPH-MCNC: 3 GM/DL
GLUCOSE SERPL-MCNC: 194 MG/DL (ref 65–99)
GRAM STN SPEC: NORMAL
HCT VFR BLD AUTO: 36.6 % (ref 37.5–51)
HGB BLD-MCNC: 12.2 G/DL (ref 13–17.7)
IMM GRANULOCYTES # BLD AUTO: 0.03 10*3/MM3 (ref 0–0.05)
IMM GRANULOCYTES NFR BLD AUTO: 0.4 % (ref 0–0.5)
LYMPHOCYTES # BLD AUTO: 0.72 10*3/MM3 (ref 0.7–3.1)
LYMPHOCYTES NFR BLD AUTO: 9.4 % (ref 19.6–45.3)
MAGNESIUM SERPL-MCNC: 2.1 MG/DL (ref 1.6–2.4)
MCH RBC QN AUTO: 29.4 PG (ref 26.6–33)
MCHC RBC AUTO-ENTMCNC: 33.3 G/DL (ref 31.5–35.7)
MCV RBC AUTO: 88.2 FL (ref 79–97)
MONOCYTES # BLD AUTO: 0.25 10*3/MM3 (ref 0.1–0.9)
MONOCYTES NFR BLD AUTO: 3.3 % (ref 5–12)
NEUTROPHILS NFR BLD AUTO: 6.66 10*3/MM3 (ref 1.7–7)
NEUTROPHILS NFR BLD AUTO: 86.8 % (ref 42.7–76)
NRBC BLD AUTO-RTO: 0 /100 WBC (ref 0–0.2)
PHOSPHATE SERPL-MCNC: 4.3 MG/DL (ref 2.5–4.5)
PLATELET # BLD AUTO: 291 10*3/MM3 (ref 140–450)
PMV BLD AUTO: 9.5 FL (ref 6–12)
POTASSIUM SERPL-SCNC: 3.9 MMOL/L (ref 3.5–5.2)
PROT SERPL-MCNC: 6.7 G/DL (ref 6–8.5)
QT INTERVAL: 348 MS
QT INTERVAL: 366 MS
QT INTERVAL: 402 MS
RBC # BLD AUTO: 4.15 10*6/MM3 (ref 4.14–5.8)
SODIUM SERPL-SCNC: 137 MMOL/L (ref 136–145)
WBC NRBC COR # BLD: 7.67 10*3/MM3 (ref 3.4–10.8)

## 2022-05-07 PROCEDURE — 99223 1ST HOSP IP/OBS HIGH 75: CPT | Performed by: INTERNAL MEDICINE

## 2022-05-07 PROCEDURE — 94799 UNLISTED PULMONARY SVC/PX: CPT

## 2022-05-07 PROCEDURE — 84100 ASSAY OF PHOSPHORUS: CPT | Performed by: INTERNAL MEDICINE

## 2022-05-07 PROCEDURE — 25010000002 FUROSEMIDE PER 20 MG: Performed by: INTERNAL MEDICINE

## 2022-05-07 PROCEDURE — 93010 ELECTROCARDIOGRAM REPORT: CPT | Performed by: INTERNAL MEDICINE

## 2022-05-07 PROCEDURE — 93005 ELECTROCARDIOGRAM TRACING: CPT | Performed by: INTERNAL MEDICINE

## 2022-05-07 PROCEDURE — 99232 SBSQ HOSP IP/OBS MODERATE 35: CPT | Performed by: INTERNAL MEDICINE

## 2022-05-07 PROCEDURE — G0378 HOSPITAL OBSERVATION PER HR: HCPCS

## 2022-05-07 PROCEDURE — 87205 SMEAR GRAM STAIN: CPT | Performed by: INTERNAL MEDICINE

## 2022-05-07 PROCEDURE — 63710000001 PREDNISONE PER 1 MG: Performed by: INTERNAL MEDICINE

## 2022-05-07 PROCEDURE — 85025 COMPLETE CBC W/AUTO DIFF WBC: CPT | Performed by: INTERNAL MEDICINE

## 2022-05-07 PROCEDURE — 36415 COLL VENOUS BLD VENIPUNCTURE: CPT | Performed by: INTERNAL MEDICINE

## 2022-05-07 PROCEDURE — 99233 SBSQ HOSP IP/OBS HIGH 50: CPT | Performed by: INTERNAL MEDICINE

## 2022-05-07 PROCEDURE — 83735 ASSAY OF MAGNESIUM: CPT | Performed by: INTERNAL MEDICINE

## 2022-05-07 PROCEDURE — 80053 COMPREHEN METABOLIC PANEL: CPT | Performed by: INTERNAL MEDICINE

## 2022-05-07 RX ORDER — FUROSEMIDE 10 MG/ML
40 INJECTION INTRAMUSCULAR; INTRAVENOUS ONCE
Status: COMPLETED | OUTPATIENT
Start: 2022-05-07 | End: 2022-05-07

## 2022-05-07 RX ORDER — ISOSORBIDE MONONITRATE 30 MG/1
30 TABLET, EXTENDED RELEASE ORAL DAILY
Status: DISCONTINUED | OUTPATIENT
Start: 2022-05-08 | End: 2022-05-12 | Stop reason: HOSPADM

## 2022-05-07 RX ORDER — DIGOXIN 125 MCG
125 TABLET ORAL
Status: DISCONTINUED | OUTPATIENT
Start: 2022-05-07 | End: 2022-05-12 | Stop reason: HOSPADM

## 2022-05-07 RX ORDER — MONTELUKAST SODIUM 10 MG/1
10 TABLET ORAL NIGHTLY
Status: DISCONTINUED | OUTPATIENT
Start: 2022-05-07 | End: 2022-05-12 | Stop reason: HOSPADM

## 2022-05-07 RX ORDER — CEFDINIR 300 MG/1
300 CAPSULE ORAL EVERY 12 HOURS SCHEDULED
Status: DISCONTINUED | OUTPATIENT
Start: 2022-05-07 | End: 2022-05-12 | Stop reason: HOSPADM

## 2022-05-07 RX ORDER — DILTIAZEM HYDROCHLORIDE 240 MG/1
240 CAPSULE, COATED, EXTENDED RELEASE ORAL
Status: DISCONTINUED | OUTPATIENT
Start: 2022-05-08 | End: 2022-05-10

## 2022-05-07 RX ORDER — BENZONATATE 100 MG/1
100 CAPSULE ORAL 3 TIMES DAILY PRN
Status: DISCONTINUED | OUTPATIENT
Start: 2022-05-07 | End: 2022-05-12 | Stop reason: HOSPADM

## 2022-05-07 RX ADMIN — SACUBITRIL AND VALSARTAN 1 TABLET: 49; 51 TABLET, FILM COATED ORAL at 09:06

## 2022-05-07 RX ADMIN — MONTELUKAST 10 MG: 10 TABLET, FILM COATED ORAL at 20:47

## 2022-05-07 RX ADMIN — LEVALBUTEROL HYDROCHLORIDE 1.25 MG: 1.25 SOLUTION RESPIRATORY (INHALATION) at 23:30

## 2022-05-07 RX ADMIN — SENNOSIDES AND DOCUSATE SODIUM 2 TABLET: 50; 8.6 TABLET ORAL at 09:37

## 2022-05-07 RX ADMIN — CLOPIDOGREL BISULFATE 75 MG: 75 TABLET ORAL at 09:07

## 2022-05-07 RX ADMIN — BENZONATATE 100 MG: 100 CAPSULE ORAL at 22:21

## 2022-05-07 RX ADMIN — LEVALBUTEROL HYDROCHLORIDE 1.25 MG: 1.25 SOLUTION RESPIRATORY (INHALATION) at 07:03

## 2022-05-07 RX ADMIN — BUDESONIDE 0.5 MG: 0.5 SUSPENSION RESPIRATORY (INHALATION) at 07:08

## 2022-05-07 RX ADMIN — LEVALBUTEROL HYDROCHLORIDE 1.25 MG: 1.25 SOLUTION RESPIRATORY (INHALATION) at 11:44

## 2022-05-07 RX ADMIN — PREDNISONE 40 MG: 20 TABLET ORAL at 09:06

## 2022-05-07 RX ADMIN — ARFORMOTEROL TARTRATE 15 MCG: 15 SOLUTION RESPIRATORY (INHALATION) at 07:13

## 2022-05-07 RX ADMIN — LEVALBUTEROL HYDROCHLORIDE 1.25 MG: 1.25 SOLUTION RESPIRATORY (INHALATION) at 18:25

## 2022-05-07 RX ADMIN — SACUBITRIL AND VALSARTAN 1 TABLET: 49; 51 TABLET, FILM COATED ORAL at 20:47

## 2022-05-07 RX ADMIN — METOPROLOL SUCCINATE 50 MG: 50 TABLET, EXTENDED RELEASE ORAL at 09:07

## 2022-05-07 RX ADMIN — DIGOXIN 125 MCG: 125 TABLET ORAL at 16:02

## 2022-05-07 RX ADMIN — BUDESONIDE 0.5 MG: 0.5 SUSPENSION RESPIRATORY (INHALATION) at 18:25

## 2022-05-07 RX ADMIN — CEFDINIR 300 MG: 300 CAPSULE ORAL at 12:24

## 2022-05-07 RX ADMIN — BENZONATATE 100 MG: 100 CAPSULE ORAL at 09:37

## 2022-05-07 RX ADMIN — FLUTICASONE PROPIONATE 1 SPRAY: 50 SPRAY, METERED NASAL at 09:37

## 2022-05-07 RX ADMIN — Medication 10 ML: at 20:47

## 2022-05-07 RX ADMIN — MULTIPLE VITAMINS W/ MINERALS TAB 1 TABLET: TAB at 09:07

## 2022-05-07 RX ADMIN — METOPROLOL SUCCINATE 50 MG: 50 TABLET, EXTENDED RELEASE ORAL at 20:47

## 2022-05-07 RX ADMIN — MONTELUKAST 10 MG: 10 TABLET, FILM COATED ORAL at 12:24

## 2022-05-07 RX ADMIN — FUROSEMIDE 40 MG: 10 INJECTION, SOLUTION INTRAMUSCULAR; INTRAVENOUS at 12:24

## 2022-05-07 RX ADMIN — TAMSULOSIN HYDROCHLORIDE 0.4 MG: 0.4 CAPSULE ORAL at 09:07

## 2022-05-07 RX ADMIN — CEFDINIR 300 MG: 300 CAPSULE ORAL at 20:47

## 2022-05-07 RX ADMIN — ISOSORBIDE MONONITRATE 60 MG: 30 TABLET, EXTENDED RELEASE ORAL at 09:07

## 2022-05-07 RX ADMIN — DILTIAZEM HYDROCHLORIDE 240 MG: 240 CAPSULE, COATED, EXTENDED RELEASE ORAL at 09:07

## 2022-05-07 RX ADMIN — RIVAROXABAN 15 MG: 15 TABLET, FILM COATED ORAL at 17:45

## 2022-05-07 RX ADMIN — ARFORMOTEROL TARTRATE 15 MCG: 15 SOLUTION RESPIRATORY (INHALATION) at 18:25

## 2022-05-08 PROBLEM — J44.1 COPD EXACERBATION: Status: ACTIVE | Noted: 2022-05-08

## 2022-05-08 LAB
ALBUMIN SERPL-MCNC: 3.6 G/DL (ref 3.5–5.2)
ALBUMIN/GLOB SERPL: 1.4 G/DL
ALP SERPL-CCNC: 52 U/L (ref 39–117)
ALT SERPL W P-5'-P-CCNC: 15 U/L (ref 1–41)
ANION GAP SERPL CALCULATED.3IONS-SCNC: 10.7 MMOL/L (ref 5–15)
AST SERPL-CCNC: 13 U/L (ref 1–40)
BASOPHILS # BLD AUTO: 0.01 10*3/MM3 (ref 0–0.2)
BASOPHILS NFR BLD AUTO: 0.1 % (ref 0–1.5)
BILIRUB SERPL-MCNC: 0.2 MG/DL (ref 0–1.2)
BUN SERPL-MCNC: 28 MG/DL (ref 8–23)
BUN/CREAT SERPL: 21.2 (ref 7–25)
CALCIUM SPEC-SCNC: 9.3 MG/DL (ref 8.6–10.5)
CHLORIDE SERPL-SCNC: 103 MMOL/L (ref 98–107)
CO2 SERPL-SCNC: 25.3 MMOL/L (ref 22–29)
CREAT SERPL-MCNC: 1.32 MG/DL (ref 0.76–1.27)
DEPRECATED RDW RBC AUTO: 47 FL (ref 37–54)
EGFRCR SERPLBLD CKD-EPI 2021: 54.5 ML/MIN/1.73
EOSINOPHIL # BLD AUTO: 0 10*3/MM3 (ref 0–0.4)
EOSINOPHIL NFR BLD AUTO: 0 % (ref 0.3–6.2)
ERYTHROCYTE [DISTWIDTH] IN BLOOD BY AUTOMATED COUNT: 14.2 % (ref 12.3–15.4)
GLOBULIN UR ELPH-MCNC: 2.6 GM/DL
GLUCOSE SERPL-MCNC: 168 MG/DL (ref 65–99)
HCT VFR BLD AUTO: 35.6 % (ref 37.5–51)
HGB BLD-MCNC: 11.7 G/DL (ref 13–17.7)
IMM GRANULOCYTES # BLD AUTO: 0.09 10*3/MM3 (ref 0–0.05)
IMM GRANULOCYTES NFR BLD AUTO: 0.7 % (ref 0–0.5)
LYMPHOCYTES # BLD AUTO: 1.11 10*3/MM3 (ref 0.7–3.1)
LYMPHOCYTES NFR BLD AUTO: 8.7 % (ref 19.6–45.3)
MAGNESIUM SERPL-MCNC: 2.3 MG/DL (ref 1.6–2.4)
MCH RBC QN AUTO: 29.8 PG (ref 26.6–33)
MCHC RBC AUTO-ENTMCNC: 32.9 G/DL (ref 31.5–35.7)
MCV RBC AUTO: 90.6 FL (ref 79–97)
MONOCYTES # BLD AUTO: 0.74 10*3/MM3 (ref 0.1–0.9)
MONOCYTES NFR BLD AUTO: 5.8 % (ref 5–12)
NEUTROPHILS NFR BLD AUTO: 10.75 10*3/MM3 (ref 1.7–7)
NEUTROPHILS NFR BLD AUTO: 84.7 % (ref 42.7–76)
NRBC BLD AUTO-RTO: 0 /100 WBC (ref 0–0.2)
PHOSPHATE SERPL-MCNC: 3.7 MG/DL (ref 2.5–4.5)
PLATELET # BLD AUTO: 328 10*3/MM3 (ref 140–450)
PMV BLD AUTO: 9.5 FL (ref 6–12)
POTASSIUM SERPL-SCNC: 4.3 MMOL/L (ref 3.5–5.2)
PROT SERPL-MCNC: 6.2 G/DL (ref 6–8.5)
QT INTERVAL: 367 MS
RBC # BLD AUTO: 3.93 10*6/MM3 (ref 4.14–5.8)
SODIUM SERPL-SCNC: 139 MMOL/L (ref 136–145)
WBC NRBC COR # BLD: 12.7 10*3/MM3 (ref 3.4–10.8)

## 2022-05-08 PROCEDURE — 63710000001 PREDNISONE PER 1 MG: Performed by: INTERNAL MEDICINE

## 2022-05-08 PROCEDURE — 80053 COMPREHEN METABOLIC PANEL: CPT | Performed by: INTERNAL MEDICINE

## 2022-05-08 PROCEDURE — 99232 SBSQ HOSP IP/OBS MODERATE 35: CPT | Performed by: INTERNAL MEDICINE

## 2022-05-08 PROCEDURE — 94799 UNLISTED PULMONARY SVC/PX: CPT

## 2022-05-08 PROCEDURE — 84100 ASSAY OF PHOSPHORUS: CPT | Performed by: INTERNAL MEDICINE

## 2022-05-08 PROCEDURE — 99233 SBSQ HOSP IP/OBS HIGH 50: CPT | Performed by: INTERNAL MEDICINE

## 2022-05-08 PROCEDURE — 85025 COMPLETE CBC W/AUTO DIFF WBC: CPT | Performed by: INTERNAL MEDICINE

## 2022-05-08 PROCEDURE — 83735 ASSAY OF MAGNESIUM: CPT | Performed by: INTERNAL MEDICINE

## 2022-05-08 RX ORDER — METOPROLOL SUCCINATE 50 MG/1
100 TABLET, EXTENDED RELEASE ORAL EVERY 12 HOURS SCHEDULED
Status: DISCONTINUED | OUTPATIENT
Start: 2022-05-08 | End: 2022-05-08

## 2022-05-08 RX ORDER — METOPROLOL TARTRATE 50 MG/1
50 TABLET, FILM COATED ORAL ONCE
Status: COMPLETED | OUTPATIENT
Start: 2022-05-08 | End: 2022-05-08

## 2022-05-08 RX ORDER — METOPROLOL SUCCINATE 50 MG/1
100 TABLET, EXTENDED RELEASE ORAL EVERY 12 HOURS SCHEDULED
Status: DISCONTINUED | OUTPATIENT
Start: 2022-05-08 | End: 2022-05-10

## 2022-05-08 RX ORDER — CARVEDILOL 12.5 MG/1
12.5 TABLET ORAL EVERY 12 HOURS SCHEDULED
Status: DISCONTINUED | OUTPATIENT
Start: 2022-05-08 | End: 2022-05-08

## 2022-05-08 RX ORDER — METOPROLOL SUCCINATE 50 MG/1
50 TABLET, EXTENDED RELEASE ORAL ONCE
Status: COMPLETED | OUTPATIENT
Start: 2022-05-08 | End: 2022-05-08

## 2022-05-08 RX ADMIN — BUDESONIDE 0.5 MG: 0.5 SUSPENSION RESPIRATORY (INHALATION) at 19:55

## 2022-05-08 RX ADMIN — CEFDINIR 300 MG: 300 CAPSULE ORAL at 09:00

## 2022-05-08 RX ADMIN — LEVALBUTEROL HYDROCHLORIDE 1.25 MG: 1.25 SOLUTION RESPIRATORY (INHALATION) at 19:55

## 2022-05-08 RX ADMIN — PREDNISONE 40 MG: 20 TABLET ORAL at 09:00

## 2022-05-08 RX ADMIN — BUDESONIDE 0.5 MG: 0.5 SUSPENSION RESPIRATORY (INHALATION) at 06:43

## 2022-05-08 RX ADMIN — DILTIAZEM HYDROCHLORIDE 240 MG: 240 CAPSULE, COATED, EXTENDED RELEASE ORAL at 11:54

## 2022-05-08 RX ADMIN — Medication 10 ML: at 22:34

## 2022-05-08 RX ADMIN — LEVALBUTEROL HYDROCHLORIDE 1.25 MG: 1.25 SOLUTION RESPIRATORY (INHALATION) at 06:43

## 2022-05-08 RX ADMIN — SACUBITRIL AND VALSARTAN 1 TABLET: 49; 51 TABLET, FILM COATED ORAL at 21:39

## 2022-05-08 RX ADMIN — SACUBITRIL AND VALSARTAN 1 TABLET: 49; 51 TABLET, FILM COATED ORAL at 09:00

## 2022-05-08 RX ADMIN — METOPROLOL SUCCINATE 50 MG: 50 TABLET, EXTENDED RELEASE ORAL at 09:00

## 2022-05-08 RX ADMIN — MULTIPLE VITAMINS W/ MINERALS TAB 1 TABLET: TAB at 09:00

## 2022-05-08 RX ADMIN — BENZONATATE 100 MG: 100 CAPSULE ORAL at 21:39

## 2022-05-08 RX ADMIN — Medication 10 ML: at 09:01

## 2022-05-08 RX ADMIN — ARFORMOTEROL TARTRATE 15 MCG: 15 SOLUTION RESPIRATORY (INHALATION) at 19:55

## 2022-05-08 RX ADMIN — CLOPIDOGREL BISULFATE 75 MG: 75 TABLET ORAL at 09:00

## 2022-05-08 RX ADMIN — FLUTICASONE PROPIONATE 1 SPRAY: 50 SPRAY, METERED NASAL at 09:01

## 2022-05-08 RX ADMIN — SENNOSIDES AND DOCUSATE SODIUM 2 TABLET: 50; 8.6 TABLET ORAL at 09:00

## 2022-05-08 RX ADMIN — ARFORMOTEROL TARTRATE 15 MCG: 15 SOLUTION RESPIRATORY (INHALATION) at 06:43

## 2022-05-08 RX ADMIN — METOPROLOL SUCCINATE 50 MG: 50 TABLET, EXTENDED RELEASE ORAL at 10:13

## 2022-05-08 RX ADMIN — RIVAROXABAN 15 MG: 15 TABLET, FILM COATED ORAL at 17:14

## 2022-05-08 RX ADMIN — HYDROCODONE POLISTIREX AND CHLORPHENIRAMINE POLISTIREX 2.5 ML: 10; 8 SUSPENSION, EXTENDED RELEASE ORAL at 04:32

## 2022-05-08 RX ADMIN — TAMSULOSIN HYDROCHLORIDE 0.4 MG: 0.4 CAPSULE ORAL at 09:00

## 2022-05-08 RX ADMIN — LEVALBUTEROL HYDROCHLORIDE 1.25 MG: 1.25 SOLUTION RESPIRATORY (INHALATION) at 11:58

## 2022-05-08 RX ADMIN — BENZONATATE 100 MG: 100 CAPSULE ORAL at 09:01

## 2022-05-08 RX ADMIN — MONTELUKAST 10 MG: 10 TABLET, FILM COATED ORAL at 21:39

## 2022-05-08 RX ADMIN — CEFDINIR 300 MG: 300 CAPSULE ORAL at 21:38

## 2022-05-08 RX ADMIN — DIGOXIN 125 MCG: 125 TABLET ORAL at 11:54

## 2022-05-08 RX ADMIN — METOPROLOL SUCCINATE 100 MG: 50 TABLET, EXTENDED RELEASE ORAL at 21:38

## 2022-05-08 RX ADMIN — METOPROLOL TARTRATE 50 MG: 50 TABLET ORAL at 17:55

## 2022-05-08 RX ADMIN — ISOSORBIDE MONONITRATE 30 MG: 30 TABLET, EXTENDED RELEASE ORAL at 09:00

## 2022-05-09 ENCOUNTER — APPOINTMENT (OUTPATIENT)
Dept: CARDIOLOGY | Facility: HOSPITAL | Age: 82
End: 2022-05-09

## 2022-05-09 LAB
ALBUMIN SERPL-MCNC: 3.5 G/DL (ref 3.5–5.2)
ALBUMIN/GLOB SERPL: 1.3 G/DL
ALP SERPL-CCNC: 50 U/L (ref 39–117)
ALT SERPL W P-5'-P-CCNC: 17 U/L (ref 1–41)
ANION GAP SERPL CALCULATED.3IONS-SCNC: 10 MMOL/L (ref 5–15)
AST SERPL-CCNC: 12 U/L (ref 1–40)
BASOPHILS # BLD AUTO: 0.01 10*3/MM3 (ref 0–0.2)
BASOPHILS NFR BLD AUTO: 0.1 % (ref 0–1.5)
BH CV ECHO MEAS - AO ROOT DIAM: 3.2 CM
BH CV ECHO MEAS - EDV(MOD-SP2): 98 ML
BH CV ECHO MEAS - EDV(MOD-SP4): 120 ML
BH CV ECHO MEAS - EF(MOD-BP): 52 %
BH CV ECHO MEAS - ESV(MOD-SP2): 43 ML
BH CV ECHO MEAS - ESV(MOD-SP4): 65 ML
BH CV ECHO MEAS - IVSD: 1.2 CM
BH CV ECHO MEAS - LA DIMENSION(2D): 5.3 CM
BH CV ECHO MEAS - LVIDD: 5.4 CM
BH CV ECHO MEAS - LVIDS: 4.2 CM
BH CV ECHO MEAS - LVOT DIAM: 2 CM
BH CV ECHO MEAS - LVPWD: 1.2 CM
BH CV ECHO MEAS - RAP SYSTOLE: 3 MMHG
BH CV ECHO MEAS - RVDD: 2.8 CM
BH CV ECHO MEAS - RVSP: 51 MMHG
BH CV ECHO MEAS - TR MAX PG: 48 MMHG
BH CV ECHO MEAS - TR MAX VEL: 347 CM/SEC
BILIRUB SERPL-MCNC: 0.2 MG/DL (ref 0–1.2)
BUN SERPL-MCNC: 27 MG/DL (ref 8–23)
BUN/CREAT SERPL: 24.3 (ref 7–25)
CALCIUM SPEC-SCNC: 9.1 MG/DL (ref 8.6–10.5)
CHLORIDE SERPL-SCNC: 105 MMOL/L (ref 98–107)
CO2 SERPL-SCNC: 24 MMOL/L (ref 22–29)
CREAT SERPL-MCNC: 1.11 MG/DL (ref 0.76–1.27)
DEPRECATED RDW RBC AUTO: 48.5 FL (ref 37–54)
DIGOXIN SERPL-MCNC: 0.33 NG/ML (ref 0.6–1.2)
EGFRCR SERPLBLD CKD-EPI 2021: 67.1 ML/MIN/1.73
EOSINOPHIL # BLD AUTO: 0.01 10*3/MM3 (ref 0–0.4)
EOSINOPHIL NFR BLD AUTO: 0.1 % (ref 0.3–6.2)
ERYTHROCYTE [DISTWIDTH] IN BLOOD BY AUTOMATED COUNT: 14.5 % (ref 12.3–15.4)
GLOBULIN UR ELPH-MCNC: 2.7 GM/DL
GLUCOSE SERPL-MCNC: 123 MG/DL (ref 65–99)
HCT VFR BLD AUTO: 38.2 % (ref 37.5–51)
HGB BLD-MCNC: 12.4 G/DL (ref 13–17.7)
IMM GRANULOCYTES # BLD AUTO: 0.15 10*3/MM3 (ref 0–0.05)
IMM GRANULOCYTES NFR BLD AUTO: 1.4 % (ref 0–0.5)
LEFT ATRIUM VOLUME INDEX: 42.5 ML/M2
LYMPHOCYTES # BLD AUTO: 2.01 10*3/MM3 (ref 0.7–3.1)
LYMPHOCYTES NFR BLD AUTO: 18.1 % (ref 19.6–45.3)
MAGNESIUM SERPL-MCNC: 2.4 MG/DL (ref 1.6–2.4)
MAXIMAL PREDICTED HEART RATE: 140 BPM
MCH RBC QN AUTO: 30.1 PG (ref 26.6–33)
MCHC RBC AUTO-ENTMCNC: 32.5 G/DL (ref 31.5–35.7)
MCV RBC AUTO: 92.7 FL (ref 79–97)
MONOCYTES # BLD AUTO: 0.92 10*3/MM3 (ref 0.1–0.9)
MONOCYTES NFR BLD AUTO: 8.3 % (ref 5–12)
NEUTROPHILS NFR BLD AUTO: 72 % (ref 42.7–76)
NEUTROPHILS NFR BLD AUTO: 8.01 10*3/MM3 (ref 1.7–7)
NRBC BLD AUTO-RTO: 0 /100 WBC (ref 0–0.2)
PHOSPHATE SERPL-MCNC: 3.3 MG/DL (ref 2.5–4.5)
PLATELET # BLD AUTO: 339 10*3/MM3 (ref 140–450)
PMV BLD AUTO: 9.4 FL (ref 6–12)
POTASSIUM SERPL-SCNC: 4.5 MMOL/L (ref 3.5–5.2)
PROT SERPL-MCNC: 6.2 G/DL (ref 6–8.5)
RBC # BLD AUTO: 4.12 10*6/MM3 (ref 4.14–5.8)
SODIUM SERPL-SCNC: 139 MMOL/L (ref 136–145)
STRESS TARGET HR: 119 BPM
WBC NRBC COR # BLD: 11.11 10*3/MM3 (ref 3.4–10.8)

## 2022-05-09 PROCEDURE — 99232 SBSQ HOSP IP/OBS MODERATE 35: CPT | Performed by: INTERNAL MEDICINE

## 2022-05-09 PROCEDURE — 85025 COMPLETE CBC W/AUTO DIFF WBC: CPT | Performed by: INTERNAL MEDICINE

## 2022-05-09 PROCEDURE — 84100 ASSAY OF PHOSPHORUS: CPT | Performed by: INTERNAL MEDICINE

## 2022-05-09 PROCEDURE — 83735 ASSAY OF MAGNESIUM: CPT | Performed by: INTERNAL MEDICINE

## 2022-05-09 PROCEDURE — 80162 ASSAY OF DIGOXIN TOTAL: CPT | Performed by: INTERNAL MEDICINE

## 2022-05-09 PROCEDURE — 93306 TTE W/DOPPLER COMPLETE: CPT | Performed by: INTERNAL MEDICINE

## 2022-05-09 PROCEDURE — 63710000001 PREDNISONE PER 1 MG: Performed by: INTERNAL MEDICINE

## 2022-05-09 PROCEDURE — 99233 SBSQ HOSP IP/OBS HIGH 50: CPT | Performed by: INTERNAL MEDICINE

## 2022-05-09 PROCEDURE — 25010000002 SULFUR HEXAFLUORIDE MICROSPH 60.7-25 MG RECONSTITUTED SUSPENSION: Performed by: INTERNAL MEDICINE

## 2022-05-09 PROCEDURE — 93306 TTE W/DOPPLER COMPLETE: CPT

## 2022-05-09 PROCEDURE — 94664 DEMO&/EVAL PT USE INHALER: CPT

## 2022-05-09 PROCEDURE — 94799 UNLISTED PULMONARY SVC/PX: CPT

## 2022-05-09 PROCEDURE — 80053 COMPREHEN METABOLIC PANEL: CPT | Performed by: INTERNAL MEDICINE

## 2022-05-09 RX ORDER — FUROSEMIDE 40 MG/1
40 TABLET ORAL DAILY
Status: DISCONTINUED | OUTPATIENT
Start: 2022-05-09 | End: 2022-05-11

## 2022-05-09 RX ADMIN — SACUBITRIL AND VALSARTAN 1 TABLET: 49; 51 TABLET, FILM COATED ORAL at 08:43

## 2022-05-09 RX ADMIN — CEFDINIR 300 MG: 300 CAPSULE ORAL at 20:35

## 2022-05-09 RX ADMIN — TAMSULOSIN HYDROCHLORIDE 0.4 MG: 0.4 CAPSULE ORAL at 08:43

## 2022-05-09 RX ADMIN — ARFORMOTEROL TARTRATE 15 MCG: 15 SOLUTION RESPIRATORY (INHALATION) at 19:10

## 2022-05-09 RX ADMIN — METOPROLOL SUCCINATE 100 MG: 50 TABLET, EXTENDED RELEASE ORAL at 08:43

## 2022-05-09 RX ADMIN — DIGOXIN 125 MCG: 125 TABLET ORAL at 11:13

## 2022-05-09 RX ADMIN — FLUTICASONE PROPIONATE 1 SPRAY: 50 SPRAY, METERED NASAL at 08:44

## 2022-05-09 RX ADMIN — MULTIPLE VITAMINS W/ MINERALS TAB 1 TABLET: TAB at 08:43

## 2022-05-09 RX ADMIN — DILTIAZEM HYDROCHLORIDE 240 MG: 240 CAPSULE, COATED, EXTENDED RELEASE ORAL at 11:13

## 2022-05-09 RX ADMIN — SACUBITRIL AND VALSARTAN 1 TABLET: 49; 51 TABLET, FILM COATED ORAL at 20:35

## 2022-05-09 RX ADMIN — LEVALBUTEROL HYDROCHLORIDE 1.25 MG: 1.25 SOLUTION RESPIRATORY (INHALATION) at 01:07

## 2022-05-09 RX ADMIN — METOPROLOL SUCCINATE 100 MG: 50 TABLET, EXTENDED RELEASE ORAL at 20:35

## 2022-05-09 RX ADMIN — BUDESONIDE 0.5 MG: 0.5 SUSPENSION RESPIRATORY (INHALATION) at 07:18

## 2022-05-09 RX ADMIN — SULFUR HEXAFLUORIDE 3 ML: KIT at 11:59

## 2022-05-09 RX ADMIN — LEVALBUTEROL HYDROCHLORIDE 1.25 MG: 1.25 SOLUTION RESPIRATORY (INHALATION) at 19:10

## 2022-05-09 RX ADMIN — ARFORMOTEROL TARTRATE 15 MCG: 15 SOLUTION RESPIRATORY (INHALATION) at 07:18

## 2022-05-09 RX ADMIN — LEVALBUTEROL HYDROCHLORIDE 1.25 MG: 1.25 SOLUTION RESPIRATORY (INHALATION) at 07:18

## 2022-05-09 RX ADMIN — Medication 10 ML: at 08:45

## 2022-05-09 RX ADMIN — ISOSORBIDE MONONITRATE 30 MG: 30 TABLET, EXTENDED RELEASE ORAL at 08:43

## 2022-05-09 RX ADMIN — RIVAROXABAN 15 MG: 15 TABLET, FILM COATED ORAL at 18:51

## 2022-05-09 RX ADMIN — LEVALBUTEROL HYDROCHLORIDE 1.25 MG: 1.25 SOLUTION RESPIRATORY (INHALATION) at 12:17

## 2022-05-09 RX ADMIN — SENNOSIDES AND DOCUSATE SODIUM 2 TABLET: 50; 8.6 TABLET ORAL at 08:43

## 2022-05-09 RX ADMIN — CLOPIDOGREL BISULFATE 75 MG: 75 TABLET ORAL at 08:44

## 2022-05-09 RX ADMIN — MONTELUKAST 10 MG: 10 TABLET, FILM COATED ORAL at 20:35

## 2022-05-09 RX ADMIN — BUDESONIDE 0.5 MG: 0.5 SUSPENSION RESPIRATORY (INHALATION) at 19:10

## 2022-05-09 RX ADMIN — PREDNISONE 40 MG: 20 TABLET ORAL at 09:30

## 2022-05-09 RX ADMIN — FUROSEMIDE 40 MG: 40 TABLET ORAL at 11:13

## 2022-05-09 RX ADMIN — Medication 10 ML: at 20:38

## 2022-05-09 RX ADMIN — CEFDINIR 300 MG: 300 CAPSULE ORAL at 08:43

## 2022-05-10 LAB
ALBUMIN SERPL-MCNC: 3.8 G/DL (ref 3.5–5.2)
ALBUMIN/GLOB SERPL: 1.6 G/DL
ALP SERPL-CCNC: 55 U/L (ref 39–117)
ALT SERPL W P-5'-P-CCNC: 30 U/L (ref 1–41)
ANION GAP SERPL CALCULATED.3IONS-SCNC: 9.7 MMOL/L (ref 5–15)
AST SERPL-CCNC: 18 U/L (ref 1–40)
BASOPHILS # BLD AUTO: 0.12 10*3/MM3 (ref 0–0.2)
BASOPHILS NFR BLD AUTO: 1.1 % (ref 0–1.5)
BILIRUB SERPL-MCNC: 0.3 MG/DL (ref 0–1.2)
BUN SERPL-MCNC: 31 MG/DL (ref 8–23)
BUN/CREAT SERPL: 23.7 (ref 7–25)
CALCIUM SPEC-SCNC: 9 MG/DL (ref 8.6–10.5)
CHLORIDE SERPL-SCNC: 103 MMOL/L (ref 98–107)
CO2 SERPL-SCNC: 26.3 MMOL/L (ref 22–29)
CREAT SERPL-MCNC: 1.31 MG/DL (ref 0.76–1.27)
DEPRECATED RDW RBC AUTO: 47 FL (ref 37–54)
EGFRCR SERPLBLD CKD-EPI 2021: 55 ML/MIN/1.73
EOSINOPHIL # BLD AUTO: 0.01 10*3/MM3 (ref 0–0.4)
EOSINOPHIL NFR BLD AUTO: 0.1 % (ref 0.3–6.2)
ERYTHROCYTE [DISTWIDTH] IN BLOOD BY AUTOMATED COUNT: 14.2 % (ref 12.3–15.4)
GLOBULIN UR ELPH-MCNC: 2.4 GM/DL
GLUCOSE SERPL-MCNC: 130 MG/DL (ref 65–99)
HCT VFR BLD AUTO: 40.8 % (ref 37.5–51)
HGB BLD-MCNC: 13.4 G/DL (ref 13–17.7)
IMM GRANULOCYTES # BLD AUTO: 0.6 10*3/MM3 (ref 0–0.05)
IMM GRANULOCYTES NFR BLD AUTO: 5.4 % (ref 0–0.5)
LYMPHOCYTES # BLD AUTO: 2.26 10*3/MM3 (ref 0.7–3.1)
LYMPHOCYTES NFR BLD AUTO: 20.5 % (ref 19.6–45.3)
MAGNESIUM SERPL-MCNC: 2.4 MG/DL (ref 1.6–2.4)
MCH RBC QN AUTO: 29.8 PG (ref 26.6–33)
MCHC RBC AUTO-ENTMCNC: 32.8 G/DL (ref 31.5–35.7)
MCV RBC AUTO: 90.9 FL (ref 79–97)
MONOCYTES # BLD AUTO: 1.04 10*3/MM3 (ref 0.1–0.9)
MONOCYTES NFR BLD AUTO: 9.4 % (ref 5–12)
NEUTROPHILS NFR BLD AUTO: 6.98 10*3/MM3 (ref 1.7–7)
NEUTROPHILS NFR BLD AUTO: 63.5 % (ref 42.7–76)
NRBC BLD AUTO-RTO: 0 /100 WBC (ref 0–0.2)
PHOSPHATE SERPL-MCNC: 4.1 MG/DL (ref 2.5–4.5)
PLATELET # BLD AUTO: 382 10*3/MM3 (ref 140–450)
PMV BLD AUTO: 9.4 FL (ref 6–12)
POTASSIUM SERPL-SCNC: 4.2 MMOL/L (ref 3.5–5.2)
PROT SERPL-MCNC: 6.2 G/DL (ref 6–8.5)
RBC # BLD AUTO: 4.49 10*6/MM3 (ref 4.14–5.8)
SODIUM SERPL-SCNC: 139 MMOL/L (ref 136–145)
TROPONIN T SERPL-MCNC: <0.01 NG/ML (ref 0–0.03)
WBC NRBC COR # BLD: 11.01 10*3/MM3 (ref 3.4–10.8)

## 2022-05-10 PROCEDURE — 84100 ASSAY OF PHOSPHORUS: CPT | Performed by: INTERNAL MEDICINE

## 2022-05-10 PROCEDURE — 99232 SBSQ HOSP IP/OBS MODERATE 35: CPT | Performed by: INTERNAL MEDICINE

## 2022-05-10 PROCEDURE — 85025 COMPLETE CBC W/AUTO DIFF WBC: CPT | Performed by: INTERNAL MEDICINE

## 2022-05-10 PROCEDURE — 99233 SBSQ HOSP IP/OBS HIGH 50: CPT | Performed by: INTERNAL MEDICINE

## 2022-05-10 PROCEDURE — 93005 ELECTROCARDIOGRAM TRACING: CPT | Performed by: INTERNAL MEDICINE

## 2022-05-10 PROCEDURE — 94799 UNLISTED PULMONARY SVC/PX: CPT

## 2022-05-10 PROCEDURE — 63710000001 PREDNISONE PER 1 MG: Performed by: INTERNAL MEDICINE

## 2022-05-10 PROCEDURE — 80053 COMPREHEN METABOLIC PANEL: CPT | Performed by: INTERNAL MEDICINE

## 2022-05-10 PROCEDURE — 93010 ELECTROCARDIOGRAM REPORT: CPT | Performed by: SPECIALIST

## 2022-05-10 PROCEDURE — 84484 ASSAY OF TROPONIN QUANT: CPT | Performed by: INTERNAL MEDICINE

## 2022-05-10 PROCEDURE — 83735 ASSAY OF MAGNESIUM: CPT | Performed by: INTERNAL MEDICINE

## 2022-05-10 PROCEDURE — 25010000002 AMIODARONE IN DEXTROSE 5% 360-4.14 MG/200ML-% SOLUTION: Performed by: INTERNAL MEDICINE

## 2022-05-10 PROCEDURE — 25010000002 HYDRALAZINE PER 20 MG: Performed by: INTERNAL MEDICINE

## 2022-05-10 RX ORDER — METOPROLOL SUCCINATE 50 MG/1
50 TABLET, EXTENDED RELEASE ORAL EVERY 12 HOURS SCHEDULED
Status: DISCONTINUED | OUTPATIENT
Start: 2022-05-10 | End: 2022-05-12 | Stop reason: HOSPADM

## 2022-05-10 RX ORDER — HYDRALAZINE HYDROCHLORIDE 20 MG/ML
10 INJECTION INTRAMUSCULAR; INTRAVENOUS EVERY 6 HOURS PRN
Status: DISCONTINUED | OUTPATIENT
Start: 2022-05-10 | End: 2022-05-12 | Stop reason: HOSPADM

## 2022-05-10 RX ORDER — MORPHINE SULFATE 2 MG/ML
1 INJECTION, SOLUTION INTRAMUSCULAR; INTRAVENOUS EVERY 4 HOURS PRN
Status: DISCONTINUED | OUTPATIENT
Start: 2022-05-10 | End: 2022-05-12 | Stop reason: HOSPADM

## 2022-05-10 RX ORDER — NITROGLYCERIN 0.4 MG/1
0.4 TABLET SUBLINGUAL
Status: DISCONTINUED | OUTPATIENT
Start: 2022-05-10 | End: 2022-05-12 | Stop reason: HOSPADM

## 2022-05-10 RX ADMIN — AMIODARONE HYDROCHLORIDE 1 MG/MIN: 1.8 INJECTION, SOLUTION INTRAVENOUS at 15:31

## 2022-05-10 RX ADMIN — DIGOXIN 125 MCG: 125 TABLET ORAL at 12:31

## 2022-05-10 RX ADMIN — AMIODARONE HYDROCHLORIDE 1 MG/MIN: 1.8 INJECTION, SOLUTION INTRAVENOUS at 09:43

## 2022-05-10 RX ADMIN — BUDESONIDE 0.5 MG: 0.5 SUSPENSION RESPIRATORY (INHALATION) at 06:27

## 2022-05-10 RX ADMIN — SENNOSIDES AND DOCUSATE SODIUM 2 TABLET: 50; 8.6 TABLET ORAL at 08:05

## 2022-05-10 RX ADMIN — CEFDINIR 300 MG: 300 CAPSULE ORAL at 08:05

## 2022-05-10 RX ADMIN — MONTELUKAST 10 MG: 10 TABLET, FILM COATED ORAL at 20:06

## 2022-05-10 RX ADMIN — LEVALBUTEROL HYDROCHLORIDE 1.25 MG: 1.25 SOLUTION RESPIRATORY (INHALATION) at 01:12

## 2022-05-10 RX ADMIN — ARFORMOTEROL TARTRATE 15 MCG: 15 SOLUTION RESPIRATORY (INHALATION) at 06:27

## 2022-05-10 RX ADMIN — SACUBITRIL AND VALSARTAN 1 TABLET: 49; 51 TABLET, FILM COATED ORAL at 08:05

## 2022-05-10 RX ADMIN — BUDESONIDE 0.5 MG: 0.5 SUSPENSION RESPIRATORY (INHALATION) at 19:06

## 2022-05-10 RX ADMIN — BENZONATATE 100 MG: 100 CAPSULE ORAL at 04:26

## 2022-05-10 RX ADMIN — HYDRALAZINE HYDROCHLORIDE 10 MG: 20 INJECTION, SOLUTION INTRAMUSCULAR; INTRAVENOUS at 17:30

## 2022-05-10 RX ADMIN — PREDNISONE 40 MG: 20 TABLET ORAL at 08:05

## 2022-05-10 RX ADMIN — LEVALBUTEROL HYDROCHLORIDE 1.25 MG: 1.25 SOLUTION RESPIRATORY (INHALATION) at 19:06

## 2022-05-10 RX ADMIN — CLOPIDOGREL BISULFATE 75 MG: 75 TABLET ORAL at 08:05

## 2022-05-10 RX ADMIN — LEVALBUTEROL HYDROCHLORIDE 1.25 MG: 1.25 SOLUTION RESPIRATORY (INHALATION) at 06:27

## 2022-05-10 RX ADMIN — ISOSORBIDE MONONITRATE 30 MG: 30 TABLET, EXTENDED RELEASE ORAL at 08:05

## 2022-05-10 RX ADMIN — Medication 10 ML: at 08:06

## 2022-05-10 RX ADMIN — ARFORMOTEROL TARTRATE 15 MCG: 15 SOLUTION RESPIRATORY (INHALATION) at 19:06

## 2022-05-10 RX ADMIN — BENZONATATE 100 MG: 100 CAPSULE ORAL at 20:08

## 2022-05-10 RX ADMIN — METOPROLOL SUCCINATE 50 MG: 50 TABLET, EXTENDED RELEASE ORAL at 08:06

## 2022-05-10 RX ADMIN — Medication 10 ML: at 17:30

## 2022-05-10 RX ADMIN — AMIODARONE HYDROCHLORIDE 0.5 MG/MIN: 1.8 INJECTION, SOLUTION INTRAVENOUS at 16:04

## 2022-05-10 RX ADMIN — BENZONATATE 100 MG: 100 CAPSULE ORAL at 12:31

## 2022-05-10 RX ADMIN — SENNOSIDES AND DOCUSATE SODIUM 2 TABLET: 50; 8.6 TABLET ORAL at 20:06

## 2022-05-10 RX ADMIN — SACUBITRIL AND VALSARTAN 1 TABLET: 49; 51 TABLET, FILM COATED ORAL at 20:06

## 2022-05-10 RX ADMIN — LEVALBUTEROL HYDROCHLORIDE 1.25 MG: 1.25 SOLUTION RESPIRATORY (INHALATION) at 11:39

## 2022-05-10 RX ADMIN — METOPROLOL SUCCINATE 50 MG: 50 TABLET, EXTENDED RELEASE ORAL at 20:06

## 2022-05-10 RX ADMIN — MULTIPLE VITAMINS W/ MINERALS TAB 1 TABLET: TAB at 08:05

## 2022-05-10 RX ADMIN — TAMSULOSIN HYDROCHLORIDE 0.4 MG: 0.4 CAPSULE ORAL at 08:05

## 2022-05-10 RX ADMIN — FLUTICASONE PROPIONATE 1 SPRAY: 50 SPRAY, METERED NASAL at 08:10

## 2022-05-10 RX ADMIN — FUROSEMIDE 40 MG: 40 TABLET ORAL at 08:05

## 2022-05-10 RX ADMIN — CEFDINIR 300 MG: 300 CAPSULE ORAL at 20:06

## 2022-05-10 RX ADMIN — NITROGLYCERIN 0.4 MG: 0.4 TABLET, ORALLY DISINTEGRATING SUBLINGUAL at 18:19

## 2022-05-10 RX ADMIN — RIVAROXABAN 15 MG: 15 TABLET, FILM COATED ORAL at 17:28

## 2022-05-11 LAB
ANION GAP SERPL CALCULATED.3IONS-SCNC: 10.4 MMOL/L (ref 5–15)
BACTERIA SPEC AEROBE CULT: NORMAL
BACTERIA SPEC AEROBE CULT: NORMAL
BASOPHILS # BLD AUTO: 0.02 10*3/MM3 (ref 0–0.2)
BASOPHILS NFR BLD AUTO: 0.2 % (ref 0–1.5)
BUN SERPL-MCNC: 25 MG/DL (ref 8–23)
BUN/CREAT SERPL: 18.1 (ref 7–25)
CALCIUM SPEC-SCNC: 9 MG/DL (ref 8.6–10.5)
CHLORIDE SERPL-SCNC: 104 MMOL/L (ref 98–107)
CO2 SERPL-SCNC: 25.6 MMOL/L (ref 22–29)
CREAT SERPL-MCNC: 1.38 MG/DL (ref 0.76–1.27)
DEPRECATED RDW RBC AUTO: 45.9 FL (ref 37–54)
EGFRCR SERPLBLD CKD-EPI 2021: 51.7 ML/MIN/1.73
EOSINOPHIL # BLD AUTO: 0.05 10*3/MM3 (ref 0–0.4)
EOSINOPHIL NFR BLD AUTO: 0.5 % (ref 0.3–6.2)
ERYTHROCYTE [DISTWIDTH] IN BLOOD BY AUTOMATED COUNT: 14.4 % (ref 12.3–15.4)
GLUCOSE SERPL-MCNC: 108 MG/DL (ref 65–99)
HCT VFR BLD AUTO: 41.4 % (ref 37.5–51)
HGB BLD-MCNC: 13.6 G/DL (ref 13–17.7)
IMM GRANULOCYTES # BLD AUTO: 0.79 10*3/MM3 (ref 0–0.05)
IMM GRANULOCYTES NFR BLD AUTO: 7.2 % (ref 0–0.5)
LYMPHOCYTES # BLD AUTO: 2.61 10*3/MM3 (ref 0.7–3.1)
LYMPHOCYTES NFR BLD AUTO: 23.8 % (ref 19.6–45.3)
MAGNESIUM SERPL-MCNC: 2.4 MG/DL (ref 1.6–2.4)
MCH RBC QN AUTO: 29.3 PG (ref 26.6–33)
MCHC RBC AUTO-ENTMCNC: 32.9 G/DL (ref 31.5–35.7)
MCV RBC AUTO: 89.2 FL (ref 79–97)
MONOCYTES # BLD AUTO: 1.18 10*3/MM3 (ref 0.1–0.9)
MONOCYTES NFR BLD AUTO: 10.8 % (ref 5–12)
NEUTROPHILS NFR BLD AUTO: 57.5 % (ref 42.7–76)
NEUTROPHILS NFR BLD AUTO: 6.32 10*3/MM3 (ref 1.7–7)
NRBC BLD AUTO-RTO: 0.5 /100 WBC (ref 0–0.2)
PLAT MORPH BLD: NORMAL
PLATELET # BLD AUTO: 371 10*3/MM3 (ref 140–450)
PMV BLD AUTO: 9 FL (ref 6–12)
POTASSIUM SERPL-SCNC: 3.9 MMOL/L (ref 3.5–5.2)
QT INTERVAL: 372 MS
QT INTERVAL: 380 MS
RBC # BLD AUTO: 4.64 10*6/MM3 (ref 4.14–5.8)
RBC MORPH BLD: NORMAL
SODIUM SERPL-SCNC: 140 MMOL/L (ref 136–145)
WBC MORPH BLD: NORMAL
WBC NRBC COR # BLD: 10.97 10*3/MM3 (ref 3.4–10.8)

## 2022-05-11 PROCEDURE — 94799 UNLISTED PULMONARY SVC/PX: CPT

## 2022-05-11 PROCEDURE — 25010000002 HYDRALAZINE PER 20 MG: Performed by: INTERNAL MEDICINE

## 2022-05-11 PROCEDURE — 85007 BL SMEAR W/DIFF WBC COUNT: CPT | Performed by: PHYSICIAN ASSISTANT

## 2022-05-11 PROCEDURE — 25010000002 AMIODARONE IN DEXTROSE 5% 360-4.14 MG/200ML-% SOLUTION: Performed by: INTERNAL MEDICINE

## 2022-05-11 PROCEDURE — 63710000001 PREDNISONE PER 1 MG: Performed by: INTERNAL MEDICINE

## 2022-05-11 PROCEDURE — 85025 COMPLETE CBC W/AUTO DIFF WBC: CPT | Performed by: PHYSICIAN ASSISTANT

## 2022-05-11 PROCEDURE — 94664 DEMO&/EVAL PT USE INHALER: CPT

## 2022-05-11 PROCEDURE — 83735 ASSAY OF MAGNESIUM: CPT | Performed by: PHYSICIAN ASSISTANT

## 2022-05-11 PROCEDURE — 80048 BASIC METABOLIC PNL TOTAL CA: CPT | Performed by: INTERNAL MEDICINE

## 2022-05-11 PROCEDURE — 99232 SBSQ HOSP IP/OBS MODERATE 35: CPT | Performed by: INTERNAL MEDICINE

## 2022-05-11 PROCEDURE — 93005 ELECTROCARDIOGRAM TRACING: CPT | Performed by: INTERNAL MEDICINE

## 2022-05-11 PROCEDURE — 93010 ELECTROCARDIOGRAM REPORT: CPT | Performed by: SPECIALIST

## 2022-05-11 PROCEDURE — 99232 SBSQ HOSP IP/OBS MODERATE 35: CPT | Performed by: NURSE PRACTITIONER

## 2022-05-11 RX ORDER — AMIODARONE HYDROCHLORIDE 200 MG/1
200 TABLET ORAL EVERY 12 HOURS SCHEDULED
Status: DISCONTINUED | OUTPATIENT
Start: 2022-05-11 | End: 2022-05-12 | Stop reason: HOSPADM

## 2022-05-11 RX ORDER — PREDNISONE 20 MG/1
20 TABLET ORAL
Status: DISCONTINUED | OUTPATIENT
Start: 2022-05-14 | End: 2022-05-12 | Stop reason: HOSPADM

## 2022-05-11 RX ORDER — DILTIAZEM HYDROCHLORIDE 180 MG/1
180 CAPSULE, COATED, EXTENDED RELEASE ORAL
Status: DISCONTINUED | OUTPATIENT
Start: 2022-05-11 | End: 2022-05-12

## 2022-05-11 RX ORDER — PREDNISONE 10 MG/1
10 TABLET ORAL
Status: DISCONTINUED | OUTPATIENT
Start: 2022-05-16 | End: 2022-05-12 | Stop reason: HOSPADM

## 2022-05-11 RX ADMIN — BUDESONIDE 0.5 MG: 0.5 SUSPENSION RESPIRATORY (INHALATION) at 06:43

## 2022-05-11 RX ADMIN — LEVALBUTEROL HYDROCHLORIDE 1.25 MG: 1.25 SOLUTION RESPIRATORY (INHALATION) at 11:52

## 2022-05-11 RX ADMIN — MONTELUKAST 10 MG: 10 TABLET, FILM COATED ORAL at 20:28

## 2022-05-11 RX ADMIN — LEVALBUTEROL HYDROCHLORIDE 1.25 MG: 1.25 SOLUTION RESPIRATORY (INHALATION) at 19:19

## 2022-05-11 RX ADMIN — DILTIAZEM HYDROCHLORIDE 180 MG: 180 CAPSULE, COATED, EXTENDED RELEASE ORAL at 13:53

## 2022-05-11 RX ADMIN — RIVAROXABAN 15 MG: 15 TABLET, FILM COATED ORAL at 17:16

## 2022-05-11 RX ADMIN — TAMSULOSIN HYDROCHLORIDE 0.4 MG: 0.4 CAPSULE ORAL at 08:00

## 2022-05-11 RX ADMIN — MULTIPLE VITAMINS W/ MINERALS TAB 1 TABLET: TAB at 08:00

## 2022-05-11 RX ADMIN — SACUBITRIL AND VALSARTAN 1 TABLET: 49; 51 TABLET, FILM COATED ORAL at 20:28

## 2022-05-11 RX ADMIN — DIGOXIN 125 MCG: 125 TABLET ORAL at 11:56

## 2022-05-11 RX ADMIN — PREDNISONE 40 MG: 20 TABLET ORAL at 07:59

## 2022-05-11 RX ADMIN — HYDRALAZINE HYDROCHLORIDE 10 MG: 20 INJECTION, SOLUTION INTRAMUSCULAR; INTRAVENOUS at 17:47

## 2022-05-11 RX ADMIN — LEVALBUTEROL HYDROCHLORIDE 1.25 MG: 1.25 SOLUTION RESPIRATORY (INHALATION) at 01:19

## 2022-05-11 RX ADMIN — METOPROLOL SUCCINATE 50 MG: 50 TABLET, EXTENDED RELEASE ORAL at 20:28

## 2022-05-11 RX ADMIN — BUDESONIDE 0.5 MG: 0.5 SUSPENSION RESPIRATORY (INHALATION) at 19:19

## 2022-05-11 RX ADMIN — CEFDINIR 300 MG: 300 CAPSULE ORAL at 08:00

## 2022-05-11 RX ADMIN — BENZONATATE 100 MG: 100 CAPSULE ORAL at 08:37

## 2022-05-11 RX ADMIN — CEFDINIR 300 MG: 300 CAPSULE ORAL at 20:28

## 2022-05-11 RX ADMIN — LEVALBUTEROL HYDROCHLORIDE 1.25 MG: 1.25 SOLUTION RESPIRATORY (INHALATION) at 06:43

## 2022-05-11 RX ADMIN — ISOSORBIDE MONONITRATE 30 MG: 30 TABLET, EXTENDED RELEASE ORAL at 08:00

## 2022-05-11 RX ADMIN — SENNOSIDES AND DOCUSATE SODIUM 2 TABLET: 50; 8.6 TABLET ORAL at 20:28

## 2022-05-11 RX ADMIN — Medication 10 ML: at 08:00

## 2022-05-11 RX ADMIN — ARFORMOTEROL TARTRATE 15 MCG: 15 SOLUTION RESPIRATORY (INHALATION) at 06:43

## 2022-05-11 RX ADMIN — AMIODARONE HYDROCHLORIDE 200 MG: 200 TABLET ORAL at 20:28

## 2022-05-11 RX ADMIN — CLOPIDOGREL BISULFATE 75 MG: 75 TABLET ORAL at 08:00

## 2022-05-11 RX ADMIN — SACUBITRIL AND VALSARTAN 1 TABLET: 49; 51 TABLET, FILM COATED ORAL at 08:00

## 2022-05-11 RX ADMIN — FLUTICASONE PROPIONATE 1 SPRAY: 50 SPRAY, METERED NASAL at 08:00

## 2022-05-11 RX ADMIN — AMIODARONE HYDROCHLORIDE 0.5 MG/MIN: 1.8 INJECTION, SOLUTION INTRAVENOUS at 01:23

## 2022-05-11 RX ADMIN — ARFORMOTEROL TARTRATE 15 MCG: 15 SOLUTION RESPIRATORY (INHALATION) at 19:19

## 2022-05-11 RX ADMIN — SENNOSIDES AND DOCUSATE SODIUM 2 TABLET: 50; 8.6 TABLET ORAL at 08:37

## 2022-05-11 RX ADMIN — BENZONATATE 100 MG: 100 CAPSULE ORAL at 23:37

## 2022-05-11 RX ADMIN — METOPROLOL SUCCINATE 50 MG: 50 TABLET, EXTENDED RELEASE ORAL at 08:00

## 2022-05-11 RX ADMIN — Medication 10 ML: at 20:28

## 2022-05-12 ENCOUNTER — READMISSION MANAGEMENT (OUTPATIENT)
Dept: CALL CENTER | Facility: HOSPITAL | Age: 82
End: 2022-05-12

## 2022-05-12 VITALS
RESPIRATION RATE: 20 BRPM | DIASTOLIC BLOOD PRESSURE: 68 MMHG | SYSTOLIC BLOOD PRESSURE: 114 MMHG | TEMPERATURE: 97.7 F | HEIGHT: 70 IN | HEART RATE: 91 BPM | BODY MASS INDEX: 32.82 KG/M2 | OXYGEN SATURATION: 93 % | WEIGHT: 229.28 LBS

## 2022-05-12 DIAGNOSIS — I48.0 PAROXYSMAL ATRIAL FIBRILLATION: Primary | ICD-10-CM

## 2022-05-12 LAB
ALBUMIN SERPL-MCNC: 3.5 G/DL (ref 3.5–5.2)
ANION GAP SERPL CALCULATED.3IONS-SCNC: 9 MMOL/L (ref 5–15)
BASOPHILS # BLD MANUAL: 0.11 10*3/MM3 (ref 0–0.2)
BASOPHILS NFR BLD MANUAL: 1 % (ref 0–1.5)
BUN SERPL-MCNC: 24 MG/DL (ref 8–23)
BUN/CREAT SERPL: 18.8 (ref 7–25)
CALCIUM SPEC-SCNC: 9.1 MG/DL (ref 8.6–10.5)
CHLORIDE SERPL-SCNC: 104 MMOL/L (ref 98–107)
CO2 SERPL-SCNC: 25 MMOL/L (ref 22–29)
CREAT SERPL-MCNC: 1.28 MG/DL (ref 0.76–1.27)
DEPRECATED RDW RBC AUTO: 47.1 FL (ref 37–54)
EGFRCR SERPLBLD CKD-EPI 2021: 56.6 ML/MIN/1.73
ERYTHROCYTE [DISTWIDTH] IN BLOOD BY AUTOMATED COUNT: 14.7 % (ref 12.3–15.4)
GLUCOSE SERPL-MCNC: 115 MG/DL (ref 65–99)
HCT VFR BLD AUTO: 40.9 % (ref 37.5–51)
HGB BLD-MCNC: 13.5 G/DL (ref 13–17.7)
LYMPHOCYTES # BLD MANUAL: 3.72 10*3/MM3 (ref 0.7–3.1)
LYMPHOCYTES NFR BLD MANUAL: 7 % (ref 5–12)
MAGNESIUM SERPL-MCNC: 2.4 MG/DL (ref 1.6–2.4)
MCH RBC QN AUTO: 29.3 PG (ref 26.6–33)
MCHC RBC AUTO-ENTMCNC: 33 G/DL (ref 31.5–35.7)
MCV RBC AUTO: 88.7 FL (ref 79–97)
MONOCYTES # BLD: 0.79 10*3/MM3 (ref 0.1–0.9)
MYELOCYTES NFR BLD MANUAL: 1 % (ref 0–0)
NEUTROPHILS # BLD AUTO: 6.54 10*3/MM3 (ref 1.7–7)
NEUTROPHILS NFR BLD MANUAL: 54 % (ref 42.7–76)
NEUTS BAND NFR BLD MANUAL: 4 % (ref 0–5)
NRBC SPEC MANUAL: 1 /100 WBC (ref 0–0.2)
PHOSPHATE SERPL-MCNC: 4.2 MG/DL (ref 2.5–4.5)
PLAT MORPH BLD: NORMAL
PLATELET # BLD AUTO: 386 10*3/MM3 (ref 140–450)
PMV BLD AUTO: 9.1 FL (ref 6–12)
POTASSIUM SERPL-SCNC: 4.2 MMOL/L (ref 3.5–5.2)
RBC # BLD AUTO: 4.61 10*6/MM3 (ref 4.14–5.8)
RBC MORPH BLD: NORMAL
SCAN SLIDE: NORMAL
SODIUM SERPL-SCNC: 138 MMOL/L (ref 136–145)
VARIANT LYMPHS NFR BLD MANUAL: 33 % (ref 19.6–45.3)
WBC MORPH BLD: NORMAL
WBC NRBC COR # BLD: 11.27 10*3/MM3 (ref 3.4–10.8)

## 2022-05-12 PROCEDURE — 92960 CARDIOVERSION ELECTRIC EXT: CPT | Performed by: INTERNAL MEDICINE

## 2022-05-12 PROCEDURE — 99239 HOSP IP/OBS DSCHRG MGMT >30: CPT | Performed by: INTERNAL MEDICINE

## 2022-05-12 PROCEDURE — 99153 MOD SED SAME PHYS/QHP EA: CPT

## 2022-05-12 PROCEDURE — 0 MEPERIDINE PER 100 MG: Performed by: INTERNAL MEDICINE

## 2022-05-12 PROCEDURE — 99232 SBSQ HOSP IP/OBS MODERATE 35: CPT | Performed by: INTERNAL MEDICINE

## 2022-05-12 PROCEDURE — 25010000002 MIDAZOLAM PER 1 MG: Performed by: INTERNAL MEDICINE

## 2022-05-12 PROCEDURE — 83735 ASSAY OF MAGNESIUM: CPT | Performed by: PHYSICIAN ASSISTANT

## 2022-05-12 PROCEDURE — 94799 UNLISTED PULMONARY SVC/PX: CPT

## 2022-05-12 PROCEDURE — 85007 BL SMEAR W/DIFF WBC COUNT: CPT | Performed by: INTERNAL MEDICINE

## 2022-05-12 PROCEDURE — 92960 CARDIOVERSION ELECTRIC EXT: CPT

## 2022-05-12 PROCEDURE — 63710000001 PREDNISONE PER 1 MG: Performed by: NURSE PRACTITIONER

## 2022-05-12 PROCEDURE — 99232 SBSQ HOSP IP/OBS MODERATE 35: CPT | Performed by: NURSE PRACTITIONER

## 2022-05-12 PROCEDURE — 85025 COMPLETE CBC W/AUTO DIFF WBC: CPT | Performed by: INTERNAL MEDICINE

## 2022-05-12 PROCEDURE — 94664 DEMO&/EVAL PT USE INHALER: CPT

## 2022-05-12 PROCEDURE — 99152 MOD SED SAME PHYS/QHP 5/>YRS: CPT

## 2022-05-12 PROCEDURE — 80069 RENAL FUNCTION PANEL: CPT | Performed by: PHYSICIAN ASSISTANT

## 2022-05-12 RX ORDER — MEPERIDINE HYDROCHLORIDE 25 MG/ML
100 INJECTION INTRAMUSCULAR; INTRAVENOUS; SUBCUTANEOUS ONCE
Status: COMPLETED | OUTPATIENT
Start: 2022-05-12 | End: 2022-05-12

## 2022-05-12 RX ORDER — LEVALBUTEROL INHALATION SOLUTION 0.63 MG/3ML
1 SOLUTION RESPIRATORY (INHALATION) EVERY 6 HOURS PRN
Qty: 360 ML | Refills: 0 | Status: SHIPPED | OUTPATIENT
Start: 2022-05-12 | End: 2022-06-02 | Stop reason: SDUPTHER

## 2022-05-12 RX ORDER — PREDNISONE 10 MG/1
TABLET ORAL
Qty: 12 TABLET | Refills: 0 | Status: SHIPPED | OUTPATIENT
Start: 2022-05-12 | End: 2022-05-18

## 2022-05-12 RX ORDER — DIGOXIN 125 MCG
125 TABLET ORAL
Qty: 30 TABLET | Refills: 0 | Status: SHIPPED | OUTPATIENT
Start: 2022-05-12 | End: 2022-06-10 | Stop reason: HOSPADM

## 2022-05-12 RX ORDER — METOPROLOL SUCCINATE 50 MG/1
50 TABLET, EXTENDED RELEASE ORAL EVERY 12 HOURS SCHEDULED
Qty: 60 TABLET | Refills: 0 | Status: SHIPPED | OUTPATIENT
Start: 2022-05-12 | End: 2022-06-10 | Stop reason: HOSPADM

## 2022-05-12 RX ORDER — CEFDINIR 300 MG/1
300 CAPSULE ORAL EVERY 12 HOURS SCHEDULED
Qty: 5 CAPSULE | Refills: 0 | Status: SHIPPED | OUTPATIENT
Start: 2022-05-12 | End: 2022-05-15

## 2022-05-12 RX ORDER — AMIODARONE HYDROCHLORIDE 200 MG/1
TABLET ORAL
Qty: 36 TABLET | Refills: 0 | Status: SHIPPED | OUTPATIENT
Start: 2022-05-12 | End: 2022-06-02

## 2022-05-12 RX ORDER — FUROSEMIDE 20 MG/1
20 TABLET ORAL DAILY
Status: DISCONTINUED | OUTPATIENT
Start: 2022-05-13 | End: 2022-05-12 | Stop reason: HOSPADM

## 2022-05-12 RX ORDER — MONTELUKAST SODIUM 10 MG/1
10 TABLET ORAL NIGHTLY
Qty: 30 TABLET | Refills: 0 | Status: SHIPPED | OUTPATIENT
Start: 2022-05-12 | End: 2022-06-02 | Stop reason: SDUPTHER

## 2022-05-12 RX ORDER — NITROGLYCERIN 0.4 MG/1
0.4 TABLET SUBLINGUAL
Qty: 30 TABLET | Refills: 0 | Status: SHIPPED | OUTPATIENT
Start: 2022-05-12 | End: 2023-02-20

## 2022-05-12 RX ORDER — MIDAZOLAM HYDROCHLORIDE 1 MG/ML
10 INJECTION INTRAMUSCULAR; INTRAVENOUS ONCE
Status: DISCONTINUED | OUTPATIENT
Start: 2022-05-12 | End: 2022-05-12

## 2022-05-12 RX ORDER — MIDAZOLAM HYDROCHLORIDE 1 MG/ML
10 INJECTION INTRAMUSCULAR; INTRAVENOUS ONCE
Status: COMPLETED | OUTPATIENT
Start: 2022-05-12 | End: 2022-05-12

## 2022-05-12 RX ADMIN — METOPROLOL SUCCINATE 50 MG: 50 TABLET, EXTENDED RELEASE ORAL at 09:53

## 2022-05-12 RX ADMIN — ARFORMOTEROL TARTRATE 15 MCG: 15 SOLUTION RESPIRATORY (INHALATION) at 07:10

## 2022-05-12 RX ADMIN — DIGOXIN 125 MCG: 125 TABLET ORAL at 11:39

## 2022-05-12 RX ADMIN — LEVALBUTEROL HYDROCHLORIDE 1.25 MG: 1.25 SOLUTION RESPIRATORY (INHALATION) at 07:10

## 2022-05-12 RX ADMIN — LEVALBUTEROL HYDROCHLORIDE 1.25 MG: 1.25 SOLUTION RESPIRATORY (INHALATION) at 12:05

## 2022-05-12 RX ADMIN — SACUBITRIL AND VALSARTAN 1 TABLET: 49; 51 TABLET, FILM COATED ORAL at 09:52

## 2022-05-12 RX ADMIN — CLOPIDOGREL BISULFATE 75 MG: 75 TABLET ORAL at 09:53

## 2022-05-12 RX ADMIN — FLUTICASONE PROPIONATE 1 SPRAY: 50 SPRAY, METERED NASAL at 09:54

## 2022-05-12 RX ADMIN — MIDAZOLAM HYDROCHLORIDE 5 MG: 1 INJECTION, SOLUTION INTRAMUSCULAR; INTRAVENOUS at 08:57

## 2022-05-12 RX ADMIN — TAMSULOSIN HYDROCHLORIDE 0.4 MG: 0.4 CAPSULE ORAL at 09:54

## 2022-05-12 RX ADMIN — MEPERIDINE HYDROCHLORIDE 25 MG: 25 INJECTION INTRAMUSCULAR; INTRAVENOUS; SUBCUTANEOUS at 09:00

## 2022-05-12 RX ADMIN — CEFDINIR 300 MG: 300 CAPSULE ORAL at 09:52

## 2022-05-12 RX ADMIN — AMIODARONE HYDROCHLORIDE 200 MG: 200 TABLET ORAL at 09:53

## 2022-05-12 RX ADMIN — BUDESONIDE 0.5 MG: 0.5 SUSPENSION RESPIRATORY (INHALATION) at 07:10

## 2022-05-12 RX ADMIN — ISOSORBIDE MONONITRATE 30 MG: 30 TABLET, EXTENDED RELEASE ORAL at 09:53

## 2022-05-12 RX ADMIN — Medication 10 ML: at 09:52

## 2022-05-12 RX ADMIN — MULTIPLE VITAMINS W/ MINERALS TAB 1 TABLET: TAB at 09:53

## 2022-05-12 RX ADMIN — LEVALBUTEROL HYDROCHLORIDE 1.25 MG: 1.25 SOLUTION RESPIRATORY (INHALATION) at 00:24

## 2022-05-12 RX ADMIN — PREDNISONE 30 MG: 20 TABLET ORAL at 09:54

## 2022-05-12 NOTE — OUTREACH NOTE
Prep Survey    Flowsheet Row Responses   [facility] facility patient discharged from? Back   Is LACE score < 7 ? No   Emergency Room discharge w/ pulse ox? No   Eligibility [facility] Back   Date of Admission 05/06/22   Date of Discharge 05/12/22   Discharge Disposition Home or Self Care   Discharge diagnosis COPD Exac   Does the patient have one of the following disease processes/diagnoses(primary or secondary)? COPD/Pneumonia   Does the patient have Home health ordered? No   Prep survey completed? Yes          EILEEN PIZARRO - Registered Nurse

## 2022-05-13 ENCOUNTER — TRANSITIONAL CARE MANAGEMENT TELEPHONE ENCOUNTER (OUTPATIENT)
Dept: CALL CENTER | Facility: HOSPITAL | Age: 82
End: 2022-05-13

## 2022-05-13 RX ORDER — TIOTROPIUM BROMIDE AND OLODATEROL 3.124; 2.736 UG/1; UG/1
2 SPRAY, METERED RESPIRATORY (INHALATION)
Qty: 2 EACH | Refills: 0 | COMMUNITY
Start: 2022-05-13 | End: 2022-06-02 | Stop reason: SDUPTHER

## 2022-05-13 NOTE — OUTREACH NOTE
Call Center TCM Note    Flowsheet Row Responses   [facility] patient discharged from? Back   Does the patient have one of the following disease processes/diagnoses(primary or secondary)? COPD/Pneumonia   Was the primary reason for admission: COPD exacerbation   TCM attempt successful? Yes  [verbal release for jersey Mayers]   Call start time [phone]   Call end time [phone]   Discharge diagnosis COPD Exac--Afib cardioversion x 2   Person spoke with today (if not patient) and relationship jersey Mayers   Meds reviewed with patient/caregiver? Yes   Is the patient having any side effects they believe may be caused by any medication additions or changes? No   Does the patient have all medications ordered at discharge? Yes   Prescription comments Wife will  Xopenex when available from pharmacy   Is the patient taking all medications as directed (includes completed medication regime)? Yes   Comments regarding appointments Cardiology 5/23/22   Does the patient have a primary care provider?  Yes   Does the patient have an appointment with their PCP or specialist within 7 days of discharge? Yes   Comments regarding PCP Hospital D/C follow-up scheduled for 5/18/22@0930am   Has the patient kept scheduled appointments due by today? N/A   Comments Awaiting a call from cardiology for scheduling with EP for possible ablation   Has home health visited the patient within 72 hours of discharge? N/A   Pulse Ox monitoring Intermittent   Pulse Ox device source Patient   O2 Sat comments Wife reports sats stay in 90s   O2 Sat: education provided Sat levels   Psychosocial issues? No   Did the patient receive a copy of their discharge instructions? Yes   Nursing interventions Reviewed instructions with patient   What is the patient's perception of their health status since discharge? Improving  [Wife reports that patient had no coughing/wheezing throughout the night and rested well,  sats stay in 90s.  Reports some HALL at times.  Patient had 2 attempts at cardioversion that were not successful,  plans for possible ablation.  ]   Nursing Interventions Nurse provided patient education  [Reviewed return precautions--wife is very competent and confident with care. ]   Is the patient/caregiver able to teach back the hierarchy of who to call/visit for symptoms/problems? PCP, Specialist, Home health nurse, Urgent Care, ED, 911 Yes   Additional teach back comments Patient weighs QOD and aware of weight gain parameters.   TCM call completed? Yes          Carly Marte RN    5/13/2022, 09:26 EDT

## 2022-05-18 ENCOUNTER — OFFICE VISIT (OUTPATIENT)
Dept: FAMILY MEDICINE CLINIC | Facility: CLINIC | Age: 82
End: 2022-05-18

## 2022-05-18 VITALS
OXYGEN SATURATION: 98 % | HEART RATE: 74 BPM | WEIGHT: 225 LBS | BODY MASS INDEX: 32.21 KG/M2 | HEIGHT: 70 IN | DIASTOLIC BLOOD PRESSURE: 61 MMHG | SYSTOLIC BLOOD PRESSURE: 105 MMHG

## 2022-05-18 DIAGNOSIS — I48.19 ATRIAL FIBRILLATION, PERSISTENT: ICD-10-CM

## 2022-05-18 DIAGNOSIS — J44.1 COPD EXACERBATION: ICD-10-CM

## 2022-05-18 DIAGNOSIS — Z09 HOSPITAL DISCHARGE FOLLOW-UP: Primary | ICD-10-CM

## 2022-05-18 PROCEDURE — 1111F DSCHRG MED/CURRENT MED MERGE: CPT | Performed by: NURSE PRACTITIONER

## 2022-05-18 PROCEDURE — 99495 TRANSJ CARE MGMT MOD F2F 14D: CPT | Performed by: NURSE PRACTITIONER

## 2022-05-18 RX ORDER — FINASTERIDE 5 MG/1
5 TABLET, FILM COATED ORAL DAILY
COMMUNITY
End: 2022-11-28

## 2022-05-18 NOTE — PROGRESS NOTES
"Transitional Care Follow Up Visit  Subjective     Layo Cee is a 82 y.o. male who presents for a transitional care management visit.    Within 48 business hours after discharge our office contacted him via telephone to coordinate his care and needs.      I reviewed and discussed the details of that call along with the discharge summary, hospital problems, inpatient lab results, inpatient diagnostic studies, and consultation reports with Layo.     Current outpatient and discharge medications have been reconciled for the patient.  Reviewed by: ARACELI Hendricks      Date of TCM Phone Call 5/12/2022   [facility]   Date of Admission 5/6/2022   Date of Discharge 5/12/2022   Discharge Disposition Home or Self Care     Risk for Readmission (LACE) Score: 12 (5/12/2022  6:01 AM)      Pt is a TCM  Of 7 days. Pt was admitted 5/6/22 to 5/12/22 for A-fib and COPD. Pt states he is feeling slightly better, feeling \"fair.\" Pt will see a cardiac electrophysiologist tomorrow in [place]. Dr. Aguilar, for cardiac ablation.     Pt has a f/u with ARACELI Garcia and Dr. Stevenson 5/23/22.    Pt has a f/u with JAVIER Brink 6/2/22         Course During Hospital Stay:       The following portions of the patient's history were reviewed and updated as appropriate: allergies, current medications, past family history, past medical history, past social history, past surgical history and problem list.        Objective   Physical Exam  Constitutional:       General: He is not in acute distress.     Appearance: Normal appearance. He is not ill-appearing.   HENT:      Head: Normocephalic and atraumatic.   Cardiovascular:      Rate and Rhythm: Normal rate. Rhythm irregular.      Pulses: Normal pulses.      Heart sounds: Normal heart sounds. No murmur heard.  Pulmonary:      Effort: Pulmonary effort is normal. No respiratory distress.      Breath sounds: Normal breath sounds.   Chest:      Chest wall: No tenderness.   Abdominal:      " General: Abdomen is flat. Bowel sounds are normal. There is no distension.      Palpations: Abdomen is soft. There is no mass.      Tenderness: There is no abdominal tenderness. There is no guarding.   Musculoskeletal:         General: No swelling or tenderness. Normal range of motion.      Cervical back: Normal range of motion and neck supple.      Right lower leg: No edema.      Left lower leg: No edema.   Skin:     General: Skin is warm and dry.      Findings: No rash.   Neurological:      General: No focal deficit present.      Mental Status: He is alert and oriented to person, place, and time. Mental status is at baseline.      Gait: Gait normal.   Psychiatric:         Mood and Affect: Mood normal.         Behavior: Behavior normal.         Thought Content: Thought content normal.         Judgment: Judgment normal.         Assessment & Plan   Problems Addressed this Visit        Cardiac and Vasculature    Atrial fibrillation, persistent (HCC)       Pulmonary and Pneumonias    COPD exacerbation (HCC)      Other Visit Diagnoses     Hospital discharge follow-up    -  Primary      Diagnoses       Codes Comments    Hospital discharge follow-up    -  Primary ICD-10-CM: Z09  ICD-9-CM: V67.59     Atrial fibrillation, persistent (HCC)     ICD-10-CM: I48.19  ICD-9-CM: 427.31 keep appt with Dr. Aguilar cardioac Electrophysiologist    COPD exacerbation (HCC)     ICD-10-CM: J44.1  ICD-9-CM: 491.21 Keep f/u with Pulmonology

## 2022-05-19 ENCOUNTER — OFFICE VISIT (OUTPATIENT)
Dept: CARDIOLOGY | Facility: CLINIC | Age: 82
End: 2022-05-19

## 2022-05-19 VITALS
WEIGHT: 221 LBS | HEIGHT: 70 IN | HEART RATE: 80 BPM | SYSTOLIC BLOOD PRESSURE: 108 MMHG | BODY MASS INDEX: 31.64 KG/M2 | DIASTOLIC BLOOD PRESSURE: 70 MMHG

## 2022-05-19 DIAGNOSIS — I25.708 CORONARY ARTERY DISEASE OF BYPASS GRAFT OF NATIVE HEART WITH STABLE ANGINA PECTORIS: Chronic | ICD-10-CM

## 2022-05-19 DIAGNOSIS — I49.3 FREQUENT PVCS: ICD-10-CM

## 2022-05-19 DIAGNOSIS — I10 ESSENTIAL HYPERTENSION: ICD-10-CM

## 2022-05-19 DIAGNOSIS — I48.3 TYPICAL ATRIAL FLUTTER: ICD-10-CM

## 2022-05-19 DIAGNOSIS — Z95.1 S/P CABG (CORONARY ARTERY BYPASS GRAFT): ICD-10-CM

## 2022-05-19 DIAGNOSIS — I48.19 ATRIAL FIBRILLATION, PERSISTENT: Primary | ICD-10-CM

## 2022-05-19 PROCEDURE — 99204 OFFICE O/P NEW MOD 45 MIN: CPT | Performed by: INTERNAL MEDICINE

## 2022-05-19 PROCEDURE — 93000 ELECTROCARDIOGRAM COMPLETE: CPT | Performed by: INTERNAL MEDICINE

## 2022-05-19 NOTE — PROGRESS NOTES
Subjective:     Encounter Date:05/19/2022      Patient ID: Layo Cee is a 82 y.o. male.    Chief Complaint:  Chief Complaint   Patient presents with   • Atrial Fibrillation       HPI:  Mr Cee is an 81yo patient of Dr. Stevenson who is referred for evaluation of persistent atrial fibrillation.  His past medical history is significant for HTN, HLD, CAD with an MI in 2016, CABG and a stent in 6/2021.  He also has CKD stage 3.  An ablation for typical atrial flutter was in 2018.  He did well until 4/2022 when he was admitted to [facility] with AF with RVR and heart failure.  He was rate controlled, anticoagulated and diuresed.  He was seen in followup by Dr. Soliz and had a cardioversion 5/12/2022 was initially successful but he reverted back to AF shortly afterward.  The AF has persisted with rates that are high at times.  He has been very symptomatic with marked fatigue, poor stamina, palpitations and exertional dypsnea.  He is no longer to be as active as he once was.    An echo 5/2022 showed mild-moderate concentric LVH with an EF of 52%, moderate MR with mod-severe LAE, mild TR with KORIN.  There was borderline RV enlargement.    A cath 6/2021 showed  Findings:  1. Coronary Artery Anatomy:  Dominance: codominant  Left Main: angiographically normal, 4.5-5.0 mm diameter vessel that bifurcates distally into the LAD and circumflex, no dampening or ventricularization of the pressure waveform upon engagement of the vessel  Left Anterior Descending: diffuse disease throughout the proximal-mid LAD with up to 80% stenosis; competitive flow was observed to the distal LAD from the patient's LIMA graft  Left Circumflex Artery: codominant vessel with focal 40-50% stenosis in the proximal circumflex and a tubular 50-60% stenosis in the distal circumflex; left-sided PDA contains a focal 70% stenosis in its proximal segment  Right Coronary Artery: severely diffusely diseased and chronically occluded distally;  left-to-right collaterals were observed to the distal RCA system     2. Graft Study:  LIMA-LAD: widely patent throughout with no significant disease in the native LAD distal to the graft anastomosis  SVG-OM: chronically occluded at ostium  SVG-RCA: chronically occluded at ostium     3. Hemodynamics:  Left Ventricle: 155/0, LVEDP=12 mmHg  Aorta: 160/81 mmHg (opening pressure), 176/67 (closing pressure)     4. Left Ventriculogram:  Ejection Fraction: 65-70%  Wall Motion: mild inferior hypokinesis  Mitral Regurgitation: trace             The following portions of the patient's history were reviewed and updated as appropriate: current medications, past family history, past medical history, past social history, past surgical history and problem list.    Problem List:  Patient Active Problem List   Diagnosis   • Unstable angina (HCC)   • Coronary artery disease of bypass graft of native heart with stable angina pectoris (HCC)   • Class 1 obesity due to excess calories with serious comorbidity and body mass index (BMI) of 33.0 to 33.9 in adult   • Gout   • Typical atrial flutter (HCC)   • Essential hypertension   • Frequent PVCs   • Mixed hyperlipidemia   • Rotator cuff Bursitis of shoulder region   • Lateral epicondylitis   • History of hematuria   • Benign prostatic hyperplasia with urinary frequency   • Hydrocele in adult   • History of COVID-19   • Allergic rhinitis   • Seasonal allergies   • Cough   • Dyspnea   • Chronic systolic congestive heart failure (HCC)   • Tobacco abuse, in remission   • Vitamin D deficiency   • Gastroesophageal reflux disease with esophagitis without hemorrhage   • Ischemic cardiomyopathy   • Centrilobular emphysema (HCC)   • Chronic obstructive pulmonary disease with acute exacerbation (HCC)   • Gastroesophageal reflux disease   • Atrial fibrillation, persistent (HCC)   • COPD (chronic obstructive pulmonary disease) (HCC)   • COPD exacerbation (HCC)   • Chronic anticoagulation   • Diastolic  dysfunction   • Fatigue   • S/P CABG (coronary artery bypass graft)   • Stage 3a chronic kidney disease (HCC)       Active Med List:    Current Outpatient Medications:   •  acetaminophen (TYLENOL) 325 MG tablet, Take 650 mg by mouth Every 6 (Six) Hours As Needed for Mild Pain ., Disp: , Rfl:   •  albuterol sulfate  (90 Base) MCG/ACT inhaler, Inhale 2 puffs Every 4 (Four) Hours As Needed., Disp: , Rfl:   •  amiodarone (PACERONE) 200 MG tablet, Take 1 tablet by mouth Every 12 (Twelve) Hours for 6 days, THEN 1 tablet Daily for 24 days., Disp: 36 tablet, Rfl: 0  •  calcium carbonate (OS-LIANNA) 600 MG tablet, Take 600 mg by mouth Every Night., Disp: , Rfl:   •  clopidogrel (PLAVIX) 75 MG tablet, Take 1 tablet by mouth Daily., Disp: 90 tablet, Rfl: 3  •  Coenzyme Q10 100 MG tablet, Take 100 mg by mouth Daily., Disp: , Rfl:   •  digoxin (LANOXIN) 125 MCG tablet, Take 1 tablet by mouth Daily With Lunch for 30 days., Disp: 30 tablet, Rfl: 0  •  famotidine (PEPCID) 10 MG tablet, Take 10 mg by mouth At Night As Needed., Disp: , Rfl:   •  finasteride (PROSCAR) 5 MG tablet, Take 5 mg by mouth Daily., Disp: , Rfl:   •  fluticasone (FLONASE) 50 MCG/ACT nasal spray, 1 spray into the nostril(s) as directed by provider Every Night., Disp: , Rfl:   •  Fluticasone Furoate (Arnuity Ellipta) 100 MCG/ACT aerosol powder , Inhale 1 puff Daily., Disp: 1 each, Rfl: 3  •  furosemide (LASIX) 20 MG tablet, Take 1 tablet by mouth Daily. (Patient taking differently: Take 40 mg by mouth Daily.), Disp: 90 tablet, Rfl: 2  •  isosorbide mononitrate (IMDUR) 60 MG 24 hr tablet, Take 60 mg by mouth Every Night., Disp: , Rfl:   •  Livalo 1 MG tablet tablet, Take 1 tablet by mouth every day (Patient taking differently: Take 1 mg by mouth Every Night.), Disp: 90 tablet, Rfl: 1  •  metoprolol succinate XL (TOPROL-XL) 50 MG 24 hr tablet, Take 1 tablet by mouth Every 12 (Twelve) Hours for 30 days., Disp: 60 tablet, Rfl: 0  •  montelukast (SINGULAIR) 10 MG  tablet, Take 1 tablet by mouth Every Night for 30 days., Disp: 30 tablet, Rfl: 0  •  mupirocin (Bactroban Nasal) 2 % nasal ointment, into the nostril(s) as directed by provider 2 (Two) Times a Day. (Patient taking differently: 1 application into the nostril(s) as directed by provider 2 (Two) Times a Day.), Disp: 1 g, Rfl: 1  •  mupirocin (BACTROBAN) 2 % ointment, , Disp: , Rfl:   •  nitroglycerin (NITROSTAT) 0.4 MG SL tablet, Place 1 tablet under the tongue Every 5 (Five) Minutes As Needed for Chest Pain for up to 30 days. Take no more than 3 doses in 15 minutes., Disp: 30 tablet, Rfl: 0  •  pantoprazole (PROTONIX) 40 MG EC tablet, Take 1 tablet by mouth daily., Disp: 30 tablet, Rfl: 3  •  sacubitril-valsartan (Entresto) 49-51 MG tablet, Take 1 tablet by mouth 2 (Two) Times a Day., Disp: 60 tablet, Rfl: 2  •  tamsulosin (FLOMAX) 0.4 MG capsule 24 hr capsule, Take 1 capsule by mouth Daily. 1/2 hours following the same meal each day (Patient taking differently: Take 1 capsule by mouth Every Night.), Disp: 90 capsule, Rfl: 4  •  tiotropium bromide-olodaterol (Stiolto Respimat) 2.5-2.5 MCG/ACT aerosol solution inhaler, Inhale 2 puffs Daily., Disp: 2 each, Rfl: 0  •  VITAMIN B COMPLEX-C PO, Take 1 tablet by mouth Daily., Disp: , Rfl:   •  Xarelto 15 MG tablet, Take 1 tablet by mouth every day (Patient taking differently: Take 15 mg by mouth Every Night.), Disp: 90 tablet, Rfl: 1  •  levalbuterol (Xopenex) 0.63 MG/3ML nebulizer solution, Take 1 ampule by nebulization Every 6 (Six) Hours As Needed for Wheezing for up to 30 days., Disp: 360 mL, Rfl: 0     Past Medical History:  Past Medical History:   Diagnosis Date   • Arthritis    • BPH (benign prostatic hyperplasia)    • CAD (coronary artery disease)    • CHF (congestive heart failure) (HCC)    • COPD (chronic obstructive pulmonary disease) ([facility])    • Coronary artery disease of bypass graft of native heart with stable angina pectoris ([facility]) 8/9/2021    (1) Coronary artery  disease, status post coronary artery bypass grafting in the past. Cardiac catheterization done in July 2016 showed patent left internal mammary graft to the LAD and occluded saphenous venous graft. Native LAD is 100% occluded in the mid portion. Native circumflex artery has no significant disease. Native right coronary artery is also 100% occluded and it is a chronic total occl   • COVID-19 02/04/2021   • Diverticulitis 10/11/2019   • Dysphagia    • Essential hypertension 08/27/2020   • Frequent PVCs 8/28/2021   • GERD (gastroesophageal reflux disease)    • Gross hematuria    • HBP (high blood pressure)    • Long-term use of high-risk medication    • Lung disease    • Mixed hyperlipidemia 8/28/2021   • Myocardial infarction (HCC) 07/2016   • OCD (obsessive compulsive disorder)    • Renal insufficiency 08/27/2020   • Rhinitis, allergic 06/05/2018   • Sore throat    • Stable angina (HCC)    • Typical atrial flutter (HCC) 11/30/2018    s/p ablation by Dr. Aguilar    • Vertigo        Past Surgical History:  Past Surgical History:   Procedure Laterality Date   • BLADDER SURGERY     • CARDIAC CATHETERIZATION  07/2016   • CARDIAC CATHETERIZATION N/A 8/9/2021    Procedure: Left Heart Cath with possible angioplasty;  Surgeon: Jack Ladd MD;  Location: [facility] CATH INVASIVE LOCATION;  Service: Cardiovascular;  Laterality: N/A;  Please schedule the procedure with Dr. Jack Ladd MD on 8/9/2021   • CARDIAC SURGERY      HEART BYPASS   • COLONOSCOPY  2011   • CORONARY ARTERY BYPASS GRAFT     • CYSTOSCOPY  03/19/2019    WITH BILATERAL RETROGRADE PYELOGRAPHY   • ELECTRICAL CARDIOVERSION  5/12/2022        • ENDOSCOPY  2016   • PROSTATE SURGERY         Social History:  Social History     Socioeconomic History   • Marital status:    Tobacco Use   • Smoking status: Former Smoker     Packs/day: 0.50     Years: 40.00     Pack years: 20.00     Types: Cigarettes     Start date: 1958     Quit date: 1998     Years since  "quittin.4   • Smokeless tobacco: Never Used   Vaping Use   • Vaping Use: Never used   Substance and Sexual Activity   • Alcohol use: Not Currently   • Drug use: Never   • Sexual activity: Defer       Allergies:  Allergies   Allergen Reactions   • Carvedilol Swelling and Anaphylaxis   • Levaquin [Levofloxacin] Unknown - Low Severity       Immunizations:  Immunization History   Administered Date(s) Administered   • COVID-19 (NIKKY) 2021   • Fluzone High-Dose 65+yrs 2021   • Fluzone Split Quad (Multi-dose) 10/08/2019   • Influenza Quad Vaccine (Inpatient) 10/08/2019   • Influenza, Unspecified 10/08/2019   • Shingrix 2021, 2021          Objective:         Review of Systems   Constitutional: Positive for fatigue.   Respiratory: Positive for shortness of breath.    Cardiovascular: Positive for palpitations. Negative for chest pain and leg swelling.   All other systems reviewed and are negative.       /70   Pulse 80   Ht 177.8 cm (70\")   Wt 100 kg (221 lb)   BMI 31.71 kg/m²     Constitutional:       General: Not in acute distress.     Appearance: Well-developed.   Eyes:      General: No scleral icterus.     Conjunctiva/sclera: Conjunctivae normal.      Pupils: Pupils are equal, round, and reactive to light.   HENT:      Head: Normocephalic and atraumatic.   Neck:      Thyroid: No thyromegaly.   Pulmonary:      Effort: Pulmonary effort is normal.      Breath sounds: Normal breath sounds.   Cardiovascular:      Normal rate. Irregularly irregular rhythm.   Abdominal:      General: Bowel sounds are normal.      Palpations: Abdomen is soft.   Musculoskeletal: Normal range of motion.      Cervical back: Normal range of motion. Skin:     General: Skin is warm and dry.   Neurological:      Mental Status: Alert and oriented to person, place, and time.         In-Office Procedure(s):    ECG 12 Lead    Date/Time: 2022 2:19 PM  Performed by: Irineo Aguilar MD  Authorized by: Lauren, " Irineo Macias MD   Comparison: compared with previous ECG from 5/11/2022  Comparison to previous ECG: AF, STT abn suggestive of ischemia  Rhythm: atrial fibrillation  Other findings: non-specific ST-T wave changes    Clinical impression: abnormal EKG          ECG 12 Lead    (Results Pending)        ASCVD RIsk Score::  The ASCVD Risk score (Ulialejandro JERONIMO Jr., et al., 2013) failed to calculate for the following reasons:    The 2013 ASCVD risk score is only valid for ages 40 to 79    The patient has a prior MI or stroke diagnosis    Recent Radiology:          Lab Review:       Recent labs reviewed and interpreted for clinical significance and application          Assessment:          Diagnosis Plan   1. Atrial fibrillation, persistent (HCC)  ECG 12 Lead   2. Coronary artery disease of bypass graft of native heart with stable angina pectoris (HCC)     3. Typical atrial flutter (HCC)     4. Essential hypertension     5. Frequent PVCs     6. S/P CABG (coronary artery bypass graft)            Plan:      1. Persistent AF - very symptomatic, rates not well controlled at times, now on amiodarone.  [id number] of at least 3 (age, HTN, vascular disease), on Xarelto.  Discussed options of antiarrhythmic therapy vs ablation with the associated risks and benefits of each, he prefers ablation.    2. CAD with prior CABG and PCI - no angina, on Plavix, Imdur, metoprolol and Entresto    3. HTN - controlled    4. HLD    5. COPD - albuterol, fluticasone and Stioloto    6. CKD3 - last creatinine 1.3, GFR 56    7. Atrial flutter - s/p ablation 2018    8. PVC's    9. GERD      Level of Care:                 Irineo Aguilar MD  05/19/22

## 2022-05-22 PROBLEM — N18.31 STAGE 3A CHRONIC KIDNEY DISEASE (HCC): Status: ACTIVE | Noted: 2022-05-22

## 2022-05-22 PROBLEM — I25.708 CORONARY ARTERY DISEASE OF BYPASS GRAFT OF NATIVE HEART WITH STABLE ANGINA PECTORIS (HCC): Status: ACTIVE | Noted: 2021-08-09

## 2022-05-22 PROBLEM — I25.708 CORONARY ARTERY DISEASE OF BYPASS GRAFT OF NATIVE HEART WITH STABLE ANGINA PECTORIS: Chronic | Status: ACTIVE | Noted: 2021-08-09

## 2022-05-22 NOTE — PROGRESS NOTES
Chief Complaint  Coronary Artery Disease, Dizziness, Shortness of Breath, and Chest Pain    Subjective        Layo Cee presents to [facility] CARDIOLOGY  Seen 82-year-old male comes in after recent unsuccessful multi cardioversion attempts for atrial fibrillation.  He also saw Dr. Aguilar last week for evaluation for possible EP studies.  States they plan to have EP studies and ablation but it has not been scheduled yet.  He complains of not feeling well while he is in atrial fib.  He feels weak, dizzy and tired and achy all over and overall just not well.  He also feels that some of his medications are upsetting his stomach.  Continues complain of shortness of breath but does not feel it is any different than before his hospitalization.  He has a couple of episodes of quick chest discomfort which is unchanged.      Past History:    Past Medical History:   Diagnosis Date   • Arthritis    • BPH (benign prostatic hyperplasia)    • CAD (coronary artery disease)    • CHF (congestive heart failure) ([facility])    • COPD (chronic obstructive pulmonary disease) ([facility])    • Coronary artery disease of bypass graft of native heart with stable angina pectoris ([facility]) 8/9/2021    (1) Coronary artery disease, status post coronary artery bypass grafting in the past. Cardiac catheterization done in July 2016 showed patent left internal mammary graft to the LAD and occluded saphenous venous graft. Native LAD is 100% occluded in the mid portion. Native circumflex artery has no significant disease. Native right coronary artery is also 100% occluded and it is a chronic total occl   • COVID-19 02/04/2021   • Diverticulitis 10/11/2019   • Dysphagia    • Essential hypertension 08/27/2020   • Frequent PVCs 8/28/2021   • GERD (gastroesophageal reflux disease)    • Gross hematuria    • HBP (high blood pressure)    • Long-term use of high-risk medication    • Lung disease    • Mixed hyperlipidemia 8/28/2021   • Myocardial  infarction ([facility]) 07/2016   • OCD (obsessive compulsive disorder)    • Renal insufficiency 08/27/2020   • Rhinitis, allergic 06/05/2018   • Sore throat    • Stable angina ([facility])    • Typical atrial flutter ([facility]) 11/30/2018    s/p ablation by Dr. Aguilar    • Vertigo         Family History: family history includes Heart disease in his father; Hypertension in his mother; Kidney cancer in his brother; Other in his brother.     Social History: reports that he quit smoking about 24 years ago. His smoking use included cigarettes. He started smoking about 64 years ago. He has a 20.00 pack-year smoking history. He has never used smokeless tobacco. He reports previous alcohol use. He reports that he does not use drugs.    Allergies: Carvedilol and Levaquin [levofloxacin]    Past Surgical History:   Procedure Laterality Date   • BLADDER SURGERY     • CARDIAC CATHETERIZATION  07/2016   • CARDIAC CATHETERIZATION N/A 8/9/2021    Procedure: Left Heart Cath with possible angioplasty;  Surgeon: Jack Ladd MD;  Location: [facility] INVASIVE LOCATION;  Service: Cardiovascular;  Laterality: N/A;  Please schedule the procedure with Dr. Jack Ladd MD on 8/9/2021   • CARDIAC SURGERY      HEART BYPASS   • COLONOSCOPY  2011   • CORONARY ARTERY BYPASS GRAFT     • CYSTOSCOPY  03/19/2019    WITH BILATERAL RETROGRADE PYELOGRAPHY   • ELECTRICAL CARDIOVERSION  5/12/2022        • ENDOSCOPY  2016   • PROSTATE SURGERY            Current Outpatient Medications:   •  acetaminophen (TYLENOL) 325 MG tablet, Take 650 mg by mouth Every 6 (Six) Hours As Needed for Mild Pain ., Disp: , Rfl:   •  albuterol sulfate  (90 Base) MCG/ACT inhaler, Inhale 2 puffs Every 4 (Four) Hours As Needed., Disp: , Rfl:   •  amiodarone (PACERONE) 200 MG tablet, Take 1 tablet by mouth Every 12 (Twelve) Hours for 6 days, THEN 1 tablet Daily for 24 days., Disp: 36 tablet, Rfl: 0  •  calcium carbonate (OS-LIANNA) 600 MG tablet, Take 600 mg by mouth Every Night., Disp:  , Rfl:   •  clopidogrel (PLAVIX) 75 MG tablet, Take 1 tablet by mouth Daily., Disp: 90 tablet, Rfl: 3  •  Coenzyme Q10 100 MG tablet, Take 100 mg by mouth Daily., Disp: , Rfl:   •  digoxin (LANOXIN) 125 MCG tablet, Take 1 tablet by mouth Daily With Lunch for 30 days., Disp: 30 tablet, Rfl: 0  •  famotidine (PEPCID) 10 MG tablet, Take 10 mg by mouth At Night As Needed., Disp: , Rfl:   •  finasteride (PROSCAR) 5 MG tablet, Take 5 mg by mouth Daily., Disp: , Rfl:   •  fluticasone (FLONASE) 50 MCG/ACT nasal spray, 1 spray into the nostril(s) as directed by provider Every Night., Disp: , Rfl:   •  Fluticasone Furoate (Arnuity Ellipta) 100 MCG/ACT aerosol powder , Inhale 1 puff Daily., Disp: 1 each, Rfl: 3  •  furosemide (LASIX) 20 MG tablet, Take 1 tablet by mouth Daily. (Patient taking differently: Take 40 mg by mouth Daily.), Disp: 90 tablet, Rfl: 2  •  isosorbide mononitrate (IMDUR) 60 MG 24 hr tablet, Take 60 mg by mouth Every Night., Disp: , Rfl:   •  levalbuterol (Xopenex) 0.63 MG/3ML nebulizer solution, Take 1 ampule by nebulization Every 6 (Six) Hours As Needed for Wheezing for up to 30 days., Disp: 360 mL, Rfl: 0  •  Livalo 1 MG tablet tablet, Take 1 tablet by mouth every day (Patient taking differently: Take 1 mg by mouth Every Night.), Disp: 90 tablet, Rfl: 1  •  metoprolol succinate XL (TOPROL-XL) 50 MG 24 hr tablet, Take 1 tablet by mouth Every 12 (Twelve) Hours for 30 days., Disp: 60 tablet, Rfl: 0  •  montelukast (SINGULAIR) 10 MG tablet, Take 1 tablet by mouth Every Night for 30 days., Disp: 30 tablet, Rfl: 0  •  mupirocin (Bactroban Nasal) 2 % nasal ointment, into the nostril(s) as directed by provider 2 (Two) Times a Day. (Patient taking differently: 1 application into the nostril(s) as directed by provider 2 (Two) Times a Day.), Disp: 1 g, Rfl: 1  •  mupirocin (BACTROBAN) 2 % ointment, , Disp: , Rfl:   •  nitroglycerin (NITROSTAT) 0.4 MG SL tablet, Place 1 tablet under the tongue Every 5 (Five) Minutes  As Needed for Chest Pain for up to 30 days. Take no more than 3 doses in 15 minutes., Disp: 30 tablet, Rfl: 0  •  pantoprazole (PROTONIX) 40 MG EC tablet, Take 1 tablet by mouth daily., Disp: 30 tablet, Rfl: 3  •  sacubitril-valsartan (Entresto) 49-51 MG tablet, Take 1 tablet by mouth 2 (Two) Times a Day., Disp: 60 tablet, Rfl: 2  •  tamsulosin (FLOMAX) 0.4 MG capsule 24 hr capsule, Take 1 capsule by mouth Daily. 1/2 hours following the same meal each day (Patient taking differently: Take 1 capsule by mouth Every Night.), Disp: 90 capsule, Rfl: 4  •  tiotropium bromide-olodaterol (Stiolto Respimat) 2.5-2.5 MCG/ACT aerosol solution inhaler, Inhale 2 puffs Daily for 30 days., Disp: 2 each, Rfl: 0  •  tiotropium bromide-olodaterol (Stiolto Respimat) 2.5-2.5 MCG/ACT aerosol solution inhaler, Inhale 2 puffs Daily., Disp: 2 each, Rfl: 0  •  VITAMIN B COMPLEX-C PO, Take 1 tablet by mouth Daily., Disp: , Rfl:   •  Xarelto 15 MG tablet, Take 1 tablet by mouth every day (Patient taking differently: Take 15 mg by mouth Every Night.), Disp: 90 tablet, Rfl: 1     There are no discontinued medications.     Review of Systems   Constitutional: Positive for fatigue.   Respiratory: Positive for shortness of breath. Negative for cough.    Cardiovascular: Positive for chest pain and palpitations. Negative for leg swelling.   Neurological: Positive for dizziness.   All other systems reviewed and are negative.       Objective     Physical Exam  Constitutional:       General: He is not in acute distress.     Appearance: He is obese.      Comments: Accompanied by his wife   Neck:      Vascular: No carotid bruit.   Cardiovascular:      Rate and Rhythm: Normal rate. Rhythm irregular.      Heart sounds: Normal heart sounds.   Pulmonary:      Effort: Pulmonary effort is normal.      Breath sounds: Normal breath sounds.   Musculoskeletal:      Right lower leg: No edema.      Left lower leg: No edema.   Neurological:      Mental Status: He is  "alert.       /67   Pulse 89   Ht 177.8 cm (70\")   Wt 104 kg (230 lb)   BMI 33.00 kg/m²       Vitals:    05/23/22 1421   BP: 133/67   Pulse: 89       Result Review :         The following data was reviewed by: ARACELI Howard on 05/23/2022:      Lab Results   Component Value Date    PROBNP 876.3 05/06/2022    PROBNP 1,411.0 05/01/2022     02/23/2021     05/26/2019     CMP    CMP 5/10/22 5/11/22 5/12/22   Glucose 130 (A) 108 (A) 115 (A)   BUN 31 (A) 25 (A) 24 (A)   Creatinine 1.31 (A) 1.38 (A) 1.28 (A)   Sodium 139 140 138   Potassium 4.2 3.9 4.2   Chloride 103 104 104   Calcium 9.0 9.0 9.1   Albumin 3.80  3.50   Total Bilirubin 0.3     Alkaline Phosphatase 55     AST (SGOT) 18     ALT (SGPT) 30     (A) Abnormal value       Comments are available for some flowsheets but are not being displayed.           CBC w/diff    CBC w/Diff 5/10/22 5/11/22 5/12/22   WBC 11.01 (A) 10.97 (A) 11.27 (A)   RBC 4.49 4.64 4.61   Hemoglobin 13.4 13.6 13.5   Hematocrit 40.8 41.4 40.9   MCV 90.9 89.2 88.7   MCH 29.8 29.3 29.3   MCHC 32.8 32.9 33.0   RDW 14.2 14.4 14.7   Platelets 382 371 386   Neutrophil Rel % 63.5 57.5    Immature Granulocyte Rel % 5.4 (A) 7.2 (A)    Lymphocyte Rel % 20.5 23.8    Monocyte Rel % 9.4 10.8    Eosinophil Rel % 0.1 (A) 0.5    Basophil Rel % 1.1 0.2    (A) Abnormal value             Lipid Panel    Lipid Panel 5/6/22   Total Cholesterol 120   Triglycerides 82   HDL Cholesterol 32 (A)   VLDL Cholesterol 16   LDL Cholesterol  72   LDL/HDL Ratio 2.24   (A) Abnormal value             Lab Results   Component Value Date    TSH 1.270 01/24/2022    TSH 1.290 08/17/2020    TSH 1.470 07/26/2019      Lab Results   Component Value Date    FREET4 1.44 01/24/2022    FREET4 1.5 08/17/2020    FREET4 1.4 07/26/2019        Magnesium   Date Value Ref Range Status   05/12/2022 2.4 1.6 - 2.4 mg/dL Final      Digoxin   Date Value Ref Range Status   05/09/2022 0.33 (L) 0.60 - 1.20 ng/mL Final      A1C " Last 3 Results    HGBA1C Last 3 Results 5/6/22   Hemoglobin A1C 6.10 (A)   (A) Abnormal value                  Last Echo  Results for orders placed during the hospital encounter of 05/06/22    Adult Transthoracic Echo Complete w/ Color, Spectral and Contrast if necessary per protocol    Interpretation Summary  · Technically difficult images  · Calculated left ventricular EF = 52% Estimated left ventricular EF was in agreement with the calculated left ventricular EF.  · Left ventricular wall thickness is consistent with mild to moderate concentric hypertrophy.  · Moderate mitral valve regurgitation is present eccentric nature  · The right ventricular cavity is borderline dilated.  · The right atrial cavity is dilated.  · There is calcification of the aortic valve.  · Estimated right ventricular systolic pressure from tricuspid regurgitation is moderately elevated (45-55 mmHg). Calculated right ventricular systolic pressure from tricuspid regurgitation is 51 mmHg.             Assessment and Plan    Diagnoses and all orders for this visit:    1. Atrial fibrillation, persistent (HCC) (Primary)  Assessment & Plan:  Symptomatic atrial fibrillation.  Stop amiodarone.  Encourage patient to call us at the end of the week if have not heard from Dr. Aguilar regarding scheduling ablation and EP studies.  Continue Xarelto 15 mg and digoxin 125 mcg but take both of them and suppertime.    Orders:  -     Digoxin Level; Future    2. Coronary artery disease of bypass graft of native heart with stable angina pectoris (HCC)  Assessment & Plan:  Angina stable.  Continue nitro as needed, isosorbide mono 60, and clopidogrel 75 mg      3. Ischemic cardiomyopathy  Assessment & Plan:  Stable.  Continue Entresto 49/51 twice daily and metoprolol ER 50 mg twice daily, furosemide 20 mg once a day      4. Stage 3a chronic kidney disease (HCC)  Assessment & Plan:  Stable.  We will continue to monitor.            Follow Up     Return for 6-8 weeks  with Elva (ok to double book).    Patient was given instructions and counseling regarding his condition or for health maintenance advice. Please see specific information pulled into the AVS if appropriate.       ARACELI Garcia  05/23/22 09:37 EDT

## 2022-05-23 ENCOUNTER — OFFICE VISIT (OUTPATIENT)
Dept: CARDIOLOGY | Facility: CLINIC | Age: 82
End: 2022-05-23

## 2022-05-23 VITALS
HEIGHT: 70 IN | SYSTOLIC BLOOD PRESSURE: 133 MMHG | DIASTOLIC BLOOD PRESSURE: 67 MMHG | WEIGHT: 230 LBS | HEART RATE: 89 BPM | BODY MASS INDEX: 32.93 KG/M2

## 2022-05-23 DIAGNOSIS — I48.19 ATRIAL FIBRILLATION, PERSISTENT: Primary | ICD-10-CM

## 2022-05-23 DIAGNOSIS — I25.5 ISCHEMIC CARDIOMYOPATHY: Chronic | ICD-10-CM

## 2022-05-23 DIAGNOSIS — N18.31 STAGE 3A CHRONIC KIDNEY DISEASE: Chronic | ICD-10-CM

## 2022-05-23 DIAGNOSIS — I25.708 CORONARY ARTERY DISEASE OF BYPASS GRAFT OF NATIVE HEART WITH STABLE ANGINA PECTORIS: Chronic | ICD-10-CM

## 2022-05-23 PROCEDURE — 99214 OFFICE O/P EST MOD 30 MIN: CPT | Performed by: NURSE PRACTITIONER

## 2022-05-23 NOTE — ASSESSMENT & PLAN NOTE
Symptomatic atrial fibrillation.  Stop amiodarone.  Encourage patient to call us at the end of the week if have not heard from Dr. Aguilar regarding scheduling ablation and EP studies.  Continue Xarelto 15 mg and digoxin 125 mcg but take both of them and suppertime.

## 2022-05-23 NOTE — ASSESSMENT & PLAN NOTE
Stable.  Continue Entresto 49/51 twice daily and metoprolol ER 50 mg twice daily, furosemide 20 mg once a day

## 2022-05-26 ENCOUNTER — PATIENT OUTREACH (OUTPATIENT)
Dept: CASE MANAGEMENT | Facility: OTHER | Age: 82
End: 2022-05-26

## 2022-05-26 DIAGNOSIS — I48.19 ATRIAL FIBRILLATION, PERSISTENT: Primary | ICD-10-CM

## 2022-05-26 NOTE — OUTREACH NOTE
AMBULATORY CASE MANAGEMENT NOTE    Name and Relationship of Patient/Support Person: Other - Self    CCM Interim Update     Reviewed chart prior to calling patient for outreach, patient said he was doing okay. He said that he still has the dull ache across his chest and gets SOA with any exertion. Waiting to hear back from Dr. Real's office. I called the office because he said that its been a week now, and should be hearing something from them about the EP studies and the ablation that they plan to hopefully help with the continuing issues that he is having. The office said they will be calling him later today to get that scheduled for him, and made patient aware.   Patient was admitted to hospital on 4/13 for A-Fib with RVR, SOA, along with COPD and CHF. He was given antibiotic, increased his Lasix to 40 mg daily and steroids and was discharged on 4/17.  Then on 5/1 he was back in the ER for elevated HR, and chest pain. He was in A-Fib again, was discharged home.   Then on 5/6 patient was back in the hospital with SOA and was admitted. They attempted cardioversion but was unsuccessful. They put him on oral Amiodarone and discharged him on 5/12. However when he seen the cardiologist on 5/23 they stopped the Amiodarone because it was upsetting his stomach and said that has gotten better since then.    Asked if he was weighting himself everyday and he said that he wasn't. Talked about the importance of it and how it can help manage his CHF better, patient voiced understanding.   No questions or concerns at this time.     Education Documentation  Unresolved/Worsening Symptoms, taught by Rajani Mccauley, RN at 5/26/2022 11:27 AM.  Learner: Patient  Readiness: Acceptance  Method: Explanation  Response: Verbalizes Understanding, Needs Reinforcement    Weight Monitoring, taught by Rajani Mccauley, RN at 5/26/2022 11:27 AM.  Learner: Patient  Readiness: Acceptance  Method: Explanation  Response: Verbalizes  Understanding, Needs Reinforcement    Diet Modification, taught by Anastacia Mccauley, RN at 5/26/2022 11:27 AM.  Learner: Patient  Readiness: Acceptance  Method: Explanation  Response: Verbalizes Understanding, Needs Reinforcement        ANASTACIA LOVE  Ambulatory Case Management  5/26/2022, 11:28 EDT

## 2022-05-31 ENCOUNTER — PATIENT OUTREACH (OUTPATIENT)
Dept: CASE MANAGEMENT | Facility: OTHER | Age: 82
End: 2022-05-31

## 2022-05-31 DIAGNOSIS — I10 ESSENTIAL HYPERTENSION: Primary | ICD-10-CM

## 2022-05-31 DIAGNOSIS — I48.19 ATRIAL FIBRILLATION, PERSISTENT: ICD-10-CM

## 2022-05-31 DIAGNOSIS — I10 ESSENTIAL HYPERTENSION: Chronic | ICD-10-CM

## 2022-05-31 PROCEDURE — 99490 CHRNC CARE MGMT STAFF 1ST 20: CPT | Performed by: PHYSICIAN ASSISTANT

## 2022-05-31 RX ORDER — SACUBITRIL AND VALSARTAN 49; 51 MG/1; MG/1
1 TABLET, FILM COATED ORAL 2 TIMES DAILY
Qty: 60 TABLET | Refills: 2 | Status: SHIPPED | OUTPATIENT
Start: 2022-05-31 | End: 2022-09-29 | Stop reason: HOSPADM

## 2022-05-31 NOTE — OUTREACH NOTE
[facility] End of Month Documentation    This Chronic Medical Management Care Plan for Layo Cee, 82 y.o. male, has been reviewed; monitored and managed and a new plan of care implemented for the month of May.  A cumulative time of 21  minutes was spent on this patient record this month, including chart review; phone call with patient; phone call with other providers.    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 pectoris (HCC); Class 1 obesity due to excess calories with serious comorbidity and body mass index (BMI) of 33.0 to 33.9 in adult; Gout; Typical atrial flutter (HCC); Essential hypertension; Frequent PVCs; Mixed hyperlipidemia; Rotator cuff Bursitis of shoulder region; Lateral epicondylitis; History of hematuria; Benign prostatic hyperplasia with urinary frequency; Hydrocele in adult; History of COVID-19; Allergic rhinitis; Seasonal allergies; Cough; Dyspnea; Chronic systolic congestive heart failure (HCC); Tobacco abuse, in remission; Vitamin D deficiency; Gastroesophageal reflux disease with esophagitis without hemorrhage; Ischemic cardiomyopathy; Centrilobular emphysema (HCC); Chronic obstructive pulmonary disease with acute exacerbation (HCC); Gastroesophageal reflux disease; Atrial fibrillation, persistent (HCC); COPD (chronic obstructive pulmonary disease) (HCC); COPD exacerbation (HCC); Chronic anticoagulation; Diastolic dysfunction; Fatigue; S/P CABG (coronary artery bypass graft); and Stage 3a chronic kidney disease (HCC) on their problem list., the following items were addressed: medical records; medications; changes to medical care; transitions to medical care and any changes can be found within the plan section of the note.  A detailed listing of time spent for chronic care management is tracked within each outreach encounter.  Current medications include:  has a current medication list which includes the  following prescription(s): acetaminophen, albuterol sulfate hfa, amiodarone, calcium carbonate, clopidogrel, coenzyme q10, digoxin, entresto, famotidine, finasteride, fluticasone, arnuity ellipta, furosemide, isosorbide mononitrate, levalbuterol, livalo, metoprolol succinate xl, montelukast, mupirocin, mupirocin, nitroglycerin, pantoprazole, tamsulosin, stiolto respimat, b complex-c, and xarelto. and the patient is reported to be patient is compliant with medication protocol,  Medications are reported to be effective.  Regarding these diagnoses no new referrals were made.  All notes on chart for PCP to review.    The patient was monitored remotely for medications; pain; weight.    The patient's physical needs include:  physical healthcare; medication education.     The patient's mental support needs include:  needs met    The patient's cognitive support needs include:  health care; medication    The patient's psychosocial support needs include:  medication management or adherence    The patient's functional needs include: medication education; physical healthcare    The patient's environmental needs include:  not applicable, N/A    Care Plan overall comments:  N/A    Refer to previous outreach notes for more information on the areas listed above.    Monthly Billing Diagnoses  (I10) Essential hypertension    (I48.19) Atrial fibrillation, persistent (HCC)    Medications   · Medications have been reconciled    Care Plan progress this month:      Recently Modified Care Plans Updates made since 4/30/2022 12:00 AM    No recently modified care plans.        Instructions   · Patient was provided an electronic copy of care plan  · CCM services were explained and offered and patient has accepted these services.  · Patient has given their written consent to receive CCM services and understands that this includes the authorization of electronic communication of medical information with the other treating providers.  · Patient  understands that they may stop CCM services at any time and these changes will be effective at the end of the calendar month and will effectively revocate the agreement of CCM services.  · Patient understands that only one practitioner can furnish and be paid for CCM services during one calendar month.  Patient also understands that there may be co-payment or deductible fees in association with CCM services.  · Patient will continue with at least monthly follow-up calls with the Nurse Navigator.    ANASTACIA LOVE  Ambulatory Case Management    5/31/2022, 16:57 EDT

## 2022-06-01 DIAGNOSIS — Z01.818 OTHER SPECIFIED PRE-OPERATIVE EXAMINATION: Primary | ICD-10-CM

## 2022-06-01 DIAGNOSIS — I48.19 ATRIAL FIBRILLATION, PERSISTENT: ICD-10-CM

## 2022-06-01 DIAGNOSIS — I48.19 PERSISTENT ATRIAL FIBRILLATION: Primary | ICD-10-CM

## 2022-06-01 DIAGNOSIS — Z01.818 PREOP TESTING: Primary | ICD-10-CM

## 2022-06-01 RX ORDER — ISOSORBIDE MONONITRATE 60 MG/1
TABLET, EXTENDED RELEASE ORAL
Qty: 90 TABLET | Refills: 0 | Status: SHIPPED | OUTPATIENT
Start: 2022-06-01 | End: 2022-10-13 | Stop reason: SDUPTHER

## 2022-06-01 NOTE — PROGRESS NOTES
Chief Complaint   GERD, possible cirrhosis  History of Present Illness       Layo Cee is a 82 y.o. male who presents to [facility] for follow-up with a history of reflux and possible cirrhosis noted on CT scan, coronary disease, atrial fibrillation, and COPD.  Patient reports overall he is doing well with his reflux controlled with Protonix and Pepcid.  He reports having a chest CT scan performed with pulmonology raising concern for his liver and possible cirrhosis.  Patient has currently been in atrial fibrillation for the past 2 months and is undergoing a cardiac ablation tomorrow.  Patient continues on 2 blood thinners.  Patient has lab work to perform today.  Patient reports bowel habits are normal occurring daily without melena or hematochezia.  Patient denies abdominal swelling, jaundice, fever, nausea, vomiting, weight loss, night sweats, melena, hematochezia, hematemesis.    Colonoscopy: Review of the patient's most recent colonoscopy performed by Mulugeta Shi on 08.19.2011 normal colonoscopy.    Results       Result Review :       CMP    CMP 5/10/22 5/11/22 5/12/22   Glucose 130 (A) 108 (A) 115 (A)   BUN 31 (A) 25 (A) 24 (A)   Creatinine 1.31 (A) 1.38 (A) 1.28 (A)   Sodium 139 140 138   Potassium 4.2 3.9 4.2   Chloride 103 104 104   Calcium 9.0 9.0 9.1   Albumin 3.80  3.50   Total Bilirubin 0.3     Alkaline Phosphatase 55     AST (SGOT) 18     ALT (SGPT) 30     (A) Abnormal value       Comments are available for some flowsheets but are not being displayed.           CBC    CBC 5/10/22 5/11/22 5/12/22   WBC 11.01 (A) 10.97 (A) 11.27 (A)   RBC 4.49 4.64 4.61   Hemoglobin 13.4 13.6 13.5   Hematocrit 40.8 41.4 40.9   MCV 90.9 89.2 88.7   MCH 29.8 29.3 29.3   MCHC 32.8 32.9 33.0   RDW 14.2 14.4 14.7   Platelets 382 371 386   (A) Abnormal value                          Past Medical History       Past Medical History:   Diagnosis Date   • Arthritis    • BPH (benign  prostatic hyperplasia)    • CAD (coronary artery disease)    • CHF (congestive heart failure) ([facility])    • COPD (chronic obstructive pulmonary disease) ([facility])    • Coronary artery disease of bypass graft of native heart with stable angina pectoris ([facility]) 8/9/2021    (1) Coronary artery disease, status post coronary artery bypass grafting in the past. Cardiac catheterization done in July 2016 showed patent left internal mammary graft to the LAD and occluded saphenous venous graft. Native LAD is 100% occluded in the mid portion. Native circumflex artery has no significant disease. Native right coronary artery is also 100% occluded and it is a chronic total occl   • COVID-19 02/04/2021   • Diverticulitis 10/11/2019   • Dysphagia    • Essential hypertension 08/27/2020   • Frequent PVCs 8/28/2021   • GERD (gastroesophageal reflux disease)    • Gross hematuria    • HBP (high blood pressure)    • Long-term use of high-risk medication    • Lung disease    • Mixed hyperlipidemia 8/28/2021   • Myocardial infarction ([facility]) 07/2016   • OCD (obsessive compulsive disorder)    • Renal insufficiency 08/27/2020   • Rhinitis, allergic 06/05/2018   • Sore throat    • Stable angina ([facility])    • Typical atrial flutter ([facility]) 11/30/2018    s/p ablation by Dr. Aguilar    • Vertigo        Past Surgical History:   Procedure Laterality Date   • BLADDER SURGERY     • CARDIAC CATHETERIZATION  07/2016   • CARDIAC CATHETERIZATION N/A 8/9/2021    Procedure: Left Heart Cath with possible angioplasty;  Surgeon: Jack Ladd MD;  Location: [facility] INVASIVE LOCATION;  Service: Cardiovascular;  Laterality: N/A;  Please schedule the procedure with Dr. Jack Ladd MD on 8/9/2021   • CARDIAC SURGERY      HEART BYPASS   • COLONOSCOPY  2011   • CORONARY ARTERY BYPASS GRAFT     • CYSTOSCOPY  03/19/2019    WITH BILATERAL RETROGRADE PYELOGRAPHY   • ELECTRICAL CARDIOVERSION  5/12/2022        • ENDOSCOPY  2016   • PROSTATE SURGERY           Current Outpatient  Medications:   •  acetaminophen (TYLENOL) 325 MG tablet, Take 650 mg by mouth Every 6 (Six) Hours As Needed for Mild Pain ., Disp: , Rfl:   •  calcium carbonate (OS-LIANNA) 600 MG tablet, Take 600 mg by mouth Every Night., Disp: , Rfl:   •  clopidogrel (PLAVIX) 75 MG tablet, Take 1 tablet by mouth Daily., Disp: 90 tablet, Rfl: 3  •  Coenzyme Q10 100 MG tablet, Take 100 mg by mouth Daily., Disp: , Rfl:   •  digoxin (LANOXIN) 125 MCG tablet, Take 1 tablet by mouth Daily With Lunch for 30 days., Disp: 30 tablet, Rfl: 0  •  Entresto 49-51 MG tablet, Take 1 tablet by mouth 2 (two) times a day., Disp: 60 tablet, Rfl: 2  •  famotidine (PEPCID) 10 MG tablet, Take 1 tablet by mouth At Night As Needed for Heartburn., Disp: 30 tablet, Rfl: 5  •  finasteride (PROSCAR) 5 MG tablet, Take 5 mg by mouth Daily., Disp: , Rfl:   •  fluticasone (FLONASE) 50 MCG/ACT nasal spray, 1 spray into the nostril(s) as directed by provider Every Night., Disp: , Rfl:   •  Fluticasone Furoate (Arnuity Ellipta) 100 MCG/ACT aerosol powder , Inhale 1 puff Daily., Disp: 1 each, Rfl: 3  •  furosemide (LASIX) 20 MG tablet, Take 1 tablet by mouth Daily. (Patient taking differently: Take 40 mg by mouth Daily.), Disp: 90 tablet, Rfl: 2  •  isosorbide mononitrate (IMDUR) 60 MG 24 hr tablet, Take 1 tablet by mouth every day, Disp: 90 tablet, Rfl: 0  •  Livalo 1 MG tablet tablet, Take 1 tablet by mouth every day (Patient taking differently: Take 1 mg by mouth Every Night.), Disp: 90 tablet, Rfl: 1  •  metoprolol succinate XL (TOPROL-XL) 50 MG 24 hr tablet, Take 1 tablet by mouth Every 12 (Twelve) Hours for 30 days., Disp: 60 tablet, Rfl: 0  •  mupirocin (Bactroban Nasal) 2 % nasal ointment, into the nostril(s) as directed by provider 2 (Two) Times a Day. (Patient taking differently: 1 application into the nostril(s) as directed by provider 2 (Two) Times a Day.), Disp: 1 g, Rfl: 1  •  mupirocin (BACTROBAN) 2 % ointment, , Disp: , Rfl:   •  nitroglycerin  "(NITROSTAT) 0.4 MG SL tablet, Place 1 tablet under the tongue Every 5 (Five) Minutes As Needed for Chest Pain for up to 30 days. Take no more than 3 doses in 15 minutes., Disp: 30 tablet, Rfl: 0  •  pantoprazole (PROTONIX) 40 MG EC tablet, Take 1 tablet by mouth Daily., Disp: 30 tablet, Rfl: 3  •  tamsulosin (FLOMAX) 0.4 MG capsule 24 hr capsule, Take 1 capsule by mouth Daily. 1/2 hours following the same meal each day (Patient taking differently: Take 1 capsule by mouth Every Night.), Disp: 90 capsule, Rfl: 4  •  VITAMIN B COMPLEX-C PO, Take 1 tablet by mouth Daily., Disp: , Rfl:   •  Xarelto 15 MG tablet, Take 1 tablet by mouth every day (Patient taking differently: Take 15 mg by mouth Every Night.), Disp: 90 tablet, Rfl: 1  •  levalbuterol (Xopenex HFA) 45 MCG/ACT inhaler, Inhale 1-2 puffs Every 4 (Four) Hours As Needed for Wheezing., Disp: 1 each, Rfl: 11  •  levalbuterol (Xopenex) 0.63 MG/3ML nebulizer solution, Take 1 ampule by nebulization Every 6 (Six) Hours As Needed for Wheezing for up to 30 days., Disp: 360 mL, Rfl: 0  •  montelukast (SINGULAIR) 10 MG tablet, Take 1 tablet by mouth Every Night for 30 days., Disp: 30 tablet, Rfl: 0  •  tiotropium bromide-olodaterol (Stiolto Respimat) 2.5-2.5 MCG/ACT aerosol solution inhaler, Inhale 2 puffs Daily., Disp: 2 each, Rfl: 0     Allergies   Allergen Reactions   • Carvedilol Swelling and Anaphylaxis   • Levaquin [Levofloxacin] Unknown - Low Severity       Family History   Problem Relation Age of Onset   • Hypertension Mother    • Heart disease Father    • Other Brother         GASTROINTESTINAL CANCER   • Kidney cancer Brother         Social History     Social History Narrative   • Not on file       Objective     Vital Signs:   /86   Pulse 81   Ht 177.8 cm (70\")   Wt 107 kg (236 lb 12.8 oz)   SpO2 98%   BMI 33.98 kg/m²       Physical Exam  Constitutional:       General: He is not in acute distress.     Appearance: Normal appearance. He is well-developed " and normal weight.   Eyes:      Conjunctiva/sclera: Conjunctivae normal.      Pupils: Pupils are equal, round, and reactive to light.      Visual Fields: Right eye visual fields normal and left eye visual fields normal.   Cardiovascular:      Rate and Rhythm: Normal rate and regular rhythm.      Heart sounds: Normal heart sounds.   Pulmonary:      Effort: Pulmonary effort is normal. No retractions.      Breath sounds: Normal breath sounds and air entry.      Comments: Inspection of chest: normal appearance  Abdominal:      General: Bowel sounds are normal.      Palpations: Abdomen is soft.      Tenderness: There is no abdominal tenderness.      Comments: No appreciable hepatosplenomegaly   Musculoskeletal:      Cervical back: Neck supple.      Right lower leg: No edema.      Left lower leg: No edema.   Lymphadenopathy:      Cervical: No cervical adenopathy.   Skin:     Findings: No lesion.      Comments: Turgor normal   Neurological:      Mental Status: He is alert and oriented to person, place, and time.   Psychiatric:         Mood and Affect: Mood and affect normal.           Assessment & Plan          Assessment and Plan    Diagnoses and all orders for this visit:    1. Abnormal CT scan, liver (Primary)  -     US Abdomen Limited; Future    2. Gastroesophageal reflux disease, unspecified whether esophagitis present  -     pantoprazole (PROTONIX) 40 MG EC tablet; Take 1 tablet by mouth Daily.  Dispense: 30 tablet; Refill: 3  -     US Abdomen Limited; Future    Other orders  -     famotidine (PEPCID) 10 MG tablet; Take 1 tablet by mouth At Night As Needed for Heartburn.  Dispense: 30 tablet; Refill: 5      82-year-old male presenting the office today for follow-up with a history of reflux and possible cirrhosis noted on CT. patient is having lab work performed today to include a CBC, CMP and PT/INR.  I have ordered an ultrasound of the right upper quadrant for further evaluation of the patient's liver and possible  cirrhosis.  Patient is undergoing cardiac ablation tomorrow for atrial fibrillation.  With the patient's continued high risk due to multiple comorbidities and cardiopulmonary risk factors patient and wife continue to do for colonoscopy and feel the risk outweigh the benefit.  I have refilled the patient's Protonix and Pepcid as these are providing good relief for the patient's reflux.  Patient will follow-up in the office in 1 year.  We will call the patient with ultrasound results.  Patient agreeable to this plan will call with any questions or concerns.          Follow Up       Follow Up   Return in about 1 year (around 6/2/2023).  Patient was given instructions and counseling regarding his condition or for health maintenance advice. Please see specific information pulled into the AVS if appropriate.

## 2022-06-02 ENCOUNTER — OFFICE VISIT (OUTPATIENT)
Dept: PULMONOLOGY | Facility: CLINIC | Age: 82
End: 2022-06-02

## 2022-06-02 ENCOUNTER — LAB (OUTPATIENT)
Dept: LAB | Facility: HOSPITAL | Age: 82
End: 2022-06-02

## 2022-06-02 ENCOUNTER — OFFICE VISIT (OUTPATIENT)
Dept: GASTROENTEROLOGY | Facility: CLINIC | Age: 82
End: 2022-06-02

## 2022-06-02 VITALS
SYSTOLIC BLOOD PRESSURE: 150 MMHG | OXYGEN SATURATION: 97 % | DIASTOLIC BLOOD PRESSURE: 88 MMHG | HEART RATE: 86 BPM | WEIGHT: 236.1 LBS | HEIGHT: 70 IN | RESPIRATION RATE: 18 BRPM | BODY MASS INDEX: 33.8 KG/M2 | TEMPERATURE: 97.8 F

## 2022-06-02 VITALS
WEIGHT: 236.8 LBS | BODY MASS INDEX: 33.9 KG/M2 | HEIGHT: 70 IN | DIASTOLIC BLOOD PRESSURE: 86 MMHG | HEART RATE: 81 BPM | SYSTOLIC BLOOD PRESSURE: 154 MMHG | OXYGEN SATURATION: 98 %

## 2022-06-02 DIAGNOSIS — J30.9 ALLERGIC RHINITIS, UNSPECIFIED SEASONALITY, UNSPECIFIED TRIGGER: Primary | ICD-10-CM

## 2022-06-02 DIAGNOSIS — Z86.16 HISTORY OF COVID-19: ICD-10-CM

## 2022-06-02 DIAGNOSIS — E78.2 MIXED HYPERLIPIDEMIA: ICD-10-CM

## 2022-06-02 DIAGNOSIS — R93.2 ABNORMAL CT SCAN, LIVER: Primary | ICD-10-CM

## 2022-06-02 DIAGNOSIS — K21.9 GASTROESOPHAGEAL REFLUX DISEASE, UNSPECIFIED WHETHER ESOPHAGITIS PRESENT: ICD-10-CM

## 2022-06-02 DIAGNOSIS — K21.00 GASTROESOPHAGEAL REFLUX DISEASE WITH ESOPHAGITIS WITHOUT HEMORRHAGE: Chronic | ICD-10-CM

## 2022-06-02 DIAGNOSIS — I51.89 DIASTOLIC DYSFUNCTION: ICD-10-CM

## 2022-06-02 DIAGNOSIS — Z01.818 PREOP TESTING: ICD-10-CM

## 2022-06-02 DIAGNOSIS — F17.201 TOBACCO ABUSE, IN REMISSION: ICD-10-CM

## 2022-06-02 DIAGNOSIS — J43.9 PULMONARY EMPHYSEMA, UNSPECIFIED EMPHYSEMA TYPE: ICD-10-CM

## 2022-06-02 DIAGNOSIS — J43.2 CENTRILOBULAR EMPHYSEMA: ICD-10-CM

## 2022-06-02 DIAGNOSIS — J30.2 SEASONAL ALLERGIES: ICD-10-CM

## 2022-06-02 DIAGNOSIS — T73.2XXS FATIGUE DUE TO EXPOSURE, SEQUELA: ICD-10-CM

## 2022-06-02 DIAGNOSIS — J44.1 COPD EXACERBATION: ICD-10-CM

## 2022-06-02 DIAGNOSIS — R06.00 DYSPNEA, UNSPECIFIED TYPE: ICD-10-CM

## 2022-06-02 DIAGNOSIS — Z01.818 OTHER SPECIFIED PRE-OPERATIVE EXAMINATION: ICD-10-CM

## 2022-06-02 DIAGNOSIS — I25.118 CORONARY ARTERY DISEASE OF NATIVE ARTERY OF NATIVE HEART WITH STABLE ANGINA PECTORIS: ICD-10-CM

## 2022-06-02 DIAGNOSIS — J44.1 CHRONIC OBSTRUCTIVE PULMONARY DISEASE WITH ACUTE EXACERBATION: ICD-10-CM

## 2022-06-02 DIAGNOSIS — R05.9 COUGH: ICD-10-CM

## 2022-06-02 DIAGNOSIS — I48.19 ATRIAL FIBRILLATION, PERSISTENT: ICD-10-CM

## 2022-06-02 DIAGNOSIS — Z95.5 S/P CORONARY ARTERY STENT PLACEMENT: ICD-10-CM

## 2022-06-02 LAB
ALBUMIN SERPL-MCNC: 3.9 G/DL (ref 3.5–5.2)
ALBUMIN/GLOB SERPL: 1.3 G/DL
ALP SERPL-CCNC: 49 U/L (ref 39–117)
ALT SERPL W P-5'-P-CCNC: 12 U/L (ref 1–41)
ANION GAP SERPL CALCULATED.3IONS-SCNC: 11.1 MMOL/L (ref 5–15)
AST SERPL-CCNC: 13 U/L (ref 1–40)
BASOPHILS # BLD AUTO: 0.05 10*3/MM3 (ref 0–0.2)
BASOPHILS NFR BLD AUTO: 0.7 % (ref 0–1.5)
BILIRUB SERPL-MCNC: 0.4 MG/DL (ref 0–1.2)
BUN SERPL-MCNC: 13 MG/DL (ref 8–23)
BUN/CREAT SERPL: 9.8 (ref 7–25)
CALCIUM SPEC-SCNC: 9.2 MG/DL (ref 8.6–10.5)
CHLORIDE SERPL-SCNC: 103 MMOL/L (ref 98–107)
CHOLEST SERPL-MCNC: 136 MG/DL (ref 0–200)
CO2 SERPL-SCNC: 27.9 MMOL/L (ref 22–29)
CREAT SERPL-MCNC: 1.32 MG/DL (ref 0.76–1.27)
DEPRECATED RDW RBC AUTO: 46.5 FL (ref 37–54)
DIGOXIN SERPL-MCNC: 0.9 NG/ML (ref 0.6–1.2)
EGFRCR SERPLBLD CKD-EPI 2021: 54.5 ML/MIN/1.73
EOSINOPHIL # BLD AUTO: 0.56 10*3/MM3 (ref 0–0.4)
EOSINOPHIL NFR BLD AUTO: 8.2 % (ref 0.3–6.2)
ERYTHROCYTE [DISTWIDTH] IN BLOOD BY AUTOMATED COUNT: 14.7 % (ref 12.3–15.4)
GLOBULIN UR ELPH-MCNC: 2.9 GM/DL
GLUCOSE SERPL-MCNC: 137 MG/DL (ref 65–99)
HCT VFR BLD AUTO: 37.7 % (ref 37.5–51)
HDLC SERPL-MCNC: 32 MG/DL (ref 40–60)
HGB BLD-MCNC: 12.7 G/DL (ref 13–17.7)
IMM GRANULOCYTES # BLD AUTO: 0.04 10*3/MM3 (ref 0–0.05)
IMM GRANULOCYTES NFR BLD AUTO: 0.6 % (ref 0–0.5)
INR PPP: 1.69 (ref 0.86–1.15)
LDLC SERPL CALC-MCNC: 81 MG/DL (ref 0–100)
LDLC/HDLC SERPL: 2.46 {RATIO}
LYMPHOCYTES # BLD AUTO: 1.89 10*3/MM3 (ref 0.7–3.1)
LYMPHOCYTES NFR BLD AUTO: 27.6 % (ref 19.6–45.3)
MCH RBC QN AUTO: 29.6 PG (ref 26.6–33)
MCHC RBC AUTO-ENTMCNC: 33.7 G/DL (ref 31.5–35.7)
MCV RBC AUTO: 87.9 FL (ref 79–97)
MONOCYTES # BLD AUTO: 0.78 10*3/MM3 (ref 0.1–0.9)
MONOCYTES NFR BLD AUTO: 11.4 % (ref 5–12)
NEUTROPHILS NFR BLD AUTO: 3.54 10*3/MM3 (ref 1.7–7)
NEUTROPHILS NFR BLD AUTO: 51.5 % (ref 42.7–76)
NRBC BLD AUTO-RTO: 0 /100 WBC (ref 0–0.2)
PLATELET # BLD AUTO: 172 10*3/MM3 (ref 140–450)
PMV BLD AUTO: 9.7 FL (ref 6–12)
POTASSIUM SERPL-SCNC: 3.9 MMOL/L (ref 3.5–5.2)
PROT SERPL-MCNC: 6.8 G/DL (ref 6–8.5)
PROTHROMBIN TIME: 20.1 SECONDS (ref 11.8–14.9)
RBC # BLD AUTO: 4.29 10*6/MM3 (ref 4.14–5.8)
SARS-COV-2 RNA PNL SPEC NAA+PROBE: NOT DETECTED
SODIUM SERPL-SCNC: 142 MMOL/L (ref 136–145)
TRIGL SERPL-MCNC: 126 MG/DL (ref 0–150)
VLDLC SERPL-MCNC: 23 MG/DL (ref 5–40)
WBC NRBC COR # BLD: 6.86 10*3/MM3 (ref 3.4–10.8)

## 2022-06-02 PROCEDURE — 80162 ASSAY OF DIGOXIN TOTAL: CPT

## 2022-06-02 PROCEDURE — 99214 OFFICE O/P EST MOD 30 MIN: CPT | Performed by: INTERNAL MEDICINE

## 2022-06-02 PROCEDURE — 85025 COMPLETE CBC W/AUTO DIFF WBC: CPT

## 2022-06-02 PROCEDURE — 36415 COLL VENOUS BLD VENIPUNCTURE: CPT

## 2022-06-02 PROCEDURE — 80053 COMPREHEN METABOLIC PANEL: CPT

## 2022-06-02 PROCEDURE — 80061 LIPID PANEL: CPT

## 2022-06-02 PROCEDURE — U0004 COV-19 TEST NON-CDC HGH THRU: HCPCS

## 2022-06-02 PROCEDURE — 85610 PROTHROMBIN TIME: CPT

## 2022-06-02 PROCEDURE — U0005 INFEC AGEN DETEC AMPLI PROBE: HCPCS

## 2022-06-02 PROCEDURE — 99214 OFFICE O/P EST MOD 30 MIN: CPT | Performed by: NURSE PRACTITIONER

## 2022-06-02 RX ORDER — LEVALBUTEROL TARTRATE 45 UG/1
1-2 AEROSOL, METERED ORAL EVERY 4 HOURS PRN
Qty: 1 EACH | Refills: 11 | Status: SHIPPED | OUTPATIENT
Start: 2022-06-02 | End: 2022-11-26 | Stop reason: ALTCHOICE

## 2022-06-02 RX ORDER — LEVALBUTEROL INHALATION SOLUTION 0.63 MG/3ML
1 SOLUTION RESPIRATORY (INHALATION) EVERY 6 HOURS PRN
Qty: 360 ML | Refills: 0 | Status: SHIPPED | OUTPATIENT
Start: 2022-06-02 | End: 2022-10-17 | Stop reason: SDUPTHER

## 2022-06-02 RX ORDER — MONTELUKAST SODIUM 10 MG/1
10 TABLET ORAL NIGHTLY
Qty: 30 TABLET | Refills: 0 | Status: SHIPPED | OUTPATIENT
Start: 2022-06-02 | End: 2022-07-20 | Stop reason: SDUPTHER

## 2022-06-02 RX ORDER — TIOTROPIUM BROMIDE AND OLODATEROL 3.124; 2.736 UG/1; UG/1
2 SPRAY, METERED RESPIRATORY (INHALATION)
Qty: 2 EACH | Refills: 0 | COMMUNITY
Start: 2022-06-02 | End: 2023-03-28 | Stop reason: SDUPTHER

## 2022-06-02 RX ORDER — TIOTROPIUM BROMIDE AND OLODATEROL 3.124; 2.736 UG/1; UG/1
2 SPRAY, METERED RESPIRATORY (INHALATION)
Qty: 2 EACH | Refills: 0 | Status: ON HOLD | COMMUNITY
Start: 2022-06-02 | End: 2022-06-03

## 2022-06-02 RX ORDER — FAMOTIDINE 10 MG
10 TABLET ORAL NIGHTLY PRN
Qty: 30 TABLET | Refills: 5 | Status: ON HOLD | OUTPATIENT
Start: 2022-06-02 | End: 2022-06-03

## 2022-06-02 RX ORDER — PANTOPRAZOLE SODIUM 40 MG/1
40 TABLET, DELAYED RELEASE ORAL DAILY
Qty: 30 TABLET | Refills: 3 | Status: SHIPPED | OUTPATIENT
Start: 2022-06-02 | End: 2022-10-14 | Stop reason: SDUPTHER

## 2022-06-02 NOTE — PROGRESS NOTES
Primary Care Provider  Kevyn Rhodes PA     Referring Provider  No ref. provider found     Chief Complaint  COPD, Emphysema, Shortness of Breath, and Follow-up    Subjective          Layo Cee presents to [facility]MON[facility]ICINE  History of Present Illness  Layo Cee is a 82 y.o. male patient with history of COPD, seasonal allergies, wheezing, cough, chronic compensated systolic heart failure, known granulomatous disease, lung nodules, tobacco abuse of cigarettes in remission, COVID-19 infection in January 2021.  He is here for follow-up.  Since his last office visit, he was admitted to hospital for A. fib with RVR with worsening shortness of breath.  He was on Breztri inhaler 2 puffs twice daily, but had significant worsening hypertension.  His cardiologist is stopped the inhaler.  He went back to his [facility] and Arnuity.  However currently his insurance is not covering the Stiolto and he wants the inhaler changed.  He has shortness of breath with activities he has some leg swelling.  He has no chest pain and chest tightness.  No nausea or vomiting.  He has intermittent wheezing.  Leg swelling is about the same.  I reviewed the previous CT scan of the chest and pulmonary function test with him again today.    Review of Systems   Respiratory: Positive for shortness of breath.         General:  Fatigue, No Fever No weight loss or loss of appetite  HEENT: No dysphagia, No Visual Changes, no rhinorrhea  Respiratory:  + cough,+Dyspnea, intermittent phlegm, No Pleuritic Pain, no wheezing, no hemoptysis  Cardiovascular: Denies chest pain, denies palpitations,+HALL, + improving chest Pressure  Gastrointestinal:  No Abdominal Pain, No Nausea, No Vomiting, No Diarrhea  Genitourinary:  No Dysuria, No Frequency, No Hesitancy  Musculoskeletal: No muscle pain or swelling  Endocrine:  No Heat Intolerance, No Cold Intolerance  Hematologic:  No Bleeding, No Bruising  Psychiatric:  No  Anxiety, No Depression  Neurologic:  No Confusion, no Dysarthria, No Headaches  Skin:  No Rash, No Open Wounds    Family History   Problem Relation Age of Onset   • Hypertension Mother    • Heart disease Father    • Other Brother         GASTROINTESTINAL CANCER   • Kidney cancer Brother         Social History     Socioeconomic History   • Marital status:    Tobacco Use   • Smoking status: Former Smoker     Packs/day: 0.50     Years: 40.00     Pack years: 20.00     Types: Cigarettes     Start date:      Quit date:      Years since quittin.4   • Smokeless tobacco: Never Used   Vaping Use   • Vaping Use: Never used   Substance and Sexual Activity   • Alcohol use: Not Currently   • Drug use: Never   • Sexual activity: Defer        Past Medical History:   Diagnosis Date   • Arthritis    • BPH (benign prostatic hyperplasia)    • CAD (coronary artery disease)    • CHF (congestive heart failure) ([facility])    • COPD (chronic obstructive pulmonary disease) ([facility])    • Coronary artery disease of bypass graft of native heart with stable angina pectoris ([facility]) 2021    (1) Coronary artery disease, status post coronary artery bypass grafting in the past. Cardiac catheterization done in 2016 showed patent left internal mammary graft to the LAD and occluded saphenous venous graft. Native LAD is 100% occluded in the mid portion. Native circumflex artery has no significant disease. Native right coronary artery is also 100% occluded and it is a chronic total occl   • COVID-19 2021   • Diverticulitis 10/11/2019   • Dysphagia    • Essential hypertension 2020   • Frequent PVCs 2021   • GERD (gastroesophageal reflux disease)    • Gross hematuria    • HBP (high blood pressure)    • Long-term use of high-risk medication    • Lung disease    • Mixed hyperlipidemia 2021   • Myocardial infarction ([facility]) 2016   • OCD (obsessive compulsive disorder)    • Renal insufficiency 2020   • Rhinitis,  allergic 06/05/2018   • Sore throat    • Stable angina (HCC)    • Typical atrial flutter (HCC) 11/30/2018    s/p ablation by Dr. Aguilar    • Vertigo         Immunization History   Administered Date(s) Administered   • COVID-19 (NIKKY) 11/04/2021   • Fluzone High-Dose 65+yrs 09/28/2021   • Fluzone Split Quad (Multi-dose) 10/08/2019   • Influenza Quad Vaccine (Inpatient) 10/08/2019   • Influenza, Unspecified 10/08/2019   • Shingrix 04/29/2021, 11/22/2021         Allergies   Allergen Reactions   • Carvedilol Swelling and Anaphylaxis   • Levaquin [Levofloxacin] Unknown - Low Severity          Current Outpatient Medications:   •  acetaminophen (TYLENOL) 325 MG tablet, Take 650 mg by mouth Every 6 (Six) Hours As Needed for Mild Pain ., Disp: , Rfl:   •  calcium carbonate (OS-LIANNA) 600 MG tablet, Take 600 mg by mouth Every Night., Disp: , Rfl:   •  clopidogrel (PLAVIX) 75 MG tablet, Take 1 tablet by mouth Daily., Disp: 90 tablet, Rfl: 3  •  Coenzyme Q10 100 MG tablet, Take 100 mg by mouth Daily., Disp: , Rfl:   •  digoxin (LANOXIN) 125 MCG tablet, Take 1 tablet by mouth Daily With Lunch for 30 days., Disp: 30 tablet, Rfl: 0  •  Entresto 49-51 MG tablet, Take 1 tablet by mouth 2 (two) times a day., Disp: 60 tablet, Rfl: 2  •  famotidine (PEPCID) 10 MG tablet, Take 1 tablet by mouth At Night As Needed for Heartburn., Disp: 30 tablet, Rfl: 5  •  finasteride (PROSCAR) 5 MG tablet, Take 5 mg by mouth Daily., Disp: , Rfl:   •  fluticasone (FLONASE) 50 MCG/ACT nasal spray, 1 spray into the nostril(s) as directed by provider Every Night., Disp: , Rfl:   •  Fluticasone Furoate (Arnuity Ellipta) 100 MCG/ACT aerosol powder , Inhale 1 puff Daily., Disp: 1 each, Rfl: 3  •  furosemide (LASIX) 20 MG tablet, Take 1 tablet by mouth Daily. (Patient taking differently: Take 40 mg by mouth Daily.), Disp: 90 tablet, Rfl: 2  •  isosorbide mononitrate (IMDUR) 60 MG 24 hr tablet, Take 1 tablet by mouth every day, Disp: 90 tablet, Rfl: 0  •   levalbuterol (Xopenex) 0.63 MG/3ML nebulizer solution, Take 1 ampule by nebulization Every 6 (Six) Hours As Needed for Wheezing for up to 30 days., Disp: 360 mL, Rfl: 0  •  Livalo 1 MG tablet tablet, Take 1 tablet by mouth every day (Patient taking differently: Take 1 mg by mouth Every Night.), Disp: 90 tablet, Rfl: 1  •  metoprolol succinate XL (TOPROL-XL) 50 MG 24 hr tablet, Take 1 tablet by mouth Every 12 (Twelve) Hours for 30 days., Disp: 60 tablet, Rfl: 0  •  montelukast (SINGULAIR) 10 MG tablet, Take 1 tablet by mouth Every Night for 30 days., Disp: 30 tablet, Rfl: 0  •  mupirocin (Bactroban Nasal) 2 % nasal ointment, into the nostril(s) as directed by provider 2 (Two) Times a Day. (Patient taking differently: 1 application into the nostril(s) as directed by provider 2 (Two) Times a Day.), Disp: 1 g, Rfl: 1  •  mupirocin (BACTROBAN) 2 % ointment, , Disp: , Rfl:   •  nitroglycerin (NITROSTAT) 0.4 MG SL tablet, Place 1 tablet under the tongue Every 5 (Five) Minutes As Needed for Chest Pain for up to 30 days. Take no more than 3 doses in 15 minutes., Disp: 30 tablet, Rfl: 0  •  pantoprazole (PROTONIX) 40 MG EC tablet, Take 1 tablet by mouth Daily., Disp: 30 tablet, Rfl: 3  •  tamsulosin (FLOMAX) 0.4 MG capsule 24 hr capsule, Take 1 capsule by mouth Daily. 1/2 hours following the same meal each day (Patient taking differently: Take 1 capsule by mouth Every Night.), Disp: 90 capsule, Rfl: 4  •  tiotropium bromide-olodaterol (Stiolto Respimat) 2.5-2.5 MCG/ACT aerosol solution inhaler, Inhale 2 puffs Daily., Disp: 2 each, Rfl: 0  •  VITAMIN B COMPLEX-C PO, Take 1 tablet by mouth Daily., Disp: , Rfl:   •  Xarelto 15 MG tablet, Take 1 tablet by mouth every day (Patient taking differently: Take 15 mg by mouth Every Night.), Disp: 90 tablet, Rfl: 1  •  levalbuterol (Xopenex HFA) 45 MCG/ACT inhaler, Inhale 1-2 puffs Every 4 (Four) Hours As Needed for Wheezing., Disp: 1 each, Rfl: 11     Objective   Vital Signs:   BP  "150/88 (BP Location: Left arm, Patient Position: Sitting, Cuff Size: Adult)   Pulse 86   Temp 97.8 °F (36.6 °C) (Tympanic)   Resp 18   Ht 177.8 cm (70\")   Wt 107 kg (236 lb 1.6 oz)   SpO2 97% Comment: room air  BMI 33.88 kg/m²     Mallampatti classification : 1  Physical Exam  Vital Signs Reviewed  Obese, elderly male, pleasant, in no acute distress, normal conversant  HEENT:  PERRL, EOMI.  OP, nares clear, no sinus tenderness  Neck:  Supple, no JVD, no thyromegaly  Lymph: no axillary, cervical, supraclavicular lymphadenopathy noted bilaterally  Chest:  good aeration, bilateral diminished breath sounds, no wheezing, cracklesi, scattered rhonchi at bases, resonant to percussion b/l  CV: RRR, no MGR, pulses 2+, equal  Abd:  Soft, NT, ND, + BS, no HSM  EXT:  no clubbing, no cyanosis, trace BLE edema  Neuro:  A&Ox3, CN grossly intact, no focal deficits  Skin: No rashes or lesions noted     Result Review :   The following data was reviewed by: Kolton Brink MD on 04/06/2022:  Common labs    Common Labsle 5/10/22 5/10/22 5/11/22 5/11/22 5/12/22 5/12/22    [id number] 0442 0450 0451   Glucose  130 (A)  108 (A) 115 (A)    BUN  31 (A)  25 (A) 24 (A)    Creatinine  1.31 (A)  1.38 (A) 1.28 (A)    Sodium  139  140 138    Potassium  4.2  3.9 4.2    Chloride  103  104 104    Calcium  9.0  9.0 9.1    Albumin  3.80   3.50    Total Bilirubin  0.3       Alkaline Phosphatase  55       AST (SGOT)  18       ALT (SGPT)  30       WBC 11.01 (A)  10.97 (A)   11.27 (A)   Hemoglobin 13.4  13.6   13.5   Hematocrit 40.8  41.4   40.9   Platelets 382  371   386   (A) Abnormal value       Comments are available for some flowsheets but are not being displayed.           CMP    CMP 5/10/22 5/11/22 5/12/22   Glucose 130 (A) 108 (A) 115 (A)   BUN 31 (A) 25 (A) 24 (A)   Creatinine 1.31 (A) 1.38 (A) 1.28 (A)   Sodium 139 140 138   Potassium 4.2 3.9 4.2   Chloride 103 104 104   Calcium 9.0 9.0 9.1   Albumin 3.80  3.50   Total Bilirubin 0.3   "   Alkaline Phosphatase 55     AST (SGOT) 18     ALT (SGPT) 30     (A) Abnormal value       Comments are available for some flowsheets but are not being displayed.           CBC    CBC 5/10/22 5/11/22 5/12/22   WBC 11.01 (A) 10.97 (A) 11.27 (A)   RBC 4.49 4.64 4.61   Hemoglobin 13.4 13.6 13.5   Hematocrit 40.8 41.4 40.9   MCV 90.9 89.2 88.7   MCH 29.8 29.3 29.3   MCHC 32.8 32.9 33.0   RDW 14.2 14.4 14.7   Platelets 382 371 386   (A) Abnormal value            CBC w/diff    CBC w/Diff 8/9/21 8/10/21 1/24/22   WBC 8.02 8.83 6.25   RBC 5.14 4.64 5.07   Hemoglobin 15.1 13.7 14.7   Hematocrit 46.3 41.2 44.2   MCV 90.1 88.8 87.2   MCH 29.4 29.5 29.0   MCHC 32.6 33.3 33.3   RDW 15.2 15.4 13.9   Platelets 185 163 261   Neutrophil Rel % 49.1  52.5   Immature Granulocyte Rel % 0.6 (A)  0.6 (A)   Lymphocyte Rel % 34.7  29.6   Monocyte Rel % 10.5  10.9   Eosinophil Rel % 4.6  5.9   Basophil Rel % 0.5  0.5   (A) Abnormal value            Data reviewed: Radiologic studies CT scan of the chest from October 2021 was reviewed.  Shows some chronic interstitial changes with emphysema.            Assessment and Plan    Diagnoses and all orders for this visit:    1. Allergic rhinitis, unspecified seasonality, unspecified trigger (Primary)    2. Seasonal allergies    3. Atrial fibrillation, persistent (HCC)    4. Diastolic dysfunction  -     tiotropium bromide-olodaterol (Stiolto Respimat) 2.5-2.5 MCG/ACT aerosol solution inhaler; Inhale 2 puffs Daily.  Dispense: 2 each; Refill: 0  -     montelukast (SINGULAIR) 10 MG tablet; Take 1 tablet by mouth Every Night for 30 days.  Dispense: 30 tablet; Refill: 0    5. Gastroesophageal reflux disease, unspecified whether esophagitis present  -     levalbuterol (Xopenex) 0.63 MG/3ML nebulizer solution; Take 1 ampule by nebulization Every 6 (Six) Hours As Needed for Wheezing for up to 30 days.  Dispense: 360 mL; Refill: 0  -     levalbuterol (Xopenex HFA) 45 MCG/ACT inhaler; Inhale 1-2 puffs Every 4  (Four) Hours As Needed for Wheezing.  Dispense: 1 each; Refill: 11  -     tiotropium bromide-olodaterol (Stiolto Respimat) 2.5-2.5 MCG/ACT aerosol solution inhaler; Inhale 2 puffs Daily.  Dispense: 2 each; Refill: 0  -     montelukast (SINGULAIR) 10 MG tablet; Take 1 tablet by mouth Every Night for 30 days.  Dispense: 30 tablet; Refill: 0    6. Cough  -     levalbuterol (Xopenex) 0.63 MG/3ML nebulizer solution; Take 1 ampule by nebulization Every 6 (Six) Hours As Needed for Wheezing for up to 30 days.  Dispense: 360 mL; Refill: 0  -     levalbuterol (Xopenex HFA) 45 MCG/ACT inhaler; Inhale 1-2 puffs Every 4 (Four) Hours As Needed for Wheezing.  Dispense: 1 each; Refill: 11    7. Centrilobular emphysema (HCC)  -     levalbuterol (Xopenex) 0.63 MG/3ML nebulizer solution; Take 1 ampule by nebulization Every 6 (Six) Hours As Needed for Wheezing for up to 30 days.  Dispense: 360 mL; Refill: 0  -     levalbuterol (Xopenex HFA) 45 MCG/ACT inhaler; Inhale 1-2 puffs Every 4 (Four) Hours As Needed for Wheezing.  Dispense: 1 each; Refill: 11  -     tiotropium bromide-olodaterol (Stiolto Respimat) 2.5-2.5 MCG/ACT aerosol solution inhaler; Inhale 2 puffs Daily.  Dispense: 2 each; Refill: 0  -     montelukast (SINGULAIR) 10 MG tablet; Take 1 tablet by mouth Every Night for 30 days.  Dispense: 30 tablet; Refill: 0    8. Chronic obstructive pulmonary disease with acute exacerbation (HCC)  -     levalbuterol (Xopenex) 0.63 MG/3ML nebulizer solution; Take 1 ampule by nebulization Every 6 (Six) Hours As Needed for Wheezing for up to 30 days.  Dispense: 360 mL; Refill: 0  -     levalbuterol (Xopenex HFA) 45 MCG/ACT inhaler; Inhale 1-2 puffs Every 4 (Four) Hours As Needed for Wheezing.  Dispense: 1 each; Refill: 11    9. Pulmonary emphysema, unspecified emphysema type (HCC)  -     levalbuterol (Xopenex) 0.63 MG/3ML nebulizer solution; Take 1 ampule by nebulization Every 6 (Six) Hours As Needed for Wheezing for up to 30 days.  Dispense:  360 mL; Refill: 0  -     levalbuterol (Xopenex HFA) 45 MCG/ACT inhaler; Inhale 1-2 puffs Every 4 (Four) Hours As Needed for Wheezing.  Dispense: 1 each; Refill: 11    10. COPD exacerbation (HCC)    11. Gastroesophageal reflux disease with esophagitis without hemorrhage    12. History of COVID-19    13. Tobacco abuse, in remission    14. Fatigue due to exposure, sequela    15. 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  COPD: Continue with Stiolto and Arnuity.  Continue with as needed rescue inhaler.  He was on albuterol and DuoNeb.  Given his A. fib with RVR and having significant problems with rapid rate, we will try to switch it to Xopenex inhaler and nebulizer.    Continue with Singulair.  Continue with pantoprazole.    Chronic A. fib: Plan for cardiac ablation in [facility] with cardiology service tomorrow.  Continue with anticoagulation.  Metoprolol per cardiology service.    I advised him regarding pulmonary rehab.  Patient is not able to navigate 30 miles travel every 2 to 3 days.  Wants to keep himself active at home.    Congestive heart failure: Continue with diuretics through Dr. Stevenson.  Continue with Xarelto and Plavix.    Lung nodules: Likely related to granulomatous lung disease.  Repeat CT scan of the chest in October 2022.    Up-to-date on flu and pneumonia vaccine.  He has had [employer] vaccine.  I advised him to take booster dose of the vaccine soon.    Follow Up   Return in about 6 months (around 12/2/2022).  Patient was given instructions and counseling regarding his condition or for health maintenance advice. Please see specific information pulled into the AVS if appropriate.       Electronically signed by Kolton Brink MD, 6/2/2022, 12:17 EDT.

## 2022-06-03 ENCOUNTER — ANESTHESIA EVENT (OUTPATIENT)
Dept: CARDIOLOGY | Facility: HOSPITAL | Age: 82
End: 2022-06-03

## 2022-06-03 ENCOUNTER — HOSPITAL ENCOUNTER (OUTPATIENT)
Dept: CARDIOLOGY | Facility: HOSPITAL | Age: 82
Discharge: HOME | End: 2022-06-03

## 2022-06-03 ENCOUNTER — HOSPITAL ENCOUNTER (OUTPATIENT)
Facility: HOSPITAL | Age: 82
Discharge: HOME | End: 2022-06-04
Attending: INTERNAL MEDICINE | Admitting: INTERNAL MEDICINE

## 2022-06-03 ENCOUNTER — ANESTHESIA (OUTPATIENT)
Dept: CARDIOLOGY | Facility: HOSPITAL | Age: 82
End: 2022-06-03

## 2022-06-03 DIAGNOSIS — I48.19 PERSISTENT ATRIAL FIBRILLATION: ICD-10-CM

## 2022-06-03 LAB
ACT BLD: 155 SECONDS (ref 82–152)
BH CV ECHO MEAS - AO MAX PG: 9.5 MMHG
BH CV ECHO MEAS - AO MEAN PG: 5.1 MMHG
BH CV ECHO MEAS - AO V2 MAX: 154 CM/SEC
BH CV ECHO MEAS - AO V2 VTI: 30.8 CM
BH CV ECHO MEAS - MR MAX PG: 140.3 MMHG
BH CV ECHO MEAS - MR MAX VEL: 592.3 CM/SEC
BH CV ECHO MEAS - MR MEAN PG: 90.9 MMHG
BH CV ECHO MEAS - MR MEAN VEL: 456.2 CM/SEC
BH CV ECHO MEAS - MR VTI: 185.5 CM
BH CV ECHO MEAS - TR MAX PG: 8.4 MMHG
BH CV ECHO MEAS - TR MAX VEL: 145.1 CM/SEC
LV EF 2D ECHO EST: 55 %
MAXIMAL PREDICTED HEART RATE: 140 BPM
STRESS TARGET HR: 119 BPM

## 2022-06-03 PROCEDURE — 25010000002 ONDANSETRON PER 1 MG: Performed by: NURSE ANESTHETIST, CERTIFIED REGISTERED

## 2022-06-03 PROCEDURE — A9270 NON-COVERED ITEM OR SERVICE: HCPCS | Performed by: INTERNAL MEDICINE

## 2022-06-03 PROCEDURE — G0378 HOSPITAL OBSERVATION PER HR: HCPCS

## 2022-06-03 PROCEDURE — C1759 CATH, INTRA ECHOCARDIOGRAPHY: HCPCS | Performed by: INTERNAL MEDICINE

## 2022-06-03 PROCEDURE — 85347 COAGULATION TIME ACTIVATED: CPT

## 2022-06-03 PROCEDURE — 4A0234Z MEASUREMENT OF CARDIAC ELECTRICAL ACTIVITY, PERCUTANEOUS APPROACH: ICD-10-PCS | Performed by: INTERNAL MEDICINE

## 2022-06-03 PROCEDURE — 93325 DOPPLER ECHO COLOR FLOW MAPG: CPT

## 2022-06-03 PROCEDURE — C1766 INTRO/SHEATH,STRBLE,NON-PEEL: HCPCS | Performed by: INTERNAL MEDICINE

## 2022-06-03 PROCEDURE — 63710000001 TAMSULOSIN 0.4 MG CAPSULE: Performed by: INTERNAL MEDICINE

## 2022-06-03 PROCEDURE — 93656 COMPRE EP EVAL ABLTJ ATR FIB: CPT | Performed by: INTERNAL MEDICINE

## 2022-06-03 PROCEDURE — C1769 GUIDE WIRE: HCPCS | Performed by: INTERNAL MEDICINE

## 2022-06-03 PROCEDURE — 02583ZZ DESTRUCTION OF CONDUCTION MECHANISM, PERCUTANEOUS APPROACH: ICD-10-PCS | Performed by: INTERNAL MEDICINE

## 2022-06-03 PROCEDURE — 63710000001 SACUBITRIL-VALSARTAN 49-51 MG TABLET: Performed by: INTERNAL MEDICINE

## 2022-06-03 PROCEDURE — 25010000002 PHENYLEPHRINE 10 MG/ML SOLUTION: Performed by: NURSE ANESTHETIST, CERTIFIED REGISTERED

## 2022-06-03 PROCEDURE — C9803 HOPD COVID-19 SPEC COLLECT: HCPCS

## 2022-06-03 PROCEDURE — C1730 CATH, EP, 19 OR FEW ELECT: HCPCS | Performed by: INTERNAL MEDICINE

## 2022-06-03 PROCEDURE — 93005 ELECTROCARDIOGRAM TRACING: CPT | Performed by: INTERNAL MEDICINE

## 2022-06-03 PROCEDURE — 93657 TX L/R ATRIAL FIB ADDL: CPT | Performed by: INTERNAL MEDICINE

## 2022-06-03 PROCEDURE — C1894 INTRO/SHEATH, NON-LASER: HCPCS | Performed by: INTERNAL MEDICINE

## 2022-06-03 PROCEDURE — C1731 CATH, EP, 20 OR MORE ELEC: HCPCS | Performed by: INTERNAL MEDICINE

## 2022-06-03 PROCEDURE — 25010000002 PROTAMINE SULFATE PER 10 MG: Performed by: INTERNAL MEDICINE

## 2022-06-03 PROCEDURE — U0004 COV-19 TEST NON-CDC HGH THRU: HCPCS | Performed by: INTERNAL MEDICINE

## 2022-06-03 PROCEDURE — 02K83ZZ MAP CONDUCTION MECHANISM, PERCUTANEOUS APPROACH: ICD-10-PCS | Performed by: INTERNAL MEDICINE

## 2022-06-03 PROCEDURE — C2630 CATH, EP, COOL-TIP: HCPCS | Performed by: INTERNAL MEDICINE

## 2022-06-03 PROCEDURE — 63710000001 FUROSEMIDE 40 MG TABLET: Performed by: INTERNAL MEDICINE

## 2022-06-03 PROCEDURE — 25010000002 PROPOFOL 10 MG/ML EMULSION: Performed by: NURSE ANESTHETIST, CERTIFIED REGISTERED

## 2022-06-03 PROCEDURE — 63710000001 ACETAMINOPHEN 325 MG TABLET: Performed by: INTERNAL MEDICINE

## 2022-06-03 PROCEDURE — 93312 ECHO TRANSESOPHAGEAL: CPT

## 2022-06-03 PROCEDURE — 93312 ECHO TRANSESOPHAGEAL: CPT | Performed by: INTERNAL MEDICINE

## 2022-06-03 PROCEDURE — 25010000002 FUROSEMIDE PER 20 MG: Performed by: INTERNAL MEDICINE

## 2022-06-03 PROCEDURE — C1732 CATH, EP, DIAG/ABL, 3D/VECT: HCPCS | Performed by: INTERNAL MEDICINE

## 2022-06-03 PROCEDURE — 63710000001 METOPROLOL SUCCINATE XL 50 MG TABLET SUSTAINED-RELEASE 24 HOUR: Performed by: INTERNAL MEDICINE

## 2022-06-03 PROCEDURE — 94640 AIRWAY INHALATION TREATMENT: CPT

## 2022-06-03 PROCEDURE — 25010000002 HEPARIN (PORCINE) PER 1000 UNITS: Performed by: INTERNAL MEDICINE

## 2022-06-03 PROCEDURE — 63710000001 FINASTERIDE 5 MG TABLET: Performed by: INTERNAL MEDICINE

## 2022-06-03 PROCEDURE — 63710000001 ISOSORBIDE MONONITRATE 30 MG TABLET SUSTAINED-RELEASE 24 HOUR: Performed by: INTERNAL MEDICINE

## 2022-06-03 PROCEDURE — 25010000002 FENTANYL CITRATE (PF) 50 MCG/ML SOLUTION: Performed by: NURSE ANESTHETIST, CERTIFIED REGISTERED

## 2022-06-03 PROCEDURE — 4A023FZ MEASUREMENT OF CARDIAC RHYTHM, PERCUTANEOUS APPROACH: ICD-10-PCS | Performed by: INTERNAL MEDICINE

## 2022-06-03 PROCEDURE — C1893 INTRO/SHEATH, FIXED,NON-PEEL: HCPCS | Performed by: INTERNAL MEDICINE

## 2022-06-03 PROCEDURE — C1760 CLOSURE DEV, VASC: HCPCS | Performed by: INTERNAL MEDICINE

## 2022-06-03 PROCEDURE — 63710000001 FLUTICASONE 50 MCG/ACT SUSPENSION 16 G BOTTLE: Performed by: INTERNAL MEDICINE

## 2022-06-03 PROCEDURE — 25010000002 MIDAZOLAM PER 1 MG: Performed by: ANESTHESIOLOGY

## 2022-06-03 PROCEDURE — U0005 INFEC AGEN DETEC AMPLI PROBE: HCPCS | Performed by: INTERNAL MEDICINE

## 2022-06-03 PROCEDURE — 93325 DOPPLER ECHO COLOR FLOW MAPG: CPT | Performed by: INTERNAL MEDICINE

## 2022-06-03 PROCEDURE — 63710000001 AMIODARONE 200 MG TABLET: Performed by: INTERNAL MEDICINE

## 2022-06-03 PROCEDURE — 94799 UNLISTED PULMONARY SVC/PX: CPT

## 2022-06-03 PROCEDURE — 93320 DOPPLER ECHO COMPLETE: CPT

## 2022-06-03 PROCEDURE — 63710000001 RIVAROXABAN 15 MG TABLET: Performed by: INTERNAL MEDICINE

## 2022-06-03 PROCEDURE — 63710000001 PANTOPRAZOLE 40 MG TABLET DELAYED-RELEASE: Performed by: INTERNAL MEDICINE

## 2022-06-03 PROCEDURE — 63710000001 MONTELUKAST 10 MG TABLET: Performed by: INTERNAL MEDICINE

## 2022-06-03 PROCEDURE — 93320 DOPPLER ECHO COMPLETE: CPT | Performed by: INTERNAL MEDICINE

## 2022-06-03 RX ORDER — FENTANYL CITRATE 50 UG/ML
INJECTION, SOLUTION INTRAMUSCULAR; INTRAVENOUS AS NEEDED
Status: DISCONTINUED | OUTPATIENT
Start: 2022-06-03 | End: 2022-06-03 | Stop reason: SURG

## 2022-06-03 RX ORDER — DIPHENHYDRAMINE HYDROCHLORIDE 50 MG/ML
12.5 INJECTION INTRAMUSCULAR; INTRAVENOUS
Status: DISCONTINUED | OUTPATIENT
Start: 2022-06-03 | End: 2022-06-03 | Stop reason: HOSPADM

## 2022-06-03 RX ORDER — SODIUM CHLORIDE 9 MG/ML
75 INJECTION, SOLUTION INTRAVENOUS CONTINUOUS
Status: DISCONTINUED | OUTPATIENT
Start: 2022-06-03 | End: 2022-06-03

## 2022-06-03 RX ORDER — SODIUM CHLORIDE 0.9 % (FLUSH) 0.9 %
10 SYRINGE (ML) INJECTION EVERY 12 HOURS SCHEDULED
Status: DISCONTINUED | OUTPATIENT
Start: 2022-06-03 | End: 2022-06-04 | Stop reason: HOSPADM

## 2022-06-03 RX ORDER — DIPHENHYDRAMINE HCL 25 MG
25 CAPSULE ORAL
Status: DISCONTINUED | OUTPATIENT
Start: 2022-06-03 | End: 2022-06-03 | Stop reason: HOSPADM

## 2022-06-03 RX ORDER — AMIODARONE HYDROCHLORIDE 200 MG/1
200 TABLET ORAL 3 TIMES DAILY
Status: DISCONTINUED | OUTPATIENT
Start: 2022-06-03 | End: 2022-06-04 | Stop reason: HOSPADM

## 2022-06-03 RX ORDER — PROMETHAZINE HYDROCHLORIDE 25 MG/1
25 SUPPOSITORY RECTAL ONCE AS NEEDED
Status: DISCONTINUED | OUTPATIENT
Start: 2022-06-03 | End: 2022-06-03 | Stop reason: HOSPADM

## 2022-06-03 RX ORDER — PANTOPRAZOLE SODIUM 40 MG/1
40 TABLET, DELAYED RELEASE ORAL
Status: DISCONTINUED | OUTPATIENT
Start: 2022-06-03 | End: 2022-06-04 | Stop reason: HOSPADM

## 2022-06-03 RX ORDER — OXYCODONE AND ACETAMINOPHEN 7.5; 325 MG/1; MG/1
1 TABLET ORAL EVERY 4 HOURS PRN
Status: DISCONTINUED | OUTPATIENT
Start: 2022-06-03 | End: 2022-06-03 | Stop reason: HOSPADM

## 2022-06-03 RX ORDER — EPHEDRINE SULFATE 50 MG/ML
5 INJECTION, SOLUTION INTRAVENOUS ONCE AS NEEDED
Status: DISCONTINUED | OUTPATIENT
Start: 2022-06-03 | End: 2022-06-03 | Stop reason: HOSPADM

## 2022-06-03 RX ORDER — SODIUM CHLORIDE 0.9 % (FLUSH) 0.9 %
10 SYRINGE (ML) INJECTION AS NEEDED
Status: DISCONTINUED | OUTPATIENT
Start: 2022-06-03 | End: 2022-06-04 | Stop reason: HOSPADM

## 2022-06-03 RX ORDER — ONDANSETRON 2 MG/ML
4 INJECTION INTRAMUSCULAR; INTRAVENOUS EVERY 6 HOURS PRN
Status: DISCONTINUED | OUTPATIENT
Start: 2022-06-03 | End: 2022-06-04 | Stop reason: HOSPADM

## 2022-06-03 RX ORDER — ACETAMINOPHEN 325 MG/1
650 TABLET ORAL EVERY 6 HOURS PRN
Status: DISCONTINUED | OUTPATIENT
Start: 2022-06-03 | End: 2022-06-04 | Stop reason: HOSPADM

## 2022-06-03 RX ORDER — MONTELUKAST SODIUM 10 MG/1
10 TABLET ORAL NIGHTLY
Status: DISCONTINUED | OUTPATIENT
Start: 2022-06-03 | End: 2022-06-04 | Stop reason: HOSPADM

## 2022-06-03 RX ORDER — METOPROLOL SUCCINATE 50 MG/1
50 TABLET, EXTENDED RELEASE ORAL EVERY 12 HOURS SCHEDULED
Status: DISCONTINUED | OUTPATIENT
Start: 2022-06-03 | End: 2022-06-04 | Stop reason: HOSPADM

## 2022-06-03 RX ORDER — PHENYLEPHRINE HYDROCHLORIDE 10 MG/ML
INJECTION INTRAVENOUS AS NEEDED
Status: DISCONTINUED | OUTPATIENT
Start: 2022-06-03 | End: 2022-06-03 | Stop reason: SURG

## 2022-06-03 RX ORDER — TAMSULOSIN HYDROCHLORIDE 0.4 MG/1
0.4 CAPSULE ORAL DAILY
Status: DISCONTINUED | OUTPATIENT
Start: 2022-06-03 | End: 2022-06-04 | Stop reason: HOSPADM

## 2022-06-03 RX ORDER — PROPOFOL 10 MG/ML
VIAL (ML) INTRAVENOUS AS NEEDED
Status: DISCONTINUED | OUTPATIENT
Start: 2022-06-03 | End: 2022-06-03 | Stop reason: SURG

## 2022-06-03 RX ORDER — NITROGLYCERIN 0.4 MG/1
0.4 TABLET SUBLINGUAL
Status: DISCONTINUED | OUTPATIENT
Start: 2022-06-03 | End: 2022-06-04 | Stop reason: HOSPADM

## 2022-06-03 RX ORDER — NALOXONE HCL 0.4 MG/ML
0.2 VIAL (ML) INJECTION AS NEEDED
Status: DISCONTINUED | OUTPATIENT
Start: 2022-06-03 | End: 2022-06-03 | Stop reason: HOSPADM

## 2022-06-03 RX ORDER — HEPARIN SODIUM 1000 [USP'U]/ML
INJECTION, SOLUTION INTRAVENOUS; SUBCUTANEOUS AS NEEDED
Status: DISCONTINUED | OUTPATIENT
Start: 2022-06-03 | End: 2022-06-03 | Stop reason: HOSPADM

## 2022-06-03 RX ORDER — OXYCODONE HYDROCHLORIDE AND ACETAMINOPHEN 5; 325 MG/1; MG/1
1 TABLET ORAL EVERY 4 HOURS PRN
Status: DISCONTINUED | OUTPATIENT
Start: 2022-06-03 | End: 2022-06-04 | Stop reason: HOSPADM

## 2022-06-03 RX ORDER — LABETALOL HYDROCHLORIDE 5 MG/ML
5 INJECTION, SOLUTION INTRAVENOUS
Status: DISCONTINUED | OUTPATIENT
Start: 2022-06-03 | End: 2022-06-03 | Stop reason: HOSPADM

## 2022-06-03 RX ORDER — PROTAMINE SULFATE 10 MG/ML
INJECTION, SOLUTION INTRAVENOUS AS NEEDED
Status: DISCONTINUED | OUTPATIENT
Start: 2022-06-03 | End: 2022-06-03 | Stop reason: HOSPADM

## 2022-06-03 RX ORDER — ISOSORBIDE MONONITRATE 30 MG/1
60 TABLET, EXTENDED RELEASE ORAL DAILY
Status: DISCONTINUED | OUTPATIENT
Start: 2022-06-03 | End: 2022-06-04 | Stop reason: HOSPADM

## 2022-06-03 RX ORDER — FAMOTIDINE 10 MG/ML
20 INJECTION, SOLUTION INTRAVENOUS
Status: COMPLETED | OUTPATIENT
Start: 2022-06-03 | End: 2022-06-03

## 2022-06-03 RX ORDER — SODIUM CHLORIDE 0.9 % (FLUSH) 0.9 %
10 SYRINGE (ML) INJECTION AS NEEDED
Status: DISCONTINUED | OUTPATIENT
Start: 2022-06-03 | End: 2022-06-03 | Stop reason: HOSPADM

## 2022-06-03 RX ORDER — SODIUM CHLORIDE 0.9 % (FLUSH) 0.9 %
10 SYRINGE (ML) INJECTION EVERY 12 HOURS SCHEDULED
Status: DISCONTINUED | OUTPATIENT
Start: 2022-06-03 | End: 2022-06-03 | Stop reason: HOSPADM

## 2022-06-03 RX ORDER — FENTANYL CITRATE 50 UG/ML
50 INJECTION, SOLUTION INTRAMUSCULAR; INTRAVENOUS
Status: DISCONTINUED | OUTPATIENT
Start: 2022-06-03 | End: 2022-06-03 | Stop reason: HOSPADM

## 2022-06-03 RX ORDER — FUROSEMIDE 10 MG/ML
INJECTION INTRAMUSCULAR; INTRAVENOUS AS NEEDED
Status: DISCONTINUED | OUTPATIENT
Start: 2022-06-03 | End: 2022-06-03 | Stop reason: HOSPADM

## 2022-06-03 RX ORDER — FUROSEMIDE 40 MG/1
40 TABLET ORAL DAILY
Status: DISCONTINUED | OUTPATIENT
Start: 2022-06-03 | End: 2022-06-04 | Stop reason: HOSPADM

## 2022-06-03 RX ORDER — FINASTERIDE 5 MG/1
5 TABLET, FILM COATED ORAL DAILY
Status: DISCONTINUED | OUTPATIENT
Start: 2022-06-03 | End: 2022-06-04 | Stop reason: HOSPADM

## 2022-06-03 RX ORDER — PROMETHAZINE HYDROCHLORIDE 12.5 MG/1
25 TABLET ORAL ONCE AS NEEDED
Status: DISCONTINUED | OUTPATIENT
Start: 2022-06-03 | End: 2022-06-03 | Stop reason: HOSPADM

## 2022-06-03 RX ORDER — ONDANSETRON 2 MG/ML
INJECTION INTRAMUSCULAR; INTRAVENOUS AS NEEDED
Status: DISCONTINUED | OUTPATIENT
Start: 2022-06-03 | End: 2022-06-03 | Stop reason: SURG

## 2022-06-03 RX ORDER — ONDANSETRON 2 MG/ML
4 INJECTION INTRAMUSCULAR; INTRAVENOUS ONCE AS NEEDED
Status: DISCONTINUED | OUTPATIENT
Start: 2022-06-03 | End: 2022-06-03 | Stop reason: HOSPADM

## 2022-06-03 RX ORDER — HYDRALAZINE HYDROCHLORIDE 20 MG/ML
5 INJECTION INTRAMUSCULAR; INTRAVENOUS
Status: DISCONTINUED | OUTPATIENT
Start: 2022-06-03 | End: 2022-06-03 | Stop reason: HOSPADM

## 2022-06-03 RX ORDER — MIDAZOLAM HYDROCHLORIDE 1 MG/ML
0.5 INJECTION INTRAMUSCULAR; INTRAVENOUS
Status: DISCONTINUED | OUTPATIENT
Start: 2022-06-03 | End: 2022-06-03 | Stop reason: HOSPADM

## 2022-06-03 RX ORDER — BUDESONIDE 0.5 MG/2ML
0.5 INHALANT ORAL
Refills: 3 | Status: DISCONTINUED | OUTPATIENT
Start: 2022-06-03 | End: 2022-06-04 | Stop reason: HOSPADM

## 2022-06-03 RX ORDER — ROCURONIUM BROMIDE 10 MG/ML
INJECTION, SOLUTION INTRAVENOUS AS NEEDED
Status: DISCONTINUED | OUTPATIENT
Start: 2022-06-03 | End: 2022-06-03 | Stop reason: SURG

## 2022-06-03 RX ORDER — CLOPIDOGREL BISULFATE 75 MG/1
75 TABLET ORAL DAILY
Status: DISCONTINUED | OUTPATIENT
Start: 2022-06-04 | End: 2022-06-04 | Stop reason: HOSPADM

## 2022-06-03 RX ORDER — HEPARIN SODIUM 10000 [USP'U]/100ML
INJECTION, SOLUTION INTRAVENOUS CONTINUOUS PRN
Status: DISCONTINUED | OUTPATIENT
Start: 2022-06-03 | End: 2022-06-03 | Stop reason: HOSPADM

## 2022-06-03 RX ORDER — FLUTICASONE PROPIONATE 50 MCG
1 SPRAY, SUSPENSION (ML) NASAL NIGHTLY
Status: DISCONTINUED | OUTPATIENT
Start: 2022-06-03 | End: 2022-06-04 | Stop reason: HOSPADM

## 2022-06-03 RX ORDER — ALBUTEROL SULFATE 2.5 MG/3ML
0.62 SOLUTION RESPIRATORY (INHALATION) EVERY 6 HOURS PRN
Refills: 11 | Status: DISCONTINUED | OUTPATIENT
Start: 2022-06-03 | End: 2022-06-04 | Stop reason: HOSPADM

## 2022-06-03 RX ADMIN — MIDAZOLAM 0.5 MG: 1 INJECTION, SOLUTION INTRAMUSCULAR; INTRAVENOUS at 07:42

## 2022-06-03 RX ADMIN — FENTANYL CITRATE 50 MCG: 0.05 INJECTION, SOLUTION INTRAMUSCULAR; INTRAVENOUS at 08:15

## 2022-06-03 RX ADMIN — RIVAROXABAN 15 MG: 15 TABLET, FILM COATED ORAL at 23:11

## 2022-06-03 RX ADMIN — ROCURONIUM BROMIDE 10 MG: 50 INJECTION INTRAVENOUS at 11:35

## 2022-06-03 RX ADMIN — PHENYLEPHRINE HYDROCHLORIDE 100 MCG: 10 INJECTION, SOLUTION INTRAVENOUS at 09:33

## 2022-06-03 RX ADMIN — SUGAMMADEX 100 MG: 100 INJECTION, SOLUTION INTRAVENOUS at 12:20

## 2022-06-03 RX ADMIN — AMIODARONE HYDROCHLORIDE 200 MG: 200 TABLET ORAL at 17:17

## 2022-06-03 RX ADMIN — PHENYLEPHRINE HYDROCHLORIDE 100 MCG: 10 INJECTION, SOLUTION INTRAVENOUS at 09:20

## 2022-06-03 RX ADMIN — ONDANSETRON 4 MG: 2 INJECTION INTRAMUSCULAR; INTRAVENOUS at 11:36

## 2022-06-03 RX ADMIN — SODIUM CHLORIDE 75 ML/HR: 9 INJECTION, SOLUTION INTRAVENOUS at 07:17

## 2022-06-03 RX ADMIN — FAMOTIDINE 20 MG: 10 INJECTION INTRAVENOUS at 07:41

## 2022-06-03 RX ADMIN — FENTANYL CITRATE 50 MCG: 0.05 INJECTION, SOLUTION INTRAMUSCULAR; INTRAVENOUS at 11:34

## 2022-06-03 RX ADMIN — ROCURONIUM BROMIDE 20 MG: 50 INJECTION INTRAVENOUS at 09:33

## 2022-06-03 RX ADMIN — BUDESONIDE 0.5 MG: 0.5 INHALANT ORAL at 20:02

## 2022-06-03 RX ADMIN — ROCURONIUM BROMIDE 50 MG: 50 INJECTION INTRAVENOUS at 08:15

## 2022-06-03 RX ADMIN — PROPOFOL 150 MG: 10 INJECTION, EMULSION INTRAVENOUS at 08:15

## 2022-06-03 RX ADMIN — SUGAMMADEX 100 MG: 100 INJECTION, SOLUTION INTRAVENOUS at 12:14

## 2022-06-03 RX ADMIN — FINASTERIDE 5 MG: 5 TABLET, FILM COATED ORAL at 23:11

## 2022-06-03 RX ADMIN — FUROSEMIDE 40 MG: 40 TABLET ORAL at 17:17

## 2022-06-03 RX ADMIN — TAMSULOSIN HYDROCHLORIDE 0.4 MG: 0.4 CAPSULE ORAL at 23:11

## 2022-06-03 RX ADMIN — ROCURONIUM BROMIDE 10 MG: 50 INJECTION INTRAVENOUS at 10:38

## 2022-06-03 RX ADMIN — FLUTICASONE PROPIONATE 1 SPRAY: 50 SPRAY, METERED NASAL at 23:11

## 2022-06-03 RX ADMIN — PANTOPRAZOLE SODIUM 40 MG: 40 TABLET, DELAYED RELEASE ORAL at 17:17

## 2022-06-03 RX ADMIN — ACETAMINOPHEN 650 MG: 325 TABLET, FILM COATED ORAL at 15:13

## 2022-06-03 RX ADMIN — AMIODARONE HYDROCHLORIDE 200 MG: 200 TABLET ORAL at 23:10

## 2022-06-03 RX ADMIN — ROCURONIUM BROMIDE 20 MG: 50 INJECTION INTRAVENOUS at 09:03

## 2022-06-03 RX ADMIN — ISOSORBIDE MONONITRATE 60 MG: 30 TABLET ORAL at 23:11

## 2022-06-03 RX ADMIN — Medication 10 ML: at 23:12

## 2022-06-03 RX ADMIN — SACUBITRIL AND VALSARTAN 1 TABLET: 49; 51 TABLET, FILM COATED ORAL at 23:11

## 2022-06-03 RX ADMIN — ROCURONIUM BROMIDE 30 MG: 50 INJECTION INTRAVENOUS at 08:42

## 2022-06-03 RX ADMIN — METOPROLOL SUCCINATE 50 MG: 50 TABLET, EXTENDED RELEASE ORAL at 23:11

## 2022-06-03 RX ADMIN — MONTELUKAST SODIUM 10 MG: 10 TABLET, FILM COATED ORAL at 23:11

## 2022-06-03 NOTE — ANESTHESIA PREPROCEDURE EVALUATION
Anesthesia Evaluation     Patient summary reviewed and Nursing notes reviewed   NPO Solid Status: > 8 hours  NPO Liquid Status: > 8 hours           Airway   Mallampati: II  TM distance: >3 FB  Neck ROM: full  No difficulty expected  Dental    (+) upper dentures and lower dentures    Pulmonary    (+) COPD mild, shortness of breath (related to AF),   Cardiovascular     (+) hypertension, past MI  >12 months, CAD, CABG >6 Months, cardiac stents more than 12 months ago dysrhythmias Atrial Fib, CHF Diastolic >=55%, hyperlipidemia,       Neuro/Psych  GI/Hepatic/Renal/Endo    (+) obesity,       Musculoskeletal     Abdominal    Substance History      OB/GYN          Other                        Anesthesia Plan    ASA 3     general     intravenous induction     Anesthetic plan, all risks, benefits, and alternatives have been provided, discussed and informed consent has been obtained with: patient.        CODE STATUS:

## 2022-06-03 NOTE — ANESTHESIA POSTPROCEDURE EVALUATION
"Patient: Layo Cee    Procedure Summary     Date: 06/03/22 Room / Location: [facility]  /  [facility]    Anesthesia Start: 0801 Anesthesia Stop: 1227    Procedures:       Ablation atrial fibrillation (N/A )      3D MAPPING INSYTE EP (N/A )      Ablation atrial flutter/CTI (N/A ) Diagnosis: Persistent atrial fibrillation (HCC)    Providers: Irineo Aguilar MD Provider: Ghanshyam Hall MD    Anesthesia Type: general ASA Status: 3          Anesthesia Type: general    Vitals  Vitals Value Taken Time   /90 06/03/22 1551   Temp 36.4 °C (97.6 °F) 06/03/22 1224   Pulse 81 06/03/22 1602   Resp 16 06/03/22 1535   SpO2 93 % 06/03/22 1602   Vitals shown include unvalidated device data.        Post Anesthesia Care and Evaluation    Pain management: adequate  Airway patency: patent  Anesthetic complications: No anesthetic complications    Cardiovascular status: acceptable  Respiratory status: acceptable  Hydration status: acceptable    Comments: /92   Pulse 80   Temp 36.4 °C (97.6 °F) (Oral)   Resp 16   Ht 177.8 cm (70\")   Wt 104 kg (230 lb)   SpO2 96%   BMI 33.00 kg/m²         "

## 2022-06-04 ENCOUNTER — READMISSION MANAGEMENT (OUTPATIENT)
Dept: CALL CENTER | Facility: HOSPITAL | Age: 82
End: 2022-06-04

## 2022-06-04 VITALS
WEIGHT: 230 LBS | DIASTOLIC BLOOD PRESSURE: 69 MMHG | HEIGHT: 70 IN | RESPIRATION RATE: 18 BRPM | OXYGEN SATURATION: 94 % | SYSTOLIC BLOOD PRESSURE: 136 MMHG | BODY MASS INDEX: 32.93 KG/M2 | TEMPERATURE: 98.3 F | HEART RATE: 93 BPM

## 2022-06-04 PROBLEM — Z98.890 S/P ABLATION OF ATRIAL FIBRILLATION: Status: ACTIVE | Noted: 2022-06-04

## 2022-06-04 PROBLEM — Z86.79 S/P ABLATION OF ATRIAL FIBRILLATION: Status: ACTIVE | Noted: 2022-06-04

## 2022-06-04 LAB
ANION GAP SERPL CALCULATED.3IONS-SCNC: 11 MMOL/L (ref 5–15)
BUN SERPL-MCNC: 12 MG/DL (ref 8–23)
BUN/CREAT SERPL: 9.6 (ref 7–25)
CALCIUM SPEC-SCNC: 7.9 MG/DL (ref 8.6–10.5)
CHLORIDE SERPL-SCNC: 105 MMOL/L (ref 98–107)
CO2 SERPL-SCNC: 24 MMOL/L (ref 22–29)
CREAT SERPL-MCNC: 1.25 MG/DL (ref 0.76–1.27)
DEPRECATED RDW RBC AUTO: 47.6 FL (ref 37–54)
EGFRCR SERPLBLD CKD-EPI 2021: 58.2 ML/MIN/1.73
ERYTHROCYTE [DISTWIDTH] IN BLOOD BY AUTOMATED COUNT: 14.7 % (ref 12.3–15.4)
GLUCOSE SERPL-MCNC: 124 MG/DL (ref 65–99)
HCT VFR BLD AUTO: 33.6 % (ref 37.5–51)
HGB BLD-MCNC: 11.3 G/DL (ref 13–17.7)
MCH RBC QN AUTO: 29.9 PG (ref 26.6–33)
MCHC RBC AUTO-ENTMCNC: 33.6 G/DL (ref 31.5–35.7)
MCV RBC AUTO: 88.9 FL (ref 79–97)
PLATELET # BLD AUTO: 161 10*3/MM3 (ref 140–450)
PMV BLD AUTO: 9.7 FL (ref 6–12)
POTASSIUM SERPL-SCNC: 3.8 MMOL/L (ref 3.5–5.2)
QT INTERVAL: 338 MS
QT INTERVAL: 339 MS
QT INTERVAL: 398 MS
RBC # BLD AUTO: 3.78 10*6/MM3 (ref 4.14–5.8)
SARS-COV-2 ORF1AB RESP QL NAA+PROBE: NOT DETECTED
SODIUM SERPL-SCNC: 140 MMOL/L (ref 136–145)
WBC NRBC COR # BLD: 10.15 10*3/MM3 (ref 3.4–10.8)

## 2022-06-04 PROCEDURE — 63710000001 PANTOPRAZOLE 40 MG TABLET DELAYED-RELEASE: Performed by: INTERNAL MEDICINE

## 2022-06-04 PROCEDURE — 63710000001 SACUBITRIL-VALSARTAN 49-51 MG TABLET: Performed by: INTERNAL MEDICINE

## 2022-06-04 PROCEDURE — 63710000001 METOPROLOL SUCCINATE XL 50 MG TABLET SUSTAINED-RELEASE 24 HOUR: Performed by: INTERNAL MEDICINE

## 2022-06-04 PROCEDURE — 94664 DEMO&/EVAL PT USE INHALER: CPT

## 2022-06-04 PROCEDURE — A9270 NON-COVERED ITEM OR SERVICE: HCPCS | Performed by: INTERNAL MEDICINE

## 2022-06-04 PROCEDURE — 80048 BASIC METABOLIC PNL TOTAL CA: CPT | Performed by: INTERNAL MEDICINE

## 2022-06-04 PROCEDURE — 63710000001 FUROSEMIDE 40 MG TABLET: Performed by: INTERNAL MEDICINE

## 2022-06-04 PROCEDURE — 63710000001 ISOSORBIDE MONONITRATE 30 MG TABLET SUSTAINED-RELEASE 24 HOUR: Performed by: INTERNAL MEDICINE

## 2022-06-04 PROCEDURE — 63710000001 FINASTERIDE 5 MG TABLET: Performed by: INTERNAL MEDICINE

## 2022-06-04 PROCEDURE — 93005 ELECTROCARDIOGRAM TRACING: CPT | Performed by: INTERNAL MEDICINE

## 2022-06-04 PROCEDURE — G0378 HOSPITAL OBSERVATION PER HR: HCPCS

## 2022-06-04 PROCEDURE — 93010 ELECTROCARDIOGRAM REPORT: CPT | Performed by: INTERNAL MEDICINE

## 2022-06-04 PROCEDURE — 94799 UNLISTED PULMONARY SVC/PX: CPT

## 2022-06-04 PROCEDURE — 63710000001 CLOPIDOGREL 75 MG TABLET: Performed by: INTERNAL MEDICINE

## 2022-06-04 PROCEDURE — 85027 COMPLETE CBC AUTOMATED: CPT | Performed by: INTERNAL MEDICINE

## 2022-06-04 PROCEDURE — 63710000001 AMIODARONE 200 MG TABLET: Performed by: INTERNAL MEDICINE

## 2022-06-04 PROCEDURE — 99217 PR OBSERVATION CARE DISCHARGE MANAGEMENT: CPT | Performed by: INTERNAL MEDICINE

## 2022-06-04 PROCEDURE — 63710000001 TAMSULOSIN 0.4 MG CAPSULE: Performed by: INTERNAL MEDICINE

## 2022-06-04 RX ORDER — AMIODARONE HYDROCHLORIDE 200 MG/1
200 TABLET ORAL 3 TIMES DAILY
Qty: 45 TABLET | Refills: 1 | Status: SHIPPED | OUTPATIENT
Start: 2022-06-04 | End: 2022-06-10 | Stop reason: HOSPADM

## 2022-06-04 RX ADMIN — SACUBITRIL AND VALSARTAN 1 TABLET: 49; 51 TABLET, FILM COATED ORAL at 09:42

## 2022-06-04 RX ADMIN — TAMSULOSIN HYDROCHLORIDE 0.4 MG: 0.4 CAPSULE ORAL at 09:42

## 2022-06-04 RX ADMIN — PANTOPRAZOLE SODIUM 40 MG: 40 TABLET, DELAYED RELEASE ORAL at 07:01

## 2022-06-04 RX ADMIN — AMIODARONE HYDROCHLORIDE 200 MG: 200 TABLET ORAL at 09:42

## 2022-06-04 RX ADMIN — Medication 10 ML: at 09:43

## 2022-06-04 RX ADMIN — ISOSORBIDE MONONITRATE 60 MG: 30 TABLET ORAL at 09:42

## 2022-06-04 RX ADMIN — CLOPIDOGREL 75 MG: 75 TABLET, FILM COATED ORAL at 09:43

## 2022-06-04 RX ADMIN — FINASTERIDE 5 MG: 5 TABLET, FILM COATED ORAL at 09:42

## 2022-06-04 RX ADMIN — FUROSEMIDE 40 MG: 40 TABLET ORAL at 09:42

## 2022-06-04 RX ADMIN — METOPROLOL SUCCINATE 50 MG: 50 TABLET, EXTENDED RELEASE ORAL at 09:42

## 2022-06-04 RX ADMIN — BUDESONIDE 0.5 MG: 0.5 INHALANT ORAL at 09:28

## 2022-06-04 NOTE — OUTREACH NOTE
Prep Survey    Flowsheet Row Responses   [facility] patient discharged from? [place]   Is LACE score < 7 ? No   Emergency Room discharge w/ pulse ox? No   Eligibility [facility]   Date of Admission 06/03/22   Date of Discharge 06/04/22   Discharge Disposition Home or Self Care   Discharge diagnosis Persistent atrial fibrillation    Does the patient have one of the following disease processes/diagnoses(primary or secondary)? Other   Does the patient have Home health ordered? No   Is there a DME ordered? No   Prep survey completed? Yes          LOLY LIANG - Registered Nurse

## 2022-06-06 ENCOUNTER — READMISSION MANAGEMENT (OUTPATIENT)
Dept: CALL CENTER | Facility: HOSPITAL | Age: 82
End: 2022-06-06

## 2022-06-06 ENCOUNTER — HOSPITAL ENCOUNTER (INPATIENT)
Facility: HOSPITAL | Age: 82
LOS: 4 days | Discharge: HOME | End: 2022-06-10
Attending: STUDENT IN AN ORGANIZED HEALTH CARE EDUCATION/TRAINING PROGRAM | Admitting: INTERNAL MEDICINE

## 2022-06-06 ENCOUNTER — TELEPHONE (OUTPATIENT)
Dept: CARDIOLOGY | Facility: CLINIC | Age: 82
End: 2022-06-06

## 2022-06-06 ENCOUNTER — APPOINTMENT (OUTPATIENT)
Dept: GENERAL RADIOLOGY | Facility: HOSPITAL | Age: 82
End: 2022-06-06

## 2022-06-06 ENCOUNTER — TRANSITIONAL CARE MANAGEMENT TELEPHONE ENCOUNTER (OUTPATIENT)
Dept: CALL CENTER | Facility: HOSPITAL | Age: 82
End: 2022-06-06

## 2022-06-06 DIAGNOSIS — J44.9 CHRONIC OBSTRUCTIVE PULMONARY DISEASE, UNSPECIFIED COPD TYPE: ICD-10-CM

## 2022-06-06 DIAGNOSIS — R26.2 DIFFICULTY WALKING: ICD-10-CM

## 2022-06-06 DIAGNOSIS — I48.91 ATRIAL FIBRILLATION, UNSPECIFIED TYPE: Primary | ICD-10-CM

## 2022-06-06 PROBLEM — R77.8 ELEVATED TROPONIN: Status: ACTIVE | Noted: 2022-06-06

## 2022-06-06 PROBLEM — I50.33 ACUTE ON CHRONIC DIASTOLIC CHF (CONGESTIVE HEART FAILURE): Status: ACTIVE | Noted: 2022-06-06

## 2022-06-06 PROBLEM — R79.89 ELEVATED TROPONIN: Status: ACTIVE | Noted: 2022-06-06

## 2022-06-06 LAB
ACT BLD: 323 SECONDS (ref 82–152)
ACT BLD: 341 SECONDS (ref 82–152)
ACT BLD: 347 SECONDS (ref 82–152)
ACT BLD: 370 SECONDS (ref 82–152)
ACT BLD: 422 SECONDS (ref 82–152)
ALBUMIN SERPL-MCNC: 3.5 G/DL (ref 3.5–5.2)
ALBUMIN/GLOB SERPL: 1.2 G/DL
ALP SERPL-CCNC: 51 U/L (ref 39–117)
ALT SERPL W P-5'-P-CCNC: 14 U/L (ref 1–41)
ANION GAP SERPL CALCULATED.3IONS-SCNC: 13.5 MMOL/L (ref 5–15)
AST SERPL-CCNC: 15 U/L (ref 1–40)
BASOPHILS # BLD AUTO: 0.02 10*3/MM3 (ref 0–0.2)
BASOPHILS NFR BLD AUTO: 0.2 % (ref 0–1.5)
BILIRUB SERPL-MCNC: 0.5 MG/DL (ref 0–1.2)
BUN SERPL-MCNC: 10 MG/DL (ref 8–23)
BUN/CREAT SERPL: 9.2 (ref 7–25)
CALCIUM SPEC-SCNC: 8.8 MG/DL (ref 8.6–10.5)
CHLORIDE SERPL-SCNC: 102 MMOL/L (ref 98–107)
CO2 SERPL-SCNC: 23.5 MMOL/L (ref 22–29)
CREAT SERPL-MCNC: 1.09 MG/DL (ref 0.76–1.27)
DEPRECATED RDW RBC AUTO: 51.7 FL (ref 37–54)
DIGOXIN SERPL-MCNC: 0.5 NG/ML (ref 0.6–1.2)
EGFRCR SERPLBLD CKD-EPI 2021: 68.6 ML/MIN/1.73
EOSINOPHIL # BLD AUTO: 0.15 10*3/MM3 (ref 0–0.4)
EOSINOPHIL NFR BLD AUTO: 1.6 % (ref 0.3–6.2)
ERYTHROCYTE [DISTWIDTH] IN BLOOD BY AUTOMATED COUNT: 15.5 % (ref 12.3–15.4)
GLOBULIN UR ELPH-MCNC: 3 GM/DL
GLUCOSE SERPL-MCNC: 142 MG/DL (ref 65–99)
HCT VFR BLD AUTO: 33.5 % (ref 37.5–51)
HGB BLD-MCNC: 11.3 G/DL (ref 13–17.7)
HOLD SPECIMEN: NORMAL
IMM GRANULOCYTES # BLD AUTO: 0.08 10*3/MM3 (ref 0–0.05)
IMM GRANULOCYTES NFR BLD AUTO: 0.9 % (ref 0–0.5)
LYMPHOCYTES # BLD AUTO: 1.5 10*3/MM3 (ref 0.7–3.1)
LYMPHOCYTES NFR BLD AUTO: 16.3 % (ref 19.6–45.3)
MCH RBC QN AUTO: 30.6 PG (ref 26.6–33)
MCHC RBC AUTO-ENTMCNC: 33.7 G/DL (ref 31.5–35.7)
MCV RBC AUTO: 90.8 FL (ref 79–97)
MONOCYTES # BLD AUTO: 1.3 10*3/MM3 (ref 0.1–0.9)
MONOCYTES NFR BLD AUTO: 14.2 % (ref 5–12)
NEUTROPHILS NFR BLD AUTO: 6.13 10*3/MM3 (ref 1.7–7)
NEUTROPHILS NFR BLD AUTO: 66.8 % (ref 42.7–76)
NRBC BLD AUTO-RTO: 0 /100 WBC (ref 0–0.2)
NT-PROBNP SERPL-MCNC: 3092 PG/ML (ref 0–1800)
PLATELET # BLD AUTO: 179 10*3/MM3 (ref 140–450)
PMV BLD AUTO: 9.8 FL (ref 6–12)
POTASSIUM SERPL-SCNC: 3.5 MMOL/L (ref 3.5–5.2)
PROT SERPL-MCNC: 6.5 G/DL (ref 6–8.5)
RBC # BLD AUTO: 3.69 10*6/MM3 (ref 4.14–5.8)
SODIUM SERPL-SCNC: 139 MMOL/L (ref 136–145)
TROPONIN I SERPL-MCNC: 0.19 NG/ML (ref 0–0.6)
TROPONIN T SERPL-MCNC: 0.19 NG/ML (ref 0–0.03)
WBC NRBC COR # BLD: 9.18 10*3/MM3 (ref 3.4–10.8)
WHOLE BLOOD HOLD COAG: NORMAL
WHOLE BLOOD HOLD SPECIMEN: NORMAL

## 2022-06-06 PROCEDURE — 71045 X-RAY EXAM CHEST 1 VIEW: CPT

## 2022-06-06 PROCEDURE — 99284 EMERGENCY DEPT VISIT MOD MDM: CPT

## 2022-06-06 PROCEDURE — 80162 ASSAY OF DIGOXIN TOTAL: CPT | Performed by: STUDENT IN AN ORGANIZED HEALTH CARE EDUCATION/TRAINING PROGRAM

## 2022-06-06 PROCEDURE — 99223 1ST HOSP IP/OBS HIGH 75: CPT | Performed by: INTERNAL MEDICINE

## 2022-06-06 PROCEDURE — 84484 ASSAY OF TROPONIN QUANT: CPT

## 2022-06-06 PROCEDURE — 83880 ASSAY OF NATRIURETIC PEPTIDE: CPT

## 2022-06-06 PROCEDURE — 93005 ELECTROCARDIOGRAM TRACING: CPT

## 2022-06-06 PROCEDURE — 80053 COMPREHEN METABOLIC PANEL: CPT

## 2022-06-06 PROCEDURE — 99222 1ST HOSP IP/OBS MODERATE 55: CPT | Performed by: INTERNAL MEDICINE

## 2022-06-06 PROCEDURE — 25010000002 FUROSEMIDE PER 20 MG: Performed by: STUDENT IN AN ORGANIZED HEALTH CARE EDUCATION/TRAINING PROGRAM

## 2022-06-06 PROCEDURE — 84484 ASSAY OF TROPONIN QUANT: CPT | Performed by: STUDENT IN AN ORGANIZED HEALTH CARE EDUCATION/TRAINING PROGRAM

## 2022-06-06 PROCEDURE — 93005 ELECTROCARDIOGRAM TRACING: CPT | Performed by: INTERNAL MEDICINE

## 2022-06-06 PROCEDURE — 93010 ELECTROCARDIOGRAM REPORT: CPT | Performed by: INTERNAL MEDICINE

## 2022-06-06 PROCEDURE — 85025 COMPLETE CBC W/AUTO DIFF WBC: CPT

## 2022-06-06 PROCEDURE — 93005 ELECTROCARDIOGRAM TRACING: CPT | Performed by: STUDENT IN AN ORGANIZED HEALTH CARE EDUCATION/TRAINING PROGRAM

## 2022-06-06 RX ORDER — METOPROLOL SUCCINATE 50 MG/1
50 TABLET, EXTENDED RELEASE ORAL EVERY 12 HOURS SCHEDULED
Status: DISCONTINUED | OUTPATIENT
Start: 2022-06-06 | End: 2022-06-09

## 2022-06-06 RX ORDER — FUROSEMIDE 10 MG/ML
40 INJECTION INTRAMUSCULAR; INTRAVENOUS ONCE
Status: COMPLETED | OUTPATIENT
Start: 2022-06-06 | End: 2022-06-06

## 2022-06-06 RX ORDER — SODIUM CHLORIDE 0.9 % (FLUSH) 0.9 %
10 SYRINGE (ML) INJECTION AS NEEDED
Status: DISCONTINUED | OUTPATIENT
Start: 2022-06-06 | End: 2022-06-07

## 2022-06-06 RX ORDER — AMIODARONE HYDROCHLORIDE 200 MG/1
200 TABLET ORAL EVERY 12 HOURS SCHEDULED
Status: DISCONTINUED | OUTPATIENT
Start: 2022-06-06 | End: 2022-06-07

## 2022-06-06 RX ORDER — PANTOPRAZOLE SODIUM 40 MG/1
40 TABLET, DELAYED RELEASE ORAL DAILY
Status: DISCONTINUED | OUTPATIENT
Start: 2022-06-06 | End: 2022-06-10 | Stop reason: HOSPADM

## 2022-06-06 RX ORDER — LEVALBUTEROL INHALATION SOLUTION 0.63 MG/3ML
1 SOLUTION RESPIRATORY (INHALATION) EVERY 6 HOURS PRN
Status: DISCONTINUED | OUTPATIENT
Start: 2022-06-06 | End: 2022-06-09

## 2022-06-06 RX ORDER — CLOPIDOGREL BISULFATE 75 MG/1
75 TABLET ORAL DAILY
Status: DISCONTINUED | OUTPATIENT
Start: 2022-06-07 | End: 2022-06-10 | Stop reason: HOSPADM

## 2022-06-06 RX ORDER — MONTELUKAST SODIUM 10 MG/1
10 TABLET ORAL NIGHTLY
Status: DISCONTINUED | OUTPATIENT
Start: 2022-06-06 | End: 2022-06-10 | Stop reason: HOSPADM

## 2022-06-06 RX ORDER — ISOSORBIDE MONONITRATE 60 MG/1
60 TABLET, EXTENDED RELEASE ORAL DAILY
Status: DISCONTINUED | OUTPATIENT
Start: 2022-06-07 | End: 2022-06-10 | Stop reason: HOSPADM

## 2022-06-06 RX ORDER — DIGOXIN 125 MCG
125 TABLET ORAL
Status: DISCONTINUED | OUTPATIENT
Start: 2022-06-07 | End: 2022-06-09

## 2022-06-06 RX ORDER — FINASTERIDE 5 MG/1
5 TABLET, FILM COATED ORAL DAILY
Status: DISCONTINUED | OUTPATIENT
Start: 2022-06-07 | End: 2022-06-10 | Stop reason: HOSPADM

## 2022-06-06 RX ORDER — TAMSULOSIN HYDROCHLORIDE 0.4 MG/1
0.4 CAPSULE ORAL DAILY
Status: DISCONTINUED | OUTPATIENT
Start: 2022-06-06 | End: 2022-06-10 | Stop reason: HOSPADM

## 2022-06-06 RX ORDER — DILTIAZEM HCL IN NACL,ISO-OSM 125 MG/125
5-15 PLASTIC BAG, INJECTION (ML) INTRAVENOUS
Status: DISCONTINUED | OUTPATIENT
Start: 2022-06-06 | End: 2022-06-07

## 2022-06-06 RX ORDER — SODIUM CHLORIDE 0.9 % (FLUSH) 0.9 %
10 SYRINGE (ML) INJECTION EVERY 12 HOURS SCHEDULED
Status: DISCONTINUED | OUTPATIENT
Start: 2022-06-06 | End: 2022-06-10 | Stop reason: HOSPADM

## 2022-06-06 RX ADMIN — SACUBITRIL AND VALSARTAN 1 TABLET: 49; 51 TABLET, FILM COATED ORAL at 20:48

## 2022-06-06 RX ADMIN — MONTELUKAST 10 MG: 10 TABLET, FILM COATED ORAL at 20:48

## 2022-06-06 RX ADMIN — Medication 10 ML: at 20:48

## 2022-06-06 RX ADMIN — AMIODARONE HYDROCHLORIDE 200 MG: 200 TABLET ORAL at 20:48

## 2022-06-06 RX ADMIN — Medication 5 MG/HR: at 14:41

## 2022-06-06 RX ADMIN — METOPROLOL SUCCINATE 50 MG: 50 TABLET, EXTENDED RELEASE ORAL at 20:48

## 2022-06-06 RX ADMIN — TAMSULOSIN HYDROCHLORIDE 0.4 MG: 0.4 CAPSULE ORAL at 17:38

## 2022-06-06 RX ADMIN — PANTOPRAZOLE SODIUM 40 MG: 40 TABLET, DELAYED RELEASE ORAL at 17:38

## 2022-06-06 RX ADMIN — FUROSEMIDE 40 MG: 10 INJECTION, SOLUTION INTRAMUSCULAR; INTRAVENOUS at 14:43

## 2022-06-06 RX ADMIN — RIVAROXABAN 15 MG: 15 TABLET, FILM COATED ORAL at 20:48

## 2022-06-06 NOTE — OUTREACH NOTE
Medical Week 2 Survey    Flowsheet Row Responses   [facility] patient discharged from? [place]   Does the patient have one of the following disease processes/diagnoses(primary or secondary)? Other   Week 2 attempt successful? No   Revoke Readmitted          PEPPER ACEVES - Registered Nurse

## 2022-06-06 NOTE — OUTREACH NOTE
Call Center TCM Note    Flowsheet Row Responses   [facility] patient discharged from? [place]   Does the patient have one of the following disease processes/diagnoses(primary or secondary)? Other   TCM attempt successful? Yes   Call start time 1043   Call end time 1056   Discharge diagnosis Persistent atrial fibrillation    Person spoke with today (if not patient) and relationship Riana-spouse   Meds reviewed with patient/caregiver? Yes   Is the patient having any side effects they believe may be caused by any medication additions or changes? No   Does the patient have all medications ordered at discharge? Yes   Is the patient taking all medications as directed (includes completed medication regime)? Yes   Comments regarding appointments Cards appt on 6/14/22 @9:45am   Does the patient have a primary care provider?  Yes   Does the patient have an appointment with their PCP within 7 days of discharge? Yes   Comments regarding PCP Hospital d/c f/u appt on 6/10/22 2:45pm   Nursing Interventions Educated patient on importance of making appointment   Has the patient kept scheduled appointments due by today? N/A   Has home health visited the patient within 72 hours of discharge? N/A   Psychosocial issues? No   Did the patient receive a copy of their discharge instructions? Yes   Nursing interventions Reviewed instructions with patient   What is the patient's perception of their health status since discharge? Same  [ ]   Is the patient/caregiver able to teach back signs and symptoms related to disease process for when to call PCP? Yes   Is the patient/caregiver able to teach back signs and symptoms related to disease process for when to call 911? Yes   Is the patient/caregiver able to teach back the hierarchy of who to call/visit for symptoms/problems? PCP, Specialist, Home health nurse, Urgent Care, ED, 911 Yes   If the patient is a current smoker, are they able to teach back resources for cessation? Not a  smoker   TCM call completed? Yes   Wrap up additional comments Pt's spouse reports that pt is sob with and without exertion, has had a weight gain of 2lbs over night, sats are 92-93% on RA, -120, temp 99.9-100.4 last pm, chills. Enc pt to f/u with cards office when this call has ended.          MARVA GREWAL RN    6/6/2022, 11:01 EDT

## 2022-06-06 NOTE — TELEPHONE ENCOUNTER
Received call from patient's wife stating that the patient was having increased SOB with and without exertion, stated 02 sat 92% on room air and ran a temp last night of 100.4 Tmax.    Discussed with Dr. Stevenson and advised patient to go to ER to be evaluated.

## 2022-06-07 LAB
ALBUMIN SERPL-MCNC: 3.5 G/DL (ref 3.5–5.2)
ALBUMIN/GLOB SERPL: 1.2 G/DL
ALP SERPL-CCNC: 52 U/L (ref 39–117)
ALT SERPL W P-5'-P-CCNC: 14 U/L (ref 1–41)
ANION GAP SERPL CALCULATED.3IONS-SCNC: 12.9 MMOL/L (ref 5–15)
AST SERPL-CCNC: 18 U/L (ref 1–40)
BILIRUB SERPL-MCNC: 0.7 MG/DL (ref 0–1.2)
BUN SERPL-MCNC: 13 MG/DL (ref 8–23)
BUN/CREAT SERPL: 10.7 (ref 7–25)
CALCIUM SPEC-SCNC: 8.9 MG/DL (ref 8.6–10.5)
CHLORIDE SERPL-SCNC: 102 MMOL/L (ref 98–107)
CK SERPL-CCNC: 48 U/L (ref 20–200)
CO2 SERPL-SCNC: 26.1 MMOL/L (ref 22–29)
CREAT SERPL-MCNC: 1.22 MG/DL (ref 0.76–1.27)
EGFRCR SERPLBLD CKD-EPI 2021: 59.9 ML/MIN/1.73
GLOBULIN UR ELPH-MCNC: 2.9 GM/DL
GLUCOSE SERPL-MCNC: 137 MG/DL (ref 65–99)
POTASSIUM SERPL-SCNC: 3.3 MMOL/L (ref 3.5–5.2)
PROT SERPL-MCNC: 6.4 G/DL (ref 6–8.5)
SODIUM SERPL-SCNC: 141 MMOL/L (ref 136–145)
TROPONIN T SERPL-MCNC: 0.19 NG/ML (ref 0–0.03)

## 2022-06-07 PROCEDURE — 99233 SBSQ HOSP IP/OBS HIGH 50: CPT | Performed by: INTERNAL MEDICINE

## 2022-06-07 PROCEDURE — 36415 COLL VENOUS BLD VENIPUNCTURE: CPT | Performed by: INTERNAL MEDICINE

## 2022-06-07 PROCEDURE — 63710000001 LEVALBUTEROL PER 0.5 MG: Performed by: INTERNAL MEDICINE

## 2022-06-07 PROCEDURE — 80053 COMPREHEN METABOLIC PANEL: CPT | Performed by: INTERNAL MEDICINE

## 2022-06-07 PROCEDURE — 82550 ASSAY OF CK (CPK): CPT | Performed by: INTERNAL MEDICINE

## 2022-06-07 PROCEDURE — 25010000002 FUROSEMIDE PER 20 MG: Performed by: INTERNAL MEDICINE

## 2022-06-07 PROCEDURE — 94799 UNLISTED PULMONARY SVC/PX: CPT

## 2022-06-07 PROCEDURE — 84484 ASSAY OF TROPONIN QUANT: CPT | Performed by: INTERNAL MEDICINE

## 2022-06-07 PROCEDURE — 25010000002 AMIODARONE IN DEXTROSE 5% 360-4.14 MG/200ML-% SOLUTION: Performed by: INTERNAL MEDICINE

## 2022-06-07 PROCEDURE — 99232 SBSQ HOSP IP/OBS MODERATE 35: CPT | Performed by: INTERNAL MEDICINE

## 2022-06-07 PROCEDURE — 94640 AIRWAY INHALATION TREATMENT: CPT

## 2022-06-07 RX ORDER — ARFORMOTEROL TARTRATE 15 UG/2ML
15 SOLUTION RESPIRATORY (INHALATION)
Status: DISCONTINUED | OUTPATIENT
Start: 2022-06-07 | End: 2022-06-10 | Stop reason: HOSPADM

## 2022-06-07 RX ORDER — SUCRALFATE 1 G/1
1 TABLET ORAL
Status: COMPLETED | OUTPATIENT
Start: 2022-06-07 | End: 2022-06-08

## 2022-06-07 RX ORDER — POTASSIUM CHLORIDE 1.5 G/1.77G
40 POWDER, FOR SOLUTION ORAL ONCE
Status: COMPLETED | OUTPATIENT
Start: 2022-06-07 | End: 2022-06-07

## 2022-06-07 RX ORDER — BUDESONIDE 0.5 MG/2ML
0.5 INHALANT ORAL
Refills: 3 | Status: DISCONTINUED | OUTPATIENT
Start: 2022-06-07 | End: 2022-06-10 | Stop reason: HOSPADM

## 2022-06-07 RX ORDER — FUROSEMIDE 10 MG/ML
60 INJECTION INTRAMUSCULAR; INTRAVENOUS ONCE
Status: COMPLETED | OUTPATIENT
Start: 2022-06-07 | End: 2022-06-07

## 2022-06-07 RX ORDER — ACETAMINOPHEN 325 MG/1
650 TABLET ORAL EVERY 6 HOURS PRN
Status: DISCONTINUED | OUTPATIENT
Start: 2022-06-07 | End: 2022-06-10 | Stop reason: HOSPADM

## 2022-06-07 RX ORDER — ALUMINA, MAGNESIA, AND SIMETHICONE 2400; 2400; 240 MG/30ML; MG/30ML; MG/30ML
15 SUSPENSION ORAL EVERY 6 HOURS PRN
Status: DISCONTINUED | OUTPATIENT
Start: 2022-06-07 | End: 2022-06-10 | Stop reason: HOSPADM

## 2022-06-07 RX ORDER — FLUTICASONE PROPIONATE 50 MCG
1 SPRAY, SUSPENSION (ML) NASAL NIGHTLY
Status: DISCONTINUED | OUTPATIENT
Start: 2022-06-07 | End: 2022-06-10 | Stop reason: HOSPADM

## 2022-06-07 RX ADMIN — ALUMINUM HYDROXIDE, MAGNESIUM HYDROXIDE, AND DIMETHICONE 15 ML: 400; 400; 40 SUSPENSION ORAL at 11:23

## 2022-06-07 RX ADMIN — AMIODARONE HYDROCHLORIDE 1 MG/MIN: 1.8 INJECTION, SOLUTION INTRAVENOUS at 15:14

## 2022-06-07 RX ADMIN — ISOSORBIDE MONONITRATE 60 MG: 60 TABLET ORAL at 08:54

## 2022-06-07 RX ADMIN — POTASSIUM CHLORIDE 40 MEQ: 1.5 POWDER, FOR SOLUTION ORAL at 08:54

## 2022-06-07 RX ADMIN — DIGOXIN 125 MCG: 125 TABLET ORAL at 11:23

## 2022-06-07 RX ADMIN — SACUBITRIL AND VALSARTAN 1 TABLET: 49; 51 TABLET, FILM COATED ORAL at 20:35

## 2022-06-07 RX ADMIN — BUDESONIDE 0.5 MG: 0.5 SUSPENSION RESPIRATORY (INHALATION) at 20:58

## 2022-06-07 RX ADMIN — AMIODARONE HYDROCHLORIDE 1 MG/MIN: 1.8 INJECTION, SOLUTION INTRAVENOUS at 09:32

## 2022-06-07 RX ADMIN — SUCRALFATE 1 G: 1 TABLET ORAL at 11:23

## 2022-06-07 RX ADMIN — PANTOPRAZOLE SODIUM 40 MG: 40 TABLET, DELAYED RELEASE ORAL at 08:55

## 2022-06-07 RX ADMIN — METOPROLOL SUCCINATE 50 MG: 50 TABLET, EXTENDED RELEASE ORAL at 20:35

## 2022-06-07 RX ADMIN — TAMSULOSIN HYDROCHLORIDE 0.4 MG: 0.4 CAPSULE ORAL at 08:55

## 2022-06-07 RX ADMIN — CLOPIDOGREL BISULFATE 75 MG: 75 TABLET ORAL at 08:55

## 2022-06-07 RX ADMIN — FLUTICASONE PROPIONATE 1 SPRAY: 50 SPRAY, METERED NASAL at 20:35

## 2022-06-07 RX ADMIN — BUDESONIDE 0.5 MG: 0.5 SUSPENSION RESPIRATORY (INHALATION) at 11:21

## 2022-06-07 RX ADMIN — AMIODARONE HYDROCHLORIDE 1 MG/MIN: 1.8 INJECTION, SOLUTION INTRAVENOUS at 21:08

## 2022-06-07 RX ADMIN — LEVALBUTEROL HYDROCHLORIDE 0.63 MG: 0.63 SOLUTION RESPIRATORY (INHALATION) at 11:26

## 2022-06-07 RX ADMIN — METOPROLOL SUCCINATE 50 MG: 50 TABLET, EXTENDED RELEASE ORAL at 08:55

## 2022-06-07 RX ADMIN — ARFORMOTEROL TARTRATE 15 MCG: 15 SOLUTION RESPIRATORY (INHALATION) at 11:21

## 2022-06-07 RX ADMIN — SACUBITRIL AND VALSARTAN 1 TABLET: 49; 51 TABLET, FILM COATED ORAL at 08:54

## 2022-06-07 RX ADMIN — MONTELUKAST 10 MG: 10 TABLET, FILM COATED ORAL at 20:35

## 2022-06-07 RX ADMIN — FINASTERIDE 5 MG: 5 TABLET, FILM COATED ORAL at 08:55

## 2022-06-07 RX ADMIN — SUCRALFATE 1 G: 1 TABLET ORAL at 17:20

## 2022-06-07 RX ADMIN — Medication 10 ML: at 20:36

## 2022-06-07 RX ADMIN — Medication 10 ML: at 08:55

## 2022-06-07 RX ADMIN — SUCRALFATE 1 G: 1 TABLET ORAL at 20:35

## 2022-06-07 RX ADMIN — RIVAROXABAN 15 MG: 15 TABLET, FILM COATED ORAL at 20:35

## 2022-06-07 RX ADMIN — ARFORMOTEROL TARTRATE 15 MCG: 15 SOLUTION RESPIRATORY (INHALATION) at 20:58

## 2022-06-07 RX ADMIN — FUROSEMIDE 60 MG: 10 INJECTION, SOLUTION INTRAMUSCULAR; INTRAVENOUS at 11:23

## 2022-06-08 LAB
ANION GAP SERPL CALCULATED.3IONS-SCNC: 12.8 MMOL/L (ref 5–15)
BUN SERPL-MCNC: 13 MG/DL (ref 8–23)
BUN/CREAT SERPL: 11.6 (ref 7–25)
CALCIUM SPEC-SCNC: 9.1 MG/DL (ref 8.6–10.5)
CHLORIDE SERPL-SCNC: 101 MMOL/L (ref 98–107)
CO2 SERPL-SCNC: 24.2 MMOL/L (ref 22–29)
CREAT SERPL-MCNC: 1.12 MG/DL (ref 0.76–1.27)
EGFRCR SERPLBLD CKD-EPI 2021: 66.4 ML/MIN/1.73
GLUCOSE SERPL-MCNC: 153 MG/DL (ref 65–99)
MAGNESIUM SERPL-MCNC: 2 MG/DL (ref 1.6–2.4)
PHOSPHATE SERPL-MCNC: 3.7 MG/DL (ref 2.5–4.5)
POTASSIUM SERPL-SCNC: 3.3 MMOL/L (ref 3.5–5.2)
QT INTERVAL: 311 MS
QT INTERVAL: 331 MS
SODIUM SERPL-SCNC: 138 MMOL/L (ref 136–145)

## 2022-06-08 PROCEDURE — 99232 SBSQ HOSP IP/OBS MODERATE 35: CPT | Performed by: INTERNAL MEDICINE

## 2022-06-08 PROCEDURE — 99233 SBSQ HOSP IP/OBS HIGH 50: CPT | Performed by: INTERNAL MEDICINE

## 2022-06-08 PROCEDURE — 94799 UNLISTED PULMONARY SVC/PX: CPT

## 2022-06-08 PROCEDURE — 84100 ASSAY OF PHOSPHORUS: CPT | Performed by: INTERNAL MEDICINE

## 2022-06-08 PROCEDURE — 83735 ASSAY OF MAGNESIUM: CPT | Performed by: INTERNAL MEDICINE

## 2022-06-08 PROCEDURE — 94760 N-INVAS EAR/PLS OXIMETRY 1: CPT

## 2022-06-08 PROCEDURE — 25010000002 HYALURONIDASE (HUMAN) 150 UNIT/ML SOLUTION 1 ML VIAL: Performed by: INTERNAL MEDICINE

## 2022-06-08 PROCEDURE — 80048 BASIC METABOLIC PNL TOTAL CA: CPT | Performed by: INTERNAL MEDICINE

## 2022-06-08 PROCEDURE — 25010000002 FUROSEMIDE PER 20 MG: Performed by: INTERNAL MEDICINE

## 2022-06-08 PROCEDURE — 25010000002 AMIODARONE IN DEXTROSE 5% 360-4.14 MG/200ML-% SOLUTION: Performed by: INTERNAL MEDICINE

## 2022-06-08 RX ORDER — MEPERIDINE HYDROCHLORIDE 25 MG/ML
100 INJECTION INTRAMUSCULAR; INTRAVENOUS; SUBCUTANEOUS ONCE
Status: DISCONTINUED | OUTPATIENT
Start: 2022-06-08 | End: 2022-06-08

## 2022-06-08 RX ORDER — FUROSEMIDE 10 MG/ML
20 INJECTION INTRAMUSCULAR; INTRAVENOUS ONCE
Status: COMPLETED | OUTPATIENT
Start: 2022-06-08 | End: 2022-06-08

## 2022-06-08 RX ORDER — MIDAZOLAM HYDROCHLORIDE 1 MG/ML
10 INJECTION INTRAMUSCULAR; INTRAVENOUS ONCE
Status: DISCONTINUED | OUTPATIENT
Start: 2022-06-08 | End: 2022-06-08

## 2022-06-08 RX ORDER — POTASSIUM CHLORIDE 1.5 G/1.77G
40 POWDER, FOR SOLUTION ORAL ONCE
Status: COMPLETED | OUTPATIENT
Start: 2022-06-08 | End: 2022-06-08

## 2022-06-08 RX ORDER — AMIODARONE HYDROCHLORIDE 200 MG/1
200 TABLET ORAL 2 TIMES DAILY
Status: DISCONTINUED | OUTPATIENT
Start: 2022-06-08 | End: 2022-06-10 | Stop reason: HOSPADM

## 2022-06-08 RX ADMIN — POTASSIUM CHLORIDE 40 MEQ: 1.5 POWDER, FOR SOLUTION ORAL at 09:02

## 2022-06-08 RX ADMIN — METOPROLOL SUCCINATE 50 MG: 50 TABLET, EXTENDED RELEASE ORAL at 21:24

## 2022-06-08 RX ADMIN — HYALURONIDASE (HUMAN RECOMBINANT) 150 UNITS: 150 INJECTION, SOLUTION SUBCUTANEOUS at 15:49

## 2022-06-08 RX ADMIN — ARFORMOTEROL TARTRATE 15 MCG: 15 SOLUTION RESPIRATORY (INHALATION) at 10:16

## 2022-06-08 RX ADMIN — TAMSULOSIN HYDROCHLORIDE 0.4 MG: 0.4 CAPSULE ORAL at 09:03

## 2022-06-08 RX ADMIN — SACUBITRIL AND VALSARTAN 1 TABLET: 49; 51 TABLET, FILM COATED ORAL at 09:02

## 2022-06-08 RX ADMIN — PANTOPRAZOLE SODIUM 40 MG: 40 TABLET, DELAYED RELEASE ORAL at 09:03

## 2022-06-08 RX ADMIN — FUROSEMIDE 20 MG: 10 INJECTION, SOLUTION INTRAMUSCULAR; INTRAVENOUS at 12:49

## 2022-06-08 RX ADMIN — MONTELUKAST 10 MG: 10 TABLET, FILM COATED ORAL at 21:25

## 2022-06-08 RX ADMIN — FLUTICASONE PROPIONATE 1 SPRAY: 50 SPRAY, METERED NASAL at 21:27

## 2022-06-08 RX ADMIN — FINASTERIDE 5 MG: 5 TABLET, FILM COATED ORAL at 09:03

## 2022-06-08 RX ADMIN — CLOPIDOGREL BISULFATE 75 MG: 75 TABLET ORAL at 09:03

## 2022-06-08 RX ADMIN — BUDESONIDE 0.5 MG: 0.5 SUSPENSION RESPIRATORY (INHALATION) at 20:48

## 2022-06-08 RX ADMIN — ARFORMOTEROL TARTRATE 15 MCG: 15 SOLUTION RESPIRATORY (INHALATION) at 20:48

## 2022-06-08 RX ADMIN — SACUBITRIL AND VALSARTAN 1 TABLET: 49; 51 TABLET, FILM COATED ORAL at 21:24

## 2022-06-08 RX ADMIN — SUCRALFATE 1 G: 1 TABLET ORAL at 07:50

## 2022-06-08 RX ADMIN — ALUMINUM HYDROXIDE, MAGNESIUM HYDROXIDE, AND DIMETHICONE 15 ML: 400; 400; 40 SUSPENSION ORAL at 09:12

## 2022-06-08 RX ADMIN — AMIODARONE HYDROCHLORIDE 1 MG/MIN: 1.8 INJECTION, SOLUTION INTRAVENOUS at 03:18

## 2022-06-08 RX ADMIN — AMIODARONE HYDROCHLORIDE 200 MG: 200 TABLET ORAL at 21:25

## 2022-06-08 RX ADMIN — BUDESONIDE 0.5 MG: 0.5 SUSPENSION RESPIRATORY (INHALATION) at 10:16

## 2022-06-08 RX ADMIN — AMIODARONE HYDROCHLORIDE 1 MG/MIN: 1.8 INJECTION, SOLUTION INTRAVENOUS at 09:30

## 2022-06-08 RX ADMIN — METOPROLOL SUCCINATE 50 MG: 50 TABLET, EXTENDED RELEASE ORAL at 09:03

## 2022-06-08 RX ADMIN — Medication 10 ML: at 21:25

## 2022-06-08 RX ADMIN — RIVAROXABAN 15 MG: 15 TABLET, FILM COATED ORAL at 21:24

## 2022-06-08 RX ADMIN — DIGOXIN 125 MCG: 125 TABLET ORAL at 12:50

## 2022-06-08 RX ADMIN — ISOSORBIDE MONONITRATE 60 MG: 60 TABLET ORAL at 09:02

## 2022-06-09 LAB
ANION GAP SERPL CALCULATED.3IONS-SCNC: 10.8 MMOL/L (ref 5–15)
BUN SERPL-MCNC: 13 MG/DL (ref 8–23)
BUN/CREAT SERPL: 10.5 (ref 7–25)
CALCIUM SPEC-SCNC: 9 MG/DL (ref 8.6–10.5)
CHLORIDE SERPL-SCNC: 99 MMOL/L (ref 98–107)
CO2 SERPL-SCNC: 26.2 MMOL/L (ref 22–29)
CREAT SERPL-MCNC: 1.24 MG/DL (ref 0.76–1.27)
EGFRCR SERPLBLD CKD-EPI 2021: 58.8 ML/MIN/1.73
GLUCOSE SERPL-MCNC: 164 MG/DL (ref 65–99)
MAGNESIUM SERPL-MCNC: 1.9 MG/DL (ref 1.6–2.4)
PHOSPHATE SERPL-MCNC: 3.7 MG/DL (ref 2.5–4.5)
POTASSIUM SERPL-SCNC: 3.8 MMOL/L (ref 3.5–5.2)
SODIUM SERPL-SCNC: 136 MMOL/L (ref 136–145)

## 2022-06-09 PROCEDURE — 80048 BASIC METABOLIC PNL TOTAL CA: CPT | Performed by: INTERNAL MEDICINE

## 2022-06-09 PROCEDURE — 94799 UNLISTED PULMONARY SVC/PX: CPT

## 2022-06-09 PROCEDURE — 99232 SBSQ HOSP IP/OBS MODERATE 35: CPT | Performed by: INTERNAL MEDICINE

## 2022-06-09 PROCEDURE — 94760 N-INVAS EAR/PLS OXIMETRY 1: CPT

## 2022-06-09 PROCEDURE — 99233 SBSQ HOSP IP/OBS HIGH 50: CPT | Performed by: INTERNAL MEDICINE

## 2022-06-09 PROCEDURE — 83735 ASSAY OF MAGNESIUM: CPT | Performed by: INTERNAL MEDICINE

## 2022-06-09 PROCEDURE — 84100 ASSAY OF PHOSPHORUS: CPT | Performed by: INTERNAL MEDICINE

## 2022-06-09 RX ORDER — DIGOXIN 250 MCG
250 TABLET ORAL
Status: DISCONTINUED | OUTPATIENT
Start: 2022-06-09 | End: 2022-06-10

## 2022-06-09 RX ORDER — LEVALBUTEROL INHALATION SOLUTION 0.63 MG/3ML
1 SOLUTION RESPIRATORY (INHALATION)
Status: DISCONTINUED | OUTPATIENT
Start: 2022-06-09 | End: 2022-06-09

## 2022-06-09 RX ORDER — LEVALBUTEROL INHALATION SOLUTION 0.63 MG/3ML
1 SOLUTION RESPIRATORY (INHALATION) EVERY 6 HOURS PRN
Status: DISCONTINUED | OUTPATIENT
Start: 2022-06-09 | End: 2022-06-10 | Stop reason: HOSPADM

## 2022-06-09 RX ADMIN — RIVAROXABAN 15 MG: 15 TABLET, FILM COATED ORAL at 20:39

## 2022-06-09 RX ADMIN — AMIODARONE HYDROCHLORIDE 200 MG: 200 TABLET ORAL at 08:15

## 2022-06-09 RX ADMIN — ARFORMOTEROL TARTRATE 15 MCG: 15 SOLUTION RESPIRATORY (INHALATION) at 08:07

## 2022-06-09 RX ADMIN — BUDESONIDE 0.5 MG: 0.5 SUSPENSION RESPIRATORY (INHALATION) at 08:07

## 2022-06-09 RX ADMIN — METOPROLOL SUCCINATE 50 MG: 50 TABLET, EXTENDED RELEASE ORAL at 08:15

## 2022-06-09 RX ADMIN — Medication 10 ML: at 08:15

## 2022-06-09 RX ADMIN — METOPROLOL SUCCINATE 75 MG: 50 TABLET, EXTENDED RELEASE ORAL at 20:39

## 2022-06-09 RX ADMIN — Medication 10 ML: at 20:40

## 2022-06-09 RX ADMIN — CLOPIDOGREL BISULFATE 75 MG: 75 TABLET ORAL at 08:15

## 2022-06-09 RX ADMIN — BUDESONIDE 0.5 MG: 0.5 SUSPENSION RESPIRATORY (INHALATION) at 19:56

## 2022-06-09 RX ADMIN — ISOSORBIDE MONONITRATE 60 MG: 60 TABLET ORAL at 08:15

## 2022-06-09 RX ADMIN — ARFORMOTEROL TARTRATE 15 MCG: 15 SOLUTION RESPIRATORY (INHALATION) at 19:56

## 2022-06-09 RX ADMIN — AMIODARONE HYDROCHLORIDE 200 MG: 200 TABLET ORAL at 20:39

## 2022-06-09 RX ADMIN — DIGOXIN 250 MCG: 125 TABLET ORAL at 12:33

## 2022-06-09 RX ADMIN — SACUBITRIL AND VALSARTAN 1 TABLET: 49; 51 TABLET, FILM COATED ORAL at 20:39

## 2022-06-09 RX ADMIN — FLUTICASONE PROPIONATE 1 SPRAY: 50 SPRAY, METERED NASAL at 20:40

## 2022-06-09 RX ADMIN — SACUBITRIL AND VALSARTAN 1 TABLET: 49; 51 TABLET, FILM COATED ORAL at 08:15

## 2022-06-09 RX ADMIN — MONTELUKAST 10 MG: 10 TABLET, FILM COATED ORAL at 20:39

## 2022-06-09 RX ADMIN — PANTOPRAZOLE SODIUM 40 MG: 40 TABLET, DELAYED RELEASE ORAL at 08:15

## 2022-06-09 RX ADMIN — FINASTERIDE 5 MG: 5 TABLET, FILM COATED ORAL at 08:15

## 2022-06-09 RX ADMIN — TAMSULOSIN HYDROCHLORIDE 0.4 MG: 0.4 CAPSULE ORAL at 08:15

## 2022-06-10 ENCOUNTER — READMISSION MANAGEMENT (OUTPATIENT)
Dept: CALL CENTER | Facility: HOSPITAL | Age: 82
End: 2022-06-10

## 2022-06-10 VITALS
SYSTOLIC BLOOD PRESSURE: 153 MMHG | WEIGHT: 234 LBS | DIASTOLIC BLOOD PRESSURE: 74 MMHG | HEIGHT: 70 IN | BODY MASS INDEX: 33.5 KG/M2 | HEART RATE: 80 BPM | OXYGEN SATURATION: 100 % | RESPIRATION RATE: 20 BRPM | TEMPERATURE: 97.2 F

## 2022-06-10 LAB
ANION GAP SERPL CALCULATED.3IONS-SCNC: 10 MMOL/L (ref 5–15)
BUN SERPL-MCNC: 12 MG/DL (ref 8–23)
BUN/CREAT SERPL: 11.4 (ref 7–25)
CALCIUM SPEC-SCNC: 9.1 MG/DL (ref 8.6–10.5)
CHLORIDE SERPL-SCNC: 104 MMOL/L (ref 98–107)
CO2 SERPL-SCNC: 24 MMOL/L (ref 22–29)
CREAT SERPL-MCNC: 1.05 MG/DL (ref 0.76–1.27)
EGFRCR SERPLBLD CKD-EPI 2021: 71.8 ML/MIN/1.73
GLUCOSE SERPL-MCNC: 134 MG/DL (ref 65–99)
MAGNESIUM SERPL-MCNC: 2 MG/DL (ref 1.6–2.4)
NT-PROBNP SERPL-MCNC: 2075 PG/ML (ref 0–1800)
PHOSPHATE SERPL-MCNC: 4 MG/DL (ref 2.5–4.5)
POTASSIUM SERPL-SCNC: 3.8 MMOL/L (ref 3.5–5.2)
SODIUM SERPL-SCNC: 138 MMOL/L (ref 136–145)

## 2022-06-10 PROCEDURE — 84100 ASSAY OF PHOSPHORUS: CPT | Performed by: INTERNAL MEDICINE

## 2022-06-10 PROCEDURE — 97161 PT EVAL LOW COMPLEX 20 MIN: CPT

## 2022-06-10 PROCEDURE — 80048 BASIC METABOLIC PNL TOTAL CA: CPT | Performed by: INTERNAL MEDICINE

## 2022-06-10 PROCEDURE — 99232 SBSQ HOSP IP/OBS MODERATE 35: CPT | Performed by: INTERNAL MEDICINE

## 2022-06-10 PROCEDURE — 83735 ASSAY OF MAGNESIUM: CPT | Performed by: INTERNAL MEDICINE

## 2022-06-10 PROCEDURE — 83880 ASSAY OF NATRIURETIC PEPTIDE: CPT | Performed by: INTERNAL MEDICINE

## 2022-06-10 PROCEDURE — 94799 UNLISTED PULMONARY SVC/PX: CPT

## 2022-06-10 PROCEDURE — 99239 HOSP IP/OBS DSCHRG MGMT >30: CPT | Performed by: INTERNAL MEDICINE

## 2022-06-10 PROCEDURE — 94618 PULMONARY STRESS TESTING: CPT

## 2022-06-10 PROCEDURE — 94664 DEMO&/EVAL PT USE INHALER: CPT

## 2022-06-10 RX ORDER — FUROSEMIDE 20 MG/1
20 TABLET ORAL DAILY
Status: DISCONTINUED | OUTPATIENT
Start: 2022-06-10 | End: 2022-06-10 | Stop reason: HOSPADM

## 2022-06-10 RX ORDER — DILTIAZEM HYDROCHLORIDE 180 MG/1
180 CAPSULE, COATED, EXTENDED RELEASE ORAL
Qty: 30 CAPSULE | Refills: 0 | OUTPATIENT
Start: 2022-06-11 | End: 2022-07-01

## 2022-06-10 RX ORDER — METOPROLOL SUCCINATE 25 MG/1
75 TABLET, EXTENDED RELEASE ORAL EVERY 12 HOURS SCHEDULED
Qty: 180 TABLET | Refills: 0 | Status: SHIPPED | OUTPATIENT
Start: 2022-06-10 | End: 2022-07-12

## 2022-06-10 RX ORDER — DILTIAZEM HYDROCHLORIDE 180 MG/1
180 CAPSULE, COATED, EXTENDED RELEASE ORAL
Status: DISCONTINUED | OUTPATIENT
Start: 2022-06-10 | End: 2022-06-10 | Stop reason: HOSPADM

## 2022-06-10 RX ORDER — AMIODARONE HYDROCHLORIDE 200 MG/1
200 TABLET ORAL 2 TIMES DAILY
Qty: 60 TABLET | Refills: 0 | Status: ON HOLD | OUTPATIENT
Start: 2022-06-10 | End: 2022-09-29 | Stop reason: SDUPTHER

## 2022-06-10 RX ADMIN — SACUBITRIL AND VALSARTAN 1 TABLET: 49; 51 TABLET, FILM COATED ORAL at 08:03

## 2022-06-10 RX ADMIN — TAMSULOSIN HYDROCHLORIDE 0.4 MG: 0.4 CAPSULE ORAL at 08:03

## 2022-06-10 RX ADMIN — BUDESONIDE 0.5 MG: 0.5 SUSPENSION RESPIRATORY (INHALATION) at 07:22

## 2022-06-10 RX ADMIN — CLOPIDOGREL BISULFATE 75 MG: 75 TABLET ORAL at 08:03

## 2022-06-10 RX ADMIN — ARFORMOTEROL TARTRATE 15 MCG: 15 SOLUTION RESPIRATORY (INHALATION) at 07:22

## 2022-06-10 RX ADMIN — DILTIAZEM HYDROCHLORIDE 180 MG: 180 CAPSULE, COATED, EXTENDED RELEASE ORAL at 10:39

## 2022-06-10 RX ADMIN — METOPROLOL SUCCINATE 75 MG: 50 TABLET, EXTENDED RELEASE ORAL at 08:03

## 2022-06-10 RX ADMIN — AMIODARONE HYDROCHLORIDE 200 MG: 200 TABLET ORAL at 08:03

## 2022-06-10 RX ADMIN — FUROSEMIDE 20 MG: 20 TABLET ORAL at 08:03

## 2022-06-10 RX ADMIN — ISOSORBIDE MONONITRATE 60 MG: 60 TABLET ORAL at 08:03

## 2022-06-10 RX ADMIN — FINASTERIDE 5 MG: 5 TABLET, FILM COATED ORAL at 08:03

## 2022-06-10 RX ADMIN — Medication 10 ML: at 08:02

## 2022-06-10 RX ADMIN — PANTOPRAZOLE SODIUM 40 MG: 40 TABLET, DELAYED RELEASE ORAL at 08:03

## 2022-06-10 NOTE — OUTREACH NOTE
Prep Survey    Flowsheet Row Responses   [facility] patient discharged from? [place]   Is LACE score < 7 ? No   Emergency Room discharge w/ pulse ox? No   Eligibility [facility]   Date of Admission 06/06/22   Date of Discharge 06/10/22   Discharge Disposition Home or Self Care   Discharge diagnosis Paroxysmal A. fib with RVR,  Acute on chronic diastolic CHF    Does the patient have one of the following disease processes/diagnoses(primary or secondary)? CHF   Does the patient have Home health ordered? No   Is there a DME ordered? No   Prep survey completed? Yes          RUFINO RODRIGUEZ - Registered Nurse

## 2022-06-13 ENCOUNTER — TELEPHONE (OUTPATIENT)
Dept: CARDIOLOGY | Facility: CLINIC | Age: 82
End: 2022-06-13

## 2022-06-13 ENCOUNTER — TRANSITIONAL CARE MANAGEMENT TELEPHONE ENCOUNTER (OUTPATIENT)
Dept: CALL CENTER | Facility: HOSPITAL | Age: 82
End: 2022-06-13

## 2022-06-13 NOTE — TELEPHONE ENCOUNTER
Take Lasix 60 mg once daily for 3 days, then go to 40 mg daily      Electronically signed by Darnell Stevenson MD, 06/13/22, 8:55 AM EDT.

## 2022-06-13 NOTE — TELEPHONE ENCOUNTER
Received VM from patient's wife.    Returned call. Patient's wife stated that he is having increased shortness of breath with exertion and his (L) leg is swollen. Denied any redness or warmth. Confirmed taking Lasx 20 mg daily and Entresto 49-51 BID.    Advised I would discuss with Dr. Stevenson and call back with his recommendations.

## 2022-06-13 NOTE — OUTREACH NOTE
Call Center TCM Note    Flowsheet Row Responses   [facility], [place], operated by [facility] patient discharged from? Back   Does the patient have one of the following disease processes/diagnoses(primary or secondary)? CHF   TCM attempt successful? Yes   Call start time 0948   Call end time 0954   Discharge diagnosis Paroxysmal A. fib with RVR,  Acute on chronic diastolic CHF    Person spoke with today (if not patient) and relationship Riana-spouse   Meds reviewed with patient/caregiver? Yes   Is the patient having any side effects they believe may be caused by any medication additions or changes? No   Does the patient have all medications ordered at discharge? Yes   Is the patient taking all medications as directed (includes completed medication regime)? Yes   Medication comments States the spoke with Dr. Stevenson and has increased lasix to 60mg for 3 days.   Does the patient have a primary care provider?  Yes   Does the patient have an appointment with their PCP within 7 days of discharge? Yes   Has the patient kept scheduled appointments due by today? N/A   Comments Has appt 6/17 with DARIAN Short    Pulse Ox monitoring Intermittent   Pulse Ox device source Patient   O2 Sat comments Wife reports sats stay in 90s   O2 Sat: education provided Sat levels, When to seek care   Psychosocial issues? No   Did the patient receive a copy of their discharge instructions? Yes   Nursing interventions Reviewed instructions with patient   What is the patient's perception of their health status since discharge? Worsening   Nursing interventions Nurse provided patient education   Is the patient weighing daily? Yes   Does the patient have scales? Yes   Daily weight interventions Education provided on importance of daily weight   Is the patient able to teach back Heart Failure diet management? Yes   Is the patient able to teach back Heart Failure Zones? Yes   Is the patient able to teach back signs and symptoms of worsening condition? (i.e. weight gain,  shortness of air, etc.) Yes   If the patient is a current smoker, are they able to teach back resources for cessation? Not a smoker   Is the patient/caregiver able to teach back the hierarchy of who to call/visit for symptoms/problems? PCP, Specialist, Home health nurse, Urgent Care, ED, 911 Yes   Additional teach back comments States he is retaining fluid with one leg swollen more than the other.  He is having trouble breating and they contacted Dr. Stevenson's office.  They have increased his lasix to 60mg for the next 3 days.  If not improvement he is to go to ED.     TCM call completed? Yes   Wrap up additional comments If symptoms do not improve, he is to go to ED.           Lauren Alberto LPN    6/13/2022, 09:58 EDT

## 2022-06-17 ENCOUNTER — OFFICE VISIT (OUTPATIENT)
Dept: FAMILY MEDICINE CLINIC | Facility: CLINIC | Age: 82
End: 2022-06-17

## 2022-06-17 ENCOUNTER — HOSPITAL ENCOUNTER (OUTPATIENT)
Dept: GENERAL RADIOLOGY | Facility: HOSPITAL | Age: 82
Discharge: HOME | End: 2022-06-17
Admitting: PHYSICIAN ASSISTANT

## 2022-06-17 VITALS
SYSTOLIC BLOOD PRESSURE: 132 MMHG | DIASTOLIC BLOOD PRESSURE: 63 MMHG | HEART RATE: 62 BPM | OXYGEN SATURATION: 96 % | BODY MASS INDEX: 33.64 KG/M2 | WEIGHT: 235 LBS | HEIGHT: 70 IN

## 2022-06-17 DIAGNOSIS — I48.0 PAROXYSMAL ATRIAL FIBRILLATION: Primary | Chronic | ICD-10-CM

## 2022-06-17 DIAGNOSIS — K21.00 GASTROESOPHAGEAL REFLUX DISEASE WITH ESOPHAGITIS WITHOUT HEMORRHAGE: Chronic | ICD-10-CM

## 2022-06-17 DIAGNOSIS — J44.1 COPD WITH EXACERBATION: ICD-10-CM

## 2022-06-17 DIAGNOSIS — R06.02 SOB (SHORTNESS OF BREATH): ICD-10-CM

## 2022-06-17 DIAGNOSIS — R05.9 COUGH: ICD-10-CM

## 2022-06-17 PROCEDURE — 93000 ELECTROCARDIOGRAM COMPLETE: CPT | Performed by: PHYSICIAN ASSISTANT

## 2022-06-17 PROCEDURE — 71046 X-RAY EXAM CHEST 2 VIEWS: CPT

## 2022-06-17 PROCEDURE — 99495 TRANSJ CARE MGMT MOD F2F 14D: CPT | Performed by: PHYSICIAN ASSISTANT

## 2022-06-17 PROCEDURE — 1111F DSCHRG MED/CURRENT MED MERGE: CPT | Performed by: PHYSICIAN ASSISTANT

## 2022-06-17 NOTE — PROGRESS NOTES
Chief Complaint  Transitional Care Management    Subjective          Layo Cee presents to [facility]  History of Present Illness    Transitional Care Follow Up Visit  Glendy Cee is a 82 y.o. male who presents for a transitional care management visit.    Within 48 business hours after discharge our office contacted him via telephone to coordinate his care and needs.      I reviewed and discussed the details of that call along with the discharge summary, hospital problems, inpatient lab results, inpatient diagnostic studies, and consultation reports with Layo.     Current outpatient and discharge medications have been reconciled for the patient.  Reviewed by: DARIAN Short      Date of TCM Phone Call 6/10/2022   [facility]   Date of Admission 6/6/2022   Date of Discharge 6/10/2022   Discharge Disposition Home or Self Care     Risk for Readmission (LACE) Score: 15 (6/10/2022  6:02 AM)    Pt here today for TCM visit. Pt was admitted to [facility] for A. Fib. Pt had an ablation done.     Pt is scheduled to follow up with Dr. Lauren Iyer in [place] on 06/20 and then Dr. Stevenson's NP on 06/24.     Pt reports occasional left foot swelling.     Course During Hospital Stay:  4 nights. Pt was previously admitted to [facility] for one night  06/03/22- 06/04/22      Pt was admitted to [facility] first time for 5 days.    Cardio - Afib with RVR - he was admitted for cardioversion and he went back into rhythm.  Then on Friday night - he began having palpitations.  She called cardio - Dr. PEREZ and he said take him to ER.  He was then admitted to [facility] for 1 week.  So they did cardizem and ami drips which did not work.  He took him back to cardioversion and it lasted 30 sec and went back out.  He has appt with Dr. Aguilar - electrocardiophysiologist - two weeks ago had an ablation - felt good for the weekend, on Monday went back to Dr. PEREZ - placed him back in the  hospital at [facility] - stayed another week.  He is back in [facility] now.    The following portions of the patient's history were reviewed and updated as appropriate: allergies, current medications, past family history, past medical history, past social history, past surgical history and problem list.    Review of Systems    Objective   Physical Exam  Vitals and nursing note reviewed.   Constitutional:       Appearance: Normal appearance. He is obese.   HENT:      Head: Normocephalic and atraumatic.   Cardiovascular:      Rate and Rhythm: Normal rate. Rhythm irregular.   Pulmonary:      Effort: Pulmonary effort is normal.      Breath sounds: Normal breath sounds.      Comments: Decreased breath sounds throughout all lung fields      Past Medical History:   Diagnosis Date   • Arthritis    • BPH (benign prostatic hyperplasia)    • CAD (coronary artery disease)    • CHF (congestive heart failure) ([facility])    • COPD (chronic obstructive pulmonary disease) ([facility])    • Coronary artery disease of bypass graft of native heart with stable angina pectoris ([facility]) 8/9/2021    (1) Coronary artery disease, status post coronary artery bypass grafting in the past. Cardiac catheterization done in July 2016 showed patent left internal mammary graft to the LAD and occluded saphenous venous graft. Native LAD is 100% occluded in the mid portion. Native circumflex artery has no significant disease. Native right coronary artery is also 100% occluded and it is a chronic total occl   • COVID-19 02/04/2021   • Diverticulitis 10/11/2019   • Dysphagia    • Essential hypertension 08/27/2020   • Frequent PVCs 8/28/2021   • GERD (gastroesophageal reflux disease)    • Gross hematuria    • HBP (high blood pressure)    • Long-term use of high-risk medication    • Lung disease    • Mixed hyperlipidemia 8/28/2021   • Myocardial infarction ([facility]) 07/2016   • OCD (obsessive compulsive disorder)    • Renal insufficiency 08/27/2020   • Rhinitis, allergic 06/05/2018   •  Sore throat    • Stable angina (HCC)    • Typical atrial flutter (HCC) 11/30/2018    s/p ablation by Dr. Aguilar    • Vertigo       Family History   Problem Relation Age of Onset   • Hypertension Mother    • Heart disease Father    • Other Brother         GASTROINTESTINAL CANCER   • Kidney cancer Brother       Past Surgical History:   Procedure Laterality Date   • BLADDER SURGERY     • CARDIAC CATHETERIZATION  07/2016   • CARDIAC CATHETERIZATION N/A 8/9/2021    Procedure: Left Heart Cath with possible angioplasty;  Surgeon: Jack Ladd MD;  Location: [facility] CATH INVASIVE LOCATION;  Service: Cardiovascular;  Laterality: N/A;  Please schedule the procedure with Dr. Jack Ladd MD on 8/9/2021   • CARDIAC ELECTROPHYSIOLOGY PROCEDURE N/A 6/3/2022    Procedure: Ablation atrial fibrillation;  Surgeon: Irineo Aguilar MD;  Location: [facility] CATH INVASIVE LOCATION;  Service: Cardiovascular;  Laterality: N/A;   • CARDIAC ELECTROPHYSIOLOGY PROCEDURE N/A 6/3/2022    Procedure: Ablation atrial flutter/CTI;  Surgeon: Irineo Aguilar MD;  Location: [facility] CATH INVASIVE LOCATION;  Service: Cardiovascular;  Laterality: N/A;   • CARDIAC SURGERY      HEART BYPASS   • COLONOSCOPY  2011   • CORONARY ARTERY BYPASS GRAFT     • CYSTOSCOPY  03/19/2019    WITH BILATERAL RETROGRADE PYELOGRAPHY   • ELECTRICAL CARDIOVERSION  5/12/2022        • ENDOSCOPY  2016   • PROSTATE SURGERY            Current Outpatient Medications:   •  acetaminophen (TYLENOL) 325 MG tablet, Take 650 mg by mouth Every 6 (Six) Hours As Needed for Mild Pain ., Disp: , Rfl:   •  amiodarone (PACERONE) 200 MG tablet, Take 1 tablet by mouth 2 (Two) Times a Day., Disp: 60 tablet, Rfl: 0  •  calcium carbonate (OS-LIANNA) 600 MG tablet, Take 600 mg by mouth Every Night., Disp: , Rfl:   •  clopidogrel (PLAVIX) 75 MG tablet, Take 1 tablet by mouth Daily., Disp: 90 tablet, Rfl: 3  •  Coenzyme Q10 100 MG tablet, Take 100 mg by mouth Daily., Disp: , Rfl:   •  dilTIAZem CD  (CARDIZEM CD) 180 MG 24 hr capsule, Take 1 capsule by mouth Daily for 30 days., Disp: 30 capsule, Rfl: 0  •  Entresto 49-51 MG tablet, Take 1 tablet by mouth 2 (two) times a day., Disp: 60 tablet, Rfl: 2  •  finasteride (PROSCAR) 5 MG tablet, Take 5 mg by mouth Daily., Disp: , Rfl:   •  fluticasone (FLONASE) 50 MCG/ACT nasal spray, 1 spray into the nostril(s) as directed by provider Every Night., Disp: , Rfl:   •  Fluticasone Furoate (Arnuity Ellipta) 100 MCG/ACT aerosol powder , Inhale 1 puff Daily., Disp: 1 each, Rfl: 3  •  furosemide (LASIX) 20 MG tablet, Take 1 tablet by mouth Daily., Disp: 90 tablet, Rfl: 2  •  isosorbide mononitrate (IMDUR) 60 MG 24 hr tablet, Take 1 tablet by mouth every day, Disp: 90 tablet, Rfl: 0  •  levalbuterol (Xopenex HFA) 45 MCG/ACT inhaler, Inhale 1-2 puffs Every 4 (Four) Hours As Needed for Wheezing., Disp: 1 each, Rfl: 11  •  levalbuterol (Xopenex) 0.63 MG/3ML nebulizer solution, Take 1 ampule by nebulization Every 6 (Six) Hours As Needed for Wheezing for up to 30 days., Disp: 360 mL, Rfl: 0  •  Livalo 1 MG tablet tablet, Take 1 tablet by mouth every day (Patient taking differently: Take 1 mg by mouth Every Night.), Disp: 90 tablet, Rfl: 1  •  metoprolol succinate XL (TOPROL-XL) 25 MG 24 hr tablet, Take 3 tablets by mouth Every 12 (Twelve) Hours for 30 days., Disp: 180 tablet, Rfl: 0  •  montelukast (SINGULAIR) 10 MG tablet, Take 1 tablet by mouth Every Night for 30 days., Disp: 30 tablet, Rfl: 0  •  nitroglycerin (NITROSTAT) 0.4 MG SL tablet, Place 1 tablet under the tongue Every 5 (Five) Minutes As Needed for Chest Pain for up to 30 days. Take no more than 3 doses in 15 minutes., Disp: 30 tablet, Rfl: 0  •  pantoprazole (PROTONIX) 40 MG EC tablet, Take 1 tablet by mouth Daily., Disp: 30 tablet, Rfl: 3  •  tamsulosin (FLOMAX) 0.4 MG capsule 24 hr capsule, Take 1 capsule by mouth Daily. 1/2 hours following the same meal each day (Patient taking differently: Take 1 capsule by mouth  "Daily.), Disp: 90 capsule, Rfl: 4  •  tiotropium bromide-olodaterol (Stiolto Respimat) 2.5-2.5 MCG/ACT aerosol solution inhaler, Inhale 2 puffs Daily., Disp: 2 each, Rfl: 0  •  VITAMIN B COMPLEX-C PO, Take 1 tablet by mouth Daily., Disp: , Rfl:   •  Xarelto 15 MG tablet, Take 1 tablet by mouth every day (Patient taking differently: Take 15 mg by mouth Every Night.), Disp: 90 tablet, Rfl: 1    Objective     Vital Signs:     /63   Pulse 62   Ht 177.8 cm (70\")   Wt 107 kg (235 lb)   SpO2 96%   BMI 33.72 kg/m²    Estimated body mass index is 33.72 kg/m² as calculated from the following:    Height as of this encounter: 177.8 cm (70\").    Weight as of this encounter: 107 kg (235 lb).     Wt Readings from Last 3 Encounters:   06/17/22 107 kg (235 lb)   06/06/22 106 kg (234 lb)   06/03/22 104 kg (230 lb)     BP Readings from Last 3 Encounters:   06/17/22 132/63   06/10/22 153/74   06/04/22 136/69       Physical Exam  Vitals and nursing note reviewed.   Constitutional:       Appearance: Normal appearance.   HENT:      Head: Normocephalic and atraumatic.   Cardiovascular:      Rate and Rhythm: Normal rate. Rhythm irregular.      Heart sounds: Normal heart sounds.   Pulmonary:      Effort: Pulmonary effort is normal.      Breath sounds: Normal breath sounds.   Musculoskeletal:      Cervical back: Neck supple.   Neurological:      Mental Status: He is alert.   Psychiatric:         Mood and Affect: Mood normal.         Behavior: Behavior normal.        Result Review :     Common labs    Common Labsle 6/8/22 6/9/22 6/10/22   Glucose 153 (A) 164 (A) 134 (A)   BUN 13 13 12   Creatinine 1.12 1.24 1.05   Sodium 138 136 138   Potassium 3.3 (A) 3.8 3.8   Chloride 101 99 104   Calcium 9.1 9.0 9.1   (A) Abnormal value                 ECG 12 Lead    Date/Time: 6/17/2022 12:29 PM  Performed by: Kevyn Rhodes PA  Authorized by: Kevyn Rhodes PA   Comparison: not compared with previous ECG   Rhythm: atrial fibrillation  Rate: " normal    Clinical impression: abnormal EKG           Patient Care Team:  Kevyn Rhodes PA as PCP - General (Physician Assistant)  Rajani Mccauley, BRENDON as Ambulatory  ([facility])  Darnell Stevenson MD as Consulting Physician (Cardiology)  Harley Linares MD as Consulting Physician (Urology)  Kolton Brink MD as Consulting Physician (Pulmonary Disease)  Irineo Aguilar MD as Consulting Physician (Cardiac Electrophysiology)           Assessment and Plan      Diagnoses and all orders for this visit:    1. Paroxysmal atrial fibrillation (HCC) (Primary)  Comments:  S/P ablation  Orders:  -     ECG 12 Lead    2. Cough  -     XR Chest PA & Lateral; Future    3. Gastroesophageal reflux disease with esophagitis without hemorrhage  Comments:  Stable on protonix 40mg daily  Overview:  Stable on protonix 40mg daily      4. SOB (shortness of breath)  Comments:  Likely secondary to COPD and Afib    5. COPD with exacerbation (HCC)  Comments:  Cont on pulmo     Follow Up     Return in about 2 months (around 8/17/2022).    Patient was given instructions and counseling regarding his condition or for health maintenance advice. Please see specific information pulled into the AVS if appropriate.     I have reviewed information obtained and documented by others and I have confirmed the accuracy of this documented note.    DARIAN Short

## 2022-06-17 NOTE — PROGRESS NOTES
Transitional Care Follow Up Visit  Subjective     Layo Cee is a 82 y.o. male who presents for a transitional care management visit.    Within 48 business hours after discharge our office contacted him via telephone to coordinate his care and needs.      I reviewed and discussed the details of that call along with the discharge summary, hospital problems, inpatient lab results, inpatient diagnostic studies, and consultation reports with Layo.     Current outpatient and discharge medications have been reconciled for the patient.  Reviewed by: DARIAN Short      Date of TCM Phone Call 6/10/2022   [facility]   Date of Admission 6/6/2022   Date of Discharge 6/10/2022   Discharge Disposition Home or Self Care     Risk for Readmission (LACE) Score: 15 (6/10/2022  6:02 AM)    Pt here today for TCM visit. Pt was admitted to [facility] for A. Fib. Pt had an ablation done.     Pt is scheduled to follow up with Dr. Lauren Iyer in [place] on 06/20 and then Dr. Stevenson's NP on 06/24.     Pt reports occasional left foot swelling.     Course During Hospital Stay:  4 nights. Pt was previously admitted to [facility] for one night  06/03/22- 06/04/22      Pt was admitted to [facility] first time for 5 days.    Cardio - Afib with RVR - he was admitted for cardioversion and he went back into rhythm.  Then on Friday night - he began having palpitations.  She called cardio - Dr. PEREZ and he said take him to ER.  He was then admitted to [facility] for 1 week.  So they did cardizem and ami drips which did not work.  He took him back to cardioversion and it lasted 30 sec and went back out.  He has appt with Dr. Aguilar - electrocardiophysiologist - two weeks ago had an ablation - felt good for the weekend, on Monday went back to Dr. PEREZ - placed him back in the hospital at [facility] - stayed another week.  He is back in A-fib now.    The following portions of the patient's history were reviewed and updated as appropriate: allergies,  current medications, past family history, past medical history, past social history, past surgical history and problem list.    Review of Systems    Objective   Physical Exam  Vitals and nursing note reviewed.   Constitutional:       Appearance: Normal appearance. He is obese.   HENT:      Head: Normocephalic and atraumatic.   Cardiovascular:      Rate and Rhythm: Normal rate. Rhythm irregular.   Pulmonary:      Effort: Pulmonary effort is normal.      Breath sounds: Normal breath sounds.      Comments: Decreased breath sounds throughout all lung fields    Musculoskeletal:      Cervical back: Neck supple.   Neurological:      Mental Status: He is alert.   Psychiatric:         Mood and Affect: Mood normal.         Behavior: Behavior normal.       Assessment & Plan   Diagnoses and all orders for this visit:    1. Paroxysmal atrial fibrillation (HCC) (Primary)  Comments:  S/P ablation  Orders:  -     ECG 12 Lead    2. Cough  -     XR Chest PA & Lateral; Future    3. Gastroesophageal reflux disease with esophagitis without hemorrhage  Comments:  Stable on protonix 40mg daily    4. SOB (shortness of breath)  Comments:  Likely secondary to COPD and Afib    5. COPD with exacerbation (HCC)  Comments:  Cont on pulmo        Diagnoses and all orders for this visit:    1. Paroxysmal atrial fibrillation (HCC) (Primary)  Comments:  S/P ablation    2. Cough  -     XR Chest PA & Lateral; Future

## 2022-06-20 ENCOUNTER — OFFICE VISIT (OUTPATIENT)
Dept: CARDIOLOGY | Facility: CLINIC | Age: 82
End: 2022-06-20

## 2022-06-20 VITALS
BODY MASS INDEX: 33.64 KG/M2 | HEART RATE: 76 BPM | DIASTOLIC BLOOD PRESSURE: 80 MMHG | HEIGHT: 70 IN | WEIGHT: 235 LBS | SYSTOLIC BLOOD PRESSURE: 130 MMHG | RESPIRATION RATE: 20 BRPM

## 2022-06-20 DIAGNOSIS — Z98.890 S/P ABLATION OF ATRIAL FIBRILLATION: ICD-10-CM

## 2022-06-20 DIAGNOSIS — I48.19 PERSISTENT ATRIAL FIBRILLATION: ICD-10-CM

## 2022-06-20 DIAGNOSIS — I48.19 ATRIAL FIBRILLATION, PERSISTENT: Primary | ICD-10-CM

## 2022-06-20 DIAGNOSIS — I50.33 ACUTE ON CHRONIC DIASTOLIC CHF (CONGESTIVE HEART FAILURE): ICD-10-CM

## 2022-06-20 DIAGNOSIS — I48.3 TYPICAL ATRIAL FLUTTER: ICD-10-CM

## 2022-06-20 DIAGNOSIS — I25.708 CORONARY ARTERY DISEASE OF BYPASS GRAFT OF NATIVE HEART WITH STABLE ANGINA PECTORIS: Chronic | ICD-10-CM

## 2022-06-20 DIAGNOSIS — Z86.79 S/P ABLATION OF ATRIAL FIBRILLATION: ICD-10-CM

## 2022-06-20 DIAGNOSIS — I25.5 ISCHEMIC CARDIOMYOPATHY: ICD-10-CM

## 2022-06-20 PROCEDURE — 93000 ELECTROCARDIOGRAM COMPLETE: CPT | Performed by: INTERNAL MEDICINE

## 2022-06-20 PROCEDURE — 99213 OFFICE O/P EST LOW 20 MIN: CPT | Performed by: INTERNAL MEDICINE

## 2022-06-20 RX ORDER — POTASSIUM CHLORIDE 750 MG/1
20 TABLET, FILM COATED, EXTENDED RELEASE ORAL DAILY
Qty: 30 TABLET | Refills: 11 | Status: SHIPPED | OUTPATIENT
Start: 2022-06-20 | End: 2022-09-29 | Stop reason: HOSPADM

## 2022-06-21 ENCOUNTER — PATIENT OUTREACH (OUTPATIENT)
Dept: CASE MANAGEMENT | Facility: OTHER | Age: 82
End: 2022-06-21

## 2022-06-21 DIAGNOSIS — I50.9 CHRONIC CONGESTIVE HEART FAILURE, UNSPECIFIED HEART FAILURE TYPE: Primary | ICD-10-CM

## 2022-06-21 DIAGNOSIS — I25.5 ISCHEMIC CARDIOMYOPATHY: ICD-10-CM

## 2022-06-21 RX ORDER — FUROSEMIDE 40 MG/1
40 TABLET ORAL DAILY
COMMUNITY
Start: 2022-06-20 | End: 2022-08-26 | Stop reason: SDUPTHER

## 2022-06-21 NOTE — OUTREACH NOTE
AMBULATORY CASE MANAGEMENT NOTE    Name and Relationship of Patient/Support Person:  -     CCM Interim Update    Reviewed chart prior to calling patient, spoke with patients wife. She said they seen Dr. Aguilar yesterday and he increased his Lasix to 40 mg daily and started him on a Potassium supplement. Riana said his feet were swollen today, and he didn't feel good, he gets SOA when he gets up. This has been an ongoing issue related to being in Afib cardiology is trying to correct. However she said they have to go back to Dr. Aguilar's office next week and have an ablation, because he went back into Afib after the cardioversion.   Talked about the chest x-ray results and the need for lab work. Riana voiced understanding and pended lab to PCP to sign off. Sent message to Jory Forman in Cardiology office who he will be seeing on Friday for follow-up to make her aware.   Went over importance of daily weights because he is not doing it consistently, she verbalized understanding.   No questions or concerns at this time.   Education Documentation  Weight Monitoring, taught by Anastacia Mccauley, RN at 6/21/2022 11:26 AM.  Learner: Family  Readiness: Acceptance  Method: Explanation  Response: Needs Reinforcement    Signs/Symptoms, taught by Anastacia Mccauley, RN at 6/21/2022 11:26 AM.  Learner: Family  Readiness: Acceptance  Method: Explanation  Response: Needs Reinforcement    Description, taught by Anastacia Mccauley, RN at 6/21/2022 11:26 AM.  Learner: Family  Readiness: Acceptance  Method: Explanation  Response: Needs Reinforcement        ANASTACIA LOVE  Ambulatory Case Management  6/21/2022, 11:27 EDT

## 2022-06-23 NOTE — PROGRESS NOTES
Chief Complaint  ER Follow up (For Afib episode), Irregular Heart Beat, and Palpitations    Subjective        Layo Cee presents to [facility] CARDIOLOGY  He is an 82-year-old with a past medical history is significant for HTN, HLD, CAD with an MI in 2016, CABG and a stent in 6/2021.  He also has CKD stage 3.  An ablation for typical atrial flutter was in 2018.  He did well until 4/2022 when he was admitted to [facility] with AF with RVR and heart failure.  He had recent ablation in early June by Dr. Aguilar unfortunately reverted back to atrial fibrillation with rapid ventricular response.  He is very symptomatic and feels poorly.  He is due to have another repeat procedure in the next few weeks when they can schedule it. Furosemide dose was increased and potassium started by Dr. Aguilar about a week ago due to abnormal chest x-ray which indicated small bilateral pleural effusions and bibasilar opacities with likely superimposed interstitial pulmonary edema.  Since that time his shortness of breath and pedal edema is greatly improved.  BNP ordered this morning by PCP results are pending.  He has occasional chest pains that wake him from sleep described as a stabbing sensation.  Denies dizziness, syncope, PND, and orthopnea.     Past History:    Past Medical History:   Diagnosis Date   • Arthritis    • BPH (benign prostatic hyperplasia)    • CAD (coronary artery disease)    • CHF (congestive heart failure) ([facility])    • COPD (chronic obstructive pulmonary disease) ([facility])    • Coronary artery disease of bypass graft of native heart with stable angina pectoris ([facility]) 8/9/2021    (1) Coronary artery disease, status post coronary artery bypass grafting in the past. Cardiac catheterization done in July 2016 showed patent left internal mammary graft to the LAD and occluded saphenous venous graft. Native LAD is 100% occluded in the mid portion. Native circumflex artery has no significant disease.  Native right coronary artery is also 100% occluded and it is a chronic total occl   • COVID-19 02/04/2021   • Diverticulitis 10/11/2019   • Dysphagia    • Essential hypertension 08/27/2020   • Frequent PVCs 8/28/2021   • GERD (gastroesophageal reflux disease)    • Gross hematuria    • HBP (high blood pressure)    • Long-term use of high-risk medication    • Lung disease    • Mixed hyperlipidemia 8/28/2021   • Myocardial infarction (HCC) 07/2016   • OCD (obsessive compulsive disorder)    • Renal insufficiency 08/27/2020   • Rhinitis, allergic 06/05/2018   • Sore throat    • Stable angina ([facility])    • Typical atrial flutter ([facility]) 11/30/2018    s/p ablation by Dr. Aguilar    • Vertigo         Family History: family history includes Heart disease in his father; Hypertension in his mother; Kidney cancer in his brother; Other in his brother.     Social History: reports that he quit smoking about 24 years ago. His smoking use included cigarettes. He started smoking about 64 years ago. He has a 20.00 pack-year smoking history. He has never used smokeless tobacco. He reports previous alcohol use. He reports that he does not use drugs.    Allergies: Carvedilol and Levaquin [levofloxacin]    Past Surgical History:   Procedure Laterality Date   • BLADDER SURGERY     • CARDIAC CATHETERIZATION  07/2016   • CARDIAC CATHETERIZATION N/A 8/9/2021    Procedure: Left Heart Cath with possible angioplasty;  Surgeon: Jack Ladd MD;  Location: [facility] INVASIVE LOCATION;  Service: Cardiovascular;  Laterality: N/A;  Please schedule the procedure with Dr. Jack Ladd MD on 8/9/2021   • CARDIAC ELECTROPHYSIOLOGY PROCEDURE N/A 6/3/2022    Procedure: Ablation atrial fibrillation;  Surgeon: Irineo Aguilar MD;  Location: [facility] INVASIVE LOCATION;  Service: Cardiovascular;  Laterality: N/A;   • CARDIAC ELECTROPHYSIOLOGY PROCEDURE N/A 6/3/2022    Procedure: Ablation atrial flutter/CTI;  Surgeon: Irineo Aguilar MD;   Location: [facility] INVASIVE LOCATION;  Service: Cardiovascular;  Laterality: N/A;   • CARDIAC SURGERY      HEART BYPASS   • COLONOSCOPY  2011   • CORONARY ARTERY BYPASS GRAFT     • CYSTOSCOPY  03/19/2019    WITH BILATERAL RETROGRADE PYELOGRAPHY   • ELECTRICAL CARDIOVERSION  5/12/2022        • ENDOSCOPY  2016   • PROSTATE SURGERY            Current Outpatient Medications:   •  acetaminophen (TYLENOL) 325 MG tablet, Take 650 mg by mouth Every 6 (Six) Hours As Needed for Mild Pain ., Disp: , Rfl:   •  amiodarone (PACERONE) 200 MG tablet, Take 1 tablet by mouth 2 (Two) Times a Day., Disp: 60 tablet, Rfl: 0  •  calcium carbonate (OS-LIANNA) 600 MG tablet, Take 600 mg by mouth Every Night., Disp: , Rfl:   •  clopidogrel (PLAVIX) 75 MG tablet, Take 1 tablet by mouth Daily., Disp: 90 tablet, Rfl: 3  •  Coenzyme Q10 100 MG tablet, Take 100 mg by mouth Daily., Disp: , Rfl:   •  dilTIAZem CD (CARDIZEM CD) 180 MG 24 hr capsule, Take 1 capsule by mouth Daily for 30 days., Disp: 30 capsule, Rfl: 0  •  Entresto 49-51 MG tablet, Take 1 tablet by mouth 2 (two) times a day., Disp: 60 tablet, Rfl: 2  •  finasteride (PROSCAR) 5 MG tablet, Take 5 mg by mouth Daily., Disp: , Rfl:   •  fluticasone (FLONASE) 50 MCG/ACT nasal spray, 1 spray into the nostril(s) as directed by provider Every Night., Disp: , Rfl:   •  Fluticasone Furoate (Arnuity Ellipta) 100 MCG/ACT aerosol powder , Inhale 1 puff Daily., Disp: 1 each, Rfl: 3  •  furosemide (LASIX) 40 MG tablet, Take 40 mg by mouth Daily., Disp: , Rfl:   •  isosorbide mononitrate (IMDUR) 60 MG 24 hr tablet, Take 1 tablet by mouth every day, Disp: 90 tablet, Rfl: 0  •  levalbuterol (Xopenex HFA) 45 MCG/ACT inhaler, Inhale 1-2 puffs Every 4 (Four) Hours As Needed for Wheezing., Disp: 1 each, Rfl: 11  •  levalbuterol (Xopenex) 0.63 MG/3ML nebulizer solution, Take 1 ampule by nebulization Every 6 (Six) Hours As Needed for Wheezing for up to 30 days., Disp: 360 mL, Rfl: 0  •  metoprolol succinate  XL (TOPROL-XL) 25 MG 24 hr tablet, Take 3 tablets by mouth Every 12 (Twelve) Hours for 30 days., Disp: 180 tablet, Rfl: 0  •  montelukast (SINGULAIR) 10 MG tablet, Take 1 tablet by mouth Every Night for 30 days., Disp: 30 tablet, Rfl: 0  •  nitroglycerin (NITROSTAT) 0.4 MG SL tablet, Place 1 tablet under the tongue Every 5 (Five) Minutes As Needed for Chest Pain for up to 30 days. Take no more than 3 doses in 15 minutes., Disp: 30 tablet, Rfl: 0  •  pantoprazole (PROTONIX) 40 MG EC tablet, Take 1 tablet by mouth Daily., Disp: 30 tablet, Rfl: 3  •  potassium chloride 10 MEQ CR tablet, Take 2 tablets by mouth Daily., Disp: 30 tablet, Rfl: 11  •  tamsulosin (FLOMAX) 0.4 MG capsule 24 hr capsule, Take 1 capsule by mouth Daily. 1/2 hours following the same meal each day (Patient taking differently: Take 1 capsule by mouth Daily.), Disp: 90 capsule, Rfl: 4  •  tiotropium bromide-olodaterol (Stiolto Respimat) 2.5-2.5 MCG/ACT aerosol solution inhaler, Inhale 2 puffs Daily., Disp: 2 each, Rfl: 0  •  VITAMIN B COMPLEX-C PO, Take 1 tablet by mouth Daily., Disp: , Rfl:   •  Xarelto 15 MG tablet, Take 1 tablet by mouth every day (Patient taking differently: Take 15 mg by mouth Every Night.), Disp: 90 tablet, Rfl: 1  •  pitavastatin calcium (Livalo) 2 MG tablet tablet, Take 1 tablet by mouth Every Night., Disp: 90 tablet, Rfl: 3     Medications Discontinued During This Encounter   Medication Reason   • Livalo 1 MG tablet tablet Dose adjustment        Review of Systems   Respiratory: Positive for cough and shortness of breath.    Cardiovascular: Positive for chest pain (woke up from sleep) and leg swelling.        Objective     Physical Exam  Constitutional:       General: He is not in acute distress.     Comments: Accompanied by his wife   Neck:      Vascular: No carotid bruit.   Cardiovascular:      Rate and Rhythm: Normal rate. Rhythm irregular.      Heart sounds: Normal heart sounds.   Pulmonary:      Effort: Pulmonary effort  "is normal.      Breath sounds: Normal breath sounds.   Musculoskeletal:      Right lower leg: No edema.      Left lower leg: No edema.   Neurological:      Mental Status: He is alert.       /55   Pulse 85   Ht 177.8 cm (70\")   Wt 106 kg (234 lb)   BMI 33.58 kg/m²       Vitals:    06/24/22 1049   BP: 111/55   Pulse: 85       Result Review :         The following data was reviewed by: ARACELI Howard on 06/24/2022:      Lab Results   Component Value Date    PROBNP 2,075.0 (H) 06/10/2022    PROBNP 3,092.0 (H) 06/06/2022    PROBNP 876.3 05/06/2022     02/23/2021     05/26/2019     CMP    CMP 6/8/22 6/9/22 6/10/22   Glucose 153 (A) 164 (A) 134 (A)   BUN 13 13 12   Creatinine 1.12 1.24 1.05   Sodium 138 136 138   Potassium 3.3 (A) 3.8 3.8   Chloride 101 99 104   Calcium 9.1 9.0 9.1   (A) Abnormal value            CBC w/diff    CBC w/Diff 6/2/22 6/4/22 6/6/22   WBC 6.86 10.15 9.18   RBC 4.29 3.78 (A) 3.69 (A)   Hemoglobin 12.7 (A) 11.3 (A) 11.3 (A)   Hematocrit 37.7 33.6 (A) 33.5 (A)   MCV 87.9 88.9 90.8   MCH 29.6 29.9 30.6   MCHC 33.7 33.6 33.7   RDW 14.7 14.7 15.5 (A)   Platelets 172 161 179   Neutrophil Rel % 51.5  66.8   Immature Granulocyte Rel % 0.6 (A)  0.9 (A)   Lymphocyte Rel % 27.6  16.3 (A)   Monocyte Rel % 11.4  14.2 (A)   Eosinophil Rel % 8.2 (A)  1.6   Basophil Rel % 0.7  0.2   (A) Abnormal value             Lipid Panel    Lipid Panel 5/6/22 6/2/22   Total Cholesterol 120 136   Triglycerides 82 126   HDL Cholesterol 32 (A) 32 (A)   VLDL Cholesterol 16 23   LDL Cholesterol  72 81   LDL/HDL Ratio 2.24 2.46   (A) Abnormal value             Lab Results   Component Value Date    TSH 1.270 01/24/2022    TSH 1.290 08/17/2020    TSH 1.470 07/26/2019      Lab Results   Component Value Date    FREET4 1.44 01/24/2022    FREET4 1.5 08/17/2020    FREET4 1.4 07/26/2019      Magnesium   Date Value Ref Range Status   06/10/2022 2.0 1.6 - 2.4 mg/dL Final      Digoxin   Date Value Ref Range " Status   06/06/2022 0.50 (L) 0.60 - 1.20 ng/mL Final      A1C Last 3 Results    HGBA1C Last 3 Results 5/6/22   Hemoglobin A1C 6.10 (A)   (A) Abnormal value                Last Echo  Results for orders placed during the hospital encounter of 06/03/22    Adult Transesophageal Echo (KELLY) W/ Cont if Necessary Per Protocol    Interpretation Summary  · Estimated left ventricular EF = 55% Left ventricular systolic function is normal. Normal left ventricular cavity size and wall thickness noted. All left ventricular wall segments contract normally. Left ventricular diastolic function was not assessed.  · The left atrial cavity is moderately dilated. No evidence of left atrial thrombus or mass present. There is no spontaneous echo contrast present. Left atrial appendage was found to be multilobar in nature. The left atrial appendage was visualized through multiple planes. Doppler interrogation shows mildly reduced flow within the left atrial appendage. No evidence of a left atrial appendage thrombus was present. Saline test results are negative.      Results for orders placed during the hospital encounter of 05/06/22    Adult Transthoracic Echo Complete w/ Color, Spectral and Contrast if necessary per protocol    Interpretation Summary  · Technically difficult images  · Calculated left ventricular EF = 52% Estimated left ventricular EF was in agreement with the calculated left ventricular EF.  · Left ventricular wall thickness is consistent with mild to moderate concentric hypertrophy.  · Moderate mitral valve regurgitation is present eccentric nature  · The right ventricular cavity is borderline dilated.  · The right atrial cavity is dilated.  · There is calcification of the aortic valve.  · Estimated right ventricular systolic pressure from tricuspid regurgitation is moderately elevated (45-55 mmHg). Calculated right ventricular systolic pressure from tricuspid regurgitation is 51 mmHg.              Assessment and Plan     Diagnoses and all orders for this visit:    1. Mixed hyperlipidemia (Primary)  Assessment & Plan:  Hyperlipidemia static control.  Increased Livalo to 2 mg a day.  Check lipids and labs in 3 months    Orders:  -     pitavastatin calcium (Livalo) 2 MG tablet tablet; Take 1 tablet by mouth Every Night.  Dispense: 90 tablet; Refill: 3  -     Lipid Panel; Future  -     Comprehensive Metabolic Panel; Future    2. Atrial fibrillation, persistent (HCC)  Assessment & Plan:  Currently in atrial fibrillation.  We are awaiting call to repeat ablation.  We will follow-up in 2 months procedure.  Continue Xarelto 15 mg, and amiodarone.      3. Chronic systolic congestive heart failure (HCC)  Assessment & Plan:  Shortness of breath and edema proving with increasing Lasix.  BNP is pending.  Continue furosemide 40 mg, metoprolol ER 25 mg 3 tabs, potassium 10 meq, and Entresto 49/51 twice daily      4. Coronary artery disease of bypass graft of native heart with stable angina pectoris (HCC)  Assessment & Plan:  Having some atypical chest pain.  Suspect this is more from his atrial fibrillation.  He agrees that if it persists after his next ablation procedure he will let us know and we will consider further evaluation.  Continue nitro as needed, isosorbide mono 60, and clopidogrel 75 mg      5. Essential hypertension  Assessment & Plan:  Well-controlled.  Continue diltiazem 180, Entresto 49/51 twice daily, furosemide 40 mg, and metoprolol ER 75 mg            Follow Up     Return in about 2 months (around 8/24/2022) for with Dr. Stevenson.    Patient was given instructions and counseling regarding his condition or for health maintenance advice. Please see specific information pulled into the AVS if appropriate.       ARACELI Garcia  06/24/22 08:40 EDT

## 2022-06-24 ENCOUNTER — LAB (OUTPATIENT)
Dept: LAB | Facility: HOSPITAL | Age: 82
End: 2022-06-24

## 2022-06-24 ENCOUNTER — OFFICE VISIT (OUTPATIENT)
Dept: CARDIOLOGY | Facility: CLINIC | Age: 82
End: 2022-06-24

## 2022-06-24 VITALS
HEIGHT: 70 IN | HEART RATE: 85 BPM | WEIGHT: 234 LBS | DIASTOLIC BLOOD PRESSURE: 55 MMHG | BODY MASS INDEX: 33.5 KG/M2 | SYSTOLIC BLOOD PRESSURE: 111 MMHG

## 2022-06-24 DIAGNOSIS — I48.19 ATRIAL FIBRILLATION, PERSISTENT: Chronic | ICD-10-CM

## 2022-06-24 DIAGNOSIS — I50.22 CHRONIC SYSTOLIC CONGESTIVE HEART FAILURE: Chronic | ICD-10-CM

## 2022-06-24 DIAGNOSIS — I50.9 CHRONIC CONGESTIVE HEART FAILURE, UNSPECIFIED HEART FAILURE TYPE: ICD-10-CM

## 2022-06-24 DIAGNOSIS — I10 ESSENTIAL HYPERTENSION: Chronic | ICD-10-CM

## 2022-06-24 DIAGNOSIS — I25.708 CORONARY ARTERY DISEASE OF BYPASS GRAFT OF NATIVE HEART WITH STABLE ANGINA PECTORIS: Chronic | ICD-10-CM

## 2022-06-24 DIAGNOSIS — E78.2 MIXED HYPERLIPIDEMIA: Primary | ICD-10-CM

## 2022-06-24 DIAGNOSIS — I48.19 ATRIAL FIBRILLATION, PERSISTENT: Primary | ICD-10-CM

## 2022-06-24 LAB — NT-PROBNP SERPL-MCNC: 825 PG/ML (ref 0–1800)

## 2022-06-24 PROCEDURE — 99214 OFFICE O/P EST MOD 30 MIN: CPT | Performed by: NURSE PRACTITIONER

## 2022-06-24 PROCEDURE — 83880 ASSAY OF NATRIURETIC PEPTIDE: CPT

## 2022-06-24 PROCEDURE — 36415 COLL VENOUS BLD VENIPUNCTURE: CPT

## 2022-06-24 NOTE — ASSESSMENT & PLAN NOTE
Hyperlipidemia static control.  Increased Livalo to 2 mg a day.  Check lipids and labs in 3 months

## 2022-06-24 NOTE — ASSESSMENT & PLAN NOTE
Well-controlled.  Continue diltiazem 180, Entresto 49/51 twice daily, furosemide 40 mg, and metoprolol ER 75 mg

## 2022-06-24 NOTE — ASSESSMENT & PLAN NOTE
Having some atypical chest pain.  Suspect this is more from his atrial fibrillation.  He agrees that if it persists after his next ablation procedure he will let us know and we will consider further evaluation.  Continue nitro as needed, isosorbide mono 60, and clopidogrel 75 mg

## 2022-06-24 NOTE — ASSESSMENT & PLAN NOTE
Shortness of breath and edema proving with increasing Lasix.  BNP is pending.  Continue furosemide 40 mg, metoprolol ER 25 mg 3 tabs, potassium 10 meq, and Entresto 49/51 twice daily

## 2022-06-24 NOTE — ASSESSMENT & PLAN NOTE
Currently in atrial fibrillation.  We are awaiting call to repeat ablation.  We will follow-up in 2 months procedure.  Continue Xarelto 15 mg, and amiodarone.

## 2022-06-27 ENCOUNTER — READMISSION MANAGEMENT (OUTPATIENT)
Dept: CALL CENTER | Facility: HOSPITAL | Age: 82
End: 2022-06-27

## 2022-06-27 DIAGNOSIS — Z01.818 OTHER SPECIFIED PRE-OPERATIVE EXAMINATION: Primary | ICD-10-CM

## 2022-06-27 NOTE — OUTREACH NOTE
CHF Week 3 Survey    Flowsheet Row Responses   [facility] facility [facility] discharged from? [facility]   Does the patient have one of the following disease processes/diagnoses(primary or secondary)? CHF   Week 3 attempt successful? No   Unsuccessful attempts Attempt 1          ALEJANDRA PIZARRO - Registered Nurse

## 2022-06-29 ENCOUNTER — TRANSCRIBE ORDERS (OUTPATIENT)
Dept: LAB | Facility: HOSPITAL | Age: 82
End: 2022-06-29

## 2022-06-29 ENCOUNTER — READMISSION MANAGEMENT (OUTPATIENT)
Dept: CALL CENTER | Facility: HOSPITAL | Age: 82
End: 2022-06-29

## 2022-06-29 ENCOUNTER — LAB (OUTPATIENT)
Dept: LAB | Facility: HOSPITAL | Age: 82
End: 2022-06-29

## 2022-06-29 DIAGNOSIS — Z01.818 PREOP TESTING: Primary | ICD-10-CM

## 2022-06-29 DIAGNOSIS — Z01.818 PREOP TESTING: ICD-10-CM

## 2022-06-29 LAB — SARS-COV-2 RNA PNL SPEC NAA+PROBE: NOT DETECTED

## 2022-06-29 PROCEDURE — U0005 INFEC AGEN DETEC AMPLI PROBE: HCPCS

## 2022-06-29 PROCEDURE — U0004 COV-19 TEST NON-CDC HGH THRU: HCPCS

## 2022-06-29 NOTE — OUTREACH NOTE
CHF Week 4 Survey    Flowsheet Row Responses   [facility] facility patient discharged from? [facility]   Does the patient have one of the following disease processes/diagnoses(primary or secondary)? CHF   Week 4 attempt successful? No          JENNIE SUMMERS - Licensed Nurse

## 2022-06-30 ENCOUNTER — PATIENT OUTREACH (OUTPATIENT)
Dept: CASE MANAGEMENT | Facility: OTHER | Age: 82
End: 2022-06-30

## 2022-06-30 DIAGNOSIS — I50.9 CHRONIC CONGESTIVE HEART FAILURE, UNSPECIFIED HEART FAILURE TYPE: Primary | ICD-10-CM

## 2022-06-30 DIAGNOSIS — I48.19 ATRIAL FIBRILLATION, PERSISTENT: ICD-10-CM

## 2022-06-30 PROCEDURE — 99490 CHRNC CARE MGMT STAFF 1ST 20: CPT | Performed by: PHYSICIAN ASSISTANT

## 2022-06-30 NOTE — OUTREACH NOTE
[facility] End of Month Documentation    This Chronic Medical Management Care Plan for Layo Cee, 82 y.o. male, has been reviewed; monitored and managed and a new plan of care implemented for the month of June.  A cumulative time of 24  minutes was spent on this patient record this month, including chart review; phone call with patient; electronic communication with primary care provider; electronic communication with other providers.    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 pectoris (HCC); Class 1 obesity due to excess calories with serious comorbidity and body mass index (BMI) of 33.0 to 33.9 in adult; Gout; Typical atrial flutter (HCC); Essential hypertension; Frequent PVCs; Mixed hyperlipidemia; Rotator cuff Bursitis of shoulder region; Lateral epicondylitis; History of hematuria; Benign prostatic hyperplasia with urinary frequency; Hydrocele in adult; History of COVID-19; Allergic rhinitis; Seasonal allergies; Chronic systolic congestive heart failure (HCC); Tobacco abuse, in remission; Vitamin D deficiency; Gastroesophageal reflux disease with esophagitis without hemorrhage; Ischemic cardiomyopathy; Centrilobular emphysema (HCC); Gastroesophageal reflux disease; Atrial fibrillation, persistent (HCC); COPD (chronic obstructive pulmonary disease) (HCC); Chronic anticoagulation; Diastolic dysfunction; S/P CABG (coronary artery bypass graft); Stage 3a chronic kidney disease (HCC); Persistent atrial fibrillation (HCC); S/P ablation of atrial fibrillation; Atrial fibrillation, unspecified type (HCC); Elevated troponin; Acute on chronic diastolic CHF (congestive heart failure) (HCC); and SOB (shortness of breath) on their problem list., the following items were addressed: medical records; medications; changes to medical care and any changes can be found within the plan section of the note.  A detailed listing of time spent  for chronic care management is tracked within each outreach encounter.  Current medications include:  has a current medication list which includes the following prescription(s): acetaminophen, amiodarone, calcium carbonate, clopidogrel, coenzyme q10, diltiazem cd, entresto, finasteride, fluticasone, arnuity ellipta, furosemide, isosorbide mononitrate, levalbuterol, levalbuterol, metoprolol succinate xl, montelukast, nitroglycerin, pantoprazole, pitavastatin calcium, potassium chloride, tamsulosin, stiolto respimat, b complex-c, and xarelto. and the patient is reported to be patient is compliant with medication protocol,  Medications are reported to be non-effective in controlling symptoms and changes have been made to the medication protocol.  Regarding these diagnoses no new referrals were made.  All notes on chart for PCP to review.    The patient was monitored remotely for medications; pain; weight; activity level.    The patient's physical needs include:  physical healthcare; medication education.     The patient's mental support needs include:  needs met    The patient's cognitive support needs include:  health care; medication    The patient's psychosocial support needs include:  medication management or adherence    The patient's functional needs include: medication education; physical healthcare    The patient's environmental needs include:  not applicable    Care Plan overall comments:  N/A    Refer to previous outreach notes for more information on the areas listed above.    Monthly Billing Diagnoses  (I50.9) Chronic congestive heart failure, unspecified heart failure type (HCC)    (I48.19) Atrial fibrillation, persistent (HCC)    Medications   · Medications have been reconciled    Care Plan progress this month:      Recently Modified Care Plans Updates made since 5/30/2022 12:00 AM     Heart Failure (Adult)         Problem Priority Last Modified     ELS DISEASE PROGRESSION (HEART FAILURE) (ADULT) --   4/7/2022  9:25 AM by Rajani Mccauley, RN              Goal Recent Progress Last Modified     Health Optimized --  4/7/2022  9:25 AM by Rajani Mccauley RN     Evidence-based guidance:   Use brief intervention, such as 5 A's (Ask, Advise, Assess, Assist, Arrange) to encourage smoking cessation; refer to smoking cessation program, if ready for more intensive intervention.   Perform or refer to a registered dietitian for a nutrition assessment and nutrition-focused physical exam.    Identify potential micronutrient deficiencies, such as iron, vitamin D and thiamin.   Assess need for potential diet and fluid modification, such as reduced sodium or fluid intake.   Minimize unnecessary dietary restrictions to increase oral intake. Note: Sodium restriction should be individualized to the patient and clinical status.   Facilitate home monitoring of weight.    Notes:              Task Due Date Last Modified     Optimize Health --  6/21/2022 11:32 AM by Rajani Mccauley, RN     Care Management Activities:      - weight gain or loss reviewed and trended      Notes:                      Instructions   · Patient was provided an electronic copy of care plan  · CCM services were explained and offered and patient has accepted these services.  · Patient has given their written consent to receive CCM services and understands that this includes the authorization of electronic communication of medical information with the other treating providers.  · Patient understands that they may stop CCM services at any time and these changes will be effective at the end of the calendar month and will effectively revocate the agreement of CCM services.  · Patient understands that only one practitioner can furnish and be paid for CCM services during one calendar month.  Patient also understands that there may be co-payment or deductible fees in association with CCM services.  · Patient will continue with at least monthly follow-up  calls with the Nurse Navigator.    ANASTACIA LOVE  Ambulatory Case Management    6/30/2022, 10:46 EDT

## 2022-07-01 ENCOUNTER — HOSPITAL ENCOUNTER (OUTPATIENT)
Dept: CARDIOLOGY | Facility: HOSPITAL | Age: 82
Discharge: HOME | End: 2022-07-01
Admitting: INTERNAL MEDICINE

## 2022-07-01 VITALS
TEMPERATURE: 97.4 F | RESPIRATION RATE: 18 BRPM | WEIGHT: 228 LBS | HEIGHT: 70 IN | BODY MASS INDEX: 32.64 KG/M2 | DIASTOLIC BLOOD PRESSURE: 69 MMHG | SYSTOLIC BLOOD PRESSURE: 100 MMHG | HEART RATE: 51 BPM | OXYGEN SATURATION: 94 %

## 2022-07-01 DIAGNOSIS — I48.19 ATRIAL FIBRILLATION, PERSISTENT: ICD-10-CM

## 2022-07-01 LAB
MAXIMAL PREDICTED HEART RATE: 140 BPM
QT INTERVAL: 442 MS
QT INTERVAL: 473 MS
STRESS TARGET HR: 119 BPM

## 2022-07-01 PROCEDURE — 92960 CARDIOVERSION ELECTRIC EXT: CPT | Performed by: INTERNAL MEDICINE

## 2022-07-01 PROCEDURE — 93010 ELECTROCARDIOGRAM REPORT: CPT | Performed by: INTERNAL MEDICINE

## 2022-07-01 PROCEDURE — 25010000002 MIDAZOLAM PER 1 MG: Performed by: INTERNAL MEDICINE

## 2022-07-01 PROCEDURE — 93005 ELECTROCARDIOGRAM TRACING: CPT | Performed by: INTERNAL MEDICINE

## 2022-07-01 PROCEDURE — 92960 CARDIOVERSION ELECTRIC EXT: CPT

## 2022-07-01 RX ORDER — MIDAZOLAM HYDROCHLORIDE 1 MG/ML
INJECTION INTRAMUSCULAR; INTRAVENOUS
Status: COMPLETED | OUTPATIENT
Start: 2022-07-01 | End: 2022-07-01

## 2022-07-01 RX ORDER — SODIUM CHLORIDE 0.9 % (FLUSH) 0.9 %
10 SYRINGE (ML) INJECTION EVERY 12 HOURS SCHEDULED
Status: DISCONTINUED | OUTPATIENT
Start: 2022-07-01 | End: 2022-07-02 | Stop reason: HOSPADM

## 2022-07-01 RX ORDER — SODIUM CHLORIDE 0.9 % (FLUSH) 0.9 %
10 SYRINGE (ML) INJECTION AS NEEDED
Status: DISCONTINUED | OUTPATIENT
Start: 2022-07-01 | End: 2022-07-02 | Stop reason: HOSPADM

## 2022-07-01 RX ORDER — SODIUM CHLORIDE 9 MG/ML
75 INJECTION, SOLUTION INTRAVENOUS CONTINUOUS
Status: DISCONTINUED | OUTPATIENT
Start: 2022-07-01 | End: 2022-07-02 | Stop reason: HOSPADM

## 2022-07-01 RX ADMIN — SODIUM CHLORIDE 75 ML/HR: 9 INJECTION, SOLUTION INTRAVENOUS at 07:44

## 2022-07-01 RX ADMIN — MIDAZOLAM 2 MG: 1 INJECTION INTRAMUSCULAR; INTRAVENOUS at 08:03

## 2022-07-01 RX ADMIN — Medication 20 MG: at 08:05

## 2022-07-01 NOTE — PROCEDURES
Pre Op Diagnosis - atrial fibrillation    Post Op Diagnosis - same    Procedure - cardioversion    Anesthesia - moderate sedation    Description - After informed consent was obtained the patient was brought to the EP lab in the fasting state.  He was sedated with Versed and Brevital and cardioverted with 200J which was successful in restoring sinus rhythm. The patient tolerated the procedure well.    The patient was given moderate sedation by an RN with me in constant attendance.  The patient was monitored continuously with EKG, pulse oximetry and BP.  The duration of sedation was 5 minutes.

## 2022-07-01 NOTE — DISCHARGE SUMMARY
[facility]    DISCHARGE SUMMARY      Patient Name: Layo Cee  :1940  82 y.o.    Date of Admit: 2022  Date of Discharge:  2022    Discharge Diagnosis:  Problems Addressed this Visit        Cardiac and Vasculature    Atrial fibrillation, persistent (HCC)    Relevant Orders    Cardioversion External in Cardiology Department      Diagnoses       Codes Comments    Atrial fibrillation, persistent (HCC)     ICD-10-CM: I48.19  ICD-9-CM: 427.31           Hospital Course: Patient was admitted for cardioversion which was successful.      Procedures Performed  * No procedures listed *       Consults     Date and Time Order Name Status Description    2022  4:25 PM Inpatient Cardiology Consult Completed           Pertinent Test Results:         Invalid input(s): LABALBU, PROT      @LABRCNT(bnp)@                    Condition on Discharge: stable    Discharge Medications     Discharge Medications      Changes to Medications      Instructions Start Date   tamsulosin 0.4 MG capsule 24 hr capsule  Commonly known as: FLOMAX  What changed: additional instructions   0.4 mg, Oral, Daily, 1/2 hours following the same meal each day      Xarelto 15 MG tablet  Generic drug: rivaroxaban  What changed:   · how much to take  · when to take this   Take 1 tablet by mouth every day         Continue These Medications      Instructions Start Date   acetaminophen 325 MG tablet  Commonly known as: TYLENOL   650 mg, Oral, Every 6 Hours PRN      amiodarone 200 MG tablet  Commonly known as: PACERONE   200 mg, Oral, 2 Times Daily      Arnuity Ellipta 100 MCG/ACT aerosol powder   Generic drug: Fluticasone Furoate   1 puff, Inhalation, Daily      calcium carbonate 600 MG tablet  Commonly known as: OS-LIANNA   600 mg, Oral, Nightly      clopidogrel 75 MG tablet  Commonly known as: PLAVIX   75 mg, Oral, Daily      Coenzyme Q10 100 MG tablet   100 mg, Oral, Daily      Entresto 49-51 MG tablet  Generic drug:  sacubitril-valsartan   1 tablet, Oral, 2 Times Daily      finasteride 5 MG tablet  Commonly known as: PROSCAR   5 mg, Oral, Daily      fluticasone 50 MCG/ACT nasal spray  Commonly known as: FLONASE   1 spray, Nasal, Nightly      furosemide 40 MG tablet  Commonly known as: LASIX   40 mg, Oral, Daily      isosorbide mononitrate 60 MG 24 hr tablet  Commonly known as: IMDUR   Take 1 tablet by mouth every day      levalbuterol 0.63 MG/3ML nebulizer solution  Commonly known as: Xopenex   0.63 mg, Nebulization, Every 6 Hours PRN      levalbuterol 45 MCG/ACT inhaler  Commonly known as: Xopenex HFA   1-2 puffs, Inhalation, Every 4 Hours PRN      metoprolol succinate XL 25 MG 24 hr tablet  Commonly known as: TOPROL-XL   75 mg, Oral, Every 12 Hours Scheduled      montelukast 10 MG tablet  Commonly known as: SINGULAIR   10 mg, Oral, Nightly      nitroglycerin 0.4 MG SL tablet  Commonly known as: NITROSTAT   0.4 mg, Sublingual, Every 5 Minutes PRN, Take no more than 3 doses in 15 minutes.      pantoprazole 40 MG EC tablet  Commonly known as: PROTONIX   40 mg, Oral, Daily      pitavastatin calcium 2 MG tablet tablet  Commonly known as: Livalo   2 mg, Oral, Nightly      potassium chloride 10 MEQ CR tablet   20 mEq, Oral, Daily      Stiolto Respimat 2.5-2.5 MCG/ACT aerosol solution inhaler  Generic drug: tiotropium bromide-olodaterol   2 puffs, Inhalation, Daily - RT      VITAMIN B COMPLEX-C PO   1 tablet, Oral, Daily         Stop These Medications    dilTIAZem  MG 24 hr capsule  Commonly known as: CARDIZEM CD            Discharge Diet:     Activity at Discharge:     Discharge disposition: home    Follow-up Appointments  Future Appointments   Date Time Provider Department Center   8/15/2022 11:00 AM Darnell Stevenson MD [facility] CD [place] [facility]   9/27/2022 11:15 AM Kevyn Rhodes PA [facility] PC CSPRG [facility]   10/27/2022 10:00 AM Ivy Peng APRN [facility] U ETRING [facility]   11/14/2022 11:00 AM Kolton Brink MD [facility] PCC ETW [facility]         Test  Results Pending at Discharge       Irineo Aguilar MD, FACC    07/01/22  08:10 EDT

## 2022-07-12 RX ORDER — METOPROLOL SUCCINATE 25 MG/1
75 TABLET, EXTENDED RELEASE ORAL EVERY 12 HOURS SCHEDULED
Qty: 180 TABLET | Refills: 0 | Status: SHIPPED | OUTPATIENT
Start: 2022-07-12 | End: 2022-08-15

## 2022-07-14 ENCOUNTER — TELEPHONE (OUTPATIENT)
Dept: CARDIOLOGY | Facility: CLINIC | Age: 82
End: 2022-07-14

## 2022-07-14 NOTE — TELEPHONE ENCOUNTER
Heart rate in 50s, likely means that he is back in sinus rhythm.    Recommend to decrease the dose of metoprolol to 50 mg twice daily (per current order, he is taking 75 twice daily)    If he is taking amiodarone 200 mg twice daily, recommend to decrease it to once daily    Keep blood pressure and heart rate log twice daily      Electronically signed by Darnell Stevenson MD, 07/14/22, 1:02 PM EDT.

## 2022-07-20 DIAGNOSIS — K21.9 GASTROESOPHAGEAL REFLUX DISEASE, UNSPECIFIED WHETHER ESOPHAGITIS PRESENT: ICD-10-CM

## 2022-07-20 DIAGNOSIS — J43.2 CENTRILOBULAR EMPHYSEMA: ICD-10-CM

## 2022-07-20 DIAGNOSIS — I51.89 DIASTOLIC DYSFUNCTION: ICD-10-CM

## 2022-07-20 RX ORDER — MONTELUKAST SODIUM 10 MG/1
10 TABLET ORAL NIGHTLY
Qty: 30 TABLET | Refills: 11 | Status: SHIPPED | OUTPATIENT
Start: 2022-07-20 | End: 2023-02-20

## 2022-07-22 ENCOUNTER — TELEPHONE (OUTPATIENT)
Dept: GASTROENTEROLOGY | Facility: CLINIC | Age: 82
End: 2022-07-22

## 2022-07-22 NOTE — TELEPHONE ENCOUNTER
Spoke to MR. Cee to remind him of his US to have that done. He has been having some heart issues. After 08.11.2022

## 2022-08-04 ENCOUNTER — OFFICE VISIT (OUTPATIENT)
Dept: CARDIOLOGY | Facility: CLINIC | Age: 82
End: 2022-08-04

## 2022-08-04 VITALS
OXYGEN SATURATION: 96 % | HEART RATE: 68 BPM | DIASTOLIC BLOOD PRESSURE: 60 MMHG | HEIGHT: 70 IN | SYSTOLIC BLOOD PRESSURE: 102 MMHG | WEIGHT: 232 LBS | BODY MASS INDEX: 33.21 KG/M2 | RESPIRATION RATE: 20 BRPM

## 2022-08-04 DIAGNOSIS — I48.19 PERSISTENT ATRIAL FIBRILLATION: ICD-10-CM

## 2022-08-04 DIAGNOSIS — I48.3 TYPICAL ATRIAL FLUTTER: ICD-10-CM

## 2022-08-04 DIAGNOSIS — I50.22 CHRONIC SYSTOLIC CONGESTIVE HEART FAILURE: Primary | ICD-10-CM

## 2022-08-04 DIAGNOSIS — Z86.79 S/P ABLATION OF ATRIAL FIBRILLATION: ICD-10-CM

## 2022-08-04 DIAGNOSIS — I25.5 ISCHEMIC CARDIOMYOPATHY: ICD-10-CM

## 2022-08-04 DIAGNOSIS — I10 ESSENTIAL HYPERTENSION: ICD-10-CM

## 2022-08-04 DIAGNOSIS — I25.708 CORONARY ARTERY DISEASE OF BYPASS GRAFT OF NATIVE HEART WITH STABLE ANGINA PECTORIS: Chronic | ICD-10-CM

## 2022-08-04 DIAGNOSIS — Z98.890 S/P ABLATION OF ATRIAL FIBRILLATION: ICD-10-CM

## 2022-08-04 DIAGNOSIS — Z95.1 S/P CABG (CORONARY ARTERY BYPASS GRAFT): ICD-10-CM

## 2022-08-04 PROCEDURE — 93000 ELECTROCARDIOGRAM COMPLETE: CPT | Performed by: INTERNAL MEDICINE

## 2022-08-04 PROCEDURE — 99213 OFFICE O/P EST LOW 20 MIN: CPT | Performed by: INTERNAL MEDICINE

## 2022-08-04 NOTE — PROGRESS NOTES
Subjective:     Encounter Date:08/04/2022      Patient ID: Layo Cee is a 82 y.o. male.    Chief Complaint:  Chief Complaint   Patient presents with   • Atrial Fibrillation     DCCV 7/1/2022       HPI:  Mr Cee is an 81yo patient of Dr. Stevenson who is referred for evaluation of persistent atrial fibrillation.  His past medical history is significant for HTN, HLD, CAD with an MI in 2016, CABG and a stent in 6/2021.  He also has CKD stage 3.  An ablation for typical atrial flutter was in 2018.  He did well until 4/2022 when he was admitted to [facility] with AF with RVR and heart failure.  He was rate controlled, anticoagulated and diuresed.  He was seen in followup by Dr. Soliz and had a cardioversion 5/12/2022 was initially successful but he reverted back to AF shortly afterward.  The AF has persisted with rates that are high at times.  He has been very symptomatic with marked fatigue, poor stamina, palpitations and exertional dypsnea.  He is no longer to be as active as he once was.     An echo 5/2022 showed mild-moderate concentric LVH with an EF of 52%, moderate MR with mod-severe LAE, mild TR with KORIN.  There was borderline RV enlargement.     A cath 6/2021 showed  Findings:  1. Coronary Artery Anatomy:  Dominance: codominant  Left Main: angiographically normal, 4.5-5.0 mm diameter vessel that bifurcates distally into the LAD and circumflex, no dampening or ventricularization of the pressure waveform upon engagement of the vessel  Left Anterior Descending: diffuse disease throughout the proximal-mid LAD with up to 80% stenosis; competitive flow was observed to the distal LAD from the patient's LIMA graft  Left Circumflex Artery: codominant vessel with focal 40-50% stenosis in the proximal circumflex and a tubular 50-60% stenosis in the distal circumflex; left-sided PDA contains a focal 70% stenosis in its proximal segment  Right Coronary Artery: severely diffusely diseased and chronically  occluded distally; left-to-right collaterals were observed to the distal RCA system     2. Graft Study:  LIMA-LAD: widely patent throughout with no significant disease in the native LAD distal to the graft anastomosis  SVG-OM: chronically occluded at ostium  SVG-RCA: chronically occluded at ostium     3. Hemodynamics:  Left Ventricle: 155/0, LVEDP=12 mmHg  Aorta: 160/81 mmHg (opening pressure), 176/67 (closing pressure)     4. Left Ventriculogram:  Ejection Fraction: 65-70%  Wall Motion: mild inferior hypokinesis  Mitral Regurgitation: trace     On 6/3/2022 he was taken to the EP lab and had the followin. Successful pulmonary vein isolation with demonstration of entrance and exit block  2. Isolation of the posterior wall  3. Creation of a roof line  4. Extensive left atrial substrate modification as described above  5. Confirmation of bidirectional conduction block across the cavotricuspid isthmus  6. Prolonged corrected sinus node recovery time  7. Normal AV node conduction  8. Normal His Purkinje conduction     Several days following his ablation he was admitted to [facility] with AF with RVR and heart failure.  He was rate controlled and diuresed and discharged after several days. He was cardioverted about a month ago and has remained in NSR.  He has been having some fatigue and exertional dyspnea.  Dr. Stevenson reduced his amiodarone to 200mg qd and reduced Toprol dose which has helped.       The following portions of the patient's history were reviewed and updated as appropriate: current medications, past family history, past medical history, past social history, past surgical history and problem list.    Problem List:  Patient Active Problem List   Diagnosis   • Unstable angina (HCC)   • Coronary artery disease of bypass graft of native heart with stable angina pectoris (HCC)   • Class 1 obesity due to excess calories with serious comorbidity and body mass index (BMI) of 33.0 to 33.9 in adult   • Gout    • Typical atrial flutter (HCC)   • Essential hypertension   • Frequent PVCs   • Mixed hyperlipidemia   • Rotator cuff Bursitis of shoulder region   • Lateral epicondylitis   • History of hematuria   • Benign prostatic hyperplasia with urinary frequency   • Hydrocele in adult   • History of COVID-19   • Allergic rhinitis   • Seasonal allergies   • Chronic systolic congestive heart failure (HCC)   • Tobacco abuse, in remission   • Vitamin D deficiency   • Gastroesophageal reflux disease with esophagitis without hemorrhage   • Ischemic cardiomyopathy   • Centrilobular emphysema (HCC)   • Gastroesophageal reflux disease   • Atrial fibrillation, persistent (HCC)   • COPD (chronic obstructive pulmonary disease) (HCC)   • Chronic anticoagulation   • Diastolic dysfunction   • S/P CABG (coronary artery bypass graft)   • Stage 3a chronic kidney disease (HCC)   • Persistent atrial fibrillation (HCC)   • S/P ablation of atrial fibrillation   • Atrial fibrillation, unspecified type (HCC)   • Elevated troponin   • Acute on chronic diastolic CHF (congestive heart failure) (HCC)   • SOB (shortness of breath)       Active Med List:    Current Outpatient Medications:   •  acetaminophen (TYLENOL) 325 MG tablet, Take 650 mg by mouth Every 6 (Six) Hours As Needed for Mild Pain ., Disp: , Rfl:   •  amiodarone (PACERONE) 200 MG tablet, Take 1 tablet by mouth 2 (Two) Times a Day. (Patient taking differently: Take 200 mg by mouth Daily.), Disp: 60 tablet, Rfl: 0  •  calcium carbonate (OS-LIANNA) 600 MG tablet, Take 600 mg by mouth Every Night., Disp: , Rfl:   •  clopidogrel (PLAVIX) 75 MG tablet, Take 1 tablet by mouth Daily., Disp: 90 tablet, Rfl: 3  •  Coenzyme Q10 100 MG tablet, Take 100 mg by mouth Daily., Disp: , Rfl:   •  Entresto 49-51 MG tablet, Take 1 tablet by mouth 2 (two) times a day., Disp: 60 tablet, Rfl: 2  •  finasteride (PROSCAR) 5 MG tablet, Take 5 mg by mouth Daily., Disp: , Rfl:   •  fluticasone (FLONASE) 50 MCG/ACT nasal  spray, 1 spray into the nostril(s) as directed by provider Every Night., Disp: , Rfl:   •  Fluticasone Furoate (Arnuity Ellipta) 100 MCG/ACT aerosol powder , Inhale 1 puff Daily., Disp: 1 each, Rfl: 3  •  furosemide (LASIX) 40 MG tablet, Take 40 mg by mouth Daily., Disp: , Rfl:   •  isosorbide mononitrate (IMDUR) 60 MG 24 hr tablet, Take 1 tablet by mouth every day, Disp: 90 tablet, Rfl: 0  •  levalbuterol (Xopenex HFA) 45 MCG/ACT inhaler, Inhale 1-2 puffs Every 4 (Four) Hours As Needed for Wheezing., Disp: 1 each, Rfl: 11  •  levalbuterol (Xopenex) 0.63 MG/3ML nebulizer solution, Take 1 ampule by nebulization Every 6 (Six) Hours As Needed for Wheezing for up to 30 days., Disp: 360 mL, Rfl: 0  •  metoprolol succinate XL (TOPROL-XL) 25 MG 24 hr tablet, Take 3 tablets by mouth Every 12 (Twelve) Hours. (Patient taking differently: Take 50 mg by mouth Every 12 (Twelve) Hours.), Disp: 180 tablet, Rfl: 0  •  montelukast (SINGULAIR) 10 MG tablet, Take 1 tablet by mouth Every Night for 30 days., Disp: 30 tablet, Rfl: 11  •  nitroglycerin (NITROSTAT) 0.4 MG SL tablet, Place 1 tablet under the tongue Every 5 (Five) Minutes As Needed for Chest Pain for up to 30 days. Take no more than 3 doses in 15 minutes., Disp: 30 tablet, Rfl: 0  •  pantoprazole (PROTONIX) 40 MG EC tablet, Take 1 tablet by mouth Daily., Disp: 30 tablet, Rfl: 3  •  pitavastatin calcium (Livalo) 2 MG tablet tablet, Take 1 tablet by mouth Every Night., Disp: 90 tablet, Rfl: 3  •  potassium chloride 10 MEQ CR tablet, Take 2 tablets by mouth Daily., Disp: 30 tablet, Rfl: 11  •  tamsulosin (FLOMAX) 0.4 MG capsule 24 hr capsule, Take 1 capsule by mouth Daily. 1/2 hours following the same meal each day (Patient taking differently: Take 1 capsule by mouth Daily.), Disp: 90 capsule, Rfl: 4  •  tiotropium bromide-olodaterol (Stiolto Respimat) 2.5-2.5 MCG/ACT aerosol solution inhaler, Inhale 2 puffs Daily., Disp: 2 each, Rfl: 0  •  VITAMIN B COMPLEX-C PO, Take 1 tablet  by mouth Daily., Disp: , Rfl:   •  Xarelto 15 MG tablet, Take 1 tablet by mouth every day (Patient taking differently: Take 15 mg by mouth Every Night.), Disp: 90 tablet, Rfl: 1     Past Medical History:  Past Medical History:   Diagnosis Date   • Arthritis    • BPH (benign prostatic hyperplasia)    • CAD (coronary artery disease)    • CHF (congestive heart failure) ([facility])    • COPD (chronic obstructive pulmonary disease) ([facility])    • Coronary artery disease of bypass graft of native heart with stable angina pectoris ([facility]) 8/9/2021    (1) Coronary artery disease, status post coronary artery bypass grafting in the past. Cardiac catheterization done in July 2016 showed patent left internal mammary graft to the LAD and occluded saphenous venous graft. Native LAD is 100% occluded in the mid portion. Native circumflex artery has no significant disease. Native right coronary artery is also 100% occluded and it is a chronic total occl   • COVID-19 02/04/2021   • Diverticulitis 10/11/2019   • Dysphagia    • Essential hypertension 08/27/2020   • Frequent PVCs 8/28/2021   • GERD (gastroesophageal reflux disease)    • Gross hematuria    • HBP (high blood pressure)    • Long-term use of high-risk medication    • Lung disease    • Mixed hyperlipidemia 8/28/2021   • Myocardial infarction ([facility]) 07/2016   • OCD (obsessive compulsive disorder)    • Renal insufficiency 08/27/2020   • Rhinitis, allergic 06/05/2018   • Sore throat    • Stable angina ([facility])    • Typical atrial flutter ([facility]) 11/30/2018    s/p ablation by Dr. Aguilar    • Vertigo        Past Surgical History:  Past Surgical History:   Procedure Laterality Date   • BLADDER SURGERY     • CARDIAC CATHETERIZATION  07/2016   • CARDIAC CATHETERIZATION N/A 8/9/2021    Procedure: Left Heart Cath with possible angioplasty;  Surgeon: Jack Ladd MD;  Location: [facility] CATH INVASIVE LOCATION;  Service: Cardiovascular;  Laterality: N/A;  Please schedule the procedure with Dr. Santiago  MD Wilberto on 2021   • CARDIAC ELECTROPHYSIOLOGY PROCEDURE N/A 6/3/2022    Procedure: Ablation atrial fibrillation;  Surgeon: Irineo Aguilar MD;  Location:  [facility] CATH INVASIVE LOCATION;  Service: Cardiovascular;  Laterality: N/A;   • CARDIAC ELECTROPHYSIOLOGY PROCEDURE N/A 6/3/2022    Procedure: Ablation atrial flutter/CTI;  Surgeon: Irineo Aguilar MD;  Location:  [facility] CATH INVASIVE LOCATION;  Service: Cardiovascular;  Laterality: N/A;   • CARDIAC SURGERY      HEART BYPASS   • COLONOSCOPY     • CORONARY ARTERY BYPASS GRAFT     • CYSTOSCOPY  2019    WITH BILATERAL RETROGRADE PYELOGRAPHY   • ELECTRICAL CARDIOVERSION  2022        • ENDOSCOPY  2016   • PROSTATE SURGERY         Social History:  Social History     Socioeconomic History   • Marital status:    Tobacco Use   • Smoking status: Former Smoker     Packs/day: 0.50     Years: 40.00     Pack years: 20.00     Types: Cigarettes     Start date:      Quit date:      Years since quittin.6   • Smokeless tobacco: Never Used   Vaping Use   • Vaping Use: Never used   Substance and Sexual Activity   • Alcohol use: Not Currently   • Drug use: Never   • Sexual activity: Defer       Allergies:  Allergies   Allergen Reactions   • Carvedilol Swelling and Anaphylaxis   • Levaquin [Levofloxacin] Unknown - Low Severity       Immunizations:  Immunization History   Administered Date(s) Administered   • COVID-19 (NIKKY) 2021   • Fluzone High-Dose 65+yrs 2021   • Fluzone Split Quad (Multi-dose) 10/08/2019   • Influenza Quad Vaccine (Inpatient) 10/08/2019   • Influenza, Unspecified 10/08/2019   • Shingrix 2021, 2021          Objective:         Review of Systems   Constitutional: Negative for fatigue.   Respiratory: Negative for shortness of breath.    Cardiovascular: Negative for chest pain, palpitations and leg swelling.   All other systems reviewed and are negative.       /60   Pulse 68   Resp 20    "Ht 177.8 cm (70\")   Wt 105 kg (232 lb)   SpO2 96%   BMI 33.29 kg/m²     Constitutional:       Appearance: Not in distress.   Eyes:      Pupils: Pupils are equal, round, and reactive to light.   Neck:      Vascular: JVD normal.   Pulmonary:      Effort: Pulmonary effort is normal.      Breath sounds: Normal breath sounds.   Cardiovascular:      Normal rate. Regular rhythm.   Abdominal:      Palpations: Abdomen is soft.   Skin:     General: Skin is warm and dry.   Neurological:      General: No focal deficit present.      Mental Status: Alert and oriented to person, place and time.         In-Office Procedure(s):    ECG 12 Lead    Date/Time: 8/4/2022 8:21 AM  Performed by: Irineo Aguilar MD  Authorized by: Irineo Aguilar MD   Comparison: compared with previous ECG from 7/1/2022  Comparison to previous ECG: SB, STT abn  Rhythm: sinus bradycardia  Other findings: non-specific ST-T wave changes    Clinical impression: abnormal EKG          No orders to display        ASCVD RIsk Score::  The ASCVD Risk score (Uli DC Jr., et al., 2013) failed to calculate for the following reasons:    The 2013 ASCVD risk score is only valid for ages 40 to 79    The patient has a prior MI or stroke diagnosis    Recent Radiology:          Lab Review:       Recent labs reviewed and interpreted for clinical significance and application          Assessment:          Diagnosis Plan   1. Chronic systolic congestive heart failure (HCC)     2. Coronary artery disease of bypass graft of native heart with stable angina pectoris (HCC)     3. Essential hypertension     4. Ischemic cardiomyopathy     5. Persistent atrial fibrillation (HCC)     6. S/P ablation of atrial fibrillation     7. S/P CABG (coronary artery bypass graft)     8. Typical atrial flutter (HCC)            Plan:      1. Persistent AF - now about 2 months post ablation, has remained in sinus rhythm since cardioversion, continue amio and Xarelto, will reduce Toprol to " 50mg qHS     2. CAD with prior CABG and PCI - no angina, on Plavix, Imdur, metoprolol and Entresto     3. HTN - controlled     4. HLD     5. COPD - albuterol, fluticasone and Stioloto     6. CKD3 - last creatinine 1.3, GFR 56     7. Atrial flutter - s/p ablation 2018     8. PVC's     9. GERD     RTC 3 months      Level of Care:                 Irineo Aguilar MD  08/04/22

## 2022-08-15 ENCOUNTER — OFFICE VISIT (OUTPATIENT)
Dept: CARDIOLOGY | Facility: CLINIC | Age: 82
End: 2022-08-15

## 2022-08-15 VITALS
DIASTOLIC BLOOD PRESSURE: 75 MMHG | HEART RATE: 76 BPM | SYSTOLIC BLOOD PRESSURE: 129 MMHG | BODY MASS INDEX: 33.64 KG/M2 | HEIGHT: 70 IN | WEIGHT: 235 LBS

## 2022-08-15 DIAGNOSIS — E78.2 MIXED HYPERLIPIDEMIA: ICD-10-CM

## 2022-08-15 DIAGNOSIS — I48.19 ATRIAL FIBRILLATION, PERSISTENT: ICD-10-CM

## 2022-08-15 DIAGNOSIS — I10 ESSENTIAL HYPERTENSION: ICD-10-CM

## 2022-08-15 DIAGNOSIS — I25.708 CORONARY ARTERY DISEASE OF BYPASS GRAFT OF NATIVE HEART WITH STABLE ANGINA PECTORIS: Primary | Chronic | ICD-10-CM

## 2022-08-15 DIAGNOSIS — I25.5 ISCHEMIC CARDIOMYOPATHY: ICD-10-CM

## 2022-08-15 PROCEDURE — 99213 OFFICE O/P EST LOW 20 MIN: CPT | Performed by: INTERNAL MEDICINE

## 2022-08-15 RX ORDER — ASPIRIN 81 MG/1
81 TABLET ORAL DAILY
Qty: 30 TABLET | Refills: 1
Start: 2022-08-15

## 2022-08-15 RX ORDER — METOPROLOL SUCCINATE 25 MG/1
25 TABLET, EXTENDED RELEASE ORAL DAILY
Qty: 90 TABLET | Refills: 2 | Status: ON HOLD
Start: 2022-08-15 | End: 2022-09-29 | Stop reason: SDUPTHER

## 2022-08-15 NOTE — PROGRESS NOTES
CARDIOLOGY FOLLOW-UP PROGRESS NOTE        Chief Complaint  Atrial Fibrillation, Hyperlipidemia, and Hypertension    Subjective            Layo Cee presents to [facility] CARDIOLOGY  History of Present Illness     is here for a follow-up visit.  He underwent pulmonary vein isolation and extensive ablation for severe symptomatic atrial fibrillation on 6/3/2022 by Dr. Aguilar.  He was readmitted 3 days later to [facility] with increasing shortness of breath and was noted to be in A. fib with RVR.  We loaded with amiodarone again and was discharged on higher dose of metoprolol.  Subsequently patient underwent a successful cardioversion a month later at [place] by Dr. Aguilar.  Since then he feels much better.  He was recently seen at electrophysiology office and was again noted to be in normal sinus rhythm.  He does not feel any palpitations.  His exertional shortness of breath is improving.  He is feeling more dizzy at this time.  He has not had any syncopal episodes.  Both him and his wife got COVID and infection, second time, last months.  They are still recovering from it.  He is taking all the medicines as prescribed.  Dose of metoprolol was decreased to Toprol-XL 50 mg daily by Dr. Aguilar recently.      Past History:    (1) Coronary artery disease, status post coronary artery bypass grafting in the past. Cardiac catheterization done in July 2016 showed patent left internal mammary graft to the LAD and occluded saphenous venous graft. Native LAD is 100% occluded in the mid portion. Native circumflex artery has no significant disease. Native right coronary artery is also 100% occluded and it is a chronic total occlusion. The right coronary artery is reconstituted with collaterals from the left side.  Patient underwent repeat cardiac catheterization in June, 2021 and underwent angioplasty stent placement to diagonal branch.  (2) Ischemic cardiomyopathy with recovery of cardiac  function. Last SPECT stress test in August 2016 showed an LV ejection fraction of 68%. (3) Chronic kidney disease, stage 3. (4) Chronic obstructive pulmonary disease, not an exacerbation. (5) Hypertension. (6) Hyperlipidemia. 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 (8) Typical atrial flutter status post radiofrequency ablation by Dr. Aguilar on 11/30/2018 (9) persistent atrial fibrillation status post extensive ablation and pulmonary vein isolation by Dr. 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 Cardiac catheterization done in July 2016 showed patent left internal mammary graft to the LAD and occluded saphenous venous graft. Native LAD is 100% occluded in the mid portion. Native circumflex artery has no significant disease. Native right coronary artery is also 100% occluded and it is a chronic total occl   • COVID-19 02/04/2021   • Diverticulitis 10/11/2019   • Dysphagia    • Essential hypertension 08/27/2020   • Frequent PVCs 8/28/2021   • GERD (gastroesophageal reflux disease)    • Gross hematuria    • HBP (high blood pressure)    • Long-term use of high-risk medication    • Lung disease    • Mixed hyperlipidemia 8/28/2021   • Myocardial infarction ([facility]) 07/2016   • OCD (obsessive compulsive disorder)    • Renal insufficiency 08/27/2020   • Rhinitis, allergic 06/05/2018   • Sore throat    • Stable angina ([facility])    • Typical atrial flutter ([facility]) 11/30/2018    s/p ablation by Dr. Aguilar    • Vertigo        Surgical History: has a past surgical  history that includes Bladder surgery; Coronary artery bypass graft; Cardiac catheterization (07/2016); Colonoscopy (2011); Cystoscopy (03/19/2019); Esophagogastroduodenoscopy (2016); Cardiac surgery; Prostate surgery; Cardiac catheterization (N/A, 8/9/2021); Electrical Cardioversion (5/12/2022); Cardiac electrophysiology procedure (N/A, 6/3/2022); and Cardiac electrophysiology procedure (N/A, 6/3/2022).     Family History: family history includes Heart disease in his father; Hypertension in his mother; Kidney cancer in his brother; Other in his brother.     Social History: reports that he quit smoking about 24 years ago. His smoking use included cigarettes. He started smoking about 64 years ago. He has a 20.00 pack-year smoking history. He has never used smokeless tobacco. He reports previous alcohol use. He reports that he does not use drugs.    Allergies: Carvedilol and Levaquin [levofloxacin]    Current Outpatient Medications on File Prior to Visit   Medication Sig   • acetaminophen (TYLENOL) 325 MG tablet Take 650 mg by mouth Every 6 (Six) Hours As Needed for Mild Pain .   • amiodarone (PACERONE) 200 MG tablet Take 1 tablet by mouth 2 (Two) Times a Day. (Patient taking differently: Take 200 mg by mouth Daily.)   • calcium carbonate (OS-LIANNA) 600 MG tablet Take 600 mg by mouth Every Night.   • Coenzyme Q10 100 MG tablet Take 100 mg by mouth Daily.   • Entresto 49-51 MG tablet Take 1 tablet by mouth 2 (two) times a day.   • finasteride (PROSCAR) 5 MG tablet Take 5 mg by mouth Daily.   • fluticasone (FLONASE) 50 MCG/ACT nasal spray 1 spray into the nostril(s) as directed by provider Every Night.   • Fluticasone Furoate (Arnuity Ellipta) 100 MCG/ACT aerosol powder  Inhale 1 puff Daily.   • furosemide (LASIX) 40 MG tablet Take 40 mg by mouth Daily.   • isosorbide mononitrate (IMDUR) 60 MG 24 hr tablet Take 1 tablet by mouth every day   • levalbuterol (Xopenex HFA) 45 MCG/ACT inhaler Inhale 1-2 puffs Every 4 (Four)  "Hours As Needed for Wheezing.   • montelukast (SINGULAIR) 10 MG tablet Take 1 tablet by mouth Every Night for 30 days.   • nitroglycerin (NITROSTAT) 0.4 MG SL tablet Place 1 tablet under the tongue Every 5 (Five) Minutes As Needed for Chest Pain for up to 30 days. Take no more than 3 doses in 15 minutes.   • pantoprazole (PROTONIX) 40 MG EC tablet Take 1 tablet by mouth Daily.   • pitavastatin calcium (Livalo) 2 MG tablet tablet Take 1 tablet by mouth Every Night.   • potassium chloride 10 MEQ CR tablet Take 2 tablets by mouth Daily.   • tamsulosin (FLOMAX) 0.4 MG capsule 24 hr capsule Take 1 capsule by mouth Daily. 1/2 hours following the same meal each day (Patient taking differently: Take 1 capsule by mouth Daily.)   • tiotropium bromide-olodaterol (Stiolto Respimat) 2.5-2.5 MCG/ACT aerosol solution inhaler Inhale 2 puffs Daily.   • VITAMIN B COMPLEX-C PO Take 1 tablet by mouth Daily.   • Xarelto 15 MG tablet Take 1 tablet by mouth every day (Patient taking differently: Take 15 mg by mouth Every Night.)   • clopidogrel (PLAVIX) 75 MG tablet Take 1 tablet by mouth Daily.   •  metoprolol succinate XL (TOPROL-XL) 25 MG 24 hr tablet  take 50 mg every night   • levalbuterol (Xopenex) 0.63 MG/3ML nebulizer solution Take 1 ampule by nebulization Every 6 (Six) Hours As Needed for Wheezing for up to 30 days.       Review of Systems   Constitutional: Positive for fatigue.   Respiratory: Positive for shortness of breath. Negative for cough and wheezing.    Cardiovascular: Negative for chest pain, palpitations and leg swelling.   Gastrointestinal: Negative for nausea and vomiting.   Neurological: Positive for dizziness. Negative for syncope.        Objective     /75   Pulse 76   Ht 177.8 cm (70\")   Wt 107 kg (235 lb)   BMI 33.72 kg/m²       Physical Exam    General : Alert, awake, no acute distress  Neck : Supple, no carotid bruit, no jugular venous distention  CVS : Regular rate and rhythm, no murmur, rubs or " gallops  Lungs: Clear to auscultation bilaterally, no crackles or rhonchi  Abdomen: Soft, nontender, bowel sounds heard in all 4 quadrants  Extremities: Warm, well-perfused, no pedal edema    Result Review :     The following data was reviewed by: Darnell Stevenson MD on 08/15/2022:    CMP    CMP 6/8/22 6/9/22 6/10/22   Glucose 153 (A) 164 (A) 134 (A)   BUN 13 13 12   Creatinine 1.12 1.24 1.05   Sodium 138 136 138   Potassium 3.3 (A) 3.8 3.8   Chloride 101 99 104   Calcium 9.1 9.0 9.1   (A) Abnormal value            CBC    CBC 6/2/22 6/4/22 6/6/22   WBC 6.86 10.15 9.18   RBC 4.29 3.78 (A) 3.69 (A)   Hemoglobin 12.7 (A) 11.3 (A) 11.3 (A)   Hematocrit 37.7 33.6 (A) 33.5 (A)   MCV 87.9 88.9 90.8   MCH 29.6 29.9 30.6   MCHC 33.7 33.6 33.7   RDW 14.7 14.7 15.5 (A)   Platelets 172 161 179   (A) Abnormal value            TSH    TSH 1/24/22   TSH 1.270           Lipid Panel    Lipid Panel 5/6/22 6/2/22   Total Cholesterol 120 136   Triglycerides 82 126   HDL Cholesterol 32 (A) 32 (A)   VLDL Cholesterol 16 23   LDL Cholesterol  72 81   LDL/HDL Ratio 2.24 2.46   (A) Abnormal value                 Data reviewed: Cardiology studies        Results for orders placed during the hospital encounter of 06/03/22    Adult Transesophageal Echo (KELLY) W/ Cont if Necessary Per Protocol    Interpretation Summary  · Estimated left ventricular EF = 55% Left ventricular systolic function is normal. Normal left ventricular cavity size and wall thickness noted. All left ventricular wall segments contract normally. Left ventricular diastolic function was not assessed.  · The left atrial cavity is moderately dilated. No evidence of left atrial thrombus or mass present. There is no spontaneous echo contrast present. Left atrial appendage was found to be multilobar in nature. The left atrial appendage was visualized through multiple planes. Doppler interrogation shows mildly reduced flow within the left atrial appendage. No evidence of a left atrial  appendage thrombus was present. Saline test results are negative.      Results for orders placed during the hospital encounter of 07/08/21    Stress Test With Myocardial Perfusion One Day    Interpretation Summary  · Myocardial perfusion imaging indicates a small-sized infarct located in the inferior wall with mild mansoor-infarct ischemia.  · Left ventricular ejection fraction is borderline normal. (Calculated EF = 49%).  · Diaphragmatic attenuation artifact is present.  · Findings consistent with a normal ECG stress test.  · Occasional isolated PVCs are noted at rest and also during stress.  · Impressions are consistent with an intermediate risk study.               Assessment and Plan        Diagnoses and all orders for this visit:    1. Coronary artery disease of bypass graft of native heart with stable angina pectoris (HCC) (Primary)  Assessment & Plan:  Currently stable with no angina-like symptoms.  He completed 1 year since latest angioplasty.  We will discontinue Plavix and instead start aspirin 81 mg p.o. daily.  Continue statins and beta-blockers.    Orders:  -     aspirin (aspirin) 81 MG EC tablet; Take 1 tablet by mouth Daily.  Dispense: 30 tablet; Refill: 1    2. Atrial fibrillation, persistent (HCC)  Assessment & Plan:  He had another cardioversion in early July and since then he remains in normal sinus rhythm.  Symptoms are somewhat better.  We will continue amiodarone 200 mg daily.  Because of dizziness, will decrease the dose of metoprolol succinate to 25 mg at bedtime.  He is already off Cardizem CD.  Continue Xarelto for anticoagulation.    Orders:  -     metoprolol succinate XL (TOPROL-XL) 25 MG 24 hr tablet; Take 1 tablet by mouth Daily.  Dispense: 90 tablet; Refill: 2    3. Ischemic cardiomyopathy  Assessment & Plan:  He is clinically not volume overloaded but does have shortness of breath.  Most recent echo showed preserved LV function.  We will continue Entresto and metoprolol.  He is unable to  tolerate carvedilol.  Continue Lasix 40 mg once daily.    Orders:  -     metoprolol succinate XL (TOPROL-XL) 25 MG 24 hr tablet; Take 1 tablet by mouth Daily.  Dispense: 90 tablet; Refill: 2    4. Essential hypertension  Assessment & Plan:  Blood pressure well controlled and at times borderline low.  Decreasing metoprolol.  Continue other medications without changes.      5. Mixed hyperlipidemia  Assessment & Plan:  Level dose was increased recently.  He is due for a CMP and lipid panel checked next month.  We will follow the results.              Follow Up     Return in about 3 months (around 11/15/2022) for Next scheduled follow up.    Patient was given instructions and counseling regarding his condition or for health maintenance advice. Please see specific information pulled into the AVS if appropriate.

## 2022-08-15 NOTE — ASSESSMENT & PLAN NOTE
Blood pressure well controlled and at times borderline low.  Decreasing metoprolol.  Continue other medications without changes.

## 2022-08-15 NOTE — ASSESSMENT & PLAN NOTE
Currently stable with no angina-like symptoms.  He completed 1 year since latest angioplasty.  We will discontinue Plavix and instead start aspirin 81 mg p.o. daily.  Continue statins and beta-blockers.

## 2022-08-15 NOTE — ASSESSMENT & PLAN NOTE
He had another cardioversion in early July and since then he remains in normal sinus rhythm.  Symptoms are somewhat better.  We will continue amiodarone 200 mg daily.  Because of dizziness, will decrease the dose of metoprolol succinate to 25 mg at bedtime.  He is already off Cardizem CD.  Continue Xarelto for anticoagulation.

## 2022-08-15 NOTE — ASSESSMENT & PLAN NOTE
Level dose was increased recently.  He is due for a CMP and lipid panel checked next month.  We will follow the results.

## 2022-08-15 NOTE — ASSESSMENT & PLAN NOTE
He is clinically not volume overloaded but does have shortness of breath.  Most recent echo showed preserved LV function.  We will continue Entresto and metoprolol.  He is unable to tolerate carvedilol.  Continue Lasix 40 mg once daily.

## 2022-08-23 ENCOUNTER — HOSPITAL ENCOUNTER (OUTPATIENT)
Dept: ULTRASOUND IMAGING | Facility: HOSPITAL | Age: 82
Discharge: HOME | End: 2022-08-23
Admitting: NURSE PRACTITIONER

## 2022-08-23 DIAGNOSIS — K21.9 GASTROESOPHAGEAL REFLUX DISEASE, UNSPECIFIED WHETHER ESOPHAGITIS PRESENT: ICD-10-CM

## 2022-08-23 DIAGNOSIS — R93.2 ABNORMAL CT SCAN, LIVER: ICD-10-CM

## 2022-08-23 PROCEDURE — 76705 ECHO EXAM OF ABDOMEN: CPT

## 2022-08-24 ENCOUNTER — PATIENT OUTREACH (OUTPATIENT)
Dept: CASE MANAGEMENT | Facility: OTHER | Age: 82
End: 2022-08-24

## 2022-08-24 DIAGNOSIS — I50.9 CHRONIC CONGESTIVE HEART FAILURE, UNSPECIFIED HEART FAILURE TYPE: Primary | ICD-10-CM

## 2022-08-24 NOTE — OUTREACH NOTE
AMBULATORY CASE MANAGEMENT NOTE    Name and Relationship of Patient/Support Person: ColemanLayo H - Self    CCM Interim Update    Reviewed chart prior to calling patient, and states that he's been doing good, but still having SOA with exertion. He seen cardiology last 8/15 and per patient he believes its from his COPD. They made several changes to his medications at that appointment, and states that his wife helps take care of all that. He was having dizziness so they decreased his Metoprolol, and states that has been better.   He seen Pulmonology last in June with the issue and no changes were made, and has follow-up in November.   States he has been watching his weight and swelling. When he checks his SAo2 its always 90% or above he says.   He had cardioversion again and has not been in A-Fib since.   Asked if he had plans to get the COVID vaccine booster and said he wasn't sure, however said he had COVID a couple months ago.   No needs at this time.   Education Documentation  No documentation found.      ANASTACIA LOVE  Ambulatory Case Management  8/24/2022, 17:01 EDT

## 2022-08-25 ENCOUNTER — TELEPHONE (OUTPATIENT)
Dept: GASTROENTEROLOGY | Facility: CLINIC | Age: 82
End: 2022-08-25

## 2022-08-25 DIAGNOSIS — K76.0 FATTY LIVER: Primary | ICD-10-CM

## 2022-08-25 NOTE — TELEPHONE ENCOUNTER
I spoke with Mrs Cee (ok per JESSICA) notified of results. Agreed to scheduled Fibro scan, scheduled for 09/02/2022 at 11am. Will mail the appt reminder and prep instructions. Voiced understanding. reji

## 2022-08-25 NOTE — TELEPHONE ENCOUNTER
----- Message from ARACELI Sutton sent at 8/23/2022  1:38 PM EDT -----  Right upper quadrant ultrasound displays fatty liver with possible cirrhotic changes.  I would recommend performing a FibroScan to differentiate fat versus fibrosis.  Gallstone without acute cholecystitis.  And a simple right hepatic cyst in the liver.

## 2022-08-26 DIAGNOSIS — I25.5 ISCHEMIC CARDIOMYOPATHY: ICD-10-CM

## 2022-08-26 RX ORDER — FUROSEMIDE 40 MG/1
40 TABLET ORAL DAILY
Qty: 90 TABLET | Refills: 1 | Status: SHIPPED | OUTPATIENT
Start: 2022-08-26 | End: 2022-09-29 | Stop reason: HOSPADM

## 2022-08-31 ENCOUNTER — PATIENT OUTREACH (OUTPATIENT)
Dept: CASE MANAGEMENT | Facility: OTHER | Age: 82
End: 2022-08-31

## 2022-08-31 DIAGNOSIS — I48.19 ATRIAL FIBRILLATION, PERSISTENT: ICD-10-CM

## 2022-08-31 DIAGNOSIS — I50.9 CHRONIC CONGESTIVE HEART FAILURE, UNSPECIFIED HEART FAILURE TYPE: Primary | ICD-10-CM

## 2022-08-31 PROCEDURE — 99490 CHRNC CARE MGMT STAFF 1ST 20: CPT | Performed by: PHYSICIAN ASSISTANT

## 2022-08-31 NOTE — OUTREACH NOTE
[facility] End of Month Documentation    This Chronic Medical Management Care Plan for Layo Cee, 82 y.o. male, has been reviewed; monitored and managed and a new plan of care implemented for the month of August.  A cumulative time of 23  minutes was spent on this patient record this month, including chart review; phone call with patient.    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 pectoris (HCC); Class 1 obesity due to excess calories with serious comorbidity and body mass index (BMI) of 33.0 to 33.9 in adult; Gout; Typical atrial flutter (HCC); Essential hypertension; Frequent PVCs; Mixed hyperlipidemia; Rotator cuff Bursitis of shoulder region; Lateral epicondylitis; History of hematuria; Benign prostatic hyperplasia with urinary frequency; Hydrocele in adult; History of COVID-19; Allergic rhinitis; Seasonal allergies; Chronic systolic congestive heart failure (HCC); Tobacco abuse, in remission; Vitamin D deficiency; Gastroesophageal reflux disease with esophagitis without hemorrhage; Ischemic cardiomyopathy; Centrilobular emphysema (HCC); Gastroesophageal reflux disease; Atrial fibrillation, persistent (HCC); COPD (chronic obstructive pulmonary disease) (HCC); Chronic anticoagulation; Diastolic dysfunction; S/P CABG (coronary artery bypass graft); Stage 3a chronic kidney disease (HCC); Persistent atrial fibrillation (HCC); S/P ablation of atrial fibrillation; Atrial fibrillation, unspecified type (HCC); Elevated troponin; Acute on chronic diastolic CHF (congestive heart failure) (HCC); and SOB (shortness of breath) on their problem list., the following items were addressed: medical records; medications; changes to medical care and any changes can be found within the plan section of the note.  A detailed listing of time spent for chronic care management is tracked within each outreach encounter.  Current medications  include:  has a current medication list which includes the following prescription(s): furosemide, acetaminophen, amiodarone, aspirin, calcium carbonate, coenzyme q10, entresto, finasteride, fluticasone, arnuity ellipta, isosorbide mononitrate, levalbuterol, levalbuterol, metoprolol succinate xl, montelukast, nitroglycerin, pantoprazole, pitavastatin calcium, potassium chloride, tamsulosin, stiolto respimat, b complex-c, xarelto, and [DISCONTINUED] diltiazem cd. and the patient is reported to be patient is compliant with medication protocol,  Medications are reported to be effective, His BP has been elevated.  Regarding these diagnoses no new referrals were made.  All notes on chart for PCP to review.    The patient was monitored remotely for medications; pain; weight; activity level; blood pressure.    The patient's physical needs include:  physical healthcare; medication education.     The patient's mental support needs include:  needs met    The patient's cognitive support needs include:  health care; medication    The patient's psychosocial support needs include:  medication management or adherence    The patient's functional needs include: medication education; physical healthcare    The patient's environmental needs include:  not applicable    Care Plan overall comments:  N/A    Refer to previous outreach notes for more information on the areas listed above.    Monthly Billing Diagnoses  (I50.9) Chronic congestive heart failure, unspecified heart failure type (HCC)    (I48.19) Atrial fibrillation, persistent (HCC)    Medications   · Medications have been reconciled    Care Plan progress this month:      Recently Modified Care Plans Updates made since 7/31/2022 12:00 AM    No recently modified care plans.        Instructions   · Patient was provided an electronic copy of care plan  · CCM services were explained and offered and patient has accepted these services.  · Patient has given their written consent to  receive CCM services and understands that this includes the authorization of electronic communication of medical information with the other treating providers.  · Patient understands that they may stop CCM services at any time and these changes will be effective at the end of the calendar month and will effectively revocate the agreement of CCM services.  · Patient understands that only one practitioner can furnish and be paid for CCM services during one calendar month.  Patient also understands that there may be co-payment or deductible fees in association with CCM services.  · Patient will continue with at least monthly follow-up calls with the Nurse Navigator.    ANASTACIA LOVE  Ambulatory Case Management    8/31/2022, 14:28 EDT

## 2022-09-02 ENCOUNTER — PROCEDURE VISIT (OUTPATIENT)
Dept: OTHER | Facility: HOSPITAL | Age: 82
End: 2022-09-02

## 2022-09-02 DIAGNOSIS — K76.0 FATTY LIVER: ICD-10-CM

## 2022-09-02 PROCEDURE — 91200 LIVER ELASTOGRAPHY: CPT | Performed by: NURSE PRACTITIONER

## 2022-09-02 RX ORDER — TIOTROPIUM BROMIDE AND OLODATEROL 3.124; 2.736 UG/1; UG/1
2 SPRAY, METERED RESPIRATORY (INHALATION)
Qty: 2 EACH | Refills: 0 | COMMUNITY
Start: 2022-09-02 | End: 2022-09-03

## 2022-09-06 NOTE — PROGRESS NOTES
Liver Elastography  Performed by: Ariela Sena APRN  Authorized by: Laila Martinez APRN   Ordering Provider: Laila Martinez APRN    Probe:  XL+  Procedure Details:  Procedure: After providing an oral and written explanation of the Fibroscan vibration controlled transient elastography (VTCE) test procedure to the patient. The patient was placed in supine position with right arm in maximum abduction to allow optimal exposure of right lateral abdomen. Patient was briefly assessed, identifying terminus of the xyphoid process and locating an ideal transient elastography testing site, midline and lateral to this point. Patient was instructed to breathe normally and remain stationary during the test process. Pre-measurement data confirmed the transient elastography probe was centered over the liver parenchyma. A series of ten 50 Hz mechanical pulses were applied with controlled application pressure to induce a mechanical shear wave in the liver tissue. For each measurement, the shear wave propagation speed was detected, displayed and converted to its equivalent liver stiffness value in kilopascals. Skin to liver capsules distance and shear wave characteristics were monitored during the entire examination to assure quality data. Median liver stiffness measurement and interquartile range were calculated and displayed in real time. Acquired measurement data was stored and submitted to the provider for review and interpretation. Patient tolerated the procedure well and was discharged without incident.   Clinical Information:     NPO 3 Hours or More: Yes      Actively Drinking: No    Findings:     Median Liver Stiffness Score:  6.5    Interquartile Range (IQR) to Median Ratio:  25    Dimple Stiffness Consistent with:  F0-F1    Current Scan Considered Reliab: Yes      Median Controlled Attenuation Parameter (dB/m):  344    IQR:  43    CAP SCORE:  Moderate/severe liver fat

## 2022-09-07 ENCOUNTER — TELEPHONE (OUTPATIENT)
Dept: GASTROENTEROLOGY | Facility: CLINIC | Age: 82
End: 2022-09-07

## 2022-09-07 NOTE — TELEPHONE ENCOUNTER
I spoke with Mr Cee, notified of results. Encouraged health lifestyle choices. Voiced understanding. reji

## 2022-09-07 NOTE — TELEPHONE ENCOUNTER
----- Message from ARACELI Pat sent at 9/6/2022 12:56 PM EDT -----  I have reviewed the patient's Fibroscan that shows moderate to severe fatty infiltration of the liver. Liver stiffness score is F0-F1 which is mild. Liver stiffness score is on a scale from F0 (mild) to F4 (cirrhosis).

## 2022-09-27 ENCOUNTER — PREP FOR SURGERY (OUTPATIENT)
Dept: OTHER | Facility: HOSPITAL | Age: 82
End: 2022-09-27

## 2022-09-27 ENCOUNTER — HOSPITAL ENCOUNTER (OUTPATIENT)
Facility: HOSPITAL | Age: 82
Setting detail: OBSERVATION
Discharge: HOME | End: 2022-09-29
Attending: INTERNAL MEDICINE | Admitting: INTERNAL MEDICINE

## 2022-09-27 ENCOUNTER — APPOINTMENT (OUTPATIENT)
Dept: CARDIOLOGY | Facility: HOSPITAL | Age: 82
End: 2022-09-27

## 2022-09-27 DIAGNOSIS — I25.5 ISCHEMIC CARDIOMYOPATHY: ICD-10-CM

## 2022-09-27 DIAGNOSIS — I48.19 ATRIAL FIBRILLATION, PERSISTENT: ICD-10-CM

## 2022-09-27 PROBLEM — R07.9 CHEST PAIN: Status: ACTIVE | Noted: 2022-09-27

## 2022-09-27 LAB
ALBUMIN SERPL-MCNC: 4 G/DL (ref 3.5–5.2)
ALBUMIN/GLOB SERPL: 1.4 G/DL
ALP SERPL-CCNC: 56 U/L (ref 39–117)
ALT SERPL W P-5'-P-CCNC: 12 U/L (ref 1–41)
ANION GAP SERPL CALCULATED.3IONS-SCNC: 8.4 MMOL/L (ref 5–15)
AST SERPL-CCNC: 18 U/L (ref 1–40)
BASOPHILS # BLD AUTO: 0.03 10*3/MM3 (ref 0–0.2)
BASOPHILS NFR BLD AUTO: 0.3 % (ref 0–1.5)
BILIRUB SERPL-MCNC: 0.3 MG/DL (ref 0–1.2)
BUN SERPL-MCNC: 18 MG/DL (ref 8–23)
BUN/CREAT SERPL: 10.1 (ref 7–25)
CALCIUM SPEC-SCNC: 9.4 MG/DL (ref 8.6–10.5)
CHLORIDE SERPL-SCNC: 101 MMOL/L (ref 98–107)
CO2 SERPL-SCNC: 29.6 MMOL/L (ref 22–29)
CREAT SERPL-MCNC: 1.78 MG/DL (ref 0.76–1.27)
DEPRECATED RDW RBC AUTO: 49.6 FL (ref 37–54)
EGFRCR SERPLBLD CKD-EPI 2021: 38.1 ML/MIN/1.73
EOSINOPHIL # BLD AUTO: 0.38 10*3/MM3 (ref 0–0.4)
EOSINOPHIL NFR BLD AUTO: 4.2 % (ref 0.3–6.2)
ERYTHROCYTE [DISTWIDTH] IN BLOOD BY AUTOMATED COUNT: 15.6 % (ref 12.3–15.4)
GLOBULIN UR ELPH-MCNC: 2.9 GM/DL
GLUCOSE SERPL-MCNC: 111 MG/DL (ref 65–99)
HCT VFR BLD AUTO: 40.5 % (ref 37.5–51)
HGB BLD-MCNC: 13.2 G/DL (ref 13–17.7)
IMM GRANULOCYTES # BLD AUTO: 0.05 10*3/MM3 (ref 0–0.05)
IMM GRANULOCYTES NFR BLD AUTO: 0.6 % (ref 0–0.5)
LYMPHOCYTES # BLD AUTO: 2.34 10*3/MM3 (ref 0.7–3.1)
LYMPHOCYTES NFR BLD AUTO: 25.8 % (ref 19.6–45.3)
MAGNESIUM SERPL-MCNC: 2.2 MG/DL (ref 1.6–2.4)
MCH RBC QN AUTO: 28.7 PG (ref 26.6–33)
MCHC RBC AUTO-ENTMCNC: 32.6 G/DL (ref 31.5–35.7)
MCV RBC AUTO: 88 FL (ref 79–97)
MONOCYTES # BLD AUTO: 1.01 10*3/MM3 (ref 0.1–0.9)
MONOCYTES NFR BLD AUTO: 11.1 % (ref 5–12)
NEUTROPHILS NFR BLD AUTO: 5.27 10*3/MM3 (ref 1.7–7)
NEUTROPHILS NFR BLD AUTO: 58 % (ref 42.7–76)
NRBC BLD AUTO-RTO: 0 /100 WBC (ref 0–0.2)
PLATELET # BLD AUTO: 211 10*3/MM3 (ref 140–450)
PMV BLD AUTO: 9.7 FL (ref 6–12)
POTASSIUM SERPL-SCNC: 3.9 MMOL/L (ref 3.5–5.2)
PROT SERPL-MCNC: 6.9 G/DL (ref 6–8.5)
RBC # BLD AUTO: 4.6 10*6/MM3 (ref 4.14–5.8)
SODIUM SERPL-SCNC: 139 MMOL/L (ref 136–145)
TROPONIN T SERPL-MCNC: <0.01 NG/ML (ref 0–0.03)
WBC NRBC COR # BLD: 9.08 10*3/MM3 (ref 3.4–10.8)

## 2022-09-27 PROCEDURE — 94799 UNLISTED PULMONARY SVC/PX: CPT

## 2022-09-27 PROCEDURE — 85025 COMPLETE CBC W/AUTO DIFF WBC: CPT | Performed by: INTERNAL MEDICINE

## 2022-09-27 PROCEDURE — G0378 HOSPITAL OBSERVATION PER HR: HCPCS

## 2022-09-27 PROCEDURE — 93306 TTE W/DOPPLER COMPLETE: CPT

## 2022-09-27 PROCEDURE — 99220 PR INITIAL OBSERVATION CARE/DAY 70 MINUTES: CPT | Performed by: INTERNAL MEDICINE

## 2022-09-27 PROCEDURE — G0379 DIRECT REFER HOSPITAL OBSERV: HCPCS

## 2022-09-27 PROCEDURE — 84484 ASSAY OF TROPONIN QUANT: CPT | Performed by: INTERNAL MEDICINE

## 2022-09-27 PROCEDURE — 80053 COMPREHEN METABOLIC PANEL: CPT | Performed by: INTERNAL MEDICINE

## 2022-09-27 PROCEDURE — 93005 ELECTROCARDIOGRAM TRACING: CPT | Performed by: INTERNAL MEDICINE

## 2022-09-27 PROCEDURE — 83735 ASSAY OF MAGNESIUM: CPT | Performed by: INTERNAL MEDICINE

## 2022-09-27 PROCEDURE — 93010 ELECTROCARDIOGRAM REPORT: CPT | Performed by: SPECIALIST

## 2022-09-27 PROCEDURE — 93306 TTE W/DOPPLER COMPLETE: CPT | Performed by: INTERNAL MEDICINE

## 2022-09-27 RX ORDER — SODIUM CHLORIDE 9 MG/ML
40 INJECTION, SOLUTION INTRAVENOUS AS NEEDED
Status: DISCONTINUED | OUTPATIENT
Start: 2022-09-27 | End: 2022-09-29 | Stop reason: HOSPADM

## 2022-09-27 RX ORDER — ACETAMINOPHEN 325 MG/1
650 TABLET ORAL EVERY 4 HOURS PRN
Status: DISCONTINUED | OUTPATIENT
Start: 2022-09-27 | End: 2022-09-28

## 2022-09-27 RX ORDER — NITROGLYCERIN 0.4 MG/1
0.4 TABLET SUBLINGUAL
Status: DISCONTINUED | OUTPATIENT
Start: 2022-09-27 | End: 2022-09-29 | Stop reason: HOSPADM

## 2022-09-27 RX ORDER — SODIUM CHLORIDE 0.9 % (FLUSH) 0.9 %
10 SYRINGE (ML) INJECTION EVERY 12 HOURS SCHEDULED
Status: DISCONTINUED | OUTPATIENT
Start: 2022-09-27 | End: 2022-09-29 | Stop reason: HOSPADM

## 2022-09-27 RX ORDER — ACETAMINOPHEN 160 MG/5ML
650 SOLUTION ORAL EVERY 4 HOURS PRN
Status: DISCONTINUED | OUTPATIENT
Start: 2022-09-27 | End: 2022-09-29 | Stop reason: HOSPADM

## 2022-09-27 RX ORDER — SODIUM CHLORIDE 0.9 % (FLUSH) 0.9 %
10 SYRINGE (ML) INJECTION AS NEEDED
Status: DISCONTINUED | OUTPATIENT
Start: 2022-09-27 | End: 2022-09-29 | Stop reason: HOSPADM

## 2022-09-27 RX ORDER — ACETAMINOPHEN 650 MG/1
650 SUPPOSITORY RECTAL EVERY 4 HOURS PRN
Status: DISCONTINUED | OUTPATIENT
Start: 2022-09-27 | End: 2022-09-29 | Stop reason: HOSPADM

## 2022-09-27 RX ADMIN — Medication 10 ML: at 19:48

## 2022-09-28 ENCOUNTER — APPOINTMENT (OUTPATIENT)
Dept: GENERAL RADIOLOGY | Facility: HOSPITAL | Age: 82
End: 2022-09-28

## 2022-09-28 LAB
ALBUMIN SERPL-MCNC: 3.9 G/DL (ref 3.5–5.2)
ALBUMIN/GLOB SERPL: 1.5 G/DL
ALP SERPL-CCNC: 52 U/L (ref 39–117)
ALT SERPL W P-5'-P-CCNC: 14 U/L (ref 1–41)
ANION GAP SERPL CALCULATED.3IONS-SCNC: 8.7 MMOL/L (ref 5–15)
AORTIC DIMENSIONLESS INDEX: 0.3 (DI)
AST SERPL-CCNC: 17 U/L (ref 1–40)
BASOPHILS # BLD AUTO: 0.05 10*3/MM3 (ref 0–0.2)
BASOPHILS NFR BLD AUTO: 0.6 % (ref 0–1.5)
BH CV ECHO MEAS - AO MAX PG: 18 MMHG
BH CV ECHO MEAS - AO MEAN PG: 10 MMHG
BH CV ECHO MEAS - AO ROOT DIAM: 2.9 CM
BH CV ECHO MEAS - AO V2 MAX: 209 CM/SEC
BH CV ECHO MEAS - AO V2 VTI: 47.6 CM
BH CV ECHO MEAS - EDV(MOD-SP2): 107 ML
BH CV ECHO MEAS - EDV(MOD-SP4): 111 ML
BH CV ECHO MEAS - EF(MOD-BP): 65 %
BH CV ECHO MEAS - ESV(MOD-SP2): 36.9 ML
BH CV ECHO MEAS - ESV(MOD-SP4): 43 ML
BH CV ECHO MEAS - IVSD: 0.8 CM
BH CV ECHO MEAS - LA A2CS (ATRIAL LENGTH): 6.6 CM
BH CV ECHO MEAS - LA A4C LENGTH: 6.7 CM
BH CV ECHO MEAS - LA DIMENSION: 4.3 CM
BH CV ECHO MEAS - LAT PEAK E' VEL: 9.4 CM/SEC
BH CV ECHO MEAS - LV MAX PG: 2 MMHG
BH CV ECHO MEAS - LV MEAN PG: 1 MMHG
BH CV ECHO MEAS - LV V1 MAX: 73.8 CM/SEC
BH CV ECHO MEAS - LV V1 VTI: 14.2 CM
BH CV ECHO MEAS - LVIDD: 5.3 CM
BH CV ECHO MEAS - LVIDS: 3.3 CM
BH CV ECHO MEAS - LVOT DIAM: 2 CM
BH CV ECHO MEAS - LVPWD: 0.8 CM
BH CV ECHO MEAS - MED PEAK E' VEL: 7.1 CM/SEC
BH CV ECHO MEAS - MV A MAX VEL: 85.9 CM/SEC
BH CV ECHO MEAS - MV DEC SLOPE: 291 CM/SEC2
BH CV ECHO MEAS - MV DEC TIME: 252 MSEC
BH CV ECHO MEAS - MV E MAX VEL: 73.3 CM/SEC
BH CV ECHO MEAS - MV E/A: 0.9
BH CV ECHO MEAS - MV MAX PG: 3 MMHG
BH CV ECHO MEAS - MV MEAN PG: 2 MMHG
BH CV ECHO MEAS - MV P1/2T: 86 MSEC
BH CV ECHO MEAS - MV V2 VTI: 29 CM
BH CV ECHO MEAS - MVA(P1/2T): 2.6 CM2
BH CV ECHO MEAS - PA V2 MAX: 62 CM/SEC
BH CV ECHO MEAS - RVDD: 3.1 CM
BH CV ECHO MEASUREMENTS AVERAGE E/E' RATIO: 8.88
BILIRUB SERPL-MCNC: 0.4 MG/DL (ref 0–1.2)
BUN SERPL-MCNC: 18 MG/DL (ref 8–23)
BUN/CREAT SERPL: 11.4 (ref 7–25)
CALCIUM SPEC-SCNC: 9.6 MG/DL (ref 8.6–10.5)
CHLORIDE SERPL-SCNC: 102 MMOL/L (ref 98–107)
CK SERPL-CCNC: 117 U/L (ref 20–200)
CO2 SERPL-SCNC: 27.3 MMOL/L (ref 22–29)
CREAT SERPL-MCNC: 1.58 MG/DL (ref 0.76–1.27)
DEPRECATED RDW RBC AUTO: 48.6 FL (ref 37–54)
EGFRCR SERPLBLD CKD-EPI 2021: 43.9 ML/MIN/1.73
EOSINOPHIL # BLD AUTO: 0.44 10*3/MM3 (ref 0–0.4)
EOSINOPHIL NFR BLD AUTO: 5.3 % (ref 0.3–6.2)
ERYTHROCYTE [DISTWIDTH] IN BLOOD BY AUTOMATED COUNT: 15.4 % (ref 12.3–15.4)
GLOBULIN UR ELPH-MCNC: 2.6 GM/DL
GLUCOSE SERPL-MCNC: 123 MG/DL (ref 65–99)
HCT VFR BLD AUTO: 40.8 % (ref 37.5–51)
HGB BLD-MCNC: 13.4 G/DL (ref 13–17.7)
IMM GRANULOCYTES # BLD AUTO: 0.04 10*3/MM3 (ref 0–0.05)
IMM GRANULOCYTES NFR BLD AUTO: 0.5 % (ref 0–0.5)
IVRT: 77 MSEC
LEFT ATRIUM VOLUME INDEX: 32.8 ML/M2
LEFT ATRIUM VOLUME: 73.1 ML
LYMPHOCYTES # BLD AUTO: 2.19 10*3/MM3 (ref 0.7–3.1)
LYMPHOCYTES NFR BLD AUTO: 26.4 % (ref 19.6–45.3)
MAXIMAL PREDICTED HEART RATE: 140 BPM
MCH RBC QN AUTO: 28.6 PG (ref 26.6–33)
MCHC RBC AUTO-ENTMCNC: 32.8 G/DL (ref 31.5–35.7)
MCV RBC AUTO: 87 FL (ref 79–97)
MONOCYTES # BLD AUTO: 0.78 10*3/MM3 (ref 0.1–0.9)
MONOCYTES NFR BLD AUTO: 9.4 % (ref 5–12)
NEUTROPHILS NFR BLD AUTO: 4.79 10*3/MM3 (ref 1.7–7)
NEUTROPHILS NFR BLD AUTO: 57.8 % (ref 42.7–76)
NRBC BLD AUTO-RTO: 0 /100 WBC (ref 0–0.2)
NT-PROBNP SERPL-MCNC: 318.1 PG/ML (ref 0–1800)
PLATELET # BLD AUTO: 195 10*3/MM3 (ref 140–450)
PMV BLD AUTO: 10.2 FL (ref 6–12)
POTASSIUM SERPL-SCNC: 4.4 MMOL/L (ref 3.5–5.2)
PROCALCITONIN SERPL-MCNC: 0.06 NG/ML (ref 0–0.25)
PROT SERPL-MCNC: 6.5 G/DL (ref 6–8.5)
QT INTERVAL: 406 MS
RBC # BLD AUTO: 4.69 10*6/MM3 (ref 4.14–5.8)
SODIUM SERPL-SCNC: 138 MMOL/L (ref 136–145)
STRESS TARGET HR: 119 BPM
TROPONIN T SERPL-MCNC: <0.01 NG/ML (ref 0–0.03)
WBC NRBC COR # BLD: 8.29 10*3/MM3 (ref 3.4–10.8)

## 2022-09-28 PROCEDURE — G0378 HOSPITAL OBSERVATION PER HR: HCPCS

## 2022-09-28 PROCEDURE — 85025 COMPLETE CBC W/AUTO DIFF WBC: CPT | Performed by: INTERNAL MEDICINE

## 2022-09-28 PROCEDURE — 82550 ASSAY OF CK (CPK): CPT | Performed by: INTERNAL MEDICINE

## 2022-09-28 PROCEDURE — 99226 PR SBSQ OBSERVATION CARE/DAY 35 MINUTES: CPT | Performed by: INTERNAL MEDICINE

## 2022-09-28 PROCEDURE — 83880 ASSAY OF NATRIURETIC PEPTIDE: CPT | Performed by: INTERNAL MEDICINE

## 2022-09-28 PROCEDURE — 96360 HYDRATION IV INFUSION INIT: CPT

## 2022-09-28 PROCEDURE — 96361 HYDRATE IV INFUSION ADD-ON: CPT

## 2022-09-28 PROCEDURE — 84145 PROCALCITONIN (PCT): CPT | Performed by: INTERNAL MEDICINE

## 2022-09-28 PROCEDURE — 71046 X-RAY EXAM CHEST 2 VIEWS: CPT

## 2022-09-28 PROCEDURE — 51798 US URINE CAPACITY MEASURE: CPT

## 2022-09-28 PROCEDURE — 25010000002 SULFUR HEXAFLUORIDE MICROSPH 60.7-25 MG RECONSTITUTED SUSPENSION: Performed by: INTERNAL MEDICINE

## 2022-09-28 PROCEDURE — 94799 UNLISTED PULMONARY SVC/PX: CPT

## 2022-09-28 PROCEDURE — 96372 THER/PROPH/DIAG INJ SC/IM: CPT

## 2022-09-28 PROCEDURE — 94640 AIRWAY INHALATION TREATMENT: CPT

## 2022-09-28 PROCEDURE — 25010000002 ENOXAPARIN PER 10 MG: Performed by: INTERNAL MEDICINE

## 2022-09-28 PROCEDURE — 80053 COMPREHEN METABOLIC PANEL: CPT | Performed by: INTERNAL MEDICINE

## 2022-09-28 PROCEDURE — 99214 OFFICE O/P EST MOD 30 MIN: CPT | Performed by: INTERNAL MEDICINE

## 2022-09-28 PROCEDURE — 84484 ASSAY OF TROPONIN QUANT: CPT | Performed by: INTERNAL MEDICINE

## 2022-09-28 RX ORDER — BUDESONIDE 0.5 MG/2ML
0.5 INHALANT ORAL
Status: DISCONTINUED | OUTPATIENT
Start: 2022-09-29 | End: 2022-09-29 | Stop reason: HOSPADM

## 2022-09-28 RX ORDER — METOPROLOL SUCCINATE 25 MG/1
25 TABLET, EXTENDED RELEASE ORAL DAILY
Status: DISCONTINUED | OUTPATIENT
Start: 2022-09-28 | End: 2022-09-29

## 2022-09-28 RX ORDER — TRAMADOL HYDROCHLORIDE 50 MG/1
50 TABLET ORAL EVERY 6 HOURS PRN
Status: DISCONTINUED | OUTPATIENT
Start: 2022-09-28 | End: 2022-09-29 | Stop reason: HOSPADM

## 2022-09-28 RX ORDER — ARFORMOTEROL TARTRATE 15 UG/2ML
15 SOLUTION RESPIRATORY (INHALATION)
Status: DISCONTINUED | OUTPATIENT
Start: 2022-09-28 | End: 2022-09-29 | Stop reason: HOSPADM

## 2022-09-28 RX ORDER — AMIODARONE HYDROCHLORIDE 200 MG/1
200 TABLET ORAL DAILY
Status: DISCONTINUED | OUTPATIENT
Start: 2022-09-28 | End: 2022-09-29 | Stop reason: HOSPADM

## 2022-09-28 RX ORDER — ASPIRIN 81 MG/1
81 TABLET ORAL DAILY
Status: DISCONTINUED | OUTPATIENT
Start: 2022-09-28 | End: 2022-09-29 | Stop reason: HOSPADM

## 2022-09-28 RX ORDER — PANTOPRAZOLE SODIUM 40 MG/1
40 TABLET, DELAYED RELEASE ORAL DAILY
Status: DISCONTINUED | OUTPATIENT
Start: 2022-09-28 | End: 2022-09-29 | Stop reason: HOSPADM

## 2022-09-28 RX ORDER — ATORVASTATIN CALCIUM 20 MG/1
20 TABLET, FILM COATED ORAL NIGHTLY
Refills: 3 | Status: DISCONTINUED | OUTPATIENT
Start: 2022-09-28 | End: 2022-09-29 | Stop reason: HOSPADM

## 2022-09-28 RX ORDER — ACETAMINOPHEN 325 MG/1
650 TABLET ORAL EVERY 6 HOURS PRN
Status: DISCONTINUED | OUTPATIENT
Start: 2022-09-28 | End: 2022-09-29 | Stop reason: HOSPADM

## 2022-09-28 RX ORDER — ISOSORBIDE MONONITRATE 30 MG/1
60 TABLET, EXTENDED RELEASE ORAL DAILY
Status: DISCONTINUED | OUTPATIENT
Start: 2022-09-28 | End: 2022-09-29 | Stop reason: HOSPADM

## 2022-09-28 RX ORDER — FINASTERIDE 5 MG/1
5 TABLET, FILM COATED ORAL DAILY
Status: DISCONTINUED | OUTPATIENT
Start: 2022-09-28 | End: 2022-09-29 | Stop reason: HOSPADM

## 2022-09-28 RX ORDER — IPRATROPIUM BROMIDE AND ALBUTEROL SULFATE 2.5; .5 MG/3ML; MG/3ML
3 SOLUTION RESPIRATORY (INHALATION) EVERY 4 HOURS PRN
Status: DISCONTINUED | OUTPATIENT
Start: 2022-09-28 | End: 2022-09-29 | Stop reason: HOSPADM

## 2022-09-28 RX ORDER — MONTELUKAST SODIUM 10 MG/1
10 TABLET ORAL NIGHTLY
Status: DISCONTINUED | OUTPATIENT
Start: 2022-09-28 | End: 2022-09-29 | Stop reason: HOSPADM

## 2022-09-28 RX ORDER — TAMSULOSIN HYDROCHLORIDE 0.4 MG/1
0.4 CAPSULE ORAL DAILY
Status: DISCONTINUED | OUTPATIENT
Start: 2022-09-28 | End: 2022-09-29 | Stop reason: HOSPADM

## 2022-09-28 RX ORDER — ACETAMINOPHEN 500 MG
1000 TABLET ORAL EVERY 8 HOURS
Status: DISCONTINUED | OUTPATIENT
Start: 2022-09-28 | End: 2022-09-29 | Stop reason: HOSPADM

## 2022-09-28 RX ORDER — ENOXAPARIN SODIUM 100 MG/ML
100 INJECTION SUBCUTANEOUS EVERY 12 HOURS
Status: DISCONTINUED | OUTPATIENT
Start: 2022-09-28 | End: 2022-09-29 | Stop reason: HOSPADM

## 2022-09-28 RX ORDER — SODIUM CHLORIDE 9 MG/ML
100 INJECTION, SOLUTION INTRAVENOUS CONTINUOUS
Status: DISCONTINUED | OUTPATIENT
Start: 2022-09-28 | End: 2022-09-28

## 2022-09-28 RX ADMIN — ATORVASTATIN CALCIUM 20 MG: 20 TABLET, FILM COATED ORAL at 20:04

## 2022-09-28 RX ADMIN — ACETAMINOPHEN 650 MG: 325 TABLET ORAL at 15:50

## 2022-09-28 RX ADMIN — SULFUR HEXAFLUORIDE 2 ML: KIT at 00:09

## 2022-09-28 RX ADMIN — TAMSULOSIN HYDROCHLORIDE 0.4 MG: 0.4 CAPSULE ORAL at 08:40

## 2022-09-28 RX ADMIN — ISOSORBIDE MONONITRATE 60 MG: 30 TABLET, EXTENDED RELEASE ORAL at 08:40

## 2022-09-28 RX ADMIN — METOPROLOL SUCCINATE 25 MG: 25 TABLET, EXTENDED RELEASE ORAL at 08:40

## 2022-09-28 RX ADMIN — ASPIRIN 81 MG: 81 TABLET, COATED ORAL at 08:40

## 2022-09-28 RX ADMIN — ENOXAPARIN SODIUM 100 MG: 100 INJECTION SUBCUTANEOUS at 11:29

## 2022-09-28 RX ADMIN — AMIODARONE HYDROCHLORIDE 200 MG: 200 TABLET ORAL at 08:40

## 2022-09-28 RX ADMIN — ENOXAPARIN SODIUM 100 MG: 100 INJECTION SUBCUTANEOUS at 22:11

## 2022-09-28 RX ADMIN — DICLOFENAC SODIUM 2 G: 10 GEL TOPICAL at 17:32

## 2022-09-28 RX ADMIN — PANTOPRAZOLE SODIUM 40 MG: 40 TABLET, DELAYED RELEASE ORAL at 08:39

## 2022-09-28 RX ADMIN — SODIUM CHLORIDE 100 ML/HR: 9 INJECTION, SOLUTION INTRAVENOUS at 10:15

## 2022-09-28 RX ADMIN — DICLOFENAC SODIUM 2 G: 10 GEL TOPICAL at 20:04

## 2022-09-28 RX ADMIN — ARFORMOTEROL TARTRATE 15 MCG: 15 SOLUTION RESPIRATORY (INHALATION) at 11:54

## 2022-09-28 RX ADMIN — FINASTERIDE 5 MG: 5 TABLET, FILM COATED ORAL at 08:39

## 2022-09-28 RX ADMIN — ARFORMOTEROL TARTRATE 15 MCG: 15 SOLUTION RESPIRATORY (INHALATION) at 20:44

## 2022-09-28 RX ADMIN — MONTELUKAST 10 MG: 10 TABLET, FILM COATED ORAL at 20:04

## 2022-09-28 RX ADMIN — Medication 10 ML: at 20:04

## 2022-09-28 RX ADMIN — Medication 10 ML: at 08:41

## 2022-09-29 ENCOUNTER — READMISSION MANAGEMENT (OUTPATIENT)
Dept: CALL CENTER | Facility: HOSPITAL | Age: 82
End: 2022-09-29

## 2022-09-29 VITALS
HEIGHT: 70 IN | OXYGEN SATURATION: 97 % | HEART RATE: 77 BPM | RESPIRATION RATE: 16 BRPM | WEIGHT: 234.79 LBS | TEMPERATURE: 97.5 F | SYSTOLIC BLOOD PRESSURE: 143 MMHG | BODY MASS INDEX: 33.61 KG/M2 | DIASTOLIC BLOOD PRESSURE: 53 MMHG

## 2022-09-29 LAB
ANION GAP SERPL CALCULATED.3IONS-SCNC: 8.4 MMOL/L (ref 5–15)
BACTERIA UR QL AUTO: ABNORMAL /HPF
BILIRUB UR QL STRIP: NEGATIVE
BUN SERPL-MCNC: 19 MG/DL (ref 8–23)
BUN/CREAT SERPL: 11.7 (ref 7–25)
CALCIUM SPEC-SCNC: 9.3 MG/DL (ref 8.6–10.5)
CHLORIDE SERPL-SCNC: 103 MMOL/L (ref 98–107)
CLARITY UR: CLEAR
CO2 SERPL-SCNC: 25.6 MMOL/L (ref 22–29)
COLOR UR: YELLOW
CREAT SERPL-MCNC: 1.62 MG/DL (ref 0.76–1.27)
CREAT UR-MCNC: 206.9 MG/DL
EGFRCR SERPLBLD CKD-EPI 2021: 42.6 ML/MIN/1.73
GLUCOSE SERPL-MCNC: 121 MG/DL (ref 65–99)
GLUCOSE UR STRIP-MCNC: NEGATIVE MG/DL
HGB UR QL STRIP.AUTO: ABNORMAL
HYALINE CASTS UR QL AUTO: ABNORMAL /LPF
KETONES UR QL STRIP: ABNORMAL
LEUKOCYTE ESTERASE UR QL STRIP.AUTO: NEGATIVE
NITRITE UR QL STRIP: NEGATIVE
PH UR STRIP.AUTO: 5.5 [PH] (ref 5–8)
POTASSIUM SERPL-SCNC: 4.5 MMOL/L (ref 3.5–5.2)
PROT ?TM UR-MCNC: 19.5 MG/DL
PROT UR QL STRIP: NEGATIVE
RBC # UR STRIP: ABNORMAL /HPF
REF LAB TEST METHOD: ABNORMAL
SODIUM SERPL-SCNC: 137 MMOL/L (ref 136–145)
SODIUM UR-SCNC: 56 MMOL/L
SP GR UR STRIP: 1.02 (ref 1–1.03)
SQUAMOUS #/AREA URNS HPF: ABNORMAL /HPF
TROPONIN T SERPL-MCNC: <0.01 NG/ML (ref 0–0.03)
UROBILINOGEN UR QL STRIP: ABNORMAL
UUN 24H UR-MCNC: 851 MG/DL
WBC # UR STRIP: ABNORMAL /HPF

## 2022-09-29 PROCEDURE — 94799 UNLISTED PULMONARY SVC/PX: CPT

## 2022-09-29 PROCEDURE — 51798 US URINE CAPACITY MEASURE: CPT

## 2022-09-29 PROCEDURE — 84540 ASSAY OF URINE/UREA-N: CPT | Performed by: INTERNAL MEDICINE

## 2022-09-29 PROCEDURE — 84156 ASSAY OF PROTEIN URINE: CPT | Performed by: INTERNAL MEDICINE

## 2022-09-29 PROCEDURE — 25010000002 ENOXAPARIN PER 10 MG: Performed by: INTERNAL MEDICINE

## 2022-09-29 PROCEDURE — 84484 ASSAY OF TROPONIN QUANT: CPT | Performed by: INTERNAL MEDICINE

## 2022-09-29 PROCEDURE — 80048 BASIC METABOLIC PNL TOTAL CA: CPT | Performed by: INTERNAL MEDICINE

## 2022-09-29 PROCEDURE — 84300 ASSAY OF URINE SODIUM: CPT | Performed by: INTERNAL MEDICINE

## 2022-09-29 PROCEDURE — G0378 HOSPITAL OBSERVATION PER HR: HCPCS

## 2022-09-29 PROCEDURE — 99214 OFFICE O/P EST MOD 30 MIN: CPT | Performed by: INTERNAL MEDICINE

## 2022-09-29 PROCEDURE — 82570 ASSAY OF URINE CREATININE: CPT | Performed by: INTERNAL MEDICINE

## 2022-09-29 PROCEDURE — 81001 URINALYSIS AUTO W/SCOPE: CPT | Performed by: INTERNAL MEDICINE

## 2022-09-29 PROCEDURE — 94664 DEMO&/EVAL PT USE INHALER: CPT

## 2022-09-29 PROCEDURE — 96372 THER/PROPH/DIAG INJ SC/IM: CPT

## 2022-09-29 PROCEDURE — 99217 PR OBSERVATION CARE DISCHARGE MANAGEMENT: CPT | Performed by: INTERNAL MEDICINE

## 2022-09-29 RX ORDER — METOPROLOL SUCCINATE 25 MG/1
25 TABLET, EXTENDED RELEASE ORAL 2 TIMES DAILY
Status: DISCONTINUED | OUTPATIENT
Start: 2022-09-29 | End: 2022-09-29 | Stop reason: HOSPADM

## 2022-09-29 RX ORDER — METOPROLOL SUCCINATE 25 MG/1
25 TABLET, EXTENDED RELEASE ORAL 2 TIMES DAILY
Qty: 90 TABLET | Refills: 0
Start: 2022-09-29 | End: 2022-11-26 | Stop reason: SDUPTHER

## 2022-09-29 RX ORDER — AMIODARONE HYDROCHLORIDE 200 MG/1
200 TABLET ORAL DAILY
Start: 2022-09-29 | End: 2022-10-25

## 2022-09-29 RX ADMIN — AMIODARONE HYDROCHLORIDE 200 MG: 200 TABLET ORAL at 08:43

## 2022-09-29 RX ADMIN — ACETAMINOPHEN 1000 MG: 500 TABLET, FILM COATED ORAL at 00:00

## 2022-09-29 RX ADMIN — ASPIRIN 81 MG: 81 TABLET, COATED ORAL at 08:43

## 2022-09-29 RX ADMIN — METOPROLOL SUCCINATE 25 MG: 25 TABLET, EXTENDED RELEASE ORAL at 08:43

## 2022-09-29 RX ADMIN — TAMSULOSIN HYDROCHLORIDE 0.4 MG: 0.4 CAPSULE ORAL at 08:42

## 2022-09-29 RX ADMIN — ENOXAPARIN SODIUM 100 MG: 100 INJECTION SUBCUTANEOUS at 11:02

## 2022-09-29 RX ADMIN — DICLOFENAC SODIUM 2 G: 10 GEL TOPICAL at 11:02

## 2022-09-29 RX ADMIN — ISOSORBIDE MONONITRATE 60 MG: 30 TABLET, EXTENDED RELEASE ORAL at 08:43

## 2022-09-29 RX ADMIN — Medication 10 ML: at 08:44

## 2022-09-29 RX ADMIN — DICLOFENAC SODIUM 2 G: 10 GEL TOPICAL at 08:42

## 2022-09-29 RX ADMIN — ARFORMOTEROL TARTRATE 15 MCG: 15 SOLUTION RESPIRATORY (INHALATION) at 07:50

## 2022-09-29 RX ADMIN — BUDESONIDE 0.5 MG: 0.5 SUSPENSION RESPIRATORY (INHALATION) at 07:50

## 2022-09-29 RX ADMIN — ACETAMINOPHEN 1000 MG: 500 TABLET, FILM COATED ORAL at 08:43

## 2022-09-29 RX ADMIN — PANTOPRAZOLE SODIUM 40 MG: 40 TABLET, DELAYED RELEASE ORAL at 08:43

## 2022-09-29 RX ADMIN — FINASTERIDE 5 MG: 5 TABLET, FILM COATED ORAL at 08:43

## 2022-09-29 NOTE — OUTREACH NOTE
Prep Survey    Flowsheet Row Responses   [facility] patient discharged from? Back   Is LACE score < 7 ? No   Emergency Room discharge w/ pulse ox? No   Eligibility [facility] Back   Date of Admission 09/27/22   Date of Discharge 09/29/22   Discharge Disposition Home or Self Care   Discharge diagnosis Chest pain   Does the patient have one of the following disease processes/diagnoses(primary or secondary)? Other   Does the patient have Home health ordered? No   Is there a DME ordered? No   Prep survey completed? Yes          SCOTTY LIANG - Registered Nurse

## 2022-09-30 ENCOUNTER — TRANSITIONAL CARE MANAGEMENT TELEPHONE ENCOUNTER (OUTPATIENT)
Dept: CALL CENTER | Facility: HOSPITAL | Age: 82
End: 2022-09-30

## 2022-09-30 NOTE — OUTREACH NOTE
Call Center TCM Note    Flowsheet Row Responses   [facility] patient discharged from? Back   Does the patient have one of the following disease processes/diagnoses(primary or secondary)? Other   TCM attempt successful? Yes   Call start time 1417   Call end time 1418   Discharge diagnosis Chest pain   Is patient permission given to speak with other caregiver? Yes   List who call center can speak with spouse- Riana   Person spoke with today (if not patient) and relationship spouse   Does the patient have all medications ordered at discharge? Yes   Is the patient taking all medications as directed (includes completed medication regime)? Yes   Comments hospital f/u with PCP on 10/5   Has home health visited the patient within 72 hours of discharge? N/A   Psychosocial issues? No   Did the patient receive a copy of their discharge instructions? Yes   What is the patient's perception of their health status since discharge? Improving   Is the patient/caregiver able to teach back the hierarchy of who to call/visit for symptoms/problems? PCP, Specialist, Home health nurse, Urgent Care, ED, 911 Yes   TCM call completed? Yes   Wrap up additional comments Per spouse, patient is doing well, confirmed appt with PCP for 10/5.          Francisca Rosales RN    9/30/2022, 14:18 EDT

## 2022-10-04 PROBLEM — I34.0 MODERATE MITRAL REGURGITATION: Status: ACTIVE | Noted: 2022-10-04

## 2022-10-04 PROBLEM — I35.0 MILD AORTIC VALVE STENOSIS: Status: ACTIVE | Noted: 2022-10-04

## 2022-10-05 ENCOUNTER — OFFICE VISIT (OUTPATIENT)
Dept: INTERNAL MEDICINE | Facility: CLINIC | Age: 82
End: 2022-10-05

## 2022-10-05 VITALS
WEIGHT: 239.8 LBS | SYSTOLIC BLOOD PRESSURE: 164 MMHG | HEIGHT: 70 IN | DIASTOLIC BLOOD PRESSURE: 78 MMHG | BODY MASS INDEX: 34.33 KG/M2 | TEMPERATURE: 97.4 F | HEART RATE: 72 BPM | OXYGEN SATURATION: 96 %

## 2022-10-05 DIAGNOSIS — R35.0 BENIGN PROSTATIC HYPERPLASIA WITH URINARY FREQUENCY: ICD-10-CM

## 2022-10-05 DIAGNOSIS — N17.9 AKI (ACUTE KIDNEY INJURY): ICD-10-CM

## 2022-10-05 DIAGNOSIS — I34.0 MODERATE MITRAL REGURGITATION: ICD-10-CM

## 2022-10-05 DIAGNOSIS — Z23 NEED FOR INFLUENZA VACCINATION: ICD-10-CM

## 2022-10-05 DIAGNOSIS — Z98.890 S/P ABLATION OF ATRIAL FIBRILLATION: ICD-10-CM

## 2022-10-05 DIAGNOSIS — Z86.79 S/P ABLATION OF ATRIAL FIBRILLATION: ICD-10-CM

## 2022-10-05 DIAGNOSIS — I35.0 MILD AORTIC VALVE STENOSIS: ICD-10-CM

## 2022-10-05 DIAGNOSIS — R07.89 ATYPICAL CHEST PAIN: ICD-10-CM

## 2022-10-05 DIAGNOSIS — E78.2 MIXED HYPERLIPIDEMIA: ICD-10-CM

## 2022-10-05 DIAGNOSIS — N18.31 STAGE 3A CHRONIC KIDNEY DISEASE: ICD-10-CM

## 2022-10-05 DIAGNOSIS — J30.89 NON-SEASONAL ALLERGIC RHINITIS DUE TO OTHER ALLERGIC TRIGGER: Primary | ICD-10-CM

## 2022-10-05 DIAGNOSIS — I48.19 PERSISTENT ATRIAL FIBRILLATION: ICD-10-CM

## 2022-10-05 DIAGNOSIS — K21.9 GASTROESOPHAGEAL REFLUX DISEASE, UNSPECIFIED WHETHER ESOPHAGITIS PRESENT: ICD-10-CM

## 2022-10-05 DIAGNOSIS — I25.810 CORONARY ARTERY DISEASE INVOLVING CORONARY BYPASS GRAFT OF NATIVE HEART WITHOUT ANGINA PECTORIS: ICD-10-CM

## 2022-10-05 DIAGNOSIS — I50.32 CHRONIC DIASTOLIC (CONGESTIVE) HEART FAILURE: ICD-10-CM

## 2022-10-05 DIAGNOSIS — N40.1 BENIGN PROSTATIC HYPERPLASIA WITH URINARY FREQUENCY: ICD-10-CM

## 2022-10-05 DIAGNOSIS — I10 ESSENTIAL HYPERTENSION: ICD-10-CM

## 2022-10-05 PROCEDURE — 99496 TRANSJ CARE MGMT HIGH F2F 7D: CPT | Performed by: STUDENT IN AN ORGANIZED HEALTH CARE EDUCATION/TRAINING PROGRAM

## 2022-10-05 PROCEDURE — 1111F DSCHRG MED/CURRENT MED MERGE: CPT | Performed by: STUDENT IN AN ORGANIZED HEALTH CARE EDUCATION/TRAINING PROGRAM

## 2022-10-05 PROCEDURE — G0008 ADMIN INFLUENZA VIRUS VAC: HCPCS | Performed by: STUDENT IN AN ORGANIZED HEALTH CARE EDUCATION/TRAINING PROGRAM

## 2022-10-05 PROCEDURE — 90662 IIV NO PRSV INCREASED AG IM: CPT | Performed by: STUDENT IN AN ORGANIZED HEALTH CARE EDUCATION/TRAINING PROGRAM

## 2022-10-05 RX ORDER — AMLODIPINE BESYLATE 5 MG/1
5 TABLET ORAL DAILY
Qty: 30 TABLET | Refills: 1 | Status: SHIPPED | OUTPATIENT
Start: 2022-10-05 | End: 2023-01-03

## 2022-10-05 NOTE — PROGRESS NOTES
"Transitional Care Follow Up Visit  Subjective     Layo Cee is a 82 y.o. male who presents for a transitional care management visit.    Within 48 business hours after discharge our office contacted him via telephone to coordinate his care and needs.      I reviewed and discussed the details of that call along with the discharge summary, hospital problems, inpatient lab results, inpatient diagnostic studies, and consultation reports with Layo.     Current outpatient and discharge medications have been reconciled for the patient.  Reviewed by: Noble Kc MD      Date of TCM Phone Call 9/29/2022   [facility]   Date of Admission 9/27/2022   Date of Discharge 9/29/2022   Discharge Disposition Home or Self Care     Risk for Readmission (LACE) Score: 11 (9/29/2022  6:00 AM)      History of Present Illness   Course During Hospital Stay:      Admitted 9/27/22 to 9/29/22 with chest pain.  Had negative troponin trend, EKG and echo obtained and without acute abnormalities.  Two view CXR showed no infiltrate or other abnormality.  Chest pain found to be reproducible, and improved with acetaminophen and topical voltaren.      Entresto currently held because of KAMLA noted during recent admission.  He has started back on lasix for past 3 days because of increased dyspnea as well as weight gain (went up 4 lbs, from 235 lbs to 239 and eventually 242 between Saturday and Sunday).      His wife, who is present at clinic today, reports he's also had a \"wet wheeze\" during this time, and has also had issues with allergies but that she has found chlorpheniramine helpful for herself as well as for him.                      The following portions of the patient's history were reviewed and updated as appropriate: allergies, current medications, past family history, past medical history, past social history, past surgical history and problem list.    Review of Systems    Objective   Physical Exam  Vitals " reviewed.   Constitutional:       General: He is not in acute distress.     Appearance: Normal appearance. He is not ill-appearing or toxic-appearing.   HENT:      Head: Normocephalic and atraumatic.      Ears:      Comments: Missing portion of left ear  Eyes:      Conjunctiva/sclera: Conjunctivae normal.   Cardiovascular:      Rate and Rhythm: Normal rate and regular rhythm.      Pulses: Normal pulses.      Heart sounds: Normal heart sounds. No murmur heard.  Pulmonary:      Effort: Pulmonary effort is normal.      Comments: Crackles bilateral lung bases.  Mild wheeze, RML  Abdominal:      General: Abdomen is flat.      Palpations: Abdomen is soft. There is no mass.      Tenderness: There is no abdominal tenderness.   Musculoskeletal:      Right lower leg: Edema present.      Left lower leg: Edema present.      Comments: 2+ LE edema bilaterally   Skin:     General: Skin is warm and dry.   Neurological:      General: No focal deficit present.      Mental Status: He is alert. Mental status is at baseline.   Psychiatric:         Mood and Affect: Mood normal.         Behavior: Behavior normal.         Thought Content: Thought content normal.         Judgment: Judgment normal.         Assessment & Plan   Diagnoses and all orders for this visit:    1. Non-seasonal allergic rhinitis due to other allergic trigger (Primary)    2. Atypical chest pain    3. KAMLA (acute kidney injury) (HCC)    4. Persistent atrial fibrillation (HCC)    5. Stage 3a chronic kidney disease (HCC)    6. S/P ablation of atrial fibrillation    7. Chronic diastolic (congestive) heart failure (HCC)    8. Mixed hyperlipidemia  -     Lipid Panel  -     Comprehensive Metabolic Panel    9. Essential hypertension  -     amLODIPine (NORVASC) 5 MG tablet; Take 1 tablet by mouth Daily.  Dispense: 30 tablet; Refill: 1    10. Gastroesophageal reflux disease, unspecified whether esophagitis present    11. Mild aortic valve stenosis    12. Moderate mitral  regurgitation    13. Benign prostatic hyperplasia with urinary frequency    14. Coronary artery disease involving coronary bypass graft of native heart without angina pectoris    15. Need for influenza vaccination  -     Fluzone High-Dose 65+yrs ([id number])    Chest Pain, CAD w/ history of CABG, A fib:  -reassuring evaluation while inpatient, and suspect chest pain musculoskeletal in origin  -discussed Echo results regarding aortic valve stenosis and mitral regurg    KAMLA:  -holding entresto, and will re-check labs    HTN:  -will add amlodipine as Bp much above goal  -will recheck labs, as once KAMLA resolves (or renal function confirmed to be stable) will plan to re-start entresto    Allergies:  -okay with cautious trial of chlorpheniramine  -I did  patient and his wife that this is on the [facility] list, and has the potential to cause issues of confusion    Immunizations:  -Discussed risks/benefits to vaccination, reviewed components of the vaccine, discussed VIS, discussed informed consent, informed consent obtained. Patient/Parent was allowed to accept or refuse vaccine. Questions answered to satisfactory state of patient/parent. We reviewed typical age appropriate and seasonally appropriate vaccinations. Reviewed immunization history and updated state vaccination form as needed. Patient/Parent was counseled on the above vaccines.

## 2022-10-05 NOTE — PROGRESS NOTES
Chief Complaint  Hospital Follow Up Visit (Fluid concerns, swelling, no current chest discomfort)    Subjective     {Problem List  Visit Diagnosis   Encounters  Notes  Medications  Labs  Result Review Imaging  Media :23}     Layo Cee presents to [facility]  History of Present Illness      Current Outpatient Medications   Medication Instructions   • acetaminophen (TYLENOL) 650 mg, Oral, Every 6 Hours PRN   • amiodarone (PACERONE) 200 mg, Oral, Daily   • aspirin 81 mg, Oral, Daily   • calcium carbonate (OS-LIANNA) 600 mg, Oral, Nightly   • Coenzyme Q10 100 mg, Oral, Daily   • Diclofenac Sodium (VOLTAREN) 2 g, Topical, 4 Times Daily   • finasteride (PROSCAR) 5 mg, Oral, Daily   • fluticasone (FLONASE) 50 MCG/ACT nasal spray 1 spray, Nasal, Nightly   • Fluticasone Furoate (Arnuity Ellipta) 100 MCG/ACT aerosol powder  1 puff, Inhalation, Daily   • isosorbide mononitrate (IMDUR) 60 MG 24 hr tablet Take 1 tablet by mouth every day   • levalbuterol (Xopenex HFA) 45 MCG/ACT inhaler 1-2 puffs, Inhalation, Every 4 Hours PRN   • levalbuterol (XOPENEX) 0.63 mg, Nebulization, Every 6 Hours PRN   • metoprolol succinate XL (TOPROL-XL) 25 mg, Oral, 2 Times Daily   • montelukast (SINGULAIR) 10 mg, Oral, Nightly   • nitroglycerin (NITROSTAT) 0.4 mg, Sublingual, Every 5 Minutes PRN, Take no more than 3 doses in 15 minutes.   • pantoprazole (PROTONIX) 40 mg, Oral, Daily   • pitavastatin calcium (LIVALO) 2 mg, Oral, Nightly   • tamsulosin (FLOMAX) 0.4 mg, Oral, Daily, 1/2 hours following the same meal each day   • tiotropium bromide-olodaterol (Stiolto Respimat) 2.5-2.5 MCG/ACT aerosol solution inhaler 2 puffs, Inhalation, Daily - RT   • VITAMIN B COMPLEX-C PO 1 tablet, Oral, Daily   • Xarelto 15 MG tablet Take 1 tablet by mouth every day       The following portions of the patient's history were reviewed and updated as appropriate: allergies, current medications, past family  "history, past medical history, past social history, past surgical history, and problem list.    Objective   Vital Signs:   /78 (BP Location: Left arm, Patient Position: Sitting, Cuff Size: Adult)   Pulse 72   Temp 97.4 °F (36.3 °C) (Temporal)   Ht 177.8 cm (70\")   Wt 109 kg (239 lb 12.8 oz)   SpO2 96%   BMI 34.41 kg/m²     Wt Readings from Last 3 Encounters:   10/05/22 109 kg (239 lb 12.8 oz)   09/27/22 107 kg (234 lb 12.6 oz)   08/15/22 107 kg (235 lb)     BP Readings from Last 3 Encounters:   10/05/22 164/78   09/29/22 143/53   08/15/22 129/75     Physical Exam   [unfilled]    Result Review :{Labs  Result Review  Imaging  Med Tab  Media  Procedures :23}   The following data was reviewed by: Michael Ayers MA on 10/05/2022:  Common labs    Common Labs 9/27/22 9/27/22 9/28/22 9/28/22 9/29/22    1829 1829 0414 0414    Glucose  111 (A)  123 (A) 121 (A)   BUN  18  18 19   Creatinine  1.78 (A)  1.58 (A) 1.62 (A)   Sodium  139  138 137   Potassium  3.9  4.4 4.5   Chloride  101  102 103   Calcium  9.4  9.6 9.3   Albumin  4.00  3.90    Total Bilirubin  0.3  0.4    Alkaline Phosphatase  56  52    AST (SGOT)  18  17    ALT (SGPT)  12  14    WBC 9.08  8.29     Hemoglobin 13.2  13.4     Hematocrit 40.5  40.8     Platelets 211  195     (A) Abnormal value              {Data reviewed (Optional):41385:::1}     Lab Results   Component Value Date    COVID19 Not Detected 06/29/2022    RAPSCRN Negative 04/11/2022    INR 1.69 (H) 06/02/2022    BILIRUBINUR Negative 09/29/2022       Procedures        Assessment and Plan {CC Problem List  Visit Diagnosis   ROS  Review (Popup)  Health Maintenance  Quality  BestPractice  Medications  SmartSets  SnapShot Encounters  Media :23}   Diagnoses and all orders for this visit:    1. Atypical chest pain (Primary)    2. KAMLA (acute kidney injury) (HCC)    3. Persistent atrial fibrillation (HCC)    4. Stage 3a chronic kidney disease (HCC)    5. S/P ablation of atrial " fibrillation    6. Chronic diastolic (congestive) heart failure (HCC)    7. Mixed hyperlipidemia    8. Essential hypertension    9. Gastroesophageal reflux disease, unspecified whether esophagitis present    10. Mild aortic valve stenosis    11. Moderate mitral regurgitation    12. Benign prostatic hyperplasia with urinary frequency    13. Coronary artery disease involving coronary bypass graft of native heart without angina pectoris          There are no discontinued medications.     {Time Spent (Optional):11574}  Follow Up {Instructions Charge Capture  Follow-up Communications :23}  No follow-ups on file.  Patient was given instructions and counseling regarding his condition or for health maintenance advice. Please see specific information pulled into the AVS if appropriate.       Michael Ayers MA  10/05/22  11:24 EDT

## 2022-10-07 ENCOUNTER — LAB (OUTPATIENT)
Dept: LAB | Facility: HOSPITAL | Age: 82
End: 2022-10-07

## 2022-10-07 LAB
ALBUMIN SERPL-MCNC: 3.9 G/DL (ref 3.5–5.2)
ALBUMIN/GLOB SERPL: 1.6 G/DL
ALP SERPL-CCNC: 55 U/L (ref 39–117)
ALT SERPL W P-5'-P-CCNC: 24 U/L (ref 1–41)
ANION GAP SERPL CALCULATED.3IONS-SCNC: 9.6 MMOL/L (ref 5–15)
AST SERPL-CCNC: 19 U/L (ref 1–40)
BILIRUB SERPL-MCNC: 0.3 MG/DL (ref 0–1.2)
BUN SERPL-MCNC: 15 MG/DL (ref 8–23)
BUN/CREAT SERPL: 11.3 (ref 7–25)
CALCIUM SPEC-SCNC: 9.8 MG/DL (ref 8.6–10.5)
CHLORIDE SERPL-SCNC: 104 MMOL/L (ref 98–107)
CHOLEST SERPL-MCNC: 112 MG/DL (ref 0–200)
CO2 SERPL-SCNC: 26.4 MMOL/L (ref 22–29)
CREAT SERPL-MCNC: 1.33 MG/DL (ref 0.76–1.27)
EGFRCR SERPLBLD CKD-EPI 2021: 54 ML/MIN/1.73
GLOBULIN UR ELPH-MCNC: 2.5 GM/DL
GLUCOSE SERPL-MCNC: 121 MG/DL (ref 65–99)
HDLC SERPL-MCNC: 32 MG/DL (ref 40–60)
LDLC SERPL CALC-MCNC: 59 MG/DL (ref 0–100)
LDLC/HDLC SERPL: 1.8 {RATIO}
POTASSIUM SERPL-SCNC: 4.7 MMOL/L (ref 3.5–5.2)
PROT SERPL-MCNC: 6.4 G/DL (ref 6–8.5)
SODIUM SERPL-SCNC: 140 MMOL/L (ref 136–145)
TRIGL SERPL-MCNC: 112 MG/DL (ref 0–150)
VLDLC SERPL-MCNC: 21 MG/DL (ref 5–40)

## 2022-10-07 PROCEDURE — 36415 COLL VENOUS BLD VENIPUNCTURE: CPT | Performed by: STUDENT IN AN ORGANIZED HEALTH CARE EDUCATION/TRAINING PROGRAM

## 2022-10-07 PROCEDURE — 80061 LIPID PANEL: CPT | Performed by: NURSE PRACTITIONER

## 2022-10-07 PROCEDURE — 80053 COMPREHEN METABOLIC PANEL: CPT | Performed by: NURSE PRACTITIONER

## 2022-10-08 DIAGNOSIS — N17.9 AKI (ACUTE KIDNEY INJURY): Primary | ICD-10-CM

## 2022-10-10 ENCOUNTER — TELEPHONE (OUTPATIENT)
Dept: INTERNAL MEDICINE | Facility: CLINIC | Age: 82
End: 2022-10-10

## 2022-10-10 ENCOUNTER — APPOINTMENT (OUTPATIENT)
Dept: CT IMAGING | Facility: HOSPITAL | Age: 82
End: 2022-10-10

## 2022-10-10 NOTE — TELEPHONE ENCOUNTER
Spoke with wife, per JESSICA. Aware of results, no further questions or concerns noted during telephone call.

## 2022-10-10 NOTE — TELEPHONE ENCOUNTER
----- Message from Noble Kc MD sent at 10/8/2022  9:08 AM EDT -----  Lipids overall are reassuring.  Mildly low HDL cholesterol noted.    CMP notable for mild improvement in renal function compared to 9 days ago.  I'd like to re-check your renal function in about one month's time (around 11/8/22).  I have placed a order for this lab, and you can obtain it at our outpatient lab at [facility].  Once your renal function fully returns to your normal, I'd like to re-start entresto.  For now, I'd like to continue holding it.

## 2022-10-13 ENCOUNTER — OFFICE VISIT (OUTPATIENT)
Dept: CARDIOLOGY | Facility: CLINIC | Age: 82
End: 2022-10-13

## 2022-10-13 VITALS
HEIGHT: 70 IN | OXYGEN SATURATION: 98 % | HEART RATE: 65 BPM | DIASTOLIC BLOOD PRESSURE: 70 MMHG | BODY MASS INDEX: 34.5 KG/M2 | WEIGHT: 241 LBS | SYSTOLIC BLOOD PRESSURE: 142 MMHG

## 2022-10-13 DIAGNOSIS — I25.708 CORONARY ARTERY DISEASE OF BYPASS GRAFT OF NATIVE HEART WITH STABLE ANGINA PECTORIS: Primary | ICD-10-CM

## 2022-10-13 DIAGNOSIS — I48.19 ATRIAL FIBRILLATION, PERSISTENT: ICD-10-CM

## 2022-10-13 DIAGNOSIS — I35.0 MILD AORTIC VALVE STENOSIS: ICD-10-CM

## 2022-10-13 DIAGNOSIS — N18.31 STAGE 3A CHRONIC KIDNEY DISEASE: ICD-10-CM

## 2022-10-13 PROCEDURE — 99214 OFFICE O/P EST MOD 30 MIN: CPT | Performed by: FAMILY MEDICINE

## 2022-10-13 RX ORDER — ISOSORBIDE MONONITRATE 60 MG/1
60 TABLET, EXTENDED RELEASE ORAL DAILY
Qty: 90 TABLET | Refills: 3 | Status: SHIPPED | OUTPATIENT
Start: 2022-10-13

## 2022-10-13 RX ORDER — FUROSEMIDE 40 MG/1
40 TABLET ORAL DAILY
COMMUNITY
End: 2022-11-02 | Stop reason: SDUPTHER

## 2022-10-14 DIAGNOSIS — K21.9 GASTROESOPHAGEAL REFLUX DISEASE, UNSPECIFIED WHETHER ESOPHAGITIS PRESENT: ICD-10-CM

## 2022-10-14 RX ORDER — PANTOPRAZOLE SODIUM 40 MG/1
40 TABLET, DELAYED RELEASE ORAL DAILY
Qty: 30 TABLET | Refills: 3 | Status: SHIPPED | OUTPATIENT
Start: 2022-10-14 | End: 2023-02-21

## 2022-10-17 DIAGNOSIS — R05.9 COUGH: ICD-10-CM

## 2022-10-17 DIAGNOSIS — J43.9 PULMONARY EMPHYSEMA, UNSPECIFIED EMPHYSEMA TYPE: ICD-10-CM

## 2022-10-17 DIAGNOSIS — K21.9 GASTROESOPHAGEAL REFLUX DISEASE, UNSPECIFIED WHETHER ESOPHAGITIS PRESENT: ICD-10-CM

## 2022-10-17 DIAGNOSIS — J44.1 CHRONIC OBSTRUCTIVE PULMONARY DISEASE WITH ACUTE EXACERBATION: ICD-10-CM

## 2022-10-17 DIAGNOSIS — J43.2 CENTRILOBULAR EMPHYSEMA: ICD-10-CM

## 2022-10-17 RX ORDER — LEVALBUTEROL INHALATION SOLUTION 0.63 MG/3ML
1 SOLUTION RESPIRATORY (INHALATION) EVERY 6 HOURS PRN
Qty: 360 ML | Refills: 5 | Status: SHIPPED | OUTPATIENT
Start: 2022-10-17 | End: 2023-02-20

## 2022-10-25 ENCOUNTER — PATIENT OUTREACH (OUTPATIENT)
Dept: CASE MANAGEMENT | Facility: OTHER | Age: 82
End: 2022-10-25

## 2022-10-25 DIAGNOSIS — I50.9 CHRONIC CONGESTIVE HEART FAILURE, UNSPECIFIED HEART FAILURE TYPE: Primary | ICD-10-CM

## 2022-10-25 DIAGNOSIS — I48.19 ATRIAL FIBRILLATION, PERSISTENT: Primary | ICD-10-CM

## 2022-10-25 RX ORDER — AMIODARONE HYDROCHLORIDE 200 MG/1
200 TABLET ORAL DAILY
Qty: 90 TABLET | Refills: 1 | Status: SHIPPED | OUTPATIENT
Start: 2022-10-25

## 2022-10-25 NOTE — OUTREACH NOTE
AMBULATORY CASE MANAGEMENT NOTE    Name and Relationship of Patient/Support Person: Layo Cee H - Self    CCM Interim Update    Reviewed chart prior to calling patient for outreach, states he has been a little under the weather. He went to urgent care 10/10 for SOA, and he said they diagnosed him with Bronchitis. However the visit showed he had Strep throat, was given Amoxicillin BID x10 days. States he finished his antibiotic. However the past couple days he has been a little SOA. He has been using the Xopenex nebulizer daily, encouraged to use Q6 as directed until he feels better. States his SAO2 has been 96-97%.   Asked if he has been weighing himself and states he was there for awhile but hasn't been recently. Advised to weight daily and keep log. Educated importance of this.   He was in hospital from 9/27-9/29 with atypical CP, KAMLA. His discharge instructions were to hold Lasix, Entresto and Potassium until he was seen by PCP. Follow-up with PCP on 10/5. Patient had restarted his Lasix about 10/2 because he was having increased SOA, and had a weight gain. Provider added Norvasc because his BP had been elevated. He had labs rechecked 10/7, PCP said to continue to hold Entresto until re-check labs again on 11/8. States his blood pressure was about 105/70's today, but that's the first time its been that low. He has been keeping a log and it has been about 180's/80's, he is going to drop log off at cardiology office tomorrow for them to review. States his HR has stayed in the 70's, no issues with that.   He is having a follow-up CT scan of chest on the nodules that pulmonology said was likely related to granulomatous disease. Will see what that shows, then follow-up with patient. Not sure if the SOA is from acute illness, CHF, COPD, or from not taking Entresto.   States he has been sleeping great, he never seems to have a problem with that.     Patient had a change in providers, consented to continue CCM  Program.  Education Documentation  No documentation found.      ANASTACIA LOVE  Ambulatory Case Management  10/25/2022, 16:17 EDT           Called patient to follow up after CT of chest was done. Patient states his SOA is about the same. Denies cough, fever, increased swelling, or increase in weight. Offered to make appointment with PCP, however he states he has an appointment with Pulmonology next week and will be okay. Advised again to use Xopenex nebulizer every 6 hrs to see if that helps and to go to ER if his symptoms get worse.   Anastacia Mccauley RN  Ambulatory Case Management    10/27/2022, 11:15 EDT

## 2022-10-26 ENCOUNTER — HOSPITAL ENCOUNTER (OUTPATIENT)
Dept: CT IMAGING | Facility: HOSPITAL | Age: 82
Discharge: HOME | End: 2022-10-26
Admitting: INTERNAL MEDICINE

## 2022-10-26 DIAGNOSIS — Z86.16 HISTORY OF COVID-19: ICD-10-CM

## 2022-10-26 DIAGNOSIS — J30.2 SEASONAL ALLERGIES: ICD-10-CM

## 2022-10-26 DIAGNOSIS — F17.201 TOBACCO ABUSE, IN REMISSION: ICD-10-CM

## 2022-10-26 DIAGNOSIS — K21.00 GASTROESOPHAGEAL REFLUX DISEASE WITH ESOPHAGITIS WITHOUT HEMORRHAGE: Chronic | ICD-10-CM

## 2022-10-26 DIAGNOSIS — J30.9 ALLERGIC RHINITIS, UNSPECIFIED SEASONALITY, UNSPECIFIED TRIGGER: ICD-10-CM

## 2022-10-26 PROCEDURE — 71250 CT THORAX DX C-: CPT

## 2022-10-29 PROBLEM — N43.2 OTHER HYDROCELE: Status: ACTIVE | Noted: 2022-10-29

## 2022-10-29 PROBLEM — Z87.898 HISTORY OF GROSS HEMATURIA: Status: ACTIVE | Noted: 2022-10-29

## 2022-10-29 PROBLEM — N40.1 BENIGN PROSTATIC HYPERPLASIA WITH LOWER URINARY TRACT SYMPTOMS: Status: ACTIVE | Noted: 2022-10-29

## 2022-10-29 NOTE — PROGRESS NOTES
Chief Complaint    Urologic complaint    Subjective          Layo Cee presents to [facility]  History of Present Illness     82-year-old  male         BPH.     History of GH        on tamsulosin 0.4 mg and finasteride 5 mg daily.  Helping, but stream is a little worse.   Not straining.  Nocturia x 0.  Not bothered.    Patient's been having some more trouble with shortness of air last few days.  He is going to call cardiology today.     history of a CABG with atrial fibrillation.   7/21 coronary stent  aspirin 81.    No  GH     hydrocele on the left side.  About the same.    PVR    10/22  49    Previous    1/19 scrotal ultrasound-large left hydrocele, normal testicles, no testicular torsion.    Hematuria last year    10/20 cystoscopy-4 cm prostate with a TUR defect.  Some regrowth on the right lateral side about a centimeter above the verumontanum.  Still pretty open.  Mild trabeculations throughout.  Negative otherwise  9/20 CT urogram-bladder wall thickening along the base and right lateral wall.  Likely related to prostate.  Bilateral simple renal cyst.  Negative otherwise    non-smoker        History of TURP around 2015, Dr. Shi    Patient was admitted in 2019 with gross hematuria and clot retention and was on CBI for 48 hours.    PSA 11/2019 1.29    Results for orders placed or performed in visit on 10/31/22   Bladder Scan   Result Value Ref Range    Volume 49    POC Urinalysis Dipstick, Automated    Specimen: Urine   Result Value Ref Range    Color Yellow Yellow, Straw, Dark Yellow, Cely    Clarity, UA Clear Clear    Specific Gravity  1.015 1.005 - 1.030    pH, Urine 6.0 5.0 - 8.0    Leukocytes Negative Negative    Nitrite, UA Negative Negative    Protein, POC Negative Negative mg/dL    Glucose, UA Negative Negative mg/dL    Ketones, UA Negative Negative    Urobilinogen, UA 0.2 E.U./dL Normal, 0.2 E.U./dL    Bilirubin Negative Negative    Blood, UA Negative Negative     Lot Number [id number]     Expiration Date 10/2,023          Past History:  Medical History: has a past medical history of Arthritis, BPH (benign prostatic hyperplasia), CAD (coronary artery disease), CHF (congestive heart failure) ([facility]), COPD (chronic obstructive pulmonary disease) ([facility]), Coronary artery disease of bypass graft of native heart with stable angina pectoris (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) (07/2016), OCD (obsessive compulsive disorder), Renal insufficiency (08/27/2020), Rhinitis, allergic (06/05/2018), Sore throat, Stable angina ([facility]), Typical atrial flutter ([facility]) (11/30/2018), and Vertigo.   Surgical History: has a past surgical history that includes Bladder surgery; Coronary artery bypass graft; Cardiac catheterization (07/2016); Colonoscopy (2011); Cystoscopy (03/19/2019); Esophagogastroduodenoscopy (2016); Cardiac surgery; Prostate surgery; Cardiac catheterization (N/A, 8/9/2021); Electrical Cardioversion (5/12/2022); Cardiac electrophysiology procedure (N/A, 6/3/2022); and Cardiac electrophysiology procedure (N/A, 6/3/2022).   Family History: family history includes Heart disease in his father; Hypertension in his mother; Kidney cancer in his brother; Other in his brother.   Social History: reports that he quit smoking about 24 years ago. His smoking use included cigarettes. He started smoking about 64 years ago. He has a 20.00 pack-year smoking history. He has never used smokeless tobacco. He reports that he does not currently use alcohol. He reports that he does not use drugs.  Allergies: Carvedilol and Levaquin [levofloxacin]            Objective     Vital Signs:   There were no vitals  taken for this visit.             Assessment and Plan    Diagnoses and all orders for this visit:    1. Other hydrocele (Primary)    2. History of gross hematuria    3. Benign prostatic hyperplasia with lower urinary tract symptoms, symptom details unspecified      BPH    Continue Flomax and finasteride        History of gross hematuria    Doing well currently.       Left hydrocele    Unchanged, monitor conservatively      Discussed with patient if his shortness of air gets any worse he would need to go to the emergency room, he is going to contact his cardiologist right now.      Follow-up in 1 year with nurse practitioner for med refill    PVR at f/u

## 2022-10-31 ENCOUNTER — PATIENT OUTREACH (OUTPATIENT)
Dept: CASE MANAGEMENT | Facility: OTHER | Age: 82
End: 2022-10-31

## 2022-10-31 ENCOUNTER — OFFICE VISIT (OUTPATIENT)
Dept: UROLOGY | Facility: CLINIC | Age: 82
End: 2022-10-31

## 2022-10-31 VITALS — WEIGHT: 242.8 LBS | HEIGHT: 70 IN | RESPIRATION RATE: 16 BRPM | BODY MASS INDEX: 34.76 KG/M2

## 2022-10-31 DIAGNOSIS — N43.2 OTHER HYDROCELE: Primary | ICD-10-CM

## 2022-10-31 DIAGNOSIS — N40.1 BENIGN PROSTATIC HYPERPLASIA WITH LOWER URINARY TRACT SYMPTOMS, SYMPTOM DETAILS UNSPECIFIED: ICD-10-CM

## 2022-10-31 DIAGNOSIS — I50.9 CHRONIC CONGESTIVE HEART FAILURE, UNSPECIFIED HEART FAILURE TYPE: Primary | ICD-10-CM

## 2022-10-31 DIAGNOSIS — Z87.898 HISTORY OF GROSS HEMATURIA: ICD-10-CM

## 2022-10-31 DIAGNOSIS — I48.19 ATRIAL FIBRILLATION, PERSISTENT: ICD-10-CM

## 2022-10-31 LAB
BILIRUB BLD-MCNC: NEGATIVE MG/DL
CLARITY, POC: CLEAR
COLOR UR: YELLOW
EXPIRATION DATE: NORMAL
GLUCOSE UR STRIP-MCNC: NEGATIVE MG/DL
KETONES UR QL: NEGATIVE
LEUKOCYTE EST, POC: NEGATIVE
Lab: NORMAL
NITRITE UR-MCNC: NEGATIVE MG/ML
PH UR: 6 [PH] (ref 5–8)
PROT UR STRIP-MCNC: NEGATIVE MG/DL
RBC # UR STRIP: NEGATIVE /UL
SP GR UR: 1.01 (ref 1–1.03)
SPECIMEN VOL 24H UR: 49 L
UROBILINOGEN UR QL: NORMAL

## 2022-10-31 PROCEDURE — 99212 OFFICE O/P EST SF 10 MIN: CPT | Performed by: UROLOGY

## 2022-10-31 PROCEDURE — 51798 US URINE CAPACITY MEASURE: CPT | Performed by: UROLOGY

## 2022-10-31 PROCEDURE — 81003 URINALYSIS AUTO W/O SCOPE: CPT | Performed by: UROLOGY

## 2022-10-31 NOTE — OUTREACH NOTE
[facility] End of Month Documentation    This Chronic Medical Management Care Plan for Layo Cee, 82 y.o. male, has been reviewed; monitored and managed and a new plan of care implemented for the month of October.  A cumulative time of 26  minutes was spent on this patient record this month, including chart review; phone call with patient.    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 pectoris (HCC); Class 1 obesity due to excess calories with serious comorbidity and body mass index (BMI) of 33.0 to 33.9 in adult; Gout; Typical atrial flutter (HCC); Essential hypertension; Frequent PVCs; Mixed hyperlipidemia; Rotator cuff Bursitis of shoulder region; Lateral epicondylitis; History of hematuria; Benign prostatic hyperplasia with urinary frequency; Hydrocele in adult; History of COVID-19; Allergic rhinitis; Seasonal allergies; Chronic systolic congestive heart failure (HCC); Tobacco abuse, in remission; Vitamin D deficiency; Gastroesophageal reflux disease with esophagitis without hemorrhage; Ischemic cardiomyopathy; Centrilobular emphysema (HCC); Gastroesophageal reflux disease; Atrial fibrillation, persistent (HCC); COPD (chronic obstructive pulmonary disease) (HCC); Chronic anticoagulation; Diastolic dysfunction; S/P CABG (coronary artery bypass graft); Stage 3a chronic kidney disease (HCC); Persistent atrial fibrillation (HCC); S/P ablation of atrial fibrillation; Atrial fibrillation, unspecified type (HCC); Elevated troponin; Acute on chronic diastolic CHF (congestive heart failure) (HCC); SOB (shortness of breath); Chest pain; Mild aortic valve stenosis; Moderate mitral regurgitation; Other hydrocele; History of gross hematuria; and Benign prostatic hyperplasia with lower urinary tract symptoms on their problem list., the following items were addressed: medical records; medications; changes to medical care; transitions to  medical care and any changes can be found within the plan section of the note.  A detailed listing of time spent for chronic care management is tracked within each outreach encounter.  Current medications include:  has a current medication list which includes the following prescription(s): acetaminophen, amiodarone, amlodipine, aspirin, calcium carbonate, coenzyme q10, diclofenac sodium, finasteride, fluticasone, arnuity ellipta, furosemide, isosorbide mononitrate, levalbuterol, levalbuterol, metoprolol succinate xl, montelukast, nitroglycerin, pantoprazole, pitavastatin calcium, tamsulosin, stiolto respimat, b complex-c, xarelto, and [DISCONTINUED] diltiazem cd. and the patient is reported to be patient is compliant with medication protocol,  Medications are reported to be non-effective in controlling symptoms and changes have been made to the medication protocol.  Regarding these diagnoses no new referrals were made.  All notes on chart for PCP to review.    The patient was monitored remotely for medications; pain; weight; activity level; blood pressure; mood & behavior, Breathing, HR, SAO2.    The patient's physical needs include:  physical healthcare; medication education.     The patient's mental support needs include:  needs met    The patient's cognitive support needs include:  health care; medication    The patient's psychosocial support needs include:  needs met; medication management or adherence    The patient's functional needs include: medication education; physical healthcare    The patient's environmental needs include:  not applicable    Care Plan overall comments:  N/A    Refer to previous outreach notes for more information on the areas listed above.    Monthly Billing Diagnoses  (I50.9) Chronic congestive heart failure, unspecified heart failure type (HCC)    (I48.19) Atrial fibrillation, persistent (HCC)    Medications   · Medications have been reconciled    Care Plan progress this month:       Recently Modified Care Plans Updates made since 9/30/2022 12:00 AM    No recently modified care plans.        Instructions   · Patient was provided an electronic copy of care plan  · CCM services were explained and offered and patient has accepted these services.  · Patient has given their written consent to receive CCM services and understands that this includes the authorization of electronic communication of medical information with the other treating providers.  · Patient understands that they may stop CCM services at any time and these changes will be effective at the end of the calendar month and will effectively revocate the agreement of CCM services.  · Patient understands that only one practitioner can furnish and be paid for CCM services during one calendar month.  Patient also understands that there may be co-payment or deductible fees in association with CCM services.  · Patient will continue with at least monthly follow-up calls with the Ambulatory .    ANASTACIA LOVE  Ambulatory Case Management    10/31/2022, 15:35 EDT

## 2022-11-01 NOTE — PROGRESS NOTES
Primary Care Provider  Noble Kc MD   Referring Provider  No ref. provider found    Patient Complaint  Follow-up, Shortness of Breath, Cough, Emphysema, and COPD      SUBJECTIVE    History of Presenting Illness  Layo Cee is a pleasant 82 y.o. male who presents to [facility] for 6-month follow-up.  Patient last saw Dr. Brink 6/2/2022.  Patient is here with his spouse today.  Patient tested positive for strep yesterday at urgent care in [place].  Patient states 3 weeks prior he had a positive strep as well.  Patient is currently on antibiotic and steroid.  Patient also was under the care of cardiology at [facility].  He underwent cardiac ablation in June for chronic A. fib.  Patient states he is in rhythm as of ablation.  Patient states he recently saw cardiology and they increased his Lasix for 5 days and is scheduled to have a BNP after he completes the Lasix.  Patient does have edema in bilateral lower extremities 1+.  Patient does not monitor his salt intake.  Wife states he ate an apple with salt last night.  Patient is monitoring his weight daily.  Patient continues to be on Arnuity and Stiolto for his lungs at this time in addition he continues to be on Xopenex nebulizer.  He does have a rescue inhaler but does not feel that it helps him.  He feels the nebulizers are better for him.  Patient recently had a CT scan of his lungs on 10/26/2022.  Its following up from having COVID.  Patient states he continues to have shortness of air with exertion of moderate severity.  Patient's last pulmonary function test was in June 2021.  Patient does have a cough with clear mucus production.    Denies wheezing, headaches, chest pain, weight loss or hemoptysis. Denies fevers, chills and night sweats. Layo Cee is able to perform ADLs without difficulties and denies any swollen glands/lymph nodes in the head or neck.    I have personally reviewed the  review of systems, past family, social, medical and surgical histories; and agree with their findings.    Review of Systems   Constitutional: Positive for fever. Negative for chills, diaphoresis, fatigue and unexpected weight change.   HENT: Positive for sore throat.    Eyes: Negative.    Respiratory: Positive for cough and shortness of breath. Negative for wheezing and stridor.    Cardiovascular: Positive for leg swelling. Negative for chest pain and palpitations.   Musculoskeletal: Negative.    Skin: Negative.         Family History   Problem Relation Age of Onset   • Hypertension Mother    • Heart disease Father    • Other Brother         GASTROINTESTINAL CANCER   • Kidney cancer Brother         Social History     Socioeconomic History   • Marital status:    Tobacco Use   • Smoking status: Former     Packs/day: 0.50     Years: 40.00     Pack years: 20.00     Types: Cigarettes     Start date:      Quit date:      Years since quittin.8   • Smokeless tobacco: Never   Vaping Use   • Vaping Use: Never used   Substance and Sexual Activity   • Alcohol use: Not Currently   • Drug use: Never   • Sexual activity: Defer        Past Medical History:   Diagnosis Date   • Arthritis    • BPH (benign prostatic hyperplasia)    • CAD (coronary artery disease)    • CHF (congestive heart failure) ([facility])    • COPD (chronic obstructive pulmonary disease) ([facility])    • Coronary artery disease of bypass graft of native heart with stable angina pectoris ([facility]) 2021    (1) Coronary artery disease, status post coronary artery bypass grafting in the past. Cardiac catheterization done in 2016 showed patent left internal mammary graft to the LAD and occluded saphenous venous graft. Native LAD is 100% occluded in the mid portion. Native circumflex artery has no significant disease. Native right coronary artery is also 100% occluded and it is a chronic total occl   • COVID-19 2021   • Diverticulitis 10/11/2019   •  Dysphagia    • Essential hypertension 08/27/2020   • Frequent PVCs 8/28/2021   • GERD (gastroesophageal reflux disease)    • Gross hematuria    • HBP (high blood pressure)    • Long-term use of high-risk medication    • Lung disease    • Mixed hyperlipidemia 8/28/2021   • Myocardial infarction (HCC) 07/2016   • OCD (obsessive compulsive disorder)    • Renal insufficiency 08/27/2020   • Rhinitis, allergic 06/05/2018   • Sore throat    • Stable angina ([facility])    • Typical atrial flutter (HCC) 11/30/2018    s/p ablation by Dr. Aguilar    • Vertigo         Immunization History   Administered Date(s) Administered   • COVID-19 (NIKKY) 11/04/2021   • Fluzone High-Dose 65+yrs 09/28/2021, 10/05/2022   • Fluzone Quad >6mos (Multi-dose) 10/08/2019   • Influenza Quad Vaccine (Inpatient) 10/08/2019   • Influenza, Unspecified 10/08/2019   • Shingrix 04/29/2021, 11/22/2021       Allergies   Allergen Reactions   • Carvedilol Swelling and Anaphylaxis   • Levaquin [Levofloxacin] Unknown - Low Severity          Current Outpatient Medications:   •  acetaminophen (TYLENOL) 325 MG tablet, Take 650 mg by mouth Every 6 (Six) Hours As Needed for Mild Pain ., Disp: , Rfl:   •  amiodarone (PACERONE) 200 MG tablet, Take 1 tablet by mouth Daily., Disp: 90 tablet, Rfl: 1  •  amLODIPine (NORVASC) 5 MG tablet, Take 1 tablet by mouth Daily., Disp: 30 tablet, Rfl: 1  •  aspirin (aspirin) 81 MG EC tablet, Take 1 tablet by mouth Daily., Disp: 30 tablet, Rfl: 1  •  azithromycin (ZITHROMAX) 500 MG tablet, Take 1 tablet by mouth Daily., Disp: , Rfl:   •  calcium carbonate (OS-LIANNA) 600 MG tablet, Take 600 mg by mouth Every Night., Disp: , Rfl:   •  Coenzyme Q10 100 MG tablet, Take 100 mg by mouth Daily., Disp: , Rfl:   •  Diclofenac Sodium (VOLTAREN) 1 % gel gel, Apply 2 g topically to the appropriate area as directed 4 (Four) Times a Day., Disp: , Rfl:   •  finasteride (PROSCAR) 5 MG tablet, Take 5 mg by mouth Daily., Disp: , Rfl:   •  fluticasone (FLONASE)  50 MCG/ACT nasal spray, 1 spray into the nostril(s) as directed by provider Every Night., Disp: , Rfl:   •  Fluticasone Furoate (Arnuity Ellipta) 100 MCG/ACT aerosol powder , Inhale 1 puff Daily., Disp: 1 each, Rfl: 3  •  furosemide (LASIX) 40 MG tablet, Take 1 tablet by mouth 2 (Two) Times a Day for 30 days., Disp: 60 tablet, Rfl: 0  •  isosorbide mononitrate (IMDUR) 60 MG 24 hr tablet, Take 1 tablet by mouth Daily., Disp: 90 tablet, Rfl: 3  •  levalbuterol (Xopenex HFA) 45 MCG/ACT inhaler, Inhale 1-2 puffs Every 4 (Four) Hours As Needed for Wheezing., Disp: 1 each, Rfl: 11  •  levalbuterol (Xopenex) 0.63 MG/3ML nebulizer solution, Take 1 ampule by nebulization Every 6 (Six) Hours As Needed for Wheezing for up to 30 days. DX: J44.9, Disp: 360 mL, Rfl: 5  •  metoprolol succinate XL (TOPROL-XL) 25 MG 24 hr tablet, Take 1 tablet by mouth 2 (Two) Times a Day for 30 days., Disp: 90 tablet, Rfl: 0  •  montelukast (SINGULAIR) 10 MG tablet, Take 1 tablet by mouth Every Night for 30 days., Disp: 30 tablet, Rfl: 11  •  nitroglycerin (NITROSTAT) 0.4 MG SL tablet, Place 1 tablet under the tongue Every 5 (Five) Minutes As Needed for Chest Pain for up to 30 days. Take no more than 3 doses in 15 minutes., Disp: 30 tablet, Rfl: 0  •  pantoprazole (PROTONIX) 40 MG EC tablet, Take 1 tablet by mouth Daily., Disp: 30 tablet, Rfl: 3  •  pitavastatin calcium (Livalo) 2 MG tablet tablet, Take 1 tablet by mouth Every Night., Disp: 90 tablet, Rfl: 3  •  predniSONE (DELTASONE) 20 MG tablet, Take 3 tablets by mouth Daily., Disp: 18 tablet, Rfl: 0  •  tamsulosin (FLOMAX) 0.4 MG capsule 24 hr capsule, Take 1 capsule by mouth Daily. 1/2 hours following the same meal each day (Patient taking differently: Take 1 capsule by mouth Daily.), Disp: 90 capsule, Rfl: 4  •  tiotropium bromide-olodaterol (Stiolto Respimat) 2.5-2.5 MCG/ACT aerosol solution inhaler, Inhale 2 puffs Daily., Disp: 2 each, Rfl: 0  •  VITAMIN B COMPLEX-C PO, Take 1 tablet by  "mouth Daily., Disp: , Rfl:   •  Xarelto 15 MG tablet, Take 1 tablet by mouth every day (Patient taking differently: Take 1 tablet by mouth Every Night.), Disp: 90 tablet, Rfl: 1       OBJECTIVE    Vital Signs   /86 (BP Location: Left arm, Patient Position: Sitting, Cuff Size: Adult)   Pulse 92   Temp 99.6 °F (37.6 °C) (Tympanic)   Resp 20   Ht 177.8 cm (70\")   Wt 110 kg (243 lb)   SpO2 97% Comment: ROOM AIR  BMI 34.87 kg/m²     Physical Exam  Vitals reviewed.   Constitutional:       General: He is not in acute distress.     Appearance: Normal appearance. He is obese. He is not ill-appearing.   HENT:      Head: Normocephalic and atraumatic.      Nose: Nose normal.      Mouth/Throat:      Mouth: Mucous membranes are moist.      Pharynx: Oropharynx is clear.   Eyes:      Extraocular Movements: Extraocular movements intact.      Conjunctiva/sclera: Conjunctivae normal.      Pupils: Pupils are equal, round, and reactive to light.   Cardiovascular:      Rate and Rhythm: Normal rate and regular rhythm.      Pulses: Normal pulses.      Heart sounds: Normal heart sounds.   Pulmonary:      Effort: Pulmonary effort is normal. No respiratory distress.      Breath sounds: No stridor. No wheezing, rhonchi or rales.   Abdominal:      General: Bowel sounds are normal.   Musculoskeletal:         General: Normal range of motion.      Cervical back: Normal range of motion and neck supple.      Right lower leg: Edema present.      Left lower leg: Edema present.      Comments: Trace edema bilateral lower extremities   Lymphadenopathy:      Cervical: No cervical adenopathy.   Skin:     General: Skin is warm and dry.   Neurological:      General: No focal deficit present.      Mental Status: He is alert and oriented to person, place, and time.   Psychiatric:         Mood and Affect: Mood normal.         Behavior: Behavior normal.          Results Review  I have personally reviewed the prior office notes, CT scan 10/26/2022 " and CT with contrast 5/6/2022, chest x-ray 9/28/2022, PFT, labs, and echo as follows:      CT Chest Without Contrast Diagnostic [[id number]] (Order [id number])  Order  Status: Final result     Appointment Information    Display Notes    V/AVB                   PACS Images     Radiology Images    Study Result    Narrative & Impression   PROCEDURE:  CT CHEST WO CONTRAST DIAGNOSTIC     COMPARISON: [facility], CT, CT CHEST W CONTRAST DIAGNOSTIC, 5/06/2022, 12:30.     INDICATIONS:  lung nodule, copd     PROTOCOL:     Standard imaging protocol performed                 RADIATION:                     Automated exposure control was utilized to minimize radiation dose.      TECHNIQUE:    Axial images of the chest without intravenous contrast.     FINDINGS:  Prior median sternotomy.  Coronary artery calcification is present.  No evidence of pleural or   pericardial effusion.  No evidence of pneumothorax.  There is no mediastinal, axillary or hilar   adenopathy.  The thoracic aorta has a normal caliber.  Images of the unenhanced upper abdomen are   unremarkable.  There are scattered calcified granulomas.  Degenerative changes are present in the   thoracic spine.  There is scarring in the apices.     IMPRESSION:  No acute findings.  Findings consistent with prior granulomatous infection.     DAREK ALEJO MD         Electronically Signed and Approved By: DAREK ALEJO MD on 10/27/2022 at 9:11                        Imaging    CT Chest Without Contrast Diagnostic (Order: [id number]) - 10/26/2022    Result History    CT Chest Without Contrast Diagnostic (Order #[id number]) on 10/27/2022 - Order Result History Report      Signed    Electronically signed by Darek Alejo IV, MD on 10/27/22 at 0911 EDT           CT Chest With Contrast Diagnostic [[id number]] (Order [id number])  Order  Status: Final result     Appointment Information    PACS Images     Radiology Images  Study Result    Narrative & Impression    PROCEDURE:  CT CHEST W CONTRAST DIAGNOSTIC     COMPARISON:  [facility], CT, CHEST W/ CONTRAST, 4/20/2021, 12:11.    [facility], CT, CT CHEST WO CONTRAST DIAGNOSTIC, 3/28/2022, 10:03.  INDICATIONS:  Pulmonary embolism (PE) suspected, unknown D-dimer     TECHNIQUE:    After obtaining the patient's consent, CT images were obtained with non-ionic   intravenous contrast material.       PROTOCOL:     Standard imaging protocol performed                 RADIATION:      DLP: 615.8mGy*cm               Automated exposure control was utilized to minimize radiation dose.   CONTRAST:      100cc Isovue 370 I.V.     FINDINGS:          No evidence of pulmonary embolism.  No acute findings of the mediastinum.  No pericardial effusion.    No dissection of the aorta.  Coronary artery calcifications are present.  Pair of extensive tiny   calcified nodules throughout both lungs again noted and calcified hilar lymph nodes and mediastinal   lymph nodes from old granulomatous disease.  No pneumonia or pulmonary edema.  No pneumothorax or   pleural effusion.  Central airways are clear.  No acute or aggressive osseous findings.  No   evidence of acute process of the included upper abdomen.     IMPRESSION:               No evidence of pulmonary embolism or other acute process of the chest.            DALIA DOMINGO MD         Electronically Signed and Approved By: DALIA DOMINGO MD on 5/06/2022 at 12:59        XR Chest PA & Lateral [[id number]] (Order [id number])  Order  Status: Final result     Appointment Information    PACS Images     Radiology Images  Study Result    Narrative & Impression   PROCEDURE:  XR CHEST PA AND LATERAL     COMPARISON: [facility], CR, XR CHEST PA AND LATERAL, 6/17/2022, 13:23.     INDICATIONS:  chest pain, reproducible, copd, r/o possible pna vs musculoskeletal abnormality     FINDINGS:             The lungs are well-expanded. The heart and pulmonary  vasculature are within normal limits. No   pleural effusions are identified. There are no active appearing infiltrates.  There are bilateral   calcified granulomas.  Prior median sternotomy and CABG procedure.     IMPRESSION: No active disease.     MAGNUS FRAZIER MD         Electronically Signed and Approved By: MAGNUS FRAZIER MD on 2022 at 18:00          Pulmonary Function Test (Transcribed): Patient Communication     Release   Not seen     Results  Pulmonary Function Test (Transcribed) (Order [id number])  Order-Level Documents:    Scan on 2021 by Maribell Rollins, ARACELI: PULMONARY FUNCTION TEST RESULT, [facility], 2021         Author: -- Service: -- Author Type: --   Filed:  Date of Service:  Creation Time:    Status: (Other)   Pulmonary function test     Spirometry:  Mild obstructive lung defect noted.  Postbronchodilator FEV1 is 2.05 L / 70% predicted.  No bronchodilator response was noted.  Flow volume loop shows obstruction.     Lung volumes:  No evidence of restriction.  Air-trapping present     Diffusion capacity:  Diffuse capacity moderately reduced at 58% predicted     Overall impression:  Mild airflow obstruction with no bronchodilator response present.  Air trapping present.  Diffusion capacity moderately reduced.  Compared to testing done in 2017, there has been a significant increase in the diffusing capacity by 17%.  Otherwise, no significant changes.     Electronically signed by Haseeb Luna MD, 07/15/21, 7:19 AM EDT.        Layo Cee  Complete Transthoracic Echocardiogram with Complete Doppler, Color Flow and Contrast  Order# [id number]  Reading physician: Lucas Alvarado MD Ordering physician: Darnell Maynard Jr., MD Study date: 22     Patient Information    Patient Name   Layo Cee MRN   [id number] Legal Sex   Male  (Age)   1940 (82 y.o.)     Patient Hx Of Height, Weight, and Vitals    Height Weight BSA (Calculated - sq m) BMI (Calculated)  "Retired BMI (kg/m2) Pulse BP   177.8 cm (70\") 107 kg (234 lb 12.6 oz) 2.23 sq meters 33.7  85 175/81     [facility] PACS Images     Show images for Adult Transthoracic Echo Complete W/ Cont if Necessary Per Protocol [facility] PACS Images     Show images for Adult Transthoracic Echo Complete W/ Cont if Necessary Per Protocol   Clinical Indication    Chest Pain     Interpretation Summary    • The left ventricular cavity is borderline dilated.  • Left ventricular ejection fraction appears to be 56 - 60%.  • Left ventricular diastolic function is consistent with (grade I) impaired relaxation and age.  • Mild aortic valve stenosis is present with max/mean pressure gradients 17/10 mmHg.  • No significant pericardial effusion noted. There may be a minimal amount of fluid near the right atrium.  • Mild to moderate left atrial enlargement.  • Mild to moderate mitral regurgitation.    Contains abnormal data Comprehensive Metabolic Panel  Order: [id number]   Status: Final result      Visible to patient: No (inaccessible in MyChart)      Next appt: 11/02/2022 at 08:30 AM in Cardiology (Mely Smith, APRN)      Dx: Mixed hyperlipidemia     Specimen Information: Blood    2 Result Notes     1 Follow-up Encounter  Component Ref Range & Units 3 wk ago   (10/7/22) 1 mo ago   (9/29/22) 1 mo ago   (9/28/22) 1 mo ago   (9/27/22) 4 mo ago   (6/10/22) 4 mo ago   (6/9/22) 4 mo ago   (6/8/22)   Glucose 65 - 99 mg/dL 121 High   121 High   123 High   111 High   134 High   164 High   153 High     BUN 8 - 23 mg/dL 15  19  18  18  12  13  13    Creatinine 0.76 - 1.27 mg/dL 1.33 High   1.62 High   1.58 High   1.78 High   1.05  1.24  1.12    Sodium 136 - 145 mmol/L 140  137  138  139  138  136  138    Potassium 3.5 - 5.2 mmol/L 4.7  4.5  4.4  3.9  3.8  3.8  3.3 Low     Chloride 98 - 107 mmol/L 104  103  102  101  104  99  101    CO2 22.0 - 29.0 mmol/L 26.4  25.6  27.3  29.6 High   24.0  26.2  24.2    Calcium 8.6 - 10.5 mg/dL 9.8  9.3  9.6  9.4  9.1  9.0  " 9.1    Total Protein 6.0 - 8.5 g/dL 6.4   6.5  6.9       Albumin 3.50 - 5.20 g/dL 3.90   3.90  4.00       ALT (SGPT) 1 - 41 U/L 24   14  12       AST (SGOT) 1 - 40 U/L 19   17  18       Alkaline Phosphatase 39 - 117 U/L 55   52  56       Total Bilirubin 0.0 - 1.2 mg/dL 0.3   0.4  0.3       Globulin gm/dL 2.5   2.6  2.9       A/G Ratio g/dL 1.6   1.5  1.4       BUN/Creatinine Ratio 7.0 - 25.0 11.3  11.7  11.4  10.1  11.4  10.5  11.6    Anion Gap 5.0 - 15.0 mmol/L 9.6  8.4  8.7  8.4  10.0  10.8  12.8    eGFR >60.0 mL/min/1.73 54.0 Low   42.6 Low  CM  43.9 Low  CM  38.1 Low  CM  71.8 CM  58.8 Low  CM  66.4 CM    Comment: National Kidney Foundation and American Society of Nephrology (ASN) Task Force recommended calculation based on the Chronic Kidney Disease Epidemiology Collaboration (CKD-EPI) equation refit without adjustment for race.   Resulting Agency   [facility]  [facility]  [facility]  [facility]  [facility]           Narrative  Performed by:  [facility]  GFR Normal >60   Chronic Kidney Disease <60   Kidney Failure <15       Specimen Collected: 10/07/22 08:13 EDT Last Resulted: 10/07/22 17:26 EDT           Contains abnormal data CBC Auto Differential  Order: [id number]   Status: Final result      Visible to patient: No (not released)      Next appt: 11/02/2022 at 08:30 AM in Cardiology (ARACELI Tristan)     Specimen Information: Blood    0 Result Notes  Component Ref Range & Units 1 mo ago   (9/28/22) 1 mo ago   (9/27/22) 4 mo ago   (6/6/22) 5 mo ago   (6/4/22) 5 mo ago   (6/2/22) 5 mo ago   (5/12/22) 5 mo ago   (5/12/22)   WBC 3.40 - 10.80 10*3/mm3 8.29  9.08  9.18  10.15  6.86   11.27 High     RBC 4.14 - 5.80 10*6/mm3 4.69  4.60  3.69 Low   3.78 Low   4.29   4.61    Hemoglobin 13.0 - 17.7 g/dL 13.4  13.2  11.3 Low   11.3 Low   12.7 Low    13.5    Hematocrit 37.5 - 51.0 % 40.8  40.5  33.5 Low   33.6 Low   37.7   40.9    MCV 79.0 - 97.0 fL 87.0  88.0  90.8  88.9  87.9   88.7    MCH 26.6 -  33.0 pg 28.6  28.7  30.6  29.9  29.6   29.3    MCHC 31.5 - 35.7 g/dL 32.8  32.6  33.7  33.6  33.7   33.0    RDW 12.3 - 15.4 % 15.4  15.6 High   15.5 High   14.7  14.7   14.7    RDW-SD 37.0 - 54.0 fl 48.6  49.6  51.7  47.6  46.5   47.1    MPV 6.0 - 12.0 fL 10.2  9.7  9.8  9.7  9.7   9.1    Platelets 140 - 450 10*3/mm3 195  211  179  161  172   386    Neutrophil % 42.7 - 76.0 % 57.8  58.0  66.8   51.5  54.0     Lymphocyte % 19.6 - 45.3 % 26.4  25.8  16.3 Low    27.6  33.0     Monocyte % 5.0 - 12.0 % 9.4  11.1  14.2 High    11.4  7.0     Eosinophil % 0.3 - 6.2 % 5.3  4.2  1.6   8.2 High       Basophil % 0.0 - 1.5 % 0.6  0.3  0.2   0.7  1.0     Immature Grans % 0.0 - 0.5 % 0.5  0.6 High   0.9 High    0.6 High       Neutrophils, Absolute 1.70 - 7.00 10*3/mm3 4.79  5.27  6.13   3.54  6.54     Lymphocytes, Absolute 0.70 - 3.10 10*3/mm3 2.19  2.34  1.50   1.89  3.72 High      Monocytes, Absolute 0.10 - 0.90 10*3/mm3 0.78  1.01 High   1.30 High    0.78  0.79     Eosinophils, Absolute 0.00 - 0.40 10*3/mm3 0.44 High   0.38  0.15   0.56 High       Basophils, Absolute 0.00 - 0.20 10*3/mm3 0.05  0.03  0.02   0.05  0.11     Immature Grans, Absolute 0.00 - 0.05 10*3/mm3 0.04  0.05  0.08 High    0.04      nRBC 0.0 - 0.2 /100 WBC 0.0  0.0  0.0   0.0  1.0 High      Resulting Agency   [facility]  [facility]  [facility]  [facility]  [facility]              Specimen Collected: 09/28/22 04:14 EDT Last Resulted: 09/28/22 05:25 EDT                  ASSESSMENT & PLAN    Patient Active Problem List   Diagnosis   • Unstable angina (HCC)   • Coronary artery disease of bypass graft of native heart with stable angina pectoris (HCC)   • Class 1 obesity due to excess calories with serious comorbidity and body mass index (BMI) of 33.0 to 33.9 in adult   • Gout   • Typical atrial flutter (HCC)   • Essential hypertension   • Frequent PVCs   • Mixed hyperlipidemia   • Rotator cuff Bursitis of shoulder region   • Lateral epicondylitis    • History of hematuria   • Benign prostatic hyperplasia with urinary frequency   • Hydrocele in adult   • History of COVID-19   • Allergic rhinitis   • Seasonal allergies   • Chronic systolic congestive heart failure (HCC)   • Tobacco abuse, in remission   • Vitamin D deficiency   • Gastroesophageal reflux disease with esophagitis without hemorrhage   • Ischemic cardiomyopathy   • Centrilobular emphysema (HCC)   • Gastroesophageal reflux disease   • Atrial fibrillation, persistent (HCC)   • COPD (chronic obstructive pulmonary disease) (HCC)   • Chronic anticoagulation   • Diastolic dysfunction   • S/P CABG (coronary artery bypass graft)   • Stage 3a chronic kidney disease (HCC)   • Persistent atrial fibrillation (HCC)   • S/P ablation of atrial fibrillation   • Atrial fibrillation, unspecified type (HCC)   • Elevated troponin   • Acute on chronic diastolic CHF (congestive heart failure) (HCC)   • SOB (shortness of breath)   • Chest pain   • Mild aortic valve stenosis   • Moderate mitral regurgitation   • Other hydrocele   • History of gross hematuria   • Benign prostatic hyperplasia with lower urinary tract symptoms       Diagnoses and all orders for this visit:    1. Centrilobular emphysema (HCC) (Primary)    2. History of granulomatous disease    3. Atrial fibrillation, persistent (HCC)           Plan:  Reviewed with patient and spouse his CT scan from 2022.  Also reviewed with patient his recent labs.  Patient to get a BNP after he has completed his Lasix.  Patient to continue with Arnuity and Stiolto patient instructed to rinse his mouth out after each use of the Arnuity to prevent oral thrush.  Patient encouraged to decrease his salt intake.  Patient encouraged to continue and complete his antibiotic and steroids for strep.  Discussed with patient if he did not get better with strep and it reoccurred that he should have a throat culture.  Patient verbalizes understanding.  Patient encouraged to monitor his  weights daily.  Patient to follow-up in 1 month or sooner if needed.    Smoking status:former  Vaccination status:up to date with one dose of COVID and flu  Medications personally reviewed    Follow Up  Return in about 4 weeks (around 12/1/2022).    Patient was given instructions and counseling regarding his condition or for health maintenance advice. Please see specific information pulled into the AVS if appropriate.

## 2022-11-02 ENCOUNTER — OFFICE VISIT (OUTPATIENT)
Dept: CARDIOLOGY | Facility: CLINIC | Age: 82
End: 2022-11-02

## 2022-11-02 ENCOUNTER — TELEPHONE (OUTPATIENT)
Dept: CARDIOLOGY | Facility: CLINIC | Age: 82
End: 2022-11-02

## 2022-11-02 VITALS
HEIGHT: 70 IN | HEART RATE: 78 BPM | WEIGHT: 243 LBS | SYSTOLIC BLOOD PRESSURE: 118 MMHG | DIASTOLIC BLOOD PRESSURE: 66 MMHG | BODY MASS INDEX: 34.79 KG/M2

## 2022-11-02 DIAGNOSIS — I48.19 ATRIAL FIBRILLATION, PERSISTENT: ICD-10-CM

## 2022-11-02 DIAGNOSIS — I50.22 CHRONIC SYSTOLIC CONGESTIVE HEART FAILURE: Primary | ICD-10-CM

## 2022-11-02 DIAGNOSIS — I10 ESSENTIAL HYPERTENSION: ICD-10-CM

## 2022-11-02 DIAGNOSIS — I25.5 ISCHEMIC CARDIOMYOPATHY: ICD-10-CM

## 2022-11-02 DIAGNOSIS — E78.2 MIXED HYPERLIPIDEMIA: ICD-10-CM

## 2022-11-02 DIAGNOSIS — I25.708 CORONARY ARTERY DISEASE OF BYPASS GRAFT OF NATIVE HEART WITH STABLE ANGINA PECTORIS: ICD-10-CM

## 2022-11-02 PROCEDURE — 99214 OFFICE O/P EST MOD 30 MIN: CPT | Performed by: FAMILY MEDICINE

## 2022-11-02 RX ORDER — FUROSEMIDE 40 MG/1
40 TABLET ORAL 2 TIMES DAILY
Qty: 60 TABLET | Refills: 0 | Status: SHIPPED | OUTPATIENT
Start: 2022-11-02 | End: 2023-02-20

## 2022-11-02 NOTE — PROGRESS NOTES
Chief Complaint  Shortness of Breath    Subjective        History of Present Illness  Layo Cee presents to [facility] CARDIOLOGY   Mr. Cee is an 82-year-old male patient, last seen in office on October 13, and reports since that time he feels as if his shortness of breath has gradually worsened, and he is of had a 5 pound weight gain over the last week.  He is having some lower extremity swelling.  He did recently have KAMLA while in hospital in September (complaints of chest pain, which ended up being musculoskeletal), which was already improving at time of discharge.    He does not report any recent episodes of chest pains, palpitations dizziness or syncope.  He does have follow-up with Dr. Aguilar  in approximately 1 week.    Past History:     (1) Coronary artery disease, status post coronary artery bypass grafting in the past. Cardiac catheterization done in July 2016 showed patent left internal mammary graft to the LAD and occluded saphenous venous graft. Native LAD is 100% occluded in the mid portion. Native circumflex artery has no significant disease. Native right coronary artery is also 100% occluded and it is a chronic total occlusion. The right coronary artery is reconstituted with collaterals from the left side.  Patient underwent repeat cardiac catheterization in June, 2021 and underwent angioplasty stent placement to diagonal branch.  (2) Ischemic cardiomyopathy with recovery of cardiac function. Last SPECT stress test in August 2016 showed an LV ejection fraction of 68%. (3) Chronic kidney disease, stage 3. (4) Chronic obstructive pulmonary disease, not an exacerbation. (5) Hypertension. (6) Hyperlipidemia. 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 (8) Typical atrial flutter status post radiofrequency ablation by Dr. Aguilar on 11/30/2018 (9) persistent atrial fibrillation status post extensive ablation and pulmonary  vein isolation by Dr. Aguilar on 6/3/2022  [facility]  Past Medical History:   Diagnosis Date   • Arthritis    • Atrial fibrillation, persistent ([facility]) 5/3/2022    Persistent atrial fibrillation status post extensive ablation and pulmonary vein isolation by Dr. Aguilar on 6/3/2022, with reoccurring rapid A. fib, and then successful cardioversion on 7/1/2022   • BPH (benign prostatic hyperplasia)    • CHF (congestive heart failure) ([facility])    • COPD (chronic obstructive pulmonary disease) ([facility])    • Coronary artery disease of bypass graft of native heart with stable angina pectoris ([facility])     (1) Coronary artery disease, s/p CABG in the past. Cardiac catheterization in July 2016 showed patentLIMA graft to the LAD and occluded SVGs. Native LAD is 100% occluded in the mid portion. Native LCx has no significant disease. Native RCA is 100% occluded and it is a . Repeat cath in June, 2021, underwent stent placement to diagnonal branch.   • COVID-19 02/04/2021   • Diverticulitis 10/11/2019   • Dysphagia    • Essential hypertension 08/27/2020   • Frequent PVCs 08/28/2021   • GERD (gastroesophageal reflux disease)    • Gross hematuria    • Long-term use of high-risk medication    • Lung disease    • Mixed hyperlipidemia 08/28/2021   • Myocardial infarction ([facility]) 07/2016   • OCD (obsessive compulsive disorder)    • Renal insufficiency 08/27/2020   • Rhinitis, allergic 06/05/2018   • Sore throat    • Stable angina ([facility])    • Typical atrial flutter ([facility]) 11/30/2018    s/p ablation by Dr. Aguilar    • Vertigo          ALLERGY  Allergies   Allergen Reactions   • Carvedilol Swelling and Anaphylaxis   • Levaquin [Levofloxacin] Unknown - Low Severity          SURGICALHX  Past Surgical History:   Procedure Laterality Date   • BLADDER SURGERY     • CARDIAC CATHETERIZATION  07/2016   • CARDIAC CATHETERIZATION N/A 8/9/2021    Procedure: Left Heart Cath with possible angioplasty;  Surgeon: Jack Ladd MD;  Location: [facility] INVASIVE LOCATION;   Service: Cardiovascular;  Laterality: N/A;  Please schedule the procedure with Dr. Jack Ladd MD on 2021   • CARDIAC ELECTROPHYSIOLOGY PROCEDURE N/A 6/3/2022    Procedure: Ablation atrial fibrillation;  Surgeon: Irineo Aguilar MD;  Location:  [facility] CATH INVASIVE LOCATION;  Service: Cardiovascular;  Laterality: N/A;   • CARDIAC ELECTROPHYSIOLOGY PROCEDURE N/A 6/3/2022    Procedure: Ablation atrial flutter/CTI;  Surgeon: Irineo Aguilar MD;  Location:  [facility] CATH INVASIVE LOCATION;  Service: Cardiovascular;  Laterality: N/A;   • CARDIAC SURGERY      HEART BYPASS   • COLONOSCOPY     • CORONARY ARTERY BYPASS GRAFT     • CYSTOSCOPY  2019    WITH BILATERAL RETROGRADE PYELOGRAPHY   • ELECTRICAL CARDIOVERSION  2022        • ENDOSCOPY  2016   • PROSTATE SURGERY            SOC  Social History     Socioeconomic History   • Marital status:    Tobacco Use   • Smoking status: Former     Packs/day: 0.50     Years: 40.00     Pack years: 20.00     Types: Cigarettes     Start date:      Quit date:      Years since quittin.9   • Smokeless tobacco: Never   Vaping Use   • Vaping Use: Never used   Substance and Sexual Activity   • Alcohol use: Not Currently   • Drug use: Never   • Sexual activity: Defer         FAMHX  Family History   Problem Relation Age of Onset   • Hypertension Mother    • Heart disease Father    • Other Brother         GASTROINTESTINAL CANCER   • Kidney cancer Brother           MEDSIGONLY  Current Outpatient Medications on File Prior to Visit   Medication Sig   • acetaminophen (TYLENOL) 325 MG tablet Take 650 mg by mouth Every 6 (Six) Hours As Needed for Mild Pain .   • amiodarone (PACERONE) 200 MG tablet Take 1 tablet by mouth Daily.   • amLODIPine (NORVASC) 5 MG tablet Take 1 tablet by mouth Daily.   • aspirin (aspirin) 81 MG EC tablet Take 1 tablet by mouth Daily.   • calcium carbonate (OS-LIANNA) 600 MG tablet Take 600 mg by mouth Every Night.   • Coenzyme Q10 100  MG tablet Take 100 mg by mouth Daily.   • Diclofenac Sodium (VOLTAREN) 1 % gel gel Apply 2 g topically to the appropriate area as directed 4 (Four) Times a Day.   • finasteride (PROSCAR) 5 MG tablet Take 5 mg by mouth Daily.   • fluticasone (FLONASE) 50 MCG/ACT nasal spray 1 spray into the nostril(s) as directed by provider Every Night.   • Fluticasone Furoate (Arnuity Ellipta) 100 MCG/ACT aerosol powder  Inhale 1 puff Daily.   • isosorbide mononitrate (IMDUR) 60 MG 24 hr tablet Take 1 tablet by mouth Daily.   • levalbuterol (Xopenex HFA) 45 MCG/ACT inhaler Inhale 1-2 puffs Every 4 (Four) Hours As Needed for Wheezing.   • levalbuterol (Xopenex) 0.63 MG/3ML nebulizer solution Take 1 ampule by nebulization Every 6 (Six) Hours As Needed for Wheezing for up to 30 days. DX: J44.9   • metoprolol succinate XL (TOPROL-XL) 25 MG 24 hr tablet Take 1 tablet by mouth 2 (Two) Times a Day for 30 days.   • montelukast (SINGULAIR) 10 MG tablet Take 1 tablet by mouth Every Night for 30 days.   • nitroglycerin (NITROSTAT) 0.4 MG SL tablet Place 1 tablet under the tongue Every 5 (Five) Minutes As Needed for Chest Pain for up to 30 days. Take no more than 3 doses in 15 minutes.   • pantoprazole (PROTONIX) 40 MG EC tablet Take 1 tablet by mouth Daily.   • pitavastatin calcium (Livalo) 2 MG tablet tablet Take 1 tablet by mouth Every Night.   • tamsulosin (FLOMAX) 0.4 MG capsule 24 hr capsule Take 1 capsule by mouth Daily. 1/2 hours following the same meal each day (Patient taking differently: Take 1 capsule by mouth Daily.)   • tiotropium bromide-olodaterol (Stiolto Respimat) 2.5-2.5 MCG/ACT aerosol solution inhaler Inhale 2 puffs Daily.   • VITAMIN B COMPLEX-C PO Take 1 tablet by mouth Daily.   • Xarelto 15 MG tablet Take 1 tablet by mouth every day (Patient taking differently: Take 1 tablet by mouth Every Night.)   •  furosemide (LASIX) 40 MG tablet Take 1 tablet by mouth Daily.     No current facility-administered medications on file  "prior to visit.       REVIEW OF SYSTEMS  Review of Systems   Constitutional: Positive for unexpected weight gain. Negative for activity change, chills and fatigue.   Respiratory: Positive for shortness of breath. Negative for cough and chest tightness.    Cardiovascular: Positive for leg swelling. Negative for chest pain and palpitations.   Gastrointestinal: Negative for nausea and vomiting.   Skin: Negative for rash.   Neurological: Negative for dizziness, syncope and light-headedness.   Hematological: Does not bruise/bleed easily.        Objective   Vitals:    22 0826   BP: 118/66   Pulse: 78   Weight: 110 kg (243 lb)   Height: 177.8 cm (70\")         Physical Exam  Constitutional:       General: He is awake. He is not in acute distress.     Appearance: Normal appearance.   Eyes:      General: No scleral icterus.     Conjunctiva/sclera: Conjunctivae normal.   Neck:      Vascular: No JVD.   Cardiovascular:      Rate and Rhythm: Normal rate and regular rhythm.      Heart sounds: Normal heart sounds, S1 normal and S2 normal. No murmur heard.    No friction rub.   Pulmonary:      Effort: Pulmonary effort is normal. Prolonged expiration present.      Breath sounds: Examination of the left-lower field reveals rales. Rales present. No wheezing or rhonchi.   Abdominal:      General: Bowel sounds are normal. There is no distension.      Palpations: Abdomen is soft.   Musculoskeletal:         General: Normal range of motion.      Right lower le+ Pitting Edema present.      Left lower le+ Pitting Edema present.   Skin:     General: Skin is warm and dry.   Neurological:      Mental Status: He is alert and oriented to person, place, and time.         Result Review     The following data was reviewed by ARACELI Tristan on 22.    proBNP   Date Value Ref Range Status   11/10/2022 431.0 0.0 - 1,800.0 pg/mL Final     CMP    CMP 9/29/22 10/7/22   Glucose 121 (A) 121 (A)   BUN 19 15   Creatinine 1.62 (A) 1.33 " (A)   Sodium 137 140   Potassium 4.5 4.7   Chloride 103 104   Calcium 9.3 9.8   Albumin  3.90   Total Bilirubin  0.3   Alkaline Phosphatase  55   AST (SGOT)  19   ALT (SGPT)  24     CBC w/diff    CBC w/Diff 6/6/22 9/27/22 9/28/22   WBC 9.18 9.08 8.29   RBC 3.69 (A) 4.60 4.69   Hemoglobin 11.3 (A) 13.2 13.4   Hematocrit 33.5 (A) 40.5 40.8   MCV 90.8 88.0 87.0   MCH 30.6 28.7 28.6   MCHC 33.7 32.6 32.8   RDW 15.5 (A) 15.6 (A) 15.4   Platelets 179 211 195   Neutrophil Rel % 66.8 58.0 57.8   Immature Granulocyte Rel % 0.9 (A) 0.6 (A) 0.5   Lymphocyte Rel % 16.3 (A) 25.8 26.4   Monocyte Rel % 14.2 (A) 11.1 9.4   Eosinophil Rel % 1.6 4.2 5.3   Basophil Rel % 0.2 0.3 0.6   (A) Abnormal value                Lab Results   Component Value Date    FREET4 1.44 01/24/2022        Magnesium   Date Value Ref Range Status   09/27/2022 2.2 1.6 - 2.4 mg/dL Final      Digoxin   Date Value Ref Range Status   06/06/2022 0.50 (L) 0.60 - 1.20 ng/mL Final      Lab Results   Component Value Date    TROPONINT <0.010 09/29/2022           Lipid Panel    Lipid Panel 5/6/22 6/2/22 10/7/22   Total Cholesterol 120 136 112   Triglycerides 82 126 112   HDL Cholesterol 32 (A) 32 (A) 32 (A)   VLDL Cholesterol 16 23 21   LDL Cholesterol  72 81 59   LDL/HDL Ratio 2.24 2.46 1.80   (A) Abnormal value              Results for orders placed during the hospital encounter of 09/27/22    Adult Transthoracic Echo Complete W/ Cont if Necessary Per Protocol    Interpretation Summary  · The left ventricular cavity is borderline dilated.  · Left ventricular ejection fraction appears to be 56 - 60%.  · Left ventricular diastolic function is consistent with (grade I) impaired relaxation and age.  · Mild aortic valve stenosis is present with max/mean pressure gradients 17/10 mmHg.  · No significant pericardial effusion noted. There may be a minimal amount of fluid near the right atrium.  · Mild to moderate left atrial enlargement.  · Mild to moderate mitral  regurgitation.    Results for orders placed during the hospital encounter of 07/08/21    Stress Test With Myocardial Perfusion One Day    Interpretation Summary  · Myocardial perfusion imaging indicates a small-sized infarct located in the inferior wall with mild mansoor-infarct ischemia.  · Left ventricular ejection fraction is borderline normal. (Calculated EF = 49%).  · Diaphragmatic attenuation artifact is present.  · Findings consistent with a normal ECG stress test.  · Occasional isolated PVCs are noted at rest and also during stress.  · Impressions are consistent with an intermediate risk study.       Assessment and Plan   Diagnoses and all orders for this visit:    1. Chronic systolic congestive heart failure (HCC) (Primary)  Assessment & Plan:  Mild volume overload, increase furosemide to twice daily x1 week, and then we can reevaluate.  We will need to closely monitor his renal function.  BMP and BN P in 1 week.    Orders:  -     proBNP; Future    2. Atrial fibrillation, persistent (HCC)  Assessment & Plan:  Currently in sinus rhythm.  No recent complaints of palpitations.  Continue metoprolol and amiodarone.  Has follow-up with Dr. Bartholomew this coming week.  Continuing Xarelto for anticoagulation.      3. Ischemic cardiomyopathy  Assessment & Plan:  Most recent echocardiogram showed improved LV function, with EF of 56 to 60%.  Plan to increase diuretics x1 week, and reevaluate symptoms.  Continue  Metoprolol.   Entresto has been on hold since his recent hospitalization with KAMLA, after 1 week of increased diuretics, we can reevaluate his renal function, and if possible restart Entresto.      4. Coronary artery disease of bypass graft of native heart with stable angina pectoris ([facility])  Assessment & Plan:  Stable, without angina.  Continue aspirin, beta-blockers, long-acting nitrate and statin therapy.      5. Essential hypertension  Assessment & Plan:  Blood pressures controlled.  Continue  metoprolol and  amlodipine.      6. Mixed hyperlipidemia  Assessment & Plan:  At goal, most recent LDL 59.  Continue pitavastatin.      Other orders  -     furosemide (LASIX) 40 MG tablet; Take 1 tablet by mouth 2 (Two) Times a Day for 30 days. (Patient taking differently: Take 40 mg by mouth Daily.)  Dispense: 60 tablet; Refill: 0              Follow Up   Return for with Dr. Stevenson, As already scheduled.   Patient already has scheduled follow-up on 11/15, and again would be reasonable to push this out, however would likely be difficult to schedule within a 1 to 2-month timeframe, and they prefer to go ahead and keep the appointment.    Patient was given instructions and counseling regarding his condition or for health maintenance advice. Please see specific information pulled into the AVS if appropriate.     Mely Smith, APRN  11/27/22  08:19 EDT    Dictated Utilizing Dragon Dictation

## 2022-11-03 ENCOUNTER — OFFICE VISIT (OUTPATIENT)
Dept: PULMONOLOGY | Facility: CLINIC | Age: 82
End: 2022-11-03

## 2022-11-03 VITALS
TEMPERATURE: 99.6 F | SYSTOLIC BLOOD PRESSURE: 176 MMHG | DIASTOLIC BLOOD PRESSURE: 86 MMHG | OXYGEN SATURATION: 97 % | RESPIRATION RATE: 20 BRPM | WEIGHT: 243 LBS | BODY MASS INDEX: 34.79 KG/M2 | HEIGHT: 70 IN | HEART RATE: 92 BPM

## 2022-11-03 DIAGNOSIS — Z86.2 HISTORY OF GRANULOMATOUS DISEASE: ICD-10-CM

## 2022-11-03 DIAGNOSIS — J43.2 CENTRILOBULAR EMPHYSEMA: Primary | ICD-10-CM

## 2022-11-03 DIAGNOSIS — I48.19 ATRIAL FIBRILLATION, PERSISTENT: ICD-10-CM

## 2022-11-03 PROCEDURE — 99213 OFFICE O/P EST LOW 20 MIN: CPT | Performed by: NURSE PRACTITIONER

## 2022-11-03 RX ORDER — AZITHROMYCIN 500 MG/1
500 TABLET, FILM COATED ORAL DAILY
COMMUNITY
Start: 2022-11-02 | End: 2022-11-15 | Stop reason: ALTCHOICE

## 2022-11-03 RX ORDER — PREDNISONE 20 MG/1
60 TABLET ORAL DAILY
Qty: 18 TABLET | Refills: 0 | COMMUNITY
Start: 2022-11-02 | End: 2022-11-08

## 2022-11-03 NOTE — ASSESSMENT & PLAN NOTE
Minimal fluid overload at this point, we will continue furosemide, and closely monitor his renal function.  If his creatinine continues to improve and stays stable, I would plan to resume Entresto and Aldactone.  For now we will continue metoprolol  (previously unable to tolerate carvedilol),  LV function is preserved on most recent echocardiogram.

## 2022-11-03 NOTE — ASSESSMENT & PLAN NOTE
Mild stenosis noted on most recent echocardiogram.  We will plan for routine monitoring in 2 to 3 years with echocardiogram, unless new/worsening symptoms.

## 2022-11-03 NOTE — ASSESSMENT & PLAN NOTE
In sinus rhythm, has tolerated increased dose of metoprolol without any complaints of dizziness.  We will plan to continue amiodarone until reevaluated with Dr. Bartholomew next month.  Continuing Xarelto for anticoagulation due to elevated [id number] score

## 2022-11-03 NOTE — ASSESSMENT & PLAN NOTE
Creatinine was improving at time of discharge from hospital.  We will need to closely monitor.  Plan to have BMP again in 2 weeks.  If creatinine is stable we may consider resuming Entresto.

## 2022-11-10 ENCOUNTER — LAB (OUTPATIENT)
Dept: LAB | Facility: HOSPITAL | Age: 82
End: 2022-11-10

## 2022-11-10 DIAGNOSIS — N17.9 AKI (ACUTE KIDNEY INJURY): ICD-10-CM

## 2022-11-10 DIAGNOSIS — I50.22 CHRONIC SYSTOLIC CONGESTIVE HEART FAILURE: ICD-10-CM

## 2022-11-10 LAB
ANION GAP SERPL CALCULATED.3IONS-SCNC: 9 MMOL/L (ref 5–15)
BUN SERPL-MCNC: 16 MG/DL (ref 8–23)
BUN/CREAT SERPL: 11.5 (ref 7–25)
CALCIUM SPEC-SCNC: 8.8 MG/DL (ref 8.6–10.5)
CHLORIDE SERPL-SCNC: 100 MMOL/L (ref 98–107)
CO2 SERPL-SCNC: 28 MMOL/L (ref 22–29)
CREAT SERPL-MCNC: 1.39 MG/DL (ref 0.76–1.27)
EGFRCR SERPLBLD CKD-EPI 2021: 51.2 ML/MIN/1.73
GLUCOSE SERPL-MCNC: 127 MG/DL (ref 65–99)
NT-PROBNP SERPL-MCNC: 431 PG/ML (ref 0–1800)
POTASSIUM SERPL-SCNC: 3.9 MMOL/L (ref 3.5–5.2)
SODIUM SERPL-SCNC: 137 MMOL/L (ref 136–145)

## 2022-11-10 PROCEDURE — 36415 COLL VENOUS BLD VENIPUNCTURE: CPT

## 2022-11-10 PROCEDURE — 83880 ASSAY OF NATRIURETIC PEPTIDE: CPT

## 2022-11-10 PROCEDURE — 80048 BASIC METABOLIC PNL TOTAL CA: CPT

## 2022-11-11 ENCOUNTER — OFFICE VISIT (OUTPATIENT)
Dept: INTERNAL MEDICINE | Facility: CLINIC | Age: 82
End: 2022-11-11

## 2022-11-11 VITALS
OXYGEN SATURATION: 94 % | DIASTOLIC BLOOD PRESSURE: 75 MMHG | WEIGHT: 243.4 LBS | HEART RATE: 66 BPM | HEIGHT: 70 IN | TEMPERATURE: 97.7 F | BODY MASS INDEX: 34.84 KG/M2 | SYSTOLIC BLOOD PRESSURE: 148 MMHG

## 2022-11-11 DIAGNOSIS — J02.9 SORE THROAT: Primary | ICD-10-CM

## 2022-11-11 DIAGNOSIS — R05.9 COUGH, UNSPECIFIED TYPE: ICD-10-CM

## 2022-11-11 DIAGNOSIS — J01.10 ACUTE NON-RECURRENT FRONTAL SINUSITIS: ICD-10-CM

## 2022-11-11 LAB
EXPIRATION DATE: NORMAL
EXPIRATION DATE: NORMAL
FLUAV AG UPPER RESP QL IA.RAPID: NOT DETECTED
FLUBV AG UPPER RESP QL IA.RAPID: NOT DETECTED
INTERNAL CONTROL: NORMAL
INTERNAL CONTROL: NORMAL
Lab: NORMAL
Lab: NORMAL
S PYO AG THROAT QL: NEGATIVE
SARS-COV-2 AG UPPER RESP QL IA.RAPID: NOT DETECTED

## 2022-11-11 PROCEDURE — 87880 STREP A ASSAY W/OPTIC: CPT | Performed by: STUDENT IN AN ORGANIZED HEALTH CARE EDUCATION/TRAINING PROGRAM

## 2022-11-11 PROCEDURE — 87428 SARSCOV & INF VIR A&B AG IA: CPT | Performed by: STUDENT IN AN ORGANIZED HEALTH CARE EDUCATION/TRAINING PROGRAM

## 2022-11-11 PROCEDURE — U0005 INFEC AGEN DETEC AMPLI PROBE: HCPCS | Performed by: STUDENT IN AN ORGANIZED HEALTH CARE EDUCATION/TRAINING PROGRAM

## 2022-11-11 PROCEDURE — U0004 COV-19 TEST NON-CDC HGH THRU: HCPCS | Performed by: STUDENT IN AN ORGANIZED HEALTH CARE EDUCATION/TRAINING PROGRAM

## 2022-11-11 PROCEDURE — 87081 CULTURE SCREEN ONLY: CPT | Performed by: STUDENT IN AN ORGANIZED HEALTH CARE EDUCATION/TRAINING PROGRAM

## 2022-11-11 PROCEDURE — 99213 OFFICE O/P EST LOW 20 MIN: CPT | Performed by: STUDENT IN AN ORGANIZED HEALTH CARE EDUCATION/TRAINING PROGRAM

## 2022-11-11 RX ORDER — AMOXICILLIN AND CLAVULANATE POTASSIUM 875; 125 MG/1; MG/1
1 TABLET, FILM COATED ORAL 2 TIMES DAILY
Qty: 14 TABLET | Refills: 0 | Status: SHIPPED | OUTPATIENT
Start: 2022-11-11 | End: 2022-11-18

## 2022-11-11 RX ORDER — BENZONATATE 100 MG/1
100 CAPSULE ORAL 3 TIMES DAILY PRN
Qty: 90 CAPSULE | Refills: 0 | Status: SHIPPED | OUTPATIENT
Start: 2022-11-11 | End: 2023-02-20 | Stop reason: ALTCHOICE

## 2022-11-11 NOTE — PROGRESS NOTES
Chief Complaint  Sore Throat (Has been going on for about 3 weeks, has been tested for Strep twice, both positive. Has had 10 days of Amoxicillin, and a round of azithromycin along with 2 injections of Rocephin and 5 days of Prednisone 20mg but is still not any better. Was recommended to culture if no improvement. ) and Cough    Subjective          Layo Cee presents to [facility]  History of Present Illness    Here for sick visit, with sinus pressure, cough productive of clear sputum, sore throat, low grade fever to 99F.  Has had sick symptoms for about 3 weeks now.  Has had two positive strep tests during this period, and is s/p 10 days of amoxicillin as well as a round of azithromycin.  Has had two rocephin injections during this time, and 5 days of 60 mg prednisone daily.    Has had lasix reduced by Cardiology earlier this week from 40 mg to 20 mg.  Will see Dr. Kendrick on Tuesday of next week.    Great grandson with recent flu infection (over the weekend)    Compliant with inhalers.  Has uses xopenex a couple times, and it helps briefly.     Current Outpatient Medications   Medication Instructions   • acetaminophen (TYLENOL) 650 mg, Oral, Every 6 Hours PRN   • amiodarone (PACERONE) 200 mg, Oral, Daily   • amLODIPine (NORVASC) 5 mg, Oral, Daily   • amoxicillin-clavulanate (Augmentin) 875-125 MG per tablet 1 tablet, Oral, 2 Times Daily   • aspirin 81 mg, Oral, Daily   • azithromycin (ZITHROMAX) 500 mg, Oral, Daily   • benzonatate (TESSALON PERLES) 100 mg, Oral, 3 Times Daily PRN   • calcium carbonate (OS-LIANNA) 600 mg, Oral, Nightly   • Coenzyme Q10 100 mg, Oral, Daily   • Diclofenac Sodium (VOLTAREN) 2 g, Topical, 4 Times Daily   • finasteride (PROSCAR) 5 mg, Oral, Daily   • fluticasone (FLONASE) 50 MCG/ACT nasal spray 1 spray, Nasal, Nightly   • Fluticasone Furoate (Arnuity Ellipta) 100 MCG/ACT aerosol powder  1 puff, Inhalation, Daily   • furosemide (LASIX) 40  "mg, Oral, 2 Times Daily   • isosorbide mononitrate (IMDUR) 60 mg, Oral, Daily   • levalbuterol (Xopenex HFA) 45 MCG/ACT inhaler 1-2 puffs, Inhalation, Every 4 Hours PRN   • levalbuterol (XOPENEX) 0.63 mg, Nebulization, Every 6 Hours PRN, DX: J44.9   • metoprolol succinate XL (TOPROL-XL) 25 mg, Oral, 2 Times Daily   • montelukast (SINGULAIR) 10 mg, Oral, Nightly   • nitroglycerin (NITROSTAT) 0.4 mg, Sublingual, Every 5 Minutes PRN, Take no more than 3 doses in 15 minutes.   • pantoprazole (PROTONIX) 40 mg, Oral, Daily   • pitavastatin calcium (LIVALO) 2 mg, Oral, Nightly   • tamsulosin (FLOMAX) 0.4 mg, Oral, Daily, 1/2 hours following the same meal each day   • tiotropium bromide-olodaterol (Stiolto Respimat) 2.5-2.5 MCG/ACT aerosol solution inhaler 2 puffs, Inhalation, Daily - RT   • VITAMIN B COMPLEX-C PO 1 tablet, Oral, Daily   • Xarelto 15 MG tablet Take 1 tablet by mouth every day       The following portions of the patient's history were reviewed and updated as appropriate: allergies, current medications, past family history, past medical history, past social history, past surgical history, and problem list.    Objective   Vital Signs:   /75 (BP Location: Left arm, Patient Position: Sitting)   Pulse 66   Temp 97.7 °F (36.5 °C) (Temporal)   Ht 177.8 cm (70\")   Wt 110 kg (243 lb 6.4 oz)   SpO2 94%   BMI 34.92 kg/m²     Wt Readings from Last 3 Encounters:   11/11/22 110 kg (243 lb 6.4 oz)   11/03/22 110 kg (243 lb)   11/02/22 110 kg (243 lb)     BP Readings from Last 3 Encounters:   11/11/22 148/75   11/03/22 176/86   11/02/22 118/66     Physical Exam  Vitals and nursing note reviewed.   Constitutional:       Appearance: Normal appearance. He is well-developed.   HENT:      Head: Normocephalic and atraumatic.      Right Ear: Tympanic membrane, ear canal and external ear normal.      Left Ear: Tympanic membrane, ear canal and external ear normal.      Nose:      Comments: Frontal sinuses TTP.  Maxillary " sinuses NTTP     Mouth/Throat:      Mouth: Mucous membranes are moist.      Pharynx: Oropharynx is clear. No oropharyngeal exudate or posterior oropharyngeal erythema.   Eyes:      Conjunctiva/sclera: Conjunctivae normal.      Pupils: Pupils are equal, round, and reactive to light.   Cardiovascular:      Rate and Rhythm: Normal rate and regular rhythm.      Pulses: Normal pulses.      Heart sounds: Normal heart sounds. No murmur heard.    No friction rub. No gallop.   Pulmonary:      Effort: Pulmonary effort is normal. No respiratory distress.      Breath sounds: Normal breath sounds. No stridor. No wheezing, rhonchi or rales.   Abdominal:      General: Abdomen is flat. There is no distension.      Palpations: Abdomen is soft.      Tenderness: There is no abdominal tenderness.   Musculoskeletal:      Right lower leg: Edema present.      Left lower leg: Edema present.      Comments: 2+ LE edema bilaterally   Skin:     General: Skin is warm and dry.   Neurological:      Mental Status: He is alert. Mental status is at baseline.   Psychiatric:         Mood and Affect: Affect normal.         Behavior: Behavior normal.         Thought Content: Thought content normal.         Judgment: Judgment normal.         Result Review :   The following data was reviewed by: Noble Kc MD on 11/11/2022:  Common labs    Common Labs 9/29/22 10/7/22 10/7/22 11/10/22     0813 0813    Glucose 121 (A) 121 (A)  127 (A)   BUN 19 15  16   Creatinine 1.62 (A) 1.33 (A)  1.39 (A)   Sodium 137 140  137   Potassium 4.5 4.7  3.9   Chloride 103 104  100   Calcium 9.3 9.8  8.8   Albumin  3.90     Total Bilirubin  0.3     Alkaline Phosphatase  55     AST (SGOT)  19     ALT (SGPT)  24     Total Cholesterol   112    Triglycerides   112    HDL Cholesterol   32 (A)    LDL Cholesterol    59    (A) Abnormal value                   Lab Results   Component Value Date    SARSANTIGEN Not Detected 11/11/2022    COVID19 Not Detected 06/29/2022    FLUAAG Not  Detected 11/11/2022    FLUBAG Not Detected 11/11/2022    RAPSCRN Negative 11/11/2022    INR 1.69 (H) 06/02/2022    BILIRUBINUR Negative 10/31/2022       Procedures        Assessment and Plan    Diagnoses and all orders for this visit:    1. Sore throat (Primary)  -     POCT rapid strep A  -     POCT SARS-CoV-2 Antigen PRIYA + Flu  -     Beta Strep Culture, Throat - , Throat; Future  -     Beta Strep Culture, Throat - Swab, Throat    2. Cough, unspecified type  -     benzonatate (Tessalon Perles) 100 MG capsule; Take 1 capsule by mouth 3 (Three) Times a Day As Needed for Cough.  Dispense: 90 capsule; Refill: 0  -     COVID-19,APTIMA PANTHER(SHERRY),[facility], NP/OP SWAB IN UTM/VTM/SALINE TRANSPORT MEDIA,24 HR TAT - Swab, Nasopharynx    3. Acute non-recurrent frontal sinusitis  -     amoxicillin-clavulanate (Augmentin) 875-125 MG per tablet; Take 1 tablet by mouth 2 (Two) Times a Day for 7 days.  Dispense: 14 tablet; Refill: 0      -no focal findings concerning for pneumonia, and no wheeze or other findings suggestive of COPD exacerbation  -he is s/p recent steroid burst as well as multiple antibiotic courses  -I do not think that volume overload or pulmonary edema is a cause or contributor to his cough  -I am prescribing a course of augmentin as many of his symptoms are consistent with sinusitis      There are no discontinued medications.       Follow Up   Return if symptoms worsen or fail to improve.  Patient was given instructions and counseling regarding his condition or for health maintenance advice. Please see specific information pulled into the AVS if appropriate.       Noble Kc MD  11/11/22  18:19 EST

## 2022-11-12 LAB — SARS-COV-2 RNA PNL SPEC NAA+PROBE: NOT DETECTED

## 2022-11-13 LAB — BACTERIA SPEC AEROBE CULT: NORMAL

## 2022-11-15 ENCOUNTER — OFFICE VISIT (OUTPATIENT)
Dept: CARDIOLOGY | Facility: CLINIC | Age: 82
End: 2022-11-15

## 2022-11-15 ENCOUNTER — TELEPHONE (OUTPATIENT)
Dept: INTERNAL MEDICINE | Facility: CLINIC | Age: 82
End: 2022-11-15

## 2022-11-15 VITALS
HEART RATE: 70 BPM | SYSTOLIC BLOOD PRESSURE: 140 MMHG | HEIGHT: 70 IN | DIASTOLIC BLOOD PRESSURE: 75 MMHG | WEIGHT: 242 LBS | BODY MASS INDEX: 34.65 KG/M2

## 2022-11-15 DIAGNOSIS — E78.2 MIXED HYPERLIPIDEMIA: ICD-10-CM

## 2022-11-15 DIAGNOSIS — I25.5 ISCHEMIC CARDIOMYOPATHY: Primary | ICD-10-CM

## 2022-11-15 DIAGNOSIS — I48.19 ATRIAL FIBRILLATION, PERSISTENT: ICD-10-CM

## 2022-11-15 DIAGNOSIS — I10 ESSENTIAL HYPERTENSION: ICD-10-CM

## 2022-11-15 DIAGNOSIS — I25.708 CORONARY ARTERY DISEASE OF BYPASS GRAFT OF NATIVE HEART WITH STABLE ANGINA PECTORIS: Chronic | ICD-10-CM

## 2022-11-15 PROCEDURE — 99214 OFFICE O/P EST MOD 30 MIN: CPT | Performed by: INTERNAL MEDICINE

## 2022-11-15 RX ORDER — SACUBITRIL AND VALSARTAN 49; 51 MG/1; MG/1
1 TABLET, FILM COATED ORAL 2 TIMES DAILY
COMMUNITY
End: 2022-11-26

## 2022-11-15 NOTE — TELEPHONE ENCOUNTER
----- Message from Noble Kc MD sent at 11/12/2022  3:32 PM EST -----  Labs are reassuring.  BMP notable for stable renal function.  Blood sugar is mildly elevated at 127.

## 2022-11-21 ENCOUNTER — LAB (OUTPATIENT)
Dept: LAB | Facility: HOSPITAL | Age: 82
End: 2022-11-21

## 2022-11-21 DIAGNOSIS — I50.22 CHRONIC SYSTOLIC CONGESTIVE HEART FAILURE: ICD-10-CM

## 2022-11-21 PROCEDURE — 84443 ASSAY THYROID STIM HORMONE: CPT

## 2022-11-21 PROCEDURE — 36415 COLL VENOUS BLD VENIPUNCTURE: CPT

## 2022-11-22 LAB — TSH SERPL DL<=0.05 MIU/L-ACNC: 1.43 UIU/ML (ref 0.27–4.2)

## 2022-11-26 RX ORDER — METOPROLOL SUCCINATE 25 MG/1
25 TABLET, EXTENDED RELEASE ORAL 2 TIMES DAILY
Qty: 90 TABLET | Refills: 0
Start: 2022-11-26 | End: 2023-02-20 | Stop reason: SDUPTHER

## 2022-11-26 RX ORDER — SACUBITRIL AND VALSARTAN 49; 51 MG/1; MG/1
1 TABLET, FILM COATED ORAL 2 TIMES DAILY
Qty: 180 TABLET | Refills: 1 | Status: SHIPPED | OUTPATIENT
Start: 2022-11-26 | End: 2023-02-20 | Stop reason: SDUPTHER

## 2022-11-26 NOTE — ASSESSMENT & PLAN NOTE
LV LV function improved per recent echocardiogram.  We will continue Entresto and metoprolol at the current dose.  He is currently taking Lasix 40 mg daily.  Creatinine is slightly elevated above his baseline but improved from before.  We will repeat complete metabolic panel before next office visit.

## 2022-11-26 NOTE — TELEPHONE ENCOUNTER
We were holding his entresto, pending re-check of renal labs.  I'm sending refill request to Dr. Stevenson, as he has repeat labs ordered for him.

## 2022-11-26 NOTE — PROGRESS NOTES
CARDIOLOGY FOLLOW-UP PROGRESS NOTE        Chief Complaint  Coronary Artery Disease, Atrial Fibrillation, and COPD    Subjective            Layo Cee presents to [facility] CARDIOLOGY  History of Present Illness    Mr Cee is here for a routine follow-up visit.  He is reporting a dry cough with some shortness of breath for the past several weeks.  He already completed a course of antibiotics and currently on cough suppressants with minimal improvement in the symptoms.  Currently, he denies having any palpitations.  Denies any chest pain.  He is fairly active at baseline.  Dizziness improved.  He had an admission to the hospital from 9/27/2022 to 9/29/2022 with chest pain.  Cardiac work-up was unremarkable and pain was mostly musculoskeletal.  He was discharged home with no changes to cardiac medications.      Past History:    (1) Coronary artery disease, status post coronary artery bypass grafting in the past. Cardiac catheterization done in July 2016 showed patent left internal mammary graft to the LAD and occluded saphenous venous graft. Native LAD is 100% occluded in the mid portion. Native circumflex artery has no significant disease. Native right coronary artery is also 100% occluded and it is a chronic total occlusion. The right coronary artery is reconstituted with collaterals from the left side.  Patient underwent repeat cardiac catheterization in June, 2021 and underwent angioplasty stent placement to diagonal branch.  (2) Ischemic cardiomyopathy with recovery of cardiac function. Last SPECT stress test in August 2016 showed an LV ejection fraction of 68%. (3) Chronic kidney disease, stage 3. (4) Chronic obstructive pulmonary disease, not an exacerbation. (5) Hypertension. (6) Hyperlipidemia. 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 (8) Typical atrial flutter status post radiofrequency ablation by Dr. Aguilar on  11/30/2018 (9) persistent atrial fibrillation status post extensive ablation and pulmonary vein isolation by Dr. Aguilar on 6/3/2022    Medical History:  Past Medical History:   Diagnosis Date   • Arthritis    • Atrial fibrillation, persistent ([facility]) 5/3/2022    Persistent atrial fibrillation status post extensive ablation and pulmonary vein isolation by Dr. Aguilar on 6/3/2022, with reoccurring rapid A. fib, and then successful cardioversion on 7/1/2022   • BPH (benign prostatic hyperplasia)    • CHF (congestive heart failure) ([facility])    • COPD (chronic obstructive pulmonary disease) ([facility])    • Coronary artery disease of bypass graft of native heart with stable angina pectoris ([facility])     (1) Coronary artery disease, s/p CABG in the past. Cardiac catheterization in July 2016 showed patentLIMA graft to the LAD and occluded SVGs. Native LAD is 100% occluded in the mid portion. Native LCx has no significant disease. Native RCA is 100% occluded and it is a . Repeat cath in June, 2021, underwent stent placement to diagnonal branch.   • COVID-19 02/04/2021   • Diverticulitis 10/11/2019   • Dysphagia    • Essential hypertension 08/27/2020   • Frequent PVCs 08/28/2021   • GERD (gastroesophageal reflux disease)    • Gross hematuria    • Long-term use of high-risk medication    • Lung disease    • Mixed hyperlipidemia 08/28/2021   • Myocardial infarction ([facility]) 07/2016   • OCD (obsessive compulsive disorder)    • Renal insufficiency 08/27/2020   • Rhinitis, allergic 06/05/2018   • Sore throat    • Stable angina ([facility])    • Typical atrial flutter ([facility]) 11/30/2018    s/p ablation by Dr. Aguilar    • Vertigo        Surgical History: has a past surgical history that includes Bladder surgery; Coronary artery bypass graft; Cardiac catheterization (07/2016); Colonoscopy (2011); Cystoscopy (03/19/2019); Esophagogastroduodenoscopy (2016); Cardiac surgery; Prostate surgery; Cardiac catheterization (N/A, 8/9/2021); Electrical Cardioversion  (5/12/2022); Cardiac electrophysiology procedure (N/A, 6/3/2022); and Cardiac electrophysiology procedure (N/A, 6/3/2022).     Family History: family history includes Heart disease in his father; Hypertension in his mother; Kidney cancer in his brother; Other in his brother.     Social History: reports that he quit smoking about 24 years ago. His smoking use included cigarettes. He started smoking about 64 years ago. He has a 20.00 pack-year smoking history. He has never used smokeless tobacco. He reports that he does not currently use alcohol. He reports that he does not use drugs.    Allergies: Carvedilol and Levaquin [levofloxacin]    Current Outpatient Medications on File Prior to Visit   Medication Sig   • acetaminophen (TYLENOL) 325 MG tablet Take 650 mg by mouth Every 6 (Six) Hours As Needed for Mild Pain .   • amiodarone (PACERONE) 200 MG tablet Take 1 tablet by mouth Daily.   • amLODIPine (NORVASC) 5 MG tablet Take 1 tablet by mouth Daily.   • aspirin (aspirin) 81 MG EC tablet Take 1 tablet by mouth Daily.   • benzonatate (Tessalon Perles) 100 MG capsule Take 1 capsule by mouth 3 (Three) Times a Day As Needed for Cough.   • calcium carbonate (OS-LIANNA) 600 MG tablet Take 600 mg by mouth Every Night.   • Coenzyme Q10 100 MG tablet Take 100 mg by mouth Daily.   • Diclofenac Sodium (VOLTAREN) 1 % gel gel Apply 2 g topically to the appropriate area as directed 4 (Four) Times a Day.   • finasteride (PROSCAR) 5 MG tablet Take 5 mg by mouth Daily.   • fluticasone (FLONASE) 50 MCG/ACT nasal spray 1 spray into the nostril(s) as directed by provider Every Night.   • Fluticasone Furoate (Arnuity Ellipta) 100 MCG/ACT aerosol powder  Inhale 1 puff Daily.   • furosemide (LASIX) 40 MG tablet Take 1 tablet by mouth 2 (Two) Times a Day for 30 days. (Patient taking differently: Take 40 mg by mouth Daily.)   • isosorbide mononitrate (IMDUR) 60 MG 24 hr tablet Take 1 tablet by mouth Daily.   • levalbuterol (Xopenex) 0.63 MG/3ML  "nebulizer solution Take 1 ampule by nebulization Every 6 (Six) Hours As Needed for Wheezing for up to 30 days. DX: J44.9   • metoprolol succinate XL (TOPROL-XL) 25 MG 24 hr tablet Take 1 tablet by mouth 2 (Two) Times a Day for 30 days.   • montelukast (SINGULAIR) 10 MG tablet Take 1 tablet by mouth Every Night for 30 days.   • nitroglycerin (NITROSTAT) 0.4 MG SL tablet Place 1 tablet under the tongue Every 5 (Five) Minutes As Needed for Chest Pain for up to 30 days. Take no more than 3 doses in 15 minutes.   • pantoprazole (PROTONIX) 40 MG EC tablet Take 1 tablet by mouth Daily.   • pitavastatin calcium (Livalo) 2 MG tablet tablet Take 1 tablet by mouth Every Night.   • sacubitril-valsartan (Entresto) 49-51 MG tablet Take 1 tablet by mouth 2 (Two) Times a Day.   • tamsulosin (FLOMAX) 0.4 MG capsule 24 hr capsule Take 1 capsule by mouth Daily. 1/2 hours following the same meal each day (Patient taking differently: Take 1 capsule by mouth Daily.)   • tiotropium bromide-olodaterol (Stiolto Respimat) 2.5-2.5 MCG/ACT aerosol solution inhaler Inhale 2 puffs Daily.   • VITAMIN B COMPLEX-C PO Take 1 tablet by mouth Daily.   • Xarelto 15 MG tablet Take 1 tablet by mouth every day (Patient taking differently: Take 15 mg by mouth Every Night.)       Review of Systems   Respiratory: Positive for cough and shortness of breath. Negative for wheezing.    Cardiovascular: Negative for chest pain, palpitations and leg swelling.   Gastrointestinal: Negative for nausea and vomiting.   Neurological: Negative for dizziness and syncope.        Objective     /75   Pulse 70   Ht 177.8 cm (70\")   Wt 110 kg (242 lb)   BMI 34.72 kg/m²       Physical Exam    General : Alert, awake, no acute distress  Neck : Supple, no carotid bruit, no jugular venous distention  CVS : Regular rate and rhythm, no murmur, rubs or gallops  Lungs: Clear to auscultation bilaterally, no crackles or rhonchi  Abdomen: Soft, nontender, bowel sounds heard in " all 4 quadrants  Extremities: Warm, well-perfused, no pedal edema    Result Review :     The following data was reviewed by: Darnell Stevenson MD on 11/15/2022:    CMP    CMP 9/29/22 10/7/22 11/10/22   Glucose 121 (A) 121 (A) 127 (A)   BUN 19 15 16   Creatinine 1.62 (A) 1.33 (A) 1.39 (A)   Sodium 137 140 137   Potassium 4.5 4.7 3.9   Chloride 103 104 100   Calcium 9.3 9.8 8.8   Albumin  3.90    Total Bilirubin  0.3    Alkaline Phosphatase  55    AST (SGOT)  19    ALT (SGPT)  24    (A) Abnormal value            CBC    CBC 6/6/22 9/27/22 9/28/22   WBC 9.18 9.08 8.29   RBC 3.69 (A) 4.60 4.69   Hemoglobin 11.3 (A) 13.2 13.4   Hematocrit 33.5 (A) 40.5 40.8   MCV 90.8 88.0 87.0   MCH 30.6 28.7 28.6   MCHC 33.7 32.6 32.8   RDW 15.5 (A) 15.6 (A) 15.4   Platelets 179 211 195   (A) Abnormal value            TSH    TSH 1/24/22 11/21/22   TSH 1.270 1.430           Lipid Panel    Lipid Panel 5/6/22 6/2/22 10/7/22   Total Cholesterol 120 136 112   Triglycerides 82 126 112   HDL Cholesterol 32 (A) 32 (A) 32 (A)   VLDL Cholesterol 16 23 21   LDL Cholesterol  72 81 59   LDL/HDL Ratio 2.24 2.46 1.80   (A) Abnormal value                 Data reviewed: Cardiology studies        Results for orders placed during the hospital encounter of 09/27/22    Adult Transthoracic Echo Complete W/ Cont if Necessary Per Protocol    Interpretation Summary  · The left ventricular cavity is borderline dilated.  · Left ventricular ejection fraction appears to be 56 - 60%.  · Left ventricular diastolic function is consistent with (grade I) impaired relaxation and age.  · Mild aortic valve stenosis is present with max/mean pressure gradients 17/10 mmHg.  · No significant pericardial effusion noted. There may be a minimal amount of fluid near the right atrium.  · Mild to moderate left atrial enlargement.  · Mild to moderate mitral regurgitation.      Results for orders placed during the hospital encounter of 07/08/21    Stress Test With Myocardial Perfusion  One Day    Interpretation Summary  · Myocardial perfusion imaging indicates a small-sized infarct located in the inferior wall with mild mansoor-infarct ischemia.  · Left ventricular ejection fraction is borderline normal. (Calculated EF = 49%).  · Diaphragmatic attenuation artifact is present.  · Findings consistent with a normal ECG stress test.  · Occasional isolated PVCs are noted at rest and also during stress.  · Impressions are consistent with an intermediate risk study.               Assessment and Plan        Diagnoses and all orders for this visit:    1. Ischemic cardiomyopathy (Primary)  Assessment & Plan:  LV LV function improved per recent echocardiogram.  We will continue Entresto and metoprolol at the current dose.  He is currently taking Lasix 40 mg daily.  Creatinine is slightly elevated above his baseline but improved from before.  We will repeat complete metabolic panel before next office visit.    Orders:  -     Comprehensive Metabolic Panel; Future  -     metoprolol succinate XL (TOPROL-XL) 25 MG 24 hr tablet; Take 1 tablet by mouth 2 (Two) Times a Day for 30 days.  Dispense: 90 tablet; Refill: 0    2. Atrial fibrillation, persistent (HCC)  Assessment & Plan:  He is currently staying in normal sinus rhythm.  Denies any major palpitations.  We will continue Xarelto for anticoagulation.  Continue metoprolol and amiodarone for rhythm management.  Planning to continue amiodarone for at least 6 months.  We will repeat complete metabolic panel and thyroid panel before next office visit.    Orders:  -     Lipid Panel; Future  -     TSH; Future  -     metoprolol succinate XL (TOPROL-XL) 25 MG 24 hr tablet; Take 1 tablet by mouth 2 (Two) Times a Day for 30 days.  Dispense: 90 tablet; Refill: 0    3. 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  Most recent echocardiogram done in September  showed preserved LV systolic function.  Continue aspirin, statin, long-acting nitrates and beta-blocker at the current dose.  Ranexa was discontinued in the past due to significant dizziness.      4. Mixed hyperlipidemia  Assessment & Plan:  Most recent LDL is 59, which is at goal.  Continue Pitavastatin 4 mg nightly.  We will repeat lipid panel before next office visit.    Orders:  -     Comprehensive Metabolic Panel; Future  -     Lipid Panel; Future    5. Essential hypertension  Assessment & Plan:  Blood pressure reasonably well controlled.  Continue current regimen.              Follow Up     Return in about 3 months (around 2/15/2023) for Next scheduled follow up.    Patient was given instructions and counseling regarding his condition or for health maintenance advice. Please see specific information pulled into the AVS if appropriate.

## 2022-11-26 NOTE — ASSESSMENT & PLAN NOTE
He is currently stable with no angina-like symptoms.  Most recent echocardiogram done in September showed preserved LV systolic function.  Continue aspirin, statin, long-acting nitrates and beta-blocker at the current dose.  Ranexa was discontinued in the past due to significant dizziness.

## 2022-11-26 NOTE — ASSESSMENT & PLAN NOTE
Most recent LDL is 59, which is at goal.  Continue Pitavastatin 4 mg nightly.  We will repeat lipid panel before next office visit.

## 2022-11-26 NOTE — ASSESSMENT & PLAN NOTE
He is currently staying in normal sinus rhythm.  Denies any major palpitations.  We will continue Xarelto for anticoagulation.  Continue metoprolol and amiodarone for rhythm management.  Planning to continue amiodarone for at least 6 months.  We will repeat complete metabolic panel and thyroid panel before next office visit.

## 2022-11-27 PROBLEM — R79.89 ELEVATED TROPONIN: Status: RESOLVED | Noted: 2022-06-06 | Resolved: 2022-11-27

## 2022-11-27 PROBLEM — R77.8 ELEVATED TROPONIN: Status: RESOLVED | Noted: 2022-06-06 | Resolved: 2022-11-27

## 2022-11-27 PROBLEM — I50.22 CHRONIC SYSTOLIC CONGESTIVE HEART FAILURE: Status: ACTIVE | Noted: 2022-11-27

## 2022-11-27 NOTE — ASSESSMENT & PLAN NOTE
Currently in sinus rhythm.  No recent complaints of palpitations.  Continue metoprolol and amiodarone.  Has follow-up with Dr. Bartholomew this coming week.  Continuing Xarelto for anticoagulation.

## 2022-11-27 NOTE — ASSESSMENT & PLAN NOTE
Mild volume overload, increase furosemide to twice daily x1 week, and then we can reevaluate.  We will need to closely monitor his renal function.  BMP and BRANDON P in 1 week.

## 2022-11-27 NOTE — ASSESSMENT & PLAN NOTE
Most recent echocardiogram showed improved LV function, with EF of 56 to 60%.  Plan to increase diuretics x1 week, and reevaluate symptoms.  Continue  Metoprolol.   Entresto has been on hold since his recent hospitalization with [facility], after 1 week of increased diuretics, we can reevaluate his renal function, and if possible restart Entresto.

## 2022-11-28 DIAGNOSIS — N40.1 BENIGN PROSTATIC HYPERPLASIA WITH LOWER URINARY TRACT SYMPTOMS, SYMPTOM DETAILS UNSPECIFIED: Primary | ICD-10-CM

## 2022-11-28 RX ORDER — FINASTERIDE 5 MG/1
TABLET, FILM COATED ORAL
Qty: 90 TABLET | Refills: 4 | Status: SHIPPED | OUTPATIENT
Start: 2022-11-28

## 2022-12-01 NOTE — PROGRESS NOTES
[place] Care Provider  Noble Kc MD   Referring Provider  No ref. provider found    Patient Complaint  Follow-up, Cough, Shortness of Breath, COPD, Emphysema, and chronic a fib      SUBJECTIVE    History of Presenting Illness  Layo Cee is a pleasant 82 y.o. male who presents to [facility] for 4 week followup. Patient here with spouse today.  I last saw the patient 11/3/2022.  Patient had been treated 2 times for strep throat through urgent care and his PCP prior to last visit.  Patient is a he did have a throat culture done by his PCP and it came back negative.  Patient has a diagnosis of chronic A. fib chronic systolic heart failure coronary artery disease with status post CABG centrilobular emphysema COPD dyspnea and history of COVID.  Patient is under the care of cardiology.  Patient continues to be on amiodarone aspirin Tessalon Arnuity Flonase Singulair Lasix Xarelto Stiolto.  Patient states overall he was doing very well till last night developed some congestion coughing.  He had stopped the Tessalon a while back and had not resumed it as of yet.  Denies any fever chills no exposure to COVID o influenza. Patient states he does have shortness of air.  He states he does continue [place].  We will come back and find that he is very short of air with exertion.   Patient continues to be getting allergy shots and is on Singulair.  Patient is not on an antihistamine. He denies wheezing, headaches, chest pain, weight loss or hemoptysis. Denies fevers, chills and night sweats. Layo Cee is able to perform ADLs without difficulties and denies any swollen glands/lymph nodes in the head or neck.  Patient does have fullness sensation in his throat with frequent throat clearing. He does have GERD and is on Protonix and feels this works well for him.     I have personally reviewed the review of systems, past family, social, medical  and surgical histories; and agree with their findings.    Review of Systems   Constitutional: Negative.  Negative for chills, diaphoresis, fatigue, fever and unexpected weight change.   HENT: Negative.    Eyes: Negative.    Respiratory: Positive for cough and shortness of breath. Negative for wheezing.    Cardiovascular: Negative.  Negative for chest pain, palpitations and leg swelling.   Musculoskeletal: Negative.    Skin: Negative.         Family History   Problem Relation Age of Onset   • Hypertension Mother    • Heart disease Father    • Other Brother         GASTROINTESTINAL CANCER   • Kidney cancer Brother         Social History     Socioeconomic History   • Marital status:    Tobacco Use   • Smoking status: Former     Packs/day: 0.50     Years: 40.00     Pack years: 20.00     Types: Cigarettes     Start date:      Quit date:      Years since quittin.9   • Smokeless tobacco: Never   Vaping Use   • Vaping Use: Never used   Substance and Sexual Activity   • Alcohol use: Not Currently   • Drug use: Never   • Sexual activity: Defer        Past Medical History:   Diagnosis Date   • Arthritis    • Atrial fibrillation, persistent ([facility]) 5/3/2022    Persistent atrial fibrillation status post extensive ablation and pulmonary vein isolation by Dr. Aguilar on 6/3/2022, with reoccurring rapid A. fib, and then successful cardioversion on 2022   • BPH (benign prostatic hyperplasia)    • CHF (congestive heart failure) ([facility])    • COPD (chronic obstructive pulmonary disease) ([facility])    • Coronary artery disease of bypass graft of native heart with stable angina pectoris ([facility])     (1) Coronary artery disease, s/p CABG in the past. Cardiac catheterization in 2016 showed patentLIMA graft to the LAD and occluded SVGs. Native LAD is 100% occluded in the mid portion. Native LCx has no significant disease. Native RCA is 100% occluded and it is a . Repeat cath in , underwent stent placement to  diagnonal branch.   • COVID-19 02/04/2021   • Diverticulitis 10/11/2019   • Dysphagia    • Essential hypertension 08/27/2020   • Frequent PVCs 08/28/2021   • GERD (gastroesophageal reflux disease)    • Gross hematuria    • Long-term use of high-risk medication    • Lung disease    • Mixed hyperlipidemia 08/28/2021   • Myocardial infarction (HCC) 07/2016   • OCD (obsessive compulsive disorder)    • Renal insufficiency 08/27/2020   • Rhinitis, allergic 06/05/2018   • Sore throat    • Stable angina (HCC)    • Typical atrial flutter (HCC) 11/30/2018    s/p ablation by Dr. Aguilar    • Vertigo         Immunization History   Administered Date(s) Administered   • COVID-19 ([employer]) 11/04/2021   • Fluzone High-Dose 65+yrs 09/28/2021, 10/05/2022   • Fluzone Quad >6mos (Multi-dose) 10/08/2019   • Influenza Quad Vaccine (Inpatient) 10/08/2019   • Influenza, Unspecified 10/08/2019   • Shingrix 04/29/2021, 11/22/2021       Allergies   Allergen Reactions   • Carvedilol Swelling and Anaphylaxis   • Levaquin [Levofloxacin] Unknown - Low Severity          Current Outpatient Medications:   •  acetaminophen (TYLENOL) 325 MG tablet, Take 650 mg by mouth Every 6 (Six) Hours As Needed for Mild Pain ., Disp: , Rfl:   •  amiodarone (PACERONE) 200 MG tablet, Take 1 tablet by mouth Daily., Disp: 90 tablet, Rfl: 1  •  amLODIPine (NORVASC) 5 MG tablet, Take 1 tablet by mouth Daily., Disp: 30 tablet, Rfl: 1  •  aspirin (aspirin) 81 MG EC tablet, Take 1 tablet by mouth Daily., Disp: 30 tablet, Rfl: 1  •  calcium carbonate (OS-LIANNA) 600 MG tablet, Take 600 mg by mouth Every Night., Disp: , Rfl:   •  Coenzyme Q10 100 MG tablet, Take 100 mg by mouth Daily., Disp: , Rfl:   •  Diclofenac Sodium (VOLTAREN) 1 % gel gel, Apply 2 g topically to the appropriate area as directed 4 (Four) Times a Day., Disp: , Rfl:   •  Entresto 49-51 MG tablet, Take 1 tablet by mouth 2 (two) times a day., Disp: 180 tablet, Rfl: 1  •  finasteride (PROSCAR) 5 MG tablet, Take 1  tablet by mouth daily., Disp: 90 tablet, Rfl: 4  •  fluticasone (FLONASE) 50 MCG/ACT nasal spray, 1 spray into the nostril(s) as directed by provider Every Night., Disp: , Rfl:   •  Fluticasone Furoate (Arnuity Ellipta) 100 MCG/ACT aerosol powder , Inhale 1 puff Daily., Disp: 1 each, Rfl: 3  •  furosemide (LASIX) 40 MG tablet, Take 1 tablet by mouth 2 (Two) Times a Day for 30 days. (Patient taking differently: Take 40 mg by mouth Daily.), Disp: 60 tablet, Rfl: 0  •  isosorbide mononitrate (IMDUR) 60 MG 24 hr tablet, Take 1 tablet by mouth Daily., Disp: 90 tablet, Rfl: 3  •  metoprolol succinate XL (TOPROL-XL) 25 MG 24 hr tablet, Take 1 tablet by mouth 2 (Two) Times a Day for 30 days., Disp: 90 tablet, Rfl: 0  •  nitroglycerin (NITROSTAT) 0.4 MG SL tablet, Place 0.4 mg under the tongue Every 5 (Five) Minutes As Needed for Chest Pain. Take no more than 3 doses in 15 minutes., Disp: , Rfl:   •  pantoprazole (PROTONIX) 40 MG EC tablet, Take 1 tablet by mouth Daily., Disp: 30 tablet, Rfl: 3  •  pitavastatin calcium (Livalo) 2 MG tablet tablet, Take 1 tablet by mouth Every Night., Disp: 90 tablet, Rfl: 3  •  tamsulosin (FLOMAX) 0.4 MG capsule 24 hr capsule, Take 1 capsule by mouth Daily. 1/2 hours following the same meal each day (Patient taking differently: Take 1 capsule by mouth Daily.), Disp: 90 capsule, Rfl: 4  •  tiotropium bromide-olodaterol (Stiolto Respimat) 2.5-2.5 MCG/ACT aerosol solution inhaler, Inhale 2 puffs Daily., Disp: 2 each, Rfl: 0  •  VITAMIN B COMPLEX-C PO, Take 1 tablet by mouth Daily., Disp: , Rfl:   •  Xarelto 15 MG tablet, Take 1 tablet by mouth every day (Patient taking differently: Take 15 mg by mouth Every Night.), Disp: 90 tablet, Rfl: 1  •  benzonatate (Tessalon Perles) 100 MG capsule, Take 1 capsule by mouth 3 (Three) Times a Day As Needed for Cough., Disp: 90 capsule, Rfl: 0  •  cetirizine (zyrTEC) 10 MG tablet, Take 1 tablet by mouth Daily., Disp: 90 tablet, Rfl: 3  •  levalbuterol  "(Xopenex) 0.63 MG/3ML nebulizer solution, Take 1 ampule by nebulization Every 6 (Six) Hours As Needed for Wheezing for up to 30 days. DX: J44.9, Disp: 360 mL, Rfl: 5  •  montelukast (SINGULAIR) 10 MG tablet, Take 1 tablet by mouth Every Night for 30 days., Disp: 30 tablet, Rfl: 11  •  nitroglycerin (NITROSTAT) 0.4 MG SL tablet, Place 1 tablet under the tongue Every 5 (Five) Minutes As Needed for Chest Pain for up to 30 days. Take no more than 3 doses in 15 minutes., Disp: 30 tablet, Rfl: 0       OBJECTIVE    Vital Signs   /65 (BP Location: Left arm, Patient Position: Sitting, Cuff Size: Adult)   Pulse 76   Temp 98.4 °F (36.9 °C) (Tympanic)   Resp 20   Ht 177.8 cm (70\")   Wt 112 kg (246 lb)   SpO2 96% Comment: room air  BMI 35.30 kg/m²     Physical Exam  Vitals reviewed.   Constitutional:       General: He is not in acute distress.     Appearance: Normal appearance. He is obese. He is not ill-appearing.   HENT:      Head: Normocephalic and atraumatic.      Nose: Nose normal.      Mouth/Throat:      Mouth: Mucous membranes are moist.      Pharynx: Oropharynx is clear.      Comments: Type of uvula and soft palate partial visualized during examination, uvula appears enlarged.  Soft palate with erythema.  Eyes:      Extraocular Movements: Extraocular movements intact.      Conjunctiva/sclera: Conjunctivae normal.      Pupils: Pupils are equal, round, and reactive to light.   Neck:      Comments: Cervical lymph nodes tender to palpation  Cardiovascular:      Rate and Rhythm: Normal rate and regular rhythm.      Pulses: Normal pulses.      Heart sounds: Normal heart sounds.   Pulmonary:      Effort: Pulmonary effort is normal. No respiratory distress.      Breath sounds: Normal breath sounds. No stridor. No wheezing, rhonchi or rales.   Abdominal:      General: Bowel sounds are normal.   Musculoskeletal:         General: Normal range of motion.      Cervical back: Normal range of motion and neck supple. " Tenderness present.      Right lower leg: No edema.      Left lower leg: No edema.   Lymphadenopathy:      Cervical: Cervical adenopathy present.   Skin:     General: Skin is warm and dry.   Neurological:      General: No focal deficit present.      Mental Status: He is oriented to person, place, and time.   Psychiatric:         Mood and Affect: Mood normal.         Behavior: Behavior normal.          Results Review  I have personally reviewed the prior office notes, diagnostics, labs.  proBNP  Order: [id number]   Status: Final result      Visible to patient: No (inaccessible in MyChart)      Next appt: 01/06/2023 at 03:00 PM in Internal Medicine (Noble Kc MD)      Dx: Chronic systolic congestive heart amrik...     Specimen Information: Blood    1 Result Note  Component   Ref Range & Units 3 wk ago   (11/10/22) 2 mo ago   (9/28/22) 5 mo ago   (6/24/22) 5 mo ago   (6/10/22) 5 mo ago   (6/6/22) 7 mo ago   (5/6/22) 7 mo ago   (5/1/22)   proBNP   0.0 - 1,800.0 pg/mL 431.0  318.1  825.0  2,075.0 High   3,092.0 High   876.3  1,411.0    Resulting Agency  [facility]  [facility]  [facility]  [facility]  [facility]  [facility]        Beta Strep Culture, Throat - Swab, Throat  Order: [id number]   Status: Final result      Visible to patient: No (inaccessible in MyChart)      Next appt: 01/06/2023 at 03:00 PM in Internal Medicine (Noble Kc MD)      Dx: Sore throat     Specimen Information: Throat; Swab    2 Result Notes  Throat Culture Beta Strep No Beta Hemolytic Streptococcus Isolated          Group A Strep incidence is low in adults. Positive culture for Beta hemolytic Streptococcus species can reflect colonization and not true infection. Please correlate clinically.         Resulting Agency:  [facility]           Specimen Collected: 11/11/22 13:57 EST Last Resulted: 11/13/22 07:30 EST             Contains abnormal data Basic metabolic panel  Order: [id number]   Status: Final result      Visible to patient: No (inaccessible  in MyChart)      Next appt: 01/06/2023 at 03:00 PM in Internal Medicine (Noble Kc MD)      Dx: KAMLA (acute kidney injury) (HCC)     Specimen Information: Blood    1 Result Note     1 Follow-up Encounter  Component   Ref Range & Units 3 wk ago   (11/10/22) 1 mo ago   (10/7/22) 2 mo ago   (9/29/22) 2 mo ago   (9/28/22) 2 mo ago   (9/27/22) 5 mo ago   (6/10/22) 5 mo ago   (6/9/22)   Glucose   65 - 99 mg/dL 127 High   121 High   121 High   123 High   111 High   134 High   164 High     BUN   8 - 23 mg/dL 16  15  19  18  18  12  13    Creatinine   0.76 - 1.27 mg/dL 1.39 High   1.33 High   1.62 High   1.58 High   1.78 High   1.05  1.24    Sodium   136 - 145 mmol/L 137  140  137  138  139  138  136    Potassium   3.5 - 5.2 mmol/L 3.9  4.7  4.5  4.4  3.9  3.8  3.8    Chloride   98 - 107 mmol/L 100  104  103  102  101  104  99    CO2   22.0 - 29.0 mmol/L 28.0  26.4  25.6  27.3  29.6 High   24.0  26.2    Calcium   8.6 - 10.5 mg/dL 8.8  9.8  9.3  9.6  9.4  9.1  9.0    BUN/Creatinine Ratio   7.0 - 25.0 11.5  11.3  11.7  11.4  10.1  11.4  10.5    Anion Gap   5.0 - 15.0 mmol/L 9.0  9.6  8.4  8.7  8.4  10.0  10.8    eGFR   >60.0 mL/min/1.73 51.2 Low   54.0 Low  CM  42.6 Low  CM  43.9 Low  CM  38.1 Low  CM  71.8 CM  58.8 Low  CM    Comment: National Kidney Foundation and American Society of Nephrology (ASN) Task Force recommended calculation based on the Chronic Kidney Disease Epidemiology Collaboration (CKD-EPI) equation refit without adjustment for race.   Resulting Agency  [facility]  [facility]  [facility]  [facility]  [facility]  [facility]           Narrative  Performed by:  [facility]  GFR Normal >60   Chronic Kidney Disease <60   Kidney Failure <15     The GFR formula is only valid for adults with stable renal function between ages 18 and 70.      Specimen Collected: 11/10/22 10:15 EST Last Resulted: 11/10/22 17:18 EST           CT Chest Without Contrast Diagnostic [[id number]] (Order [id number])  Order  Status: Final  result     Appointment Information    Display Notes    V/AVB                   PACS Images     Radiology Images    Study Result    Narrative & Impression   PROCEDURE:  CT CHEST WO CONTRAST DIAGNOSTIC     COMPARISON: [facility], CT, CT CHEST W CONTRAST DIAGNOSTIC, 5/06/2022, 12:30.     INDICATIONS:  lung nodule, copd     PROTOCOL:     Standard imaging protocol performed                 RADIATION:                     Automated exposure control was utilized to minimize radiation dose.      TECHNIQUE:    Axial images of the chest without intravenous contrast.     FINDINGS:  Prior median sternotomy.  Coronary artery calcification is present.  No evidence of pleural or   pericardial effusion.  No evidence of pneumothorax.  There is no mediastinal, axillary or hilar   adenopathy.  The thoracic aorta has a normal caliber.  Images of the unenhanced upper abdomen are   unremarkable.  There are scattered calcified granulomas.  Degenerative changes are present in the   thoracic spine.  There is scarring in the apices.     IMPRESSION:  No acute findings.  Findings consistent with prior granulomatous infection.     MAGNUS FRAZIER MD         Electronically Signed and Approved By: MAGNUS FRAZIER MD on 10/27/2022 at 9:11                ASSESSMENT & PLAN    Patient Active Problem List   Diagnosis   • Unstable angina (HCC)   • Coronary artery disease of bypass graft of native heart with stable angina pectoris (HCC)   • Class 1 obesity due to excess calories with serious comorbidity and body mass index (BMI) of 33.0 to 33.9 in adult   • Gout   • Typical atrial flutter (HCC)   • Essential hypertension   • Frequent PVCs   • Mixed hyperlipidemia   • Rotator cuff Bursitis of shoulder region   • Lateral epicondylitis   • History of hematuria   • Benign prostatic hyperplasia with urinary frequency   • Hydrocele in adult   • History of COVID-19   • Allergic rhinitis   • Seasonal allergies   • Tobacco abuse, in remission   •  Vitamin D deficiency   • Gastroesophageal reflux disease with esophagitis without hemorrhage   • Ischemic cardiomyopathy   • Centrilobular emphysema (HCC)   • Gastroesophageal reflux disease   • Atrial fibrillation, persistent (HCC)   • COPD (chronic obstructive pulmonary disease) (HCC)   • Chronic anticoagulation   • Diastolic dysfunction   • S/P CABG (coronary artery bypass graft)   • Stage 3a chronic kidney disease (HCC)   • Persistent atrial fibrillation (HCC)   • S/P ablation of atrial fibrillation   • Atrial fibrillation, unspecified type (HCC)   • Acute on chronic diastolic CHF (congestive heart failure) (HCC)   • SOB (shortness of breath)   • Chest pain   • Mild aortic valve stenosis   • Moderate mitral regurgitation   • Other hydrocele   • History of gross hematuria   • Benign prostatic hyperplasia with lower urinary tract symptoms   • Chronic systolic congestive heart failure (HCC)       Diagnoses and all orders for this visit:    1. Allergic rhinitis, unspecified seasonality, unspecified trigger (Primary)  -     cetirizine (zyrTEC) 10 MG tablet; Take 1 tablet by mouth Daily.  Dispense: 90 tablet; Refill: 3  -     Ambulatory Referral to ENT (Otolaryngology)    2. Chronic cough  -     Ambulatory Referral to ENT (Otolaryngology)    3. Gastroesophageal reflux disease, unspecified whether esophagitis present  -     Ambulatory Referral to ENT (Otolaryngology)           Plan:  Patient to continue with his current medications for his lungs including his Singulair amiodarone Arnuity Stiolto.  Patient to follow-up with cardiology as scheduled.  We will add Zyrtec to patient's medication regimen.  In addition we will make referral to ENT at this time.  Patient will follow back up in 3 months or sooner if needed.    Smoking status: Former  Vaccination status: 1 dose of COVID up-to-date on flu  Medications personally reviewed    Follow Up  Return in about 3 months (around 3/2/2023) for Dr. Brink .    Patient was given  instructions and counseling regarding his condition or for health maintenance advice. Please see specific information pulled into the AVS if appropriate.

## 2022-12-02 ENCOUNTER — OFFICE VISIT (OUTPATIENT)
Dept: PULMONOLOGY | Facility: CLINIC | Age: 82
End: 2022-12-02

## 2022-12-02 VITALS
TEMPERATURE: 98.4 F | RESPIRATION RATE: 20 BRPM | BODY MASS INDEX: 35.22 KG/M2 | OXYGEN SATURATION: 96 % | SYSTOLIC BLOOD PRESSURE: 141 MMHG | WEIGHT: 246 LBS | HEART RATE: 76 BPM | HEIGHT: 70 IN | DIASTOLIC BLOOD PRESSURE: 65 MMHG

## 2022-12-02 DIAGNOSIS — R05.3 CHRONIC COUGH: ICD-10-CM

## 2022-12-02 DIAGNOSIS — J30.9 ALLERGIC RHINITIS, UNSPECIFIED SEASONALITY, UNSPECIFIED TRIGGER: Primary | ICD-10-CM

## 2022-12-02 DIAGNOSIS — K21.9 GASTROESOPHAGEAL REFLUX DISEASE, UNSPECIFIED WHETHER ESOPHAGITIS PRESENT: ICD-10-CM

## 2022-12-02 PROCEDURE — 99213 OFFICE O/P EST LOW 20 MIN: CPT | Performed by: NURSE PRACTITIONER

## 2022-12-02 RX ORDER — CETIRIZINE HYDROCHLORIDE 10 MG/1
10 TABLET ORAL DAILY
Qty: 90 TABLET | Refills: 3 | Status: SHIPPED | OUTPATIENT
Start: 2022-12-02

## 2022-12-02 RX ORDER — NITROGLYCERIN 0.4 MG/1
0.4 TABLET SUBLINGUAL
COMMUNITY
End: 2023-03-04

## 2022-12-05 RX ORDER — TIOTROPIUM BROMIDE AND OLODATEROL 3.124; 2.736 UG/1; UG/1
2 SPRAY, METERED RESPIRATORY (INHALATION)
Qty: 2 EACH | Refills: 0 | COMMUNITY
Start: 2022-12-05 | End: 2022-12-06

## 2022-12-14 DIAGNOSIS — N20.0 KIDNEY STONES: ICD-10-CM

## 2022-12-15 ENCOUNTER — OFFICE VISIT (OUTPATIENT)
Dept: OTOLARYNGOLOGY | Facility: CLINIC | Age: 82
End: 2022-12-15

## 2022-12-15 VITALS — TEMPERATURE: 97.8 F | WEIGHT: 246.8 LBS | HEIGHT: 70 IN | BODY MASS INDEX: 35.33 KG/M2

## 2022-12-15 DIAGNOSIS — J30.1 SEASONAL ALLERGIC RHINITIS DUE TO POLLEN: Primary | ICD-10-CM

## 2022-12-15 DIAGNOSIS — R05.3 CHRONIC COUGH: ICD-10-CM

## 2022-12-15 DIAGNOSIS — K21.9 GASTROESOPHAGEAL REFLUX DISEASE, UNSPECIFIED WHETHER ESOPHAGITIS PRESENT: ICD-10-CM

## 2022-12-15 PROCEDURE — 99214 OFFICE O/P EST MOD 30 MIN: CPT | Performed by: OTOLARYNGOLOGY

## 2022-12-15 RX ORDER — TAMSULOSIN HYDROCHLORIDE 0.4 MG/1
CAPSULE ORAL
Qty: 90 CAPSULE | Refills: 4 | Status: SHIPPED | OUTPATIENT
Start: 2022-12-15

## 2022-12-21 PROCEDURE — 31575 DIAGNOSTIC LARYNGOSCOPY: CPT | Performed by: OTOLARYNGOLOGY

## 2022-12-21 NOTE — PROGRESS NOTES
Patient Name: Layo Cee   Visit Date: 12/15/2022   Patient ID: [id number]  Provider: Emil Burnette MD    Sex: male  Location: [facility]   YOB: 1940  Location Address: [address] [place] [address], [address],?[place]?[address]    Primary Care Provider Noble Kc MD  Location Phone: [phone]    Referring Provider: ARACELI Diamond        Chief Complaint  Follow-up, Allergic Rhinitis, Cough, and Heartburn    Subjective    History of Present Illness  Layo Cee is a 82 y.o. male who presents to [facility] today as a consult from ARACELI Diamond.    He presents to the clinic today for evaluation of chronic cough and strep throat.  He informs me that this has been going on for several months and started with upper respiratory symptoms.  He notes that the upper respiratory infection has now subsided, but he continues to have chronic cough.  His wife notes that sometimes the cough is severe and causes a lot of throat discomfort.  He notes that the cough is usually nonproductive.  He does have some lung issues and sees a pulmonologist for this.    I previously saw the patient in 2020 for similar symptoms.  He does have GERD, and at that clinic visit we discussed GERD precautions.    Past Medical History:   Diagnosis Date   • Arthritis    • Atrial fibrillation, persistent (Formerly [facility]) 5/3/2022    Persistent atrial fibrillation status post extensive ablation and pulmonary vein isolation by Dr. Aguilar on 6/3/2022, with reoccurring rapid A. fib, and then successful cardioversion on 7/1/2022   • BPH (benign prostatic hyperplasia)    • CHF (congestive heart failure) (Formerly [facility])    • COPD (chronic obstructive pulmonary disease) (Formerly [facility])    • Coronary artery disease of bypass graft of native heart with stable angina pectoris (Formerly [facility])     (1) Coronary artery disease, s/p CABG in the past. Cardiac catheterization in July 2016 showed patentLIMA graft to the LAD  and occluded SVGs. Native LAD is 100% occluded in the mid portion. Native LCx has no significant disease. Native RCA is 100% occluded and it is a . Repeat cath in June, 2021, underwent stent placement to diagnonal branch.   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 Aguilar    • Vertigo        Past Surgical History:   Procedure Laterality Date   • BLADDER SURGERY     • CARDIAC CATHETERIZATION  07/2016   • CARDIAC CATHETERIZATION N/A 8/9/2021    Procedure: Left Heart Cath with possible angioplasty;  Surgeon: Jack Ladd MD;  Location: [facility] CATH INVASIVE LOCATION;  Service: Cardiovascular;  Laterality: N/A;  Please schedule the procedure with Dr. Jack Ladd MD on 8/9/2021   • CARDIAC ELECTROPHYSIOLOGY PROCEDURE N/A 6/3/2022    Procedure: Ablation atrial fibrillation;  Surgeon: Irineo Aguilar MD;  Location: [facility] CATH INVASIVE LOCATION;  Service: Cardiovascular;  Laterality: N/A;   • CARDIAC ELECTROPHYSIOLOGY PROCEDURE N/A 6/3/2022    Procedure: Ablation atrial flutter/CTI;  Surgeon: Irineo Aguilar MD;  Location: [facility] CATH INVASIVE LOCATION;  Service: Cardiovascular;  Laterality: N/A;   • CARDIAC SURGERY      HEART BYPASS   • COLONOSCOPY  2011   • CORONARY ARTERY BYPASS GRAFT     • CYSTOSCOPY  03/19/2019    WITH BILATERAL RETROGRADE PYELOGRAPHY   • ELECTRICAL CARDIOVERSION  5/12/2022        • ENDOSCOPY  2016   • PROSTATE SURGERY           Current Outpatient Medications:   •  acetaminophen  (TYLENOL) 325 MG tablet, Take 650 mg by mouth Every 6 (Six) Hours As Needed for Mild Pain ., Disp: , Rfl:   •  amiodarone (PACERONE) 200 MG tablet, Take 1 tablet by mouth Daily., Disp: 90 tablet, Rfl: 1  •  amLODIPine (NORVASC) 5 MG tablet, Take 1 tablet by mouth Daily., Disp: 30 tablet, Rfl: 1  •  aspirin (aspirin) 81 MG EC tablet, Take 1 tablet by mouth Daily., Disp: 30 tablet, Rfl: 1  •  benzonatate (Tessalon Perles) 100 MG capsule, Take 1 capsule by mouth 3 (Three) Times a Day As Needed for Cough., Disp: 90 capsule, Rfl: 0  •  calcium carbonate (OS-LIANNA) 600 MG tablet, Take 600 mg by mouth Every Night., Disp: , Rfl:   •  cetirizine (zyrTEC) 10 MG tablet, Take 1 tablet by mouth Daily., Disp: 90 tablet, Rfl: 3  •  Coenzyme Q10 100 MG tablet, Take 100 mg by mouth Daily., Disp: , Rfl:   •  Diclofenac Sodium (VOLTAREN) 1 % gel gel, Apply 2 g topically to the appropriate area as directed 4 (Four) Times a Day., Disp: , Rfl:   •  Entresto 49-51 MG tablet, Take 1 tablet by mouth 2 (two) times a day., Disp: 180 tablet, Rfl: 1  •  finasteride (PROSCAR) 5 MG tablet, Take 1 tablet by mouth daily., Disp: 90 tablet, Rfl: 4  •  fluticasone (FLONASE) 50 MCG/ACT nasal spray, 1 spray into the nostril(s) as directed by provider Every Night., Disp: , Rfl:   •  Fluticasone Furoate (Arnuity Ellipta) 100 MCG/ACT aerosol powder , Inhale 1 puff Daily., Disp: 1 each, Rfl: 3  •  isosorbide mononitrate (IMDUR) 60 MG 24 hr tablet, Take 1 tablet by mouth Daily., Disp: 90 tablet, Rfl: 3  •  metoprolol succinate XL (TOPROL-XL) 25 MG 24 hr tablet, Take 1 tablet by mouth 2 (Two) Times a Day for 30 days., Disp: 90 tablet, Rfl: 0  •  nitroglycerin (NITROSTAT) 0.4 MG SL tablet, Place 0.4 mg under the tongue Every 5 (Five) Minutes As Needed for Chest Pain. Take no more than 3 doses in 15 minutes., Disp: , Rfl:   •  pantoprazole (PROTONIX) 40 MG EC tablet, Take 1 tablet by mouth Daily., Disp: 30 tablet, Rfl: 3  •  pitavastatin calcium (Livalo) 2 MG  "tablet tablet, Take 1 tablet by mouth Every Night., Disp: 90 tablet, Rfl: 3  •  tamsulosin (FLOMAX) 0.4 MG capsule 24 hr capsule, Take 1 capsule by mouth daily. take 1/2 hour following the same meal each day, Disp: 90 capsule, Rfl: 4  •  tiotropium bromide-olodaterol (Stiolto Respimat) 2.5-2.5 MCG/ACT aerosol solution inhaler, Inhale 2 puffs Daily., Disp: 2 each, Rfl: 0  •  VITAMIN B COMPLEX-C PO, Take 1 tablet by mouth Daily., Disp: , Rfl:   •  Xarelto 15 MG tablet, Take 1 tablet by mouth every day (Patient taking differently: Take 15 mg by mouth Every Night.), Disp: 90 tablet, Rfl: 1  •  furosemide (LASIX) 40 MG tablet, Take 1 tablet by mouth 2 (Two) Times a Day for 30 days. (Patient taking differently: Take 40 mg by mouth Daily.), Disp: 60 tablet, Rfl: 0  •  levalbuterol (Xopenex) 0.63 MG/3ML nebulizer solution, Take 1 ampule by nebulization Every 6 (Six) Hours As Needed for Wheezing for up to 30 days. DX: J44.9, Disp: 360 mL, Rfl: 5  •  montelukast (SINGULAIR) 10 MG tablet, Take 1 tablet by mouth Every Night for 30 days., Disp: 30 tablet, Rfl: 11  •  nitroglycerin (NITROSTAT) 0.4 MG SL tablet, Place 1 tablet under the tongue Every 5 (Five) Minutes As Needed for Chest Pain for up to 30 days. Take no more than 3 doses in 15 minutes., Disp: 30 tablet, Rfl: 0     Allergies   Allergen Reactions   • Carvedilol Swelling and Anaphylaxis   • Levaquin [Levofloxacin] Unknown - Low Severity       Family History   Problem Relation Age of Onset   • Hypertension Mother    • Heart disease Father    • Other Brother         GASTROINTESTINAL CANCER   • Kidney cancer Brother         Social History     Social History Narrative   • Not on file       Objective     Vital Signs:   Temp 97.8 °F (36.6 °C) (Tympanic)   Ht 177.8 cm (70\")   Wt 112 kg (246 lb 12.8 oz)   BMI 35.41 kg/m²       Physical Exam    Flexible laryngoscopy    Date/Time: 12/21/2022 2:44 PM  Performed by: Emil Burnette MD  Authorized by: Emil Burnette MD "     Procedure details:     Indications: evaluation of larynx and hoarseness, dysphagia, or aspiration      Medication:  Afrin    Instrument: flexible fiberoptic laryngoscope      Scope location: right nare    Comments:      Nasal exam reveals no lesions, polyps, or purulence.  Vocal folds are bilaterally mobile with good excursion.  Interarytenoid pachydermia consistent with GERD, minimal.  No lesions or masses.        Constitutional   Appearance  · : well developed, well-nourished, alert and in no acute distress, voice clear and strong    Head  Inspection  · : no deformities or lesions  Face  Inspection  · : No facial lesions; House-Brackmann I/VI bilaterally  Palpation  · : No TMJ crepitus nor  muscle tenderness bilaterally    Eyes  Vision  Visual Fields  · : Extraocular movements are intact. No spontaneous or gaze-induced nystagmus.  Conjunctivae  · : clear  Sclerae  · : clear  Pupils and Irises  · : pupils equal, round, and reactive to light.     Ears, Nose, Mouth and Throat    Ears    External Ears  · : appearance within normal limits, no lesions present  Otoscopic Examination  · : Tympanic membrane appearance within normal limits bilaterally without perforations, well-aerated middle ears  Hearing  · : intact to conversational voice both ears  Tunning fork testing:     :    Nose    External Nose  · : appearance normal  Intranasal Exam  · : mucosa within normal limits, vestibules normal, no intranasal lesions present, septum midline, sinuses non tender to percussion  Oral Cavity    Oral Mucosa  · : oral mucosa normal without pallor or cyanosis  Lips  · : lip appearance normal  Teeth  · : normal dentition for age  Gums  · : gums pink, non-swollen, no bleeding present  Tongue  · : tongue appearance normal; normal mobility  Palate  · : hard palate normal, soft palate appearance normal with symmetric mobility    Throat    Oropharynx  · : no inflammation or lesions present, tonsils within normal  limits  Hypopharynx  · : appearance within normal limits, superior epiglottis within normal limits  Larynx  · : appearance within normal limits, vocal cords within normal limits, no lesions present    Neck  Inspection/Palpation  · : normal appearance, no masses or tenderness, trachea midline; thyroid size normal, nontender, no nodules or masses present on palpation    Respiratory  Respiratory Effort  · : breathing unlabored  Inspection of Chest  · : normal appearance, no retractions    Cardiovascular  Heart  · : regular rate and rhythm    Lymphatic  Neck  · : no lymphadenopathy present  Supraclavicular Nodes  · : no lymphadenopathy present  Preauricular Nodes  · : no lymphadenopathy present    Skin and Subcutaneous Tissue  General Inspection  · : Regarding face and neck - there are no rashes present, no lesions present, and no areas of discoloration    Neurologic  Cranial Nerves  · : cranial nerves II-XII are grossly intact bilaterally  Gait and Station  · : normal gait, able to stand without diffculty    Psychiatric  Judgement and Insight  · : judgment and insight intact  Mood and Affect  · : mood normal, affect appropriate          Assessment and Plan    Diagnoses and all orders for this visit:    1. Seasonal allergic rhinitis due to pollen (Primary)    2. Gastroesophageal reflux disease, unspecified whether esophagitis present    3. Chronic cough    Examination today reveals his voice to be clear and strong.  Nasolaryngoscopy was performed and did not reveal any significant abnormalities such as lesions or masses.  I do think this is at least partially related to GERD, discussed GERD precautions with him.  We also discussed taking sips of water in case this is chronic habitual cough as I believe the trauma from coughing is contributing to throat irritation.  If there are continued symptoms despite treatment I will be glad to see him back in the clinic for reevaluation.    Follow Up   No follow-ups on  file.  Patient was given instructions and counseling regarding his condition or for health maintenance advice. Please see specific information pulled into the AVS if appropriate.

## 2022-12-30 DIAGNOSIS — R05.9 COUGH: ICD-10-CM

## 2022-12-30 DIAGNOSIS — J44.9 CHRONIC OBSTRUCTIVE PULMONARY DISEASE, UNSPECIFIED COPD TYPE: ICD-10-CM

## 2022-12-30 DIAGNOSIS — R06.00 DYSPNEA, UNSPECIFIED TYPE: ICD-10-CM

## 2022-12-30 DIAGNOSIS — K76.89 LIVER NODULE: ICD-10-CM

## 2022-12-30 DIAGNOSIS — J30.2 SEASONAL ALLERGIES: ICD-10-CM

## 2022-12-30 DIAGNOSIS — J43.2 CENTRILOBULAR EMPHYSEMA: ICD-10-CM

## 2022-12-30 DIAGNOSIS — F17.201 TOBACCO ABUSE, IN REMISSION: ICD-10-CM

## 2022-12-30 DIAGNOSIS — I50.22 CHRONIC SYSTOLIC CONGESTIVE HEART FAILURE: ICD-10-CM

## 2022-12-30 DIAGNOSIS — Z86.16 HISTORY OF COVID-19: ICD-10-CM

## 2022-12-30 DIAGNOSIS — J30.9 ALLERGIC RHINITIS, UNSPECIFIED SEASONALITY, UNSPECIFIED TRIGGER: ICD-10-CM

## 2022-12-30 RX ORDER — FLUTICASONE FUROATE 100 UG/1
1 POWDER RESPIRATORY (INHALATION) DAILY
Qty: 1 EACH | Refills: 6 | Status: SHIPPED | OUTPATIENT
Start: 2022-12-30 | End: 2023-03-28 | Stop reason: SDUPTHER

## 2023-01-03 DIAGNOSIS — I10 ESSENTIAL HYPERTENSION: ICD-10-CM

## 2023-01-03 RX ORDER — AMLODIPINE BESYLATE 5 MG/1
5 TABLET ORAL DAILY
Qty: 30 TABLET | Refills: 1 | Status: SHIPPED | OUTPATIENT
Start: 2023-01-03 | End: 2023-02-20 | Stop reason: SDUPTHER

## 2023-01-09 ENCOUNTER — OFFICE VISIT (OUTPATIENT)
Dept: INTERNAL MEDICINE | Facility: CLINIC | Age: 83
End: 2023-01-09
Payer: MEDICARE

## 2023-01-09 VITALS
TEMPERATURE: 97.6 F | OXYGEN SATURATION: 95 % | SYSTOLIC BLOOD PRESSURE: 116 MMHG | HEIGHT: 70 IN | DIASTOLIC BLOOD PRESSURE: 66 MMHG | BODY MASS INDEX: 35.3 KG/M2 | WEIGHT: 246.6 LBS | HEART RATE: 74 BPM

## 2023-01-09 DIAGNOSIS — E66.09 CLASS 2 OBESITY DUE TO EXCESS CALORIES WITHOUT SERIOUS COMORBIDITY WITH BODY MASS INDEX (BMI) OF 35.0 TO 35.9 IN ADULT: ICD-10-CM

## 2023-01-09 DIAGNOSIS — I50.22 CHRONIC SYSTOLIC CONGESTIVE HEART FAILURE: ICD-10-CM

## 2023-01-09 DIAGNOSIS — Z00.00 MEDICARE ANNUAL WELLNESS VISIT, SUBSEQUENT: Primary | ICD-10-CM

## 2023-01-09 DIAGNOSIS — I10 ESSENTIAL HYPERTENSION: ICD-10-CM

## 2023-01-09 DIAGNOSIS — Z23 ENCOUNTER FOR IMMUNIZATION: ICD-10-CM

## 2023-01-09 DIAGNOSIS — H66.002 NON-RECURRENT ACUTE SUPPURATIVE OTITIS MEDIA OF LEFT EAR WITHOUT SPONTANEOUS RUPTURE OF TYMPANIC MEMBRANE: ICD-10-CM

## 2023-01-09 DIAGNOSIS — I48.19 PERSISTENT ATRIAL FIBRILLATION: ICD-10-CM

## 2023-01-09 PROBLEM — E66.812 CLASS 2 OBESITY DUE TO EXCESS CALORIES WITHOUT SERIOUS COMORBIDITY WITH BODY MASS INDEX (BMI) OF 35.0 TO 35.9 IN ADULT: Status: ACTIVE | Noted: 2021-08-23

## 2023-01-09 PROCEDURE — 1159F MED LIST DOCD IN RCRD: CPT | Performed by: STUDENT IN AN ORGANIZED HEALTH CARE EDUCATION/TRAINING PROGRAM

## 2023-01-09 PROCEDURE — 90471 IMMUNIZATION ADMIN: CPT | Performed by: STUDENT IN AN ORGANIZED HEALTH CARE EDUCATION/TRAINING PROGRAM

## 2023-01-09 PROCEDURE — 85025 COMPLETE CBC W/AUTO DIFF WBC: CPT | Performed by: STUDENT IN AN ORGANIZED HEALTH CARE EDUCATION/TRAINING PROGRAM

## 2023-01-09 PROCEDURE — 99214 OFFICE O/P EST MOD 30 MIN: CPT | Performed by: STUDENT IN AN ORGANIZED HEALTH CARE EDUCATION/TRAINING PROGRAM

## 2023-01-09 PROCEDURE — 1170F FXNL STATUS ASSESSED: CPT | Performed by: STUDENT IN AN ORGANIZED HEALTH CARE EDUCATION/TRAINING PROGRAM

## 2023-01-09 PROCEDURE — 90714 TD VACC NO PRESV 7 YRS+ IM: CPT | Performed by: STUDENT IN AN ORGANIZED HEALTH CARE EDUCATION/TRAINING PROGRAM

## 2023-01-09 PROCEDURE — 84443 ASSAY THYROID STIM HORMONE: CPT | Performed by: STUDENT IN AN ORGANIZED HEALTH CARE EDUCATION/TRAINING PROGRAM

## 2023-01-09 PROCEDURE — 80061 LIPID PANEL: CPT | Performed by: STUDENT IN AN ORGANIZED HEALTH CARE EDUCATION/TRAINING PROGRAM

## 2023-01-09 PROCEDURE — 80053 COMPREHEN METABOLIC PANEL: CPT | Performed by: STUDENT IN AN ORGANIZED HEALTH CARE EDUCATION/TRAINING PROGRAM

## 2023-01-09 PROCEDURE — G0439 PPPS, SUBSEQ VISIT: HCPCS | Performed by: STUDENT IN AN ORGANIZED HEALTH CARE EDUCATION/TRAINING PROGRAM

## 2023-01-09 RX ORDER — AMOXICILLIN AND CLAVULANATE POTASSIUM 875; 125 MG/1; MG/1
1 TABLET, FILM COATED ORAL 2 TIMES DAILY
Qty: 14 TABLET | Refills: 0 | Status: SHIPPED | OUTPATIENT
Start: 2023-01-09 | End: 2023-01-16

## 2023-01-09 RX ORDER — AMOXICILLIN AND CLAVULANATE POTASSIUM 875; 125 MG/1; MG/1
1 TABLET, FILM COATED ORAL 2 TIMES DAILY
Qty: 14 TABLET | Refills: 0 | Status: SHIPPED | OUTPATIENT
Start: 2023-01-09 | End: 2023-01-09

## 2023-01-09 NOTE — PATIENT INSTRUCTIONS
Cooking With Less Salt  Cooking with less salt is one way to reduce the amount of sodium you get from food. Sodium is one of the elements that make up salt. It is found naturally in foods and is also added to certain foods. Depending on your condition and overall health, your health care provider or dietitian may recommend that you reduce your sodium intake. Most people should have less than 2,300 milligrams (mg) of sodium each day. If you have high blood pressure (hypertension), you may need to limit your sodium to 1,500 mg each day. Follow the tips below to help reduce your sodium intake.  What are tips for eating less sodium?  Reading food labels       Check the food label before buying or using packaged ingredients. Always check the label for the serving size and sodium content.  Look for products with no more than 140 mg of sodium in one serving.  Check the % Daily Value column to see what percent of the daily recommended amount of sodium is provided in one serving of the product. Foods with 5% or less in this column are considered low in sodium. Foods with 20% or higher are considered high in sodium.  Do not choose foods with salt as one of the first three ingredients on the ingredients list. If salt is one of the first three ingredients, it usually means the item is high in sodium.     Shopping  Buy sodium-free or low-sodium products. Look for the following words on food labels:  Low-sodium.  Sodium-free.  Reduced-sodium.  No salt added.  Unsalted.  Always check the sodium content even if foods are labeled as low-sodium or no salt added.  Buy fresh foods.  Cooking  Use herbs, seasonings without salt, and spices as substitutes for salt.  Use sodium-free baking soda when baking.  [place], braise, or roast foods to add flavor with less salt.  Avoid adding salt to pasta, rice, or hot cereals.  Drain and rinse canned vegetables, beans, and meat before use.  Avoid adding salt when cooking sweets and desserts.  Cook  with low-sodium ingredients.  What foods are high in sodium?  Vegetables  Regular canned vegetables (not low-sodium or reduced-sodium). Sauerkraut, pickled vegetables, and relishes. Olives. French fries. Onion rings. Regular canned tomato sauce and paste. Regular tomato and vegetable juice. Frozen vegetables in sauces.  Grains  Instant hot cereals. Bread stuffing, pancake, and biscuit mixes. Croutons. Seasoned rice or pasta mixes. Noodle soup cups. Boxed or frozen macaroni and cheese. Regular salted crackers. Self-rising flour. Rolls. Bagels. Flour tortillas and wraps.  Meats and other proteins  Meat or fish that is salted, canned, smoked, cured, spiced, or pickled. This includes gar, ham, sausages, hot dogs, corned beef, chipped beef, meat loaves, salt pork, jerky, pickled herring, anchovies, regular canned tuna, and sardines. Salted nuts.  Dairy  Processed cheese and cheese spreads. Cheese curds. Blue cheese. Feta cheese. String cheese. Regular cottage cheese. Buttermilk. Canned milk.  The items listed above may not be a complete list of foods high in sodium. Actual amounts of sodium may be different depending on processing. Contact a dietitian for more information.  What foods are low in sodium?  Fruits  Fresh, frozen, or canned fruit with no sauce added. Fruit juice.  Vegetables  Fresh or frozen vegetables with no sauce added. \"No salt added\" canned vegetables. \"No salt added\" tomato sauce and paste. Low-sodium or reduced-sodium tomato and vegetable juice.  Grains  Noodles, pasta, quinoa, rice. Shredded or puffed wheat or puffed rice. Regular or quick oats (not instant). Low-sodium crackers. Low-sodium bread. Whole-grain bread and whole-grain pasta. Unsalted popcorn.  Meats and other proteins  Fresh or frozen whole meats, poultry (not injected with sodium), and fish with no sauce added. Unsalted nuts. Dried peas, beans, and lentils without added salt. Unsalted canned beans. Eggs. Unsalted nut butters.  Low-sodium canned tuna or chicken.  Dairy  Milk. Soy milk. Yogurt. Low-sodium cheeses, such as Swiss, [place], mozzarella, and ricotta. Sherbet or ice cream (keep to ½ cup per serving). Cream cheese.  Fats and oils  Unsalted butter or margarine.  Other foods  Homemade pudding. Sodium-free baking soda and baking powder. Herbs and spices. Low-sodium seasoning mixes.  Beverages  Coffee and tea. Carbonated beverages.  The items listed above may not be a complete list of foods low in sodium. Actual amounts of sodium may be different depending on processing. Contact a dietitian for more information.  What are some salt alternatives when cooking?  The following are herbs, seasonings, and spices that can be used instead of salt to flavor your food. Herbs should be fresh or dried. Do not choose packaged mixes. Next to the name of the herb, spice, or seasoning are some examples of foods you can pair it with.  Herbs  Bay leaves - Soups, meat and vegetable dishes, and spaghetti sauce.  Basil - Italian dishes, soups, pasta, and fish dishes.  Cilantro - Meat, poultry, and vegetable dishes.  Chili powder - Marinades and Mexican dishes.  Chives - Salad dressings and potato dishes.  Cumin - Mexican dishes, couscous, and meat dishes.  Dill - Fish dishes, sauces, and salads.  Fennel - Meat and vegetable dishes, breads, and cookies.  Garlic (do not use garlic salt) - Italian dishes, meat dishes, salad dressings, and sauces.  Marjoram - Soups, potato dishes, and meat dishes.  Oregano - Pizza and spaghetti sauce.  Parsley - Salads, soups, pasta, and meat dishes.  Mara - Italian dishes, salad dressings, soups, and red meats.  Saffron - Fish dishes, pasta, and some poultry dishes.  [place] - Stuffings and sauces.  Tarragon - Fish and poultry dishes.  Thyme - Stuffing, meat, and fish dishes.  Seasonings  Lemon juice - Fish dishes, poultry dishes, vegetables, and salads.  Vinegar - Salad dressings, vegetables, and fish  dishes.  Spices  Cinnamon - Sweet dishes, such as cakes, cookies, and puddings.  Cloves - Gingerbread, puddings, and marinades for meats.  Rojo - Vegetable dishes, fish and poultry dishes, and stir-barros dishes.  Ban - Vegetable dishes, fish dishes, and stir-barros dishes.  Nutmeg - Pasta, vegetables, poultry, fish dishes, and custard.  Summary  Cooking with less salt is one way to reduce the amount of sodium that you get from food.  Buy sodium-free or low-sodium products.  Check the food label before using or buying packaged ingredients.  Use herbs, seasonings without salt, and spices as substitutes for salt in foods.  This information is not intended to replace advice given to you by your health care provider. Make sure you discuss any questions you have with your health care provider.  Document Revised: 12/09/2020 Document Reviewed: 12/09/2020  [employer] Patient Education © 2021 [employer]      https://www.nhlbi.nih.gov/files/docs/public/heart/dash_brief.pdf\">   DASH Eating Plan  DASH stands for Dietary Approaches to Stop Hypertension. The DASH eating plan is a healthy eating plan that has been shown to:  Reduce high blood pressure (hypertension).  Reduce your risk for type 2 diabetes, heart disease, and stroke.  Help with weight loss.  What are tips for following this plan?  Reading food labels  Check food labels for the amount of salt (sodium) per serving. Choose foods with less than 5 percent of the Daily Value of sodium. Generally, foods with less than 300 milligrams (mg) of sodium per serving fit into this eating plan.  To find whole grains, look for the word \"whole\" as the first word in the ingredient list.  Shopping  Buy products labeled as \"low-sodium\" or \"no salt added.\"  Buy fresh foods. Avoid canned foods and pre-made or frozen meals.  Cooking  Avoid adding salt when cooking. Use salt-free seasonings or herbs instead of table salt or sea salt. Check with your health care provider or pharmacist before  using salt substitutes.  Do not barros foods. Cook foods using healthy methods such as baking, boiling, grilling, roasting, and broiling instead.  Cook with heart-healthy oils, such as olive, canola, avocado, soybean, or sunflower oil.  Meal planning       Eat a balanced diet that includes:  4 or more servings of fruits and 4 or more servings of vegetables each day. Try to fill one-half of your plate with fruits and vegetables.  6-8 servings of whole grains each day.  Less than 6 oz (170 g) of lean meat, poultry, or fish each day. A 3-oz (85-g) serving of meat is about the same size as a deck of cards. One egg equals 1 oz (28 g).  2-3 servings of low-fat dairy each day. One serving is 1 cup (237 mL).  1 serving of nuts, seeds, or beans 5 times each week.  2-3 servings of heart-healthy fats. Healthy fats called omega-3 fatty acids are found in foods such as walnuts, flaxseeds, fortified milks, and eggs. These fats are also found in cold-water fish, such as sardines, salmon, and mackerel.  Limit how much you eat of:  Canned or prepackaged foods.  Food that is high in trans fat, such as some fried foods.  Food that is high in saturated fat, such as fatty meat.  Desserts and other sweets, sugary drinks, and other foods with added sugar.  Full-fat dairy products.  Do not salt foods before eating.  Do not eat more than 4 egg yolks a week.  Try to eat at least 2 vegetarian meals a week.  Eat more home-cooked food and less restaurant, buffet, and fast food.     Lifestyle  When eating at a restaurant, ask that your food be prepared with less salt or no salt, if possible.  If you drink alcohol:  Limit how much you use to:  0-1 drink a day for women who are not pregnant.  0-2 drinks a day for men.  Be aware of how much alcohol is in your drink. In the [place] one drink equals one 12 oz bottle of beer (355 mL), one 5 oz glass of wine (148 mL), or one 1½ oz glass of hard liquor (44 mL).  General information  Avoid eating more than  2,300 mg of salt a day. If you have hypertension, you may need to reduce your sodium intake to 1,500 mg a day.  Work with your health care provider to maintain a healthy body weight or to lose weight. Ask what an ideal weight is for you.  Get at least 30 minutes of exercise that causes your heart to beat faster (aerobic exercise) most days of the week. Activities may include walking, swimming, or biking.  Work with your health care provider or dietitian to adjust your eating plan to your individual calorie needs.  What foods should I eat?  Fruits  All fresh, dried, or frozen fruit. Canned fruit in natural juice (without added sugar).  Vegetables  Fresh or frozen vegetables (raw, steamed, roasted, or grilled). Low-sodium or reduced-sodium tomato and vegetable juice. Low-sodium or reduced-sodium tomato sauce and tomato paste. Low-sodium or reduced-sodium canned vegetables.  Grains  Whole-grain or whole-wheat bread. Whole-grain or whole-wheat pasta. Brown rice. Oatmeal. Quinoa. Bulgur. Whole-grain and low-sodium cereals. [place] bread. Low-fat, low-sodium crackers. Whole-wheat flour tortillas.  Meats and other proteins  Skinless chicken or turkey. Ground chicken or turkey. Pork with fat trimmed off. Fish and seafood. Egg whites. Dried beans, peas, or lentils. Unsalted nuts, nut butters, and seeds. Unsalted canned beans. Lean cuts of beef with fat trimmed off. Low-sodium, lean precooked or cured meat, such as sausages or meat loaves.  Dairy  Low-fat (1%) or fat-free (skim) milk. Reduced-fat, low-fat, or fat-free cheeses. Nonfat, low-sodium ricotta or cottage cheese. Low-fat or nonfat yogurt. Low-fat, low-sodium cheese.  Fats and oils  Soft margarine without trans fats. Vegetable oil. Reduced-fat, low-fat, or light mayonnaise and salad dressings (reduced-sodium). Canola, safflower, olive, avocado, soybean, and sunflower oils. Avocado.  Seasonings and condiments  Herbs. Spices. Seasoning mixes without salt.  Other  foods  Unsalted popcorn and pretzels. Fat-free sweets.  The items listed above may not be a complete list of foods and beverages you can eat. Contact a dietitian for more information.  What foods should I avoid?  Fruits  Canned fruit in a light or heavy syrup. Fried fruit. Fruit in cream or butter sauce.  Vegetables  Creamed or fried vegetables. Vegetables in a cheese sauce. Regular canned vegetables (not low-sodium or reduced-sodium). Regular canned tomato sauce and paste (not low-sodium or reduced-sodium). Regular tomato and vegetable juice (not low-sodium or reduced-sodium). Pickles. Olives.  Grains  Baked goods made with fat, such as croissants, muffins, or some breads. Dry pasta or rice meal packs.  Meats and other proteins  Fatty cuts of meat. Ribs. Fried meat. [place]. Bologna, salami, and other precooked or cured meats, such as sausages or meat loaves. Fat from the back of a pig (fatback). Bratwurst. Salted nuts and seeds. Canned beans with added salt. Canned or smoked fish. Whole eggs or egg yolks. Chicken or turkey with skin.  Dairy  Whole or 2% milk, cream, and half-and-half. Whole or full-fat cream cheese. Whole-fat or sweetened yogurt. Full-fat cheese. Nondairy creamers. Whipped toppings. Processed cheese and cheese spreads.  Fats and oils  Butter. Stick margarine. Lard. Shortening. Ghee. [place] fat. Tropical oils, such as coconut, palm kernel, or palm oil.  Seasonings and condiments  Onion salt, garlic salt, seasoned salt, table salt, and sea salt. Worcestershire sauce. Tartar sauce. Barbecue sauce. Teriyaki sauce. Soy sauce, including reduced-sodium. Steak sauce. Canned and packaged gravies. Fish sauce. Oyster sauce. Cocktail sauce. Store-bought horseradish. Ketchup. Mustard. Meat flavorings and tenderizers. Bouillon cubes. Hot sauces. Pre-made or packaged marinades. Pre-made or packaged taco seasonings. Relishes. Regular salad dressings.  Other foods  Salted popcorn and pretzels.  The items listed above  may not be a complete list of foods and beverages you should avoid. Contact a dietitian for more information.  Where to find more information  National Heart, Lung, and [facility] [place]: www.nhlbi.nih.gov  American Heart Association: www.heart.org  Academy of Nutrition and Dietetics: www.eatright.org  National Kidney Foundation: www.kidney.org  Summary  The DASH eating plan is a healthy eating plan that has been shown to reduce high blood pressure (hypertension). It may also reduce your risk for type 2 diabetes, heart disease, and stroke.  When on the DASH eating plan, aim to eat more fresh fruits and vegetables, whole grains, lean proteins, low-fat dairy, and heart-healthy fats.  With the DASH eating plan, you should limit salt (sodium) intake to 2,300 mg a day. If you have hypertension, you may need to reduce your sodium intake to 1,500 mg a day.  Work with your health care provider or dietitian to adjust your eating plan to your individual calorie needs.  This information is not intended to replace advice given to you by your health care provider. Make sure you discuss any questions you have with your health care provider.  Document Revised: 11/20/2020 Document Reviewed: 11/20/2020  [employer] Patient Education © 2021 "[employer].       Heart-Healthy Eating Plan  Heart-healthy meal planning includes:  Eating less unhealthy fats.  Eating more healthy fats.  Making other changes in your diet.  Talk with your doctor or a diet specialist (dietitian) to create an eating plan that is right for you.  What is my plan?  Your doctor may recommend an eating plan that includes:  Total fat: ______% or less of total calories a day.  Saturated fat: ______% or less of total calories a day.  Cholesterol: less than _________mg a day.  What are tips for following this plan?  Cooking  Avoid frying your food. Try to bake, boil, grill, or broil it instead. You can also reduce fat by:  Removing the skin from poultry.  Removing all  visible fats from meats.  Steaming vegetables in water or broth.  Meal planning       At meals, divide your plate into four equal parts:  Fill one-half of your plate with vegetables and green salads.  Fill one-fourth of your plate with whole grains.  Fill one-fourth of your plate with lean protein foods.  Eat 4-5 servings of vegetables per day. A serving of vegetables is:  1 cup of raw or cooked vegetables.  2 cups of raw leafy greens.  Eat 4-5 servings of fruit per day. A serving of fruit is:  1 medium whole fruit.  ¼ cup of dried fruit.  ½ cup of fresh, frozen, or canned fruit.  ½ cup of 100% fruit juice.  Eat more foods that have soluble fiber. These are apples, broccoli, carrots, beans, peas, and barley. Try to get 20-30 g of fiber per day.  Eat 4-5 servings of nuts, legumes, and seeds per week:  1 serving of dried beans or legumes equals ½ cup after being cooked.  1 serving of nuts is ¼ cup.  1 serving of seeds equals 1 tablespoon.     General information  Eat more home-cooked food. Eat less restaurant, buffet, and fast food.  Limit or avoid alcohol.  Limit foods that are high in starch and sugar.  Avoid fried foods.  Lose weight if you are overweight.  Keep track of how much salt (sodium) you eat. This is important if you have high blood pressure. Ask your doctor to tell you more about this.  Try to add vegetarian meals each week.  Fats  Choose healthy fats. These include olive oil and canola oil, flaxseeds, walnuts, almonds, and seeds.  Eat more omega-3 fats. These include salmon, mackerel, sardines, tuna, flaxseed oil, and ground flaxseeds. Try to eat fish at least 2 times each week.  Check food labels. Avoid foods with trans fats or high amounts of saturated fat.  Limit saturated fats.  These are often found in animal products, such as meats, butter, and cream.  These are also found in plant foods, such as palm oil, palm kernel oil, and coconut oil.  Avoid foods with partially hydrogenated oils in them.  These have trans fats. Examples are stick margarine, some tub margarines, cookies, crackers, and other baked goods.  What foods can I eat?  Fruits  All fresh, canned (in natural juice), or frozen fruits.  Vegetables  Fresh or frozen vegetables (raw, steamed, roasted, or grilled). Green salads.  Grains  Most grains. Choose whole wheat and whole grains most of the time. Rice and pasta, including brown rice and pastas made with whole wheat.  Meats and other proteins  Lean, well-trimmed beef, veal, pork, and lamb. Chicken and turkey without skin. All fish and shellfish. Wild duck, rabbit, pheasant, and venison. Egg whites or low-cholesterol egg substitutes. Dried beans, peas, lentils, and tofu. Seeds and most nuts.  Dairy  Low-fat or nonfat cheeses, including ricotta and mozzarella. Skim or 1% milk that is liquid, powdered, or evaporated. Buttermilk that is made with low-fat milk. Nonfat or low-fat yogurt.  Fats and oils  Non-hydrogenated (trans-free) margarines. Vegetable oils, including soybean, sesame, sunflower, olive, peanut, safflower, corn, canola, and cottonseed. Salad dressings or mayonnaise made with a vegetable oil.  Beverages  Mineral water. Coffee and tea. Diet carbonated beverages.  Sweets and desserts  Sherbet, gelatin, and fruit ice. Small amounts of dark chocolate.  Limit all sweets and desserts.  Seasonings and condiments  All seasonings and condiments.  The items listed above may not be a complete list of foods and drinks you can eat. Contact a dietitian for more options.  What foods should I avoid?  Fruits  Canned fruit in heavy syrup. Fruit in cream or butter sauce. Fried fruit. Limit coconut.  Vegetables  Vegetables cooked in cheese, cream, or butter sauce. Fried vegetables.  Grains  Breads that are made with saturated or trans fats, oils, or whole milk. Croissants. Sweet rolls. Donuts. High-fat crackers, such as cheese crackers.  Meats and other proteins  Fatty meats, such as hot dogs, ribs,  sausage, gar, rib-eye roast or steak. High-fat deli meats, such as salami and bologna. Caviar. Domestic duck and goose. Organ meats, such as liver.  Dairy  Cream, sour cream, cream cheese, and creamed cottage cheese. Whole-milk cheeses. Whole or 2% milk that is liquid, evaporated, or condensed. Whole buttermilk. Cream sauce or high-fat cheese sauce. Yogurt that is made from whole milk.  Fats and oils  Meat fat, or shortening. Cocoa butter, hydrogenated oils, palm oil, coconut oil, palm kernel oil. Solid fats and shortenings, including gar fat, salt pork, lard, and butter. Nondairy cream substitutes. Salad dressings with cheese or sour cream.  Beverages  Regular sodas and juice drinks with added sugar.  Sweets and desserts  Frosting. Pudding. Cookies. Cakes. Pies. Milk chocolate or white chocolate. Buttered syrups. Full-fat ice cream or ice cream drinks.  The items listed above may not be a complete list of foods and drinks to avoid. Contact a dietitian for more information.  Summary  Heart-healthy meal planning includes eating less unhealthy fats, eating more healthy fats, and making other changes in your diet.  Eat a balanced diet. This includes fruits and vegetables, low-fat or nonfat dairy, lean protein, nuts and legumes, whole grains, and heart-healthy oils and fats.  This information is not intended to replace advice given to you by your health care provider. Make sure you discuss any questions you have with your health care provider.  Document Revised: 02/21/2019 Document Reviewed: 01/25/2019  [employer]vier Patient Education © 2021 [employer] Inc.       Mediterranean Diet  A Mediterranean diet refers to food and lifestyle choices that are based on the traditions of countries located on the [place]. This way of eating has been shown to help prevent certain conditions and improve outcomes for people who have chronic diseases, like kidney disease and heart disease.  What are tips for following this  plan?  Lifestyle  Cook and eat meals together with your family, when possible.  Drink enough fluid to keep your urine clear or pale yellow.  Be physically active every day. This includes:  Aerobic exercise like running or swimming.  Leisure activities like gardening, walking, or housework.  Get 7-8 hours of sleep each night.  If recommended by your health care provider, drink red wine in moderation. This means 1 glass a day for nonpregnant women and 2 glasses a day for men. A glass of wine equals 5 oz (150 mL).  Reading food labels       Check the serving size of packaged foods. For foods such as rice and pasta, the serving size refers to the amount of cooked product, not dry.  Check the total fat in packaged foods. Avoid foods that have saturated fat or trans fats.  Check the ingredients list for added sugars, such as corn syrup.     Shopping  At the grocery store, buy most of your food from the areas near the walls of the store. This includes:  Fresh fruits and vegetables (produce).  Grains, beans, nuts, and seeds. Some of these may be available in unpackaged forms or large amounts (in bulk).  Fresh seafood.  Poultry and eggs.  Low-fat dairy products.  Buy whole ingredients instead of prepackaged foods.  Buy fresh fruits and vegetables in-season from local farmers markets.  Buy frozen fruits and vegetables in resealable bags.  If you do not have access to quality fresh seafood, buy precooked frozen shrimp or canned fish, such as tuna, salmon, or sardines.  Buy small amounts of raw or cooked vegetables, salads, or olives from the deli or salad bar at your store.  Stock your pantry so you always have certain foods on hand, such as olive oil, canned tuna, canned tomatoes, rice, pasta, and beans.  Cooking  Cook foods with extra-virgin olive oil instead of using butter or other vegetable oils.  Have meat as a side dish, and have vegetables or grains as your main dish. This means having meat in small portions or adding  small amounts of meat to foods like pasta or stew.  Use beans or vegetables instead of meat in common dishes like chili or lasagna.  [place] with different cooking methods. Try roasting or broiling vegetables instead of steaming or sautéeing them.  Add frozen vegetables to soups, stews, pasta, or rice.  Add nuts or seeds for added healthy fat at each meal. You can add these to yogurt, salads, or vegetable dishes.  Marinate fish or vegetables using olive oil, lemon juice, garlic, and fresh herbs.  Meal planning       Plan to eat 1 vegetarian meal one day each week. Try to work up to 2 vegetarian meals, if possible.  Eat seafood 2 or more times a week.  Have healthy snacks readily available, such as:  Vegetable sticks with hummus.  Greek yogurt.  Fruit and nut trail mix.  Eat balanced meals throughout the week. This includes:  Fruit: 2-3 servings a day  Vegetables: 4-5 servings a day  Low-fat dairy: 2 servings a day  Fish, poultry, or lean meat: 1 serving a day  Beans and legumes: 2 or more servings a week  Nuts and seeds: 1-2 servings a day  Whole grains: 6-8 servings a day  Extra-virgin olive oil: 3-4 servings a day  Limit red meat and sweets to only a few servings a month     What are my food choices?  Mediterranean diet  Recommended  Grains: Whole-grain pasta. Brown rice. Bulgar wheat. Polenta. Couscous. Whole-wheat bread. Oatmeal. Quinoa.  Vegetables: Artichokes. Beets. Broccoli. Cabbage. Carrots. Eggplant. Green beans. Chard. Kale. Spinach. Onions. Leeks. Peas. Squash. Tomatoes. Peppers. Radishes.  Fruits: Apples. Apricots. Avocado. Berries. Bananas. Cherries. Dates. Figs. Grapes. Sherin. Melon. Oranges. Peaches. Plums. Pomegranate.  Meats and other protein foods: Beans. Almonds. Sunflower seeds. Pine nuts. Peanuts. [place]. [place]. Scallops. Shrimp. Tuna. Tilapia. Clams. Oysters. Eggs.  Dairy: Low-fat milk. Cheese. Greek yogurt.  Beverages: Water. Red wine. Herbal tea.  Fats and oils: Extra virgin olive oil.  Avocado oil. Grape seed oil.  Sweets and desserts: Greek yogurt with honey. Baked apples. Poached pears. Trail mix.  Seasoning and other foods: Basil. Cilantro. Coriander. Cumin. Mint. Parsley. [place]. Rosemary. Tarragon. Garlic. Oregano. Thyme. Pepper. Balsalmic vinegar. Tahini. Hummus. Tomato sauce. Olives. Mushrooms.  Limit these  Grains: Prepackaged pasta or rice dishes. Prepackaged cereal with added sugar.  Vegetables: Deep fried potatoes (french fries).  Fruits: Fruit canned in syrup.  Meats and other protein foods: Beef. Pork. Lamb. Poultry with skin. Hot dogs. [place].  Dairy: Ice cream. Sour cream. Whole milk.  Beverages: Juice. Sugar-sweetened soft drinks. Beer. Liquor and spirits.  Fats and oils: Butter. Canola oil. Vegetable oil. Beef fat (tallow). Lard.  Sweets and desserts: Cookies. Cakes. Pies. Candy.  Seasoning and other foods: Mayonnaise. Premade sauces and marinades.  The items listed may not be a complete list. Talk with your dietitian about what dietary choices are right for you.  Summary  The Mediterranean diet includes both food and lifestyle choices.  Eat a variety of fresh fruits and vegetables, beans, nuts, seeds, and whole grains.  Limit the amount of red meat and sweets that you eat.  Talk with your health care provider about whether it is safe for you to drink red wine in moderation. This means 1 glass a day for nonpregnant women and 2 glasses a day for men. A glass of wine equals 5 oz (150 mL).  This information is not intended to replace advice given to you by your health care provider. Make sure you discuss any questions you have with your health care provider.  Document Revised: 08/17/2017 Document Reviewed: 08/10/2017  Elsevier Patient Education © 2020 [employer]         Exercising to Stay Healthy  To become healthy and stay healthy, it is recommended that you do moderate-intensity and vigorous-intensity exercise. You can tell that you are exercising at a moderate intensity if your  heart starts beating faster and you start breathing faster but can still hold a conversation. You can tell that you are exercising at a vigorous intensity if you are breathing much harder and faster and cannot hold a conversation while exercising.  Exercising regularly is important. It has many health benefits, such as:  Improving overall fitness, flexibility, and endurance.  Increasing bone density.  Helping with weight control.  Decreasing body fat.  Increasing muscle strength.  Reducing stress and tension.  Improving overall health.  How often should I exercise?  Choose an activity that you enjoy, and set realistic goals. Your health care provider can help you make an activity plan that works for you.  Exercise regularly as told by your health care provider. This may include:  Doing strength training two times a week, such as:  Lifting weights.  Using resistance bands.  Push-ups.  Sit-ups.  Yoga.  Doing a certain intensity of exercise for a given amount of time. Choose from these options:  A total of 150 minutes of moderate-intensity exercise every week.  A total of 75 minutes of vigorous-intensity exercise every week.  A mix of moderate-intensity and vigorous-intensity exercise every week.  Children, pregnant women, people who have not exercised regularly, people who are overweight, and older adults may need to talk with a health care provider about what activities are safe to do. If you have a medical condition, be sure to talk with your health care provider before you start a new exercise program.  What are some exercise ideas?    Moderate-intensity exercise ideas include:  Walking 1 mile (1.6 km) in about 15 minutes.  Biking.  Hiking.  Golfing.  Dancing.  Water aerobics.  Vigorous-intensity exercise ideas include:  Walking 4.5 miles (7.2 km) or more in about 1 hour.  Jogging or running 5 miles (8 km) in about 1 hour.  Biking 10 miles (16.1 km) or more in about 1 hour.  Lap swimming.  Roller-skating or in-line  skating.  Cross-country skiing.  Vigorous competitive sports, such as football, basketball, and soccer.  Jumping rope.  Aerobic dancing.  What are some everyday activities that can help me to get exercise?  Yard work, such as:  Pushing a .  Raking and bagging leaves.  Washing your car.  Pushing a stroller.  Shoveling snow.  Gardening.  Washing windows or floors.  How can I be more active in my day-to-day activities?  Use stairs instead of an elevator.  Take a walk during your lunch break.  If you drive, park your car farther away from your work or school.  If you take public transportation, get off one stop early and walk the rest of the way.  Stand up or walk around during all of your indoor phone calls.  Get up, stretch, and walk around every 30 minutes throughout the day.  Enjoy exercise with a friend. Support to continue exercising will help you keep a regular routine of activity.  What guidelines can I follow while exercising?  Before you start a new exercise program, talk with your health care provider.  Do not exercise so much that you hurt yourself, feel dizzy, or get very short of breath.  Wear comfortable clothes and wear shoes with good support.  Drink plenty of water while you exercise to prevent dehydration or heat stroke.  Work out until your breathing and your heartbeat get faster.  Where to find more information  [place] Department of Health and Human Services: www.hhs.gov  Centers for Disease Control and Prevention (CDC): www.cdc.gov  Summary  Exercising regularly is important. It will improve your overall fitness, flexibility, and endurance.  Regular exercise also will improve your overall health. It can help you control your weight, reduce stress, and improve your bone density.  Do not exercise so much that you hurt yourself, feel dizzy, or get very short of breath.  Before you start a new exercise program, talk with your health care provider.  This information is not intended to replace  advice given to you by your health care provider. Make sure you discuss any questions you have with your health care provider.  Document Revised: 11/30/2018 Document Reviewed: 11/08/2018  Elsevier Patient Education © 2021 [employer]           Mindfulness-Based Stress Reduction  Mindfulness-based stress reduction (MBSR) is a program that helps people learn to practice mindfulness. Mindfulness is the practice of intentionally paying attention to the present moment. It can be learned and practiced through techniques such as education, breathing exercises, meditation, and yoga. MBSR includes several mindfulness techniques in one program.  MBSR works best when you understand the treatment, are willing to try new things, and can commit to spending time practicing what you learn. MBSR training may include learning about:  How your emotions, thoughts, and reactions affect your body.  New ways to respond to things that cause negative thoughts to start (triggers).  How to notice your thoughts and let go of them.  Practicing awareness of everyday things that you normally do without thinking.  The techniques and goals of different types of meditation.  What are the benefits of MBSR?  MBSR can have many benefits, which include helping you to:  Develop self-awareness. This refers to knowing and understanding yourself.  Learn skills and attitudes that help you to participate in your own health care.  Learn new ways to care for yourself.  Be more accepting about how things are, and let things go.  Be less judgmental and approach things with an open mind.  Be patient with yourself and trust yourself more.  MBSR has also been shown to:  Reduce negative emotions, such as depression and anxiety.  Improve memory and focus.  Change how you sense and approach pain.  Boost your body's ability to fight infections.  Help you connect better with other people.  Improve your sense of well-being.  Follow these instructions at home:       Find  a local in-person or online MBSR program.  Set aside some time regularly for mindfulness practice.  Find a mindfulness practice that works best for you. This may include one or more of the following:  Meditation. Meditation involves focusing your mind on a certain thought or activity.  Breathing awareness exercises. These help you to stay present by focusing on your breath.  Body scan. For this practice, you lie down and pay attention to each part of your body from head to toe. You can identify tension and soreness and intentionally relax parts of your body.  Yoga. Yoga involves stretching and breathing, and it can improve your ability to move and be flexible. It can also provide an experience of testing your body's limits, which can help you release stress.  Mindful eating. This way of eating involves focusing on the taste, texture, color, and smell of each bite of food. Because this slows down eating and helps you feel full sooner, it can be an important part of a weight-loss plan.  Find a podcast or recording that provides guidance for breathing awareness, body scan, or meditation exercises. You can listen to these any time when you have a free moment to rest without distractions.  Follow your treatment plan as told by your health care provider. This may include taking regular medicines and making changes to your diet or lifestyle as recommended.  How to practice mindfulness  To do a basic awareness exercise:  Find a comfortable place to sit.  Pay attention to the present moment. Observe your thoughts, feelings, and surroundings just as they are.  Avoid placing judgment on yourself, your feelings, or your surroundings. Make note of any judgment that comes up, and let it go.  Your mind may wander, and that is okay. Make note of when your thoughts drift, and return your attention to the present moment.  To do basic mindfulness meditation:  Find a comfortable place to sit. This may include a stable chair or a firm  floor cushion.  Sit upright with your back straight. Let your arms fall next to your side with your hands resting on your legs.  If sitting in a chair, rest your feet flat on the floor.  If sitting on a cushion, cross your legs in front of you.  Keep your head in a neutral position with your chin dropped slightly. Relax your jaw and rest the tip of your tongue on the roof of your mouth. Drop your gaze to the floor. You can close your eyes if you like.  Breathe normally and pay attention to your breath. Feel the air moving in and out of your nose. Feel your belly expanding and relaxing with each breath.  Your mind may wander, and that is okay. Make note of when your thoughts drift, and return your attention to your breath.  Avoid placing judgment on yourself, your feelings, or your surroundings. Make note of any judgment or feelings that come up, let them go, and bring your attention back to your breath.  When you are ready, lift your gaze or open your eyes. Pay attention to how your body feels after the meditation.  Where to find more information  You can find more information about MBSR from:  Your health care provider.  Community-based meditation centers or programs.  Programs offered near you.  Summary  Mindfulness-based stress reduction (MBSR) is a program that teaches you how to intentionally pay attention to the present moment. It is used with other treatments to help you cope better with daily stress, emotions, and pain.  MBSR focuses on developing self-awareness, which allows you to respond to life stress without judgment or negative emotions.  MBSR programs may involve learning different mindfulness practices, such as breathing exercises, meditation, yoga, body scan, or mindful eating. Find a mindfulness practice that works best for you, and set aside time for it on a regular basis.  This information is not intended to replace advice given to you by your health care provider. Make sure you discuss any  questions you have with your health care provider.  Document Revised: 02/22/2021 Document Reviewed: 04/26/2018  Elsevier Patient Education © 2021 [employer]      Breast cancer in female

## 2023-01-09 NOTE — PROGRESS NOTES
The ABCs of the Annual Wellness Visit  Subsequent Medicare Wellness Visit    Subjective    Layo Cee is a 82 y.o. male who presents for a Subsequent Medicare Wellness Visit.    The following portions of the patient's history were reviewed and   updated as appropriate: allergies, current medications, past family history, past medical history, past social history, past surgical history and problem list.    Compared to one year ago, the patient feels his physical   health is worse.    Compared to one year ago, the patient feels his mental   health is the same.    Recent Hospitalizations:  This patient has had a [facility], [place], operated by [facility] admission record on file within the last 365 days.    Current Medical Providers:  Patient Care Team:  Noble Kc MD as PCP - General (Internal Medicine)  Rajani Mccauley RN as Ambulatory  ([facility])  Darnell Stevenson MD as Consulting Physician (Cardiology)  Harley Linares MD as Consulting Physician (Urology)  Kolton Brink MD as Consulting Physician (Pulmonary Disease)  Irineo Aguilar MD as Consulting Physician (Cardiac Electrophysiology)    Outpatient Medications Prior to Visit   Medication Sig Dispense Refill   • acetaminophen (TYLENOL) 325 MG tablet Take 650 mg by mouth Every 6 (Six) Hours As Needed for Mild Pain .     • amiodarone (PACERONE) 200 MG tablet Take 1 tablet by mouth Daily. 90 tablet 1   • amLODIPine (NORVASC) 5 MG tablet Take 1 tablet by mouth daily. 30 tablet 1   • aspirin (aspirin) 81 MG EC tablet Take 1 tablet by mouth Daily. 30 tablet 1   • benzonatate (Tessalon Perles) 100 MG capsule Take 1 capsule by mouth 3 (Three) Times a Day As Needed for Cough. 90 capsule 0   • calcium carbonate (OS-LIANNA) 600 MG tablet Take 600 mg by mouth Every Night.     • cetirizine (zyrTEC) 10 MG tablet Take 1 tablet by mouth Daily. 90 tablet 3   • Coenzyme Q10 100 MG tablet Take 100 mg by mouth Daily.     • Diclofenac Sodium (VOLTAREN) 1 % gel gel  Apply 2 g topically to the appropriate area as directed 4 (Four) Times a Day.     • Entresto 49-51 MG tablet Take 1 tablet by mouth 2 (two) times a day. 180 tablet 1   • finasteride (PROSCAR) 5 MG tablet Take 1 tablet by mouth daily. 90 tablet 4   • Fluticasone Furoate (Arnuity Ellipta) 100 MCG/ACT aerosol powder  Inhale 1 puff Daily. 1 each 6   • isosorbide mononitrate (IMDUR) 60 MG 24 hr tablet Take 1 tablet by mouth Daily. 90 tablet 3   • nitroglycerin (NITROSTAT) 0.4 MG SL tablet Place 0.4 mg under the tongue Every 5 (Five) Minutes As Needed for Chest Pain. Take no more than 3 doses in 15 minutes.     • pantoprazole (PROTONIX) 40 MG EC tablet Take 1 tablet by mouth Daily. 30 tablet 3   • pitavastatin calcium (Livalo) 2 MG tablet tablet Take 1 tablet by mouth Every Night. 90 tablet 3   • tamsulosin (FLOMAX) 0.4 MG capsule 24 hr capsule Take 1 capsule by mouth daily. take 1/2 hour following the same meal each day 90 capsule 4   • tiotropium bromide-olodaterol (Stiolto Respimat) 2.5-2.5 MCG/ACT aerosol solution inhaler Inhale 2 puffs Daily. 2 each 0   • VITAMIN B COMPLEX-C PO Take 1 tablet by mouth Daily.     • Xarelto 15 MG tablet Take 1 tablet by mouth every day (Patient taking differently: Take 15 mg by mouth Every Night.) 90 tablet 1   • fluticasone (FLONASE) 50 MCG/ACT nasal spray 1 spray into the nostril(s) as directed by provider Every Night.     • furosemide (LASIX) 40 MG tablet Take 1 tablet by mouth 2 (Two) Times a Day for 30 days. (Patient taking differently: Take 40 mg by mouth Daily.) 60 tablet 0   • levalbuterol (Xopenex) 0.63 MG/3ML nebulizer solution Take 1 ampule by nebulization Every 6 (Six) Hours As Needed for Wheezing for up to 30 days. DX: J44.9 360 mL 5   • metoprolol succinate XL (TOPROL-XL) 25 MG 24 hr tablet Take 1 tablet by mouth 2 (Two) Times a Day for 30 days. 90 tablet 0   • montelukast (SINGULAIR) 10 MG tablet Take 1 tablet by mouth Every Night for 30 days. 30 tablet 11   •  nitroglycerin (NITROSTAT) 0.4 MG SL tablet Place 1 tablet under the tongue Every 5 (Five) Minutes As Needed for Chest Pain for up to 30 days. Take no more than 3 doses in 15 minutes. 30 tablet 0     No facility-administered medications prior to visit.       No opioid medication identified on active medication list. I have reviewed chart for other potential  high risk medication/s and harmful drug interactions in the elderly.          Aspirin is on active medication list. Aspirin use is indicated based on review of current medical condition/s. Pros and cons of this therapy have been discussed today. Benefits of this medication outweigh potential harm.  Patient has been encouraged to continue taking this medication.  .      Patient Active Problem List   Diagnosis   • Unstable angina (HCC)   • Coronary artery disease of bypass graft of native heart with stable angina pectoris (HCC)   • Class 2 obesity due to excess calories without serious comorbidity with body mass index (BMI) of 35.0 to 35.9 in adult   • Gout   • Typical atrial flutter (HCC)   • Essential hypertension   • Frequent PVCs   • Mixed hyperlipidemia   • Rotator cuff Bursitis of shoulder region   • Lateral epicondylitis   • History of hematuria   • Benign prostatic hyperplasia with urinary frequency   • Hydrocele in adult   • History of COVID-19   • Allergic rhinitis   • Seasonal allergies   • Tobacco abuse, in remission   • Vitamin D deficiency   • Gastroesophageal reflux disease with esophagitis without hemorrhage   • Ischemic cardiomyopathy   • Centrilobular emphysema (HCC)   • Gastroesophageal reflux disease   • Atrial fibrillation, persistent (HCC)   • COPD (chronic obstructive pulmonary disease) (HCC)   • Chronic anticoagulation   • Diastolic dysfunction   • S/P CABG (coronary artery bypass graft)   • Stage 3a chronic kidney disease (HCC)   • Persistent atrial fibrillation (HCC)   • S/P ablation of atrial fibrillation   • Atrial fibrillation,  unspecified type (HCC)   • Acute on chronic diastolic CHF (congestive heart failure) (HCC)   • SOB (shortness of breath)   • Chest pain   • Mild aortic valve stenosis   • Moderate mitral regurgitation   • Other hydrocele   • History of gross hematuria   • Benign prostatic hyperplasia with lower urinary tract symptoms   • Chronic systolic congestive heart failure (HCC)     Advance Care Planning  Advance Directive is not on file.  ACP discussion was held with the patient during this visit. Patient does not have an advance directive, information provided.     Objective    Vitals:    23 1409   BP: 116/66   BP Location: Left arm   Patient Position: Sitting   Cuff Size: Adult   Pulse: 74   Temp: 97.6 °F (36.4 °C)   TempSrc: Temporal   SpO2: 95%   Weight: 112 kg (246 lb 9.6 oz)   Height: 177.8 cm (70\")     Estimated body mass index is 35.38 kg/m² as calculated from the following:    Height as of this encounter: 177.8 cm (70\").    Weight as of this encounter: 112 kg (246 lb 9.6 oz).    Class 2 Severe Obesity (BMI >=35 and <=39.9). Obesity-related health conditions include the following: hypertension, coronary heart disease and dyslipidemias. Obesity is unchanged. BMI is is above average; BMI management plan is completed. We discussed portion control and increasing exercise.      Does the patient have evidence of cognitive impairment? No          HEALTH RISK ASSESSMENT    Smoking Status:  Social History     Tobacco Use   Smoking Status Former   • Packs/day: 0.50   • Years: 40.00   • Pack years: 20.00   • Types: Cigarettes   • Start date:    • Quit date:    • Years since quittin.0   Smokeless Tobacco Never     Alcohol Consumption:  Social History     Substance and Sexual Activity   Alcohol Use Not Currently     Fall Risk Screen:    [facility].    Depression Screening:  PHQ-2/PHQ-9 Depression Screening 2023   Little Interest or Pleasure in Doing Things 0-->not at  all   Feeling Down, Depressed or Hopeless 0-->not at all   PHQ-9: Brief Depression Severity Measure Score 0       Health Habits and Functional and Cognitive Screening:  Functional & Cognitive Status 1/9/2023   Do you have difficulty preparing food and eating? No   Do you have difficulty bathing yourself, getting dressed or grooming yourself? No   Do you have difficulty using the toilet? No   Do you have difficulty moving around from place to place? No   Do you have trouble with steps or getting out of a bed or a chair? No   Current Diet Well Balanced Diet   Dental Exam Up to date   Eye Exam Not up to date   Exercise (times per week) 7 times per week   Current Exercises Include Yard Work   Do you need help using the phone?  No   Are you deaf or do you have serious difficulty hearing?  No   Do you need help with transportation? No   Do you need help shopping? No   Do you need help preparing meals?  No   Do you need help with housework?  No   Do you need help with laundry? No   Do you need help taking your medications? Yes   Do you need help managing money? No   Do you ever drive or ride in a car without wearing a seat belt? Yes   Have you felt unusual stress, anger or loneliness in the last month? No   Who do you live with? Spouse   If you need help, do you have trouble finding someone available to you? No   Have you been bothered in the last four weeks by sexual problems? No   Do you have difficulty concentrating, remembering or making decisions? No       Age-appropriate Screening Schedule:  Refer to the list below for future screening recommendations based on patient's age, sex and/or medical conditions. Orders for these recommended tests are listed in the plan section. The patient has been provided with a written plan.    Health Maintenance   Topic Date Due   • TDAP/TD VACCINES (1 - Tdap) Never done   • LIPID PANEL  10/07/2023   • INFLUENZA VACCINE  Completed   • ZOSTER VACCINE  Completed                CMS  Preventative Services Quick Reference  Risk Factors Identified During Encounter  Immunizations Discussed/Encouraged: Td  Inactivity/Sedentary: Patient was advised to exercise at least 150 minutes a week per CDC recommendations.  The above risks/problems have been discussed with the patient.  Pertinent information has been shared with the patient in the After Visit Summary.  An After Visit Summary and PPPS were made available to the patient.    Follow Up:   Next Medicare Wellness visit to be scheduled in 1 year.       Additional E&M Note during same encounter follows:  Patient has multiple medical problems which are significant and separately identifiable that require additional work above and beyond the Medicare Wellness Visit.      Chief Complaint  Medicare Wellness-subsequent, ear bothersome  (Bilateral, stated a couple weeks ago he felt they were off and went to clean them with a q-tip and the right ear was bleeding- ent has pt on cetrizine), and medication list (Patient has few medications in med list that are \"\" patient does still take these, his toporol however is 1 tablet daily currently, it does not allow me to change it once it has  )    Subjective        HPI  Layo Cee is also being seen today for       Ear Pressure:    present in both ears.    HTN:    Compliant with regimen.    2022 admission for chest pain noted (thought to be [facility] in nature).  He reports no recent chest pain.    A Fib, CHF:    Follows with Dr. Kendrick.  Next appointment in February.  Per wife, who is present at clinic today with Mr. 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         Objective   Vital Signs:  /66 (BP Location: Left arm, Patient Position: Sitting, Cuff Size: Adult)   Pulse 74   Temp 97.6 °F (36.4 °C) (Temporal)   Ht 177.8 cm (70\")   Wt 112  kg (246 lb 9.6 oz)   SpO2 95%   BMI 35.38 kg/m²     Physical Exam  Vitals reviewed.   Constitutional:       General: He is not in acute distress.     Appearance: Normal appearance. He is not ill-appearing, toxic-appearing or diaphoretic.   HENT:      Head: Normocephalic and atraumatic.      Comments: Missing part of left ear     Right Ear: Ear canal and external ear normal. There is impacted cerumen.      Left Ear: Ear canal and external ear normal.      Ears:      Comments: Purulence noted under left TM  Eyes:      Conjunctiva/sclera: Conjunctivae normal.   Cardiovascular:      Rate and Rhythm: Normal rate and regular rhythm.      Pulses: Normal pulses.      Heart sounds: Normal heart sounds. No murmur heard.    No friction rub. No gallop.   Pulmonary:      Effort: Pulmonary effort is normal. No respiratory distress.      Breath sounds: Normal breath sounds. No stridor. No wheezing, rhonchi or rales.   Chest:      Chest wall: No tenderness.   Abdominal:      General: Abdomen is flat.      Palpations: Abdomen is soft. There is no mass.      Tenderness: There is no abdominal tenderness.   Musculoskeletal:      Right lower leg: Edema present.      Comments: 1+ LE edema bilaterally   Skin:     General: Skin is warm and dry.   Neurological:      General: No focal deficit present.      Mental Status: He is alert. Mental status is at baseline.   Psychiatric:         Mood and Affect: Mood normal.         Behavior: Behavior normal.         Thought Content: Thought content normal.         Judgment: Judgment normal.                         Assessment and Plan   Diagnoses and all orders for this visit:    1. Medicare annual wellness visit, subsequent (Primary)  -     CBC & Differential  -     Comprehensive Metabolic Panel  -     TSH  -     Lipid Panel    2. Chronic systolic congestive heart failure (HCC)    3. Persistent atrial fibrillation (HCC)    4. Essential hypertension  -     Comprehensive Metabolic Panel    5. Class 2  obesity due to excess calories without serious comorbidity with body mass index (BMI) of 35.0 to 35.9 in adult    6. Encounter for immunization    7. Non-recurrent acute suppurative otitis media of left ear without spontaneous rupture of tympanic membrane  -     Discontinue: amoxicillin-clavulanate (Augmentin) 875-125 MG per tablet; Take 1 tablet by mouth 2 (Two) Times a Day for 7 days.  Dispense: 14 tablet; Refill: 0  -     amoxicillin-clavulanate (Augmentin) 875-125 MG per tablet; Take 1 tablet by mouth 2 (Two) Times a Day for 7 days.  Dispense: 14 tablet; Refill: 0      CHF:  -stable    A fib:  -stable  -on amiodarone    Health Maintenance:  -nutritional counseling on the components of a heart healthy diet  -exercise counseling, recommending at least 2.5 hours of moderate exercise weekly  -Discussed risks/benefits to vaccination, reviewed components of the vaccine, discussed VIS, discussed informed consent, informed consent obtained. Patient/Parent was allowed to accept or refuse vaccine. Questions answered to satisfactory state of patient/parent. We reviewed typical age appropriate and seasonally appropriate vaccinations. Reviewed immunization history and updated state vaccination form as needed. Patient/Parent was counseled on the above vaccines.           Follow Up   Return in about 3 months (around 4/9/2023) for HTN.  Patient was given instructions and counseling regarding his condition or for health maintenance advice. Please see specific information pulled into the AVS if appropriate.

## 2023-01-10 LAB
ALBUMIN SERPL-MCNC: 3.8 G/DL (ref 3.5–5.2)
ALBUMIN/GLOB SERPL: 1.2 G/DL
ALP SERPL-CCNC: 56 U/L (ref 39–117)
ALT SERPL W P-5'-P-CCNC: 20 U/L (ref 1–41)
ANION GAP SERPL CALCULATED.3IONS-SCNC: 12 MMOL/L (ref 5–15)
AST SERPL-CCNC: 21 U/L (ref 1–40)
BASOPHILS # BLD AUTO: 0.04 10*3/MM3 (ref 0–0.2)
BASOPHILS NFR BLD AUTO: 0.5 % (ref 0–1.5)
BILIRUB SERPL-MCNC: 0.3 MG/DL (ref 0–1.2)
BUN SERPL-MCNC: 15 MG/DL (ref 8–23)
BUN/CREAT SERPL: 9.7 (ref 7–25)
CALCIUM SPEC-SCNC: 9.2 MG/DL (ref 8.6–10.5)
CHLORIDE SERPL-SCNC: 101 MMOL/L (ref 98–107)
CHOLEST SERPL-MCNC: 137 MG/DL (ref 0–200)
CO2 SERPL-SCNC: 27 MMOL/L (ref 22–29)
CREAT SERPL-MCNC: 1.55 MG/DL (ref 0.76–1.27)
DEPRECATED RDW RBC AUTO: 45.9 FL (ref 37–54)
EGFRCR SERPLBLD CKD-EPI 2021: 45 ML/MIN/1.73
EOSINOPHIL # BLD AUTO: 0.45 10*3/MM3 (ref 0–0.4)
EOSINOPHIL NFR BLD AUTO: 5.8 % (ref 0.3–6.2)
ERYTHROCYTE [DISTWIDTH] IN BLOOD BY AUTOMATED COUNT: 14.5 % (ref 12.3–15.4)
GLOBULIN UR ELPH-MCNC: 3.2 GM/DL
GLUCOSE SERPL-MCNC: 78 MG/DL (ref 65–99)
HCT VFR BLD AUTO: 39.8 % (ref 37.5–51)
HDLC SERPL-MCNC: 34 MG/DL (ref 40–60)
HGB BLD-MCNC: 12.9 G/DL (ref 13–17.7)
IMM GRANULOCYTES # BLD AUTO: 0.05 10*3/MM3 (ref 0–0.05)
IMM GRANULOCYTES NFR BLD AUTO: 0.6 % (ref 0–0.5)
LDLC SERPL CALC-MCNC: 77 MG/DL (ref 0–100)
LDLC/HDLC SERPL: 2.14 {RATIO}
LYMPHOCYTES # BLD AUTO: 2.23 10*3/MM3 (ref 0.7–3.1)
LYMPHOCYTES NFR BLD AUTO: 28.8 % (ref 19.6–45.3)
MCH RBC QN AUTO: 28.5 PG (ref 26.6–33)
MCHC RBC AUTO-ENTMCNC: 32.4 G/DL (ref 31.5–35.7)
MCV RBC AUTO: 88.1 FL (ref 79–97)
MONOCYTES # BLD AUTO: 0.89 10*3/MM3 (ref 0.1–0.9)
MONOCYTES NFR BLD AUTO: 11.5 % (ref 5–12)
NEUTROPHILS NFR BLD AUTO: 4.09 10*3/MM3 (ref 1.7–7)
NEUTROPHILS NFR BLD AUTO: 52.8 % (ref 42.7–76)
NRBC BLD AUTO-RTO: 0 /100 WBC (ref 0–0.2)
PLATELET # BLD AUTO: 217 10*3/MM3 (ref 140–450)
PMV BLD AUTO: 10.1 FL (ref 6–12)
POTASSIUM SERPL-SCNC: 4.2 MMOL/L (ref 3.5–5.2)
PROT SERPL-MCNC: 7 G/DL (ref 6–8.5)
RBC # BLD AUTO: 4.52 10*6/MM3 (ref 4.14–5.8)
SODIUM SERPL-SCNC: 140 MMOL/L (ref 136–145)
TRIGL SERPL-MCNC: 151 MG/DL (ref 0–150)
TSH SERPL DL<=0.05 MIU/L-ACNC: 1.8 UIU/ML (ref 0.27–4.2)
VLDLC SERPL-MCNC: 26 MG/DL (ref 5–40)
WBC NRBC COR # BLD: 7.75 10*3/MM3 (ref 3.4–10.8)

## 2023-01-11 ENCOUNTER — PATIENT OUTREACH (OUTPATIENT)
Dept: CASE MANAGEMENT | Facility: OTHER | Age: 83
End: 2023-01-11
Payer: MEDICARE

## 2023-01-11 DIAGNOSIS — I50.9 CHRONIC CONGESTIVE HEART FAILURE, UNSPECIFIED HEART FAILURE TYPE: Primary | ICD-10-CM

## 2023-01-11 PROCEDURE — 99490 CHRNC CARE MGMT STAFF 1ST 20: CPT | Performed by: STUDENT IN AN ORGANIZED HEALTH CARE EDUCATION/TRAINING PROGRAM

## 2023-01-11 NOTE — OUTREACH NOTE
AMBULATORY CASE MANAGEMENT NOTE    Name and Relationship of Patient/Support Person: ColemanLayo H - Self    [facility] Interim Update    Patient returned my phone call, states he's been doing about the same.   He was positive for strep twice. Seen PCP on 1/9 and was put on another antibiotic for ear infection.   Cardiology told him to resume Entresto, but no changes were made. Asked if he has been logging his weight daily and he said he forgets to do that. Advised best practice to monitor for too much fluid because of his CHF, he verbalized understanding. He said his blood pressure has been doing good.   Patient states the Zyrtec has helped a little with the drainage. He said the throat discomfort is a little better.   Will put in monitoring status, he is doing well.   No needs at this time.  Education Documentation  No documentation found.      ANASTACIA LOVE  Ambulatory Case Management  1/11/2023, 13:14 EST

## 2023-01-12 ENCOUNTER — TELEPHONE (OUTPATIENT)
Dept: INTERNAL MEDICINE | Facility: CLINIC | Age: 83
End: 2023-01-12
Payer: MEDICARE

## 2023-01-12 NOTE — TELEPHONE ENCOUNTER
Contacted patient regarding lab results. Patient verified . I informed patient of the below result note. Patient voiced understanding, no further questions.

## 2023-01-12 NOTE — TELEPHONE ENCOUNTER
----- Message from Noble Kc MD sent at 1/12/2023  6:50 AM EST -----  Labs are reassuring.    Creatinine and renal function within your normal range.    Lipids notable for mildly elevated triglycerides and mildly low HDL cholesterol.   I'd like to continue your current regimen.    Very mild anemia noted (0.1 g/dl below the normal range).  This is a very negligible deviation from the normal range.    TSH is within normal limits.

## 2023-01-31 ENCOUNTER — PATIENT OUTREACH (OUTPATIENT)
Dept: CASE MANAGEMENT | Facility: OTHER | Age: 83
End: 2023-01-31
Payer: MEDICARE

## 2023-01-31 DIAGNOSIS — I48.19 ATRIAL FIBRILLATION, PERSISTENT: ICD-10-CM

## 2023-01-31 DIAGNOSIS — I50.9 CHRONIC CONGESTIVE HEART FAILURE, UNSPECIFIED HEART FAILURE TYPE: Primary | ICD-10-CM

## 2023-01-31 NOTE — OUTREACH NOTE
[facility] End of Month Documentation    This Chronic Medical Management Care Plan for Layo Cee, 82 y.o. male, has been reviewed; monitored and managed and a new plan of care implemented for the month of January.  A cumulative time of 20 minutes was spent on this patient record this month, including chart review; phone call with patient.    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 pectoris (HCC); Class 2 obesity due to excess calories without serious comorbidity with body mass index (BMI) of 35.0 to 35.9 in adult; Gout; Typical atrial flutter (HCC); Essential hypertension; Frequent PVCs; Mixed hyperlipidemia; Rotator cuff Bursitis of shoulder region; Lateral epicondylitis; History of hematuria; Benign prostatic hyperplasia with urinary frequency; Hydrocele in adult; History of COVID-19; Allergic rhinitis; Seasonal allergies; Tobacco abuse, in remission; Vitamin D deficiency; Gastroesophageal reflux disease with esophagitis without hemorrhage; Ischemic cardiomyopathy; Centrilobular emphysema (HCC); Gastroesophageal reflux disease; Atrial fibrillation, persistent (HCC); COPD (chronic obstructive pulmonary disease) (HCC); Chronic anticoagulation; Diastolic dysfunction; S/P CABG (coronary artery bypass graft); Stage 3a chronic kidney disease (HCC); Persistent atrial fibrillation (HCC); S/P ablation of atrial fibrillation; Atrial fibrillation, unspecified type (HCC); Acute on chronic diastolic CHF (congestive heart failure) (HCC); SOB (shortness of breath); Chest pain; Mild aortic valve stenosis; Moderate mitral regurgitation; Other hydrocele; History of gross hematuria; Benign prostatic hyperplasia with lower urinary tract symptoms; and Chronic systolic congestive heart failure (HCC) on their problem list., the following items were addressed: medical records; medications and any changes can be found within the plan section of  the note.  A detailed listing of time spent for chronic care management is tracked within each outreach encounter.  Current medications include:  has a current medication list which includes the following prescription(s): acetaminophen, amiodarone, amlodipine, aspirin, benzonatate, calcium carbonate, cetirizine, coenzyme q10, diclofenac sodium, entresto, finasteride, fluticasone, arnuity ellipta, furosemide, isosorbide mononitrate, levalbuterol, metoprolol succinate xl, montelukast, nitroglycerin, nitroglycerin, pantoprazole, pitavastatin calcium, rivaroxaban, tamsulosin, stiolto respimat, b complex-c, and [DISCONTINUED] diltiazem cd. and the patient is reported to be patient is compliant with medication protocol,  Medications are reported to be effective.  Regarding these diagnoses, no new referrals were made.  All notes on chart for PCP to review.    The patient was monitored remotely for medications; weight; activity level; blood pressure; mood & behavior, Breathing,HR.    The patient's physical needs include:  physical healthcare.     The patient's mental support needs include:  needs met    The patient's cognitive support needs include:  health care; household care    The patient's psychosocial support needs include:  needs met    The patient's functional needs include: physical healthcare    The patient's environmental needs include:  not applicable    Care Plan overall comments:  Patient lives with wife who is very supportive and helps with his care.    Refer to previous outreach notes for more information on the areas listed above.    Monthly Billing Diagnoses  (I50.9) Chronic congestive heart failure, unspecified heart failure type (HCC)    (I48.19) Atrial fibrillation, persistent (HCC)    Medications   · Medications have been reconciled    Care Plan progress this month:      Recently Modified Care Plans Updates made since 12/31/2022 12:00 AM    No recently modified care plans.           Instructions   · Patient was provided an electronic copy of care plan  · CCM services were explained and offered and patient has accepted these services.  · Patient has given their written consent to receive CCM services and understands that this includes the authorization of electronic communication of medical information with the other treating providers.  · Patient understands that they may stop CCM services at any time and these changes will be effective at the end of the calendar month and will effectively revocate the agreement of CCM services.  · Patient understands that only one practitioner can furnish and be paid for CCM services during one calendar month.  Patient also understands that there may be co-payment or deductible fees in association with CCM services.  · Patient will continue with at least monthly follow-up calls with the Ambulatory .    ANASTACIA LOVE  Ambulatory Case Management    1/31/2023, 11:14 EST

## 2023-02-01 DIAGNOSIS — E78.2 MIXED HYPERLIPIDEMIA: ICD-10-CM

## 2023-02-13 ENCOUNTER — TELEPHONE (OUTPATIENT)
Dept: CASE MANAGEMENT | Facility: OTHER | Age: 83
End: 2023-02-13
Payer: MEDICARE

## 2023-02-13 NOTE — TELEPHONE ENCOUNTER
30 day chart review and nothing new.   Labs from 1/9 OV with PCP were reassuring per PCP. No changes made.   Rajani Mccauley RN  Ambulatory Case Management    2/13/2023, 15:41 EST

## 2023-02-15 ENCOUNTER — LAB (OUTPATIENT)
Dept: LAB | Facility: HOSPITAL | Age: 83
End: 2023-02-15
Payer: MEDICARE

## 2023-02-15 DIAGNOSIS — I25.5 ISCHEMIC CARDIOMYOPATHY: ICD-10-CM

## 2023-02-15 DIAGNOSIS — E78.2 MIXED HYPERLIPIDEMIA: ICD-10-CM

## 2023-02-15 DIAGNOSIS — I48.19 ATRIAL FIBRILLATION, PERSISTENT: ICD-10-CM

## 2023-02-15 LAB
ALBUMIN SERPL-MCNC: 4.3 G/DL (ref 3.5–5.2)
ALBUMIN/GLOB SERPL: 1.8 G/DL
ALP SERPL-CCNC: 56 U/L (ref 39–117)
ALT SERPL W P-5'-P-CCNC: 20 U/L (ref 1–41)
ANION GAP SERPL CALCULATED.3IONS-SCNC: 14.2 MMOL/L (ref 5–15)
AST SERPL-CCNC: 17 U/L (ref 1–40)
BILIRUB SERPL-MCNC: 0.4 MG/DL (ref 0–1.2)
BUN SERPL-MCNC: 16 MG/DL (ref 8–23)
BUN/CREAT SERPL: 9 (ref 7–25)
CALCIUM SPEC-SCNC: 9.5 MG/DL (ref 8.6–10.5)
CHLORIDE SERPL-SCNC: 101 MMOL/L (ref 98–107)
CHOLEST SERPL-MCNC: 125 MG/DL (ref 0–200)
CO2 SERPL-SCNC: 25.8 MMOL/L (ref 22–29)
CREAT SERPL-MCNC: 1.77 MG/DL (ref 0.76–1.27)
EGFRCR SERPLBLD CKD-EPI 2021: 38.3 ML/MIN/1.73
GLOBULIN UR ELPH-MCNC: 2.4 GM/DL
GLUCOSE SERPL-MCNC: 117 MG/DL (ref 65–99)
HDLC SERPL-MCNC: 37 MG/DL (ref 40–60)
LDLC SERPL CALC-MCNC: 63 MG/DL (ref 0–100)
LDLC/HDLC SERPL: 1.59 {RATIO}
POTASSIUM SERPL-SCNC: 4.2 MMOL/L (ref 3.5–5.2)
PROT SERPL-MCNC: 6.7 G/DL (ref 6–8.5)
SODIUM SERPL-SCNC: 141 MMOL/L (ref 136–145)
TRIGL SERPL-MCNC: 146 MG/DL (ref 0–150)
VLDLC SERPL-MCNC: 25 MG/DL (ref 5–40)

## 2023-02-15 PROCEDURE — 80053 COMPREHEN METABOLIC PANEL: CPT

## 2023-02-15 PROCEDURE — 36415 COLL VENOUS BLD VENIPUNCTURE: CPT

## 2023-02-15 PROCEDURE — 80061 LIPID PANEL: CPT

## 2023-02-20 ENCOUNTER — OFFICE VISIT (OUTPATIENT)
Dept: CARDIOLOGY | Facility: CLINIC | Age: 83
End: 2023-02-20
Payer: MEDICARE

## 2023-02-20 VITALS
HEIGHT: 70 IN | HEART RATE: 81 BPM | WEIGHT: 251 LBS | DIASTOLIC BLOOD PRESSURE: 67 MMHG | SYSTOLIC BLOOD PRESSURE: 147 MMHG | BODY MASS INDEX: 35.93 KG/M2

## 2023-02-20 DIAGNOSIS — I25.5 ISCHEMIC CARDIOMYOPATHY: ICD-10-CM

## 2023-02-20 DIAGNOSIS — I10 ESSENTIAL HYPERTENSION: ICD-10-CM

## 2023-02-20 DIAGNOSIS — I25.708 CORONARY ARTERY DISEASE OF BYPASS GRAFT OF NATIVE HEART WITH STABLE ANGINA PECTORIS: Primary | Chronic | ICD-10-CM

## 2023-02-20 DIAGNOSIS — K21.9 GASTROESOPHAGEAL REFLUX DISEASE, UNSPECIFIED WHETHER ESOPHAGITIS PRESENT: ICD-10-CM

## 2023-02-20 DIAGNOSIS — E78.2 MIXED HYPERLIPIDEMIA: ICD-10-CM

## 2023-02-20 DIAGNOSIS — I48.19 ATRIAL FIBRILLATION, PERSISTENT: ICD-10-CM

## 2023-02-20 PROCEDURE — 99214 OFFICE O/P EST MOD 30 MIN: CPT | Performed by: INTERNAL MEDICINE

## 2023-02-20 RX ORDER — TORSEMIDE 20 MG/1
20 TABLET ORAL DAILY
Qty: 90 TABLET | Refills: 1 | Status: SHIPPED | OUTPATIENT
Start: 2023-02-20

## 2023-02-20 RX ORDER — SACUBITRIL AND VALSARTAN 49; 51 MG/1; MG/1
1 TABLET, FILM COATED ORAL 2 TIMES DAILY
Qty: 180 TABLET | Refills: 3 | Status: SHIPPED | OUTPATIENT
Start: 2023-02-20

## 2023-02-20 RX ORDER — FUROSEMIDE 40 MG/1
40 TABLET ORAL DAILY
Qty: 90 TABLET | Refills: 3 | Status: CANCELLED | OUTPATIENT
Start: 2023-02-20

## 2023-02-20 RX ORDER — METOPROLOL SUCCINATE 25 MG/1
25 TABLET, EXTENDED RELEASE ORAL DAILY
Qty: 90 TABLET | Refills: 3
Start: 2023-02-20

## 2023-02-20 RX ORDER — AMLODIPINE BESYLATE 5 MG/1
5 TABLET ORAL DAILY
Qty: 90 TABLET | Refills: 3 | Status: SHIPPED | OUTPATIENT
Start: 2023-02-20

## 2023-02-21 RX ORDER — PANTOPRAZOLE SODIUM 40 MG/1
40 TABLET, DELAYED RELEASE ORAL DAILY
Qty: 30 TABLET | Refills: 3 | Status: SHIPPED | OUTPATIENT
Start: 2023-02-21

## 2023-03-02 ENCOUNTER — OFFICE VISIT (OUTPATIENT)
Dept: INTERNAL MEDICINE | Facility: CLINIC | Age: 83
End: 2023-03-02
Payer: MEDICARE

## 2023-03-02 VITALS
OXYGEN SATURATION: 95 % | BODY MASS INDEX: 36.33 KG/M2 | TEMPERATURE: 97.6 F | SYSTOLIC BLOOD PRESSURE: 138 MMHG | HEIGHT: 70 IN | DIASTOLIC BLOOD PRESSURE: 66 MMHG | HEART RATE: 72 BPM | WEIGHT: 253.8 LBS

## 2023-03-02 DIAGNOSIS — I50.22 CHRONIC SYSTOLIC CONGESTIVE HEART FAILURE: ICD-10-CM

## 2023-03-02 DIAGNOSIS — I48.19 PERSISTENT ATRIAL FIBRILLATION: ICD-10-CM

## 2023-03-02 DIAGNOSIS — I10 ESSENTIAL HYPERTENSION: Primary | ICD-10-CM

## 2023-03-02 DIAGNOSIS — Z79.01 CHRONIC ANTICOAGULATION: ICD-10-CM

## 2023-03-02 DIAGNOSIS — Z12.5 SCREENING PSA (PROSTATE SPECIFIC ANTIGEN): ICD-10-CM

## 2023-03-02 DIAGNOSIS — E66.09 CLASS 2 OBESITY DUE TO EXCESS CALORIES WITHOUT SERIOUS COMORBIDITY WITH BODY MASS INDEX (BMI) OF 35.0 TO 35.9 IN ADULT: ICD-10-CM

## 2023-03-02 LAB
PSA SERPL-MCNC: 2.17 NG/ML (ref 0–4)
TSH SERPL DL<=0.05 MIU/L-ACNC: 1.23 UIU/ML (ref 0.27–4.2)

## 2023-03-02 PROCEDURE — 84443 ASSAY THYROID STIM HORMONE: CPT | Performed by: STUDENT IN AN ORGANIZED HEALTH CARE EDUCATION/TRAINING PROGRAM

## 2023-03-02 PROCEDURE — G0103 PSA SCREENING: HCPCS | Performed by: STUDENT IN AN ORGANIZED HEALTH CARE EDUCATION/TRAINING PROGRAM

## 2023-03-02 PROCEDURE — 99214 OFFICE O/P EST MOD 30 MIN: CPT | Performed by: STUDENT IN AN ORGANIZED HEALTH CARE EDUCATION/TRAINING PROGRAM

## 2023-03-02 RX ORDER — BUPROPION HYDROCHLORIDE 150 MG/1
150 TABLET, EXTENDED RELEASE ORAL 2 TIMES DAILY
Qty: 180 TABLET | Refills: 2 | Status: SHIPPED | OUTPATIENT
Start: 2023-03-02

## 2023-03-02 NOTE — PROGRESS NOTES
Chief Complaint  Hypertension and Weight Loss (Wanting something for weght loss)    Subjective          Layo Cee presents to [facility]  History of Present Illness    Here with wife.    Interested in medical options for weight loss.    Reports he checks BP at home, but doesn't remember the numbers.  He reports no chest pain.    Current Outpatient Medications   Medication Instructions   • acetaminophen (TYLENOL) 650 mg, Oral, Every 6 Hours PRN   • amiodarone (PACERONE) 200 mg, Oral, Daily   • amLODIPine (NORVASC) 5 mg, Oral, Daily   • aspirin 81 mg, Oral, Daily   • buPROPion SR (WELLBUTRIN SR) 150 mg, Oral, 2 Times Daily, For the first three days, take once daily by mouth. Day 4 and after, take twice daily by mouth.   • calcium carbonate (OS-LIANNA) 600 mg, Oral, Nightly   • cetirizine (ZYRTEC) 10 mg, Oral, Daily   • Coenzyme Q10 100 mg, Oral, Daily   • Diclofenac Sodium (VOLTAREN) 2 g, Topical, 4 Times Daily   • finasteride (PROSCAR) 5 MG tablet Take 1 tablet by mouth daily.   • fluticasone (FLONASE) 50 MCG/ACT nasal spray 1 spray, Nasal, Nightly   • Fluticasone Furoate (Arnuity Ellipta) 100 MCG/ACT aerosol powder  1 puff, Inhalation, Daily   • isosorbide mononitrate (IMDUR) 60 mg, Oral, Daily   • levalbuterol (XOPENEX) 0.63 mg, Nebulization, Every 6 Hours PRN, DX: J44.9   • metoprolol succinate XL (TOPROL-XL) 25 mg, Oral, Daily   • montelukast (SINGULAIR) 10 mg, Oral, Nightly   • nitroglycerin (NITROSTAT) 0.4 mg, Sublingual, Every 5 Minutes PRN, Take no more than 3 doses in 15 minutes.   • nitroglycerin (NITROSTAT) 0.4 mg, Sublingual, Every 5 Minutes PRN, Take no more than 3 doses in 15 minutes.   • pantoprazole (PROTONIX) 40 mg, Oral, Daily   • pitavastatin calcium (LIVALO) 2 mg, Oral, Nightly   • rivaroxaban (XARELTO) 15 mg, Oral, Daily With Dinner   • sacubitril-valsartan (Entresto) 49-51 MG tablet 1 tablet, Oral, 2 Times Daily   • tamsulosin (FLOMAX) 0.4 MG  "capsule 24 hr capsule Take 1 capsule by mouth daily. take 1/2 hour following the same meal each day   • tiotropium bromide-olodaterol (Stiolto Respimat) 2.5-2.5 MCG/ACT aerosol solution inhaler 2 puffs, Inhalation, Daily - RT   • torsemide (DEMADEX) 20 mg, Oral, Daily   • VITAMIN B COMPLEX-C PO 1 tablet, Oral, Daily       The following portions of the patient's history were reviewed and updated as appropriate: allergies, current medications, past family history, past medical history, past social history, past surgical history, and problem list.    Objective   Vital Signs:   /66   Pulse 72   Temp 97.6 °F (36.4 °C) (Temporal)   Ht 177.8 cm (70\")   Wt 115 kg (253 lb 12.8 oz)   SpO2 95%   BMI 36.42 kg/m²     Wt Readings from Last 3 Encounters:   03/02/23 115 kg (253 lb 12.8 oz)   02/20/23 114 kg (251 lb)   01/09/23 112 kg (246 lb 9.6 oz)     BP Readings from Last 3 Encounters:   03/02/23 138/66   02/20/23 147/67   01/09/23 116/66     Physical Exam  Vitals reviewed.   Constitutional:       General: He is not in acute distress.     Appearance: Normal appearance. He is not ill-appearing, toxic-appearing or diaphoretic.   HENT:      Head: Normocephalic and atraumatic.      Right Ear: External ear normal.      Left Ear: External ear normal.   Eyes:      Conjunctiva/sclera: Conjunctivae normal.   Cardiovascular:      Rate and Rhythm: Normal rate and regular rhythm.      Pulses: Normal pulses.      Heart sounds: Normal heart sounds. No murmur heard.    No friction rub. No gallop.   Pulmonary:      Effort: Pulmonary effort is normal. No respiratory distress.      Breath sounds: Normal breath sounds. No stridor. No wheezing, rhonchi or rales.   Chest:      Chest wall: No tenderness.   Abdominal:      General: Abdomen is flat.      Palpations: Abdomen is soft. There is no mass.      Tenderness: There is no abdominal tenderness.   Musculoskeletal:      Right lower leg: Edema present.      Left lower leg: Edema present. "      Comments: 1+ edema   Skin:     General: Skin is warm and dry.   Neurological:      General: No focal deficit present.      Mental Status: He is alert. Mental status is at baseline.   Psychiatric:         Mood and Affect: Mood normal.         Behavior: Behavior normal.         Thought Content: Thought content normal.         Judgment: Judgment normal.         Result Review :   The following data was reviewed by: Noble Kc MD on 03/02/2023:  Common labs    Common Labs 11/10/22 1/9/23 1/9/23 1/9/23 2/15/23 2/15/23     1656 1656 1656 1015 1015   Glucose 127 (A)  78  117 (A)    BUN 16  15  16    Creatinine 1.39 (A)  1.55 (A)  1.77 (A)    Sodium 137  140  141    Potassium 3.9  4.2  4.2    Chloride 100  101  101    Calcium 8.8  9.2  9.5    Albumin   3.8  4.3    Total Bilirubin   0.3  0.4    Alkaline Phosphatase   56  56    AST (SGOT)   21  17    ALT (SGPT)   20  20    WBC  7.75       Hemoglobin  12.9 (A)       Hematocrit  39.8       Platelets  217       Total Cholesterol    137  125   Triglycerides    151 (A)  146   HDL Cholesterol    34 (A)  37 (A)   LDL Cholesterol     77  63   (A) Abnormal value                   Lab Results   Component Value Date    SARSANTIGEN Not Detected 11/11/2022    COVID19 Not Detected 11/11/2022    FLUAAG Not Detected 11/11/2022    FLUBAG Not Detected 11/11/2022    RAPSCRN Negative 11/11/2022    INR 1.69 (H) 06/02/2022    BILIRUBINUR Negative 10/31/2022       Procedures        Assessment and Plan    Diagnoses and all orders for this visit:    1. Essential hypertension (Primary)  -     Cancel: Comprehensive Metabolic Panel    2. Chronic systolic congestive heart failure (HCC)  -     TSH    3. Persistent atrial fibrillation (HCC)  -     TSH    4. Chronic anticoagulation    5. Screening PSA (prostate specific antigen)  -     PSA Screen    6. Class 2 obesity due to excess calories without serious comorbidity with body mass index (BMI) of 35.0 to 35.9 in adult  Overview:  Disc diet and  exercise    Orders:  -     buPROPion SR (Wellbutrin SR) 150 MG 12 hr tablet; Take 1 tablet by mouth 2 (Two) Times a Day. For the first three days, take once daily by mouth. Day 4 and after, take twice daily by mouth.  Dispense: 180 tablet; Refill: 2    HTN:  -BP mildly above goal  -asked to keep BP log for review next visit    Obesity:  -discussed major drug classes as well as their risks and benefits  -will trial wellbutrin  -nutritional and exercise counseling today      There are no discontinued medications.       Follow Up   Return in about 3 months (around 6/2/2023) for HTN.  Patient was given instructions and counseling regarding his condition or for health maintenance advice. Please see specific information pulled into the AVS if appropriate.       Noble Kc MD  03/02/23  15:04 EST

## 2023-03-03 ENCOUNTER — TELEPHONE (OUTPATIENT)
Dept: INTERNAL MEDICINE | Facility: CLINIC | Age: 83
End: 2023-03-03
Payer: MEDICARE

## 2023-03-03 NOTE — TELEPHONE ENCOUNTER
----- Message from Noble Kc MD sent at 3/3/2023  8:03 AM EST -----  Labs overall are reassuring.    PSA is within normal limits.    TSH (a test of your thyroid) is also within normal limits.

## 2023-03-04 PROBLEM — I50.33 ACUTE ON CHRONIC DIASTOLIC CHF (CONGESTIVE HEART FAILURE): Status: RESOLVED | Noted: 2022-06-06 | Resolved: 2023-03-04

## 2023-03-04 NOTE — ASSESSMENT & PLAN NOTE
LV ejection fraction is normal per recent echocardiograms.  He reports increasing weight and swelling.  Will change diuretics to torsemide 20 mg daily.  Repeat basic metabolic panel in 2 weeks after making the change.

## 2023-03-04 NOTE — ASSESSMENT & PLAN NOTE
Currently stable with no anginal-like symptoms.  LV systolic function is preserved.  Continue statins and beta-blockers.  He is not on aspirin to to minimize bleeding risk, since he also taking Xarelto

## 2023-03-04 NOTE — PROGRESS NOTES
CARDIOLOGY FOLLOW-UP PROGRESS NOTE        Chief Complaint  Follow-up, Cardiomyopathy, Coronary Artery Disease, Hypertension, and Atrial Fibrillation    Subjective            Layo Cee presents to [facility]  History of Present Illness    Mr Cee is here for routine 4-month follow-up visit for coronary disease and congestive heart failure.  He denies any recent episodes of chest pain.  Cough is getting better.  He reports recent weight gain and increasing pedal edema.  He is taking all the medicines as prescribed.  No recent hospitalizations or ER visits.      Past History:    (1) Coronary artery disease, status post coronary artery bypass grafting in the past. Cardiac catheterization done in July 2016 showed patent left internal mammary graft to the LAD and occluded saphenous venous graft. Native LAD is 100% occluded in the mid portion. Native circumflex artery has no significant disease. Native right coronary artery is also 100% occluded and it is a chronic total occlusion. The right coronary artery is reconstituted with collaterals from the left side.  Patient underwent repeat cardiac catheterization in June, 2021 and underwent angioplasty stent placement to diagonal branch.  (2) Ischemic cardiomyopathy with recovery of cardiac function. Last SPECT stress test in August 2016 showed an LV ejection fraction of 68%. (3) Chronic kidney disease, stage 3. (4) Chronic obstructive pulmonary disease, not an exacerbation. (5) Hypertension. (6) Hyperlipidemia. 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 (8) Typical atrial flutter status post radiofrequency ablation by Dr. Aguilar on 11/30/2018 (9) persistent atrial fibrillation status post extensive ablation and pulmonary vein isolation by Dr. Aguilar on 6/3/2022    Medical History:  Past Medical History:   Diagnosis Date   • Acute on chronic diastolic CHF (congestive heart  failure) ([facility]) 6/6/2022   • Arthritis    • Atrial fibrillation, persistent ([facility]) 5/3/2022    Persistent atrial fibrillation status post extensive ablation and pulmonary vein isolation by Dr. Aguilar on 6/3/2022, with reoccurring rapid A. fib, and then successful cardioversion on 7/1/2022   • BPH (benign prostatic hyperplasia)    • CHF (congestive heart failure) ([facility])    • COPD (chronic obstructive pulmonary disease) ([facility])    • Coronary artery disease of bypass graft of native heart with stable angina pectoris ([facility])     (1) Coronary artery disease, s/p CABG in the past. Cardiac catheterization in July 2016 showed patentLIMA graft to the LAD and occluded SVGs. Native LAD is 100% occluded in the mid portion. Native LCx has no significant disease. Native RCA is 100% occluded and it is a . Repeat cath in June, 2021, underwent stent placement to diagnonal branch.   • COVID-19 02/04/2021   • Diverticulitis 10/11/2019   • Dysphagia    • Essential hypertension 08/27/2020   • Frequent PVCs 08/28/2021   • GERD (gastroesophageal reflux disease)    • Gross hematuria    • Long-term use of high-risk medication    • Lung disease    • Mixed hyperlipidemia 08/28/2021   • Myocardial infarction ([facility]) 07/2016   • OCD (obsessive compulsive disorder)    • Renal insufficiency 08/27/2020   • Rhinitis, allergic 06/05/2018   • Sore throat    • Stable angina ([facility])    • Typical atrial flutter ([facility]) 11/30/2018    s/p ablation by Dr. Aguilar    • Vertigo        Surgical History: has a past surgical history that includes Bladder surgery; Coronary artery bypass graft; Cardiac catheterization (07/2016); Colonoscopy (2011); Cystoscopy (03/19/2019); Esophagogastroduodenoscopy (2016); Cardiac surgery; Prostate surgery; Cardiac catheterization (N/A, 8/9/2021); Electrical Cardioversion (5/12/2022); Cardiac electrophysiology procedure (N/A, 6/3/2022); and Cardiac electrophysiology procedure (N/A, 6/3/2022).     Family History: family history includes Heart  disease in his father; Hypertension in his mother; Kidney cancer in his brother; Other in his brother.     Social History: reports that he quit smoking about 25 years ago. His smoking use included cigarettes. He started smoking about 65 years ago. He has a 20.00 pack-year smoking history. He has never used smokeless tobacco. He reports that he does not currently use alcohol. He reports that he does not use drugs.    Allergies: Carvedilol and Levaquin [levofloxacin]    Current Outpatient Medications on File Prior to Visit   Medication Sig   • acetaminophen (TYLENOL) 325 MG tablet Take 2 tablets by mouth Every 6 (Six) Hours As Needed for Mild Pain.   • amiodarone (PACERONE) 200 MG tablet Take 1 tablet by mouth Daily.   • aspirin (aspirin) 81 MG EC tablet Take 1 tablet by mouth Daily.   • calcium carbonate (OS-LIANNA) 600 MG tablet Take 1 tablet by mouth Every Night.   • cetirizine (zyrTEC) 10 MG tablet Take 1 tablet by mouth Daily.   • Coenzyme Q10 100 MG tablet Take 1 tablet by mouth Daily.   • Diclofenac Sodium (VOLTAREN) 1 % gel gel Apply 2 g topically to the appropriate area as directed 4 (Four) Times a Day.   • finasteride (PROSCAR) 5 MG tablet Take 1 tablet by mouth daily.   • Fluticasone Furoate (Arnuity Ellipta) 100 MCG/ACT aerosol powder  Inhale 1 puff Daily.   • isosorbide mononitrate (IMDUR) 60 MG 24 hr tablet Take 1 tablet by mouth Daily.   • levalbuterol (Xopenex) 0.63 MG/3ML nebulizer solution Take 1 ampule by nebulization Every 6 (Six) Hours As Needed for Wheezing for up to 30 days. DX: J44.9   • montelukast (SINGULAIR) 10 MG tablet Take 1 tablet by mouth Every Night for 30 days.   • nitroglycerin (NITROSTAT) 0.4 MG SL tablet Place 1 tablet under the tongue Every 5 (Five) Minutes As Needed for Chest Pain for up to 30 days. Take no more than 3 doses in 15 minutes.   • tamsulosin (FLOMAX) 0.4 MG capsule 24 hr capsule Take 1 capsule by mouth daily. take 1/2 hour following the same meal each day   • tiotropium  "bromide-olodaterol (Stiolto Respimat) 2.5-2.5 MCG/ACT aerosol solution inhaler Inhale 2 puffs Daily.   • VITAMIN B COMPLEX-C PO Take 1 tablet by mouth Daily.   • fluticasone (FLONASE) 50 MCG/ACT nasal spray 1 spray into the nostril(s) as directed by provider Every Night.   • [DISCONTINUED] dilTIAZem CD (CARDIZEM CD) 180 MG 24 hr capsule Take 1 capsule by mouth Daily for 30 days.   • [DISCONTINUED] nitroglycerin (NITROSTAT) 0.4 MG SL tablet Place 1 tablet under the tongue Every 5 (Five) Minutes As Needed for Chest Pain. Take no more than 3 doses in 15 minutes.     No current facility-administered medications on file prior to visit.          Review of Systems   Constitutional: Positive for fatigue and unexpected weight gain.   Respiratory: Positive for shortness of breath. Negative for cough and wheezing.    Cardiovascular: Positive for palpitations. Negative for chest pain and leg swelling.   Gastrointestinal: Negative for nausea and vomiting.   Neurological: Negative for dizziness and syncope.        Objective     /67   Pulse 81   Ht 177.8 cm (70\")   Wt 114 kg (251 lb)   BMI 36.01 kg/m²       Physical Exam    General : Alert, awake, no acute distress  Neck : Supple, no carotid bruit, no jugular venous distention  CVS : Regular rate and rhythm, no murmur, rubs or gallops  Lungs: Clear to auscultation bilaterally, no crackles or rhonchi  Abdomen: Soft, nontender, bowel sounds heard in all 4 quadrants  Extremities: Warm, well-perfused, 1+ edema bilaterally    Result Review :     The following data was reviewed by: Darnell Stevenson MD on 02/20/2023:    CMP    CMP 11/10/22 1/9/23 2/15/23   Glucose 127 (A) 78 117 (A)   BUN 16 15 16   Creatinine 1.39 (A) 1.55 (A) 1.77 (A)   eGFR 51.2 (A) 45.0 (A) 38.3 (A)   Sodium 137 140 141   Potassium 3.9 4.2 4.2   Chloride 100 101 101   Calcium 8.8 9.2 9.5   Total Protein  7.0 6.7   Albumin  3.8 4.3   Globulin  3.2 2.4   Total Bilirubin  0.3 0.4   Alkaline Phosphatase  56 56 "   AST (SGOT)  21 17   ALT (SGPT)  20 20   Albumin/Globulin Ratio  1.2 1.8   BUN/Creatinine Ratio 11.5 9.7 9.0   Anion Gap 9.0 12.0 14.2   (A) Abnormal value       Comments are available for some flowsheets but are not being displayed.           CBC    CBC 9/27/22 9/28/22 1/9/23   WBC 9.08 8.29 7.75   RBC 4.60 4.69 4.52   Hemoglobin 13.2 13.4 12.9 (A)   Hematocrit 40.5 40.8 39.8   MCV 88.0 87.0 88.1   MCH 28.7 28.6 28.5   MCHC 32.6 32.8 32.4   RDW 15.6 (A) 15.4 14.5   Platelets 211 195 217   (A) Abnormal value            TSH    TSH 11/21/22 1/9/23 3/2/23   TSH 1.430 1.800 1.230           Lipid Panel    Lipid Panel 10/7/22 1/9/23 2/15/23   Total Cholesterol 112 137 125   Triglycerides 112 151 (A) 146   HDL Cholesterol 32 (A) 34 (A) 37 (A)   VLDL Cholesterol 21 26 25   LDL Cholesterol  59 77 63   LDL/HDL Ratio 1.80 2.14 1.59   (A) Abnormal value                 Data reviewed: Cardiology studies        Results for orders placed during the hospital encounter of 09/27/22    Adult Transthoracic Echo Complete W/ Cont if Necessary Per Protocol    Interpretation Summary  · The left ventricular cavity is borderline dilated.  · Left ventricular ejection fraction appears to be 56 - 60%.  · Left ventricular diastolic function is consistent with (grade I) impaired relaxation and age.  · Mild aortic valve stenosis is present with max/mean pressure gradients 17/10 mmHg.  · No significant pericardial effusion noted. There may be a minimal amount of fluid near the right atrium.  · Mild to moderate left atrial enlargement.  · Mild to moderate mitral regurgitation.      Results for orders placed during the hospital encounter of 07/08/21    Stress Test With Myocardial Perfusion One Day    Interpretation Summary  · Myocardial perfusion imaging indicates a small-sized infarct located in the inferior wall with mild mansoor-infarct ischemia.  · Left ventricular ejection fraction is borderline normal. (Calculated EF = 49%).  · Diaphragmatic  attenuation artifact is present.  · Findings consistent with a normal ECG stress test.  · Occasional isolated PVCs are noted at rest and also during stress.  · Impressions are consistent with an intermediate risk study.               Assessment and Plan        Diagnoses and all orders for this visit:    1. Coronary artery disease of bypass graft of native heart with stable angina pectoris (HCC) (Primary)  Assessment & Plan:  Currently stable with no anginal-like symptoms.  LV systolic function is preserved.  Continue statins and beta-blockers.  He is not on aspirin to to minimize bleeding risk, since he also taking Xarelto      2. Essential hypertension  Assessment & Plan:  Blood pressure is reasonably well controlled on current regimen which will be continued.    Orders:  -     amLODIPine (NORVASC) 5 MG tablet; Take 1 tablet by mouth Daily.  Dispense: 90 tablet; Refill: 3    3. Mixed hyperlipidemia  Assessment & Plan:  LDL is 63, at goal.  Continue Livalo 2 mg nightly.  We will repeat lipid panel before next visit.    Orders:  -     pitavastatin calcium (Livalo) 2 MG tablet tablet; Take 1 tablet by mouth Every Night.  Dispense: 90 tablet; Refill: 3    4. Atrial fibrillation, persistent (HCC)  Assessment & Plan:  He currently stays in normal sinus rhythm.  Of note, he underwent extensive ablation on 6/3/2022 and had recurrence since then.  We will continue amiodarone for a longer duration at 200 mg once daily.  We will repeat complete metabolic panel and thyroid panel before next visit.  Continue Xarelto for anticoagulation.    Orders:  -     rivaroxaban (Xarelto) 15 MG tablet; Take 1 tablet by mouth Daily With Dinner.  Dispense: 90 tablet; Refill: 3  -     metoprolol succinate XL (TOPROL-XL) 25 MG 24 hr tablet; Take 1 tablet by mouth Daily.  Dispense: 90 tablet; Refill: 3  -     CBC (No Diff); Future    5. Ischemic cardiomyopathy  Assessment & Plan:  LV ejection fraction is normal per recent echocardiograms.  He  reports increasing weight and swelling.  Will change diuretics to torsemide 20 mg daily.  Repeat basic metabolic panel in 2 weeks after making the change.    Orders:  -     sacubitril-valsartan (Entresto) 49-51 MG tablet; Take 1 tablet by mouth 2 (Two) Times a Day.  Dispense: 180 tablet; Refill: 3  -     metoprolol succinate XL (TOPROL-XL) 25 MG 24 hr tablet; Take 1 tablet by mouth Daily.  Dispense: 90 tablet; Refill: 3  -     torsemide (DEMADEX) 20 MG tablet; Take 1 tablet by mouth Daily.  Dispense: 90 tablet; Refill: 1  -     Basic Metabolic Panel; Future  -     Magnesium; Future            Follow Up     Return in about 4 months (around 6/20/2023) for Next scheduled follow up, OK to double book .    Patient was given instructions and counseling regarding his condition or for health maintenance advice. Please see specific information pulled into the AVS if appropriate.

## 2023-03-04 NOTE — ASSESSMENT & PLAN NOTE
He currently stays in normal sinus rhythm.  Of note, he underwent extensive ablation on 6/3/2022 and had recurrence since then.  We will continue amiodarone for a longer duration at 200 mg once daily.  We will repeat complete metabolic panel and thyroid panel before next visit.  Continue Xarelto for anticoagulation.

## 2023-03-10 ENCOUNTER — TELEPHONE (OUTPATIENT)
Dept: INTERNAL MEDICINE | Facility: CLINIC | Age: 83
End: 2023-03-10
Payer: MEDICARE

## 2023-03-10 NOTE — TELEPHONE ENCOUNTER
That's unusual.  In general, I'd expect wellbutrin to increase blood pressure rather than decrease.  I think we can monitor this for now.  If his blood pressure worsens, I'd recommend stopping the medicine, of course.

## 2023-03-10 NOTE — TELEPHONE ENCOUNTER
Spoke with patient's wife. Transfer from the [facility].     Patient's wife states the patient's blood pressure is 98/69 and his heart rate is 77.   She also states that when he gets up he gets dizzy.   Wife wants to know if the Wellbutrin could be causing this.   Patient states that is the only new medication.

## 2023-03-13 NOTE — TELEPHONE ENCOUNTER
Spoke with patient and wife- states that he is taking metoprolol and amlodipine at night and half a tablet of amiodarone in the morning.     Patient inquired if amlodipine could be taken in the morning instead of night time. Informed this would be OK and to keep a log of BP on different time regimen to bring in for Dr. Heaton as well.     Patient verbalized understanding with no further questions or concerns noted.

## 2023-03-28 ENCOUNTER — OFFICE VISIT (OUTPATIENT)
Dept: PULMONOLOGY | Facility: CLINIC | Age: 83
End: 2023-03-28
Payer: MEDICARE

## 2023-03-28 VITALS
OXYGEN SATURATION: 98 % | WEIGHT: 245.5 LBS | TEMPERATURE: 97.8 F | BODY MASS INDEX: 35.15 KG/M2 | SYSTOLIC BLOOD PRESSURE: 139 MMHG | HEART RATE: 67 BPM | DIASTOLIC BLOOD PRESSURE: 65 MMHG | RESPIRATION RATE: 16 BRPM | HEIGHT: 70 IN

## 2023-03-28 DIAGNOSIS — J30.9 ALLERGIC RHINITIS, UNSPECIFIED SEASONALITY, UNSPECIFIED TRIGGER: ICD-10-CM

## 2023-03-28 DIAGNOSIS — Z95.1 S/P CABG (CORONARY ARTERY BYPASS GRAFT): ICD-10-CM

## 2023-03-28 DIAGNOSIS — R07.89 OTHER CHEST PAIN: ICD-10-CM

## 2023-03-28 DIAGNOSIS — K21.9 GASTROESOPHAGEAL REFLUX DISEASE, UNSPECIFIED WHETHER ESOPHAGITIS PRESENT: ICD-10-CM

## 2023-03-28 DIAGNOSIS — Z86.16 HISTORY OF COVID-19: ICD-10-CM

## 2023-03-28 DIAGNOSIS — J43.2 CENTRILOBULAR EMPHYSEMA: ICD-10-CM

## 2023-03-28 DIAGNOSIS — J30.2 SEASONAL ALLERGIES: ICD-10-CM

## 2023-03-28 DIAGNOSIS — J43.9 PULMONARY EMPHYSEMA, UNSPECIFIED EMPHYSEMA TYPE: ICD-10-CM

## 2023-03-28 DIAGNOSIS — R06.00 DYSPNEA, UNSPECIFIED TYPE: ICD-10-CM

## 2023-03-28 DIAGNOSIS — J44.9 CHRONIC OBSTRUCTIVE PULMONARY DISEASE, UNSPECIFIED COPD TYPE: ICD-10-CM

## 2023-03-28 DIAGNOSIS — F17.201 TOBACCO ABUSE, IN REMISSION: ICD-10-CM

## 2023-03-28 DIAGNOSIS — J30.1 SEASONAL ALLERGIC RHINITIS DUE TO POLLEN: Primary | ICD-10-CM

## 2023-03-28 DIAGNOSIS — I50.22 CHRONIC SYSTOLIC CONGESTIVE HEART FAILURE: ICD-10-CM

## 2023-03-28 DIAGNOSIS — K76.89 LIVER NODULE: ICD-10-CM

## 2023-03-28 DIAGNOSIS — R05.9 COUGH: ICD-10-CM

## 2023-03-28 DIAGNOSIS — I51.89 DIASTOLIC DYSFUNCTION: ICD-10-CM

## 2023-03-28 DIAGNOSIS — K21.00 GASTROESOPHAGEAL REFLUX DISEASE WITH ESOPHAGITIS WITHOUT HEMORRHAGE: Chronic | ICD-10-CM

## 2023-03-28 DIAGNOSIS — R06.02 SOB (SHORTNESS OF BREATH): ICD-10-CM

## 2023-03-28 PROCEDURE — 99214 OFFICE O/P EST MOD 30 MIN: CPT | Performed by: INTERNAL MEDICINE

## 2023-03-28 PROCEDURE — 3075F SYST BP GE 130 - 139MM HG: CPT | Performed by: INTERNAL MEDICINE

## 2023-03-28 PROCEDURE — 3078F DIAST BP <80 MM HG: CPT | Performed by: INTERNAL MEDICINE

## 2023-03-28 PROCEDURE — 1159F MED LIST DOCD IN RCRD: CPT | Performed by: INTERNAL MEDICINE

## 2023-03-28 PROCEDURE — 1160F RVW MEDS BY RX/DR IN RCRD: CPT | Performed by: INTERNAL MEDICINE

## 2023-03-28 RX ORDER — DOXYCYCLINE HYCLATE 100 MG/1
CAPSULE ORAL
COMMUNITY
Start: 2023-03-24

## 2023-03-28 RX ORDER — AMOXICILLIN AND CLAVULANATE POTASSIUM 875; 125 MG/1; MG/1
TABLET, FILM COATED ORAL
COMMUNITY
Start: 2023-03-17 | End: 2023-03-28

## 2023-03-28 RX ORDER — GUAIFENESIN AND CODEINE PHOSPHATE 100; 10 MG/5ML; MG/5ML
5 SOLUTION ORAL
COMMUNITY
Start: 2023-03-24 | End: 2023-04-04

## 2023-03-28 RX ORDER — IPRATROPIUM BROMIDE AND ALBUTEROL SULFATE 2.5; .5 MG/3ML; MG/3ML
3 SOLUTION RESPIRATORY (INHALATION) 4 TIMES DAILY PRN
Qty: 360 ML | Refills: 3 | Status: SHIPPED | OUTPATIENT
Start: 2023-03-28

## 2023-03-28 RX ORDER — PREDNISONE 10 MG/1
TABLET ORAL
COMMUNITY
Start: 2023-03-17

## 2023-03-28 RX ORDER — TIOTROPIUM BROMIDE AND OLODATEROL 3.124; 2.736 UG/1; UG/1
2 SPRAY, METERED RESPIRATORY (INHALATION)
Qty: 2 EACH | Refills: 0 | COMMUNITY
Start: 2023-03-28

## 2023-03-28 RX ORDER — FLUTICASONE FUROATE 100 UG/1
1 POWDER RESPIRATORY (INHALATION) DAILY
Qty: 1 EACH | Refills: 6 | Status: SHIPPED | OUTPATIENT
Start: 2023-03-28

## 2023-03-28 RX ORDER — CEFDINIR 300 MG/1
300 CAPSULE ORAL 2 TIMES DAILY
Qty: 20 CAPSULE | Refills: 0 | Status: SHIPPED | OUTPATIENT
Start: 2023-03-28 | End: 2023-03-29

## 2023-03-28 NOTE — PROGRESS NOTES
Primary Care Provider  Noble Kc MD     Referring Provider  No ref. provider found     Chief Complaint  COPD, Emphysema, Shortness of Breath, Wheezing, Cough, and Follow-up (3 month follow up)    Subjective          Layo Cee presents to [facility]  COPD  He complains of cough, shortness of breath and wheezing. His past medical history is significant for emphysema.   Emphysema  Associated symptoms include coughing.   Shortness of Breath  Associated symptoms include wheezing.     Layo Cee is a 83 y.o. male patient with history of COPD, seasonal allergies, wheezing, cough, chronic compensated systolic heart failure, known granulomatous disease, lung nodules, tobacco abuse of cigarettes in remission, COVID-19 infection in January 2021.  He is here for follow-up.  Since his last office visit, he had cardiac ablation with cardiology service.  He is off Plavix now.  He had repeat CT scan of the chest recently which was reviewed with him today.  He is continued on Xarelto.  He started having sore throat 3 weeks ago, was diagnosed with strep throat and has been on Augmentin which did not help much.  He is currently taking doxycycline starting yesterday but has not noticed significant difference.  He continues to take Stiolto 2 puffs once daily and Arnuity 1 inhalation once daily.  He has shortness of breath with activities.  His leg swelling has improved.  He has no chest pain and chest tightness.  No nausea or vomiting.  He has intermittent wheezing.     Review of Systems   Respiratory: Positive for cough, shortness of breath and wheezing.         General:  Fatigue, No Fever No weight loss or loss of appetite  HEENT: No dysphagia, No Visual Changes, no rhinorrhea, sore throat+  Respiratory:  + cough,+Dyspnea, intermittent phlegm, No Pleuritic Pain, no wheezing, no hemoptysis  Cardiovascular: Denies chest pain, denies palpitations,+HALL, + improving  chest Pressure  Gastrointestinal:  No Abdominal Pain, No Nausea, No Vomiting, No Diarrhea  Genitourinary:  No Dysuria, No Frequency, No Hesitancy  Musculoskeletal: No muscle pain or swelling  Endocrine:  No Heat Intolerance, No Cold Intolerance  Hematologic:  No Bleeding, No Bruising  Psychiatric:  No Anxiety, No Depression  Neurologic:  No Confusion, no Dysarthria, No Headaches  Skin:  No Rash, No Open Wounds    Family History   Problem Relation Age of Onset   • Hypertension Mother    • Heart disease Father    • Other Brother         GASTROINTESTINAL CANCER   • Kidney cancer Brother         Social History     Socioeconomic History   • Marital status:    Tobacco Use   • Smoking status: Former     Packs/day: 0.50     Years: 40.00     Pack years: 20.00     Types: Cigarettes     Start date:      Quit date:      Years since quittin.2   • Smokeless tobacco: Never   Vaping Use   • Vaping Use: Never used   Substance and Sexual Activity   • Alcohol use: Not Currently   • Drug use: Never   • Sexual activity: Defer        Past Medical History:   Diagnosis Date   • Acute on chronic diastolic CHF (congestive heart failure) ([facility]) 2022   • Arthritis    • Atrial fibrillation, persistent ([facility]) 5/3/2022    Persistent atrial fibrillation status post extensive ablation and pulmonary vein isolation by Dr. Aguilar on 6/3/2022, with reoccurring rapid A. fib, and then successful cardioversion on 2022   • BPH (benign prostatic hyperplasia)    • CHF (congestive heart failure) ([facility])    • COPD (chronic obstructive pulmonary disease) ([facility])    • Coronary artery disease of bypass graft of native heart with stable angina pectoris ([facility])     (1) Coronary artery disease, s/p CABG in the past. Cardiac catheterization in 2016 showed patentLIMA graft to the LAD and occluded SVGs. Native LAD is 100% occluded in the mid portion. Native LCx has no significant disease. Native RCA is 100% occluded and it is a . Repeat cath  in June, 2021, underwent stent placement to diagnonal branch.   • COVID-19 02/04/2021   • Diverticulitis 10/11/2019   • Dysphagia    • Essential hypertension 08/27/2020   • Frequent PVCs 08/28/2021   • GERD (gastroesophageal reflux disease)    • Gross hematuria    • Long-term use of high-risk medication    • Lung disease    • Mixed hyperlipidemia 08/28/2021   • Myocardial infarction (HCC) 07/2016   • OCD (obsessive compulsive disorder)    • Renal insufficiency 08/27/2020   • Rhinitis, allergic 06/05/2018   • Sore throat    • Stable angina (HCC)    • Typical atrial flutter (HCC) 11/30/2018    s/p ablation by Dr. Aguilar    • Vertigo         Immunization History   Administered Date(s) Administered   • COVID-19 ([employer]) 11/04/2021   • Fluzone High-Dose 65+yrs 09/28/2021, 10/05/2022   • Fluzone Quad >6mos (Multi-dose) 10/08/2019   • Influenza Quad Vaccine (Inpatient) 10/08/2019   • Influenza, Unspecified 10/08/2019   • Shingrix 04/29/2021, 11/22/2021   • Td 01/09/2023         Allergies   Allergen Reactions   • Carvedilol Swelling and Anaphylaxis   • Levaquin [Levofloxacin] Unknown - Low Severity          Current Outpatient Medications:   •  acetaminophen (TYLENOL) 325 MG tablet, Take 2 tablets by mouth Every 6 (Six) Hours As Needed for Mild Pain., Disp: , Rfl:   •  amiodarone (PACERONE) 200 MG tablet, Take 1 tablet by mouth Daily., Disp: 90 tablet, Rfl: 1  •  amLODIPine (NORVASC) 5 MG tablet, Take 1 tablet by mouth Daily., Disp: 90 tablet, Rfl: 3  •  aspirin (aspirin) 81 MG EC tablet, Take 1 tablet by mouth Daily., Disp: 30 tablet, Rfl: 1  •  buPROPion SR (Wellbutrin SR) 150 MG 12 hr tablet, Take 1 tablet by mouth 2 (Two) Times a Day. For the first three days, take once daily by mouth. Day 4 and after, take twice daily by mouth., Disp: 180 tablet, Rfl: 2  •  calcium carbonate (OS-LIANNA) 600 MG tablet, Take 1 tablet by mouth Every Night., Disp: , Rfl:   •  cetirizine (zyrTEC) 10 MG tablet, Take 1 tablet by mouth Daily.,  Disp: 90 tablet, Rfl: 3  •  Coenzyme Q10 100 MG tablet, Take 1 tablet by mouth Daily., Disp: , Rfl:   •  Diclofenac Sodium (VOLTAREN) 1 % gel gel, Apply 2 g topically to the appropriate area as directed 4 (Four) Times a Day., Disp: , Rfl:   •  doxycycline (VIBRAMYCIN) 100 MG capsule, , Disp: , Rfl:   •  finasteride (PROSCAR) 5 MG tablet, Take 1 tablet by mouth daily., Disp: 90 tablet, Rfl: 4  •  Fluticasone Furoate (Arnuity Ellipta) 100 MCG/ACT aerosol powder , Inhale 1 puff Daily., Disp: 1 each, Rfl: 6  •  guaiFENesin-codeine (GUAIFENESIN AC) 100-10 MG/5ML liquid, Take 5 mL by mouth., Disp: , Rfl:   •  isosorbide mononitrate (IMDUR) 60 MG 24 hr tablet, Take 1 tablet by mouth Daily., Disp: 90 tablet, Rfl: 3  •  metoprolol succinate XL (TOPROL-XL) 25 MG 24 hr tablet, Take 1 tablet by mouth Daily., Disp: 90 tablet, Rfl: 3  •  pantoprazole (PROTONIX) 40 MG EC tablet, Take 1 tablet by mouth daily., Disp: 30 tablet, Rfl: 3  •  pitavastatin calcium (Livalo) 2 MG tablet tablet, Take 1 tablet by mouth Every Night., Disp: 90 tablet, Rfl: 3  •  predniSONE (DELTASONE) 10 MG tablet, , Disp: , Rfl:   •  rivaroxaban (Xarelto) 15 MG tablet, Take 1 tablet by mouth Daily With Dinner., Disp: 90 tablet, Rfl: 3  •  sacubitril-valsartan (Entresto) 49-51 MG tablet, Take 1 tablet by mouth 2 (Two) Times a Day., Disp: 180 tablet, Rfl: 3  •  tamsulosin (FLOMAX) 0.4 MG capsule 24 hr capsule, Take 1 capsule by mouth daily. take 1/2 hour following the same meal each day, Disp: 90 capsule, Rfl: 4  •  tiotropium bromide-olodaterol (Stiolto Respimat) 2.5-2.5 MCG/ACT aerosol solution inhaler, Inhale 2 puffs Daily., Disp: 2 each, Rfl: 0  •  torsemide (DEMADEX) 20 MG tablet, Take 1 tablet by mouth Daily., Disp: 90 tablet, Rfl: 1  •  VITAMIN B COMPLEX-C PO, Take 1 tablet by mouth Daily., Disp: , Rfl:   •  cefdinir (OMNICEF) 300 MG capsule, Take 1 capsule by mouth 2 (Two) Times a Day., Disp: 20 capsule, Rfl: 0  •  fluticasone (FLONASE) 50 MCG/ACT nasal  "spray, 1 spray into the nostril(s) as directed by provider Every Night., Disp: , Rfl:   •  ipratropium-albuterol (DUO-NEB) 0.5-2.5 mg/3 ml nebulizer, Take 3 mL by nebulization 4 (Four) Times a Day As Needed for Wheezing., Disp: 360 mL, Rfl: 3  •  levalbuterol (Xopenex) 0.63 MG/3ML nebulizer solution, Take 1 ampule by nebulization Every 6 (Six) Hours As Needed for Wheezing for up to 30 days. DX: J44.9, Disp: 360 mL, Rfl: 5  •  montelukast (SINGULAIR) 10 MG tablet, Take 1 tablet by mouth Every Night for 30 days., Disp: 30 tablet, Rfl: 11  •  nitroglycerin (NITROSTAT) 0.4 MG SL tablet, Place 1 tablet under the tongue Every 5 (Five) Minutes As Needed for Chest Pain for up to 30 days. Take no more than 3 doses in 15 minutes., Disp: 30 tablet, Rfl: 0     Objective   Vital Signs:   /65 (BP Location: Left arm, Patient Position: Sitting, Cuff Size: Adult)   Pulse 67   Temp 97.8 °F (36.6 °C) (Tympanic)   Resp 16   Ht 177.8 cm (70\")   Wt 111 kg (245 lb 8 oz)   SpO2 98%   BMI 35.23 kg/m²     Mallampatti classification : 1  Physical Exam  Vital Signs Reviewed  Obese, elderly male, pleasant, in no acute distress, normal conversant  HEENT:  PERRL, EOMI.  OP, nares clear, has hoarseness of voice, posterior pharyngeal erythema+  Neck:  Supple, no JVD, no thyromegaly  Lymph: no axillary, cervical, supraclavicular lymphadenopathy noted bilaterally  Chest:  good aeration, bilateral diminished breath sounds, no wheezing, cracklesi, scattered rhonchi at bases, resonant to percussion b/l  CV: RRR, no MGR, pulses 2+, equal  Abd:  Soft, NT, ND, + BS, no HSM  EXT:  no clubbing, no cyanosis, trace BLE edema  Neuro:  A&Ox3, CN grossly intact, no focal deficits  Skin: No rashes or lesions noted     Result Review :   The following data was reviewed by: Kolton Brink MD on 04/06/2022:  Common labs    Common Labs 1/9/23 1/9/23 1/9/23 2/15/23 2/15/23 3/2/23    1656 1656 1656 1015 1015    Glucose  78  117 (A)     BUN  15  16   "   Creatinine  1.55 (A)  1.77 (A)     Sodium  140  141     Potassium  4.2  4.2     Chloride  101  101     Calcium  9.2  9.5     Albumin  3.8  4.3     Total Bilirubin  0.3  0.4     Alkaline Phosphatase  56  56     AST (SGOT)  21  17     ALT (SGPT)  20  20     WBC 7.75        Hemoglobin 12.9 (A)        Hematocrit 39.8        Platelets 217        Total Cholesterol   137  125    Triglycerides   151 (A)  146    HDL Cholesterol   34 (A)  37 (A)    LDL Cholesterol    77  63    PSA      2.170   (A) Abnormal value            CMP    CMP 11/10/22 1/9/23 2/15/23   Glucose 127 (A) 78 117 (A)   BUN 16 15 16   Creatinine 1.39 (A) 1.55 (A) 1.77 (A)   eGFR 51.2 (A) 45.0 (A) 38.3 (A)   Sodium 137 140 141   Potassium 3.9 4.2 4.2   Chloride 100 101 101   Calcium 8.8 9.2 9.5   Total Protein  7.0 6.7   Albumin  3.8 4.3   Globulin  3.2 2.4   Total Bilirubin  0.3 0.4   Alkaline Phosphatase  56 56   AST (SGOT)  21 17   ALT (SGPT)  20 20   Albumin/Globulin Ratio  1.2 1.8   BUN/Creatinine Ratio 11.5 9.7 9.0   Anion Gap 9.0 12.0 14.2   (A) Abnormal value       Comments are available for some flowsheets but are not being displayed.           CBC    CBC 9/27/22 9/28/22 1/9/23   WBC 9.08 8.29 7.75   RBC 4.60 4.69 4.52   Hemoglobin 13.2 13.4 12.9 (A)   Hematocrit 40.5 40.8 39.8   MCV 88.0 87.0 88.1   MCH 28.7 28.6 28.5   MCHC 32.6 32.8 32.4   RDW 15.6 (A) 15.4 14.5   Platelets 211 195 217   (A) Abnormal value            CBC w/diff    CBC w/Diff 8/9/21 8/10/21 1/24/22   WBC 8.02 8.83 6.25   RBC 5.14 4.64 5.07   Hemoglobin 15.1 13.7 14.7   Hematocrit 46.3 41.2 44.2   MCV 90.1 88.8 87.2   MCH 29.4 29.5 29.0   MCHC 32.6 33.3 33.3   RDW 15.2 15.4 13.9   Platelets 185 163 261   Neutrophil Rel % 49.1  52.5   Immature Granulocyte Rel % 0.6 (A)  0.6 (A)   Lymphocyte Rel % 34.7  29.6   Monocyte Rel % 10.5  10.9   Eosinophil Rel % 4.6  5.9   Basophil Rel % 0.5  0.5   (A) Abnormal value            Data reviewed: Radiologic studies CT scan of the chest from  October 2021 was reviewed.  Shows some chronic interstitial changes with emphysema.            Assessment and Plan    Diagnoses and all orders for this visit:    1. Seasonal allergic rhinitis due to pollen (Primary)    2. Seasonal allergies  -     Fluticasone Furoate (Arnuity Ellipta) 100 MCG/ACT aerosol powder ; Inhale 1 puff Daily.  Dispense: 1 each; Refill: 6    3. S/P CABG (coronary artery bypass graft)    4. Other chest pain    5. Chronic systolic congestive heart failure (HCC)  -     Fluticasone Furoate (Arnuity Ellipta) 100 MCG/ACT aerosol powder ; Inhale 1 puff Daily.  Dispense: 1 each; Refill: 6    6. Pulmonary emphysema, unspecified emphysema type (HCC)  -     cefdinir (OMNICEF) 300 MG capsule; Take 1 capsule by mouth 2 (Two) Times a Day.  Dispense: 20 capsule; Refill: 0  -     tiotropium bromide-olodaterol (Stiolto Respimat) 2.5-2.5 MCG/ACT aerosol solution inhaler; Inhale 2 puffs Daily.  Dispense: 2 each; Refill: 0  -     Fluticasone Furoate (Arnuity Ellipta) 100 MCG/ACT aerosol powder ; Inhale 1 puff Daily.  Dispense: 1 each; Refill: 6  -     ipratropium-albuterol (DUO-NEB) 0.5-2.5 mg/3 ml nebulizer; Take 3 mL by nebulization 4 (Four) Times a Day As Needed for Wheezing.  Dispense: 360 mL; Refill: 3    7. Centrilobular emphysema (HCC)  -     cefdinir (OMNICEF) 300 MG capsule; Take 1 capsule by mouth 2 (Two) Times a Day.  Dispense: 20 capsule; Refill: 0  -     tiotropium bromide-olodaterol (Stiolto Respimat) 2.5-2.5 MCG/ACT aerosol solution inhaler; Inhale 2 puffs Daily.  Dispense: 2 each; Refill: 0  -     Fluticasone Furoate (Arnuity Ellipta) 100 MCG/ACT aerosol powder ; Inhale 1 puff Daily.  Dispense: 1 each; Refill: 6  -     ipratropium-albuterol (DUO-NEB) 0.5-2.5 mg/3 ml nebulizer; Take 3 mL by nebulization 4 (Four) Times a Day As Needed for Wheezing.  Dispense: 360 mL; Refill: 3    8. SOB (shortness of breath)    9. Tobacco abuse, in remission  -     Fluticasone Furoate (Arnuity Ellipta) 100 MCG/ACT  aerosol powder ; Inhale 1 puff Daily.  Dispense: 1 each; Refill: 6    10. Gastroesophageal reflux disease with esophagitis without hemorrhage  -     cefdinir (OMNICEF) 300 MG capsule; Take 1 capsule by mouth 2 (Two) Times a Day.  Dispense: 20 capsule; Refill: 0  -     tiotropium bromide-olodaterol (Stiolto Respimat) 2.5-2.5 MCG/ACT aerosol solution inhaler; Inhale 2 puffs Daily.  Dispense: 2 each; Refill: 0  -     Fluticasone Furoate (Arnuity Ellipta) 100 MCG/ACT aerosol powder ; Inhale 1 puff Daily.  Dispense: 1 each; Refill: 6  -     ipratropium-albuterol (DUO-NEB) 0.5-2.5 mg/3 ml nebulizer; Take 3 mL by nebulization 4 (Four) Times a Day As Needed for Wheezing.  Dispense: 360 mL; Refill: 3    11. Diastolic dysfunction  -     tiotropium bromide-olodaterol (Stiolto Respimat) 2.5-2.5 MCG/ACT aerosol solution inhaler; Inhale 2 puffs Daily.  Dispense: 2 each; Refill: 0    12. Gastroesophageal reflux disease, unspecified whether esophagitis present  -     tiotropium bromide-olodaterol (Stiolto Respimat) 2.5-2.5 MCG/ACT aerosol solution inhaler; Inhale 2 puffs Daily.  Dispense: 2 each; Refill: 0    13. Allergic rhinitis, unspecified seasonality, unspecified trigger  -     cefdinir (OMNICEF) 300 MG capsule; Take 1 capsule by mouth 2 (Two) Times a Day.  Dispense: 20 capsule; Refill: 0  -     tiotropium bromide-olodaterol (Stiolto Respimat) 2.5-2.5 MCG/ACT aerosol solution inhaler; Inhale 2 puffs Daily.  Dispense: 2 each; Refill: 0  -     Fluticasone Furoate (Arnuity Ellipta) 100 MCG/ACT aerosol powder ; Inhale 1 puff Daily.  Dispense: 1 each; Refill: 6  -     ipratropium-albuterol (DUO-NEB) 0.5-2.5 mg/3 ml nebulizer; Take 3 mL by nebulization 4 (Four) Times a Day As Needed for Wheezing.  Dispense: 360 mL; Refill: 3    14. History of COVID-19  -     Fluticasone Furoate (Arnuity Ellipta) 100 MCG/ACT aerosol powder ; Inhale 1 puff Daily.  Dispense: 1 each; Refill: 6    15. Liver nodule  -     Fluticasone Furoate (Arnuity  Ellipta) 100 MCG/ACT aerosol powder ; Inhale 1 puff Daily.  Dispense: 1 each; Refill: 6    16. Chronic obstructive pulmonary disease, unspecified COPD type (HCC)  -     cefdinir (OMNICEF) 300 MG capsule; Take 1 capsule by mouth 2 (Two) Times a Day.  Dispense: 20 capsule; Refill: 0  -     tiotropium bromide-olodaterol (Stiolto Respimat) 2.5-2.5 MCG/ACT aerosol solution inhaler; Inhale 2 puffs Daily.  Dispense: 2 each; Refill: 0  -     Fluticasone Furoate (Arnuity Ellipta) 100 MCG/ACT aerosol powder ; Inhale 1 puff Daily.  Dispense: 1 each; Refill: 6  -     ipratropium-albuterol (DUO-NEB) 0.5-2.5 mg/3 ml nebulizer; Take 3 mL by nebulization 4 (Four) Times a Day As Needed for Wheezing.  Dispense: 360 mL; Refill: 3    17. Cough  -     cefdinir (OMNICEF) 300 MG capsule; Take 1 capsule by mouth 2 (Two) Times a Day.  Dispense: 20 capsule; Refill: 0  -     tiotropium bromide-olodaterol (Stiolto Respimat) 2.5-2.5 MCG/ACT aerosol solution inhaler; Inhale 2 puffs Daily.  Dispense: 2 each; Refill: 0  -     Fluticasone Furoate (Arnuity Ellipta) 100 MCG/ACT aerosol powder ; Inhale 1 puff Daily.  Dispense: 1 each; Refill: 6  -     ipratropium-albuterol (DUO-NEB) 0.5-2.5 mg/3 ml nebulizer; Take 3 mL by nebulization 4 (Four) Times a Day As Needed for Wheezing.  Dispense: 360 mL; Refill: 3    18. Dyspnea, unspecified type  -     cefdinir (OMNICEF) 300 MG capsule; Take 1 capsule by mouth 2 (Two) Times a Day.  Dispense: 20 capsule; Refill: 0  -     tiotropium bromide-olodaterol (Stiolto Respimat) 2.5-2.5 MCG/ACT aerosol solution inhaler; Inhale 2 puffs Daily.  Dispense: 2 each; Refill: 0  -     Fluticasone Furoate (Arnuity Ellipta) 100 MCG/ACT aerosol powder ; Inhale 1 puff Daily.  Dispense: 1 each; Refill: 6  -     ipratropium-albuterol (DUO-NEB) 0.5-2.5 mg/3 ml nebulizer; Take 3 mL by nebulization 4 (Four) Times a Day As Needed for Wheezing.  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  COPD: Continue with Stiolto and Arnuity.  Continue with as needed rescue inhaler. He was on albuterol and DuoNeb.    I advised him to use DuoNeb 1-2 times daily for airway clearance.  Continue with Singulair.  Continue with pantoprazole.    Strep sore throat: Currently on doxycycline, not improving with symptoms.  I have sent a prescription for cefdinir if needed after the course of doxycycline.    Chronic A. fib: Status post cardiac ablation, now in sinus rhythm.  Continue Xarelto.    He would benefit from pulmonary rehab, however has to drive a long distance for it and is not willing to do it for now  Congestive heart failure: Continue with diuretics through Dr. Stevenson.  Continue with Xarelto.    Lung nodules: Likely related to granulomatous lung disease.  Repeat CT scan of the chest in October 2022 shows normal intrathoracic process.    Up-to-date on flu and pneumonia vaccine.  He has had J & J vaccine.  Advised booster vaccination for COVID.    Follow Up   Return in about 6 months (around 9/28/2023).  Patient was given instructions and counseling regarding his condition or for health maintenance advice. Please see specific information pulled into the AVS if appropriate.       Electronically signed by Kolton Brink MD, 3/28/2023, 16:18 EDT.

## 2023-03-29 RX ORDER — CEFUROXIME AXETIL 250 MG/1
250 TABLET ORAL 2 TIMES DAILY
Qty: 20 TABLET | Refills: 0 | Status: SHIPPED | OUTPATIENT
Start: 2023-03-29

## 2023-03-29 NOTE — TELEPHONE ENCOUNTER
[facility] faxed stated Cefdinir is out of stock.  Replacing with Cefuroxine 250MG BID for 10 days per Dr. Brink

## 2023-04-13 ENCOUNTER — PATIENT OUTREACH (OUTPATIENT)
Dept: CASE MANAGEMENT | Facility: OTHER | Age: 83
End: 2023-04-13
Payer: MEDICARE

## 2023-04-13 DIAGNOSIS — I50.9 CHRONIC CONGESTIVE HEART FAILURE, UNSPECIFIED HEART FAILURE TYPE: Primary | ICD-10-CM

## 2023-04-13 NOTE — OUTREACH NOTE
AMBULATORY CASE MANAGEMENT NOTE    Name and Relationship of Patient/Support Person: Layo Cee H - Self    CCM Interim Update    CCM outreach made, patient has been sick off and on for a month. He was seen at urgent care and gave him Augmentin for strep, then went back had Bronchitis and was put on Doxycycline. On 3/28 he seen pulmonologist and he out him on Cefdinir, pharmacy didn't have that so they were going to give him Ceftin. However he went back to urgent care and she gave him clindamycin, he just finished that. He states about 2 weeks ago he started to get dizzy, lightheaded and has a HA. The dizziness and HA comes and goes.     They have been using Debrox,  told him he had a lot of wax, but didn't irrigate it.   Advised to call first thing in am when office opens and see if he can get a same day appointment with . Asked how his blood pressure has been and he said good. States his appetite has been good as well.  He had follow up with cardiology on 2/20 and had increased swelling, so  changed his diuretic to Torsemide, he states it took several days to start working, but swelling has gone down. He is weighting himself daily, and is aware of symptoms to look for and when to alert provider. Denies any increased SOA.  His PCP started him on Wellbutrin to help with weight loss, he has been on that for a month, and has not noticed a difference.    No other needs at this time.  Education Documentation  No documentation found.      Rajani LOVE  Ambulatory Case Management  4/13/2023, 16:41 EDT

## 2023-04-14 ENCOUNTER — OFFICE VISIT (OUTPATIENT)
Dept: INTERNAL MEDICINE | Facility: CLINIC | Age: 83
End: 2023-04-14
Payer: MEDICARE

## 2023-04-14 VITALS
HEIGHT: 70 IN | OXYGEN SATURATION: 95 % | WEIGHT: 241.8 LBS | HEART RATE: 72 BPM | DIASTOLIC BLOOD PRESSURE: 74 MMHG | BODY MASS INDEX: 34.62 KG/M2 | TEMPERATURE: 97.8 F | SYSTOLIC BLOOD PRESSURE: 126 MMHG

## 2023-04-14 DIAGNOSIS — H61.21 IMPACTED CERUMEN OF RIGHT EAR: ICD-10-CM

## 2023-04-14 DIAGNOSIS — R42 DIZZINESS: Primary | ICD-10-CM

## 2023-04-14 DIAGNOSIS — J01.00 ACUTE NON-RECURRENT MAXILLARY SINUSITIS: ICD-10-CM

## 2023-04-14 RX ORDER — CEFDINIR 300 MG/1
300 CAPSULE ORAL 2 TIMES DAILY
Qty: 14 CAPSULE | Refills: 0 | Status: SHIPPED | OUTPATIENT
Start: 2023-04-14 | End: 2023-04-21

## 2023-04-14 RX ORDER — CLINDAMYCIN HYDROCHLORIDE 300 MG/1
300 CAPSULE ORAL 3 TIMES DAILY
Qty: 21 CAPSULE | Refills: 0 | Status: SHIPPED | OUTPATIENT
Start: 2023-04-14 | End: 2023-04-21

## 2023-04-14 NOTE — PROGRESS NOTES
Chief Complaint  Dizziness (Ongoing about 4 weeks, has been to UC 3x, has been on 4 different abx.) and Headache    Subjective          Layo Cee presents to [facility]  History of Present Illness    Here with dizziness, headache, and sinus pressure.  Present about 4 weeks.  Has had multiple urgent care visits during this time.  Per wife's report (who is present today), has received augmentin, doxycycline, clinda and steroids during this time.    He is having no fever.  No chest pain.  No syncope or falls.    Current Outpatient Medications   Medication Instructions   • acetaminophen (TYLENOL) 650 mg, Oral, Every 6 Hours PRN   • amiodarone (PACERONE) 200 mg, Oral, Daily   • amLODIPine (NORVASC) 5 mg, Oral, Daily   • aspirin 81 mg, Oral, Daily   • buPROPion SR (WELLBUTRIN SR) 150 mg, Oral, 2 Times Daily, For the first three days, take once daily by mouth. Day 4 and after, take twice daily by mouth.   • calcium carbonate (OS-LIANNA) 600 mg, Oral, Nightly   • cefdinir (OMNICEF) 300 mg, Oral, 2 Times Daily   • cetirizine (ZYRTEC) 10 mg, Oral, Daily   • clindamycin (CLEOCIN) 300 mg, Oral, 3 Times Daily   • Coenzyme Q10 100 mg, Oral, Daily   • Diclofenac Sodium (VOLTAREN) 2 g, Topical, 4 Times Daily   • finasteride (PROSCAR) 5 MG tablet Take 1 tablet by mouth daily.   • fluticasone (FLONASE) 50 MCG/ACT nasal spray 1 spray, Nasal, Nightly   • Fluticasone Furoate (Arnuity Ellipta) 100 MCG/ACT aerosol powder  1 puff, Inhalation, Daily   • ipratropium-albuterol (DUO-NEB) 0.5-2.5 mg/3 ml nebulizer 3 mL, Nebulization, 4 Times Daily PRN   • isosorbide mononitrate (IMDUR) 60 mg, Oral, Daily   • levalbuterol (XOPENEX) 0.63 mg, Nebulization, Every 6 Hours PRN, DX: J44.9   • metoprolol succinate XL (TOPROL-XL) 25 mg, Oral, Daily   • montelukast (SINGULAIR) 10 mg, Oral, Nightly   • nitroglycerin (NITROSTAT) 0.4 mg, Sublingual, Every 5 Minutes PRN, Take no more than 3 doses in 15  "minutes.   • pantoprazole (PROTONIX) 40 mg, Oral, Daily   • pitavastatin calcium (LIVALO) 2 mg, Oral, Nightly   • predniSONE (DELTASONE) 10 MG tablet No dose, route, or frequency recorded.   • rivaroxaban (XARELTO) 15 mg, Oral, Daily With Dinner   • sacubitril-valsartan (Entresto) 49-51 MG tablet 1 tablet, Oral, 2 Times Daily   • tamsulosin (FLOMAX) 0.4 MG capsule 24 hr capsule Take 1 capsule by mouth daily. take 1/2 hour following the same meal each day   • tiotropium bromide-olodaterol (Stiolto Respimat) 2.5-2.5 MCG/ACT aerosol solution inhaler 2 puffs, Inhalation, Daily - RT   • torsemide (DEMADEX) 20 mg, Oral, Daily   • VITAMIN B COMPLEX-C PO 1 tablet, Oral, Daily       The following portions of the patient's history were reviewed and updated as appropriate: allergies, current medications, past family history, past medical history, past social history, past surgical history, and problem list.    Objective   Vital Signs:   /74 (BP Location: Left arm, Patient Position: Sitting)   Pulse 72   Temp 97.8 °F (36.6 °C) (Temporal)   Ht 177.8 cm (70\")   Wt 110 kg (241 lb 12.8 oz)   SpO2 95%   BMI 34.69 kg/m²     Wt Readings from Last 3 Encounters:   04/14/23 110 kg (241 lb 12.8 oz)   03/28/23 111 kg (245 lb 8 oz)   03/02/23 115 kg (253 lb 12.8 oz)     BP Readings from Last 3 Encounters:   04/14/23 126/74   03/28/23 139/65   03/02/23 138/66     Physical Exam  Vitals reviewed.   Constitutional:       General: He is not in acute distress.     Appearance: Normal appearance. He is not ill-appearing, toxic-appearing or diaphoretic.   HENT:      Head: Normocephalic and atraumatic.      Comments: Maxillary sinus TTP bilaterally     Right Ear: Tympanic membrane, ear canal and external ear normal. There is impacted cerumen.      Left Ear: Tympanic membrane and ear canal normal.      Ears:      Comments: Impacted cerumen.  Irrigation today.  TM WNL after procedure.  Eyes:      Conjunctiva/sclera: Conjunctivae normal. "   Cardiovascular:      Rate and Rhythm: Normal rate and regular rhythm.      Pulses: Normal pulses.      Heart sounds: Normal heart sounds. No murmur heard.    No friction rub. No gallop.   Pulmonary:      Effort: Pulmonary effort is normal. No respiratory distress.      Breath sounds: Normal breath sounds. No stridor. No wheezing, rhonchi or rales.   Chest:      Chest wall: No tenderness.   Abdominal:      General: Abdomen is flat.      Palpations: Abdomen is soft. There is no mass.      Tenderness: There is no abdominal tenderness.   Musculoskeletal:      Right lower leg: No edema.      Left lower leg: No edema.   Skin:     General: Skin is warm and dry.   Neurological:      General: No focal deficit present.      Mental Status: He is alert. Mental status is at baseline.   Psychiatric:         Mood and Affect: Mood normal.         Behavior: Behavior normal.         Thought Content: Thought content normal.         Judgment: Judgment normal.          Result Review :   The following data was reviewed by: Noble Kc MD on 04/14/2023:  Common labs        1/9/2023    16:56 2/15/2023    10:15 3/2/2023    12:09   Common Labs   Glucose 78   117      BUN 15   16      Creatinine 1.55   1.77      Sodium 140   141      Potassium 4.2   4.2      Chloride 101   101      Calcium 9.2   9.5      Albumin 3.8   4.3      Total Bilirubin 0.3   0.4      Alkaline Phosphatase 56   56      AST (SGOT) 21   17      ALT (SGPT) 20   20      WBC 7.75       Hemoglobin 12.9       Hematocrit 39.8       Platelets 217       Total Cholesterol 137   125      Triglycerides 151   146      HDL Cholesterol 34   37      LDL Cholesterol  77   63      PSA   2.170              Lab Results   Component Value Date    SARSANTIGEN Not Detected 11/11/2022    COVID19 Not Detected 11/11/2022    FLUAAG Negative 03/24/2023    FLUBAG Not Detected 11/11/2022    RAPSCRN Positive (A) 03/17/2023    INR 1.69 (H) 06/02/2022    BILIRUBINUR Negative 10/31/2022       Ear  Cerumen Removal    Date/Time: 4/14/2023 12:42 PM  Performed by: Noble Kc MD  Authorized by: Noble Kc MD     Anesthesia:  Local Anesthetic: none  Ceruminolytics applied: Ceruminolytics applied prior to the procedure.  Location details: right ear  Patient tolerance: patient tolerated the procedure well with no immediate complications  Procedure type: instrumentation, irrigation   Sedation:  Patient sedated: no           EKG:  Sinus rhythm  Rate 69  Normal axis  ST depressions in inferior leads, essentially unchanged compared to September 27, 2022 EKG  QTc 422 ms     Assessment and Plan    Diagnoses and all orders for this visit:    1. Dizziness (Primary)  -     ECG 12 Lead    2. Acute non-recurrent maxillary sinusitis  -     cefdinir (OMNICEF) 300 MG capsule; Take 1 capsule by mouth 2 (Two) Times a Day for 7 days.  Dispense: 14 capsule; Refill: 0  -     clindamycin (Cleocin) 300 MG capsule; Take 1 capsule by mouth 3 (Three) Times a Day for 7 days.  Dispense: 21 capsule; Refill: 0    3. Impacted cerumen of right ear  -     Cerumen Removal      -exam and EKG reassuring  -I did  that in the event of chest pain I would recommend contacting 911 and transportation via ambulance to ED    Medications Discontinued During This Encounter   Medication Reason   • cefuroxime (CEFTIN) 250 MG tablet *Therapy completed   • doxycycline (VIBRAMYCIN) 100 MG capsule *Therapy completed          Follow Up   Return if symptoms worsen or fail to improve.  Patient was given instructions and counseling regarding his condition or for health maintenance advice. Please see specific information pulled into the AVS if appropriate.       Noble Kc MD  04/14/23  21:30 EDT

## 2023-04-18 DIAGNOSIS — J43.2 CENTRILOBULAR EMPHYSEMA: ICD-10-CM

## 2023-04-18 DIAGNOSIS — J30.9 ALLERGIC RHINITIS, UNSPECIFIED SEASONALITY, UNSPECIFIED TRIGGER: ICD-10-CM

## 2023-04-18 DIAGNOSIS — I51.89 DIASTOLIC DYSFUNCTION: ICD-10-CM

## 2023-04-18 DIAGNOSIS — K21.9 GASTROESOPHAGEAL REFLUX DISEASE, UNSPECIFIED WHETHER ESOPHAGITIS PRESENT: ICD-10-CM

## 2023-04-18 DIAGNOSIS — J44.9 CHRONIC OBSTRUCTIVE PULMONARY DISEASE, UNSPECIFIED COPD TYPE: ICD-10-CM

## 2023-04-18 DIAGNOSIS — K21.00 GASTROESOPHAGEAL REFLUX DISEASE WITH ESOPHAGITIS WITHOUT HEMORRHAGE: Chronic | ICD-10-CM

## 2023-04-18 DIAGNOSIS — J43.9 PULMONARY EMPHYSEMA, UNSPECIFIED EMPHYSEMA TYPE: ICD-10-CM

## 2023-04-18 DIAGNOSIS — R06.00 DYSPNEA, UNSPECIFIED TYPE: ICD-10-CM

## 2023-04-18 DIAGNOSIS — R05.9 COUGH: ICD-10-CM

## 2023-04-18 RX ORDER — TIOTROPIUM BROMIDE AND OLODATEROL 3.124; 2.736 UG/1; UG/1
2 SPRAY, METERED RESPIRATORY (INHALATION)
Qty: 1 EACH | Refills: 3 | COMMUNITY
Start: 2023-04-18 | End: 2023-04-24 | Stop reason: SDUPTHER

## 2023-04-24 DIAGNOSIS — J44.9 CHRONIC OBSTRUCTIVE PULMONARY DISEASE, UNSPECIFIED COPD TYPE: ICD-10-CM

## 2023-04-24 DIAGNOSIS — K21.9 GASTROESOPHAGEAL REFLUX DISEASE, UNSPECIFIED WHETHER ESOPHAGITIS PRESENT: ICD-10-CM

## 2023-04-24 DIAGNOSIS — I51.89 DIASTOLIC DYSFUNCTION: ICD-10-CM

## 2023-04-24 DIAGNOSIS — R05.9 COUGH: ICD-10-CM

## 2023-04-24 DIAGNOSIS — J43.2 CENTRILOBULAR EMPHYSEMA: ICD-10-CM

## 2023-04-24 DIAGNOSIS — R06.00 DYSPNEA, UNSPECIFIED TYPE: ICD-10-CM

## 2023-04-24 DIAGNOSIS — J43.9 PULMONARY EMPHYSEMA, UNSPECIFIED EMPHYSEMA TYPE: ICD-10-CM

## 2023-04-24 DIAGNOSIS — K21.00 GASTROESOPHAGEAL REFLUX DISEASE WITH ESOPHAGITIS WITHOUT HEMORRHAGE: Chronic | ICD-10-CM

## 2023-04-24 DIAGNOSIS — J30.9 ALLERGIC RHINITIS, UNSPECIFIED SEASONALITY, UNSPECIFIED TRIGGER: ICD-10-CM

## 2023-04-24 RX ORDER — TIOTROPIUM BROMIDE AND OLODATEROL 3.124; 2.736 UG/1; UG/1
2 SPRAY, METERED RESPIRATORY (INHALATION)
Qty: 1 EACH | Refills: 3 | Status: SHIPPED | OUTPATIENT
Start: 2023-04-24

## 2023-04-27 ENCOUNTER — PATIENT OUTREACH (OUTPATIENT)
Dept: CASE MANAGEMENT | Facility: OTHER | Age: 83
End: 2023-04-27
Payer: MEDICARE

## 2023-04-27 DIAGNOSIS — J44.9 CHRONIC OBSTRUCTIVE PULMONARY DISEASE, UNSPECIFIED COPD TYPE: ICD-10-CM

## 2023-04-27 DIAGNOSIS — I50.9 CHRONIC CONGESTIVE HEART FAILURE, UNSPECIFIED HEART FAILURE TYPE: Primary | ICD-10-CM

## 2023-04-27 NOTE — OUTREACH NOTE
CCM End of Month Documentation    This Chronic Medical Management Care Plan for Layo Cee, 83 y.o. male, has been reviewed; monitored and managed and a new plan of care implemented for the month of April.  A cumulative time of 24  minutes was spent on this patient record this month, including chart review; phone call with patient.    Regarding the patient's problems: has Unstable angina; Coronary artery disease of bypass graft of native heart with stable angina pectoris (HCC); Class 2 obesity due to excess calories without serious comorbidity with body mass index (BMI) of 35.0 to 35.9 in adult; Gout; Typical atrial flutter (HCC); Essential hypertension; Frequent PVCs; Mixed hyperlipidemia; Rotator cuff Bursitis of shoulder region; Lateral epicondylitis; History of hematuria; Benign prostatic hyperplasia with urinary frequency; Hydrocele in adult; History of COVID-19; Allergic rhinitis; Seasonal allergies; Tobacco abuse, in remission; Vitamin D deficiency; Gastroesophageal reflux disease with esophagitis without hemorrhage; Ischemic cardiomyopathy; Centrilobular emphysema; Gastroesophageal reflux disease; Atrial fibrillation, persistent; COPD (chronic obstructive pulmonary disease); Chronic anticoagulation; Diastolic dysfunction; S/P CABG (coronary artery bypass graft); Stage 3a chronic kidney disease; Persistent atrial fibrillation; S/P ablation of atrial fibrillation; Atrial fibrillation, unspecified type; SOB (shortness of breath); Chest pain; Mild aortic valve stenosis; Moderate mitral regurgitation; Other hydrocele; History of gross hematuria; Benign prostatic hyperplasia with lower urinary tract symptoms; and Chronic systolic congestive heart failure on their problem list., the following items were addressed: medical records; medications; changes to medical care and any changes can be found within the plan section of the note.  A detailed listing of time spent for chronic care management is tracked within  each outreach encounter.  Current medications include:  has a current medication list which includes the following prescription(s): acetaminophen, amiodarone, amlodipine, aspirin, bupropion sr, calcium carbonate, cetirizine, coenzyme q10, diclofenac sodium, finasteride, fluticasone, arnuity ellipta, ipratropium-albuterol, isosorbide mononitrate, levalbuterol, metoprolol succinate xl, montelukast, nitroglycerin, pantoprazole, pitavastatin calcium, prednisone, rivaroxaban, entresto, tamsulosin, stiolto respimat, torsemide, b complex-c, and [DISCONTINUED] diltiazem cd. and the patient is reported to be patient is compliant with medication protocol,  Medications are reported to be effective.  Regarding these diagnoses no new referrals were made.  All notes on chart for PCP to review.    The patient was monitored remotely for medications; weight; activity level; blood pressure; mood & behavior, Breathing, HR, Swelling.    The patient's physical needs include:  physical healthcare.     The patient's mental support needs include:  needs met    The patient's cognitive support needs include:  health care; household care    The patient's psychosocial support needs include:  needs met    The patient's functional needs include: physical healthcare    The patient's environmental needs include:  not applicable    Care Plan overall comments:  Patient lives with wife who is very supportive and helps with his care.    Refer to previous outreach notes for more information on the areas listed above.    Monthly Billing Diagnoses  (I50.9) Chronic congestive heart failure, unspecified heart failure type    (J44.9) Chronic obstructive pulmonary disease, unspecified COPD type    Medications   · Medications have been reconciled    Care Plan progress this month:      Recently Modified Care Plans Updates made since 3/27/2023 12:00 AM    No recently modified care plans.        Instructions   · Patient was provided an electronic copy of care  plan  · CCM services were explained and offered and patient has accepted these services.  · Patient has given their written consent to receive CCM services and understands that this includes the authorization of electronic communication of medical information with the other treating providers.  · Patient understands that they may stop CCM services at any time and these changes will be effective at the end of the calendar month and will effectively revocate the agreement of CCM services.  · Patient understands that only one practitioner can furnish and be paid for CCM services during one calendar month.  Patient also understands that there may be co-payment or deductible fees in association with CCM services.  · Patient will continue with at least monthly follow-up calls with the Ambulatory .    Rajani LOVE  Ambulatory Case Management    4/27/2023, 14:17 EDT

## 2023-05-08 ENCOUNTER — TELEPHONE (OUTPATIENT)
Dept: CARDIOLOGY | Facility: CLINIC | Age: 83
End: 2023-05-08
Payer: MEDICARE

## 2023-05-08 NOTE — TELEPHONE ENCOUNTER
The [facility] received a fax that requires your attention. The document has been indexed to the patient’s chart for your review.      Reason for sending: [facility]- EXT MED REC NOTIF     Documents Description: 90 DAY SUPPLY REQUEST     [facility] of Sender[facility] [place]     Date Indexed: 5.5.23    Notes (if needed):

## 2023-05-11 ENCOUNTER — TELEPHONE (OUTPATIENT)
Dept: CASE MANAGEMENT | Facility: OTHER | Age: 83
End: 2023-05-11
Payer: MEDICARE

## 2023-05-11 NOTE — TELEPHONE ENCOUNTER
[facility] outreach made with patient and he said he has not had any more sinus issues, that has cleared up.   However he said he is still having dizzy spells, the past couple days have been a little worse. He said his blood pressure has been okay, ranging from 130's-60's-70's. There has not been any recent medication changes. He has been eating and drinking the same. He said the dizziness comes on when he goes from sitting to standing, but also when he has been standing up, he will just get dizzy. He seen you last on 4/14 and was having having the issue then.   He did mention he has had issues with dizziness off and on for the past several years. He said once before cardiologist adjusted some of his medications and it got a little better. His next OV with cardiology is 6/19.  Please advise.   Rajani Mccauley RN  Ambulatory Case Management  5/11/2023, 16:17 EDT

## 2023-05-12 NOTE — TELEPHONE ENCOUNTER
I'm happy to see them in clinic to discuss.  Perhaps we could schedule to see him this Monday or Tuesday with myself or one of our other providers?

## 2023-05-15 ENCOUNTER — OFFICE VISIT (OUTPATIENT)
Dept: INTERNAL MEDICINE | Facility: CLINIC | Age: 83
End: 2023-05-15
Payer: MEDICARE

## 2023-05-15 VITALS — HEIGHT: 70 IN | TEMPERATURE: 97.8 F | BODY MASS INDEX: 34.5 KG/M2 | WEIGHT: 241 LBS

## 2023-05-15 DIAGNOSIS — I50.22 CHRONIC SYSTOLIC CONGESTIVE HEART FAILURE: ICD-10-CM

## 2023-05-15 DIAGNOSIS — R42 DIZZINESS: ICD-10-CM

## 2023-05-15 DIAGNOSIS — I48.19 PERSISTENT ATRIAL FIBRILLATION: ICD-10-CM

## 2023-05-15 DIAGNOSIS — I10 ESSENTIAL HYPERTENSION: Primary | ICD-10-CM

## 2023-05-15 DIAGNOSIS — R42 LIGHT HEADEDNESS: ICD-10-CM

## 2023-05-15 NOTE — PROGRESS NOTES
Chief Complaint  Hypertension and Dizziness    Subjective          Layo Cee presents to [facility]  History of Present Illness    Here with wife.    Here with complaints of dizziness (light-headedness).  No syncopal episodes.  Light-headedness occurs with standing, and takes several seconds (jeanine 15 seconds) to resolve).  He denies chest pain.    Has upcoming cardiology appointment 6/8/23.      Current Outpatient Medications   Medication Instructions   • acetaminophen (TYLENOL) 650 mg, Oral, Every 6 Hours PRN   • amiodarone (PACERONE) 200 mg, Oral, Daily   • amLODIPine (NORVASC) 5 mg, Oral, Daily   • aspirin 81 mg, Oral, Daily   • buPROPion SR (WELLBUTRIN SR) 150 mg, Oral, 2 Times Daily, For the first three days, take once daily by mouth. Day 4 and after, take twice daily by mouth.   • calcium carbonate (OS-LIANNA) 600 mg, Oral, Nightly   • cetirizine (ZYRTEC) 10 mg, Oral, Daily   • Coenzyme Q10 100 mg, Oral, Daily   • Diclofenac Sodium (VOLTAREN) 2 g, Topical, 4 Times Daily   • finasteride (PROSCAR) 5 MG tablet Take 1 tablet by mouth daily.   • fluticasone (FLONASE) 50 MCG/ACT nasal spray 1 spray, Nasal, Nightly   • Fluticasone Furoate (Arnuity Ellipta) 100 MCG/ACT aerosol powder  1 puff, Inhalation, Daily   • ipratropium-albuterol (DUO-NEB) 0.5-2.5 mg/3 ml nebulizer 3 mL, Nebulization, 4 Times Daily PRN   • isosorbide mononitrate (IMDUR) 60 mg, Oral, Daily   • levalbuterol (XOPENEX) 0.63 mg, Nebulization, Every 6 Hours PRN, DX: J44.9   • metoprolol succinate XL (TOPROL-XL) 25 mg, Oral, Daily   • montelukast (SINGULAIR) 10 mg, Oral, Nightly   • nitroglycerin (NITROSTAT) 0.4 mg, Sublingual, Every 5 Minutes PRN, Take no more than 3 doses in 15 minutes.   • pantoprazole (PROTONIX) 40 mg, Oral, Daily   • pitavastatin calcium (LIVALO) 2 mg, Oral, Nightly   • rivaroxaban (XARELTO) 15 mg, Oral, Daily With Dinner   • sacubitril-valsartan (Entresto) 49-51 MG tablet 1 tablet,  "Oral, 2 Times Daily   • tamsulosin (FLOMAX) 0.4 MG capsule 24 hr capsule Take 1 capsule by mouth daily. take 1/2 hour following the same meal each day   • tiotropium bromide-olodaterol (Stiolto Respimat) 2.5-2.5 MCG/ACT aerosol solution inhaler 2 puffs, Inhalation, Daily - RT   • torsemide (DEMADEX) 20 mg, Oral, Daily   • VITAMIN B COMPLEX-C PO 1 tablet, Oral, Daily       The following portions of the patient's history were reviewed and updated as appropriate: allergies, current medications, past family history, past medical history, past social history, past surgical history, and problem list.    Objective   Vital Signs:   Temp 97.8 °F (36.6 °C) (Temporal)   Ht 177.8 cm (70\")   Wt 109 kg (241 lb)   BMI 34.58 kg/m²     Wt Readings from Last 3 Encounters:   05/15/23 109 kg (241 lb)   04/14/23 110 kg (241 lb 12.8 oz)   03/28/23 111 kg (245 lb 8 oz)     BP Readings from Last 3 Encounters:   04/14/23 126/74   03/28/23 139/65   03/02/23 138/66     Vitals:    05/15/23 1417 05/15/23 1418 05/15/23 1419   Orthostatic BP: 93/56 121/68 103/66   Orthostatic Pulse: 67 70 67   Patient Position: Sitting Standing Lying         Physical Exam  Vitals reviewed.   Constitutional:       General: He is not in acute distress.     Appearance: Normal appearance. He is not ill-appearing, toxic-appearing or diaphoretic.   HENT:      Head: Normocephalic and atraumatic.      Right Ear: Tympanic membrane, ear canal and external ear normal.      Left Ear: Tympanic membrane and ear canal normal.      Ears:      Comments: Part of left ear missing  Eyes:      Conjunctiva/sclera: Conjunctivae normal.   Cardiovascular:      Rate and Rhythm: Normal rate and regular rhythm.      Pulses: Normal pulses.      Heart sounds: Normal heart sounds. No murmur heard.    No friction rub. No gallop.   Pulmonary:      Effort: Pulmonary effort is normal. No respiratory distress.      Breath sounds: Normal breath sounds. No stridor. No wheezing, rhonchi or rales. "   Chest:      Chest wall: No tenderness.   Abdominal:      General: Abdomen is flat.      Palpations: Abdomen is soft. There is no mass.      Tenderness: There is no abdominal tenderness.   Musculoskeletal:      Right lower leg: No edema.      Left lower leg: No edema.   Skin:     General: Skin is warm and dry.   Neurological:      General: No focal deficit present.      Mental Status: He is alert. Mental status is at baseline.   Psychiatric:         Mood and Affect: Mood normal.         Behavior: Behavior normal.         Thought Content: Thought content normal.         Judgment: Judgment normal.         Result Review :   The following data was reviewed by: Noble Kc MD on 05/15/2023:  Common labs        1/9/2023    16:56 2/15/2023    10:15 3/2/2023    12:09   Common Labs   Glucose 78   117      BUN 15   16      Creatinine 1.55   1.77      Sodium 140   141      Potassium 4.2   4.2      Chloride 101   101      Calcium 9.2   9.5      Albumin 3.8   4.3      Total Bilirubin 0.3   0.4      Alkaline Phosphatase 56   56      AST (SGOT) 21   17      ALT (SGPT) 20   20      WBC 7.75       Hemoglobin 12.9       Hematocrit 39.8       Platelets 217       Total Cholesterol 137   125      Triglycerides 151   146      HDL Cholesterol 34   37      LDL Cholesterol  77   63      PSA   2.170              Lab Results   Component Value Date    SARSANTIGEN Not Detected 11/11/2022    COVID19 Not Detected 11/11/2022    FLUAAG Negative 03/24/2023    FLUBAG Not Detected 11/11/2022    RAPSCRN Positive (A) 03/17/2023    INR 1.69 (H) 06/02/2022    BILIRUBINUR Negative 10/31/2022       Procedures        Assessment and Plan    Diagnoses and all orders for this visit:    1. Essential hypertension (Primary)    2. Dizziness    3. Chronic systolic congestive heart failure    4. Persistent atrial fibrillation    5. Light headedness      HTN, light-headedness:  -orthostatic BP's are negative today  -I did ask for him to keep a BP log for me to  review  -I also asked him to reach out to his cardiologist's office to see if he can be seen sooner  -BP's here have been reassuring    Medications Discontinued During This Encounter   Medication Reason   • predniSONE (DELTASONE) 10 MG tablet *Therapy completed        I spent 30 minutes caring for Layo on this date of service. This time includes time spent by me in the following activities:preparing for the visit, reviewing tests, obtaining and/or reviewing a separately obtained history, performing a medically appropriate examination and/or evaluation , counseling and educating the patient/family/caregiver and documenting information in the medical record  Follow Up   Return in about 3 months (around 8/15/2023) for HTN.  Patient was given instructions and counseling regarding his condition or for health maintenance advice. Please see specific information pulled into the AVS if appropriate.       Noble Kc MD  05/15/23  17:26 EDT

## 2023-05-24 ENCOUNTER — TELEPHONE (OUTPATIENT)
Dept: INTERNAL MEDICINE | Facility: CLINIC | Age: 83
End: 2023-05-24
Payer: MEDICARE

## 2023-05-24 NOTE — TELEPHONE ENCOUNTER
Blood pressure reviewed, and it is reassuring.  Taken as a whole, your blood pressures are at goal.  I see one blood pressure that's a little on the low side but acceptable.  Overall, these are reassuring measurements.

## 2023-05-31 ENCOUNTER — PATIENT OUTREACH (OUTPATIENT)
Dept: CASE MANAGEMENT | Facility: OTHER | Age: 83
End: 2023-05-31

## 2023-05-31 DIAGNOSIS — I50.9 CHRONIC CONGESTIVE HEART FAILURE, UNSPECIFIED HEART FAILURE TYPE: Primary | ICD-10-CM

## 2023-05-31 DIAGNOSIS — J44.9 CHRONIC OBSTRUCTIVE PULMONARY DISEASE, UNSPECIFIED COPD TYPE: ICD-10-CM

## 2023-05-31 NOTE — OUTREACH NOTE
CCM End of Month Documentation    This Chronic Medical Management Care Plan for Layo Cee, 83 y.o. male, has been reviewed; monitored and managed and a new plan of care implemented for the month of May.  A cumulative time of 20  minutes was spent on this patient record this month, including chart review; phone call with patient.    Regarding the patient's problems: has Unstable angina; Coronary artery disease of bypass graft of native heart with stable angina pectoris (HCC); Class 2 obesity due to excess calories without serious comorbidity with body mass index (BMI) of 35.0 to 35.9 in adult; Gout; Typical atrial flutter (HCC); Essential hypertension; Frequent PVCs; Mixed hyperlipidemia; Rotator cuff Bursitis of shoulder region; Lateral epicondylitis; History of hematuria; Benign prostatic hyperplasia with urinary frequency; Hydrocele in adult; History of COVID-19; Allergic rhinitis; Seasonal allergies; Tobacco abuse, in remission; Vitamin D deficiency; Gastroesophageal reflux disease with esophagitis without hemorrhage; Ischemic cardiomyopathy; Centrilobular emphysema; Gastroesophageal reflux disease; Atrial fibrillation, persistent; COPD (chronic obstructive pulmonary disease); Chronic anticoagulation; Diastolic dysfunction; S/P CABG (coronary artery bypass graft); Stage 3a chronic kidney disease; Persistent atrial fibrillation; S/P ablation of atrial fibrillation; Atrial fibrillation, unspecified type; SOB (shortness of breath); Chest pain; Mild aortic valve stenosis; Moderate mitral regurgitation; Other hydrocele; History of gross hematuria; Benign prostatic hyperplasia with lower urinary tract symptoms; and Chronic systolic congestive heart failure on their problem list., the following items were addressed: medical records; medications; changes to medical care and any changes can be found within the plan section of the note.  A detailed listing of time spent for chronic care management is tracked within  each outreach encounter.  Current medications include:  has a current medication list which includes the following prescription(s): acetaminophen, amiodarone, amlodipine, aspirin, bupropion sr, calcium carbonate, cetirizine, coenzyme q10, diclofenac sodium, finasteride, fluticasone, arnuity ellipta, ipratropium-albuterol, isosorbide mononitrate, levalbuterol, metoprolol succinate xl, montelukast, nitroglycerin, pantoprazole, pitavastatin calcium, rivaroxaban, entresto, tamsulosin, stiolto respimat, torsemide, b complex-c, and [DISCONTINUED] diltiazem cd. and the patient is reported to be patient is compliant with medication protocol,  Medications are reported to be effective.  Regarding these diagnoses no new referrals were made.  All notes on chart for PCP to review.    The patient was monitored remotely for medications; weight; activity level; blood pressure; mood & behavior.    The patient's physical needs include:  physical healthcare; medication education.     The patient's mental support needs include:  coordination of community providers    The patient's cognitive support needs include:  health care; household care; coordination of community providers; medication    The patient's psychosocial support needs include:  coordination of community providers    The patient's functional needs include: physical healthcare; medication education    The patient's environmental needs include:  not applicable    Care Plan overall comments:  Patient lives with wife who is very supportive and helps with his care.    Refer to previous outreach notes for more information on the areas listed above.    Monthly Billing Diagnoses  (I50.9) Chronic congestive heart failure, unspecified heart failure type    (J44.9) Chronic obstructive pulmonary disease, unspecified COPD type    Medications   · Medications have been reconciled    Care Plan progress this month:      Recently Modified Care Plans Updates made since 4/30/2023 12:00 AM     No recently modified care plans.        Instructions   · Patient was provided an electronic copy of care plan  · CCM services were explained and offered and patient has accepted these services.  · Patient has given their written consent to receive CCM services and understands that this includes the authorization of electronic communication of medical information with the other treating providers.  · Patient understands that they may stop CCM services at any time and these changes will be effective at the end of the calendar month and will effectively revocate the agreement of CCM services.  · Patient understands that only one practitioner can furnish and be paid for CCM services during one calendar month.  Patient also understands that there may be co-payment or deductible fees in association with CCM services.  · Patient will continue with at least monthly follow-up calls with the Ambulatory .    Rajani LOVE  Ambulatory Case Management    5/31/2023, 14:15 EDT

## 2023-06-05 ENCOUNTER — TELEPHONE (OUTPATIENT)
Dept: CARDIOLOGY | Facility: CLINIC | Age: 83
End: 2023-06-05
Payer: MEDICARE

## 2023-06-05 NOTE — TELEPHONE ENCOUNTER
The [facility] received a fax that requires your attention. The document has been indexed to the patient’s chart for your review.      Reason for sending: EXTERNAL MEDICAL RECORD NOTIFICATION     Documents Description: MEDICATION NON ADHERENCE THERAPY     Name of Sender: [facility]     Date Indexed: 6.2.23

## 2023-06-08 ENCOUNTER — OFFICE VISIT (OUTPATIENT)
Dept: CARDIOLOGY | Facility: CLINIC | Age: 83
End: 2023-06-08
Payer: MEDICARE

## 2023-06-08 VITALS
HEART RATE: 69 BPM | HEIGHT: 70 IN | SYSTOLIC BLOOD PRESSURE: 145 MMHG | DIASTOLIC BLOOD PRESSURE: 65 MMHG | WEIGHT: 250 LBS | BODY MASS INDEX: 35.79 KG/M2

## 2023-06-08 DIAGNOSIS — I48.19 ATRIAL FIBRILLATION, PERSISTENT: ICD-10-CM

## 2023-06-08 DIAGNOSIS — I35.0 MILD AORTIC VALVE STENOSIS: ICD-10-CM

## 2023-06-08 DIAGNOSIS — I10 ESSENTIAL HYPERTENSION: ICD-10-CM

## 2023-06-08 DIAGNOSIS — E78.2 MIXED HYPERLIPIDEMIA: ICD-10-CM

## 2023-06-08 DIAGNOSIS — I25.708 CORONARY ARTERY DISEASE OF BYPASS GRAFT OF NATIVE HEART WITH STABLE ANGINA PECTORIS: Primary | ICD-10-CM

## 2023-06-08 DIAGNOSIS — I25.5 ISCHEMIC CARDIOMYOPATHY: ICD-10-CM

## 2023-06-08 RX ORDER — ISOSORBIDE MONONITRATE 30 MG/1
30 TABLET, EXTENDED RELEASE ORAL DAILY
Qty: 90 TABLET | Refills: 3 | Status: SHIPPED | OUTPATIENT
Start: 2023-06-08

## 2023-06-08 NOTE — ASSESSMENT & PLAN NOTE
He is in sinus rhythm, denies any palpitations.  Continue amiodarone, and recheck CMP and thyroid levels.  Continue anticoagulation protection with  Xarelto.

## 2023-06-08 NOTE — ASSESSMENT & PLAN NOTE
His blood pressure remains reasonably well controlled, continue current medication regiment.  Most recent creatinine slightly above his baseline, but staid stable at 1.7 after diuretic change earlier this year.

## 2023-06-08 NOTE — ASSESSMENT & PLAN NOTE
Mild aortic stenosis noted on echocardiogram last year, no worsening shortness of breath suggestive of progressive disease.

## 2023-06-08 NOTE — ASSESSMENT & PLAN NOTE
His most recent echocardiogram shows normal LV EF of 56 to 60%.  Continue current medication regimen including diuretics with torsemide 20 mg daily.

## 2023-06-08 NOTE — ASSESSMENT & PLAN NOTE
Most recent LDL is well controlled, however he is complaining of muscle aches.  We will decrease to Livalo 1 mg nightly.

## 2023-06-08 NOTE — ASSESSMENT & PLAN NOTE
Stable, without angina symptoms.  Continue statin and beta-blocker therapies.  Avoiding aspirin to minimize risk of bleeding, as he is is on DOAC for atrial fibrillation.  He has not had any episodes of chest pain for some time, considering his recent episodes of orthostatic lightheadedness, recommend decreasing dose of Imdur to 30 mg daily.  Instructed him if he begins to have chest pain again we can revisit this.

## 2023-06-08 NOTE — PROGRESS NOTES
Chief Complaint  Congestive Heart Failure, Coronary Artery Disease, Follow-up, and Hypertension    Subjective        History of Present Illness  Layo Cee presents to [facility] CARDIOLOGY   Mr. Cee is an 83-year-old male patient coming in for follow-up of CHF and coronary disease.  At last visit he was noted to have increasing pedal edema, diuretic was changed to torsemide 20 mg daily.  He has noted improvement in swelling.  He notes intermittently feeling mildly lightheaded, generally worsened with position change.  He has no complaints of chest pain/pressure, shortness of breath remains stable, lower extremity swelling has improved, and he has not had any complete syncopal episodes.  He continues to have leg aches, which she feels is related to his statin therapy.      Past History:     (1) Coronary artery disease, status post coronary artery bypass grafting in the past. Cardiac catheterization done in July 2016 showed patent left internal mammary graft to the LAD and occluded saphenous venous graft. Native LAD is 100% occluded in the mid portion. Native circumflex artery has no significant disease. Native right coronary artery is also 100% occluded and it is a chronic total occlusion. The right coronary artery is reconstituted with collaterals from the left side.  Patient underwent repeat cardiac catheterization in June, 2021 and underwent angioplasty stent placement to diagonal branch.  (2) Ischemic cardiomyopathy with recovery of cardiac function. Last SPECT stress test in August 2016 showed an LV ejection fraction of 68%. (3) Chronic kidney disease, stage 3. (4) Chronic obstructive pulmonary disease, not an exacerbation. (5) Hypertension. (6) Hyperlipidemia. 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 (8) Typical atrial flutter status post radiofrequency ablation by Dr. Aguilar on 11/30/2018 (9) persistent atrial  fibrillation status post extensive ablation and pulmonary vein isolation by Dr. Aguilar on 6/3/2022    Past Medical History:   Diagnosis Date    Acute on chronic diastolic CHF (congestive heart failure) 6/6/2022    Arthritis     Atrial fibrillation, persistent 5/3/2022    BPH (benign prostatic hyperplasia)     CHF (congestive heart failure)     COPD (chronic obstructive pulmonary disease)     Coronary artery disease of bypass graft of native heart with stable angina pectoris     COVID-19 02/04/2021    Diverticulitis 10/11/2019    Dysphagia     Essential hypertension 08/27/2020    Frequent PVCs 08/28/2021    GERD (gastroesophageal reflux disease)     Gross hematuria     Long-term use of high-risk medication     Lung disease     Mixed hyperlipidemia 08/28/2021    Myocardial infarction 07/2016    OCD (obsessive compulsive disorder)     Renal insufficiency 08/27/2020    Rhinitis, allergic 06/05/2018    Sore throat     Stable angina     Typical atrial flutter 11/30/2018    Vertigo        Allergies   Allergen Reactions    Carvedilol Swelling and Anaphylaxis    Levaquin [Levofloxacin] Unknown - Low Severity        Past Surgical History:   Procedure Laterality Date    BLADDER SURGERY      CARDIAC CATHETERIZATION  07/2016    CARDIAC CATHETERIZATION N/A 8/9/2021    Procedure: Left Heart Cath with possible angioplasty;  Surgeon: Jack Ladd MD;  Location: [facility] CATH INVASIVE LOCATION;  Service: Cardiovascular;  Laterality: N/A;  Please schedule the procedure with Dr. Jack Ladd MD on 8/9/2021    CARDIAC ELECTROPHYSIOLOGY PROCEDURE N/A 6/3/2022    Procedure: Ablation atrial fibrillation;  Surgeon: Irineo Aguilar MD;  Location: [facility] CATH INVASIVE LOCATION;  Service: Cardiovascular;  Laterality: N/A;    CARDIAC ELECTROPHYSIOLOGY PROCEDURE N/A 6/3/2022    Procedure: Ablation atrial flutter/CTI;  Surgeon: Irineo Aguilar MD;  Location: [facility] CATH INVASIVE LOCATION;  Service: Cardiovascular;  Laterality: N/A;     CARDIAC SURGERY      HEART BYPASS    COLONOSCOPY  2011    CORONARY ARTERY BYPASS GRAFT      CYSTOSCOPY  03/19/2019    WITH BILATERAL RETROGRADE PYELOGRAPHY    ELECTRICAL CARDIOVERSION  5/12/2022         ENDOSCOPY  2016    PROSTATE SURGERY          Social History  He  reports that he quit smoking about 25 years ago. His smoking use included cigarettes. He started smoking about 65 years ago. He has a 20.00 pack-year smoking history. He has never used smokeless tobacco. He reports that he does not currently use alcohol. He reports that he does not use drugs.    Family History  His family history includes Heart disease in his father; Hypertension in his mother; Kidney cancer in his brother; Other in his brother.       Current Outpatient Medications on File Prior to Visit   Medication Sig    acetaminophen (TYLENOL) 325 MG tablet Take 2 tablets by mouth Every 6 (Six) Hours As Needed for Mild Pain.    amiodarone (PACERONE) 200 MG tablet Take 1 tablet by mouth Daily. (Patient taking differently: Take 100 mg by mouth Daily.)    amLODIPine (NORVASC) 5 MG tablet Take 1 tablet by mouth Daily.    aspirin (aspirin) 81 MG EC tablet Take 1 tablet by mouth Daily.    buPROPion SR (Wellbutrin SR) 150 MG 12 hr tablet Take 1 tablet by mouth 2 (Two) Times a Day. For the first three days, take once daily by mouth. Day 4 and after, take twice daily by mouth. (Patient taking differently: Take 1 tablet by mouth Daily. For the first three days, take once daily by mouth. Day 4 and after, take twice daily by mouth.)    calcium carbonate (OS-LIANNA) 600 MG tablet Take 1 tablet by mouth Every Night.    cetirizine (zyrTEC) 10 MG tablet Take 1 tablet by mouth Daily.    Coenzyme Q10 100 MG tablet Take 1 tablet by mouth Daily.    Diclofenac Sodium (VOLTAREN) 1 % gel gel Apply 2 g topically to the appropriate area as directed 4 (Four) Times a Day.    finasteride (PROSCAR) 5 MG tablet Take 1 tablet by mouth daily.    fluticasone (FLONASE) 50 MCG/ACT  nasal spray 1 spray into the nostril(s) as directed by provider Every Night.    Fluticasone Furoate (Arnuity Ellipta) 100 MCG/ACT aerosol powder  Inhale 1 puff Daily.    ipratropium-albuterol (DUO-NEB) 0.5-2.5 mg/3 ml nebulizer Take 3 mL by nebulization 4 (Four) Times a Day As Needed for Wheezing.    metoprolol succinate XL (TOPROL-XL) 25 MG 24 hr tablet Take 1 tablet by mouth Daily.    montelukast (SINGULAIR) 10 MG tablet Take 1 tablet by mouth Every Night for 30 days.    nitroglycerin (NITROSTAT) 0.4 MG SL tablet Place 1 tablet under the tongue Every 5 (Five) Minutes As Needed for Chest Pain for up to 30 days. Take no more than 3 doses in 15 minutes.    pantoprazole (PROTONIX) 40 MG EC tablet Take 1 tablet by mouth daily.    rivaroxaban (Xarelto) 15 MG tablet Take 1 tablet by mouth Daily With Dinner.    sacubitril-valsartan (Entresto) 49-51 MG tablet Take 1 tablet by mouth 2 (Two) Times a Day.    tamsulosin (FLOMAX) 0.4 MG capsule 24 hr capsule Take 1 capsule by mouth daily. take 1/2 hour following the same meal each day    tiotropium bromide-olodaterol (Stiolto Respimat) 2.5-2.5 MCG/ACT aerosol solution inhaler Inhale 2 puffs Daily.    torsemide (DEMADEX) 20 MG tablet Take 1 tablet by mouth Daily.    VITAMIN B COMPLEX-C PO Take 1 tablet by mouth Daily.    [DISCONTINUED] isosorbide mononitrate (IMDUR) 60 MG 24 hr tablet Take 1 tablet by mouth Daily.    [DISCONTINUED] pitavastatin calcium (Livalo) 2 MG tablet tablet Take 1 tablet by mouth Every Night.    levalbuterol (Xopenex) 0.63 MG/3ML nebulizer solution Take 1 ampule by nebulization Every 6 (Six) Hours As Needed for Wheezing for up to 30 days. DX: J44.9    [DISCONTINUED] dilTIAZem CD (CARDIZEM CD) 180 MG 24 hr capsule Take 1 capsule by mouth Daily for 30 days.     No current facility-administered medications on file prior to visit.         Review of Systems   Constitutional:  Positive for fatigue.   Respiratory:  Positive for shortness of breath (Very mild with  "activity). Negative for cough and chest tightness.    Cardiovascular:  Negative for chest pain, palpitations and leg swelling.   Gastrointestinal:  Negative for nausea and vomiting.   Musculoskeletal:  Positive for myalgias.   Neurological:  Positive for light-headedness. Negative for syncope.   Hematological:  Does not bruise/bleed easily.      Objective   Vitals:    06/08/23 1112   BP: 145/65   Pulse: 69   Weight: 113 kg (250 lb)   Height: 177.8 cm (70\")         Physical Exam  General : Alert, awake, no acute distress  Neck : Supple, no carotid bruit, no jugular venous distention  CVS : Regular rate and rhythm, no murmur, rubs or gallops  Lungs: Clear to auscultation bilaterally, no crackles or rhonchi  Abdomen: Soft, nontender, bowel sounds active  Extremities: Warm, well-perfused, trace bilateral pedal edema      Result Review     The following data was reviewed by ARACELI Tristan  proBNP   Date Value Ref Range Status   11/10/2022 431.0 0.0 - 1,800.0 pg/mL Final     CMP          11/10/2022    10:15 1/9/2023    16:56 2/15/2023    10:15   CMP   Glucose 127  78  117    BUN 16  15  16    Creatinine 1.39  1.55  1.77    EGFR 51.2  45.0  38.3    Sodium 137  140  141    Potassium 3.9  4.2  4.2    Chloride 100  101  101    Calcium 8.8  9.2  9.5    Total Protein  7.0  6.7    Albumin  3.8  4.3    Globulin  3.2  2.4    Total Bilirubin  0.3  0.4    Alkaline Phosphatase  56  56    AST (SGOT)  21  17    ALT (SGPT)  20  20    Albumin/Globulin Ratio  1.2  1.8    BUN/Creatinine Ratio 11.5  9.7  9.0    Anion Gap 9.0  12.0  14.2      CBC w/diff          9/27/2022    18:29 9/28/2022    04:14 1/9/2023    16:56   CBC w/Diff   WBC 9.08  8.29  7.75    RBC 4.60  4.69  4.52    Hemoglobin 13.2  13.4  12.9    Hematocrit 40.5  40.8  39.8    MCV 88.0  87.0  88.1    MCH 28.7  28.6  28.5    MCHC 32.6  32.8  32.4    RDW 15.6  15.4  14.5    Platelets 211  195  217    Neutrophil Rel % 58.0  57.8  52.8    Immature Granulocyte Rel % 0.6  0.5  " 0.6    Lymphocyte Rel % 25.8  26.4  28.8    Monocyte Rel % 11.1  9.4  11.5    Eosinophil Rel % 4.2  5.3  5.8    Basophil Rel % 0.3  0.6  0.5       Lab Results   Component Value Date    TSH 1.230 03/02/2023      Lab Results   Component Value Date    FREET4 1.44 01/24/2022      No results found for: DDIMERQUANT  Magnesium   Date Value Ref Range Status   09/27/2022 2.2 1.6 - 2.4 mg/dL Final      Digoxin   Date Value Ref Range Status   06/06/2022 0.50 (L) 0.60 - 1.20 ng/mL Final      Lab Results   Component Value Date    TROPONINT <0.010 09/29/2022           Lipid Panel          10/7/2022    08:13 1/9/2023    16:56 2/15/2023    10:15   Lipid Panel   Total Cholesterol 112  137  125    Triglycerides 112  151  146    HDL Cholesterol 32  34  37    VLDL Cholesterol 21  26  25    LDL Cholesterol  59  77  63    LDL/HDL Ratio 1.80  2.14  1.59        BMP 2/27/23  GLUCOSE-TL  70 - 110 mg/dL 129 High    BUN-TL  7 - 18 mg/dL 19 High    CREATININE-TL  0.8 - 1.3 mg/dL 1.7 High    SODIUM-TL  136 - 145 mmol/L 138   POTASSIUM-TL  3.5 - 5.1 mmol/L 3.9   Chloride  98 - 107 mmol/L 104   CARBON DIOXIDE-TL  21 - 32 mmol/L 29   CALCIUM-TL  8.7 - 10.2 mg/dL 8.7   ANION GAP-TL  4 - 12 mmol/L 9   GFR ESTIMATE-TL  mL/min 40     MAGNESIUM-TL  1.8 - 2.4 mg/dL 2.0         Results for orders placed during the hospital encounter of 09/27/22    Adult Transthoracic Echo Complete W/ Cont if Necessary Per Protocol    Interpretation Summary  · The left ventricular cavity is borderline dilated.  · Left ventricular ejection fraction appears to be 56 - 60%.  · Left ventricular diastolic function is consistent with (grade I) impaired relaxation and age.  · Mild aortic valve stenosis is present with max/mean pressure gradients 17/10 mmHg.  · No significant pericardial effusion noted. There may be a minimal amount of fluid near the right atrium.  · Mild to moderate left atrial enlargement.  · Mild to moderate mitral regurgitation.    Results for orders placed  during the hospital encounter of 07/08/21    Stress Test With Myocardial Perfusion One Day    Interpretation Summary  · Myocardial perfusion imaging indicates a small-sized infarct located in the inferior wall with mild mansoor-infarct ischemia.  · Left ventricular ejection fraction is borderline normal. (Calculated EF = 49%).  · Diaphragmatic attenuation artifact is present.  · Findings consistent with a normal ECG stress test.  · Occasional isolated PVCs are noted at rest and also during stress.  · Impressions are consistent with an intermediate risk study.           Assessment and Plan   Diagnoses and all orders for this visit:    1. Coronary artery disease of bypass graft of native heart with stable angina pectoris (Primary)  Assessment & Plan:  Stable, without angina symptoms.  Continue statin and beta-blocker therapies.  Avoiding aspirin to minimize risk of bleeding, as he is is on DOAC for atrial fibrillation.  He has not had any episodes of chest pain for some time, considering his recent episodes of orthostatic lightheadedness, recommend decreasing dose of Imdur to 30 mg daily.  Instructed him if he begins to have chest pain again we can revisit this.      2. Atrial fibrillation, persistent  Assessment & Plan:  He is in sinus rhythm, denies any palpitations.  Continue amiodarone, and recheck CMP and thyroid levels.  Continue anticoagulation protection with  Xarelto.    Orders:  -     TSH; Future  -     Comprehensive Metabolic Panel; Future    3. Mixed hyperlipidemia  Assessment & Plan:  Most recent LDL is well controlled, however he is complaining of muscle aches.  We will decrease to Livalo 1 mg nightly.    Orders:  -     Comprehensive Metabolic Panel; Future  -     Lipid Panel; Future    4. Essential hypertension  Assessment & Plan:  His blood pressure remains reasonably well controlled, continue current medication regiment.  Most recent creatinine slightly above his baseline, but staid stable at 1.7 after  diuretic change earlier this year.    Orders:  -     Comprehensive Metabolic Panel; Future    5. Ischemic cardiomyopathy  Assessment & Plan:  His most recent echocardiogram shows normal LV EF of 56 to 60%.  Continue current medication regimen including diuretics with torsemide 20 mg daily.      6. Mild aortic valve stenosis  Assessment & Plan:  Mild aortic stenosis noted on echocardiogram last year, no worsening shortness of breath suggestive of progressive disease.        Other orders  -     isosorbide mononitrate (IMDUR) 30 MG 24 hr tablet; Take 1 tablet by mouth Daily.  Dispense: 90 tablet; Refill: 3  -     pitavastatin calcium (Livalo) 1 MG tablet tablet; Take 1 tablet by mouth Every Night.  Dispense: 90 tablet; Refill: 3        [id number] Score: 4         Follow Up   Return in about 3 months (around 9/8/2023) for with Dr. Stevenson.    Patient was given instructions and counseling regarding his condition or for health maintenance advice. Please see specific information pulled into the AVS if appropriate.     Signed,  Mely Smith, APRN  06/08/2023     Dictated Utilizing Dragon Dictation: Please note that portions of this note were completed with a voice recognition program.  Part of this note may be an electronic transcription/translation of spoken language to printed text using the Dragon Dictation System.

## 2023-07-24 DIAGNOSIS — K21.9 GASTROESOPHAGEAL REFLUX DISEASE, UNSPECIFIED WHETHER ESOPHAGITIS PRESENT: ICD-10-CM

## 2023-07-24 DIAGNOSIS — I51.89 DIASTOLIC DYSFUNCTION: ICD-10-CM

## 2023-07-24 DIAGNOSIS — J43.2 CENTRILOBULAR EMPHYSEMA: ICD-10-CM

## 2023-07-24 RX ORDER — PANTOPRAZOLE SODIUM 40 MG/1
40 TABLET, DELAYED RELEASE ORAL DAILY
Qty: 90 TABLET | Refills: 2 | Status: SHIPPED | OUTPATIENT
Start: 2023-07-24

## 2023-07-24 RX ORDER — MONTELUKAST SODIUM 10 MG/1
TABLET ORAL
Qty: 30 TABLET | Refills: 11 | Status: SHIPPED | OUTPATIENT
Start: 2023-07-24

## 2023-07-26 ENCOUNTER — PATIENT OUTREACH (OUTPATIENT)
Dept: CASE MANAGEMENT | Facility: OTHER | Age: 83
End: 2023-07-26
Payer: MEDICARE

## 2023-07-26 DIAGNOSIS — I50.9 CHRONIC CONGESTIVE HEART FAILURE, UNSPECIFIED HEART FAILURE TYPE: Primary | ICD-10-CM

## 2023-07-26 NOTE — OUTREACH NOTE
AMBULATORY CASE MANAGEMENT NOTE    Name and Relationship of Patient/Support Person: Layo Cee H - Self    Patient Outreach    CCM outreach made, patient states he is doing fairly well.  He had follow-up with cardiology on 6/8, his Isosorbide was decreased because his dizziness and his CP c/o are stable. Patient knows to stand slowly, then wait several seconds to make sure he has his balance to help prevent falls. His Livalo was also decreased because of his muscle cramps.  He said his legs weren't swollen today, but he said it could vary and they might be swollen tomorrow. Talked about elevating BLE while sitting, also low sodium diet. Also denies any more SOA than his usual.   He states his BP and HR have been good.   His wife is great support and keeps up with everything well.   Reminded about upcoming appointment and no needs at this time.  Education Documentation  No documentation found.      Rajani LOVE  Ambulatory Case Management  7/26/2023, 14:38 EDT

## 2023-07-31 ENCOUNTER — PATIENT OUTREACH (OUTPATIENT)
Dept: CASE MANAGEMENT | Facility: OTHER | Age: 83
End: 2023-07-31
Payer: MEDICARE

## 2023-07-31 DIAGNOSIS — J44.9 CHRONIC OBSTRUCTIVE PULMONARY DISEASE, UNSPECIFIED COPD TYPE: ICD-10-CM

## 2023-07-31 DIAGNOSIS — I50.9 CHRONIC CONGESTIVE HEART FAILURE, UNSPECIFIED HEART FAILURE TYPE: Primary | ICD-10-CM

## 2023-08-14 DIAGNOSIS — I25.5 ISCHEMIC CARDIOMYOPATHY: ICD-10-CM

## 2023-08-14 DIAGNOSIS — I48.19 ATRIAL FIBRILLATION, PERSISTENT: ICD-10-CM

## 2023-08-14 RX ORDER — METOPROLOL SUCCINATE 25 MG/1
TABLET, EXTENDED RELEASE ORAL
Qty: 180 TABLET | Refills: 3 | Status: SHIPPED | OUTPATIENT
Start: 2023-08-14

## 2023-08-17 ENCOUNTER — OFFICE VISIT (OUTPATIENT)
Dept: INTERNAL MEDICINE | Facility: CLINIC | Age: 83
End: 2023-08-17
Payer: MEDICARE

## 2023-08-17 VITALS
SYSTOLIC BLOOD PRESSURE: 135 MMHG | DIASTOLIC BLOOD PRESSURE: 67 MMHG | RESPIRATION RATE: 22 BRPM | WEIGHT: 246.6 LBS | TEMPERATURE: 97.3 F | BODY MASS INDEX: 35.3 KG/M2 | HEART RATE: 65 BPM | OXYGEN SATURATION: 94 % | HEIGHT: 70 IN

## 2023-08-17 DIAGNOSIS — Z98.890 S/P ABLATION OF ATRIAL FIBRILLATION: ICD-10-CM

## 2023-08-17 DIAGNOSIS — I10 ESSENTIAL HYPERTENSION: Primary | ICD-10-CM

## 2023-08-17 DIAGNOSIS — I48.19 PERSISTENT ATRIAL FIBRILLATION: ICD-10-CM

## 2023-08-17 DIAGNOSIS — I50.22 CHRONIC SYSTOLIC CONGESTIVE HEART FAILURE: ICD-10-CM

## 2023-08-17 DIAGNOSIS — Z79.01 CHRONIC ANTICOAGULATION: ICD-10-CM

## 2023-08-17 DIAGNOSIS — J43.9 PULMONARY EMPHYSEMA, UNSPECIFIED EMPHYSEMA TYPE: ICD-10-CM

## 2023-08-17 DIAGNOSIS — Z86.79 S/P ABLATION OF ATRIAL FIBRILLATION: ICD-10-CM

## 2023-08-17 LAB
ALBUMIN SERPL-MCNC: 3.9 G/DL (ref 3.5–5.2)
ALBUMIN/GLOB SERPL: 1.3 G/DL
ALP SERPL-CCNC: 58 U/L (ref 39–117)
ALT SERPL W P-5'-P-CCNC: 16 U/L (ref 1–41)
ANION GAP SERPL CALCULATED.3IONS-SCNC: 11.1 MMOL/L (ref 5–15)
AST SERPL-CCNC: 17 U/L (ref 1–40)
BILIRUB SERPL-MCNC: 0.2 MG/DL (ref 0–1.2)
BUN SERPL-MCNC: 27 MG/DL (ref 8–23)
BUN/CREAT SERPL: 16.4 (ref 7–25)
CALCIUM SPEC-SCNC: 8.8 MG/DL (ref 8.6–10.5)
CHLORIDE SERPL-SCNC: 104 MMOL/L (ref 98–107)
CO2 SERPL-SCNC: 23.9 MMOL/L (ref 22–29)
CREAT SERPL-MCNC: 1.65 MG/DL (ref 0.76–1.27)
EGFRCR SERPLBLD CKD-EPI 2021: 41.5 ML/MIN/1.73
GLOBULIN UR ELPH-MCNC: 2.9 GM/DL
GLUCOSE SERPL-MCNC: 116 MG/DL (ref 65–99)
POTASSIUM SERPL-SCNC: 4.9 MMOL/L (ref 3.5–5.2)
PROT SERPL-MCNC: 6.8 G/DL (ref 6–8.5)
SODIUM SERPL-SCNC: 139 MMOL/L (ref 136–145)
TSH SERPL DL<=0.05 MIU/L-ACNC: 0.41 UIU/ML (ref 0.27–4.2)

## 2023-08-17 PROCEDURE — 84443 ASSAY THYROID STIM HORMONE: CPT | Performed by: STUDENT IN AN ORGANIZED HEALTH CARE EDUCATION/TRAINING PROGRAM

## 2023-08-17 PROCEDURE — 80053 COMPREHEN METABOLIC PANEL: CPT | Performed by: STUDENT IN AN ORGANIZED HEALTH CARE EDUCATION/TRAINING PROGRAM

## 2023-08-17 NOTE — PROGRESS NOTES
Chief Complaint  Hypertension (Follow up)    Subjective          Layo Cee presents to [facility]  History of Present Illness    Here with wife.    Amiodarone reduced by half in the interim.  Light-headedness improved since reducing amiodarone.    Had cough, congestion this past weekend.  Went to urgent care.  Given rocephin and steroid shot plus course of oral steroids and augmentin.  No x-rays or labs.  Now feeling improved.    Of note, had several spots biopsied by Dr. Marte of derm this past Monday.      Current Outpatient Medications   Medication Instructions    acetaminophen (TYLENOL) 650 mg, Oral, Every 6 Hours PRN    amiodarone (PACERONE) 200 mg, Oral, Daily    amLODIPine (NORVASC) 5 mg, Oral, Daily    aspirin 81 mg, Oral, Daily    buPROPion SR (WELLBUTRIN SR) 150 mg, Oral, 2 Times Daily, For the first three days, take once daily by mouth. Day 4 and after, take twice daily by mouth.    calcium carbonate (OS-LIANNA) 600 mg, Oral, Nightly    cetirizine (ZYRTEC) 10 mg, Oral, Daily    Coenzyme Q10 100 mg, Oral, Daily    Diclofenac Sodium (VOLTAREN) 2 g, Topical, 4 Times Daily    finasteride (PROSCAR) 5 MG tablet Take 1 tablet by mouth daily.    fluticasone (FLONASE) 50 MCG/ACT nasal spray 1 spray, Nasal, Nightly    Fluticasone Furoate (Arnuity Ellipta) 100 MCG/ACT aerosol powder  1 puff, Inhalation, Daily    ipratropium-albuterol (DUO-NEB) 0.5-2.5 mg/3 ml nebulizer 3 mL, Nebulization, 4 Times Daily PRN    isosorbide mononitrate (IMDUR) 30 mg, Oral, Daily    metoprolol succinate XL (TOPROL-XL) 25 MG 24 hr tablet Take 3 tablets by mouth every 12 hours    montelukast (SINGULAIR) 10 MG tablet Take 1 tablet by mouth every night    nitroglycerin (NITROSTAT) 0.4 mg, Sublingual, Every 5 Minutes PRN, Take no more than 3 doses in 15 minutes.    pantoprazole (PROTONIX) 40 mg, Oral, Daily    pitavastatin calcium (LIVALO) 1 mg, Oral, Nightly    rivaroxaban (XARELTO) 15 mg,  "Oral, Daily With Dinner    sacubitril-valsartan (Entresto) 49-51 MG tablet 1 tablet, Oral, 2 Times Daily    tamsulosin (FLOMAX) 0.4 MG capsule 24 hr capsule Take 1 capsule by mouth daily. take 1/2 hour following the same meal each day    tiotropium bromide-olodaterol (Stiolto Respimat) 2.5-2.5 MCG/ACT aerosol solution inhaler 2 puffs, Inhalation, Daily - RT    torsemide (DEMADEX) 20 mg, Oral, Daily    VITAMIN B COMPLEX-C PO 1 tablet, Oral, Daily       The following portions of the patient's history were reviewed and updated as appropriate: allergies, current medications, past family history, past medical history, past social history, past surgical history, and problem list.    Objective   Vital Signs:   /67 (BP Location: Left arm, Patient Position: Sitting, Cuff Size: Large Adult)   Pulse 65   Temp 97.3 øF (36.3 øC) (Temporal)   Resp 22   Ht 177.8 cm (70\")   Wt 112 kg (246 lb 9.6 oz)   SpO2 94%   BMI 35.38 kg/mý     BP Readings from Last 3 Encounters:   08/17/23 135/67   06/08/23 145/65   04/14/23 126/74     Wt Readings from Last 3 Encounters:   08/17/23 112 kg (246 lb 9.6 oz)   06/08/23 113 kg (250 lb)   05/15/23 109 kg (241 lb)     Physical Exam  Vitals reviewed.   Constitutional:       General: He is not in acute distress.     Appearance: Normal appearance. He is not ill-appearing, toxic-appearing or diaphoretic.   HENT:      Head: Normocephalic and atraumatic.      Right Ear: External ear normal.      Ears:      Comments: Part of left auricle missing  Eyes:      Conjunctiva/sclera: Conjunctivae normal.   Cardiovascular:      Rate and Rhythm: Normal rate and regular rhythm.      Pulses: Normal pulses.      Heart sounds: Normal heart sounds. No murmur heard.    No friction rub. No gallop.   Pulmonary:      Effort: Pulmonary effort is normal. No respiratory distress.      Breath sounds: Normal breath sounds. No stridor. No wheezing, rhonchi or rales.   Chest:      Chest wall: No tenderness. "   Abdominal:      General: Abdomen is flat.      Palpations: Abdomen is soft. There is no mass.      Tenderness: There is no abdominal tenderness.   Musculoskeletal:      Right lower leg: Edema present.      Left lower leg: Edema present.      Comments: 1+ Edema   Skin:     General: Skin is warm and dry.   Neurological:      General: No focal deficit present.      Mental Status: He is alert. Mental status is at baseline.   Psychiatric:         Mood and Affect: Mood normal.         Behavior: Behavior normal.         Thought Content: Thought content normal.         Judgment: Judgment normal.     Result Review :   The following data was reviewed by: Noble Kc MD on 08/17/2023:  Common labs          1/9/2023    16:56 2/15/2023    10:15 3/2/2023    12:09   Common Labs   Glucose 78  117     BUN 15  16     Creatinine 1.55  1.77     Sodium 140  141     Potassium 4.2  4.2     Chloride 101  101     Calcium 9.2  9.5     Albumin 3.8  4.3     Total Bilirubin 0.3  0.4     Alkaline Phosphatase 56  56     AST (SGOT) 21  17     ALT (SGPT) 20  20     WBC 7.75      Hemoglobin 12.9      Hematocrit 39.8      Platelets 217      Total Cholesterol 137  125     Triglycerides 151  146     HDL Cholesterol 34  37     LDL Cholesterol  77  63     PSA   2.170             Lab Results   Component Value Date    SARSANTIGEN Not Detected 11/11/2022    COVID19 Not Detected 11/11/2022    FLUAAG Negative 03/24/2023    FLUBAG Not Detected 11/11/2022    RAPSCRN Negative 08/14/2023    INR 1.69 (H) 06/02/2022    BILIRUBINUR Negative 10/31/2022       Procedures        Assessment and Plan    Diagnoses and all orders for this visit:    1. Essential hypertension (Primary)  -     Comprehensive Metabolic Panel    2. Persistent atrial fibrillation  -     TSH    3. Chronic systolic congestive heart failure    4. Chronic anticoagulation    5. Pulmonary emphysema, unspecified emphysema type    6. S/P ablation of atrial fibrillation      HTN:  -BP at goal, will  continue current regimen of amlodipine, imdur, metoprolol, entresto    A fib:  -symptoms improved with amiodarone adjustment  -on xarelto    Medications Discontinued During This Encounter   Medication Reason    levalbuterol (Xopenex) 0.63 MG/3ML nebulizer solution *Therapy completed       Follow Up   Return in about 5 months (around 1/10/2024) for Medicare Wellness.  Patient was given instructions and counseling regarding his condition or for health maintenance advice. Please see specific information pulled into the AVS if appropriate.       Noble Kc MD  08/17/23  17:52 EDT

## 2023-08-18 ENCOUNTER — TELEPHONE (OUTPATIENT)
Dept: INTERNAL MEDICINE | Facility: CLINIC | Age: 83
End: 2023-08-18
Payer: MEDICARE

## 2023-08-18 NOTE — TELEPHONE ENCOUNTER
----- Message from Noble Kc MD sent at 8/18/2023  6:51 AM EDT -----  Labs overall are reassuring.    CMP shows mildly improved renal function.    TSH is within normal limits.

## 2023-08-28 ENCOUNTER — TELEPHONE (OUTPATIENT)
Dept: CASE MANAGEMENT | Facility: OTHER | Age: 83
End: 2023-08-28
Payer: MEDICARE

## 2023-08-28 DIAGNOSIS — I25.5 ISCHEMIC CARDIOMYOPATHY: ICD-10-CM

## 2023-08-28 RX ORDER — TORSEMIDE 20 MG/1
20 TABLET ORAL DAILY
Qty: 90 TABLET | Refills: 1 | Status: SHIPPED | OUTPATIENT
Start: 2023-08-28

## 2023-08-28 NOTE — TELEPHONE ENCOUNTER
#60 chart review. He was seen at  8/14, given ABX and steroid for COPD and Viral syn. He seen PCP 8/17, he was feeling better and PCP stated his HTN was at goal. No changes noted.     Rajani Mccauley RN  Ambulatory Case Management  8/28/2023, 14:24 EDT

## 2023-09-05 ENCOUNTER — LAB (OUTPATIENT)
Dept: LAB | Facility: HOSPITAL | Age: 83
End: 2023-09-05
Payer: MEDICARE

## 2023-09-05 DIAGNOSIS — E78.2 MIXED HYPERLIPIDEMIA: ICD-10-CM

## 2023-09-05 LAB
CHOLEST SERPL-MCNC: 137 MG/DL (ref 0–200)
HDLC SERPL-MCNC: 34 MG/DL (ref 40–60)
LDLC SERPL CALC-MCNC: 79 MG/DL (ref 0–100)
LDLC/HDLC SERPL: 2.24 {RATIO}
TRIGL SERPL-MCNC: 134 MG/DL (ref 0–150)
VLDLC SERPL-MCNC: 24 MG/DL (ref 5–40)

## 2023-09-05 PROCEDURE — 80061 LIPID PANEL: CPT

## 2023-09-05 PROCEDURE — 36415 COLL VENOUS BLD VENIPUNCTURE: CPT

## 2023-09-11 ENCOUNTER — OFFICE VISIT (OUTPATIENT)
Dept: PULMONOLOGY | Facility: CLINIC | Age: 83
End: 2023-09-11
Payer: MEDICARE

## 2023-09-11 VITALS
RESPIRATION RATE: 16 BRPM | TEMPERATURE: 98.4 F | BODY MASS INDEX: 35.36 KG/M2 | HEIGHT: 70 IN | OXYGEN SATURATION: 96 % | SYSTOLIC BLOOD PRESSURE: 121 MMHG | DIASTOLIC BLOOD PRESSURE: 56 MMHG | HEART RATE: 77 BPM | WEIGHT: 247 LBS

## 2023-09-11 DIAGNOSIS — K21.9 GASTROESOPHAGEAL REFLUX DISEASE, UNSPECIFIED WHETHER ESOPHAGITIS PRESENT: ICD-10-CM

## 2023-09-11 DIAGNOSIS — I51.89 DIASTOLIC DYSFUNCTION: ICD-10-CM

## 2023-09-11 DIAGNOSIS — Z23 ENCOUNTER FOR IMMUNIZATION: Primary | ICD-10-CM

## 2023-09-11 DIAGNOSIS — J43.2 CENTRILOBULAR EMPHYSEMA: ICD-10-CM

## 2023-09-11 RX ORDER — FLUTICASONE FUROATE, UMECLIDINIUM BROMIDE AND VILANTEROL TRIFENATATE 100; 62.5; 25 UG/1; UG/1; UG/1
1 POWDER RESPIRATORY (INHALATION)
Qty: 1 EACH | Refills: 11 | Status: SHIPPED | OUTPATIENT
Start: 2023-09-11

## 2023-09-11 RX ORDER — MONTELUKAST SODIUM 10 MG/1
10 TABLET ORAL NIGHTLY
Qty: 30 TABLET | Refills: 11 | Status: SHIPPED | OUTPATIENT
Start: 2023-09-11

## 2023-09-11 RX ORDER — FLUTICASONE FUROATE, UMECLIDINIUM BROMIDE AND VILANTEROL TRIFENATATE 100; 62.5; 25 UG/1; UG/1; UG/1
1 POWDER RESPIRATORY (INHALATION)
COMMUNITY
End: 2023-09-11 | Stop reason: SDUPTHER

## 2023-09-11 NOTE — PROGRESS NOTES
Primary Care Provider  Noble Kc MD     Referring Provider  No ref. provider found     Chief Complaint  COPD, Shortness of Breath, Wheezing, Cough, and Follow-up (6 month follow up)    Subjective          Layo Cee presents to [facility]  COPD  He complains of cough, shortness of breath and wheezing. His past medical history is significant for emphysema.   Emphysema  Associated symptoms include coughing.   Shortness of Breath  Associated symptoms include wheezing.   Wheezing   Associated symptoms include coughing and shortness of breath.   Cough  Associated symptoms include shortness of breath and wheezing. His past medical history is significant for emphysema.   Layo Cee is a 83 y.o. male patient with history of COPD, seasonal allergies, wheezing, cough, chronic compensated systolic heart failure, known granulomatous disease, lung nodules, tobacco abuse of cigarettes in remission, COVID-19 infection in January 2021.  He is here for follow-up.  Since his last office visit, he has been around Trelegy Ellipta once daily.  He was on Stiolto and Arnuity previously.  Currently he is using torsemide for diuretics.  He is using DuoNeb nebulizer 4 times a day.  He has cough and congestion.  He used to be on oxygen with sleep. He is continued on Xarelto. He has shortness of breath with activities.  His leg swelling has improved.  He has no chest pain and chest tightness.  No nausea or vomiting.  He has intermittent wheezing.  Has no changes in weight or appetite.  No fever or chills.      Review of Systems   Respiratory:  Positive for cough, shortness of breath and wheezing.  General:  Fatigue, No Fever No weight loss or loss of appetite  HEENT: No dysphagia, No Visual Changes, no rhinorrhea, sore throat+  Respiratory:  + cough,+Dyspnea, intermittent phlegm, No Pleuritic Pain, no wheezing, no hemoptysis  Cardiovascular: Denies chest pain, denies  palpitations,+HALL, + improving chest Pressure  Gastrointestinal:  No Abdominal Pain, No Nausea, No Vomiting, No Diarrhea  Genitourinary:  No Dysuria, No Frequency, No Hesitancy  Musculoskeletal: No muscle pain or swelling  Endocrine:  No Heat Intolerance, No Cold Intolerance  Hematologic:  No Bleeding, No Bruising  Psychiatric:  No Anxiety, No Depression  Neurologic:  No Confusion, no Dysarthria, No Headaches  Skin:  No Rash, No Open Wounds    Family History   Problem Relation Age of Onset    Hypertension Mother     Heart disease Father     Other Brother         GASTROINTESTINAL CANCER    Kidney cancer Brother         Social History     Socioeconomic History    Marital status:    Tobacco Use    Smoking status: Former     Packs/day: 0.50     Years: 40.00     Pack years: 20.00     Types: Cigarettes     Start date:      Quit date:      Years since quittin.7    Smokeless tobacco: Never   Vaping Use    Vaping Use: Never used   Substance and Sexual Activity    Alcohol use: Not Currently    Drug use: Never    Sexual activity: Defer        Past Medical History:   Diagnosis Date    Acute on chronic diastolic CHF (congestive heart failure) 2022    Arthritis     Atrial fibrillation, persistent 5/3/2022    Persistent atrial fibrillation status post extensive ablation and pulmonary vein isolation by Dr. Aguilar on 6/3/2022, with reoccurring rapid A. fib, and then successful cardioversion on 2022    BPH (benign prostatic hyperplasia)     CHF (congestive heart failure)     COPD (chronic obstructive pulmonary disease)     Coronary artery disease of bypass graft of native heart with stable angina pectoris     (1) Coronary artery disease, s/p CABG in the past. Cardiac catheterization in 2016 showed patentLIMA graft to the LAD and occluded SVGs. Native LAD is 100% occluded in the mid portion. Native LCx has no significant disease. Native RCA is 100% occluded and it is a . Repeat cath in ,  underwent stent placement to diagnonal branch.    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 Lauren Valencia         Immunization History   Administered Date(s) Administered    COVID-19 (Entreda) 11/04/2021    Fluzone High-Dose 65+yrs 09/28/2021, 10/05/2022, 09/11/2023    Fluzone Quad >6mos (Multi-dose) 10/08/2019    Influenza Quad Vaccine (Inpatient) 10/08/2019    Influenza, Unspecified 10/08/2019    Pneumococcal Conjugate 13-Valent (PCV13) 04/13/2018    Pneumococcal Polysaccharide (PPSV23) 06/07/2019    Shingrix 04/29/2021, 11/22/2021    Td (TDVAX) 01/09/2023         Allergies   Allergen Reactions    Carvedilol Swelling and Anaphylaxis    Levaquin [Levofloxacin] Unknown - Low Severity          Current Outpatient Medications:     acetaminophen (TYLENOL) 325 MG tablet, Take 2 tablets by mouth Every 6 (Six) Hours As Needed for Mild Pain., Disp: , Rfl:     amiodarone (PACERONE) 200 MG tablet, Take 1 tablet by mouth Daily. (Patient taking differently: Take 0.5 tablets by mouth Daily.), Disp: 90 tablet, Rfl: 1    amLODIPine (NORVASC) 5 MG tablet, Take 1 tablet by mouth Daily., Disp: 90 tablet, Rfl: 3    aspirin (aspirin) 81 MG EC tablet, Take 1 tablet by mouth Daily., Disp: 30 tablet, Rfl: 1    calcium carbonate (OS-LIANNA) 600 MG tablet, Take 1 tablet by mouth Every Night., Disp: , Rfl:     cetirizine (zyrTEC) 10 MG tablet, Take 1 tablet by mouth Daily., Disp: 90 tablet, Rfl: 3    Coenzyme Q10 100 MG tablet, Take 1 tablet by mouth Daily., Disp: , Rfl:      Diclofenac Sodium (VOLTAREN) 1 % gel gel, Apply 2 g topically to the appropriate area as directed 4 (Four) Times a Day., Disp: , Rfl:     finasteride (PROSCAR) 5 MG tablet, Take 1 tablet by mouth daily., Disp: 90 tablet, Rfl: 4    Fluticasone-Umeclidin-Vilant (Trelegy Ellipta) 100-62.5-25 MCG/ACT inhaler, Inhale 1 puff Daily., Disp: 1 each, Rfl: 11    ipratropium-albuterol (DUO-NEB) 0.5-2.5 mg/3 ml nebulizer, Take 3 mL by nebulization 4 (Four) Times a Day As Needed for Wheezing., Disp: 360 mL, Rfl: 3    isosorbide mononitrate (IMDUR) 30 MG 24 hr tablet, Take 1 tablet by mouth Daily., Disp: 90 tablet, Rfl: 3    metoprolol succinate XL (TOPROL-XL) 25 MG 24 hr tablet, Take 3 tablets by mouth every 12 hours, Disp: 180 tablet, Rfl: 3    montelukast (SINGULAIR) 10 MG tablet, Take 1 tablet by mouth Every Night., Disp: 30 tablet, Rfl: 11    nitroglycerin (NITROSTAT) 0.4 MG SL tablet, Place 1 tablet under the tongue Every 5 (Five) Minutes As Needed for Chest Pain for up to 30 days. Take no more than 3 doses in 15 minutes., Disp: 30 tablet, Rfl: 0    pantoprazole (PROTONIX) 40 MG EC tablet, Take 1 tablet by mouth daily., Disp: 90 tablet, Rfl: 2    pitavastatin calcium (Livalo) 1 MG tablet tablet, Take 1 tablet by mouth Every Night., Disp: 90 tablet, Rfl: 3    rivaroxaban (Xarelto) 15 MG tablet, Take 1 tablet by mouth Daily With Dinner., Disp: 90 tablet, Rfl: 3    sacubitril-valsartan (Entresto) 49-51 MG tablet, Take 1 tablet by mouth 2 (Two) Times a Day., Disp: 180 tablet, Rfl: 3    tamsulosin (FLOMAX) 0.4 MG capsule 24 hr capsule, Take 1 capsule by mouth daily. take 1/2 hour following the same meal each day, Disp: 90 capsule, Rfl: 4    torsemide (DEMADEX) 20 MG tablet, Take 1 tablet by mouth daily., Disp: 90 tablet, Rfl: 1    VITAMIN B COMPLEX-C PO, Take 1 tablet by mouth Daily., Disp: , Rfl:     buPROPion SR (Wellbutrin SR) 150 MG 12 hr tablet, Take 1 tablet by mouth 2 (Two) Times a Day. For the first three days, take once  "daily by mouth. Day 4 and after, take twice daily by mouth. (Patient not taking: Reported on 9/11/2023), Disp: 180 tablet, Rfl: 2    fluticasone (FLONASE) 50 MCG/ACT nasal spray, 1 spray into the nostril(s) as directed by provider Every Night., Disp: , Rfl:      Objective   Vital Signs:   /56 (BP Location: Right arm, Patient Position: Sitting, Cuff Size: Adult)   Pulse 77   Temp 98.4 °F (36.9 °C) (Tympanic)   Resp 16   Ht 177.8 cm (70\")   Wt 112 kg (247 lb)   SpO2 96% Comment: room air  BMI 35.44 kg/m²     Mallampatti classification : 1  Physical Exam  Vital Signs Reviewed  Obese, elderly male, pleasant, in no acute distress, normal conversant  HEENT:  PERRL, EOMI.  OP, nares clear, has hoarseness of voice, posterior pharyngeal erythema+  Neck:  Supple, no JVD, no thyromegaly  Lymph: no axillary, cervical, supraclavicular lymphadenopathy noted bilaterally  Chest:  good aeration, bilateral diminished breath sounds, no wheezing, cracklesi, scattered rhonchi at bases, resonant to percussion b/l  CV: RRR, no MGR, pulses 2+, equal  Abd:  Soft, NT, ND, + BS, no HSM  EXT:  no clubbing, no cyanosis, trace BLE edema  Neuro:  A&Ox3, CN grossly intact, no focal deficits  Skin: No rashes or lesions noted     Result Review :   The following data was reviewed by: Kolton Brink MD on 04/06/2022:  Common labs          3/2/2023    12:09 8/17/2023    12:25 9/5/2023    11:08   Common Labs   Glucose  116     BUN  27     Creatinine  1.65     Sodium  139     Potassium  4.9     Chloride  104     Calcium  8.8     Albumin  3.9     Total Bilirubin  0.2     Alkaline Phosphatase  58     AST (SGOT)  17     ALT (SGPT)  16     Total Cholesterol   137    Triglycerides   134    HDL Cholesterol   34    LDL Cholesterol    79    PSA 2.170        CMP          1/9/2023    16:56 2/15/2023    10:15 8/17/2023    12:25   CMP   Glucose 78  117  116    BUN 15  16  27    Creatinine 1.55  1.77  1.65    EGFR 45.0  38.3  41.5    Sodium 140  141  " 139    Potassium 4.2  4.2  4.9    Chloride 101  101  104    Calcium 9.2  9.5  8.8    Total Protein 7.0  6.7  6.8    Albumin 3.8  4.3  3.9    Globulin 3.2  2.4  2.9    Total Bilirubin 0.3  0.4  0.2    Alkaline Phosphatase 56  56  58    AST (SGOT) 21  17  17    ALT (SGPT) 20  20  16    Albumin/Globulin Ratio 1.2  1.8  1.3    BUN/Creatinine Ratio 9.7  9.0  16.4    Anion Gap 12.0  14.2  11.1      CBC          9/27/2022    18:29 9/28/2022    04:14 1/9/2023    16:56   CBC   WBC 9.08  8.29  7.75    RBC 4.60  4.69  4.52    Hemoglobin 13.2  13.4  12.9    Hematocrit 40.5  40.8  39.8    MCV 88.0  87.0  88.1    MCH 28.7  28.6  28.5    MCHC 32.6  32.8  32.4    RDW 15.6  15.4  14.5    Platelets 211  195  217      CBC w/diff      CBC w/Diff 8/9/21 8/10/21 1/24/22   WBC 8.02 8.83 6.25   RBC 5.14 4.64 5.07   Hemoglobin 15.1 13.7 14.7   Hematocrit 46.3 41.2 44.2   MCV 90.1 88.8 87.2   MCH 29.4 29.5 29.0   MCHC 32.6 33.3 33.3   RDW 15.2 15.4 13.9   Platelets 185 163 261   Neutrophil Rel % 49.1  52.5   Immature Granulocyte Rel % 0.6 (A)  0.6 (A)   Lymphocyte Rel % 34.7  29.6   Monocyte Rel % 10.5  10.9   Eosinophil Rel % 4.6  5.9   Basophil Rel % 0.5  0.5   (A) Abnormal value              Data reviewed: Radiologic studies CT scan of the chest from October 2021 was reviewed.  Shows some chronic interstitial changes with emphysema.             Assessment and Plan    Diagnoses and all orders for this visit:    1. Encounter for immunization (Primary)  -     Cancel: Fluzone >6 Months ([id number])  -     Fluzone High-Dose 65+yrs (9901-0072)    2. Diastolic dysfunction  -     Cancel: Fluzone >6 Months ([id number])  -     Overnight Sleep Oximetry Study; Future  -     Fluticasone-Umeclidin-Vilant (Trelegy Ellipta) 100-62.5-25 MCG/ACT inhaler; Inhale 1 puff Daily.  Dispense: 1 each; Refill: 11  -     montelukast (SINGULAIR) 10 MG tablet; Take 1 tablet by mouth Every Night.  Dispense: 30 tablet; Refill: 11    3. Centrilobular emphysema  -      Cancel: Fluzone >6 Months ([id number])  -     Overnight Sleep Oximetry Study; Future  -     Fluticasone-Umeclidin-Vilant (Trelegy Ellipta) 100-62.5-25 MCG/ACT inhaler; Inhale 1 puff Daily.  Dispense: 1 each; Refill: 11  -     montelukast (SINGULAIR) 10 MG tablet; Take 1 tablet by mouth Every Night.  Dispense: 30 tablet; Refill: 11    4. Gastroesophageal reflux disease, unspecified whether esophagitis present  -     Cancel: Fluzone >6 Months ([id number])  -     Overnight Sleep Oximetry Study; Future  -     Fluticasone-Umeclidin-Vilant (Trelegy Ellipta) 100-62.5-25 MCG/ACT inhaler; Inhale 1 puff Daily.  Dispense: 1 each; Refill: 11  -     montelukast (SINGULAIR) 10 MG tablet; Take 1 tablet by mouth Every Night.  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  COPD: Switch Stiolto and Arnuity to trelogy Ellipta 200 mcg once daily. He was on albuterol and DuoNeb.    I advised him to use DuoNeb 1-2 times daily for airway clearance.  Continue with Singulair.  Continue with pantoprazole.    Chronic A. fib: Status post cardiac ablation, now in sinus rhythm.  Continue Xarelto.    He would benefit from pulmonary rehab, however has to drive a long distance for it and is not willing to do it for now  Congestive heart failure: Continue with diuretics through Dr. Stevenson.  Continue with Xarelto.  Continue with torsemide once daily.    Chronic hypoxic respiratory failure: Check overnight oximetry.  Continues to use oxygen with sleep and as well he is improving on it.    Lung nodules: Likely related to granulomatous lung disease.  Repeat CT scan of the chest in October 2022 shows normal intrathoracic process.    Up-to-date on flu and pneumonia vaccine.  He has had J & J vaccine.  Advised booster vaccination for COVID.    Follow Up   Return in about 6 months (around 3/11/2024).  Patient was given instructions and counseling regarding his condition or for health maintenance advice. Please  see specific information pulled into the AVS if appropriate.       Electronically signed by Kolton Brink MD, 9/11/2023, 17:38 EDT.

## 2023-09-12 ENCOUNTER — OFFICE VISIT (OUTPATIENT)
Dept: CARDIOLOGY | Facility: CLINIC | Age: 83
End: 2023-09-12
Payer: MEDICARE

## 2023-09-12 VITALS
SYSTOLIC BLOOD PRESSURE: 126 MMHG | HEIGHT: 70 IN | DIASTOLIC BLOOD PRESSURE: 48 MMHG | BODY MASS INDEX: 35.36 KG/M2 | WEIGHT: 247 LBS | HEART RATE: 65 BPM

## 2023-09-12 DIAGNOSIS — E78.2 MIXED HYPERLIPIDEMIA: ICD-10-CM

## 2023-09-12 DIAGNOSIS — I48.19 ATRIAL FIBRILLATION, PERSISTENT: ICD-10-CM

## 2023-09-12 DIAGNOSIS — I25.708 CORONARY ARTERY DISEASE OF BYPASS GRAFT OF NATIVE HEART WITH STABLE ANGINA PECTORIS: Primary | Chronic | ICD-10-CM

## 2023-09-12 DIAGNOSIS — I25.5 ISCHEMIC CARDIOMYOPATHY: ICD-10-CM

## 2023-09-12 DIAGNOSIS — I10 ESSENTIAL HYPERTENSION: ICD-10-CM

## 2023-09-12 PROCEDURE — 3074F SYST BP LT 130 MM HG: CPT | Performed by: INTERNAL MEDICINE

## 2023-09-12 PROCEDURE — 1159F MED LIST DOCD IN RCRD: CPT | Performed by: INTERNAL MEDICINE

## 2023-09-12 PROCEDURE — 1160F RVW MEDS BY RX/DR IN RCRD: CPT | Performed by: INTERNAL MEDICINE

## 2023-09-12 PROCEDURE — 99214 OFFICE O/P EST MOD 30 MIN: CPT | Performed by: INTERNAL MEDICINE

## 2023-09-12 PROCEDURE — 3078F DIAST BP <80 MM HG: CPT | Performed by: INTERNAL MEDICINE

## 2023-09-15 NOTE — PROGRESS NOTES
CARDIOLOGY FOLLOW-UP PROGRESS NOTE        Chief Complaint  Follow-up, Coronary Artery Disease, and Cardiomyopathy    Subjective            Layo Cee presents to [facility] CARDIOLOGY  History of Present Illness      Mr Cee is here for routine 4-month follow-up visit.  He reports some swelling of the feet but improved from before.  Shortness of breath is stable.  He has not had any episodes of recent chest pain.  No recent hospitalizations or ER visits.  Taking all the medications as prescribed.      Past History:    (1) Coronary artery disease, status post coronary artery bypass grafting in the past. Cardiac catheterization done in July 2016 showed patent left internal mammary graft to the LAD and occluded saphenous venous graft. Native LAD is 100% occluded in the mid portion. Native circumflex artery has no significant disease. Native right coronary artery is also 100% occluded and it is a chronic total occlusion. The right coronary artery is reconstituted with collaterals from the left side.  Patient underwent repeat cardiac catheterization in June, 2021 and underwent angioplasty stent placement to diagonal branch.  (2) Ischemic cardiomyopathy with recovery of cardiac function. Last SPECT stress test in August 2016 showed an LV ejection fraction of 68%. (3) Chronic kidney disease, stage 3. (4) Chronic obstructive pulmonary disease, not an exacerbation. (5) Hypertension. (6) Hyperlipidemia. 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 (8) Typical atrial flutter status post radiofrequency ablation by Dr. Aguilar on 11/30/2018 (9) persistent atrial fibrillation status post extensive ablation and pulmonary vein isolation by Dr. Aguilar on 6/3/2022     Medical History:  Past Medical History:   Diagnosis Date    Acute on chronic diastolic CHF (congestive heart failure) 6/6/2022    Arthritis     Atrial fibrillation, persistent 5/3/2022     Persistent atrial fibrillation status post extensive ablation and pulmonary vein isolation by Dr. Aguilar on 6/3/2022, with reoccurring rapid A. fib, and then successful cardioversion on 7/1/2022    BPH (benign prostatic hyperplasia)     CHF (congestive heart failure)     COPD (chronic obstructive pulmonary disease)     Coronary artery disease of bypass graft of native heart with stable angina pectoris     (1) Coronary artery disease, s/p CABG in the past. Cardiac catheterization in July 2016 showed patentLIMA graft to the LAD and occluded SVGs. Native LAD is 100% occluded in the mid portion. Native LCx has no significant disease. Native RCA is 100% occluded and it is a . Repeat cath in June, 2021, underwent stent placement to diagnonal branch.    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 Lauren Valencia        Surgical History: has a past surgical history that includes Bladder surgery; Coronary artery bypass graft; Cardiac catheterization (07/2016); Colonoscopy (2011); Cystoscopy (03/19/2019); Esophagogastroduodenoscopy (2016); Cardiac surgery; Prostate surgery; Cardiac catheterization (N/A, 8/9/2021); Electrical Cardioversion (5/12/2022); Cardiac electrophysiology procedure (N/A, 6/3/2022); and Cardiac electrophysiology procedure (N/A, 6/3/2022).     Family History: family history includes Heart disease in his father; Hypertension in his mother; Kidney cancer in his brother; Other in his brother.     Social History: reports that he quit  smoking about 25 years ago. His smoking use included cigarettes. He started smoking about 65 years ago. He has a 20.00 pack-year smoking history. He has never used smokeless tobacco. He reports that he does not currently use alcohol. He reports that he does not use drugs.    Allergies: Carvedilol and Levaquin [levofloxacin]    Current Outpatient Medications on File Prior to Visit   Medication Sig    acetaminophen (TYLENOL) 325 MG tablet Take 2 tablets by mouth Every 6 (Six) Hours As Needed for Mild Pain.    amiodarone (PACERONE) 200 MG tablet Take 1 tablet by mouth Daily. (Patient taking differently: Take 0.5 tablets by mouth Daily.)    amLODIPine (NORVASC) 5 MG tablet Take 1 tablet by mouth Daily.    aspirin (aspirin) 81 MG EC tablet Take 1 tablet by mouth Daily.    calcium carbonate (OS-LIANNA) 600 MG tablet Take 1 tablet by mouth Every Night.    cetirizine (zyrTEC) 10 MG tablet Take 1 tablet by mouth Daily.    Coenzyme Q10 100 MG tablet Take 1 tablet by mouth Daily.    Diclofenac Sodium (VOLTAREN) 1 % gel gel Apply 2 g topically to the appropriate area as directed 4 (Four) Times a Day.    finasteride (PROSCAR) 5 MG tablet Take 1 tablet by mouth daily.    Fluticasone-Umeclidin-Vilant (Trelegy Ellipta) 100-62.5-25 MCG/ACT inhaler Inhale 1 puff Daily.    ipratropium-albuterol (DUO-NEB) 0.5-2.5 mg/3 ml nebulizer Take 3 mL by nebulization 4 (Four) Times a Day As Needed for Wheezing.    isosorbide mononitrate (IMDUR) 30 MG 24 hr tablet Take 1 tablet by mouth Daily.    metoprolol succinate XL (TOPROL-XL) 25 MG 24 hr tablet Take 3 tablets by mouth every 12 hours    montelukast (SINGULAIR) 10 MG tablet Take 1 tablet by mouth Every Night.    pantoprazole (PROTONIX) 40 MG EC tablet Take 1 tablet by mouth daily.    pitavastatin calcium (Livalo) 1 MG tablet tablet Take 1 tablet by mouth Every Night.    rivaroxaban (Xarelto) 15 MG tablet Take 1 tablet by mouth Daily With Dinner.    sacubitril-valsartan (Entresto) 49-51 MG tablet  "Take 1 tablet by mouth 2 (Two) Times a Day.    tamsulosin (FLOMAX) 0.4 MG capsule 24 hr capsule Take 1 capsule by mouth daily. take 1/2 hour following the same meal each day    torsemide (DEMADEX) 20 MG tablet Take 1 tablet by mouth daily.    VITAMIN B COMPLEX-C PO Take 1 tablet by mouth Daily.    fluticasone (FLONASE) 50 MCG/ACT nasal spray 1 spray into the nostril(s) as directed by provider Every Night.    nitroglycerin (NITROSTAT) 0.4 MG SL tablet Place 1 tablet under the tongue Every 5 (Five) Minutes As Needed for Chest Pain for up to 30 days. Take no more than 3 doses in 15 minutes.         Review of Systems   Respiratory:  Positive for shortness of breath. Negative for cough and wheezing.    Cardiovascular:  Positive for leg swelling. Negative for chest pain and palpitations.   Gastrointestinal:  Negative for nausea and vomiting.   Neurological:  Negative for dizziness and syncope.      Objective     /48   Pulse 65   Ht 177.8 cm (70\")   Wt 112 kg (247 lb)   BMI 35.44 kg/m²       Physical Exam    General : Alert, awake, no acute distress  Neck : Supple, no carotid bruit, no jugular venous distention  CVS : Regular rate and rhythm, no murmur, rubs or gallops  Lungs: Clear to auscultation bilaterally, no crackles or rhonchi  Abdomen: Soft, nontender, bowel sounds heard in all 4 quadrants  Extremities: Warm, well-perfused, trace edema bilaterally    Result Review :     The following data was reviewed by: Darnell Stevenson MD on 09/12/2023:    CMP          1/9/2023    16:56 2/15/2023    10:15 8/17/2023    12:25   CMP   Glucose 78  117  116    BUN 15  16  27    Creatinine 1.55  1.77  1.65    EGFR 45.0  38.3  41.5    Sodium 140  141  139    Potassium 4.2  4.2  4.9    Chloride 101  101  104    Calcium 9.2  9.5  8.8    Total Protein 7.0  6.7  6.8    Albumin 3.8  4.3  3.9    Globulin 3.2  2.4  2.9    Total Bilirubin 0.3  0.4  0.2    Alkaline Phosphatase 56  56  58    AST (SGOT) 21  17  17    ALT (SGPT) 20  20  " 16    Albumin/Globulin Ratio 1.2  1.8  1.3    BUN/Creatinine Ratio 9.7  9.0  16.4    Anion Gap 12.0  14.2  11.1      CBC          9/27/2022    18:29 9/28/2022    04:14 1/9/2023    16:56   CBC   WBC 9.08  8.29  7.75    RBC 4.60  4.69  4.52    Hemoglobin 13.2  13.4  12.9    Hematocrit 40.5  40.8  39.8    MCV 88.0  87.0  88.1    MCH 28.7  28.6  28.5    MCHC 32.6  32.8  32.4    RDW 15.6  15.4  14.5    Platelets 211  195  217      TSH          1/9/2023    16:56 3/2/2023    12:09 8/17/2023    12:25   TSH   TSH 1.800  1.230  0.412      Lipid Panel          1/9/2023    16:56 2/15/2023    10:15 9/5/2023    11:08   Lipid Panel   Total Cholesterol 137  125  137    Triglycerides 151  146  134    HDL Cholesterol 34  37  34    VLDL Cholesterol 26  25  24    LDL Cholesterol  77  63  79    LDL/HDL Ratio 2.14  1.59  2.24           Data reviewed: Cardiology studies        Results for orders placed during the hospital encounter of 09/27/22    Adult Transthoracic Echo Complete W/ Cont if Necessary Per Protocol    Interpretation Summary  · The left ventricular cavity is borderline dilated.  · Left ventricular ejection fraction appears to be 56 - 60%.  · Left ventricular diastolic function is consistent with (grade I) impaired relaxation and age.  · Mild aortic valve stenosis is present with max/mean pressure gradients 17/10 mmHg.  · No significant pericardial effusion noted. There may be a minimal amount of fluid near the right atrium.  · Mild to moderate left atrial enlargement.  · Mild to moderate mitral regurgitation.      Results for orders placed during the hospital encounter of 07/08/21    Stress Test With Myocardial Perfusion One Day    Interpretation Summary  · Myocardial perfusion imaging indicates a small-sized infarct located in the inferior wall with mild mansoor-infarct ischemia.  · Left ventricular ejection fraction is borderline normal. (Calculated EF = 49%).  · Diaphragmatic attenuation artifact is present.  · Findings  consistent with a normal ECG stress test.  · Occasional isolated PVCs are noted at rest and also during stress.  · Impressions are consistent with an intermediate risk study.               Assessment and Plan        Diagnoses and all orders for this visit:    1. Coronary artery disease of bypass graft of native heart with stable angina pectoris (Primary)  Assessment & Plan:  He is currently stable with no anginal-like symptoms.  LV systolic function is preserved at this time.  Continue aspirin, statin and metoprolol at the current dose.      2. Atrial fibrillation, persistent  Assessment & Plan:  He had extensive ablations for atrial fibrillation and flutter in the past.  He is staying in sinus rhythm for more than a year now.  We will continue amiodarone 100 mg daily for longer time as advised by cardiac electrophysiologist.  His recent labs showed normal liver enzymes and thyroid panel.      3. Ischemic cardiomyopathy  Assessment & Plan:  Most recent echocardiogram showed preserved LV function, improved from before.  He has mild edema otherwise no significant volume overload.  Continue torsemide 20 mg daily.      4. Essential hypertension  Assessment & Plan:  Pressure very well controlled.  Continue current regimen without changes.      5. Mixed hyperlipidemia  Assessment & Plan:  Recent labs showed LDL of 79 which is near goal, but higher than previous levels.  Is currently taking only 1 mg of Livalo due to muscle pains.  Medications will be continued at the current dose.                Follow Up     Return in about 6 months (around 3/12/2024) for Next scheduled follow up, OK to use a new patient slot if needed .    Patient was given instructions and counseling regarding his condition or for health maintenance advice. Please see specific information pulled into the AVS if appropriate.

## 2023-09-15 NOTE — ASSESSMENT & PLAN NOTE
He is currently stable with no anginal-like symptoms.  LV systolic function is preserved at this time.  Continue aspirin, statin and metoprolol at the current dose.

## 2023-09-15 NOTE — ASSESSMENT & PLAN NOTE
He had extensive ablations for atrial fibrillation and flutter in the past.  He is staying in sinus rhythm for more than a year now.  We will continue amiodarone 100 mg daily for longer time as advised by cardiac electrophysiologist.  His recent labs showed normal liver enzymes and thyroid panel.

## 2023-09-15 NOTE — ASSESSMENT & PLAN NOTE
Most recent echocardiogram showed preserved LV function, improved from before.  He has mild edema otherwise no significant volume overload.  Continue torsemide 20 mg daily.

## 2023-09-15 NOTE — ASSESSMENT & PLAN NOTE
Recent labs showed LDL of 79 which is near goal, but higher than previous levels.  Is currently taking only 1 mg of Livalo due to muscle pains.  Medications will be continued at the current dose.

## 2023-10-25 ENCOUNTER — TELEPHONE (OUTPATIENT)
Dept: PULMONOLOGY | Facility: CLINIC | Age: 83
End: 2023-10-25

## 2023-10-25 NOTE — TELEPHONE ENCOUNTER
Provider: KAYLEN BYRD     Caller: NIDA WHITING    Relationship to Patient: SPOUSE     Phone Number: [phone]    Reason for Call: THE PTS WIFE CALLED BECAUSE HER  HAD A PROCEDURE DONE IN SEPT BUT THEY WERE NEVER GIVEN THE RESULTS OR SPOKE WITH ANYONE AFTER THE PROCEDURE. TODAY THEY HAD A MAN SHOW UP WITH OXYGEN AND THEY HAVE NO CLUE WHY. SHE WOULD LIKE SOMEONE TO CONTACT HER ASAP TO DISCUSS WHAT'S GOING ON.     When was the patient last seen: 09/11/23

## 2023-10-26 NOTE — TELEPHONE ENCOUNTER
I called and spoke with pt and apologized for not calling.  Dr. Srinivasan note states that pt needs to continue oxygen as he is benefiting from it.  I sent the oxygen order in thinking it was a recert.  After speaking with pt, he has never been on oxygen.  PT is aware how to use and aware of results now.

## 2023-10-30 ENCOUNTER — TELEPHONE (OUTPATIENT)
Dept: UROLOGY | Facility: CLINIC | Age: 83
End: 2023-10-30
Payer: MEDICARE

## 2023-10-30 NOTE — TELEPHONE ENCOUNTER
Called and spoke with pt after verifying name and . Called to remind pt of his upcoming appt with Ivy tomorrow at 10am

## 2023-10-31 ENCOUNTER — OFFICE VISIT (OUTPATIENT)
Dept: UROLOGY | Facility: CLINIC | Age: 83
End: 2023-10-31
Payer: MEDICARE

## 2023-10-31 VITALS — HEIGHT: 70 IN | WEIGHT: 244.4 LBS | BODY MASS INDEX: 34.99 KG/M2

## 2023-10-31 DIAGNOSIS — N40.1 BENIGN PROSTATIC HYPERPLASIA WITH LOWER URINARY TRACT SYMPTOMS, SYMPTOM DETAILS UNSPECIFIED: Primary | ICD-10-CM

## 2023-10-31 DIAGNOSIS — N20.0 KIDNEY STONES: ICD-10-CM

## 2023-10-31 LAB
BILIRUB BLD-MCNC: NEGATIVE MG/DL
CLARITY, POC: CLEAR
COLOR UR: YELLOW
EXPIRATION DATE: ABNORMAL
GLUCOSE UR STRIP-MCNC: NEGATIVE MG/DL
KETONES UR QL: NEGATIVE
LEUKOCYTE EST, POC: NEGATIVE
Lab: ABNORMAL
NITRITE UR-MCNC: NEGATIVE MG/ML
PH UR: 6 [PH] (ref 5–8)
PROT UR STRIP-MCNC: NEGATIVE MG/DL
RBC # UR STRIP: ABNORMAL /UL
SP GR UR: 1.02 (ref 1–1.03)
URINE VOLUME: 32
UROBILINOGEN UR QL: ABNORMAL

## 2023-10-31 RX ORDER — TAMSULOSIN HYDROCHLORIDE 0.4 MG/1
2 CAPSULE ORAL DAILY
Qty: 180 CAPSULE | Refills: 4 | Status: SHIPPED | OUTPATIENT
Start: 2023-10-31

## 2023-10-31 RX ORDER — FINASTERIDE 5 MG/1
5 TABLET, FILM COATED ORAL DAILY
Qty: 90 TABLET | Refills: 4 | Status: SHIPPED | OUTPATIENT
Start: 2023-10-31

## 2023-10-31 NOTE — PROGRESS NOTES
Chief Complaint: Follow-up (Follow up for BPH, pt is taking finasteride and tamsulosin. Pt does not have any ill side effects from medicine to report today. No straining to urinate, no frequency outside of normal, no retention, and no urgency reported. )    Subjective         History of Present Illness  Layo Cee is a 83 y.o. male presents to [facility] to be seen for annual f/u BPH.    Patient previously seen by Dr. Linares.    on tamsulosin 0.4 mg and finasteride 5 mg daily for several years. No side effects.    Stream is fair states this could be better.     Not straining.      Nocturia x 0.       no stone episodes in the last year.    Previous:   Patient's been having some more trouble with shortness of air last few days.  He is going to call cardiology today.      history of a CABG with atrial fibrillation.   7/21 coronary stent  aspirin 81.     No  GH      hydrocele on the left side.  About the same.     PVR     10/22  49     Previous     1/19 scrotal ultrasound-large left hydrocele, normal testicles, no testicular torsion.     Hematuria last year     10/20 cystoscopy-4 cm prostate with a TUR defect.  Some regrowth on the right lateral side about a centimeter above the verumontanum.  Still pretty open.  Mild trabeculations throughout.  Negative otherwise  9/20 CT urogram-bladder wall thickening along the base and right lateral wall.  Likely related to prostate.  Bilateral simple renal cyst.  Negative otherwise    non-smoker     History of TURP around 2015, Dr. Shi    Patient was admitted in 2019 with gross hematuria and clot retention and was on CBI for 48 hours.    PSA 11/2019 1.29        Objective     Past Medical History:   Diagnosis Date    Acute on chronic diastolic CHF (congestive heart failure) 6/6/2022    Arthritis     Atrial fibrillation, persistent 5/3/2022    Persistent atrial fibrillation status post extensive ablation and pulmonary vein isolation by Dr. Aguilar on  6/3/2022, with reoccurring rapid A. fib, and then successful cardioversion on 7/1/2022    BPH (benign prostatic hyperplasia)     CHF (congestive heart failure)     COPD (chronic obstructive pulmonary disease)     Coronary artery disease of bypass graft of native heart with stable angina pectoris     (1) Coronary artery disease, s/p CABG in the past. Cardiac catheterization in July 2016 showed patentLIMA graft to the LAD and occluded SVGs. Native LAD is 100% occluded in the mid portion. Native LCx has no significant disease. Native RCA is 100% occluded and it is a . Repeat cath in June, 2021, underwent stent placement to diagnonal branch.    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 Lauren Valencia        Past Surgical History:   Procedure Laterality Date    BLADDER SURGERY      CARDIAC CATHETERIZATION  07/2016    CARDIAC CATHETERIZATION N/A 8/9/2021    Procedure: Left Heart Cath with possible angioplasty;  Surgeon: Jack Ladd MD;  Location: [facility] INVASIVE LOCATION;  Service: Cardiovascular;  Laterality: N/A;  Please schedule the procedure with Dr. Jack Ladd MD on 8/9/2021    CARDIAC ELECTROPHYSIOLOGY PROCEDURE N/A 6/3/2022    Procedure: Ablation atrial fibrillation;  Surgeon: Irineo Aguilar MD;  Location: [facility] INVASIVE LOCATION;  Service: Cardiovascular;  Laterality: N/A;    CARDIAC ELECTROPHYSIOLOGY PROCEDURE N/A 6/3/2022    Procedure: Ablation atrial flutter/CTI;  Surgeon: Irineo Aguilar MD;  Location:   [facility] INVASIVE LOCATION;  Service: Cardiovascular;  Laterality: N/A;    CARDIAC SURGERY      HEART BYPASS    COLONOSCOPY  2011    CORONARY ARTERY BYPASS GRAFT      CYSTOSCOPY  03/19/2019    WITH BILATERAL RETROGRADE PYELOGRAPHY    ELECTRICAL CARDIOVERSION  5/12/2022         ENDOSCOPY  2016    PROSTATE SURGERY           Current Outpatient Medications:     acetaminophen (TYLENOL) 325 MG tablet, Take 2 tablets by mouth Every 6 (Six) Hours As Needed for Mild Pain., Disp: , Rfl:     amiodarone (PACERONE) 200 MG tablet, Take 1 tablet by mouth Daily. (Patient taking differently: Take 0.5 tablets by mouth Daily.), Disp: 90 tablet, Rfl: 1    amLODIPine (NORVASC) 5 MG tablet, Take 1 tablet by mouth Daily., Disp: 90 tablet, Rfl: 3    aspirin (aspirin) 81 MG EC tablet, Take 1 tablet by mouth Daily., Disp: 30 tablet, Rfl: 1    calcium carbonate (OS-LIANNA) 600 MG tablet, Take 1 tablet by mouth Every Night., Disp: , Rfl:     cetirizine (zyrTEC) 10 MG tablet, Take 1 tablet by mouth Daily., Disp: 90 tablet, Rfl: 3    Coenzyme Q10 100 MG tablet, Take 1 tablet by mouth Daily., Disp: , Rfl:     Diclofenac Sodium (VOLTAREN) 1 % gel gel, Apply 2 g topically to the appropriate area as directed 4 (Four) Times a Day., Disp: , Rfl:     finasteride (PROSCAR) 5 MG tablet, Take 1 tablet by mouth Daily., Disp: 90 tablet, Rfl: 4    fluticasone (FLONASE) 50 MCG/ACT nasal spray, 1 spray into the nostril(s) as directed by provider Every Night., Disp: , Rfl:     Fluticasone-Umeclidin-Vilant (Trelegy Ellipta) 100-62.5-25 MCG/ACT inhaler, Inhale 1 puff Daily., Disp: 1 each, Rfl: 11    ipratropium-albuterol (DUO-NEB) 0.5-2.5 mg/3 ml nebulizer, Take 3 mL by nebulization 4 (Four) Times a Day As Needed for Wheezing., Disp: 360 mL, Rfl: 3    isosorbide mononitrate (IMDUR) 30 MG 24 hr tablet, Take 1 tablet by mouth Daily., Disp: 90 tablet, Rfl: 3    metoprolol succinate XL (TOPROL-XL) 25 MG 24 hr tablet, Take 3 tablets by mouth every 12 hours, Disp: 180  "tablet, Rfl: 3    montelukast (SINGULAIR) 10 MG tablet, Take 1 tablet by mouth Every Night., Disp: 30 tablet, Rfl: 11    nitroglycerin (NITROSTAT) 0.4 MG SL tablet, Place 1 tablet under the tongue Every 5 (Five) Minutes As Needed for Chest Pain for up to 30 days. Take no more than 3 doses in 15 minutes., Disp: 30 tablet, Rfl: 0    pantoprazole (PROTONIX) 40 MG EC tablet, Take 1 tablet by mouth daily., Disp: 90 tablet, Rfl: 2    pitavastatin calcium (Livalo) 1 MG tablet tablet, Take 1 tablet by mouth Every Night., Disp: 90 tablet, Rfl: 3    rivaroxaban (Xarelto) 15 MG tablet, Take 1 tablet by mouth Daily With Dinner., Disp: 90 tablet, Rfl: 3    sacubitril-valsartan (Entresto) 49-51 MG tablet, Take 1 tablet by mouth 2 (Two) Times a Day., Disp: 180 tablet, Rfl: 3    tamsulosin (FLOMAX) 0.4 MG capsule 24 hr capsule, Take 2 capsules by mouth Daily., Disp: 180 capsule, Rfl: 4    torsemide (DEMADEX) 20 MG tablet, Take 1 tablet by mouth daily., Disp: 90 tablet, Rfl: 1    VITAMIN B COMPLEX-C PO, Take 1 tablet by mouth Daily., Disp: , Rfl:     Allergies   Allergen Reactions    Carvedilol Swelling and Anaphylaxis    Levaquin [Levofloxacin] Unknown - Low Severity        Family History   Problem Relation Age of Onset    Hypertension Mother     Heart disease Father     Other Brother         GASTROINTESTINAL CANCER    Kidney cancer Brother        Social History     Socioeconomic History    Marital status:    Tobacco Use    Smoking status: Former     Packs/day: 0.50     Years: 40.00     Additional pack years: 0.00     Total pack years: 20.00     Types: Cigarettes     Start date:      Quit date:      Years since quittin.8    Smokeless tobacco: Never   Vaping Use    Vaping Use: Never used   Substance and Sexual Activity    Alcohol use: Not Currently    Drug use: Never    Sexual activity: Defer       Vital Signs:   Ht 177.8 cm (70\")   Wt 111 kg (244 lb 6.4 oz)   BMI 35.07 kg/m²      Physical Exam     Result " Review :   The following data was reviewed by: ARACELI Decker on 10/31/2023:  Results for orders placed or performed in visit on 10/31/23   Bladder Scan   Result Value Ref Range    Urine Volume 32    POC Urinalysis Dipstick, Automated    Specimen: Urine   Result Value Ref Range    Color Yellow Yellow, Straw, Dark Yellow, Cely    Clarity, UA Clear Clear    Specific Gravity  1.020 1.005 - 1.030    pH, Urine 6.0 5.0 - 8.0    Leukocytes Negative Negative    Nitrite, UA Negative Negative    Protein, POC Negative Negative mg/dL    Glucose, UA Negative Negative mg/dL    Ketones, UA Negative Negative    Urobilinogen, UA 0.2 E.U./dL Normal, 0.2 E.U./dL    Bilirubin Negative Negative    Blood, UA Trace (A) Negative    Lot Number [id number]     Expiration Date 2,024/10       PSA          3/2/2023    12:09   PSA   PSA 2.170      Bladder Scan interpretation 10/31/2023    Estimation of residual urine via [employer] 3000 Verathon Bladder Scan  MA/nurse performing: jose m chapin Rn   Residual Urine: 32 ml  Indication: Benign prostatic hyperplasia with lower urinary tract symptoms, symptom details unspecified    Kidney stones   Position: Supine  Examination: Incremental scanning of the suprapubic area using 2.0 MHz transducer using copious amounts of acoustic gel.   Findings: An anechoic area was demonstrated which represented the bladder, with measurement of residual urine as noted. I inspected this myself. In that the residual urine was stable or insignificant, refer to plan for treatment and plan necessary at this time.          Procedures        Assessment and Plan    Diagnoses and all orders for this visit:    1. Benign prostatic hyperplasia with lower urinary tract symptoms, symptom details unspecified (Primary)  -     POC Urinalysis Dipstick, Automated  -     Bladder Scan  -     finasteride (PROSCAR) 5 MG tablet; Take 1 tablet by mouth Daily.  Dispense: 90 tablet; Refill: 4    2. Kidney stones  -     tamsulosin (FLOMAX) 0.4 MG  capsule 24 hr capsule; Take 2 capsules by mouth Daily.  Dispense: 180 capsule; Refill: 4      We will try increasing his tamsuloisn to 0.8 mg q day and refill finasteride.     We will have him call if any dizziness with increasing dose of tamsulosin.      I spent 10 minutes caring for Layo on this date of service. This time includes time spent by me in the following activities:reviewing tests, obtaining and/or reviewing a separately obtained history, performing a medically appropriate examination and/or evaluation , counseling and educating the patient/family/caregiver, ordering medications, tests, or procedures, and documenting information in the medical record  Follow Up   Return in about 3 months (around 1/31/2024) for f/u BPH.  Patient was given instructions and counseling regarding his condition or for health maintenance advice. Please see specific information pulled into the AVS if appropriate.         This document has been electronically signed by ARACELI Decker  October 31, 2023 10:29 EDT

## 2023-11-01 ENCOUNTER — TELEPHONE (OUTPATIENT)
Dept: CASE MANAGEMENT | Facility: OTHER | Age: 83
End: 2023-11-01
Payer: MEDICARE

## 2023-11-01 NOTE — TELEPHONE ENCOUNTER
[facility] #90 day monitoring chart review. Patient had Cardiology OV 9/12, HTN well controlled, Cardiomyopathy with mild edema, CAD stable, and no issues with A-Fib.    Follow-up with urology, she is increasing Flomax to see if it can hep his urine stream.   Follow-up with  he switched Stiolto and Arnuity to Trelegy and ordered a sleep study. Doing well otherwise, will DC from [facility].  Rajani Mccauley RN  Ambulatory Case Management    11/1/2023, 16:12 EDT

## 2023-12-15 ENCOUNTER — OFFICE VISIT (OUTPATIENT)
Dept: ORTHOPEDIC SURGERY | Facility: CLINIC | Age: 83
End: 2023-12-15
Payer: MEDICARE

## 2023-12-15 VITALS
BODY MASS INDEX: 34.93 KG/M2 | WEIGHT: 244 LBS | OXYGEN SATURATION: 95 % | HEART RATE: 72 BPM | DIASTOLIC BLOOD PRESSURE: 69 MMHG | HEIGHT: 70 IN | SYSTOLIC BLOOD PRESSURE: 165 MMHG

## 2023-12-15 DIAGNOSIS — M70.61 TROCHANTERIC BURSITIS OF BOTH HIPS: ICD-10-CM

## 2023-12-15 DIAGNOSIS — M70.62 TROCHANTERIC BURSITIS OF BOTH HIPS: ICD-10-CM

## 2023-12-15 DIAGNOSIS — M25.552 LEFT HIP PAIN: ICD-10-CM

## 2023-12-15 DIAGNOSIS — M25.551 RIGHT HIP PAIN: Primary | ICD-10-CM

## 2023-12-15 RX ORDER — LIDOCAINE HYDROCHLORIDE 10 MG/ML
5 INJECTION, SOLUTION INFILTRATION; PERINEURAL
Status: COMPLETED | OUTPATIENT
Start: 2023-12-15 | End: 2023-12-15

## 2023-12-15 RX ORDER — BUPROPION HYDROCHLORIDE 150 MG/1
150 TABLET, EXTENDED RELEASE ORAL 2 TIMES DAILY
COMMUNITY

## 2023-12-15 RX ORDER — TRIAMCINOLONE ACETONIDE 40 MG/ML
40 INJECTION, SUSPENSION INTRA-ARTICULAR; INTRAMUSCULAR
Status: COMPLETED | OUTPATIENT
Start: 2023-12-15 | End: 2023-12-15

## 2023-12-15 RX ADMIN — TRIAMCINOLONE ACETONIDE 40 MG: 40 INJECTION, SUSPENSION INTRA-ARTICULAR; INTRAMUSCULAR at 15:12

## 2023-12-15 RX ADMIN — LIDOCAINE HYDROCHLORIDE 5 ML: 10 INJECTION, SOLUTION INFILTRATION; PERINEURAL at 15:12

## 2023-12-15 NOTE — PROGRESS NOTES
"Chief Complaint  Initial Evaluation of the Left Hip and Initial Evaluation of the Right Hip     Subjective      Layo Cee presents to [facility] for evaluation of the bilateral hips. He reports bilateral hip pain to the lateral hip. He reports symptoms for about 6 weeks. He denies groin pain. He denies injury or trauma. He denies numbness and tingling as well.     Allergies   Allergen Reactions    Carvedilol Swelling and Anaphylaxis    Levaquin [Levofloxacin] Unknown - Low Severity        Social History     Socioeconomic History    Marital status:    Tobacco Use    Smoking status: Former     Packs/day: 0.50     Years: 40.00     Additional pack years: 0.00     Total pack years: 20.00     Types: Cigarettes     Start date:      Quit date:      Years since quittin.9    Smokeless tobacco: Never   Vaping Use    Vaping Use: Never used   Substance and Sexual Activity    Alcohol use: Not Currently    Drug use: Never    Sexual activity: Defer        I reviewed the patient's chief complaint, history of present illness, review of systems, past medical history, surgical history, family history, social history, medications, and allergy list.     Review of Systems     Constitutional: Denies fevers, chills, weight loss  Cardiovascular: Denies chest pain, shortness of breath  Skin: Denies rashes, acute skin changes  Neurologic: Denies headache, loss of consciousness  MSK: bilateral hip pain      Vital Signs:   /69   Pulse 72   Ht 177.8 cm (70\")   Wt 111 kg (244 lb)   SpO2 95%   BMI 35.01 kg/m²          Physical Exam  General: Alert. No acute distress    Ortho Exam        Right hip- Positive EHL, FHL, GS and TA. Sensation intact to all 5 nerves of the foot. Positive pulses. Tender to the lateral hip. Tender to the lateral hip over the greater trochanter. Skin intact. No pain with hip motion. Full extension. Flexion 90 degrees.     Left hip- Positive EHL, FHL, GS and " TA. Sensation intact to all 5 nerves of the foot. Positive pulses. Tender to the lateral hip. Tender to the lateral hip over the greater trochanter. Skin intact. No pain with hip motion. Full extension. Flexion 90 degrees.       Large Joint Arthrocentesis: R greater trochanteric bursa  Date/Time: 12/15/2023 3:12 PM  Consent given by: patient  Site marked: site marked  Timeout: Immediately prior to procedure a time out was called to verify the correct patient, procedure, equipment, support staff and site/side marked as required   Supporting Documentation  Indications: pain   Procedure Details  Location: hip - R greater trochanteric bursa  Preparation: Patient was prepped and draped in the usual sterile fashion  Needle size: 22 G  Medications administered: 5 mL lidocaine 1 %; 40 mg triamcinolone acetonide 40 MG/ML  Patient tolerance: patient tolerated the procedure well with no immediate complications      Large Joint Arthrocentesis: L greater trochanteric bursa  Date/Time: 12/15/2023 3:12 PM  Consent given by: patient  Site marked: site marked  Timeout: Immediately prior to procedure a time out was called to verify the correct patient, procedure, equipment, support staff and site/side marked as required   Supporting Documentation  Indications: pain   Procedure Details  Location: hip - L greater trochanteric bursa  Preparation: Patient was prepped and draped in the usual sterile fashion  Needle size: 22 G  Medications administered: 5 mL lidocaine 1 %; 40 mg triamcinolone acetonide 40 MG/ML  Patient tolerance: patient tolerated the procedure well with no immediate complications          Imaging Results (Most Recent)       Procedure Component Value Units Date/Time    XR Hips Bilateral With or Without Pelvis 2 View [[id number]] Resulted: 12/15/23 1424     Updated: 12/15/23 1427             Result Review :     X-Ray Report:  Bilateral  hip X-Ray  Indication: Evaluation of bilateral hip pain  AP/Lateral view(s)  Findings:  no acute fracture. Mild degenerative changes.   Prior studies available for comparison: no       No results found.           Assessment and Plan     Diagnoses and all orders for this visit:    1. Right hip pain (Primary)  -     XR Hips Bilateral With or Without Pelvis 2 View    2. Left hip pain  -     XR Hips Bilateral With or Without Pelvis 2 View    3. Trochanteric bursitis of both hips        Discussed the treatment plan with the patient.  I reviewed the x-rays that were obtained today with the patient. Discussed the risks and benefits of bilateral hip bursa injections. The patient expressed understanding and wished to proceed. He tolerated the injections well. Home exercises given today. Plan for OTC medication as needed. Order for physical therapy given today.       Call or return if worsening symptoms.    Follow Up     6 weeks      Patient was given instructions and counseling regarding his condition or for health maintenance advice. Please see specific information pulled into the AVS if appropriate.     Scribed for Douglas Pike MD by Stephanie Horton.  12/15/23   14:20 EST    I have personally performed the services described in this document as scribed by the above individual and it is both accurate and complete. Douglas Pike MD 12/15/23

## 2023-12-26 DIAGNOSIS — I25.5 ISCHEMIC CARDIOMYOPATHY: ICD-10-CM

## 2023-12-26 RX ORDER — SACUBITRIL AND VALSARTAN 49; 51 MG/1; MG/1
1 TABLET, FILM COATED ORAL 2 TIMES DAILY
Qty: 180 TABLET | Refills: 3 | Status: SHIPPED | OUTPATIENT
Start: 2023-12-26

## 2024-01-17 ENCOUNTER — OFFICE VISIT (OUTPATIENT)
Dept: INTERNAL MEDICINE | Facility: CLINIC | Age: 84
End: 2024-01-17
Payer: MEDICARE

## 2024-01-17 VITALS
WEIGHT: 242 LBS | SYSTOLIC BLOOD PRESSURE: 125 MMHG | HEART RATE: 68 BPM | OXYGEN SATURATION: 98 % | BODY MASS INDEX: 34.65 KG/M2 | HEIGHT: 70 IN | DIASTOLIC BLOOD PRESSURE: 79 MMHG | TEMPERATURE: 96.9 F

## 2024-01-17 DIAGNOSIS — E66.9 OBESITY (BMI 30.0-34.9): ICD-10-CM

## 2024-01-17 DIAGNOSIS — Z00.00 MEDICARE ANNUAL WELLNESS VISIT, SUBSEQUENT: Primary | ICD-10-CM

## 2024-01-17 NOTE — PROGRESS NOTES
The ABCs of the Annual Wellness Visit  Subsequent Medicare Wellness Visit    Subjective    Layo Cee is a 83 y.o. male who presents for a Subsequent Medicare Wellness Visit.    The following portions of the patient's history were reviewed and   updated as appropriate: allergies, current medications, past family history, past medical history, past social history, past surgical history, and problem list.    Compared to one year ago, the patient feels his physical   health is the same.    Compared to one year ago, the patient feels his mental   health is the same.    Recent Hospitalizations:  He was not admitted to the hospital during the last year.       Current Medical Providers:  Patient Care Team:  Noble Kc MD as PCP - General (Internal Medicine)  Darnell Stevenson MD as Consulting Physician (Cardiology)  Harley Linares MD as Consulting Physician (Urology)  Kolton Brink MD as Consulting Physician (Pulmonary Disease)  Irineo Aguilar MD as Consulting Physician (Cardiac Electrophysiology)  Harley Lewis MD as Consulting Physician (Allergy)    Outpatient Medications Prior to Visit   Medication Sig Dispense Refill    acetaminophen (TYLENOL) 325 MG tablet Take 2 tablets by mouth Every 6 (Six) Hours As Needed for Mild Pain.      amiodarone (PACERONE) 200 MG tablet Take 1 tablet by mouth Daily. (Patient taking differently: Take 0.5 tablets by mouth Daily.) 90 tablet 1    amLODIPine (NORVASC) 5 MG tablet Take 1 tablet by mouth Daily. 90 tablet 3    aspirin (aspirin) 81 MG EC tablet Take 1 tablet by mouth Daily. 30 tablet 1    calcium carbonate (OS-LIANNA) 600 MG tablet Take 1 tablet by mouth Every Night.      cetirizine (zyrTEC) 10 MG tablet Take 1 tablet by mouth Daily. 90 tablet 3    Coenzyme Q10 100 MG tablet Take 1 tablet by mouth Daily.      Diclofenac Sodium (VOLTAREN) 1 % gel gel Apply 2 g topically to the appropriate area as directed 4 (Four) Times a Day.      finasteride (PROSCAR) 5 MG  tablet Take 1 tablet by mouth Daily. 90 tablet 4    Fluticasone-Umeclidin-Vilant (Trelegy Ellipta) 100-62.5-25 MCG/ACT inhaler Inhale 1 puff Daily. 1 each 11    ipratropium-albuterol (DUO-NEB) 0.5-2.5 mg/3 ml nebulizer Take 3 mL by nebulization 4 (Four) Times a Day As Needed for Wheezing. 360 mL 3    isosorbide mononitrate (IMDUR) 30 MG 24 hr tablet Take 1 tablet by mouth Daily. 90 tablet 3    metoprolol succinate XL (TOPROL-XL) 25 MG 24 hr tablet Take 3 tablets by mouth every 12 hours 180 tablet 3    montelukast (SINGULAIR) 10 MG tablet Take 1 tablet by mouth Every Night. 30 tablet 11    pantoprazole (PROTONIX) 40 MG EC tablet Take 1 tablet by mouth daily. 90 tablet 2    pitavastatin calcium (Livalo) 1 MG tablet tablet Take 1 tablet by mouth Every Night. 90 tablet 3    rivaroxaban (Xarelto) 15 MG tablet Take 1 tablet by mouth Daily With Dinner. 90 tablet 3    sacubitril-valsartan (Entresto) 49-51 MG tablet Take 1 tablet by mouth 2 (Two) Times a Day. 180 tablet 3    tamsulosin (FLOMAX) 0.4 MG capsule 24 hr capsule Take 2 capsules by mouth Daily. 180 capsule 4    torsemide (DEMADEX) 20 MG tablet Take 1 tablet by mouth daily. 90 tablet 1    VITAMIN B COMPLEX-C PO Take 1 tablet by mouth Daily.      buPROPion SR (WELLBUTRIN SR) 150 MG 12 hr tablet Take 1 tablet by mouth 2 (Two) Times a Day.      fluticasone (FLONASE) 50 MCG/ACT nasal spray 1 spray into the nostril(s) as directed by provider Every Night.      nitroglycerin (NITROSTAT) 0.4 MG SL tablet Place 1 tablet under the tongue Every 5 (Five) Minutes As Needed for Chest Pain for up to 30 days. Take no more than 3 doses in 15 minutes. 30 tablet 0     No facility-administered medications prior to visit.       No opioid medication identified on active medication list. I have reviewed chart for other potential  high risk medication/s and harmful drug interactions in the elderly.        Aspirin is on active medication list. Aspirin use is indicated based on review of  current medical condition/s. Pros and cons of this therapy have been discussed today. Benefits of this medication outweigh potential harm.  Patient has been encouraged to continue taking this medication.  .      Patient Active Problem List   Diagnosis    Unstable angina    Coronary artery disease of bypass graft of native heart with stable angina pectoris    Class 2 obesity due to excess calories without serious comorbidity with body mass index (BMI) of 35.0 to 35.9 in adult    Gout    Typical atrial flutter    Essential hypertension    Frequent PVCs    Mixed hyperlipidemia    Rotator cuff Bursitis of shoulder region    Lateral epicondylitis    History of hematuria    Benign prostatic hyperplasia with urinary frequency    Hydrocele in adult    History of COVID-19    Allergic rhinitis    Seasonal allergies    Tobacco abuse, in remission    Vitamin D deficiency    Gastroesophageal reflux disease with esophagitis without hemorrhage    Ischemic cardiomyopathy    Centrilobular emphysema    Gastroesophageal reflux disease    Atrial fibrillation, persistent    COPD (chronic obstructive pulmonary disease)    Chronic anticoagulation    Diastolic dysfunction    S/P CABG (coronary artery bypass graft)    Stage 3a chronic kidney disease    Persistent atrial fibrillation    S/P ablation of atrial fibrillation    Atrial fibrillation, unspecified type    SOB (shortness of breath)    Chest pain    Mild aortic valve stenosis    Moderate mitral regurgitation    Other hydrocele    History of gross hematuria    Benign prostatic hyperplasia with lower urinary tract symptoms    Chronic systolic congestive heart failure     Advance Care Planning   Advance Care Planning     Advance Directive is not on file.  ACP discussion was held with the patient during this visit. Patient does not have an advance directive, information provided.     Objective    Vitals:    01/17/24 1031   BP: 125/79   BP Location: Left arm   Patient Position: Sitting  "  Cuff Size: Large Adult   Pulse: 68   Temp: 96.9 °F (36.1 °C)   TempSrc: Temporal   SpO2: 98%   Weight: 110 kg (242 lb)   Height: 177.8 cm (70\")     Estimated body mass index is 34.72 kg/m² as calculated from the following:    Height as of this encounter: 177.8 cm (70\").    Weight as of this encounter: 110 kg (242 lb).           Does the patient have evidence of cognitive impairment? No          HEALTH RISK ASSESSMENT    Smoking Status:  Social History     Tobacco Use   Smoking Status Former    Packs/day: 0.50    Years: 40.00    Additional pack years: 0.00    Total pack years: 20.00    Types: Cigarettes    Start date:     Quit date:     Years since quittin.0   Smokeless Tobacco Never     Alcohol Consumption:  Social History     Substance and Sexual Activity   Alcohol Use Not Currently     Fall Risk Screen:    CLYDE Fall Risk Assessment was completed, and patient is at LOW risk for falls.Assessment completed on:2024    Depression Screenin/17/2024    10:41 AM   PHQ-2/PHQ-9 Depression Screening   Little Interest or Pleasure in Doing Things 0-->not at all   Feeling Down, Depressed or Hopeless 0-->not at all   PHQ-9: Brief Depression Severity Measure Score 0       Health Habits and Functional and Cognitive Screenin/17/2024    10:00 AM   Functional & Cognitive Status   Do you have difficulty preparing food and eating? No   Do you have difficulty bathing yourself, getting dressed or grooming yourself? No   Do you have difficulty using the toilet? No   Do you have difficulty moving around from place to place? No   Do you have trouble with steps or getting out of a bed or a chair? No   Current Diet Well Balanced Diet   Dental Exam Up to date   Eye Exam Up to date   Exercise (times per week) 2 times per week   Current Exercises Include Yard Work   Do you need help using the phone?  No   Are you deaf or do you have serious difficulty hearing?  No   Do you need help to go to places out of " walking distance? No   Do you need help shopping? No   Do you need help preparing meals?  No   Do you need help with housework?  No   Do you need help with laundry? No   Do you need help taking your medications? No   Do you need help managing money? No   Do you ever drive or ride in a car without wearing a seat belt? No   Have you felt unusual stress, anger or loneliness in the last month? No   Who do you live with? Spouse   If you need help, do you have trouble finding someone available to you? No   Do you have difficulty concentrating, remembering or making decisions? Yes       Age-appropriate Screening Schedule:  Refer to the list below for future screening recommendations based on patient's age, sex and/or medical conditions. Orders for these recommended tests are listed in the plan section. The patient has been provided with a written plan.    Health Maintenance   Topic Date Due    COVID-19 Vaccine (2 - 2023-24 season) 09/01/2023    LIPID PANEL  09/05/2024    BMI FOLLOWUP  12/18/2024    ANNUAL WELLNESS VISIT  01/17/2025    TDAP/TD VACCINES (2 - Tdap) 01/09/2033    INFLUENZA VACCINE  Completed    Pneumococcal Vaccine 65+  Completed    ZOSTER VACCINE  Completed                  CMS Preventative Services Quick Reference  Risk Factors Identified During Encounter  Inactivity/Sedentary: Patient was advised to exercise at least 150 minutes a week per CDC recommendations.  The above risks/problems have been discussed with the patient.  Pertinent information has been shared with the patient in the After Visit Summary.  An After Visit Summary and PPPS were made available to the patient.    Follow Up:   Next Medicare Wellness visit to be scheduled in 1 year.       Additional E&M Note during same encounter follows:  Patient has multiple medical problems which are significant and separately identifiable that require additional work above and beyond the Medicare Wellness Visit.      Chief Complaint  Medicare  "Wellness-subsequent    Subjective        HPI  Layo Cee is also being seen today for weight. He states he was prescribed wellbutrin in the past to help with weight loss and took it for about 4 months. He didn't see any changes so he stopped taking it.   Diet- states he eats a normal diet. He has been trying to cut down on portions. He has tried a stationary bike a couple of times at home but not much. He does have issues with bursitis of the hips and will be starting PT soon for that.   He is interested in other medications for weight loss.       Objective   Vital Signs:  /79 (BP Location: Left arm, Patient Position: Sitting, Cuff Size: Large Adult)   Pulse 68   Temp 96.9 °F (36.1 °C) (Temporal)   Ht 177.8 cm (70\")   Wt 110 kg (242 lb)   SpO2 98%   BMI 34.72 kg/m²     Physical Exam  Vitals and nursing note reviewed.   Constitutional:       Appearance: Normal appearance.   HENT:      Head: Normocephalic and atraumatic.   Pulmonary:      Effort: Pulmonary effort is normal.   Neurological:      Mental Status: He is alert and oriented to person, place, and time.   Psychiatric:         Mood and Affect: Mood normal.         Behavior: Behavior normal.          The following data was reviewed by: ARACELI Lange on 01/17/2024:               Assessment and Plan   Diagnoses and all orders for this visit:    1. Medicare annual wellness visit, subsequent (Primary)    2. Obesity (BMI 30.0-34.9)  Comments:  BMI is currently 34.7  Orders:  -     Tirzepatide-Weight Management (ZEPBOUND) 2.5 MG/0.5ML solution auto-injector; Inject 0.5 mL under the skin into the appropriate area as directed 1 (One) Time Per Week.  Dispense: 2 mL; Refill: 0      Discussed risks and benefits of zepbound as well as possible side effects. Pt denied family history of MTC or MENS2. Also informed that insurance may not cover this medication. Other medications such as phentermine are contraindicated due to age and heart history. "   Healthy eating information added to AVS. We also discussed incorporating exercise into his routine where possible.        Follow Up   Return in about 1 year (around 1/17/2025) for Medicare Wellness.  Patient was given instructions and counseling regarding his condition or for health maintenance advice. Please see specific information pulled into the AVS if appropriate.

## 2024-01-29 ENCOUNTER — TELEPHONE (OUTPATIENT)
Dept: UROLOGY | Facility: CLINIC | Age: 84
End: 2024-01-29
Payer: MEDICARE

## 2024-01-29 NOTE — TELEPHONE ENCOUNTER
Provider: AARON IBRAHIM    Caller: Layo Cee    Relationship to Patient: Self     Phone Number: [phone]    Reason for Call: RESCHEDULE APPOINTMENT DUE TO SCHEDULING CONFLICT    When was the patient last seen: 10/31/23    Notes: RESCHEDULED FROM 01/30/24 TO 03/04/24.

## 2024-02-08 DIAGNOSIS — I48.19 ATRIAL FIBRILLATION, PERSISTENT: ICD-10-CM

## 2024-02-08 RX ORDER — AMIODARONE HYDROCHLORIDE 200 MG/1
TABLET ORAL
Qty: 36 TABLET | Refills: 1 | Status: SHIPPED | OUTPATIENT
Start: 2024-02-08

## 2024-02-26 ENCOUNTER — OFFICE VISIT (OUTPATIENT)
Dept: ORTHOPEDIC SURGERY | Facility: CLINIC | Age: 84
End: 2024-02-26
Payer: MEDICARE

## 2024-02-26 ENCOUNTER — OFFICE VISIT (OUTPATIENT)
Dept: INTERNAL MEDICINE | Facility: CLINIC | Age: 84
End: 2024-02-26
Payer: MEDICARE

## 2024-02-26 VITALS
HEART RATE: 91 BPM | TEMPERATURE: 98.6 F | DIASTOLIC BLOOD PRESSURE: 76 MMHG | HEIGHT: 70 IN | WEIGHT: 249 LBS | OXYGEN SATURATION: 94 % | BODY MASS INDEX: 35.65 KG/M2 | SYSTOLIC BLOOD PRESSURE: 142 MMHG

## 2024-02-26 VITALS — BODY MASS INDEX: 35.07 KG/M2 | HEIGHT: 70 IN | WEIGHT: 245 LBS

## 2024-02-26 DIAGNOSIS — M79.89 FOOT SWELLING: Primary | ICD-10-CM

## 2024-02-26 DIAGNOSIS — G89.29 CHRONIC BILATERAL LOW BACK PAIN, UNSPECIFIED WHETHER SCIATICA PRESENT: Primary | ICD-10-CM

## 2024-02-26 DIAGNOSIS — I10 ESSENTIAL HYPERTENSION: ICD-10-CM

## 2024-02-26 DIAGNOSIS — M54.50 CHRONIC BILATERAL LOW BACK PAIN, UNSPECIFIED WHETHER SCIATICA PRESENT: Primary | ICD-10-CM

## 2024-02-26 DIAGNOSIS — M1A.9XX0 CHRONIC GOUT INVOLVING TOE OF LEFT FOOT WITHOUT TOPHUS, UNSPECIFIED CAUSE: ICD-10-CM

## 2024-02-26 RX ORDER — PREDNISONE 10 MG/1
10 TABLET ORAL DAILY
Qty: 90 TABLET | Refills: 0 | Status: SHIPPED | OUTPATIENT
Start: 2024-02-26

## 2024-02-26 RX ORDER — ALLOPURINOL 100 MG/1
100 TABLET ORAL DAILY
Qty: 90 TABLET | Refills: 2 | Status: SHIPPED | OUTPATIENT
Start: 2024-02-26

## 2024-02-26 NOTE — PROGRESS NOTES
"Chief Complaint  Follow-up of the Right Hip and Follow-up of the Left Hip    Subjective      Layo Cee presents to [facility] for follow-up of bilateral hip pain, osteoarthritis, and trochanteric bursitis.  He was initially evaluated for this in clinic on 12/15/2023 and received bilateral hip bursa steroid injections and referral to physical therapy.  He presents independently ambulatory without use of assistive device.  Reports that his hips are \"a little better now, but now it is my back\".  He is no longer having bilateral hip pain, however, instead localizing pain to the lower back with radiation towards SI joint  bilaterally. He is currently participating in outpatient physical therapy through [facility] and feels that this has provided significant relief.  He feels he would benefit from continued participation in PT.    Objective   Allergies   Allergen Reactions    Carvedilol Swelling and Anaphylaxis    Levaquin [Levofloxacin] Unknown - Low Severity       Vital Signs:   Ht 177.8 cm (70\")   Wt 111 kg (245 lb)   BMI 35.15 kg/m²       Physical Exam    Constitutional: Awake, alert. Well nourished appearance.    Integumentary: Warm, dry, intact. No obvious rashes.    HENT: Atraumatic, normocephalic.   Respiratory: Non labored respirations .   Cardiovascular: Intact peripheral pulses.    Psychiatric: Normal mood and affect. A&O X3    Ortho Exam  Bilateral hips: Skin is warm, dry, and intact.  No tenderness to palpation of bilateral greater trochanters.  No pain reported with passive internal or external rotation of the hips.  There is some paraspinal tenderness in the lower lumbar region.  Tenderness to palpation of SI joint.  Full flexion and extension of the knee.  Full plantarflexion and dorsiflexion of the ankles.  Sensation and distal neurovascular intact.    Imaging Results (Most Recent)       None                    Assessment and Plan   Problem List Items Addressed " This Visit    None  Visit Diagnoses       Chronic bilateral low back pain, unspecified whether sciatica present    -  Primary    Relevant Orders    Ambulatory Referral to Physical Therapy (Completed)          Follow Up   No follow-ups on file.    Tobacco Use: Medium Risk (2/26/2024)    Patient History     Smoking Tobacco Use: Former     Smokeless Tobacco Use: Never     Passive Exposure: Not on file     Patient is a former smoker.  Encouraged continued tobacco cessation.  Did not discuss options for smoking cessation.    Patient Instructions   Patient is no longer having bilateral hip pain consistent with trochanteric bursitis.  He is currently localizing symptoms to the low back.  Advised continued participation in physical therapy.  Updated PT order placed.  Follow-up in 6 weeks.  If no improvement, consider MRI lumbar spine.  Call with changes or concerns.  Patient was given instructions and counseling regarding his condition or for health maintenance advice. Please see specific information pulled into the AVS if appropriate.

## 2024-02-26 NOTE — PATIENT INSTRUCTIONS
Patient is no longer having bilateral hip pain consistent with trochanteric bursitis.  He is currently localizing symptoms to the low back.  Advised continued participation in physical therapy.  Updated PT order placed.  Follow-up in 6 weeks.  If no improvement, consider MRI lumbar spine.  Call with changes or concerns.

## 2024-02-26 NOTE — PROGRESS NOTES
Chief Complaint  Foot Swelling (Left foot swelling, redness, and hurting)    Subjective          Layo Cee presents to [facility]  History of Present Illness    Here with his wife.    Seen at urgent care in [place] 1/29/24 with left great toe swelling.  Had uric acid level that was elevated, and diagnosed with gout.  Given medrol pack, which helped symptoms.  However, toe pain never fully resolved, and has since returned.      PHQ-9 Depression Screening  Little interest or pleasure in doing things?     Feeling down, depressed, or hopeless?     Trouble falling or staying asleep, or sleeping too much?     Feeling tired or having little energy?     Poor appetite or overeating?     Feeling bad about yourself - or that you are a failure or have let yourself or your family down?     Trouble concentrating on things, such as reading the newspaper or watching television?     Moving or speaking so slowly that other people could have noticed? Or the opposite - being so fidgety or restless that you have been moving around a lot more than usual?     Thoughts that you would be better off dead, or of hurting yourself in some way?     PHQ-9 Total Score     If you checked off any problems, how difficult have these problems made it for you to do your work, take care of things at home, or get along with other people?           Current Outpatient Medications   Medication Instructions    acetaminophen (TYLENOL) 650 mg, Oral, Every 6 Hours PRN    allopurinol (ZYLOPRIM) 100 mg, Oral, Daily    amiodarone (PACERONE) 200 MG tablet Take 1 tablet by mouth every 12 (twelve) hours for 6 days, then and 1 tablet daily for 24 days.    amLODIPine (NORVASC) 5 mg, Oral, Daily    aspirin 81 mg, Oral, Daily    calcium carbonate (OS-LIANNA) 600 mg, Oral, Nightly    cetirizine (ZYRTEC) 10 mg, Oral, Daily    Coenzyme Q10 100 mg, Oral, Daily    Diclofenac Sodium (VOLTAREN) 2 g, Topical, 4 Times Daily     "finasteride (PROSCAR) 5 mg, Oral, Daily    fluticasone (FLONASE) 50 MCG/ACT nasal spray 1 spray, Nasal, Nightly    Fluticasone-Umeclidin-Vilant (Trelegy Ellipta) 100-62.5-25 MCG/ACT inhaler 1 puff, Inhalation, Daily - RT    ipratropium-albuterol (DUO-NEB) 0.5-2.5 mg/3 ml nebulizer 3 mL, Nebulization, 4 Times Daily PRN    isosorbide mononitrate (IMDUR) 30 mg, Oral, Daily    metoprolol succinate XL (TOPROL-XL) 25 MG 24 hr tablet Take 3 tablets by mouth every 12 hours    montelukast (SINGULAIR) 10 mg, Oral, Nightly    nitroglycerin (NITROSTAT) 0.4 mg, Sublingual, Every 5 Minutes PRN, Take no more than 3 doses in 15 minutes.    pantoprazole (PROTONIX) 40 mg, Oral, Daily    pitavastatin calcium (LIVALO) 1 mg, Oral, Nightly    predniSONE (DELTASONE) 10 mg, Oral, Daily    rivaroxaban (XARELTO) 15 mg, Oral, Daily With Dinner    sacubitril-valsartan (Entresto) 49-51 MG tablet 1 tablet, Oral, 2 Times Daily    tamsulosin (FLOMAX) 0.8 mg, Oral, Daily    Tirzepatide-Weight Management (ZEPBOUND) 2.5 mg, Subcutaneous, Weekly    torsemide (DEMADEX) 20 mg, Oral, Daily    VITAMIN B COMPLEX-C PO 1 tablet, Oral, Daily       The following portions of the patient's history were reviewed and updated as appropriate: allergies, current medications, past family history, past medical history, past social history, past surgical history, and problem list.    Objective   Vital Signs:   /76 (BP Location: Right arm, Patient Position: Sitting, Cuff Size: Large Adult)   Pulse 91   Temp 98.6 °F (37 °C) (Temporal)   Ht 177.8 cm (70\")   Wt 113 kg (249 lb)   SpO2 94%   BMI 35.73 kg/m²     BP Readings from Last 3 Encounters:   02/26/24 142/76   01/17/24 125/79   12/15/23 165/69     Wt Readings from Last 3 Encounters:   02/26/24 113 kg (249 lb)   02/26/24 111 kg (245 lb)   01/17/24 110 kg (242 lb)           Physical Exam  Vitals reviewed.   Constitutional:       General: He is not in acute distress.     Appearance: Normal appearance. He is not " ill-appearing, toxic-appearing or diaphoretic.   HENT:      Head: Normocephalic and atraumatic.      Right Ear: External ear normal.      Left Ear: External ear normal.   Eyes:      Conjunctiva/sclera: Conjunctivae normal.   Cardiovascular:      Rate and Rhythm: Normal rate and regular rhythm.      Pulses: Normal pulses.      Heart sounds: Normal heart sounds. No murmur heard.     No friction rub. No gallop.   Pulmonary:      Effort: Pulmonary effort is normal. No respiratory distress.      Breath sounds: Normal breath sounds. No stridor. No wheezing, rhonchi or rales.   Chest:      Chest wall: No tenderness.   Abdominal:      General: Abdomen is flat.      Palpations: Abdomen is soft. There is no mass.      Tenderness: There is no abdominal tenderness.   Musculoskeletal:      Right lower leg: Edema present.      Left lower leg: Edema present.      Comments: 1+ LE edema bilaterally.  Left great toe swelling noted   Skin:     General: Skin is warm and dry.   Neurological:      General: No focal deficit present.      Mental Status: He is alert. Mental status is at baseline.   Psychiatric:         Behavior: Behavior normal.         Thought Content: Thought content normal.         Judgment: Judgment normal.        Result Review :   The following data was reviewed by: Noble Kc MD on 02/26/2024:  Common labs          8/17/2023    12:25 9/5/2023    11:08   Common Labs   Glucose 116     BUN 27     Creatinine 1.65     Sodium 139     Potassium 4.9     Chloride 104     Calcium 8.8     Albumin 3.9     Total Bilirubin 0.2     Alkaline Phosphatase 58     AST (SGOT) 17     ALT (SGPT) 16     Total Cholesterol  137    Triglycerides  134    HDL Cholesterol  34    LDL Cholesterol   79             Lab Results   Component Value Date    SARSANTIGEN Not Detected 11/11/2022    COVID19 Not Detected 11/11/2022    FLUAAG Negative 03/24/2023    FLUBAG Not Detected 11/11/2022    RAPSCRN Negative 08/14/2023    INR 1.69 (H) 06/02/2022     BILIRUBINUR Negative 10/31/2023       Procedures        Assessment and Plan    Diagnoses and all orders for this visit:    1. Foot swelling (Primary)    2. Essential hypertension    3. Chronic gout involving toe of left foot without tophus, unspecified cause  -     allopurinol (Zyloprim) 100 MG tablet; Take 1 tablet by mouth Daily.  Dispense: 90 tablet; Refill: 2  -     predniSONE (DELTASONE) 10 MG tablet; Take 1 tablet by mouth Daily.  Dispense: 90 tablet; Refill: 0      Gout:  -having active gout flare  -uric acid 8.7 (1/29/24 labs)  -meets criteria for long-term urate lowering therapy (CKD)  -will use steroids for treatment of acute attack and prophylaxis (tentative plan for 3 months of prophylaxis plus chronic urate lowering therapy)  -due to use of amiodarone, colchicine would be an inappropriate medicine to use for prophylaxis    HTN:  -above goal, but having gout attack at present  -asked to keep BP log of at least seven days of BID measurements for my review      There are no discontinued medications.       Follow Up   Return in about 3 months (around 5/26/2024) for gout, HTN.  Patient was given instructions and counseling regarding his condition or for health maintenance advice. Please see specific information pulled into the AVS if appropriate.       Noble Kc MD  02/26/24  13:36 EST

## 2024-02-28 DIAGNOSIS — I48.19 ATRIAL FIBRILLATION, PERSISTENT: ICD-10-CM

## 2024-02-28 RX ORDER — RIVAROXABAN 15 MG/1
15 TABLET, FILM COATED ORAL
Qty: 30 TABLET | Refills: 11 | Status: SHIPPED | OUTPATIENT
Start: 2024-02-28

## 2024-03-04 ENCOUNTER — OFFICE VISIT (OUTPATIENT)
Dept: UROLOGY | Facility: CLINIC | Age: 84
End: 2024-03-04
Payer: MEDICARE

## 2024-03-04 VITALS
HEART RATE: 69 BPM | WEIGHT: 249.6 LBS | HEIGHT: 70 IN | SYSTOLIC BLOOD PRESSURE: 147 MMHG | RESPIRATION RATE: 14 BRPM | BODY MASS INDEX: 35.73 KG/M2 | DIASTOLIC BLOOD PRESSURE: 68 MMHG

## 2024-03-04 DIAGNOSIS — N40.1 BENIGN PROSTATIC HYPERPLASIA WITH LOWER URINARY TRACT SYMPTOMS, SYMPTOM DETAILS UNSPECIFIED: Primary | ICD-10-CM

## 2024-03-04 LAB
BILIRUB BLD-MCNC: NEGATIVE MG/DL
CLARITY, POC: CLEAR
COLOR UR: YELLOW
EXPIRATION DATE: ABNORMAL
GLUCOSE UR STRIP-MCNC: NEGATIVE MG/DL
KETONES UR QL: NEGATIVE
LEUKOCYTE EST, POC: NEGATIVE
Lab: ABNORMAL
NITRITE UR-MCNC: NEGATIVE MG/ML
PH UR: 6 [PH] (ref 5–8)
PROT UR STRIP-MCNC: NEGATIVE MG/DL
RBC # UR STRIP: ABNORMAL /UL
SP GR UR: 1.03 (ref 1–1.03)
URINE VOLUME: 0
UROBILINOGEN UR QL: NORMAL

## 2024-03-04 PROCEDURE — 3078F DIAST BP <80 MM HG: CPT | Performed by: NURSE PRACTITIONER

## 2024-03-04 PROCEDURE — 51798 US URINE CAPACITY MEASURE: CPT | Performed by: NURSE PRACTITIONER

## 2024-03-04 PROCEDURE — 81003 URINALYSIS AUTO W/O SCOPE: CPT | Performed by: NURSE PRACTITIONER

## 2024-03-04 PROCEDURE — 1160F RVW MEDS BY RX/DR IN RCRD: CPT | Performed by: NURSE PRACTITIONER

## 2024-03-04 PROCEDURE — 3077F SYST BP >= 140 MM HG: CPT | Performed by: NURSE PRACTITIONER

## 2024-03-04 PROCEDURE — 1159F MED LIST DOCD IN RCRD: CPT | Performed by: NURSE PRACTITIONER

## 2024-03-04 PROCEDURE — 99213 OFFICE O/P EST LOW 20 MIN: CPT | Performed by: NURSE PRACTITIONER

## 2024-03-12 ENCOUNTER — OFFICE VISIT (OUTPATIENT)
Dept: CARDIOLOGY | Facility: CLINIC | Age: 84
End: 2024-03-12
Payer: MEDICARE

## 2024-03-12 VITALS
WEIGHT: 247 LBS | HEART RATE: 85 BPM | DIASTOLIC BLOOD PRESSURE: 84 MMHG | SYSTOLIC BLOOD PRESSURE: 121 MMHG | BODY MASS INDEX: 35.36 KG/M2 | HEIGHT: 70 IN

## 2024-03-12 DIAGNOSIS — I25.5 ISCHEMIC CARDIOMYOPATHY: ICD-10-CM

## 2024-03-12 DIAGNOSIS — I10 ESSENTIAL HYPERTENSION: ICD-10-CM

## 2024-03-12 DIAGNOSIS — E78.2 MIXED HYPERLIPIDEMIA: ICD-10-CM

## 2024-03-12 DIAGNOSIS — I48.19 ATRIAL FIBRILLATION, PERSISTENT: ICD-10-CM

## 2024-03-12 DIAGNOSIS — I25.708 CORONARY ARTERY DISEASE OF BYPASS GRAFT OF NATIVE HEART WITH STABLE ANGINA PECTORIS: Primary | Chronic | ICD-10-CM

## 2024-03-12 PROCEDURE — 3074F SYST BP LT 130 MM HG: CPT | Performed by: INTERNAL MEDICINE

## 2024-03-12 PROCEDURE — 99214 OFFICE O/P EST MOD 30 MIN: CPT | Performed by: INTERNAL MEDICINE

## 2024-03-12 PROCEDURE — 3079F DIAST BP 80-89 MM HG: CPT | Performed by: INTERNAL MEDICINE

## 2024-03-12 PROCEDURE — 1159F MED LIST DOCD IN RCRD: CPT | Performed by: INTERNAL MEDICINE

## 2024-03-12 PROCEDURE — 1160F RVW MEDS BY RX/DR IN RCRD: CPT | Performed by: INTERNAL MEDICINE

## 2024-03-12 RX ORDER — AMLODIPINE BESYLATE 5 MG/1
5 TABLET ORAL DAILY
Qty: 90 TABLET | Refills: 3 | Status: SHIPPED | OUTPATIENT
Start: 2024-03-12

## 2024-03-12 NOTE — ASSESSMENT & PLAN NOTE
Blood pressure is recently on the higher side.  Home blood pressure log earlier in the month showed recently well-controlled blood pressure.  He is on a steroid course at this time which is likely recent.  Continue current regimen encouraged to keep home blood pressure log.

## 2024-03-12 NOTE — ASSESSMENT & PLAN NOTE
Most recent LDL is 79, which is near goal.  He is unable to tolerate high intensity statins.  Will continue Pitavastatin 1 mg nightly

## 2024-03-12 NOTE — PROGRESS NOTES
CARDIOLOGY FOLLOW-UP PROGRESS NOTE        Chief Complaint  Follow-up, Coronary Artery Disease, Hyperlipidemia, Hypertension, and Atrial Fibrillation    Subjective            Layo Cee presents to [facility] CARDIOLOGY  History of Present Illness    Mr Cee is here for routine 6-month follow-up visit.  He denies any new complaints.  No recent episodes of chest pain or palpitations.  He continues to report fatigue and shortness of breath, both at rest and activity along with wheezing.  He is not very active at baseline, mostly due to joint problems.  Recently, he had a flareup of gout.  He would like to be started on another weight loss agents like Wegovy, and is inquiring about his options    Past History:    (1) Coronary artery disease, status post coronary artery bypass grafting in the past. Cardiac catheterization done in July 2016 showed patent left internal mammary graft to the LAD and occluded saphenous venous graft. Native LAD is 100% occluded in the mid portion. Native circumflex artery has no significant disease. Native right coronary artery is also 100% occluded and it is a chronic total occlusion. The right coronary artery is reconstituted with collaterals from the left side.  Patient underwent repeat cardiac catheterization in June, 2021 and underwent angioplasty stent placement to diagonal branch.      (2) Ischemic cardiomyopathy with recovery of cardiac function.  (3) Chronic kidney disease, stage 3.   (4) Chronic obstructive pulmonary disease  (5) Hypertension. (6) Hyperlipidemia.   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   (8) Typical atrial flutter status post radiofrequency ablation by Dr. Aguilar on 11/30/2018   (9) Persistent atrial fibrillation status post extensive ablation and pulmonary vein isolation by Dr. Aguilar on 6/3/2022      Medical History:  Past Medical History:   Diagnosis Date    Acute on  chronic diastolic CHF (congestive heart failure) 6/6/2022    Arthritis     Atrial fibrillation, persistent 5/3/2022    Persistent atrial fibrillation status post extensive ablation and pulmonary vein isolation by Dr. Aguilar on 6/3/2022, with reoccurring rapid A. fib, and then successful cardioversion on 7/1/2022    BPH (benign prostatic hyperplasia)     CHF (congestive heart failure)     COPD (chronic obstructive pulmonary disease)     Coronary artery disease of bypass graft of native heart with stable angina pectoris     COVID-19 02/04/2021    Diverticulitis 10/11/2019    Dysphagia     Essential hypertension 08/27/2020    Frequent PVCs 08/28/2021    GERD (gastroesophageal reflux disease)     Gross hematuria     Long-term use of high-risk medication     Lung disease     Mixed hyperlipidemia 08/28/2021    Myocardial infarction 07/2016    OCD (obsessive compulsive disorder)     Renal insufficiency 08/27/2020    Rhinitis, allergic 06/05/2018    Sore throat     Stable angina     Typical atrial flutter 11/30/2018    s/p ablation by Dr. Lauren Valencia        Surgical History: has a past surgical history that includes Bladder surgery; Coronary artery bypass graft; Cardiac catheterization (07/2016); Colonoscopy (2011); Cystoscopy (03/19/2019); Esophagogastroduodenoscopy (2016); Cardiac surgery; Prostate surgery; Cardiac catheterization (N/A, 8/9/2021); Electrical Cardioversion (5/12/2022); Cardiac electrophysiology procedure (N/A, 6/3/2022); and Cardiac electrophysiology procedure (N/A, 6/3/2022).     Family History: family history includes Heart disease in his father; Hypertension in his mother; Kidney cancer in his brother; Other in his brother.     Social History: reports that he quit smoking about 26 years ago. His smoking use included cigarettes. He started smoking about 66 years ago. He has a 20 pack-year smoking history. He has been exposed to tobacco smoke. He has never used smokeless tobacco. He reports that he  does not currently use alcohol. He reports that he does not use drugs.    Allergies: Carvedilol and Levaquin [levofloxacin]    Current Outpatient Medications on File Prior to Visit   Medication Sig    acetaminophen (TYLENOL) 325 MG tablet Take 2 tablets by mouth Every 6 (Six) Hours As Needed for Mild Pain.    allopurinol (Zyloprim) 100 MG tablet Take 1 tablet by mouth Daily.    amiodarone (PACERONE) 200 MG tablet Take 1 tablet by mouth every 12 (twelve) hours for 6 days, then and 1 tablet daily for 24 days. (Patient taking differently: Take 0.5 tablets by mouth Daily.)    aspirin (aspirin) 81 MG EC tablet Take 1 tablet by mouth Daily.    calcium carbonate (OS-LIANNA) 600 MG tablet Take 1 tablet by mouth Every Night.    cetirizine (zyrTEC) 10 MG tablet Take 1 tablet by mouth Daily.    Coenzyme Q10 100 MG tablet Take 1 tablet by mouth Daily.    Diclofenac Sodium (VOLTAREN) 1 % gel gel Apply 2 g topically to the appropriate area as directed 4 (Four) Times a Day.    finasteride (PROSCAR) 5 MG tablet Take 1 tablet by mouth Daily.    Fluticasone-Umeclidin-Vilant (Trelegy Ellipta) 100-62.5-25 MCG/ACT inhaler Inhale 1 puff Daily.    ipratropium-albuterol (DUO-NEB) 0.5-2.5 mg/3 ml nebulizer Take 3 mL by nebulization 4 (Four) Times a Day As Needed for Wheezing.    isosorbide mononitrate (IMDUR) 30 MG 24 hr tablet Take 1 tablet by mouth Daily.    metoprolol succinate XL (TOPROL-XL) 25 MG 24 hr tablet Take 3 tablets by mouth every 12 hours (Patient taking differently: Take 1 tablet by mouth Daily.)    montelukast (SINGULAIR) 10 MG tablet Take 1 tablet by mouth Every Night.    pantoprazole (PROTONIX) 40 MG EC tablet Take 1 tablet by mouth daily.    pitavastatin calcium (Livalo) 1 MG tablet tablet Take 1 tablet by mouth Every Night.    predniSONE (DELTASONE) 10 MG tablet Take 1 tablet by mouth Daily.    rivaroxaban (Xarelto) 15 MG tablet Take 1 tablet by mouth daily with dinner.    sacubitril-valsartan (Entresto) 49-51 MG tablet  "Take 1 tablet by mouth 2 (Two) Times a Day.    tamsulosin (FLOMAX) 0.4 MG capsule 24 hr capsule Take 2 capsules by mouth Daily.    Tirzepatide-Weight Management (ZEPBOUND) 2.5 MG/0.5ML solution auto-injector Inject 0.5 mL under the skin into the appropriate area as directed 1 (One) Time Per Week.    torsemide (DEMADEX) 20 MG tablet Take 1 tablet by mouth daily.    VITAMIN B COMPLEX-C PO Take 1 tablet by mouth Daily.    [DISCONTINUED] amLODIPine (NORVASC) 5 MG tablet Take 1 tablet by mouth Daily.    fluticasone (FLONASE) 50 MCG/ACT nasal spray 1 spray into the nostril(s) as directed by provider Every Night.    nitroglycerin (NITROSTAT) 0.4 MG SL tablet Place 1 tablet under the tongue Every 5 (Five) Minutes As Needed for Chest Pain for up to 30 days. Take no more than 3 doses in 15 minutes.       Review of Systems   Constitutional:  Positive for fatigue.   Respiratory:  Positive for shortness of breath. Negative for cough and wheezing.    Cardiovascular:  Negative for chest pain, palpitations and leg swelling.   Gastrointestinal:  Negative for nausea and vomiting.   Musculoskeletal:  Positive for arthralgias.   Neurological:  Negative for dizziness and syncope.        Objective     /84   Pulse 85   Ht 177.8 cm (70\")   Wt 112 kg (247 lb)   BMI 35.44 kg/m²       Physical Exam    General : Alert, awake, no acute distress  Neck : Supple, no carotid bruit, no jugular venous distention  CVS : Regular rate and rhythm, no murmur, rubs or gallops  Lungs: Clear to auscultation bilaterally, no crackles or rhonchi  Abdomen: Soft, nontender, bowel sounds heard in all 4 quadrants  Extremities: Warm, well-perfused, trace edema bilaterally    Result Review :     The following data was reviewed by: Darnell Stevenson MD on 03/12/2024:    CMP          8/17/2023    12:25   CMP   Glucose 116    BUN 27    Creatinine 1.65    EGFR 41.5    Sodium 139    Potassium 4.9    Chloride 104    Calcium 8.8    Total Protein 6.8    Albumin 3.9 "    Globulin 2.9    Total Bilirubin 0.2    Alkaline Phosphatase 58    AST (SGOT) 17    ALT (SGPT) 16    Albumin/Globulin Ratio 1.3    BUN/Creatinine Ratio 16.4    Anion Gap 11.1        TSH          8/17/2023    12:25   TSH   TSH 0.412      Lipid Panel          9/5/2023    11:08   Lipid Panel   Total Cholesterol 137    Triglycerides 134    HDL Cholesterol 34    VLDL Cholesterol 24    LDL Cholesterol  79    LDL/HDL Ratio 2.24           Data reviewed: Cardiology studies        Results for orders placed during the hospital encounter of 09/27/22    Adult Transthoracic Echo Complete W/ Cont if Necessary Per Protocol    Interpretation Summary  · The left ventricular cavity is borderline dilated.  · Left ventricular ejection fraction appears to be 56 - 60%.  · Left ventricular diastolic function is consistent with (grade I) impaired relaxation and age.  · Mild aortic valve stenosis is present with max/mean pressure gradients 17/10 mmHg.  · No significant pericardial effusion noted. There may be a minimal amount of fluid near the right atrium.  · Mild to moderate left atrial enlargement.  · Mild to moderate mitral regurgitation.      Results for orders placed during the hospital encounter of 07/08/21    Stress Test With Myocardial Perfusion One Day    Interpretation Summary  · Myocardial perfusion imaging indicates a small-sized infarct located in the inferior wall with mild mansoor-infarct ischemia.  · Left ventricular ejection fraction is borderline normal. (Calculated EF = 49%).  · Diaphragmatic attenuation artifact is present.  · Findings consistent with a normal ECG stress test.  · Occasional isolated PVCs are noted at rest and also during stress.  · Impressions are consistent with an intermediate risk study.               Assessment and Plan        Diagnoses and all orders for this visit:    1. Coronary artery disease of bypass graft of native heart with stable angina pectoris (Primary)  Assessment & Plan:  He is currently  stable with no anginal-like symptoms.  LV ejection fraction is around 50% per most recent echocardiogram.  Will continue aspirin, beta-blockers and statins.      2. Essential hypertension  Assessment & Plan:  Blood pressure is recently on the higher side.  Home blood pressure log earlier in the month showed recently well-controlled blood pressure.  He is on a steroid course at this time which is likely recent.  Continue current regimen encouraged to keep home blood pressure log.    Orders:  -     amLODIPine (NORVASC) 5 MG tablet; Take 1 tablet by mouth Daily.  Dispense: 90 tablet; Refill: 3    3. Atrial fibrillation, persistent  Assessment & Plan:  He remains in sinus rhythm, denies any major palpitations.  Most recent labs showed stable hemoglobin and renal functions.  Continue Xarelto for anticoagulation , along with metoprolol for rate and rhythm management.      4. Ischemic cardiomyopathy  Assessment & Plan:  Most recent LV ejection fraction 60%.  He is clinically not volume overloaded.  He has shortness of breath, related to COPD.  Continue torsemide, along with metoprolol and Entresto.      5. Mixed hyperlipidemia  Assessment & Plan:  Most recent LDL is 79, which is near goal.  He is unable to tolerate high intensity statins.  Will continue Pitavastatin 1 mg nightly          Mr Cee would like to be started on medications for weight loss.  He is inquiring whether Wegovy or similar medication will be beneficial.  He has no cardiac contraindications for GLP-1 receptor agonists and likely has beneficial effects.      Follow Up     Return in about 6 months (around 9/12/2024) for Next scheduled follow up, OK to use a new patient slot if needed .    Patient was given instructions and counseling regarding his condition or for health maintenance advice. Please see specific information pulled into the AVS if appropriate.

## 2024-03-12 NOTE — ASSESSMENT & PLAN NOTE
Most recent LV ejection fraction 60%.  He is clinically not volume overloaded.  He has shortness of breath, related to COPD.  Continue torsemide, along with metoprolol and Entresto.

## 2024-03-12 NOTE — ASSESSMENT & PLAN NOTE
He is currently stable with no anginal-like symptoms.  LV ejection fraction is around 50% per most recent echocardiogram.  Will continue aspirin, beta-blockers and statins.

## 2024-03-12 NOTE — ASSESSMENT & PLAN NOTE
He remains in sinus rhythm, denies any major palpitations.  Most recent labs showed stable hemoglobin and renal functions.  Continue Xarelto for anticoagulation , along with metoprolol for rate and rhythm management.

## 2024-03-15 ENCOUNTER — TELEPHONE (OUTPATIENT)
Dept: INTERNAL MEDICINE | Facility: CLINIC | Age: 84
End: 2024-03-15
Payer: MEDICARE

## 2024-03-15 NOTE — TELEPHONE ENCOUNTER
Please let Mr. Cee know that I've reviewed his BP log.  Blood pressure readings are reassuring, and at goal.

## 2024-03-18 ENCOUNTER — OFFICE VISIT (OUTPATIENT)
Dept: PULMONOLOGY | Facility: CLINIC | Age: 84
End: 2024-03-18
Payer: MEDICARE

## 2024-03-18 VITALS
DIASTOLIC BLOOD PRESSURE: 62 MMHG | HEART RATE: 95 BPM | SYSTOLIC BLOOD PRESSURE: 108 MMHG | BODY MASS INDEX: 35.79 KG/M2 | OXYGEN SATURATION: 97 % | RESPIRATION RATE: 16 BRPM | HEIGHT: 70 IN | WEIGHT: 250 LBS

## 2024-03-18 DIAGNOSIS — J43.2 CENTRILOBULAR EMPHYSEMA: ICD-10-CM

## 2024-03-18 DIAGNOSIS — F17.201 TOBACCO ABUSE, IN REMISSION: ICD-10-CM

## 2024-03-18 DIAGNOSIS — J44.9 CHRONIC OBSTRUCTIVE PULMONARY DISEASE, UNSPECIFIED COPD TYPE: ICD-10-CM

## 2024-03-18 DIAGNOSIS — R05.9 COUGH: ICD-10-CM

## 2024-03-18 DIAGNOSIS — K21.9 GASTROESOPHAGEAL REFLUX DISEASE, UNSPECIFIED WHETHER ESOPHAGITIS PRESENT: ICD-10-CM

## 2024-03-18 DIAGNOSIS — I51.89 DIASTOLIC DYSFUNCTION: ICD-10-CM

## 2024-03-18 DIAGNOSIS — J30.1 SEASONAL ALLERGIC RHINITIS DUE TO POLLEN: Primary | ICD-10-CM

## 2024-03-18 DIAGNOSIS — E66.09 CLASS 2 OBESITY DUE TO EXCESS CALORIES WITHOUT SERIOUS COMORBIDITY WITH BODY MASS INDEX (BMI) OF 35.0 TO 35.9 IN ADULT: ICD-10-CM

## 2024-03-18 DIAGNOSIS — J43.9 PULMONARY EMPHYSEMA, UNSPECIFIED EMPHYSEMA TYPE: ICD-10-CM

## 2024-03-18 DIAGNOSIS — Z86.16 HISTORY OF COVID-19: ICD-10-CM

## 2024-03-18 DIAGNOSIS — K21.00 GASTROESOPHAGEAL REFLUX DISEASE WITH ESOPHAGITIS WITHOUT HEMORRHAGE: Chronic | ICD-10-CM

## 2024-03-18 DIAGNOSIS — J30.2 SEASONAL ALLERGIES: ICD-10-CM

## 2024-03-18 DIAGNOSIS — R06.00 DYSPNEA, UNSPECIFIED TYPE: ICD-10-CM

## 2024-03-18 DIAGNOSIS — J30.9 ALLERGIC RHINITIS, UNSPECIFIED SEASONALITY, UNSPECIFIED TRIGGER: ICD-10-CM

## 2024-03-18 DIAGNOSIS — R06.02 SOB (SHORTNESS OF BREATH): ICD-10-CM

## 2024-03-18 PROCEDURE — 3074F SYST BP LT 130 MM HG: CPT | Performed by: INTERNAL MEDICINE

## 2024-03-18 PROCEDURE — 1159F MED LIST DOCD IN RCRD: CPT | Performed by: INTERNAL MEDICINE

## 2024-03-18 PROCEDURE — 99214 OFFICE O/P EST MOD 30 MIN: CPT | Performed by: INTERNAL MEDICINE

## 2024-03-18 PROCEDURE — 1160F RVW MEDS BY RX/DR IN RCRD: CPT | Performed by: INTERNAL MEDICINE

## 2024-03-18 PROCEDURE — 3078F DIAST BP <80 MM HG: CPT | Performed by: INTERNAL MEDICINE

## 2024-03-18 RX ORDER — FLUTICASONE FUROATE, UMECLIDINIUM BROMIDE AND VILANTEROL TRIFENATATE 100; 62.5; 25 UG/1; UG/1; UG/1
1 POWDER RESPIRATORY (INHALATION)
Qty: 1 EACH | Refills: 11 | Status: SHIPPED | OUTPATIENT
Start: 2024-03-18

## 2024-03-18 RX ORDER — IPRATROPIUM BROMIDE AND ALBUTEROL SULFATE 2.5; .5 MG/3ML; MG/3ML
3 SOLUTION RESPIRATORY (INHALATION) 4 TIMES DAILY PRN
Qty: 360 ML | Refills: 3 | Status: SHIPPED | OUTPATIENT
Start: 2024-03-18

## 2024-03-18 NOTE — PROGRESS NOTES
Primary Care Provider  Noble Kc MD     Referring Provider  No ref. provider found     Chief Complaint  COPD, Lung Nodule, Follow-up (6 Month ), Shortness of Breath (With ambulation ), and Cough    Subjective          Layo Cee presents to [facility]  COPD  He complains of cough, shortness of breath and wheezing. His past medical history is significant for emphysema.   Emphysema  Associated symptoms include coughing.   Shortness of Breath  Associated symptoms include wheezing.   Wheezing   Associated symptoms include coughing and shortness of breath.   Cough  Associated symptoms include shortness of breath and wheezing. His past medical history is significant for emphysema.     Layo Cee is a 84 y.o. male patient with history of COPD, seasonal allergies, wheezing, cough, chronic compensated systolic heart failure, known granulomatous disease, lung nodules, tobacco abuse of cigarettes in remission, COVID-19 infection in January 2021, chronic hypoxic respiratory failure.  He is here for follow-up.  Since his last office visit, we stopped his Stiolto and Arnuity and switch to Trelegy Ellipta.  He has been using Trelegy Ellipta once daily.  Currently he is using torsemide for diuretics.  His diuretics dose has been decreased by Dr. Stevenson.  He is using DuoNeb nebulizer 2 to 4 times a day.  He has cough and congestion.  He continues to use oxygen with sleep. He is continued on Xarelto. He has shortness of breath with activities.  His leg swelling has improved.  He has no chest pain and chest tightness.  No nausea or vomiting.  He has intermittent wheezing.  Has no changes in weight or appetite.  No fever or chills.  He is currently going through therapy for his hip pains.  He is using oxygen all night long and does get benefit from it.  He feels like he has more energy during daytime.  His breathing is no worse compared to last office  visit.    Review of Systems   Respiratory:  Positive for cough, shortness of breath and wheezing.    General:  Fatigue, No Fever No weight loss or loss of appetite  HEENT: No dysphagia, No Visual Changes, no rhinorrhea, sore throat+  Respiratory:  + cough,+Dyspnea, intermittent phlegm, No Pleuritic Pain, no wheezing, no hemoptysis  Cardiovascular: Denies chest pain, denies palpitations,+HALL, + improving chest Pressure  Gastrointestinal:  No Abdominal Pain, No Nausea, No Vomiting, No Diarrhea  Genitourinary:  No Dysuria, No Frequency, No Hesitancy  Musculoskeletal: No muscle pain or swelling  Endocrine:  No Heat Intolerance, No Cold Intolerance  Hematologic:  No Bleeding, No Bruising  Psychiatric:  No Anxiety, No Depression  Neurologic:  No Confusion, no Dysarthria, No Headaches  Skin:  No Rash, No Open Wounds    Family History   Problem Relation Age of Onset    Hypertension Mother     Heart disease Father     Other Brother         GASTROINTESTINAL CANCER    Kidney cancer Brother         Social History     Socioeconomic History    Marital status:    Tobacco Use    Smoking status: Former     Current packs/day: 0.00     Average packs/day: 0.5 packs/day for 40.0 years (20.0 ttl pk-yrs)     Types: Cigarettes     Start date:      Quit date:      Years since quittin.2     Passive exposure: Past    Smokeless tobacco: Never   Vaping Use    Vaping status: Never Used   Substance and Sexual Activity    Alcohol use: Not Currently    Drug use: Never    Sexual activity: Defer        Past Medical History:   Diagnosis Date    Acute on chronic diastolic CHF (congestive heart failure) 2022    Arthritis     Atrial fibrillation, persistent 5/3/2022    Persistent atrial fibrillation status post extensive ablation and pulmonary vein isolation by Dr. Aguilar on 6/3/2022, with reoccurring rapid A. fib, and then successful cardioversion on 2022    BPH (benign prostatic hyperplasia)     CHF (congestive heart failure)      COPD (chronic obstructive pulmonary disease)     Coronary artery disease of bypass graft of native heart with stable angina pectoris     (1) Coronary artery disease, s/p CABG in the past. Cardiac catheterization in July 2016 showed patentLIMA graft to the LAD and occluded SVGs. Native LAD is 100% occluded in the mid portion. Native LCx has no significant disease. Native RCA is 100% occluded and it is a . Repeat cath in June, 2021, underwent stent placement to diagnonal branch.    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 Lauren Valencia         Immunization History   Administered Date(s) Administered    COVID-19 (Nippo) 11/04/2021    Fluzone High-Dose 65+yrs 09/28/2021, 10/05/2022, 09/11/2023    Fluzone Quad >6mos (Multi-dose) 10/08/2019    Influenza Quad Vaccine (Inpatient) 10/08/2019    Influenza, Unspecified 10/08/2019    Pneumococcal Conjugate 13-Valent (PCV13) 04/13/2018    Pneumococcal Polysaccharide (PPSV23) 06/07/2019    Shingrix 04/29/2021, 11/22/2021    Td (TDVAX) 01/09/2023         Allergies   Allergen Reactions    Carvedilol Swelling and Anaphylaxis    Levaquin [Levofloxacin] Unknown - Low Severity          Current Outpatient Medications:     acetaminophen (TYLENOL) 325 MG tablet, Take 2 tablets by mouth Every 6 (Six) Hours As Needed for Mild Pain., Disp: , Rfl:     allopurinol (Zyloprim) 100 MG tablet, Take 1 tablet by mouth Daily., Disp: 90 tablet, Rfl: 2    amiodarone (PACERONE) 200 MG tablet, Take 1 tablet by mouth every 12  (twelve) hours for 6 days, then and 1 tablet daily for 24 days. (Patient taking differently: Take 0.5 tablets by mouth Daily.), Disp: 36 tablet, Rfl: 1    amLODIPine (NORVASC) 5 MG tablet, Take 1 tablet by mouth Daily., Disp: 90 tablet, Rfl: 3    aspirin (aspirin) 81 MG EC tablet, Take 1 tablet by mouth Daily., Disp: 30 tablet, Rfl: 1    calcium carbonate (OS-LIANNA) 600 MG tablet, Take 1 tablet by mouth Every Night., Disp: , Rfl:     cetirizine (zyrTEC) 10 MG tablet, Take 1 tablet by mouth Daily., Disp: 90 tablet, Rfl: 3    Coenzyme Q10 100 MG tablet, Take 1 tablet by mouth Daily., Disp: , Rfl:     Diclofenac Sodium (VOLTAREN) 1 % gel gel, Apply 2 g topically to the appropriate area as directed 4 (Four) Times a Day., Disp: , Rfl:     finasteride (PROSCAR) 5 MG tablet, Take 1 tablet by mouth Daily., Disp: 90 tablet, Rfl: 4    Fluticasone-Umeclidin-Vilant (Trelegy Ellipta) 100-62.5-25 MCG/ACT inhaler, Inhale 1 puff Daily., Disp: 1 each, Rfl: 11    ipratropium-albuterol (DUO-NEB) 0.5-2.5 mg/3 ml nebulizer, Take 3 mL by nebulization 4 (Four) Times a Day As Needed for Wheezing., Disp: 360 mL, Rfl: 3    isosorbide mononitrate (IMDUR) 30 MG 24 hr tablet, Take 1 tablet by mouth Daily., Disp: 90 tablet, Rfl: 3    metoprolol succinate XL (TOPROL-XL) 25 MG 24 hr tablet, Take 3 tablets by mouth every 12 hours (Patient taking differently: Take 1 tablet by mouth Daily.), Disp: 180 tablet, Rfl: 3    montelukast (SINGULAIR) 10 MG tablet, Take 1 tablet by mouth Every Night., Disp: 30 tablet, Rfl: 11    pantoprazole (PROTONIX) 40 MG EC tablet, Take 1 tablet by mouth daily., Disp: 90 tablet, Rfl: 2    pitavastatin calcium (Livalo) 1 MG tablet tablet, Take 1 tablet by mouth Every Night., Disp: 90 tablet, Rfl: 3    predniSONE (DELTASONE) 10 MG tablet, Take 1 tablet by mouth Daily., Disp: 90 tablet, Rfl: 0    rivaroxaban (Xarelto) 15 MG tablet, Take 1 tablet by mouth daily with dinner., Disp: 30 tablet, Rfl: 11    sacubitril-valsartan  "(Entresto) 49-51 MG tablet, Take 1 tablet by mouth 2 (Two) Times a Day., Disp: 180 tablet, Rfl: 3    tamsulosin (FLOMAX) 0.4 MG capsule 24 hr capsule, Take 2 capsules by mouth Daily., Disp: 180 capsule, Rfl: 4    Tirzepatide-Weight Management (ZEPBOUND) 2.5 MG/0.5ML solution auto-injector, Inject 0.5 mL under the skin into the appropriate area as directed 1 (One) Time Per Week., Disp: 2 mL, Rfl: 0    torsemide (DEMADEX) 20 MG tablet, Take 1 tablet by mouth daily. (Patient taking differently: Take 0.5 tablets by mouth Daily.), Disp: 90 tablet, Rfl: 1    VITAMIN B COMPLEX-C PO, Take 1 tablet by mouth Daily., Disp: , Rfl:     fluticasone (FLONASE) 50 MCG/ACT nasal spray, 1 spray into the nostril(s) as directed by provider Every Night., Disp: , Rfl:     nitroglycerin (NITROSTAT) 0.4 MG SL tablet, Place 1 tablet under the tongue Every 5 (Five) Minutes As Needed for Chest Pain for up to 30 days. Take no more than 3 doses in 15 minutes., Disp: 30 tablet, Rfl: 0     Objective   Vital Signs:   /62 (BP Location: Left arm, Patient Position: Sitting, Cuff Size: Large Adult)   Pulse 95   Resp 16   Ht 177.8 cm (70\")   Wt 113 kg (250 lb)   SpO2 97% Comment: Room air. 2L at night  BMI 35.87 kg/m²     Mallampatti classification : 1  Physical Exam  Vital Signs Reviewed  Obese, elderly male, pleasant, in no acute distress, normal conversant  HEENT:  PERRL, EOMI.  OP, nares clear, has hoarseness of voice, posterior pharyngeal erythema+  Neck:  Supple, no JVD, no thyromegaly  Lymph: no axillary, cervical, supraclavicular lymphadenopathy noted bilaterally  Chest:  good aeration, bilateral diminished breath sounds, no wheezing, cracklesi, scattered rhonchi at bases, resonant to percussion b/l  CV: RRR, no MGR, pulses 2+, equal  Abd:  Soft, NT, ND, + BS, no HSM  EXT:  no clubbing, no cyanosis, trace BLE edema  Neuro:  A&Ox3, CN grossly intact, no focal deficits  Skin: No rashes or lesions noted     Result Review :   The " following data was reviewed by: Kolton Brink MD on 04/06/2022:  Common labs          8/17/2023    12:25 9/5/2023    11:08   Common Labs   Glucose 116     BUN 27     Creatinine 1.65     Sodium 139     Potassium 4.9     Chloride 104     Calcium 8.8     Albumin 3.9     Total Bilirubin 0.2     Alkaline Phosphatase 58     AST (SGOT) 17     ALT (SGPT) 16     Total Cholesterol  137    Triglycerides  134    HDL Cholesterol  34    LDL Cholesterol   79      CMP          8/17/2023    12:25   CMP   Glucose 116    BUN 27    Creatinine 1.65    EGFR 41.5    Sodium 139    Potassium 4.9    Chloride 104    Calcium 8.8    Total Protein 6.8    Albumin 3.9    Globulin 2.9    Total Bilirubin 0.2    Alkaline Phosphatase 58    AST (SGOT) 17    ALT (SGPT) 16    Albumin/Globulin Ratio 1.3    BUN/Creatinine Ratio 16.4    Anion Gap 11.1          CBC w/diff      CBC w/Diff 8/9/21 8/10/21 1/24/22   WBC 8.02 8.83 6.25   RBC 5.14 4.64 5.07   Hemoglobin 15.1 13.7 14.7   Hematocrit 46.3 41.2 44.2   MCV 90.1 88.8 87.2   MCH 29.4 29.5 29.0   MCHC 32.6 33.3 33.3   RDW 15.2 15.4 13.9   Platelets 185 163 261   Neutrophil Rel % 49.1  52.5   Immature Granulocyte Rel % 0.6 (A)  0.6 (A)   Lymphocyte Rel % 34.7  29.6   Monocyte Rel % 10.5  10.9   Eosinophil Rel % 4.6  5.9   Basophil Rel % 0.5  0.5   (A) Abnormal value              Data reviewed: Radiologic studies CT scan of the chest from October 2021 was reviewed.  Shows some chronic interstitial changes with emphysema.             Assessment and Plan    Diagnoses and all orders for this visit:    1. Seasonal allergic rhinitis due to pollen (Primary)  -     Overnight Sleep Oximetry Study; Future    2. Seasonal allergies  -     Overnight Sleep Oximetry Study; Future    3. History of COVID-19  -     Overnight Sleep Oximetry Study; Future    4. Gastroesophageal reflux disease with esophagitis without hemorrhage  -     Overnight Sleep Oximetry Study; Future  -     ipratropium-albuterol (DUO-NEB) 0.5-2.5 mg/3  ml nebulizer; Take 3 mL by nebulization 4 (Four) Times a Day As Needed for Wheezing.  Dispense: 360 mL; Refill: 3    5. Tobacco abuse, in remission  -     Overnight Sleep Oximetry Study; Future    6. SOB (shortness of breath)  -     Overnight Sleep Oximetry Study; Future    7. Pulmonary emphysema, unspecified emphysema type  -     Overnight Sleep Oximetry Study; Future  -     ipratropium-albuterol (DUO-NEB) 0.5-2.5 mg/3 ml nebulizer; Take 3 mL by nebulization 4 (Four) Times a Day As Needed for Wheezing.  Dispense: 360 mL; Refill: 3    8. Gastroesophageal reflux disease, unspecified whether esophagitis present  -     Overnight Sleep Oximetry Study; Future  -     Fluticasone-Umeclidin-Vilant (Trelegy Ellipta) 100-62.5-25 MCG/ACT inhaler; Inhale 1 puff Daily.  Dispense: 1 each; Refill: 11    9. Class 2 obesity due to excess calories without serious comorbidity with body mass index (BMI) of 35.0 to 35.9 in adult  -     Overnight Sleep Oximetry Study; Future    10. Diastolic dysfunction  -     Fluticasone-Umeclidin-Vilant (Trelegy Ellipta) 100-62.5-25 MCG/ACT inhaler; Inhale 1 puff Daily.  Dispense: 1 each; Refill: 11    11. Centrilobular emphysema  -     Fluticasone-Umeclidin-Vilant (Trelegy Ellipta) 100-62.5-25 MCG/ACT inhaler; Inhale 1 puff Daily.  Dispense: 1 each; Refill: 11  -     ipratropium-albuterol (DUO-NEB) 0.5-2.5 mg/3 ml nebulizer; Take 3 mL by nebulization 4 (Four) Times a Day As Needed for Wheezing.  Dispense: 360 mL; Refill: 3    12. Allergic rhinitis, unspecified seasonality, unspecified trigger  -     ipratropium-albuterol (DUO-NEB) 0.5-2.5 mg/3 ml nebulizer; Take 3 mL by nebulization 4 (Four) Times a Day As Needed for Wheezing.  Dispense: 360 mL; Refill: 3    13. Chronic obstructive pulmonary disease, unspecified COPD type  -     ipratropium-albuterol (DUO-NEB) 0.5-2.5 mg/3 ml nebulizer; Take 3 mL by nebulization 4 (Four) Times a Day As Needed for Wheezing.  Dispense: 360 mL; Refill: 3    14. Cough  -      ipratropium-albuterol (DUO-NEB) 0.5-2.5 mg/3 ml nebulizer; Take 3 mL by nebulization 4 (Four) Times a Day As Needed for Wheezing.  Dispense: 360 mL; Refill: 3    15. Dyspnea, unspecified type  -     ipratropium-albuterol (DUO-NEB) 0.5-2.5 mg/3 ml nebulizer; Take 3 mL by nebulization 4 (Four) Times a Day As Needed for Wheezing.  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  COPD: Continue trelogy Ellipta 100 mcg once daily. He was on albuterol and DuoNeb.    I advised him to use DuoNeb 1-2 times daily for airway clearance.  Continue with Singulair.  Continue with pantoprazole.    Chronic A. fib: Status post cardiac ablation, now in sinus rhythm.  Continue Xarelto.    He would benefit from pulmonary rehab, however has to drive a long distance for it and is not willing to do it for now.  Congestive heart failure: Continue with diuretics through Dr. Stevenson.  Continue with Xarelto.  Continue with torsemide once daily.    Chronic hypoxic respiratory failure: Check overnight oximetry.  Continues to use oxygen with sleep and as well he is improving on it.  He has been using it with sleep and overall has improvement in his sleep habits as well as daytime energy.  He will need to continue using oxygen with sleep.    Lung nodules: Likely related to granulomatous lung disease.  Repeat CT scan of the chest in October 2022 shows normal intrathoracic process.    Up-to-date on flu and pneumonia vaccine.  He has had J & J vaccine.  Advised booster vaccination for COVID.  Recommended RSV vaccine as well.    Follow Up   Return in about 6 months (around 9/18/2024).  Patient was given instructions and counseling regarding his condition or for health maintenance advice. Please see specific information pulled into the AVS if appropriate.       Electronically signed by Kolton Brink MD, 3/18/2024, 13:57 EDT.

## 2024-03-28 DIAGNOSIS — J44.9 CHRONIC OBSTRUCTIVE PULMONARY DISEASE, UNSPECIFIED COPD TYPE: Primary | ICD-10-CM

## 2024-05-31 ENCOUNTER — OFFICE VISIT (OUTPATIENT)
Dept: INTERNAL MEDICINE | Facility: CLINIC | Age: 84
End: 2024-05-31
Payer: MEDICARE

## 2024-05-31 VITALS
BODY MASS INDEX: 34.67 KG/M2 | WEIGHT: 242.2 LBS | DIASTOLIC BLOOD PRESSURE: 70 MMHG | HEART RATE: 74 BPM | OXYGEN SATURATION: 94 % | SYSTOLIC BLOOD PRESSURE: 120 MMHG | TEMPERATURE: 97.8 F | HEIGHT: 70 IN

## 2024-05-31 DIAGNOSIS — M10.9 GOUT, UNSPECIFIED CAUSE, UNSPECIFIED CHRONICITY, UNSPECIFIED SITE: Primary | ICD-10-CM

## 2024-05-31 DIAGNOSIS — I10 ESSENTIAL HYPERTENSION: ICD-10-CM

## 2024-05-31 DIAGNOSIS — E66.09 CLASS 1 OBESITY DUE TO EXCESS CALORIES WITHOUT SERIOUS COMORBIDITY WITH BODY MASS INDEX (BMI) OF 34.0 TO 34.9 IN ADULT: ICD-10-CM

## 2024-05-31 LAB
ALBUMIN SERPL-MCNC: 3.8 G/DL (ref 3.5–5.2)
ALBUMIN/GLOB SERPL: 1.6 G/DL
ALP SERPL-CCNC: 55 U/L (ref 39–117)
ALT SERPL W P-5'-P-CCNC: 16 U/L (ref 1–41)
ANION GAP SERPL CALCULATED.3IONS-SCNC: 10 MMOL/L (ref 5–15)
AST SERPL-CCNC: 11 U/L (ref 1–40)
BILIRUB SERPL-MCNC: 0.3 MG/DL (ref 0–1.2)
BUN SERPL-MCNC: 16 MG/DL (ref 8–23)
BUN/CREAT SERPL: 10.7 (ref 7–25)
CALCIUM SPEC-SCNC: 8.6 MG/DL (ref 8.6–10.5)
CHLORIDE SERPL-SCNC: 104 MMOL/L (ref 98–107)
CO2 SERPL-SCNC: 25 MMOL/L (ref 22–29)
CREAT SERPL-MCNC: 1.49 MG/DL (ref 0.76–1.27)
EGFRCR SERPLBLD CKD-EPI 2021: 46.6 ML/MIN/1.73
GLOBULIN UR ELPH-MCNC: 2.4 GM/DL
GLUCOSE SERPL-MCNC: 102 MG/DL (ref 65–99)
POTASSIUM SERPL-SCNC: 3.8 MMOL/L (ref 3.5–5.2)
PROT SERPL-MCNC: 6.2 G/DL (ref 6–8.5)
SODIUM SERPL-SCNC: 139 MMOL/L (ref 136–145)
URATE SERPL-MCNC: 7.8 MG/DL (ref 3.4–7)

## 2024-05-31 PROCEDURE — 3074F SYST BP LT 130 MM HG: CPT | Performed by: STUDENT IN AN ORGANIZED HEALTH CARE EDUCATION/TRAINING PROGRAM

## 2024-05-31 PROCEDURE — 3078F DIAST BP <80 MM HG: CPT | Performed by: STUDENT IN AN ORGANIZED HEALTH CARE EDUCATION/TRAINING PROGRAM

## 2024-05-31 PROCEDURE — 84550 ASSAY OF BLOOD/URIC ACID: CPT | Performed by: STUDENT IN AN ORGANIZED HEALTH CARE EDUCATION/TRAINING PROGRAM

## 2024-05-31 PROCEDURE — 99214 OFFICE O/P EST MOD 30 MIN: CPT | Performed by: STUDENT IN AN ORGANIZED HEALTH CARE EDUCATION/TRAINING PROGRAM

## 2024-05-31 PROCEDURE — 1126F AMNT PAIN NOTED NONE PRSNT: CPT | Performed by: STUDENT IN AN ORGANIZED HEALTH CARE EDUCATION/TRAINING PROGRAM

## 2024-05-31 PROCEDURE — 80053 COMPREHEN METABOLIC PANEL: CPT | Performed by: STUDENT IN AN ORGANIZED HEALTH CARE EDUCATION/TRAINING PROGRAM

## 2024-05-31 NOTE — PROGRESS NOTES
Chief Complaint  Hypertension, Gout    Subjective          Layo Cee presents to [facility]  History of Present Illness    Doing well since last visit.  No further gout flares.  Has just finished prednisone.  Compliant with allopurinol.    Of note, reports went to [facility] in [facility] about 2 months ago and was prescribed wegovy.  Now taking 0.5 mg dosing, and tolerating it well.  Has lost 8 lbs thus far and quite happy.  He and his wife (who accompanies him today) are unsure if it's the name brand or a compounded version.    PHQ-9 Depression Screening  Little interest or pleasure in doing things?     Feeling down, depressed, or hopeless?     Trouble falling or staying asleep, or sleeping too much?     Feeling tired or having little energy?     Poor appetite or overeating?     Feeling bad about yourself - or that you are a failure or have let yourself or your family down?     Trouble concentrating on things, such as reading the newspaper or watching television?     Moving or speaking so slowly that other people could have noticed? Or the opposite - being so fidgety or restless that you have been moving around a lot more than usual?     Thoughts that you would be better off dead, or of hurting yourself in some way?     PHQ-9 Total Score     If you checked off any problems, how difficult have these problems made it for you to do your work, take care of things at home, or get along with other people?           Current Outpatient Medications   Medication Instructions    acetaminophen (TYLENOL) 650 mg, Oral, Every 6 Hours PRN    allopurinol (ZYLOPRIM) 100 mg, Oral, Daily    amiodarone (PACERONE) 200 MG tablet Take 1 tablet by mouth every 12 (twelve) hours for 6 days, then and 1 tablet daily for 24 days.    amLODIPine (NORVASC) 5 mg, Oral, Daily    aspirin 81 mg, Oral, Daily    calcium carbonate (OS-LIANNA) 600 mg, Oral, Nightly     "cetirizine (ZYRTEC) 10 mg, Oral, Daily    Coenzyme Q10 100 mg, Oral, Daily    Diclofenac Sodium (VOLTAREN) 2 g, Topical, 4 Times Daily    finasteride (PROSCAR) 5 mg, Oral, Daily    fluticasone (FLONASE) 50 MCG/ACT nasal spray 1 spray, Nasal, Nightly    Fluticasone-Umeclidin-Vilant (Trelegy Ellipta) 100-62.5-25 MCG/ACT inhaler 1 puff, Inhalation, Daily - RT    ipratropium-albuterol (DUO-NEB) 0.5-2.5 mg/3 ml nebulizer 3 mL, Nebulization, 4 Times Daily PRN    isosorbide mononitrate (IMDUR) 30 mg, Oral, Daily    metoprolol succinate XL (TOPROL-XL) 25 MG 24 hr tablet Take 3 tablets by mouth every 12 hours    montelukast (SINGULAIR) 10 mg, Oral, Nightly    nitroglycerin (NITROSTAT) 0.4 mg, Sublingual, Every 5 Minutes PRN, Take no more than 3 doses in 15 minutes.    pantoprazole (PROTONIX) 40 mg, Oral, Daily    pitavastatin calcium (LIVALO) 1 mg, Oral, Nightly    sacubitril-valsartan (Entresto) 49-51 MG tablet 1 tablet, Oral, 2 Times Daily    tamsulosin (FLOMAX) 0.8 mg, Oral, Daily    Tirzepatide-Weight Management (ZEPBOUND) 2.5 mg, Subcutaneous, Weekly    torsemide (DEMADEX) 20 mg, Oral, Daily    VITAMIN B COMPLEX-C PO 1 tablet, Oral, Daily    Xarelto 15 mg, Oral, Daily With Dinner       The following portions of the patient's history were reviewed and updated as appropriate: allergies, current medications, past family history, past medical history, past social history, past surgical history, and problem list.    Objective   Vital Signs:   /70 (BP Location: Right arm, Patient Position: Sitting, Cuff Size: Large Adult)   Pulse 74   Temp 97.8 °F (36.6 °C) (Temporal)   Ht 177.8 cm (70\")   Wt 110 kg (242 lb 3.2 oz)   SpO2 94%   BMI 34.75 kg/m²     BP Readings from Last 3 Encounters:   05/31/24 120/70   03/18/24 108/62   03/12/24 121/84     Wt Readings from Last 3 Encounters:   05/31/24 110 kg (242 lb 3.2 oz)   03/18/24 113 kg (250 lb)   03/12/24 112 kg (247 lb)           Physical Exam  Vitals reviewed. "   Constitutional:       General: He is not in acute distress.     Appearance: Normal appearance. He is not ill-appearing, toxic-appearing or diaphoretic.   HENT:      Head: Normocephalic and atraumatic.      Right Ear: External ear normal.      Left Ear: External ear normal.   Eyes:      Conjunctiva/sclera: Conjunctivae normal.   Cardiovascular:      Rate and Rhythm: Normal rate and regular rhythm.      Pulses: Normal pulses.      Heart sounds: Normal heart sounds. No murmur heard.     No friction rub. No gallop.   Pulmonary:      Effort: Pulmonary effort is normal. No respiratory distress.      Breath sounds: Normal breath sounds. No stridor. No wheezing, rhonchi or rales.   Chest:      Chest wall: No tenderness.   Abdominal:      General: Abdomen is flat.      Palpations: Abdomen is soft. There is no mass.      Tenderness: There is no abdominal tenderness.   Musculoskeletal:      Right lower leg: No edema.      Left lower leg: No edema.   Skin:     General: Skin is warm and dry.   Neurological:      General: No focal deficit present.      Mental Status: He is alert. Mental status is at baseline.   Psychiatric:         Mood and Affect: Mood normal.         Behavior: Behavior normal.         Thought Content: Thought content normal.         Judgment: Judgment normal.          Result Review :   The following data was reviewed by: Noble Kc MD on 05/31/2024:  Common labs          8/17/2023    12:25 9/5/2023    11:08   Common Labs   Glucose 116     BUN 27     Creatinine 1.65     Sodium 139     Potassium 4.9     Chloride 104     Calcium 8.8     Albumin 3.9     Total Bilirubin 0.2     Alkaline Phosphatase 58     AST (SGOT) 17     ALT (SGPT) 16     Total Cholesterol  137    Triglycerides  134    HDL Cholesterol  34    LDL Cholesterol   79             Lab Results   Component Value Date    SARSANTIGEN Not Detected 11/11/2022    COVID19 Not Detected 11/11/2022    FLUAAG Negative 03/24/2023    FLUBAG Not Detected  11/11/2022    RAPSCRN Negative 08/14/2023    INR 1.69 (H) 06/02/2022    BILIRUBINUR Negative 03/04/2024       Procedures        Assessment and Plan    Diagnoses and all orders for this visit:    1. Gout, unspecified cause, unspecified chronicity, unspecified site (Primary)  -     Uric Acid  -     Comprehensive Metabolic Panel    2. Essential hypertension  -     Comprehensive Metabolic Panel    3. Class 1 obesity due to excess calories without serious comorbidity with body mass index (BMI) of 34.0 to 34.9 in adult      HTN:  -BP at goal of < 130/80  -will continue amlodipine    Gout:  -stable  -will check uric acid today    Obesity:  -as wegovy is quite difficult to obtain at the moment, I do strongly suspect that he is using semaglutide provided by a compounding pharmacy.  I did speak with him and his wife about my concerns about the safety and legality of semaglutide as provided by compounding pharmacies  -I did recommend they inquire of their provider whether or not this is the name brand or a compounded version      Medications Discontinued During This Encounter   Medication Reason    predniSONE (DELTASONE) 10 MG tablet           Follow Up   Return if symptoms worsen or fail to improve.  Will RTC 1/17/2025 for previously scheduled medicare wellness visit.  Patient was given instructions and counseling regarding his condition or for health maintenance advice. Please see specific information pulled into the AVS if appropriate.       Noble Kc MD  05/31/24  13:46 EDT

## 2024-06-05 DIAGNOSIS — M1A.9XX0 CHRONIC GOUT INVOLVING TOE OF LEFT FOOT WITHOUT TOPHUS, UNSPECIFIED CAUSE: ICD-10-CM

## 2024-06-05 RX ORDER — ALLOPURINOL 200 MG/1
200 TABLET ORAL DAILY
Qty: 90 TABLET | Refills: 2 | Status: SHIPPED | OUTPATIENT
Start: 2024-06-05

## 2024-06-07 ENCOUNTER — TELEPHONE (OUTPATIENT)
Dept: INTERNAL MEDICINE | Facility: CLINIC | Age: 84
End: 2024-06-07
Payer: MEDICARE

## 2024-06-07 NOTE — TELEPHONE ENCOUNTER
----- Message from Noble Kc sent at 6/5/2024  5:39 PM EDT -----  Uric acid is above goal.  I would like to increase your allopurinol.  I have sent a new prescription.    CMP shows stable renal function.

## 2024-06-10 DIAGNOSIS — I51.89 DIASTOLIC DYSFUNCTION: ICD-10-CM

## 2024-06-10 DIAGNOSIS — J43.2 CENTRILOBULAR EMPHYSEMA: ICD-10-CM

## 2024-06-10 DIAGNOSIS — K21.9 GASTROESOPHAGEAL REFLUX DISEASE, UNSPECIFIED WHETHER ESOPHAGITIS PRESENT: ICD-10-CM

## 2024-06-10 RX ORDER — MONTELUKAST SODIUM 10 MG/1
10 TABLET ORAL NIGHTLY
Qty: 90 TABLET | OUTPATIENT
Start: 2024-06-10

## 2024-06-10 RX ORDER — PITAVASTATIN CALCIUM 1.04 MG/1
1 TABLET, FILM COATED ORAL NIGHTLY
Qty: 90 TABLET | Refills: 3 | Status: SHIPPED | OUTPATIENT
Start: 2024-06-10

## 2024-07-08 RX ORDER — ISOSORBIDE MONONITRATE 30 MG/1
30 TABLET, EXTENDED RELEASE ORAL DAILY
Qty: 90 TABLET | Refills: 3 | Status: SHIPPED | OUTPATIENT
Start: 2024-07-08

## 2024-08-21 DIAGNOSIS — K21.9 GASTROESOPHAGEAL REFLUX DISEASE, UNSPECIFIED WHETHER ESOPHAGITIS PRESENT: ICD-10-CM

## 2024-08-21 RX ORDER — PANTOPRAZOLE SODIUM 40 MG/1
40 TABLET, DELAYED RELEASE ORAL DAILY
Qty: 90 TABLET | Refills: 2 | OUTPATIENT
Start: 2024-08-21

## 2024-08-23 DIAGNOSIS — K21.9 GASTROESOPHAGEAL REFLUX DISEASE, UNSPECIFIED WHETHER ESOPHAGITIS PRESENT: ICD-10-CM

## 2024-08-23 RX ORDER — PANTOPRAZOLE SODIUM 40 MG/1
40 TABLET, DELAYED RELEASE ORAL DAILY
Qty: 90 TABLET | Refills: 2 | OUTPATIENT
Start: 2024-08-23

## 2024-08-29 DIAGNOSIS — I25.5 ISCHEMIC CARDIOMYOPATHY: ICD-10-CM

## 2024-08-29 DIAGNOSIS — I48.19 ATRIAL FIBRILLATION, PERSISTENT: ICD-10-CM

## 2024-08-29 RX ORDER — METOPROLOL SUCCINATE 25 MG/1
25 TABLET, EXTENDED RELEASE ORAL EVERY 12 HOURS
Qty: 90 TABLET | Refills: 3 | Status: SHIPPED | OUTPATIENT
Start: 2024-08-29

## 2024-08-29 NOTE — TELEPHONE ENCOUNTER
MANPRE[facility] PT.  METOPROLOL REFILL REQUESTED.  PT ADVISED HE IS TAKING METOPROLOL 25 MG DAILY.  PLEASE ADVISE FOR REFILL

## 2024-09-14 DIAGNOSIS — I51.89 DIASTOLIC DYSFUNCTION: ICD-10-CM

## 2024-09-14 DIAGNOSIS — K21.9 GASTROESOPHAGEAL REFLUX DISEASE, UNSPECIFIED WHETHER ESOPHAGITIS PRESENT: ICD-10-CM

## 2024-09-14 DIAGNOSIS — J43.2 CENTRILOBULAR EMPHYSEMA: ICD-10-CM

## 2024-09-16 ENCOUNTER — LAB (OUTPATIENT)
Dept: LAB | Facility: HOSPITAL | Age: 84
End: 2024-09-16
Payer: MEDICARE

## 2024-09-16 ENCOUNTER — OFFICE VISIT (OUTPATIENT)
Dept: CARDIOLOGY | Facility: CLINIC | Age: 84
End: 2024-09-16
Payer: MEDICARE

## 2024-09-16 VITALS
HEART RATE: 78 BPM | DIASTOLIC BLOOD PRESSURE: 63 MMHG | HEIGHT: 70 IN | BODY MASS INDEX: 35.93 KG/M2 | WEIGHT: 251 LBS | SYSTOLIC BLOOD PRESSURE: 114 MMHG

## 2024-09-16 DIAGNOSIS — I48.19 ATRIAL FIBRILLATION, PERSISTENT: ICD-10-CM

## 2024-09-16 DIAGNOSIS — I25.708 CORONARY ARTERY DISEASE OF BYPASS GRAFT OF NATIVE HEART WITH STABLE ANGINA PECTORIS: Chronic | ICD-10-CM

## 2024-09-16 DIAGNOSIS — I25.5 ISCHEMIC CARDIOMYOPATHY: ICD-10-CM

## 2024-09-16 DIAGNOSIS — E78.2 MIXED HYPERLIPIDEMIA: ICD-10-CM

## 2024-09-16 DIAGNOSIS — K21.9 GASTROESOPHAGEAL REFLUX DISEASE, UNSPECIFIED WHETHER ESOPHAGITIS PRESENT: ICD-10-CM

## 2024-09-16 DIAGNOSIS — I25.708 CORONARY ARTERY DISEASE OF BYPASS GRAFT OF NATIVE HEART WITH STABLE ANGINA PECTORIS: Primary | Chronic | ICD-10-CM

## 2024-09-16 DIAGNOSIS — I10 ESSENTIAL HYPERTENSION: ICD-10-CM

## 2024-09-16 LAB
ALBUMIN SERPL-MCNC: 4.1 G/DL (ref 3.5–5.2)
ALBUMIN/GLOB SERPL: 1.7 G/DL
ALP SERPL-CCNC: 59 U/L (ref 39–117)
ALT SERPL W P-5'-P-CCNC: 13 U/L (ref 1–41)
ANION GAP SERPL CALCULATED.3IONS-SCNC: 9.8 MMOL/L (ref 5–15)
AST SERPL-CCNC: 20 U/L (ref 1–40)
BILIRUB SERPL-MCNC: 0.3 MG/DL (ref 0–1.2)
BUN SERPL-MCNC: 12 MG/DL (ref 8–23)
BUN/CREAT SERPL: 7.9 (ref 7–25)
CALCIUM SPEC-SCNC: 9.7 MG/DL (ref 8.6–10.5)
CHLORIDE SERPL-SCNC: 102 MMOL/L (ref 98–107)
CHOLEST SERPL-MCNC: 113 MG/DL (ref 0–200)
CO2 SERPL-SCNC: 29.2 MMOL/L (ref 22–29)
CREAT SERPL-MCNC: 1.51 MG/DL (ref 0.76–1.27)
DEPRECATED RDW RBC AUTO: 45.1 FL (ref 37–54)
EGFRCR SERPLBLD CKD-EPI 2021: 45.8 ML/MIN/1.73
ERYTHROCYTE [DISTWIDTH] IN BLOOD BY AUTOMATED COUNT: 13.7 % (ref 12.3–15.4)
GLOBULIN UR ELPH-MCNC: 2.4 GM/DL
GLUCOSE SERPL-MCNC: 96 MG/DL (ref 65–99)
HCT VFR BLD AUTO: 39.7 % (ref 37.5–51)
HDLC SERPL-MCNC: 33 MG/DL (ref 40–60)
HGB BLD-MCNC: 13.2 G/DL (ref 13–17.7)
LDLC SERPL CALC-MCNC: 57 MG/DL (ref 0–100)
LDLC/HDLC SERPL: 1.65 {RATIO}
MCH RBC QN AUTO: 30.3 PG (ref 26.6–33)
MCHC RBC AUTO-ENTMCNC: 33.2 G/DL (ref 31.5–35.7)
MCV RBC AUTO: 91.3 FL (ref 79–97)
PLATELET # BLD AUTO: 224 10*3/MM3 (ref 140–450)
PMV BLD AUTO: 10.4 FL (ref 6–12)
POTASSIUM SERPL-SCNC: 4.3 MMOL/L (ref 3.5–5.2)
PROT SERPL-MCNC: 6.5 G/DL (ref 6–8.5)
RBC # BLD AUTO: 4.35 10*6/MM3 (ref 4.14–5.8)
SODIUM SERPL-SCNC: 141 MMOL/L (ref 136–145)
TRIGL SERPL-MCNC: 127 MG/DL (ref 0–150)
VLDLC SERPL-MCNC: 23 MG/DL (ref 5–40)
WBC NRBC COR # BLD AUTO: 9.54 10*3/MM3 (ref 3.4–10.8)

## 2024-09-16 PROCEDURE — 3078F DIAST BP <80 MM HG: CPT | Performed by: INTERNAL MEDICINE

## 2024-09-16 PROCEDURE — 1160F RVW MEDS BY RX/DR IN RCRD: CPT | Performed by: INTERNAL MEDICINE

## 2024-09-16 PROCEDURE — 80061 LIPID PANEL: CPT

## 2024-09-16 PROCEDURE — 3074F SYST BP LT 130 MM HG: CPT | Performed by: INTERNAL MEDICINE

## 2024-09-16 PROCEDURE — 80053 COMPREHEN METABOLIC PANEL: CPT

## 2024-09-16 PROCEDURE — 99214 OFFICE O/P EST MOD 30 MIN: CPT | Performed by: INTERNAL MEDICINE

## 2024-09-16 PROCEDURE — 85027 COMPLETE CBC AUTOMATED: CPT

## 2024-09-16 PROCEDURE — 36415 COLL VENOUS BLD VENIPUNCTURE: CPT

## 2024-09-16 PROCEDURE — 1159F MED LIST DOCD IN RCRD: CPT | Performed by: INTERNAL MEDICINE

## 2024-09-16 RX ORDER — MONTELUKAST SODIUM 10 MG/1
10 TABLET ORAL NIGHTLY
Qty: 90 TABLET | Refills: 3 | Status: SHIPPED | OUTPATIENT
Start: 2024-09-16

## 2024-09-16 RX ORDER — AMLODIPINE BESYLATE 2.5 MG/1
2.5 TABLET ORAL DAILY
Qty: 90 TABLET | Refills: 2 | Status: SHIPPED | OUTPATIENT
Start: 2024-09-16

## 2024-09-16 RX ORDER — PANTOPRAZOLE SODIUM 40 MG/1
40 TABLET, DELAYED RELEASE ORAL DAILY
Qty: 90 TABLET | Refills: 2 | Status: SHIPPED | OUTPATIENT
Start: 2024-09-16

## 2024-09-19 ENCOUNTER — TELEPHONE (OUTPATIENT)
Dept: CARDIOLOGY | Facility: CLINIC | Age: 84
End: 2024-09-19
Payer: MEDICARE

## 2024-09-19 ENCOUNTER — TELEPHONE (OUTPATIENT)
Dept: CARDIOLOGY | Facility: CLINIC | Age: 84
End: 2024-09-19

## 2024-09-24 ENCOUNTER — OFFICE VISIT (OUTPATIENT)
Dept: PULMONOLOGY | Facility: CLINIC | Age: 84
End: 2024-09-24
Payer: MEDICARE

## 2024-09-24 VITALS
RESPIRATION RATE: 18 BRPM | DIASTOLIC BLOOD PRESSURE: 67 MMHG | WEIGHT: 232.8 LBS | BODY MASS INDEX: 33.33 KG/M2 | TEMPERATURE: 97.2 F | OXYGEN SATURATION: 100 % | SYSTOLIC BLOOD PRESSURE: 133 MMHG | HEART RATE: 68 BPM | HEIGHT: 70 IN

## 2024-09-24 DIAGNOSIS — F17.201 TOBACCO ABUSE, IN REMISSION: ICD-10-CM

## 2024-09-24 DIAGNOSIS — J30.1 SEASONAL ALLERGIC RHINITIS DUE TO POLLEN: ICD-10-CM

## 2024-09-24 DIAGNOSIS — K21.9 GASTROESOPHAGEAL REFLUX DISEASE, UNSPECIFIED WHETHER ESOPHAGITIS PRESENT: ICD-10-CM

## 2024-09-24 DIAGNOSIS — J43.9 PULMONARY EMPHYSEMA, UNSPECIFIED EMPHYSEMA TYPE: ICD-10-CM

## 2024-09-24 DIAGNOSIS — E66.09 CLASS 2 OBESITY DUE TO EXCESS CALORIES WITHOUT SERIOUS COMORBIDITY WITH BODY MASS INDEX (BMI) OF 35.0 TO 35.9 IN ADULT: ICD-10-CM

## 2024-09-24 DIAGNOSIS — J44.9 CHRONIC OBSTRUCTIVE PULMONARY DISEASE, UNSPECIFIED COPD TYPE: ICD-10-CM

## 2024-09-24 DIAGNOSIS — R06.02 SOB (SHORTNESS OF BREATH): ICD-10-CM

## 2024-09-24 DIAGNOSIS — J30.2 SEASONAL ALLERGIES: ICD-10-CM

## 2024-09-24 DIAGNOSIS — J30.9 ALLERGIC RHINITIS, UNSPECIFIED SEASONALITY, UNSPECIFIED TRIGGER: ICD-10-CM

## 2024-09-24 DIAGNOSIS — Z95.1 S/P CABG (CORONARY ARTERY BYPASS GRAFT): ICD-10-CM

## 2024-09-24 DIAGNOSIS — K21.00 GASTROESOPHAGEAL REFLUX DISEASE WITH ESOPHAGITIS WITHOUT HEMORRHAGE: Chronic | ICD-10-CM

## 2024-09-24 DIAGNOSIS — R06.00 DYSPNEA, UNSPECIFIED TYPE: ICD-10-CM

## 2024-09-24 DIAGNOSIS — Z23 ENCOUNTER FOR VACCINATION: Primary | ICD-10-CM

## 2024-09-24 DIAGNOSIS — J43.2 CENTRILOBULAR EMPHYSEMA: ICD-10-CM

## 2024-09-24 DIAGNOSIS — R05.9 COUGH: ICD-10-CM

## 2024-09-24 DIAGNOSIS — I51.89 DIASTOLIC DYSFUNCTION: ICD-10-CM

## 2024-09-24 PROCEDURE — 90662 IIV NO PRSV INCREASED AG IM: CPT | Performed by: INTERNAL MEDICINE

## 2024-09-24 PROCEDURE — 3075F SYST BP GE 130 - 139MM HG: CPT | Performed by: INTERNAL MEDICINE

## 2024-09-24 PROCEDURE — G0008 ADMIN INFLUENZA VIRUS VAC: HCPCS | Performed by: INTERNAL MEDICINE

## 2024-09-24 PROCEDURE — 3078F DIAST BP <80 MM HG: CPT | Performed by: INTERNAL MEDICINE

## 2024-09-24 PROCEDURE — 99214 OFFICE O/P EST MOD 30 MIN: CPT | Performed by: INTERNAL MEDICINE

## 2024-09-24 RX ORDER — FLUTICASONE FUROATE, UMECLIDINIUM BROMIDE AND VILANTEROL TRIFENATATE 100; 62.5; 25 UG/1; UG/1; UG/1
1 POWDER RESPIRATORY (INHALATION)
Qty: 1 EACH | Refills: 11 | Status: SHIPPED | OUTPATIENT
Start: 2024-09-24

## 2024-09-24 RX ORDER — MONTELUKAST SODIUM 10 MG/1
10 TABLET ORAL NIGHTLY
Qty: 90 TABLET | Refills: 3 | Status: SHIPPED | OUTPATIENT
Start: 2024-09-24

## 2024-09-24 RX ORDER — SULFAMETHOXAZOLE/TRIMETHOPRIM 800-160 MG
TABLET ORAL
COMMUNITY
Start: 2024-06-05

## 2024-09-28 DIAGNOSIS — I25.5 ISCHEMIC CARDIOMYOPATHY: ICD-10-CM

## 2024-09-28 DIAGNOSIS — I48.19 ATRIAL FIBRILLATION, PERSISTENT: ICD-10-CM

## 2024-09-30 RX ORDER — TORSEMIDE 20 MG/1
20 TABLET ORAL DAILY
Qty: 90 TABLET | Refills: 1 | Status: SHIPPED | OUTPATIENT
Start: 2024-09-30

## 2024-09-30 RX ORDER — AMIODARONE HYDROCHLORIDE 200 MG/1
TABLET ORAL
Qty: 36 TABLET | Refills: 1 | Status: SHIPPED | OUTPATIENT
Start: 2024-09-30

## 2024-10-29 ENCOUNTER — OFFICE VISIT (OUTPATIENT)
Dept: UROLOGY | Age: 84
End: 2024-10-29
Payer: MEDICARE

## 2024-10-29 VITALS — BODY MASS INDEX: 33.29 KG/M2 | WEIGHT: 232 LBS

## 2024-10-29 DIAGNOSIS — N20.0 KIDNEY STONES: ICD-10-CM

## 2024-10-29 DIAGNOSIS — N40.1 BENIGN PROSTATIC HYPERPLASIA WITH LOWER URINARY TRACT SYMPTOMS, SYMPTOM DETAILS UNSPECIFIED: Primary | ICD-10-CM

## 2024-10-29 DIAGNOSIS — R31.0 GROSS HEMATURIA: ICD-10-CM

## 2024-10-29 PROBLEM — M75.90 BURSITIS AND TENDINITIS OF SHOULDER REGION: Status: RESOLVED | Noted: 2021-09-21 | Resolved: 2024-10-29

## 2024-10-29 PROBLEM — N43.2 OTHER HYDROCELE: Status: RESOLVED | Noted: 2022-10-29 | Resolved: 2024-10-29

## 2024-10-29 PROBLEM — Z87.448 HISTORY OF HEMATURIA: Status: RESOLVED | Noted: 2021-10-25 | Resolved: 2024-10-29

## 2024-10-29 PROBLEM — M75.50 BURSITIS AND TENDINITIS OF SHOULDER REGION: Status: RESOLVED | Noted: 2021-09-21 | Resolved: 2024-10-29

## 2024-10-29 PROBLEM — Z86.16 HISTORY OF COVID-19: Status: RESOLVED | Noted: 2021-10-28 | Resolved: 2024-10-29

## 2024-10-29 PROBLEM — R35.0 BENIGN PROSTATIC HYPERPLASIA WITH URINARY FREQUENCY: Status: RESOLVED | Noted: 2021-10-25 | Resolved: 2024-10-29

## 2024-10-29 PROBLEM — Z87.898 HISTORY OF GROSS HEMATURIA: Status: RESOLVED | Noted: 2022-10-29 | Resolved: 2024-10-29

## 2024-10-29 PROBLEM — N43.3 HYDROCELE IN ADULT: Status: RESOLVED | Noted: 2021-10-26 | Resolved: 2024-10-29

## 2024-10-29 LAB
BILIRUB BLD-MCNC: NEGATIVE MG/DL
CLARITY, POC: CLEAR
COLOR UR: YELLOW
EXPIRATION DATE: NORMAL
GLUCOSE UR STRIP-MCNC: NEGATIVE MG/DL
KETONES UR QL: NEGATIVE
LEUKOCYTE EST, POC: NEGATIVE
Lab: NORMAL
NITRITE UR-MCNC: NEGATIVE MG/ML
PH UR: 5.5 [PH] (ref 5–8)
PROT UR STRIP-MCNC: NEGATIVE MG/DL
RBC # UR STRIP: NEGATIVE /UL
SP GR UR: 1.01 (ref 1–1.03)
URINE VOLUME: 0
UROBILINOGEN UR QL: NORMAL

## 2024-10-29 RX ORDER — TAMSULOSIN HYDROCHLORIDE 0.4 MG/1
1 CAPSULE ORAL DAILY
Qty: 90 CAPSULE | Refills: 4 | Status: SHIPPED | OUTPATIENT
Start: 2024-10-29

## 2024-10-29 RX ORDER — FINASTERIDE 5 MG/1
5 TABLET, FILM COATED ORAL DAILY
Qty: 90 TABLET | Refills: 4 | Status: SHIPPED | OUTPATIENT
Start: 2024-10-29

## 2024-10-29 NOTE — PROGRESS NOTES
"Chief Complaint: Benign Prostatic Hypertrophy (F/u)    Subjective         Benign Prostatic Hypertrophy      Layo Cee is a 84 y.o. male presents to [facility] to be seen for annual f/u BPH.    At last visit we had him tamsulosin 0.8mg q day  and finasteride 5mg q day.    He states the tamsulosin made him more dizzy.     He went back to 0.4  mq day of tamsulosin.     He was seen on 10/18/24 at urgent care in [place] for uti symtpms \"Patient presents here with dysuria, frequency, urgency, hesitancy, denies any blood in urine. Denies scrotal pain or swelling and no back/flank pain. Symptoms started yesterday afternoon. He has a recurrent history of prostatitis and UTI's. He sees a urologist. Has taken a cranberry pill and tried cranberry juice for symptoms, but seen no relief.\"    He was given bactrim ds for 10 days.     Subsequent cx returned positive for citrobacter koseri 38988 cfu/ ml pan sensitive.    He was on atbx for 5 days and then began having gross hematuria.     He was having clots in the urine and dark red blood in the urine.    He states that he had issues with retention and then ended up being able to go freely after 3-4 x trying to go.    He states he is still having issues with soreness and straining.    This is his first uti and gross hematuria episode in some time.             Previous:   to see if this would help his stream.     Stream is better.     Not straining.      Nocturia x 0.      No urgency or frequency denies straining.        Previous:   Patient's been having some more trouble with shortness of air last few days.  He is going to call cardiology today.      history of a CABG with atrial fibrillation.   7/21 coronary stent  aspirin 81.     No  GH      hydrocele on the left side.  About the same.     PVR     10/22  49     Previous     1/19 scrotal ultrasound-large left hydrocele, normal testicles, no testicular torsion.     Hematuria last year     10/20 " cystoscopy-4 cm prostate with a TUR defect.  Some regrowth on the right lateral side about a centimeter above the verumontanum.  Still pretty open.  Mild trabeculations throughout.  Negative otherwise  9/20 CT urogram-bladder wall thickening along the base and right lateral wall.  Likely related to prostate.  Bilateral simple renal cyst.  Negative otherwise    non-smoker     History of TURP around 2015, Dr. Shi    Patient was admitted in 2019 with gross hematuria and clot retention and was on CBI for 48 hours.    PSA 11/2019 1.29        Objective     Past Medical History:   Diagnosis Date    Acute on chronic diastolic CHF (congestive heart failure) 6/6/2022    Arthritis     Atrial fibrillation, persistent 5/3/2022    Persistent atrial fibrillation status post extensive ablation and pulmonary vein isolation by Dr. Aguilar on 6/3/2022, with reoccurring rapid A. fib, and then successful cardioversion on 7/1/2022    BPH (benign prostatic hyperplasia)     CHF (congestive heart failure)     COPD (chronic obstructive pulmonary disease)     Coronary artery disease of bypass graft of native heart with stable angina pectoris     (1) Coronary artery disease, s/p CABG in the past. Cardiac catheterization in July 2016 showed patentLIMA graft to the LAD and occluded SVGs. Native LAD is 100% occluded in the mid portion. Native LCx has no significant disease. Native RCA is 100% occluded and it is a . Repeat cath in June, 2021, underwent stent placement to diagnonal branch.    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 Lauren Valencia        Past Surgical History:   Procedure Laterality Date    BLADDER SURGERY      CARDIAC CATHETERIZATION  07/2016    CARDIAC CATHETERIZATION N/A 8/9/2021    Procedure: Left Heart Cath with possible angioplasty;  Surgeon: Jack Ladd MD;  Location: [facility] CATH INVASIVE LOCATION;  Service: Cardiovascular;  Laterality: N/A;  Please schedule the procedure with Dr. Jack Ladd MD on 8/9/2021    CARDIAC ELECTROPHYSIOLOGY PROCEDURE N/A 6/3/2022    Procedure: Ablation atrial fibrillation;  Surgeon: Irineo Aguilar MD;  Location: [facility] CATH INVASIVE LOCATION;  Service: Cardiovascular;  Laterality: N/A;    CARDIAC ELECTROPHYSIOLOGY PROCEDURE N/A 6/3/2022    Procedure: Ablation atrial flutter/CTI;  Surgeon: Irineo Aguilar MD;  Location: [facility] CATH INVASIVE LOCATION;  Service: Cardiovascular;  Laterality: N/A;    CARDIAC SURGERY      HEART BYPASS    COLONOSCOPY  2011    CORONARY ARTERY BYPASS GRAFT      CYSTOSCOPY  03/19/2019    WITH BILATERAL RETROGRADE PYELOGRAPHY    ELECTRICAL CARDIOVERSION  5/12/2022         ENDOSCOPY  2016    PROSTATE SURGERY           Current Outpatient Medications:     acetaminophen (TYLENOL) 325 MG tablet, Take 2 tablets by mouth Every 6 (Six) Hours As Needed for Mild Pain., Disp: , Rfl:     allopurinol 200 MG tablet, Take 200 mg by mouth Daily., Disp: 90 tablet, Rfl: 2    amiodarone (PACERONE) 200 MG tablet, Take 1 tablet by mouth every 12 (twelve) hours for 6 days, then  1 tablet daily for 24 days., Disp: 36 tablet, Rfl: 1    amLODIPine (NORVASC) 2.5 MG tablet, Take 1 tablet by mouth Daily., Disp: 90 tablet, Rfl: 2    aspirin (aspirin) 81 MG EC tablet, Take 1 tablet by mouth Daily., Disp: 30 tablet, Rfl: 1    calcium carbonate (OS-LIANNA) 600 MG tablet, Take 1 tablet by mouth Every Night., Disp: , Rfl:     cetirizine (zyrTEC) 10 MG tablet, Take 1 tablet by mouth Daily., Disp: 90 tablet, Rfl: 3    Coenzyme  Q10 100 MG tablet, Take 1 tablet by mouth Daily., Disp: , Rfl:     Diclofenac Sodium (VOLTAREN) 1 % gel gel, Apply 2 g topically to the appropriate area as directed 4 (Four) Times a Day., Disp: , Rfl:     finasteride (PROSCAR) 5 MG tablet, Take 1 tablet by mouth Daily., Disp: 90 tablet, Rfl: 4    Fluticasone-Umeclidin-Vilant (Trelegy Ellipta) 100-62.5-25 MCG/ACT inhaler, Inhale 1 puff Daily., Disp: 1 each, Rfl: 11    ipratropium-albuterol (DUO-NEB) 0.5-2.5 mg/3 ml nebulizer, Take 3 mL by nebulization 4 (Four) Times a Day As Needed for Wheezing., Disp: 360 mL, Rfl: 3    isosorbide mononitrate (IMDUR) 30 MG 24 hr tablet, Take 1 tablet by mouth daily., Disp: 90 tablet, Rfl: 3    Livalo 1 MG tablet tablet, Take 1 tablet by mouth every night., Disp: 90 tablet, Rfl: 3    metoprolol succinate XL (TOPROL-XL) 25 MG 24 hr tablet, Take 1 tablet by mouth Every 12 (Twelve) Hours., Disp: 90 tablet, Rfl: 3    montelukast (SINGULAIR) 10 MG tablet, Take 1 tablet by mouth Every Night., Disp: 90 tablet, Rfl: 3    pantoprazole (PROTONIX) 40 MG EC tablet, Take 1 tablet by mouth Daily., Disp: 90 tablet, Rfl: 2    rivaroxaban (Xarelto) 15 MG tablet, Take 1 tablet by mouth daily with dinner., Disp: 30 tablet, Rfl: 11    sacubitril-valsartan (Entresto) 49-51 MG tablet, Take 1 tablet by mouth 2 (Two) Times a Day., Disp: 180 tablet, Rfl: 3    Semaglutide-Weight Management 0.5 MG/0.5ML solution auto-injector, Inject  under the skin into the appropriate area as directed., Disp: , Rfl:     tamsulosin (FLOMAX) 0.4 MG capsule 24 hr capsule, Take 1 capsule by mouth Daily., Disp: 90 capsule, Rfl: 4    torsemide (DEMADEX) 20 MG tablet, Take 1 tablet by mouth daily., Disp: 90 tablet, Rfl: 1    VITAMIN B COMPLEX-C PO, Take 1 tablet by mouth Daily., Disp: , Rfl:     fluticasone (FLONASE) 50 MCG/ACT nasal spray, 1 spray into the nostril(s) as directed by provider Every Night., Disp: , Rfl:     nitroglycerin (NITROSTAT) 0.4 MG SL tablet, Place 1 tablet  under the tongue Every 5 (Five) Minutes As Needed for Chest Pain for up to 30 days. Take no more than 3 doses in 15 minutes., Disp: 30 tablet, Rfl: 0    Allergies   Allergen Reactions    Carvedilol Swelling and Anaphylaxis    Levaquin [Levofloxacin] Unknown - Low Severity        Family History   Problem Relation Age of Onset    Hypertension Mother     Heart disease Father     Other Brother         GASTROINTESTINAL CANCER    Kidney cancer Brother        Social History     Socioeconomic History    Marital status:    Tobacco Use    Smoking status: Former     Current packs/day: 0.00     Average packs/day: 0.5 packs/day for 40.0 years (20.0 ttl pk-yrs)     Types: Cigarettes     Start date:      Quit date:      Years since quittin.8     Passive exposure: Past    Smokeless tobacco: Never   Vaping Use    Vaping status: Never Used   Substance and Sexual Activity    Alcohol use: Not Currently    Drug use: Never    Sexual activity: Defer       Vital Signs:   Wt 105 kg (232 lb)   BMI 33.29 kg/m²      Physical Exam     Result Review :   The following data was reviewed by: ARACELI Decker on 10/29/2024:  Results for orders placed or performed in visit on 10/29/24   Bladder Scan    Collection Time: 10/29/24 11:34 AM   Result Value Ref Range    Urine Volume 0    POC Urinalysis Dipstick, Automated    Collection Time: 10/29/24 11:40 AM    Specimen: Urine   Result Value Ref Range    Color Yellow Yellow, Straw, Dark Yellow, Cely    Clarity, UA Clear Clear    Specific Gravity  1.010 1.005 - 1.030    pH, Urine 5.5 5.0 - 8.0    Leukocytes Negative Negative    Nitrite, UA Negative Negative    Protein, POC Negative Negative mg/dL    Glucose, UA Negative Negative mg/dL    Ketones, UA Negative Negative    Urobilinogen, UA Normal Normal, 0.2 E.U./dL    Bilirubin Negative Negative    Blood, UA Negative Negative    Lot Number -     Expiration Date -           Bladder Scan interpretation 10/29/2024    Estimation of  residual urine via [address] Bladder Scan  MA/nurse performing: angelica mahajan Ma   Residual Urine: 0 ml  Indication: Benign prostatic hyperplasia with lower urinary tract symptoms, symptom details unspecified    Kidney stones    Gross hematuria   Position: Supine  Examination: Incremental scanning of the suprapubic area using 2.0 MHz transducer using copious amounts of acoustic gel.   Findings: An anechoic area was demonstrated which represented the bladder, with measurement of residual urine as noted. I inspected this myself. In that the residual urine was stable or insignificant, refer to plan for treatment and plan necessary at this time.          Procedures        Assessment and Plan    Diagnoses and all orders for this visit:    1. Benign prostatic hyperplasia with lower urinary tract symptoms, symptom details unspecified (Primary)  -     POC Urinalysis Dipstick, Automated  -     Bladder Scan  -     finasteride (PROSCAR) 5 MG tablet; Take 1 tablet by mouth Daily.  Dispense: 90 tablet; Refill: 4    2. Kidney stones  -     tamsulosin (FLOMAX) 0.4 MG capsule 24 hr capsule; Take 1 capsule by mouth Daily.  Dispense: 90 capsule; Refill: 4    3. Gross hematuria      He will continue tamsulosin 0.4mg q day.  Continue finasteride 5mg daily.    Ua today looks great. Assume gross hematuria was due to infection given proximity to positive cx.    Recommend taking daily cranberry supplement and probiotics.    He can take azo for the soreness over the few days.     We will have him f/u in 3 months or sooner if needed.      I spent 10 minutes caring for Layo on this date of service. This time includes time spent by me in the following activities:reviewing tests, obtaining and/or reviewing a separately obtained history, performing a medically appropriate examination and/or evaluation , counseling and educating the patient/family/caregiver, ordering medications, tests, or procedures, and documenting information in the  medical record  Follow Up   Return in about 3 months (around 1/29/2025) for f/u bph/ uti/ gross heamturia with PVR .  Patient was given instructions and counseling regarding his condition or for health maintenance advice. Please see specific information pulled into the AVS if appropriate.         This document has been electronically signed by ARACELI Decker  October 29, 2024 11:58 EDT

## 2024-11-05 ENCOUNTER — TELEPHONE (OUTPATIENT)
Dept: CARDIOLOGY | Facility: CLINIC | Age: 84
End: 2024-11-05
Payer: MEDICARE

## 2024-11-05 NOTE — TELEPHONE ENCOUNTER
Er precauctions for severere symptoms.  Hold amlodipine, entresto and imdur tonight and in morning, if possible add to tomorrow schedule (if not ablet to come tmw, then one day this week, and call us with bp readings in morning.) f

## 2024-11-05 NOTE — TELEPHONE ENCOUNTER
MANPREET patient and wife. Went over recommendations. Patient is scheduled to see ARACELI Gauthier tomorrow morning.

## 2024-11-05 NOTE — TELEPHONE ENCOUNTER
Patient's wife called states patient is having dizziness am bp/hr was abnormal. Patient's bp at 1130 today sittin/60; standing 65/- with extreme dizziness. Patient's bp at 1440 125/62-87- patient is still having dizziness sitting but gets much worse with standing. Per patient wife and granddaughter, patient heart rate has been irregular.     Patient complains his head just feels weird.     Went over medications. Patient is currently taking    Amiodarone 100 mg daily  Torsemide 10 mg daily  Metoprolol 25 mg daily  Entresto BID  Amlodipine 2.5 mg daily  Imdur 30 mg daily  Xarelto daily  U-[employer] supplement for bladder health is new for patient.     RN encouraged patient to replace caffeinenated  beverages with water, or at least every other. Went over instructions to to keep BP/HR log, and wear compression socks.

## 2024-11-06 ENCOUNTER — OFFICE VISIT (OUTPATIENT)
Dept: CARDIOLOGY | Facility: CLINIC | Age: 84
End: 2024-11-06
Payer: MEDICARE

## 2024-11-06 ENCOUNTER — LAB (OUTPATIENT)
Facility: HOSPITAL | Age: 84
End: 2024-11-06
Payer: MEDICARE

## 2024-11-06 VITALS — BODY MASS INDEX: 32.19 KG/M2 | HEIGHT: 70 IN | WEIGHT: 224.87 LBS

## 2024-11-06 DIAGNOSIS — I25.708 CORONARY ARTERY DISEASE OF BYPASS GRAFT OF NATIVE HEART WITH STABLE ANGINA PECTORIS: Primary | ICD-10-CM

## 2024-11-06 DIAGNOSIS — I10 ESSENTIAL HYPERTENSION: ICD-10-CM

## 2024-11-06 DIAGNOSIS — I25.5 ISCHEMIC CARDIOMYOPATHY: ICD-10-CM

## 2024-11-06 DIAGNOSIS — I48.19 ATRIAL FIBRILLATION, PERSISTENT: ICD-10-CM

## 2024-11-06 DIAGNOSIS — E78.2 MIXED HYPERLIPIDEMIA: ICD-10-CM

## 2024-11-06 LAB
ALBUMIN SERPL-MCNC: 3.9 G/DL (ref 3.5–5.2)
ALBUMIN/GLOB SERPL: 1.2 G/DL
ALP SERPL-CCNC: 75 U/L (ref 39–117)
ALT SERPL W P-5'-P-CCNC: 17 U/L (ref 1–41)
ANION GAP SERPL CALCULATED.3IONS-SCNC: 10 MMOL/L (ref 5–15)
AST SERPL-CCNC: 20 U/L (ref 1–40)
BILIRUB SERPL-MCNC: 0.3 MG/DL (ref 0–1.2)
BUN SERPL-MCNC: 17 MG/DL (ref 8–23)
BUN/CREAT SERPL: 7.4 (ref 7–25)
CALCIUM SPEC-SCNC: 9.7 MG/DL (ref 8.6–10.5)
CHLORIDE SERPL-SCNC: 100 MMOL/L (ref 98–107)
CO2 SERPL-SCNC: 25 MMOL/L (ref 22–29)
CREAT SERPL-MCNC: 2.29 MG/DL (ref 0.76–1.27)
DEPRECATED RDW RBC AUTO: 46.2 FL (ref 37–54)
EGFRCR SERPLBLD CKD-EPI 2021: 27.8 ML/MIN/1.73
ERYTHROCYTE [DISTWIDTH] IN BLOOD BY AUTOMATED COUNT: 13.8 % (ref 12.3–15.4)
GLOBULIN UR ELPH-MCNC: 3.2 GM/DL
GLUCOSE SERPL-MCNC: 93 MG/DL (ref 65–99)
HCT VFR BLD AUTO: 39.9 % (ref 37.5–51)
HGB BLD-MCNC: 13.3 G/DL (ref 13–17.7)
MCH RBC QN AUTO: 30.4 PG (ref 26.6–33)
MCHC RBC AUTO-ENTMCNC: 33.3 G/DL (ref 31.5–35.7)
MCV RBC AUTO: 91.1 FL (ref 79–97)
PLATELET # BLD AUTO: 205 10*3/MM3 (ref 140–450)
PMV BLD AUTO: 10.1 FL (ref 6–12)
POTASSIUM SERPL-SCNC: 4.6 MMOL/L (ref 3.5–5.2)
PROT SERPL-MCNC: 7.1 G/DL (ref 6–8.5)
RBC # BLD AUTO: 4.38 10*6/MM3 (ref 4.14–5.8)
SODIUM SERPL-SCNC: 135 MMOL/L (ref 136–145)
WBC NRBC COR # BLD AUTO: 9.01 10*3/MM3 (ref 3.4–10.8)

## 2024-11-06 PROCEDURE — 80053 COMPREHEN METABOLIC PANEL: CPT

## 2024-11-06 PROCEDURE — 36415 COLL VENOUS BLD VENIPUNCTURE: CPT

## 2024-11-06 PROCEDURE — 99214 OFFICE O/P EST MOD 30 MIN: CPT | Performed by: FAMILY MEDICINE

## 2024-11-06 PROCEDURE — 1159F MED LIST DOCD IN RCRD: CPT | Performed by: FAMILY MEDICINE

## 2024-11-06 PROCEDURE — 1160F RVW MEDS BY RX/DR IN RCRD: CPT | Performed by: FAMILY MEDICINE

## 2024-11-06 PROCEDURE — 93000 ELECTROCARDIOGRAM COMPLETE: CPT | Performed by: FAMILY MEDICINE

## 2024-11-06 PROCEDURE — 85027 COMPLETE CBC AUTOMATED: CPT

## 2024-11-06 NOTE — PROGRESS NOTES
Chief Complaint  Coronary Artery Disease and Dizziness    Subjective        History of Present Illness  Layo Cee presents to [facility] CARDIOLOGY   Mr. Cee is an 84-year-old male patient coming in today for an acute visit due to low blood pressure and dizziness.  He contacted our office yesterday, stating his blood pressure was running on the low side even sitting around 100 systolic but was dropping to the 60s and 70s with standing.  He felt tenses overnight, and brought him in for visit today.  Blood pressures have improved, and dizziness has also improved overnight by holding these medications.  He still feels very fatigued.  He has been taking GLP-1 injections for the last several months and has had significant weight loss (intended), and blood pressure has down with this.  This morning he notes he had a little bit of chest tightness, but did hold his long-acting nitrate.  No shortness of breath.  He denies feeling palpitations, yesterday when his blood pressure was low his heart rate was on the higher side in the  range, but he is regular and in the 70s today.        Past History:     (1) Coronary artery disease, status post coronary artery bypass grafting in the past. Cardiac catheterization done in July 2016 showed patent left internal mammary graft to the LAD and occluded saphenous venous graft. Native LAD is 100% occluded in the mid portion. Native circumflex artery has no significant disease. Native right coronary artery is also 100% occluded and it is a chronic total occlusion. The right coronary artery is reconstituted with collaterals from the left side.  Patient underwent repeat cardiac catheterization in June, 2021 and underwent angioplasty stent placement to diagonal branch.       (2) Ischemic cardiomyopathy with recovery of cardiac function.  (3) Chronic kidney disease, stage 3.   (4) Chronic obstructive pulmonary disease  (5) Hypertension. (6) Hyperlipidemia.   (7)  Very frequent PVCs constituting 11.8% of all the beats per 24-hour Holter monitor study in June of 2018.   (8) Typical atrial flutter status post radiofrequency ablation by Dr. Aguilar on 11/30/2018   (9) Persistent atrial fibrillation status post extensive ablation and pulmonary vein isolation by Dr. Aguilar on 6/3/2022      Past Medical History:   Diagnosis Date    Acute on chronic diastolic CHF (congestive heart failure) 6/6/2022    Arthritis     Atrial fibrillation, persistent 5/3/2022    BPH (benign prostatic hyperplasia)     CHF (congestive heart failure)     COPD (chronic obstructive pulmonary disease)     Coronary artery disease of bypass graft of native heart with stable angina pectoris     COVID-19 02/04/2021    Diverticulitis 10/11/2019    Dysphagia     Essential hypertension 08/27/2020    Frequent PVCs 08/28/2021    GERD (gastroesophageal reflux disease)     Gross hematuria     Long-term use of high-risk medication     Lung disease     Mixed hyperlipidemia 08/28/2021    Myocardial infarction 07/2016    OCD (obsessive compulsive disorder)     Renal insufficiency 08/27/2020    Rhinitis, allergic 06/05/2018    Sore throat     Stable angina     Typical atrial flutter 11/30/2018    Vertigo        Allergies   Allergen Reactions    Carvedilol Swelling and Anaphylaxis    Levaquin [Levofloxacin] Unknown - Low Severity        Past Surgical History:   Procedure Laterality Date    BLADDER SURGERY      CARDIAC CATHETERIZATION  07/2016    CARDIAC CATHETERIZATION N/A 8/9/2021    Procedure: Left Heart Cath with possible angioplasty;  Surgeon: Jack Ladd MD;  Location: [facility] CATH INVASIVE LOCATION;  Service: Cardiovascular;  Laterality: N/A;  Please schedule the procedure with Dr. Jack Ladd MD on 8/9/2021    CARDIAC ELECTROPHYSIOLOGY PROCEDURE N/A 6/3/2022    Procedure: Ablation atrial fibrillation;  Surgeon: Irineo Aguilar MD;  Location: [facility] CATH INVASIVE LOCATION;  Service: Cardiovascular;  Laterality:  N/A;    CARDIAC ELECTROPHYSIOLOGY PROCEDURE N/A 6/3/2022    Procedure: Ablation atrial flutter/CTI;  Surgeon: Irineo Aguilar MD;  Location: [facility] INVASIVE LOCATION;  Service: Cardiovascular;  Laterality: N/A;    CARDIAC SURGERY      HEART BYPASS    COLONOSCOPY  2011    CORONARY ARTERY BYPASS GRAFT      CYSTOSCOPY  03/19/2019    WITH BILATERAL RETROGRADE PYELOGRAPHY    ELECTRICAL CARDIOVERSION  5/12/2022         ENDOSCOPY  2016    PROSTATE SURGERY          Social History  He  reports that he quit smoking about 26 years ago. His smoking use included cigarettes. He started smoking about 66 years ago. He has a 20 pack-year smoking history. He has been exposed to tobacco smoke. He has never used smokeless tobacco. He reports that he does not currently use alcohol. He reports that he does not use drugs.    Family History  His family history includes Heart disease in his father; Hypertension in his mother; Kidney cancer in his brother; Other in his brother.       Current Outpatient Medications on File Prior to Visit   Medication Sig    acetaminophen (TYLENOL) 325 MG tablet Take 2 tablets by mouth Every 6 (Six) Hours As Needed for Mild Pain.    allopurinol 200 MG tablet Take 200 mg by mouth Daily.    amiodarone (PACERONE) 200 MG tablet Take 1 tablet by mouth every 12 (twelve) hours for 6 days, then  1 tablet daily for 24 days.    amLODIPine (NORVASC) 2.5 MG tablet Take 1 tablet by mouth Daily.    aspirin (aspirin) 81 MG EC tablet Take 1 tablet by mouth Daily.    calcium carbonate (OS-LIANNA) 600 MG tablet Take 1 tablet by mouth Every Night.    cetirizine (zyrTEC) 10 MG tablet Take 1 tablet by mouth Daily.    Coenzyme Q10 100 MG tablet Take 1 tablet by mouth Daily.    Diclofenac Sodium (VOLTAREN) 1 % gel gel Apply 2 g topically to the appropriate area as directed 4 (Four) Times a Day.    finasteride (PROSCAR) 5 MG tablet Take 1 tablet by mouth Daily.    Fluticasone-Umeclidin-Vilant (Trelegy Ellipta) 100-62.5-25  "MCG/ACT inhaler Inhale 1 puff Daily.    ipratropium-albuterol (DUO-NEB) 0.5-2.5 mg/3 ml nebulizer Take 3 mL by nebulization 4 (Four) Times a Day As Needed for Wheezing.    isosorbide mononitrate (IMDUR) 30 MG 24 hr tablet Take 1 tablet by mouth daily.    Livalo 1 MG tablet tablet Take 1 tablet by mouth every night.    metoprolol succinate XL (TOPROL-XL) 25 MG 24 hr tablet Take 1 tablet by mouth Every 12 (Twelve) Hours.    montelukast (SINGULAIR) 10 MG tablet Take 1 tablet by mouth Every Night.    pantoprazole (PROTONIX) 40 MG EC tablet Take 1 tablet by mouth Daily.    rivaroxaban (Xarelto) 15 MG tablet Take 1 tablet by mouth daily with dinner.    sacubitril-valsartan (Entresto) 49-51 MG tablet Take 1 tablet by mouth 2 (Two) Times a Day.    Semaglutide-Weight Management 0.5 MG/0.5ML solution auto-injector Inject  under the skin into the appropriate area as directed.    tamsulosin (FLOMAX) 0.4 MG capsule 24 hr capsule Take 1 capsule by mouth Daily.    torsemide (DEMADEX) 20 MG tablet Take 1 tablet by mouth daily.    VITAMIN B COMPLEX-C PO Take 1 tablet by mouth Daily.    fluticasone (FLONASE) 50 MCG/ACT nasal spray 1 spray into the nostril(s) as directed by provider Every Night.    nitroglycerin (NITROSTAT) 0.4 MG SL tablet Place 1 tablet under the tongue Every 5 (Five) Minutes As Needed for Chest Pain for up to 30 days. Take no more than 3 doses in 15 minutes.    [DISCONTINUED] dilTIAZem CD (CARDIZEM CD) 180 MG 24 hr capsule Take 1 capsule by mouth Daily for 30 days.     No current facility-administered medications on file prior to visit.         Review of Systems     Objective   Vitals:    11/06/24 1035   Weight: 102 kg (224 lb 13.9 oz)   Height: 177.8 cm (70\")           Vitals:    11/06/24 1035 11/06/24 1037 11/06/24 1040   Orthostatic BP: 156/77 149/69 109/72   Orthostatic Pulse: 79 83 80   Patient Position: Lying Sitting Standing          Physical Exam  General : Alert, awake, no acute distress  Neck : Supple, no " "carotid bruit, no jugular venous distention  CVS : Regular rate and rhythm, no murmur, no rubs or gallops  Lungs: Clear to auscultation bilaterally, no crackles or rhonchi  Abdomen: Soft, nontender, bowel sounds active  Extremities: Warm, well-perfused, no pedal edema      Result Review     The following data was reviewed by ARACELI Tristan  proBNP   Date Value Ref Range Status   11/10/2022 431.0 0.0 - 1,800.0 pg/mL Final     CMP          5/31/2024    13:20 9/16/2024    11:21 11/6/2024    11:31   CMP   Glucose 102  96  93    BUN 16  12  17    Creatinine 1.49  1.51  2.29    EGFR 46.6  45.8  27.8    Sodium 139  141  135    Potassium 3.8  4.3  4.6    Chloride 104  102  100    Calcium 8.6  9.7  9.7    Total Protein 6.2  6.5  7.1    Albumin 3.8  4.1  3.9    Globulin 2.4  2.4  3.2    Total Bilirubin 0.3  0.3  0.3    Alkaline Phosphatase 55  59  75    AST (SGOT) 11  20  20    ALT (SGPT) 16  13  17    Albumin/Globulin Ratio 1.6  1.7  1.2    BUN/Creatinine Ratio 10.7  7.9  7.4    Anion Gap 10.0  9.8  10.0      CBC w/diff          9/16/2024    11:21   CBC w/Diff   WBC 9.54    RBC 4.35    Hemoglobin 13.2    Hematocrit 39.7    MCV 91.3    MCH 30.3    MCHC 33.2    RDW 13.7    Platelets 224       Lab Results   Component Value Date    TSH 0.412 08/17/2023      Lab Results   Component Value Date    FREET4 1.44 01/24/2022      No results found for: \"DDIMERQUANT\"  Magnesium   Date Value Ref Range Status   09/27/2022 2.2 1.6 - 2.4 mg/dL Final      Digoxin   Date Value Ref Range Status   06/06/2022 0.50 (L) 0.60 - 1.20 ng/mL Final      Lab Results   Component Value Date    TROPONINT <0.010 09/29/2022           Lipid Panel          9/16/2024    11:21   Lipid Panel   Total Cholesterol 113    Triglycerides 127    HDL Cholesterol 33    VLDL Cholesterol 23    LDL Cholesterol  57    LDL/HDL Ratio 1.65        Results for orders placed during the hospital encounter of 09/27/22    Adult Transthoracic Echo Complete W/ Cont if Necessary Per " Protocol    Interpretation Summary  · The left ventricular cavity is borderline dilated.  · Left ventricular ejection fraction appears to be 56 - 60%.  · Left ventricular diastolic function is consistent with (grade I) impaired relaxation and age.  · Mild aortic valve stenosis is present with max/mean pressure gradients 17/10 mmHg.  · No significant pericardial effusion noted. There may be a minimal amount of fluid near the right atrium.  · Mild to moderate left atrial enlargement.  · Mild to moderate mitral regurgitation.    Results for orders placed during the hospital encounter of 07/08/21    Stress Test With Myocardial Perfusion One Day    Interpretation Summary  · Myocardial perfusion imaging indicates a small-sized infarct located in the inferior wall with mild mansoor-infarct ischemia.  · Left ventricular ejection fraction is borderline normal. (Calculated EF = 49%).  · Diaphragmatic attenuation artifact is present.  · Findings consistent with a normal ECG stress test.  · Occasional isolated PVCs are noted at rest and also during stress.  · Impressions are consistent with an intermediate risk study.      ECG 12 Lead    Date/Time: 11/6/2024 11:18 AM  Performed by: Mely Smith APRN    Authorized by: Mely Smith APRN  Comparison: compared with previous ECG from 4/14/2023  Similar to previous ECG  Rhythm: sinus rhythm  Rate: normal  Conduction: conduction normal  QRS axis: normal  Comments: Nonspecific repolarization abnormality in diffuse leads otherwise normal EKG              Assessment and Plan   Diagnoses and all orders for this visit:    1. Coronary artery disease of bypass graft of native heart with stable angina pectoris (Primary)  Assessment & Plan:  He described having some mild chest tightness today, but generally does not have any chest pain or pressure.  We held his long-acting nitrate due to hypotension.  Recommend he continue his aspirin, statin and beta-blocker.  If his blood pressure  remains 140 or above for the next 2 days then he can restart long-acting nitrate at half tab.      2. Atrial fibrillation, persistent  Assessment & Plan:  Is in sinus rhythm, without any complaints palpitations.  He noted higher heart rates at home yesterday but this was likely secondary to hypotension.  We will continue metoprolol for rate and rhythm control along with chronic anticoagulation with Xarelto.    Orders:  -     ECG 12 Lead    3. Ischemic cardiomyopathy  Assessment & Plan:  Most recent echocardiogram shows normal EF, and he does not have any evidence of clinical overload.  Due to hypotension of asked him to hold his Entresto, once his BP stabilized we may try to resume it at a lower dose.  He may continue his current dose of metoprolol.  We will also hold torsemide and use as needed.      4. Essential hypertension  Assessment & Plan:  He is having hypotension and was symptomatic.  He has had significant weight loss over the last few months.  I have instructed him to continue his beta-blocker, once his blood pressure is consistently staying above 140 over the next couple of days he can restart isosorbide at half tab , we will stop amlodipine.  Have instructed him to hold Entresto, and torsemide.  He will turn in a BP log after 1 to 2 weeks, and if blood pressure has improved, we may try to restart Entresto at lower dose.  Instructed him regarding his torsemide if he begins to have weight gain/swelling or shortness of breath to contact us and we will reevaluate restarting it.    Orders:  -     CBC (No Diff); Future  -     Comprehensive Metabolic Panel; Future    5. Mixed hyperlipidemia  Assessment & Plan:   Well-controlled his most recent LDL is at goal at 57.  Continue current dose pravastatin.                  Follow Up   Return for Or sooner if indicated after reviewing bp log and labs.    Patient was given instructions and counseling regarding his condition or for health maintenance advice. Please see  specific information pulled into the AVS if appropriate.     Signed,  Mely Smith, ARACELI  11/06/2024     Dictated Utilizing Dragon Dictation: Please note that portions of this note were completed with a voice recognition program.  Part of this note may be an electronic transcription/translation of spoken language to printed text using the Dragon Dictation System.

## 2024-11-07 DIAGNOSIS — I10 ESSENTIAL HYPERTENSION: Primary | ICD-10-CM

## 2024-11-07 NOTE — ASSESSMENT & PLAN NOTE
He described having some mild chest tightness today, but generally does not have any chest pain or pressure.  We held his long-acting nitrate due to hypotension.  Recommend he continue his aspirin, statin and beta-blocker.  If his blood pressure remains 140 or above for the next 2 days then he can restart long-acting nitrate at half tab.

## 2024-11-07 NOTE — ASSESSMENT & PLAN NOTE
Most recent echocardiogram shows normal EF, and he does not have any evidence of clinical overload.  Due to hypotension of asked him to hold his Entresto, once his BP stabilized we may try to resume it at a lower dose.  He may continue his current dose of metoprolol.  We will also hold torsemide and use as needed.

## 2024-11-07 NOTE — ASSESSMENT & PLAN NOTE
Is in sinus rhythm, without any complaints palpitations.  He noted higher heart rates at home yesterday but this was likely secondary to hypotension.  We will continue metoprolol for rate and rhythm control along with chronic anticoagulation with Xarelto.

## 2024-11-07 NOTE — ASSESSMENT & PLAN NOTE
He is having hypotension and was symptomatic.  He has had significant weight loss over the last few months.  I have instructed him to continue his beta-blocker, once his blood pressure is consistently staying above 140 over the next couple of days he can restart isosorbide at half tab , we will stop amlodipine.  Have instructed him to hold Entresto, and torsemide.  He will turn in a BP log after 1 to 2 weeks, and if blood pressure has improved, we may try to restart Entresto at lower dose.  Instructed him regarding his torsemide if he begins to have weight gain/swelling or shortness of breath to contact us and we will reevaluate restarting it.

## 2024-11-08 ENCOUNTER — TELEPHONE (OUTPATIENT)
Dept: CARDIOLOGY | Facility: CLINIC | Age: 84
End: 2024-11-08
Payer: MEDICARE

## 2024-11-08 NOTE — TELEPHONE ENCOUNTER
----- Message from Mely Smith sent at 11/7/2024  8:52 AM EST -----  Blood counts normal, electrolytes are normal, but renal function shows elevated creatinine and decreased GFR.  This is likely due to low blood pressure, and holding Entresto and torsemide as discussed in office yesterday will help improve this.  Would like to repeat chemistry panel in 2 weeks to make sure it returns to normal, which will also help us to decide if we need to restart a lower dose of Entresto or hold it altogether.  I am placing the lab orders.

## 2024-11-22 ENCOUNTER — TELEPHONE (OUTPATIENT)
Dept: UROLOGY | Age: 84
End: 2024-11-22
Payer: MEDICARE

## 2024-11-22 NOTE — TELEPHONE ENCOUNTER
Hub staff attempted to follow warm transfer process and was unsuccessful     Caller: Layo Cee    Relationship to patient: Self    Best call back number: [phone]    Patient is needing: PT WAS SEEN 10.29.2024. HE WENT TO URGENT CARE TODAY 11.22.2024. HE HAS BLADDER PAIN AND BLOOD IN URINE.THEY GAVE HIM AN ANTIBIOTIC. PT WOULD LIKE TO SEE IF HE CAN GET AN APPT ON TUES MORNING 11.26.2024 . PLEASE CALL PT TO ADVISE. THANK YOU

## 2024-11-26 ENCOUNTER — HOSPITAL ENCOUNTER (OUTPATIENT)
Dept: RESPIRATORY THERAPY | Facility: HOSPITAL | Age: 84
Discharge: HOME | End: 2024-11-26
Payer: MEDICARE

## 2024-11-26 ENCOUNTER — LAB (OUTPATIENT)
Facility: HOSPITAL | Age: 84
End: 2024-11-26
Payer: MEDICARE

## 2024-11-26 DIAGNOSIS — I10 ESSENTIAL HYPERTENSION: ICD-10-CM

## 2024-11-26 DIAGNOSIS — J43.2 CENTRILOBULAR EMPHYSEMA: ICD-10-CM

## 2024-11-26 DIAGNOSIS — R05.9 COUGH: ICD-10-CM

## 2024-11-26 DIAGNOSIS — J44.9 CHRONIC OBSTRUCTIVE PULMONARY DISEASE, UNSPECIFIED COPD TYPE: ICD-10-CM

## 2024-11-26 DIAGNOSIS — J43.9 PULMONARY EMPHYSEMA, UNSPECIFIED EMPHYSEMA TYPE: ICD-10-CM

## 2024-11-26 LAB
ANION GAP SERPL CALCULATED.3IONS-SCNC: 9.9 MMOL/L (ref 5–15)
BUN SERPL-MCNC: 10 MG/DL (ref 8–23)
BUN/CREAT SERPL: 6.9 (ref 7–25)
CALCIUM SPEC-SCNC: 9 MG/DL (ref 8.6–10.5)
CHLORIDE SERPL-SCNC: 102 MMOL/L (ref 98–107)
CO2 SERPL-SCNC: 26.1 MMOL/L (ref 22–29)
CREAT SERPL-MCNC: 1.45 MG/DL (ref 0.76–1.27)
EGFRCR SERPLBLD CKD-EPI 2021: 48.1 ML/MIN/1.73
GLUCOSE SERPL-MCNC: 102 MG/DL (ref 65–99)
POTASSIUM SERPL-SCNC: 4.1 MMOL/L (ref 3.5–5.2)
SODIUM SERPL-SCNC: 138 MMOL/L (ref 136–145)

## 2024-11-26 PROCEDURE — 80048 BASIC METABOLIC PNL TOTAL CA: CPT

## 2024-11-26 PROCEDURE — 94726 PLETHYSMOGRAPHY LUNG VOLUMES: CPT

## 2024-11-26 PROCEDURE — 94729 DIFFUSING CAPACITY: CPT

## 2024-11-26 PROCEDURE — 94060 EVALUATION OF WHEEZING: CPT

## 2024-11-26 PROCEDURE — 36415 COLL VENOUS BLD VENIPUNCTURE: CPT

## 2024-11-26 RX ORDER — ALBUTEROL SULFATE 0.83 MG/ML
2.5 SOLUTION RESPIRATORY (INHALATION) ONCE
Status: COMPLETED | OUTPATIENT
Start: 2024-11-26 | End: 2024-11-26

## 2024-11-26 RX ADMIN — ALBUTEROL SULFATE 2.5 MG: 2.5 SOLUTION RESPIRATORY (INHALATION) at 13:31

## 2024-11-27 ENCOUNTER — TELEPHONE (OUTPATIENT)
Dept: CARDIOLOGY | Facility: CLINIC | Age: 84
End: 2024-11-27
Payer: MEDICARE

## 2024-11-27 DIAGNOSIS — I25.5 ISCHEMIC CARDIOMYOPATHY: Primary | ICD-10-CM

## 2024-11-27 RX ORDER — SACUBITRIL AND VALSARTAN 24; 26 MG/1; MG/1
1 TABLET, FILM COATED ORAL 2 TIMES DAILY
Qty: 180 TABLET | Refills: 3 | Status: SHIPPED | OUTPATIENT
Start: 2024-11-27

## 2024-11-27 NOTE — TELEPHONE ENCOUNTER
----- Message from Jessica Chery sent at 11/27/2024 12:39 AM EST -----  Renal function is improved and electrolytes are stable, can restart entresto at lower dose 24-26 mg twice daily. Repeat BMP in 4 weeks to monitor renal function.

## 2024-11-27 NOTE — TELEPHONE ENCOUNTER
SW patient and wife, went over results and medications. Patient and wife verbalized understanding and appreciation.  Sent new prescription and placed lab orders.

## 2024-11-29 ENCOUNTER — APPOINTMENT (OUTPATIENT)
Dept: GENERAL RADIOLOGY | Facility: HOSPITAL | Age: 84
End: 2024-11-29
Payer: MEDICARE

## 2024-11-29 ENCOUNTER — HOSPITAL ENCOUNTER (EMERGENCY)
Facility: HOSPITAL | Age: 84
Discharge: HOME | End: 2024-11-29
Attending: EMERGENCY MEDICINE
Payer: MEDICARE

## 2024-11-29 VITALS
OXYGEN SATURATION: 98 % | SYSTOLIC BLOOD PRESSURE: 174 MMHG | HEIGHT: 70 IN | RESPIRATION RATE: 20 BRPM | TEMPERATURE: 98.1 F | DIASTOLIC BLOOD PRESSURE: 77 MMHG | WEIGHT: 224.87 LBS | HEART RATE: 85 BPM | BODY MASS INDEX: 32.19 KG/M2

## 2024-11-29 DIAGNOSIS — K21.9 GASTROESOPHAGEAL REFLUX DISEASE WITHOUT ESOPHAGITIS: ICD-10-CM

## 2024-11-29 DIAGNOSIS — R07.9 NONSPECIFIC CHEST PAIN: Primary | ICD-10-CM

## 2024-11-29 LAB
ALBUMIN SERPL-MCNC: 3.8 G/DL (ref 3.5–5.2)
ALBUMIN/GLOB SERPL: 1.2 G/DL
ALP SERPL-CCNC: 72 U/L (ref 39–117)
ALT SERPL W P-5'-P-CCNC: 9 U/L (ref 1–41)
ANION GAP SERPL CALCULATED.3IONS-SCNC: 10.7 MMOL/L (ref 5–15)
AST SERPL-CCNC: 14 U/L (ref 1–40)
BASOPHILS # BLD AUTO: 0.06 10*3/MM3 (ref 0–0.2)
BASOPHILS NFR BLD AUTO: 0.6 % (ref 0–1.5)
BILIRUB SERPL-MCNC: 0.4 MG/DL (ref 0–1.2)
BUN SERPL-MCNC: 14 MG/DL (ref 8–23)
BUN/CREAT SERPL: 9 (ref 7–25)
CALCIUM SPEC-SCNC: 8.9 MG/DL (ref 8.6–10.5)
CHLORIDE SERPL-SCNC: 98 MMOL/L (ref 98–107)
CO2 SERPL-SCNC: 25.3 MMOL/L (ref 22–29)
CREAT SERPL-MCNC: 1.56 MG/DL (ref 0.76–1.27)
DEPRECATED RDW RBC AUTO: 52.9 FL (ref 37–54)
EGFRCR SERPLBLD CKD-EPI 2021: 44.1 ML/MIN/1.73
EOSINOPHIL # BLD AUTO: 0.26 10*3/MM3 (ref 0–0.4)
EOSINOPHIL NFR BLD AUTO: 2.6 % (ref 0.3–6.2)
ERYTHROCYTE [DISTWIDTH] IN BLOOD BY AUTOMATED COUNT: 15.7 % (ref 12.3–15.4)
FLUAV SUBTYP SPEC NAA+PROBE: NOT DETECTED
FLUBV RNA ISLT QL NAA+PROBE: NOT DETECTED
GEN 5 2HR TROPONIN T REFLEX: 21 NG/L
GLOBULIN UR ELPH-MCNC: 3.1 GM/DL
GLUCOSE SERPL-MCNC: 102 MG/DL (ref 65–99)
HCT VFR BLD AUTO: 39.5 % (ref 37.5–51)
HGB BLD-MCNC: 12.6 G/DL (ref 13–17.7)
HOLD SPECIMEN: NORMAL
HOLD SPECIMEN: NORMAL
IMM GRANULOCYTES # BLD AUTO: 0.05 10*3/MM3 (ref 0–0.05)
IMM GRANULOCYTES NFR BLD AUTO: 0.5 % (ref 0–0.5)
LIPASE SERPL-CCNC: 53 U/L (ref 13–60)
LYMPHOCYTES # BLD AUTO: 1.25 10*3/MM3 (ref 0.7–3.1)
LYMPHOCYTES NFR BLD AUTO: 12.7 % (ref 19.6–45.3)
MAGNESIUM SERPL-MCNC: 2.1 MG/DL (ref 1.6–2.4)
MCH RBC QN AUTO: 29.8 PG (ref 26.6–33)
MCHC RBC AUTO-ENTMCNC: 31.9 G/DL (ref 31.5–35.7)
MCV RBC AUTO: 93.4 FL (ref 79–97)
MONOCYTES # BLD AUTO: 1.13 10*3/MM3 (ref 0.1–0.9)
MONOCYTES NFR BLD AUTO: 11.5 % (ref 5–12)
NEUTROPHILS NFR BLD AUTO: 7.1 10*3/MM3 (ref 1.7–7)
NEUTROPHILS NFR BLD AUTO: 72.1 % (ref 42.7–76)
NRBC BLD AUTO-RTO: 0 /100 WBC (ref 0–0.2)
NT-PROBNP SERPL-MCNC: 1088 PG/ML (ref 0–1800)
PLATELET # BLD AUTO: 230 10*3/MM3 (ref 140–450)
PMV BLD AUTO: 9.4 FL (ref 6–12)
POTASSIUM SERPL-SCNC: 4.5 MMOL/L (ref 3.5–5.2)
PROT SERPL-MCNC: 6.9 G/DL (ref 6–8.5)
QT INTERVAL: 347 MS
QT INTERVAL: 372 MS
QTC INTERVAL: 434 MS
QTC INTERVAL: 452 MS
RBC # BLD AUTO: 4.23 10*6/MM3 (ref 4.14–5.8)
RSV RNA NPH QL NAA+NON-PROBE: NOT DETECTED
SARS-COV-2 RNA RESP QL NAA+PROBE: NOT DETECTED
SODIUM SERPL-SCNC: 134 MMOL/L (ref 136–145)
TROPONIN T DELTA: -1 NG/L
TROPONIN T SERPL HS-MCNC: 22 NG/L
WBC NRBC COR # BLD AUTO: 9.85 10*3/MM3 (ref 3.4–10.8)
WHOLE BLOOD HOLD COAG: NORMAL
WHOLE BLOOD HOLD SPECIMEN: NORMAL

## 2024-11-29 PROCEDURE — 99284 EMERGENCY DEPT VISIT MOD MDM: CPT

## 2024-11-29 PROCEDURE — 80053 COMPREHEN METABOLIC PANEL: CPT | Performed by: EMERGENCY MEDICINE

## 2024-11-29 PROCEDURE — 83735 ASSAY OF MAGNESIUM: CPT | Performed by: EMERGENCY MEDICINE

## 2024-11-29 PROCEDURE — 93005 ELECTROCARDIOGRAM TRACING: CPT

## 2024-11-29 PROCEDURE — 71045 X-RAY EXAM CHEST 1 VIEW: CPT

## 2024-11-29 PROCEDURE — 84484 ASSAY OF TROPONIN QUANT: CPT | Performed by: EMERGENCY MEDICINE

## 2024-11-29 PROCEDURE — 83690 ASSAY OF LIPASE: CPT | Performed by: EMERGENCY MEDICINE

## 2024-11-29 PROCEDURE — 85025 COMPLETE CBC W/AUTO DIFF WBC: CPT | Performed by: EMERGENCY MEDICINE

## 2024-11-29 PROCEDURE — 83880 ASSAY OF NATRIURETIC PEPTIDE: CPT | Performed by: EMERGENCY MEDICINE

## 2024-11-29 PROCEDURE — 93005 ELECTROCARDIOGRAM TRACING: CPT | Performed by: EMERGENCY MEDICINE

## 2024-11-29 PROCEDURE — 36415 COLL VENOUS BLD VENIPUNCTURE: CPT

## 2024-11-29 PROCEDURE — 87637 SARSCOV2&INF A&B&RSV AMP PRB: CPT | Performed by: EMERGENCY MEDICINE

## 2024-11-29 RX ORDER — ASPIRIN 81 MG/1
324 TABLET, CHEWABLE ORAL ONCE
Status: DISCONTINUED | OUTPATIENT
Start: 2024-11-29 | End: 2024-11-29 | Stop reason: HOSPADM

## 2024-11-29 RX ORDER — SUCRALFATE 1 G/1
1 TABLET ORAL ONCE
Status: COMPLETED | OUTPATIENT
Start: 2024-11-29 | End: 2024-11-29

## 2024-11-29 RX ORDER — SUCRALFATE 1 G/1
1 TABLET ORAL
Qty: 28 TABLET | Refills: 0 | Status: SHIPPED | OUTPATIENT
Start: 2024-11-29

## 2024-11-29 RX ORDER — SODIUM CHLORIDE 0.9 % (FLUSH) 0.9 %
10 SYRINGE (ML) INJECTION AS NEEDED
Status: DISCONTINUED | OUTPATIENT
Start: 2024-11-29 | End: 2024-11-29 | Stop reason: HOSPADM

## 2024-11-29 RX ADMIN — SUCRALFATE 1 G: 1 TABLET ORAL at 18:24

## 2024-11-29 NOTE — ED PROVIDER NOTES
Time: 3:21 PM EST  Date of encounter:  11/29/2024  Independent Historian/Clinical History and Information was obtained by:   Patient    History is limited by: N/A    Chief Complaint: Chest pain, palpitations      History of Present Illness:  Patient is a 84 y.o. year old male who presents to the emergency department for evaluation of chest pain, palpitations.  Patient states his heart rate was ranging from  at home.  Notes an ache in his chest along with palpitations.  The ache is substernal to epigastric in location and he has recently been on antibiotics due to urinary tract infection.  He started these antibiotics on 11/22/2024.      Patient Care Team  Primary Care Provider: Noble Kc MD    Past Medical History:     Allergies   Allergen Reactions    Carvedilol Swelling and Anaphylaxis    Levaquin [Levofloxacin] Unknown - Low Severity     Past Medical History:   Diagnosis Date    Acute on chronic diastolic CHF (congestive heart failure) 6/6/2022    Arthritis     Atrial fibrillation, persistent 5/3/2022    Persistent atrial fibrillation status post extensive ablation and pulmonary vein isolation by Dr. Aguilar on 6/3/2022, with reoccurring rapid A. fib, and then successful cardioversion on 7/1/2022    BPH (benign prostatic hyperplasia)     CHF (congestive heart failure)     COPD (chronic obstructive pulmonary disease)     Coronary artery disease of bypass graft of native heart with stable angina pectoris     (1) Coronary artery disease, s/p CABG in the past. Cardiac catheterization in July 2016 showed patentLIMA graft to the LAD and occluded SVGs. Native LAD is 100% occluded in the mid portion. Native LCx has no significant disease. Native RCA is 100% occluded and it is a . Repeat cath in June, 2021, underwent stent placement to diagnonal branch.    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 Lauren Valencia      Past Surgical History:   Procedure Laterality Date    BLADDER SURGERY      CARDIAC CATHETERIZATION  07/2016    CARDIAC CATHETERIZATION N/A 8/9/2021    Procedure: Left Heart Cath with possible angioplasty;  Surgeon: Jack Ladd MD;  Location: [facility] CATH INVASIVE LOCATION;  Service: Cardiovascular;  Laterality: N/A;  Please schedule the procedure with Dr. Jack Ladd MD on 8/9/2021    CARDIAC ELECTROPHYSIOLOGY PROCEDURE N/A 6/3/2022    Procedure: Ablation atrial fibrillation;  Surgeon: Irineo Aguilar MD;  Location: [facility] CATH INVASIVE LOCATION;  Service: Cardiovascular;  Laterality: N/A;    CARDIAC ELECTROPHYSIOLOGY PROCEDURE N/A 6/3/2022    Procedure: Ablation atrial flutter/CTI;  Surgeon: Irineo Aguilar MD;  Location: [facility] CATH INVASIVE LOCATION;  Service: Cardiovascular;  Laterality: N/A;    CARDIAC SURGERY      HEART BYPASS    COLONOSCOPY  2011    CORONARY ARTERY BYPASS GRAFT      CYSTOSCOPY  03/19/2019    WITH BILATERAL RETROGRADE PYELOGRAPHY    ELECTRICAL CARDIOVERSION  5/12/2022         ENDOSCOPY  2016    PROSTATE SURGERY       Family History   Problem Relation Age of Onset    Hypertension Mother     Heart disease Father     Other Brother         GASTROINTESTINAL CANCER    Kidney cancer Brother        Home Medications:  Prior to Admission medications    Medication Sig Start Date End Date Taking? Authorizing Provider   acetaminophen (TYLENOL) 325 MG tablet Take 2 tablets by mouth Every 6 (Six) Hours As Needed for Mild Pain.    Provider, MD Sammy   allopurinol 200 MG tablet Take 200 mg by mouth Daily.  6/5/24   Nbole Kc MD   amiodarone (PACERONE) 200 MG tablet Take 1 tablet by mouth every 12 (twelve) hours for 6 days, then  1 tablet daily for 24 days. 9/30/24   Darnell Stevenson MD   amLODIPine (NORVASC) 2.5 MG tablet Take 1 tablet by mouth Daily. 9/16/24   Darnell Stevenson MD   aspirin (aspirin) 81 MG EC tablet Take 1 tablet by mouth Daily. 8/15/22   Darnell Stevenson MD   calcium carbonate (OS-LIANNA) 600 MG tablet Take 1 tablet by mouth Every Night.    ProviderSammy MD   cetirizine (zyrTEC) 10 MG tablet Take 1 tablet by mouth Daily. 12/2/22   Lauren Ferris APRN   Coenzyme Q10 100 MG tablet Take 1 tablet by mouth Daily.    ProviderSammy MD   Diclofenac Sodium (VOLTAREN) 1 % gel gel Apply 2 g topically to the appropriate area as directed 4 (Four) Times a Day. 9/29/22   Ever Flores MD   finasteride (PROSCAR) 5 MG tablet Take 1 tablet by mouth Daily. 10/29/24   Ivy Peng APRN   fluticasone (FLONASE) 50 MCG/ACT nasal spray 1 spray into the nostril(s) as directed by provider Every Night. 12/28/21 12/15/23  Sammy Gomez MD   Fluticasone-Umeclidin-Vilant (Trelegy Ellipta) 100-62.5-25 MCG/ACT inhaler Inhale 1 puff Daily. 9/24/24   Kolton Brink MD   ipratropium-albuterol (DUO-NEB) 0.5-2.5 mg/3 ml nebulizer Take 3 mL by nebulization 4 (Four) Times a Day As Needed for Wheezing. 3/18/24   Kolton Brink MD   isosorbide mononitrate (IMDUR) 30 MG 24 hr tablet Take 1 tablet by mouth daily. 7/8/24   Mely Smith APRN   Livalo 1 MG tablet tablet Take 1 tablet by mouth every night. 6/10/24   Mely Smith APRN   metoprolol succinate XL (TOPROL-XL) 25 MG 24 hr tablet Take 1 tablet by mouth Every 12 (Twelve) Hours. 8/29/24   Mely Smith APRN   montelukast (SINGULAIR) 10 MG tablet Take 1 tablet by mouth Every Night. 9/24/24   Kolton Brink MD   nitroglycerin (NITROSTAT) 0.4 MG SL tablet Place 1 tablet under the tongue Every 5 (Five) Minutes As Needed for Chest Pain  for up to 30 days. Take no more than 3 doses in 15 minutes. 5/12/22 12/15/23  Shayne Ro MD   pantoprazole (PROTONIX) 40 MG EC tablet Take 1 tablet by mouth Daily. 24   Darnell Stevenson MD   rivaroxaban (Xarelto) 15 MG tablet Take 1 tablet by mouth daily with dinner. 24   Darnell Stevenson MD   sacubitril-valsartan (Entresto) 24-26 MG tablet Take 1 tablet by mouth 2 (Two) Times a Day. 24   Jessica Chery APRN   Semaglutide-Weight Management 0.5 MG/0.5ML solution auto-injector Inject  under the skin into the appropriate area as directed.    ProviderSammy MD   tamsulosin (FLOMAX) 0.4 MG capsule 24 hr capsule Take 1 capsule by mouth Daily. 10/29/24   Ivy Peng APRN   torsemide (DEMADEX) 20 MG tablet Take 1 tablet by mouth daily. 24   Darnell Stevenson MD   VITAMIN B COMPLEX-C PO Take 1 tablet by mouth Daily.    Provider, MD Sammy   dilTIAZem CD (CARDIZEM CD) 180 MG 24 hr capsule Take 1 capsule by mouth Daily for 30 days. 22  Ever Flores MD        Social History:   Social History     Tobacco Use    Smoking status: Former     Current packs/day: 0.00     Average packs/day: 0.5 packs/day for 40.0 years (20.0 ttl pk-yrs)     Types: Cigarettes     Start date:      Quit date:      Years since quittin.9     Passive exposure: Past    Smokeless tobacco: Never   Vaping Use    Vaping status: Never Used   Substance Use Topics    Alcohol use: Not Currently    Drug use: Never         Review of Systems:  Review of Systems   Constitutional:  Negative for chills and fever.   HENT:  Negative for congestion, rhinorrhea and sore throat.    Eyes:  Negative for photophobia.   Respiratory:  Negative for apnea, cough, chest tightness and shortness of breath.    Cardiovascular:  Positive for chest pain and palpitations.   Gastrointestinal:  Positive for abdominal pain. Negative for diarrhea, nausea and vomiting.   Endocrine: Negative.    Genitourinary:   "Negative for difficulty urinating and dysuria.   Musculoskeletal:  Negative for back pain, joint swelling and myalgias.   Skin:  Negative for color change and wound.   Allergic/Immunologic: Negative.    Neurological:  Negative for seizures and headaches.   Psychiatric/Behavioral: Negative.     All other systems reviewed and are negative.       Physical Exam:  /77   Pulse 85   Temp 98.1 °F (36.7 °C)   Resp 20   Ht 177.8 cm (70\")   Wt 102 kg (224 lb 13.9 oz)   SpO2 98%   BMI 32.27 kg/m²     Physical Exam  Vitals and nursing note reviewed.   Constitutional:       General: He is awake.      Appearance: Normal appearance. He is well-developed.   HENT:      Head: Normocephalic and atraumatic.      Nose: Nose normal.      Mouth/Throat:      Mouth: Mucous membranes are moist.   Eyes:      Extraocular Movements: Extraocular movements intact.      Pupils: Pupils are equal, round, and reactive to light.   Cardiovascular:      Rate and Rhythm: Normal rate and regular rhythm.      Heart sounds: Normal heart sounds.   Pulmonary:      Effort: Pulmonary effort is normal. No respiratory distress.      Breath sounds: Normal breath sounds. No wheezing, rhonchi or rales.   Abdominal:      General: Bowel sounds are normal.      Palpations: Abdomen is soft.      Tenderness: There is no abdominal tenderness. There is no guarding or rebound.      Comments: No rigidity   Musculoskeletal:         General: No tenderness. Normal range of motion.      Cervical back: Normal range of motion and neck supple.   Skin:     General: Skin is warm and dry.      Coloration: Skin is not jaundiced.   Neurological:      General: No focal deficit present.      Mental Status: He is alert. Mental status is at baseline.   Psychiatric:         Mood and Affect: Mood normal.                  Procedures:  Procedures      Medical Decision Making:      Comorbidities that affect care:    CHF, GERD, BPH, COPD    External Notes reviewed:    Previous Clinic " Note: Primary care office visit with [facility] on 11/22/2024.  Description: UTI with hematuria, dysuria      The following orders were placed and all results were independently analyzed by me:  Orders Placed This Encounter   Procedures    COVID-19, FLU A/B, RSV PCR 1 HR TAT - Swab, Nasopharynx    XR Chest 1 View    [place] Draw    High Sensitivity Troponin T    Comprehensive Metabolic Panel    Lipase    BNP    Magnesium    CBC Auto Differential    High Sensitivity Troponin T 2Hr    NPO Diet NPO Type: Strict NPO    Undress & Gown    Continuous Pulse Oximetry    Oxygen Therapy- Nasal Cannula; Titrate 1-6 LPM Per SpO2; 90 - 95%    ECG 12 Lead ED Triage Standing Order; Chest Pain    ECG 12 Lead ED Triage Standing Order; Chest Pain    Insert Peripheral IV    CBC & Differential    Green Top (Gel)    Lavender Top    Gold Top - SST    Light Blue Top       Medications Given in the Emergency Department:  Medications   sodium chloride 0.9 % flush 10 mL (has no administration in time range)   aspirin chewable tablet 324 mg (324 mg Oral Not Given 11/29/24 1658)   sucralfate (CARAFATE) tablet 1 g (has no administration in time range)        ED Course:    ED Course as of 11/29/24 1812 Fri Nov 29, 2024   1527 I have personally interpreted the EKG today and it shows no evidence of any acute ischemia or heart arrhythmia.  Inferior/lateral ST depression with no significant interval change from 11/7/2024. [RP]   1755 Reassessment: Patient prefers to go home.  I did offer admission multiple times.  Family at bedside. [RP]      ED Course User Index  [RP] Liu Razo MD       Labs:    Lab Results (last 24 hours)       Procedure Component Value Units Date/Time    High Sensitivity Troponin T [[id number]]  (Abnormal) Collected: 11/29/24 1442    Specimen: Blood Updated: 11/29/24 1506     HS Troponin T 22 ng/L     Narrative:      High Sensitive Troponin T Reference Range:  <14.0 ng/L- Negative Female for AMI  <22.0  ng/L- Negative Male for AMI  >=14 - Abnormal Female indicating possible myocardial injury.  >=22 - Abnormal Male indicating possible myocardial injury.   Clinicians would have to utilize clinical acumen, EKG, Troponin, and serial changes to determine if it is an Acute Myocardial Infarction or myocardial injury due to an underlying chronic condition.         CBC & Differential [[id number]]  (Abnormal) Collected: 11/29/24 1442    Specimen: Blood Updated: 11/29/24 1448    Narrative:      The following orders were created for panel order CBC & Differential.  Procedure                               Abnormality         Status                     ---------                               -----------         ------                     CBC Auto Differential[[id number]]        Abnormal            Final result                 Please view results for these tests on the individual orders.    Comprehensive Metabolic Panel [[id number]]  (Abnormal) Collected: 11/29/24 1442    Specimen: Blood Updated: 11/29/24 1506     Glucose 102 mg/dL      BUN 14 mg/dL      Creatinine 1.56 mg/dL      Sodium 134 mmol/L      Potassium 4.5 mmol/L      Chloride 98 mmol/L      CO2 25.3 mmol/L      Calcium 8.9 mg/dL      Total Protein 6.9 g/dL      Albumin 3.8 g/dL      ALT (SGPT) 9 U/L      AST (SGOT) 14 U/L      Alkaline Phosphatase 72 U/L      Total Bilirubin 0.4 mg/dL      Globulin 3.1 gm/dL      A/G Ratio 1.2 g/dL      BUN/Creatinine Ratio 9.0     Anion Gap 10.7 mmol/L      eGFR 44.1 mL/min/1.73     Narrative:      GFR Normal >60  Chronic Kidney Disease <60  Kidney Failure <15    The GFR formula is only valid for adults with stable renal function between ages 18 and 70.    Lipase [[id number]]  (Normal) Collected: 11/29/24 1442    Specimen: Blood Updated: 11/29/24 1506     Lipase 53 U/L     BNP [[id number]]  (Normal) Collected: 11/29/24 1442    Specimen: Blood Updated: 11/29/24 1504     proBNP 1,088.0 pg/mL     Narrative:      This assay is used as an aid  in the diagnosis of individuals suspected of having heart failure. It can be used as an aid in the diagnosis of acute decompensated heart failure (ADHF) in patients presenting with signs and symptoms of ADHF to the emergency department (ED). In addition, NT-proBNP of <300 pg/mL indicates ADHF is not likely.    Age Range Result Interpretation  NT-proBNP Concentration (pg/mL:      <50             Positive            >450                   Gray                 300-450                    Negative             <300    50-75           Positive            >900                  Gray                300-900                  Negative            <300      >75             Positive            >1800                  Gray                300-1800                  Negative            <300    Magnesium [[id number]]  (Normal) Collected: 11/29/24 1442    Specimen: Blood Updated: 11/29/24 1506     Magnesium 2.1 mg/dL     CBC Auto Differential [[id number]]  (Abnormal) Collected: 11/29/24 1442    Specimen: Blood Updated: 11/29/24 1448     WBC 9.85 10*3/mm3      RBC 4.23 10*6/mm3      Hemoglobin 12.6 g/dL      Hematocrit 39.5 %      MCV 93.4 fL      MCH 29.8 pg      MCHC 31.9 g/dL      RDW 15.7 %      RDW-SD 52.9 fl      MPV 9.4 fL      Platelets 230 10*3/mm3      Neutrophil % 72.1 %      Lymphocyte % 12.7 %      Monocyte % 11.5 %      Eosinophil % 2.6 %      Basophil % 0.6 %      Immature Grans % 0.5 %      Neutrophils, Absolute 7.10 10*3/mm3      Lymphocytes, Absolute 1.25 10*3/mm3      Monocytes, Absolute 1.13 10*3/mm3      Eosinophils, Absolute 0.26 10*3/mm3      Basophils, Absolute 0.06 10*3/mm3      Immature Grans, Absolute 0.05 10*3/mm3      nRBC 0.0 /100 WBC     COVID-19, FLU A/B, RSV PCR 1 HR TAT - Swab, Nasopharynx [[id number]]  (Normal) Collected: 11/29/24 1604    Specimen: Swab from Nasopharynx Updated: 11/29/24 1656     COVID19 Not Detected     Influenza A PCR Not Detected     Influenza B PCR Not Detected     RSV, PCR Not Detected     Narrative:      Fact sheet for providers: https://www.fda.gov/media/640067/download    Fact sheet for patients: https://www.fda.gov/media/722956/download    Test performed by PCR.    High Sensitivity Troponin T 2Hr [[id number]]  (Normal) Collected: 11/29/24 1701    Specimen: Blood Updated: 11/29/24 1723     HS Troponin T 21 ng/L      Troponin T Delta -1 ng/L     Narrative:      High Sensitive Troponin T Reference Range:  <14.0 ng/L- Negative Female for AMI  <22.0 ng/L- Negative Male for AMI  >=14 - Abnormal Female indicating possible myocardial injury.  >=22 - Abnormal Male indicating possible myocardial injury.   Clinicians would have to utilize clinical acumen, EKG, Troponin, and serial changes to determine if it is an Acute Myocardial Infarction or myocardial injury due to an underlying chronic condition.                  Imaging:    XR Chest 1 View    Result Date: 11/29/2024  XR CHEST 1 VW Date of Exam: 11/29/2024 2:45 PM EST Indication: Chest Pain Triage Protocol Comparison: 9/28/2022 Findings: There are postoperative changes of prior bypass surgery. Cardiac size is within normal limits. There are numerable small calcified pulmonary nodules in both lungs reflecting old healed granulomatous disease. No acute infiltrates are identified.     Impression: 1.Postoperative changes of prior bypass surgery. 2.Old healed granulomatous disease. 3.No acute process identified Electronically Signed: Shayne Brown MD  11/29/2024 2:59 PM EST  Workstation ID: [id number]       Differential Diagnosis and Discussion:    Abdominal Pain: Based on the patient's signs and symptoms, I considered abdominal aortic aneurysm, small bowel obstruction, pancreatitis, acute cholecystitis, acute appendecitis, peptic ulcer disease, gastritis, colitis, endocrine disorders, irritable bowel syndrome and other differential diagnosis an etiology of the patient's abdominal pain.  Chest Pain:  Based on the patient's signs and symptoms, I considered  aortic dissection, myocardial infaction, pulmonary embolism, cardiac tamponade, pericarditis, pneumothorax, musculoskeletal chest pain and other differential diagnosis as an etiology of the patient's chest pain.   Palpitations: Differential diagnosis includes but is not limited to anxiety, atrioventricular blocks, mitral valve disease, hypoxia, coronary artery disease, hypokalemia, anemia, fever, COPD, congestive heart failure, pericarditis, Lon-Parkinson-White syndrome, pulmonary embolism, SVT, atrial fibrillation, atrial flutter, sinus tachycardia, thyrotoxicosis, and pheochromocytoma.    All labs were reviewed and interpreted by me.  All X-rays impressions were independently interpreted by me.  EKG was interpreted by me.    [facility]                     Patient Care Considerations:    PERC: I used the PERC score to risk stratify the patient for PE and a CT of the chest was considered but ultimately not indicated in today's visit.      Consultants/Shared Management Plan:    None    Social Determinants of Health:    Patient is independent, reliable, and has access to care.       Disposition and Care Coordination:    Discharged: I considered escalation of care by admitting this patient to the hospital, however ultimately the patient request to go home.  I did alert him that I would prefer to keep him in the hospital overnight as his pain has not resolved, however he feels that his his troponin shows no evidence of heart attack he would prefer to be discharged.    I have explained the patient´s condition, diagnoses and treatment plan based on the information available to me at this time. I have answered questions and addressed any concerns. The patient has a good  understanding of the patient´s diagnosis, condition, and treatment plan as can be expected at this point. The vital signs have been stable. The patient´s condition is stable and appropriate for discharge from the emergency department.      The patient will  pursue further outpatient evaluation with the primary care physician or other designated or consulting physician as outlined in the discharge instructions. They are agreeable to this plan of care and follow-up instructions have been explained in detail. The patient has received these instructions in written format and has expressed an understanding of the discharge instructions. The patient is aware that any significant change in condition or worsening of symptoms should prompt an immediate return to this or the closest emergency department or call to 911.    Final diagnoses:   Nonspecific chest pain   Gastroesophageal reflux disease without esophagitis        ED Disposition       ED Disposition   Discharge    Condition   Stable    Comment   --               This medical record created using voice recognition software.             Liu Razo MD  11/29/24 0611

## 2024-12-03 ENCOUNTER — TELEPHONE (OUTPATIENT)
Dept: INTERNAL MEDICINE | Facility: CLINIC | Age: 84
End: 2024-12-03

## 2024-12-03 NOTE — TELEPHONE ENCOUNTER
HUB WAS UNABLE TO WARM TRANSFER     Caller: Layo Cee    Relationship to patient: Self    Best call back number: [phone]     Chief complaint: ED FOLLOW UP     Type of visit: HOSPITAL FOLLOW UP     Requested date: 7-14 DAYS      Additional notes: PATIENT WAS INSTRUCTED TO HAVE A ED FOLLOW UP, NO APPOINTMENT AVAILABLE SOON WITH PCP, AND PCP WILL BE OFF BOARDING .

## 2024-12-04 NOTE — TELEPHONE ENCOUNTER
Caller: Layo Cee    Relationship: Self    Best call back number: [phone]    What was the call regarding: PATIENT DOES NOT WANT A ER FOLLOW UP APPOINTMENT AT THIS TIME.

## 2024-12-12 ENCOUNTER — OFFICE VISIT (OUTPATIENT)
Dept: UROLOGY | Age: 84
End: 2024-12-12
Payer: MEDICARE

## 2024-12-12 VITALS — WEIGHT: 224 LBS | HEIGHT: 70 IN | BODY MASS INDEX: 32.07 KG/M2

## 2024-12-12 DIAGNOSIS — N39.0 RECURRENT UTI: ICD-10-CM

## 2024-12-12 DIAGNOSIS — N40.1 BENIGN PROSTATIC HYPERPLASIA WITH LOWER URINARY TRACT SYMPTOMS, SYMPTOM DETAILS UNSPECIFIED: Primary | ICD-10-CM

## 2024-12-12 LAB
BACTERIA UR QL AUTO: ABNORMAL /HPF
BILIRUB BLD-MCNC: NEGATIVE MG/DL
CLARITY, POC: CLEAR
COLOR UR: YELLOW
EXPIRATION DATE: ABNORMAL
GLUCOSE UR STRIP-MCNC: NEGATIVE MG/DL
HYALINE CASTS UR QL AUTO: ABNORMAL /LPF
KETONES UR QL: NEGATIVE
LEUKOCYTE EST, POC: ABNORMAL
Lab: ABNORMAL
NITRITE UR-MCNC: NEGATIVE MG/ML
PH UR: 6 [PH] (ref 5–8)
PROT UR STRIP-MCNC: ABNORMAL MG/DL
RBC # UR STRIP: ABNORMAL /HPF
RBC # UR STRIP: ABNORMAL /UL
REF LAB TEST METHOD: ABNORMAL
SP GR UR: 1.01 (ref 1–1.03)
SQUAMOUS #/AREA URNS HPF: ABNORMAL /HPF
URINE VOLUME: 0
UROBILINOGEN UR QL: ABNORMAL
WBC # UR STRIP: ABNORMAL /HPF

## 2024-12-12 PROCEDURE — 81015 MICROSCOPIC EXAM OF URINE: CPT | Performed by: NURSE PRACTITIONER

## 2024-12-12 NOTE — PROGRESS NOTES
"Chief Complaint: Benign prostatic hyperplasia with lower urinary tract sympt    Subjective         Benign Prostatic Hypertrophy      Layo Cee is a 84 y.o. male presents to [facility] to be seen for f/u BPH.  The patient called the office about a month after we last saw him with complaints of bladder pain and blood in the urine he apparently was seen in urgent care and he was given an antibiotic.    His urine culture results from [facility] revealed 8000 colonies per milliliter of Citrobacter koseri however no sensitivity result was sent given the low colony count.    At last visit we recommended to continue tamsulosin 0.4 mg daily as well as finasteride.    Urine today is revealing small blood small leukocyte esterase and his PVR is 0.    He is voiding 5-6 x a day.     Drinking nearly a pot of coffee a day.     He drinks a mountian dew a day as well.    He is drinking tea as well.     He states he is with straining and urgency.    He states he continues to have burning every time he voids.          Previous:    At last visit we had him tamsulosin 0.8mg q day  and finasteride 5mg q day.    He states the tamsulosin made him more dizzy.     He went back to 0.4  mq day of tamsulosin.     He was seen on 10/18/24 at urgent care in [place] for uti symtpms \"Patient presents here with dysuria, frequency, urgency, hesitancy, denies any blood in urine. Denies scrotal pain or swelling and no back/flank pain. Symptoms started yesterday afternoon. He has a recurrent history of prostatitis and UTI's. He sees a urologist. Has taken a cranberry pill and tried cranberry juice for symptoms, but seen no relief.\"    He was given bactrim ds for 10 days.     Subsequent cx returned positive for citrobacter koseri 29277 cfu/ ml pan sensitive.    He was on atbx for 5 days and then began having gross hematuria.     He was having clots in the urine and dark red blood in the urine.    He " states that he had issues with retention and then ended up being able to go freely after 3-4 x trying to go.    He states he is still having issues with soreness and straining.    This is his first uti and gross hematuria episode in some time.             Previous:   to see if this would help his stream.     Stream is better.     Not straining.      Nocturia x 0.      No urgency or frequency denies straining.        Previous:   Patient's been having some more trouble with shortness of air last few days.  He is going to call cardiology today.      history of a CABG with atrial fibrillation.   7/21 coronary stent  aspirin 81.     No  GH      hydrocele on the left side.  About the same.     PVR     10/22  49     Previous     1/19 scrotal ultrasound-large left hydrocele, normal testicles, no testicular torsion.     Hematuria last year     10/20 cystoscopy-4 cm prostate with a TUR defect.  Some regrowth on the right lateral side about a centimeter above the verumontanum.  Still pretty open.  Mild trabeculations throughout.  Negative otherwise  9/20 CT urogram-bladder wall thickening along the base and right lateral wall.  Likely related to prostate.  Bilateral simple renal cyst.  Negative otherwise    non-smoker     History of TURP around 2015, Dr. Shi    Patient was admitted in 2019 with gross hematuria and clot retention and was on CBI for 48 hours.    PSA 11/2019 1.29        Objective     Past Medical History:   Diagnosis Date    Acute on chronic diastolic CHF (congestive heart failure) 6/6/2022    Arthritis     Atrial fibrillation, persistent 5/3/2022    Persistent atrial fibrillation status post extensive ablation and pulmonary vein isolation by Dr. Aguilar on 6/3/2022, with reoccurring rapid A. fib, and then successful cardioversion on 7/1/2022    BPH (benign prostatic hyperplasia)     CHF (congestive heart failure)     COPD (chronic obstructive pulmonary disease)     Coronary artery disease of bypass graft of  native heart with stable angina pectoris     (1) Coronary artery disease, s/p CABG in the past. Cardiac catheterization in July 2016 showed patentLIMA graft to the LAD and occluded SVGs. Native LAD is 100% occluded in the mid portion. Native LCx has no significant disease. Native RCA is 100% occluded and it is a . Repeat cath in June, 2021, underwent stent placement to diagnonal branch.    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 Lauren Valencia        Past Surgical History:   Procedure Laterality Date    BLADDER SURGERY      CARDIAC CATHETERIZATION  07/2016    CARDIAC CATHETERIZATION N/A 8/9/2021    Procedure: Left Heart Cath with possible angioplasty;  Surgeon: Jack Ladd MD;  Location: [facility] CATH INVASIVE LOCATION;  Service: Cardiovascular;  Laterality: N/A;  Please schedule the procedure with Dr. Jack Ladd MD on 8/9/2021    CARDIAC ELECTROPHYSIOLOGY PROCEDURE N/A 6/3/2022    Procedure: Ablation atrial fibrillation;  Surgeon: Irineo Aguilar MD;  Location: [facility] CATH INVASIVE LOCATION;  Service: Cardiovascular;  Laterality: N/A;    CARDIAC ELECTROPHYSIOLOGY PROCEDURE N/A 6/3/2022    Procedure: Ablation atrial flutter/CTI;  Surgeon: Irineo Aguilar MD;  Location: [facility] CATH INVASIVE LOCATION;  Service: Cardiovascular;  Laterality: N/A;    CARDIAC SURGERY      HEART BYPASS    COLONOSCOPY  2011    CORONARY ARTERY BYPASS GRAFT      CYSTOSCOPY  03/19/2019    WITH BILATERAL RETROGRADE PYELOGRAPHY    ELECTRICAL  CARDIOVERSION  5/12/2022         ENDOSCOPY  2016    PROSTATE SURGERY           Current Outpatient Medications:     amiodarone (PACERONE) 200 MG tablet, Take 1 tablet by mouth every 12 (twelve) hours for 6 days, then  1 tablet daily for 24 days. (Patient taking differently: Take 0.5 tablets by mouth Daily.), Disp: 36 tablet, Rfl: 1    acetaminophen (TYLENOL) 325 MG tablet, Take 2 tablets by mouth Every 6 (Six) Hours As Needed for Mild Pain., Disp: , Rfl:     allopurinol 200 MG tablet, Take 200 mg by mouth Daily., Disp: 90 tablet, Rfl: 2    aspirin (aspirin) 81 MG EC tablet, Take 1 tablet by mouth Daily., Disp: 30 tablet, Rfl: 1    calcium carbonate (OS-LIANNA) 600 MG tablet, Take 1 tablet by mouth Every Night., Disp: , Rfl:     cetirizine (zyrTEC) 10 MG tablet, Take 1 tablet by mouth Daily., Disp: 90 tablet, Rfl: 3    Coenzyme Q10 100 MG tablet, Take 1 tablet by mouth Daily., Disp: , Rfl:     Diclofenac Sodium (VOLTAREN) 1 % gel gel, Apply 2 g topically to the appropriate area as directed 4 (Four) Times a Day., Disp: , Rfl:     finasteride (PROSCAR) 5 MG tablet, Take 1 tablet by mouth Daily., Disp: 90 tablet, Rfl: 4    fluticasone (FLONASE) 50 MCG/ACT nasal spray, 1 spray into the nostril(s) as directed by provider Every Night., Disp: , Rfl:     Fluticasone-Umeclidin-Vilant (Trelegy Ellipta) 100-62.5-25 MCG/ACT inhaler, Inhale 1 puff Daily., Disp: 1 each, Rfl: 11    ipratropium-albuterol (DUO-NEB) 0.5-2.5 mg/3 ml nebulizer, Take 3 mL by nebulization 4 (Four) Times a Day As Needed for Wheezing., Disp: 360 mL, Rfl: 3    isosorbide mononitrate (IMDUR) 30 MG 24 hr tablet, Take 1 tablet by mouth daily. (Patient taking differently: Take 1 tablet by mouth Daily. 1/2 tablet), Disp: 90 tablet, Rfl: 3    Livalo 1 MG tablet tablet, Take 1 tablet by mouth every night., Disp: 90 tablet, Rfl: 3    metoprolol succinate XL (TOPROL-XL) 25 MG 24 hr tablet, Take 1 tablet by mouth Every 12 (Twelve) Hours., Disp: 90 tablet, Rfl: 3     montelukast (SINGULAIR) 10 MG tablet, Take 1 tablet by mouth Every Night., Disp: 90 tablet, Rfl: 3    nitroglycerin (NITROSTAT) 0.4 MG SL tablet, Place 1 tablet under the tongue Every 5 (Five) Minutes As Needed for Chest Pain for up to 30 days. Take no more than 3 doses in 15 minutes., Disp: 30 tablet, Rfl: 0    pantoprazole (PROTONIX) 40 MG EC tablet, Take 1 tablet by mouth Daily., Disp: 90 tablet, Rfl: 2    rivaroxaban (Xarelto) 15 MG tablet, Take 1 tablet by mouth daily with dinner., Disp: 30 tablet, Rfl: 11    sacubitril-valsartan (Entresto) 24-26 MG tablet, Take 1 tablet by mouth 2 (Two) Times a Day., Disp: 180 tablet, Rfl: 3    Semaglutide-Weight Management 0.5 MG/0.5ML solution auto-injector, Inject  under the skin into the appropriate area as directed., Disp: , Rfl:     sucralfate (CARAFATE) 1 g tablet, Take 1 tablet by mouth 3 (Three) Times a Day With Meals. May dissolve tablets in a small amount of water to help with swallowing, Disp: 28 tablet, Rfl: 0    tamsulosin (FLOMAX) 0.4 MG capsule 24 hr capsule, Take 1 capsule by mouth Daily., Disp: 90 capsule, Rfl: 4    VITAMIN B COMPLEX-C PO, Take 1 tablet by mouth Daily., Disp: , Rfl:     Allergies   Allergen Reactions    Carvedilol Swelling and Anaphylaxis    Levaquin [Levofloxacin] Unknown - Low Severity        Family History   Problem Relation Age of Onset    Hypertension Mother     Heart disease Father     Other Brother         GASTROINTESTINAL CANCER    Kidney cancer Brother        Social History     Socioeconomic History    Marital status:    Tobacco Use    Smoking status: Former     Current packs/day: 0.00     Average packs/day: 0.5 packs/day for 40.0 years (20.0 ttl pk-yrs)     Types: Cigarettes     Start date:      Quit date:      Years since quittin.9     Passive exposure: Past    Smokeless tobacco: Never   Vaping Use    Vaping status: Never Used   Substance and Sexual Activity    Alcohol use: Not Currently    Drug use: Never     "Sexual activity: Defer       Vital Signs:   Ht 177.8 cm (70\")   Wt 102 kg (224 lb)   BMI 32.14 kg/m²      Physical Exam     Result Review :   The following data was reviewed by: ARACELI Decker on 12/12/2024:  Results for orders placed or performed in visit on 12/12/24   Bladder Scan    Collection Time: 12/12/24  2:17 PM   Result Value Ref Range    Urine Volume 0    POC Urinalysis Dipstick, Automated    Collection Time: 12/12/24  2:18 PM    Specimen: Urine   Result Value Ref Range    Color Yellow Yellow, Straw, Dark Yellow, Cely    Clarity, UA Clear Clear    Specific Gravity  1.015 1.005 - 1.030    pH, Urine 6.0 5.0 - 8.0    Leukocytes Small (1+) (A) Negative    Nitrite, UA Negative Negative    Protein, POC 30 mg/dL (A) Negative mg/dL    Glucose, UA Negative Negative mg/dL    Ketones, UA Negative Negative    Urobilinogen, UA 0.2 E.U./dL Normal, 0.2 E.U./dL    Bilirubin Negative Negative    Blood, UA Small (A) Negative    Lot Number [id number]     Expiration Date 103,125           Bladder Scan interpretation 12/12/2024    Estimation of residual urine via BVI 3000 Verathon Bladder Scan  MA/nurse performing: nic martinez Ma   Residual Urine: 0 ml  Indication: Benign prostatic hyperplasia with lower urinary tract symptoms, symptom details unspecified    Recurrent UTI   Position: Supine  Examination: Incremental scanning of the suprapubic area using 2.0 MHz transducer using copious amounts of acoustic gel.   Findings: An anechoic area was demonstrated which represented the bladder, with measurement of residual urine as noted. I inspected this myself. In that the residual urine was stable or insignificant, refer to plan for treatment and plan necessary at this time.          Procedures        Assessment and Plan    Diagnoses and all orders for this visit:    1. Benign prostatic hyperplasia with lower urinary tract symptoms, symptom details unspecified (Primary)  -     Bladder Scan  -     POC Urinalysis Dipstick, " Automated  -     Urinalysis, Microscopic Only - Urine, Clean Catch; Future    2. Recurrent UTI  -     Pathnostics Guidance UTI -; Future      He will continue tamsulosin 0.4mg q day.  Continue finasteride 5mg daily.    Recommend continuing cranberry supplement and probiotics.    Will send ua for micro and PCR cx today.    We will have him f/u in 3 months or sooner if needed.      I spent 10 minutes caring for Layo on this date of service. This time includes time spent by me in the following activities:reviewing tests, obtaining and/or reviewing a separately obtained history, performing a medically appropriate examination and/or evaluation , counseling and educating the patient/family/caregiver, ordering medications, tests, or procedures, and documenting information in the medical record  Follow Up   Return in about 3 months (around 3/12/2025) for f/u utis/ heamnturia/ BPH.  Patient was given instructions and counseling regarding his condition or for health maintenance advice. Please see specific information pulled into the AVS if appropriate.         This document has been electronically signed by ARACELI Decker  December 12, 2024 14:40 EST

## 2024-12-14 LAB
QT INTERVAL: 347 MS
QT INTERVAL: 372 MS
QTC INTERVAL: 434 MS
QTC INTERVAL: 452 MS

## 2024-12-16 DIAGNOSIS — N39.0 RECURRENT UTI: Primary | ICD-10-CM

## 2024-12-16 RX ORDER — SULFAMETHOXAZOLE AND TRIMETHOPRIM 800; 160 MG/1; MG/1
1 TABLET ORAL 2 TIMES DAILY
Qty: 20 TABLET | Refills: 0 | Status: SHIPPED | OUTPATIENT
Start: 2024-12-16 | End: 2024-12-26

## 2024-12-26 ENCOUNTER — OFFICE VISIT (OUTPATIENT)
Dept: FAMILY MEDICINE CLINIC | Facility: CLINIC | Age: 84
End: 2024-12-26
Payer: MEDICARE

## 2024-12-26 VITALS
HEART RATE: 80 BPM | DIASTOLIC BLOOD PRESSURE: 50 MMHG | OXYGEN SATURATION: 94 % | WEIGHT: 222 LBS | SYSTOLIC BLOOD PRESSURE: 105 MMHG | BODY MASS INDEX: 31.85 KG/M2

## 2024-12-26 DIAGNOSIS — R94.4 DECREASED GFR: ICD-10-CM

## 2024-12-26 DIAGNOSIS — I25.708 CORONARY ARTERY DISEASE OF BYPASS GRAFT OF NATIVE HEART WITH STABLE ANGINA PECTORIS: Chronic | ICD-10-CM

## 2024-12-26 DIAGNOSIS — R05.9 COUGH, UNSPECIFIED TYPE: Primary | ICD-10-CM

## 2024-12-26 DIAGNOSIS — J44.1 COPD WITH EXACERBATION: ICD-10-CM

## 2024-12-26 LAB
EXPIRATION DATE: NORMAL
FLUAV AG UPPER RESP QL IA.RAPID: NOT DETECTED
FLUBV AG UPPER RESP QL IA.RAPID: NOT DETECTED
INTERNAL CONTROL: NORMAL
Lab: NORMAL
SARS-COV-2 AG UPPER RESP QL IA.RAPID: NOT DETECTED

## 2024-12-26 PROCEDURE — 3074F SYST BP LT 130 MM HG: CPT | Performed by: STUDENT IN AN ORGANIZED HEALTH CARE EDUCATION/TRAINING PROGRAM

## 2024-12-26 PROCEDURE — 87428 SARSCOV & INF VIR A&B AG IA: CPT | Performed by: STUDENT IN AN ORGANIZED HEALTH CARE EDUCATION/TRAINING PROGRAM

## 2024-12-26 PROCEDURE — 1126F AMNT PAIN NOTED NONE PRSNT: CPT | Performed by: STUDENT IN AN ORGANIZED HEALTH CARE EDUCATION/TRAINING PROGRAM

## 2024-12-26 PROCEDURE — 3078F DIAST BP <80 MM HG: CPT | Performed by: STUDENT IN AN ORGANIZED HEALTH CARE EDUCATION/TRAINING PROGRAM

## 2024-12-26 PROCEDURE — 99214 OFFICE O/P EST MOD 30 MIN: CPT | Performed by: STUDENT IN AN ORGANIZED HEALTH CARE EDUCATION/TRAINING PROGRAM

## 2024-12-26 RX ORDER — NITROGLYCERIN 0.4 MG/1
0.4 TABLET SUBLINGUAL
Qty: 30 TABLET | Refills: 0 | Status: SHIPPED | OUTPATIENT
Start: 2024-12-26 | End: 2025-01-25

## 2024-12-26 RX ORDER — PREDNISONE 20 MG/1
40 TABLET ORAL DAILY
Qty: 10 TABLET | Refills: 0 | Status: SHIPPED | OUTPATIENT
Start: 2024-12-26 | End: 2024-12-31

## 2024-12-26 RX ORDER — DOXYCYCLINE 100 MG/1
100 CAPSULE ORAL 2 TIMES DAILY
Qty: 10 CAPSULE | Refills: 0 | Status: SHIPPED | OUTPATIENT
Start: 2024-12-26 | End: 2024-12-31

## 2024-12-26 RX ORDER — MULTIVIT WITH MINERALS/LUTEIN
250 TABLET ORAL DAILY
COMMUNITY

## 2024-12-26 NOTE — PROGRESS NOTES
Subjective:       Layo Cee is a 84 y.o. male with a concurrent medical history of atrial fibrillation status post ablation, coronary artery disease status post bypass, heart failure with preserved ejection fraction secondary to ischemic cardiomyopathy, hypertension, gout, benign prostate hyperplasia, COPD, prediabetes, obesity, and renal insufficiency and past medical history of myocardial infarction who presents for COPD exacerbation and to establish care.          Patient has history of atrial fibrillation s/p ablation.  Current medications include amiodarone 100 mg (half the 200 mg dose), metoprolol succinate 25 mg. He is on Xarelto for stroke prophylaxis.    He sees cardiology Dr. Stevenson. Last appointment was in September and I reviewed that office note.          Patient has history of coronary artery disease and is status post bypass.  Current medications include aspirin 81 mg, isosorbide mononitrate 15 mg (half of a 30 mg tablet), metoprolol succinate 25 mg, pitavastatin 1 mg.    He sees cardiology Dr. Stevenson. Last appointment was in September and I reviewed that office note.      Patient has a history of heart failure with preserved ejection fraction secondary to ischemic cardiomyopathy.    Current medications include metoprolol succinate 25 mg and Entresto.    He sees cardiology Dr. Stevenson. Last appointment was in September and I reviewed that office note.        In terms of hypertension, this has improved with weight loss to the point where he had had hypotensive episodes and had some medications discontinued.  Current medication that either treat or affect blood pressure include metoprolol, Entresto, and isosorbide mononitrate.        Patient has history of gout. Current medication includes allopurinol 200 mg.    Uric acid was elevated at 7.8 approximately 6 months ago.  At that time allopurinol was increased to his current dosage.     He tells me in terms of flares he hasn't had a flare in  three years. Would recheck uric acid with future lab work.        Patient has a history of BPH current medications include finasteride 5 mg and Flomax 0.4 mg. He does see urology.  Their last visit was earlier this month in which they also treated her for recurrent urinary tract infection with plan for 3-month follow-up.      Patient has history of COPD. Current medications include Trelegy Ellipta and Singulair.  He does see pulmonologist and their last visit was in September.    Cough started Sunday. Productive of clear mucus.         Has been trying Mucinex without relief.     Would treat exacerbation with 5 days of steroids and azithromycin.     I did tell him that if symptoms worsen, he should let us know in the event he requires recheck or chest x-ray.        Patient has prediabetes and obesity with a BMI of 31.85 and weighs 222 pounds. He has been started on weight loss medicine from outside physician and is interested in us taking control of this.  I am happy to prescribe this as he would be indicated for weight loss based on BMI greater than 30 with history of cardiovascular disease but I need to know the dose he is on.  Wife is going to call the outside provider and let us know so I can send this in.    Patient does seem to have decreased GFR on lab work and increased creatinine.    Would recommend renal ultrasound if he has not had 1 to assess for presence of medical renal disease. Given comorbid conditions would have low threshold to refer to nephrology.          The following portions of the patient's history were reviewed and updated as appropriate: allergies, current medications, past family history, past medical history, past social history, past surgical history, and problem list.    Past Medical Hx:  Past Medical History:   Diagnosis Date    Acute on chronic diastolic CHF (congestive heart failure) 6/6/2022    Arthritis     Atrial fibrillation, persistent 5/3/2022    Persistent atrial fibrillation  status post extensive ablation and pulmonary vein isolation by Dr. Aguilar on 6/3/2022, with reoccurring rapid A. fib, and then successful cardioversion on 7/1/2022    BPH (benign prostatic hyperplasia)     CHF (congestive heart failure)     COPD (chronic obstructive pulmonary disease)     Coronary artery disease of bypass graft of native heart with stable angina pectoris     (1) Coronary artery disease, s/p CABG in the past. Cardiac catheterization in July 2016 showed patentLIMA graft to the LAD and occluded SVGs. Native LAD is 100% occluded in the mid portion. Native LCx has no significant disease. Native RCA is 100% occluded and it is a . Repeat cath in June, 2021, underwent stent placement to diagnonal branch.    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 Lauren Valencia        Past Surgical Hx:  Past Surgical History:   Procedure Laterality Date    BLADDER SURGERY      CARDIAC CATHETERIZATION  07/2016    CARDIAC CATHETERIZATION N/A 8/9/2021    Procedure: Left Heart Cath with possible angioplasty;  Surgeon: Jack Ladd MD;  Location: [facility] CATH INVASIVE LOCATION;  Service: Cardiovascular;  Laterality: N/A;  Please schedule the procedure with Dr. Jack Ladd MD on 8/9/2021    CARDIAC ELECTROPHYSIOLOGY PROCEDURE N/A 6/3/2022    Procedure: Ablation atrial fibrillation;  Surgeon: Irineo Aguilar MD;  Location: [facility] CATH INVASIVE LOCATION;  Service: Cardiovascular;  Laterality: N/A;    CARDIAC ELECTROPHYSIOLOGY PROCEDURE N/A  6/3/2022    Procedure: Ablation atrial flutter/CTI;  Surgeon: Irineo Aguilar MD;  Location: [facility] INVASIVE LOCATION;  Service: Cardiovascular;  Laterality: N/A;    CARDIAC SURGERY      HEART BYPASS    COLONOSCOPY  2011    CORONARY ARTERY BYPASS GRAFT      CYSTOSCOPY  03/19/2019    WITH BILATERAL RETROGRADE PYELOGRAPHY    ELECTRICAL CARDIOVERSION  5/12/2022         ENDOSCOPY  2016    PROSTATE SURGERY         Current Meds:    Current Outpatient Medications:     acetaminophen (TYLENOL) 325 MG tablet, Take 2 tablets by mouth Every 6 (Six) Hours As Needed for Mild Pain., Disp: , Rfl:     allopurinol 200 MG tablet, Take 200 mg by mouth Daily., Disp: 90 tablet, Rfl: 2    amiodarone (PACERONE) 200 MG tablet, Take 1 tablet by mouth every 12 (twelve) hours for 6 days, then  1 tablet daily for 24 days. (Patient taking differently: Take 0.5 tablets by mouth Daily.), Disp: 36 tablet, Rfl: 1    aspirin (aspirin) 81 MG EC tablet, Take 1 tablet by mouth Daily., Disp: 30 tablet, Rfl: 1    calcium carbonate (OS-LIANNA) 600 MG tablet, Take 1 tablet by mouth Every Night., Disp: , Rfl:     cetirizine (zyrTEC) 10 MG tablet, Take 1 tablet by mouth Daily., Disp: 90 tablet, Rfl: 3    Coenzyme Q10 100 MG tablet, Take 1 tablet by mouth Daily., Disp: , Rfl:     Diclofenac Sodium (VOLTAREN) 1 % gel gel, Apply 2 g topically to the appropriate area as directed 4 (Four) Times a Day., Disp: , Rfl:     finasteride (PROSCAR) 5 MG tablet, Take 1 tablet by mouth Daily., Disp: 90 tablet, Rfl: 4    fluticasone (FLONASE) 50 MCG/ACT nasal spray, Administer 1 spray into the nostril(s) as directed by provider Every Night., Disp: , Rfl:     Fluticasone-Umeclidin-Vilant (Trelegy Ellipta) 100-62.5-25 MCG/ACT inhaler, Inhale 1 puff Daily., Disp: 1 each, Rfl: 11    ipratropium-albuterol (DUO-NEB) 0.5-2.5 mg/3 ml nebulizer, Take 3 mL by nebulization 4 (Four) Times a Day As Needed for Wheezing., Disp: 360 mL, Rfl: 3    isosorbide mononitrate (IMDUR) 30 MG  24 hr tablet, Take 1 tablet by mouth daily. (Patient taking differently: Take 1 tablet by mouth Daily. 1/2 tablet), Disp: 90 tablet, Rfl: 3    Livalo 1 MG tablet tablet, Take 1 tablet by mouth every night., Disp: 90 tablet, Rfl: 3    metoprolol succinate XL (TOPROL-XL) 25 MG 24 hr tablet, Take 1 tablet by mouth Every 12 (Twelve) Hours., Disp: 90 tablet, Rfl: 3    montelukast (SINGULAIR) 10 MG tablet, Take 1 tablet by mouth Every Night., Disp: 90 tablet, Rfl: 3    nitroglycerin (NITROSTAT) 0.4 MG SL tablet, Place 1 tablet under the tongue Every 5 (Five) Minutes As Needed for Chest Pain for up to 30 days. Take no more than 3 doses in 15 minutes., Disp: 30 tablet, Rfl: 0    pantoprazole (PROTONIX) 40 MG EC tablet, Take 1 tablet by mouth Daily., Disp: 90 tablet, Rfl: 2    rivaroxaban (Xarelto) 15 MG tablet, Take 1 tablet by mouth daily with dinner., Disp: 30 tablet, Rfl: 11    sacubitril-valsartan (Entresto) 24-26 MG tablet, Take 1 tablet by mouth 2 (Two) Times a Day., Disp: 180 tablet, Rfl: 3    Semaglutide-Weight Management 0.5 MG/0.5ML solution auto-injector, Inject 0.2 mL under the skin into the appropriate area as directed., Disp: , Rfl:     sulfamethoxazole-trimethoprim (Bactrim DS) 800-160 MG per tablet, Take 1 tablet by mouth 2 (Two) Times a Day for 10 days., Disp: 20 tablet, Rfl: 0    tamsulosin (FLOMAX) 0.4 MG capsule 24 hr capsule, Take 1 capsule by mouth Daily., Disp: 90 capsule, Rfl: 4    VITAMIN B COMPLEX-C PO, Take 1 tablet by mouth Daily., Disp: , Rfl:     vitamin C (ASCORBIC ACID) 250 MG tablet, Take 1 tablet by mouth Daily., Disp: , Rfl:     doxycycline (VIBRAMYCIN) 100 MG capsule, Take 1 capsule by mouth 2 (Two) Times a Day for 5 days., Disp: 10 capsule, Rfl: 0    predniSONE (DELTASONE) 20 MG tablet, Take 2 tablets by mouth Daily for 5 days., Disp: 10 tablet, Rfl: 0    Allergies:  Allergies   Allergen Reactions    Carvedilol Swelling and Anaphylaxis    Levaquin [Levofloxacin] Unknown - Low Severity  "      Family Hx:  Family History   Problem Relation Age of Onset    Hypertension Mother     Heart disease Father     Other Brother         GASTROINTESTINAL CANCER    Kidney cancer Brother         Social History:  Social History     Socioeconomic History    Marital status:    Tobacco Use    Smoking status: Former     Current packs/day: 0.00     Average packs/day: 0.5 packs/day for 40.0 years (20.0 ttl pk-yrs)     Types: Cigarettes     Start date:      Quit date:      Years since quittin.0     Passive exposure: Past    Smokeless tobacco: Never   Vaping Use    Vaping status: Never Used   Substance and Sexual Activity    Alcohol use: Not Currently    Drug use: Never    Sexual activity: Defer       Review of Systems  Review of Systems   Respiratory:  Positive for cough (productive of clear sputum), chest tightness (\"a little\") and shortness of breath (worse than normal). Negative for wheezing.        Objective:     /50 (BP Location: Left arm, Patient Position: Sitting, Cuff Size: Large Adult)   Pulse 80   Wt 101 kg (222 lb)   SpO2 94%   BMI 31.85 kg/m²   Physical Exam  Constitutional:       General: He is not in acute distress.     Appearance: Normal appearance. He is obese. He is not ill-appearing, toxic-appearing or diaphoretic.   Cardiovascular:      Rate and Rhythm: Normal rate.   Pulmonary:      Effort: Pulmonary effort is normal. No respiratory distress.      Breath sounds: No stridor. Wheezing present. No rhonchi or rales.      Comments: Cough present throughout exam  Neurological:      Mental Status: He is alert.   Psychiatric:         Mood and Affect: Mood normal.         Behavior: Behavior normal.          Assessment/Plan:     Diagnoses and all orders for this visit:    1. Cough, unspecified type (Primary)  -     POCT SARS-CoV-2 Antigen PRIYA + Flu    2. COPD with exacerbation      Patient has history of COPD. Current medications include Trelegy Ellipta and Singulair.  He does see " pulmonologist and their last visit was in September.    Cough started Sunday. Productive of clear mucus.         Has been trying Mucinex without relief.     Would treat exacerbation with 5 days of steroids and azithromycin.     I did tell him that if symptoms worsen, he should let us know in the event he requires recheck or chest x-ray.        -     predniSONE (DELTASONE) 20 MG tablet; Take 2 tablets by mouth Daily for 5 days.  Dispense: 10 tablet; Refill: 0  -     doxycycline (VIBRAMYCIN) 100 MG capsule; Take 1 capsule by mouth 2 (Two) Times a Day for 5 days.  Dispense: 10 capsule; Refill: 0    3. Coronary artery disease of bypass graft of native heart with stable angina pectoris        Patient has history of MI and request refill of as needed nitroglycerin.  I did note that he takes Imdur however which is a long-acting nitrate and I did tell him to discuss with cardiologist how much nitroglycerin as needed he could safely take while on a long-acting nitrate to avoid precipitating hypotension.      -     nitroglycerin (NITROSTAT) 0.4 MG SL tablet; Place 1 tablet under the tongue Every 5 (Five) Minutes As Needed for Chest Pain for up to 30 days. Take no more than 3 doses in 15 minutes.  Dispense: 30 tablet; Refill: 0    4. Decreased GFR    Patient does seem to have decreased GFR on lab work and increased creatinine.    Would recommend renal ultrasound if he has not had 1 to assess for presence of medical renal disease. Given comorbid conditions would have low threshold to refer to nephrology.    -     US Renal Bilateral; Future                  Follow-up:     Return in about 1 month (around 1/26/2025) for Recheck.    Preventative:  Health Maintenance   Topic Date Due    COVID-19 Vaccine (2 - 2024-25 season) 09/01/2024    ANNUAL WELLNESS VISIT  01/17/2025    RSV Vaccine - Adults (1 - 1-dose 75+ series) 03/18/2025 (Originally 3/17/2017)    LIPID PANEL  09/16/2025    BMI FOLLOWUP  09/16/2025    TDAP/TD VACCINES (2 -  Tdap) 01/09/2033    INFLUENZA VACCINE  Completed    Pneumococcal Vaccine 65+  Completed    ZOSTER VACCINE  Completed         This document has been electronically signed by Tevin Zavala MD on December 26, 2024 12:45 EST       Parts of this note are electronic transcriptions/translations of spoken language to printed text using the Dragon Dictation system.

## 2025-01-03 ENCOUNTER — TELEPHONE (OUTPATIENT)
Dept: FAMILY MEDICINE CLINIC | Facility: CLINIC | Age: 85
End: 2025-01-03
Payer: MEDICARE

## 2025-01-03 DIAGNOSIS — E66.9 OBESITY (BMI 30-39.9): Primary | ICD-10-CM

## 2025-01-03 RX ORDER — SEMAGLUTIDE 1 MG/.5ML
1 INJECTION, SOLUTION SUBCUTANEOUS WEEKLY
Qty: 0.5 ML | Refills: 3 | Status: SHIPPED | OUTPATIENT
Start: 2025-01-03

## 2025-01-03 NOTE — TELEPHONE ENCOUNTER
[facility] CALLED OFFICE TO STATE PATIENT IS ON WEGOVY 1MG WEEKLY.       CALL BACK NUMBER [phone] IF NEEDING TO SPEAK WITH NURSE.

## 2025-01-03 NOTE — TELEPHONE ENCOUNTER
Please deliver the patient my sincere thanks for giving us this information.  Please let him know that I have sent in this 1 mg dosage to the pharmacy and then we will discuss increasing the dose in the future.      This document has been electronically signed by Tevin Zavala MD on January 3, 2025 14:42 EST

## 2025-01-08 ENCOUNTER — HOSPITAL ENCOUNTER (OUTPATIENT)
Dept: ULTRASOUND IMAGING | Facility: HOSPITAL | Age: 85
Discharge: HOME | End: 2025-01-08
Admitting: STUDENT IN AN ORGANIZED HEALTH CARE EDUCATION/TRAINING PROGRAM
Payer: MEDICARE

## 2025-01-08 DIAGNOSIS — R94.4 DECREASED GFR: ICD-10-CM

## 2025-01-08 PROCEDURE — 76775 US EXAM ABDO BACK WALL LIM: CPT

## 2025-01-09 NOTE — PROGRESS NOTES
Despite labs showing decreased renal function, ultrasound does not demonstrate medical renal disease. Although bilateral renal cysts are seen, these are categorized as simple cysts by radiologist, and as such, no referral to nephrology is warranted at this juncture.     Thank you,    Tevin Zavala     ?  This document has been electronically signed by Tevin Zavala MD on January 8, 2025 21:07 EST

## 2025-01-15 ENCOUNTER — TELEPHONE (OUTPATIENT)
Dept: CARDIOLOGY | Facility: CLINIC | Age: 85
End: 2025-01-15
Payer: MEDICARE

## 2025-01-15 NOTE — TELEPHONE ENCOUNTER
[facility] received a fax that requires your attention. The document has been indexed to the patient’s chart for your review.      Reason for sending: EXTERNAL MEDICAL RECORD NOTIFICATION     Documents Description: LIVALO ALTERNATIVE [id number]    Name of Sender: [facility]     Date Indexed: 1.14.25    NOT ON FORMULARY- PT HAS TEMPORARY SUPPLY

## 2025-01-20 NOTE — TELEPHONE ENCOUNTER
Approved on January 17 by [employer]    Approved. This drug has been approved under the Member's Medicare Part D benefit for LIVALO Tablet 1MG. Approved quantity: 90 per 90 day(s). You may fill up to a 90 day supply except for those on Specialty Tier 5, which can be filled up to a 30 day supply. Please call the pharmacy to process the prescription claim.    Authorization Expiration Date: 12/31/2099    APPROVAL LETTER SCANNED IN MEDIA

## 2025-01-27 ENCOUNTER — HOSPITAL ENCOUNTER (OUTPATIENT)
Dept: GENERAL RADIOLOGY | Facility: HOSPITAL | Age: 85
Discharge: HOME | End: 2025-01-27
Payer: MEDICARE

## 2025-01-27 ENCOUNTER — LAB (OUTPATIENT)
Dept: LAB | Facility: HOSPITAL | Age: 85
End: 2025-01-27
Payer: MEDICARE

## 2025-01-27 ENCOUNTER — OFFICE VISIT (OUTPATIENT)
Dept: FAMILY MEDICINE CLINIC | Facility: CLINIC | Age: 85
End: 2025-01-27
Payer: MEDICARE

## 2025-01-27 VITALS
OXYGEN SATURATION: 96 % | DIASTOLIC BLOOD PRESSURE: 57 MMHG | BODY MASS INDEX: 32.99 KG/M2 | HEIGHT: 70 IN | HEART RATE: 68 BPM | SYSTOLIC BLOOD PRESSURE: 142 MMHG | WEIGHT: 230.4 LBS

## 2025-01-27 DIAGNOSIS — R42 DIZZINESS: ICD-10-CM

## 2025-01-27 DIAGNOSIS — R06.09 OTHER FORM OF DYSPNEA: ICD-10-CM

## 2025-01-27 DIAGNOSIS — J18.9 COMMUNITY ACQUIRED PNEUMONIA, UNSPECIFIED LATERALITY: Primary | ICD-10-CM

## 2025-01-27 DIAGNOSIS — I25.5 ISCHEMIC CARDIOMYOPATHY: ICD-10-CM

## 2025-01-27 DIAGNOSIS — N39.0 RECURRENT UTI: ICD-10-CM

## 2025-01-27 DIAGNOSIS — D64.9 NORMOCYTIC ANEMIA: ICD-10-CM

## 2025-01-27 DIAGNOSIS — R42 DIZZINESS: Primary | ICD-10-CM

## 2025-01-27 LAB
ALBUMIN SERPL-MCNC: 3.6 G/DL (ref 3.5–5.2)
ALBUMIN/GLOB SERPL: 1.4 G/DL
ALP SERPL-CCNC: 57 U/L (ref 39–117)
ALT SERPL W P-5'-P-CCNC: 15 U/L (ref 1–41)
ANION GAP SERPL CALCULATED.3IONS-SCNC: 10.6 MMOL/L (ref 5–15)
AST SERPL-CCNC: 17 U/L (ref 1–40)
BILIRUB SERPL-MCNC: 0.3 MG/DL (ref 0–1.2)
BUN SERPL-MCNC: 12 MG/DL (ref 8–23)
BUN/CREAT SERPL: 8.5 (ref 7–25)
CALCIUM SPEC-SCNC: 8.8 MG/DL (ref 8.6–10.5)
CHLORIDE SERPL-SCNC: 105 MMOL/L (ref 98–107)
CO2 SERPL-SCNC: 25.4 MMOL/L (ref 22–29)
CREAT SERPL-MCNC: 1.41 MG/DL (ref 0.76–1.27)
DEPRECATED RDW RBC AUTO: 45.3 FL (ref 37–54)
EGFRCR SERPLBLD CKD-EPI 2021: 49.8 ML/MIN/1.73
ERYTHROCYTE [DISTWIDTH] IN BLOOD BY AUTOMATED COUNT: 14.2 % (ref 12.3–15.4)
GLOBULIN UR ELPH-MCNC: 2.6 GM/DL
GLUCOSE SERPL-MCNC: 80 MG/DL (ref 65–99)
HCT VFR BLD AUTO: 32.7 % (ref 37.5–51)
HGB BLD-MCNC: 10.6 G/DL (ref 13–17.7)
MCH RBC QN AUTO: 28.7 PG (ref 26.6–33)
MCHC RBC AUTO-ENTMCNC: 32.4 G/DL (ref 31.5–35.7)
MCV RBC AUTO: 88.6 FL (ref 79–97)
NT-PROBNP SERPL-MCNC: 2489 PG/ML (ref 0–1800)
PLATELET # BLD AUTO: 244 10*3/MM3 (ref 140–450)
PMV BLD AUTO: 10.4 FL (ref 6–12)
POTASSIUM SERPL-SCNC: 4.1 MMOL/L (ref 3.5–5.2)
PROT SERPL-MCNC: 6.2 G/DL (ref 6–8.5)
RBC # BLD AUTO: 3.69 10*6/MM3 (ref 4.14–5.8)
SODIUM SERPL-SCNC: 141 MMOL/L (ref 136–145)
WBC NRBC COR # BLD AUTO: 8.91 10*3/MM3 (ref 3.4–10.8)

## 2025-01-27 PROCEDURE — 3078F DIAST BP <80 MM HG: CPT | Performed by: STUDENT IN AN ORGANIZED HEALTH CARE EDUCATION/TRAINING PROGRAM

## 2025-01-27 PROCEDURE — 3077F SYST BP >= 140 MM HG: CPT | Performed by: STUDENT IN AN ORGANIZED HEALTH CARE EDUCATION/TRAINING PROGRAM

## 2025-01-27 PROCEDURE — 36415 COLL VENOUS BLD VENIPUNCTURE: CPT

## 2025-01-27 PROCEDURE — 99214 OFFICE O/P EST MOD 30 MIN: CPT | Performed by: STUDENT IN AN ORGANIZED HEALTH CARE EDUCATION/TRAINING PROGRAM

## 2025-01-27 PROCEDURE — 1126F AMNT PAIN NOTED NONE PRSNT: CPT | Performed by: STUDENT IN AN ORGANIZED HEALTH CARE EDUCATION/TRAINING PROGRAM

## 2025-01-27 PROCEDURE — 80053 COMPREHEN METABOLIC PANEL: CPT

## 2025-01-27 PROCEDURE — 71046 X-RAY EXAM CHEST 2 VIEWS: CPT

## 2025-01-27 PROCEDURE — 83880 ASSAY OF NATRIURETIC PEPTIDE: CPT

## 2025-01-27 PROCEDURE — 84466 ASSAY OF TRANSFERRIN: CPT

## 2025-01-27 PROCEDURE — 85027 COMPLETE CBC AUTOMATED: CPT

## 2025-01-27 PROCEDURE — 83540 ASSAY OF IRON: CPT

## 2025-01-27 PROCEDURE — 93000 ELECTROCARDIOGRAM COMPLETE: CPT | Performed by: STUDENT IN AN ORGANIZED HEALTH CARE EDUCATION/TRAINING PROGRAM

## 2025-01-27 PROCEDURE — 82728 ASSAY OF FERRITIN: CPT

## 2025-01-27 RX ORDER — DOXYCYCLINE 100 MG/1
100 CAPSULE ORAL 2 TIMES DAILY
Qty: 10 CAPSULE | Refills: 0 | Status: SHIPPED | OUTPATIENT
Start: 2025-01-27 | End: 2025-02-03 | Stop reason: HOSPADM

## 2025-01-27 NOTE — PROGRESS NOTES
Subjective:       Layo Cee is a 84 y.o. male with a concurrent medical history of atrial fibrillation status post ablation, coronary artery disease status post bypass, heart failure with preserved ejection fraction secondary to ischemic cardiomyopathy, hypertension, gout, benign prostate hyperplasia, COPD, prediabetes, obesity, and renal insufficiency and past medical history of myocardial infarction who presents for follow-up on COPD exacerbation.      At our initial establish care visit on 12/26/2024, approximately 1 month ago, he reported productive cough and other worsening symptoms.  I treated him with 5 days of steroid and azithromycin for presumptive COPD exacerbation on the basis of his history and physical exam.  However, although his cough has resolved with treatment, he continues to have dyspnea on exertion.      It is true that he has baseline COPD for which he sees pulmonology and does have upcoming appointment with rehab for pulmonary rehabilitation.  Current medications include Trelegy Ellipta and Singulair. As I mentioned in my last office note, if his symptoms did not improve, I would discuss potentially pursuing further imaging.  I do think based on his comorbidities and other current symptoms including dizziness, it would be worth repeating a chest x-ray today.      At our initial establish care visit we also discussed history of prediabetes with obesity.  BMI today is 33.06.  He previously been prescribed a weight loss medicine from an outside physician.  He was on semaglutide at the 1 mg dose.  I began writing this medication at his request.  He has gained some slight weight today and weighs 230 pounds, up from 222 pounds at last visit.  Ordinarily I would think about increasing the dosage today but due to other symptoms including dizziness I do think it is better to leave it at its current dosage for now.  Fortunately, he does not believe that his dizziness is related in any way to  oral intake as he continues to eat a healthy portion while on this medication.  I did tell him if he notices he is restricting meals excessively to let me know.      At our previous visit, we discussed renal insufficiency.  I obtained a renal ultrasound to assess for presence of medical renal disease but this was not demonstrated on ultrasound.  For now, we will continue to trend renal function but if it continues to worsen would have lower threshold than normal due to comorbidities to refer to nephrology.    Today, he reports a bothersome symptom, primarily dizziness.  It is worth noting that this is a chronic issue for him dating at least back to September as even at that time, his cardiologist noted in his office note that he was decreasing the dose of amlodipine, which Layo has come off completely since then.  Although Layo has a history of hypertension, that had improved with weight loss on an GLP-1 to the point where he had reported hypotensive episodes in the past and had medications that needed to be discontinued.  At the moment, he continues to take several medications related to his heart that affect blood pressure including metoprolol, Entresto, and Imdur.    We discussed the differential diagnosis for dizziness that is broad.  I obtained an EKG due to his history of atrial fibrillation and though it did show normal sinus rhythm, it also demonstrated repolarization abnormalities suggestive of ischemia.  He does have history of myocardial infarction in the past and and is status post bypass.  I pressed him extensively on any heart related symptoms, and he firmly denies any recent symptoms of chest pain. He does see cardiologist Dr. Stevenson.  He denies symptoms of heart failure exacerbation but I would repeat a BNP today.  Given he is history of shortness of breath, I do think it is worth obtaining a chest x-ray to rule out any pneumonia with dizziness.  I would also repeat BMP and CBC to rule out  electrolyte abnormalities or anemia.    However, I think the most likely cause of his symptoms is orthostatic hypotension.  His blood pressure went from 146/66 while sitting to 125/61 upon standing.  As symptoms tend to happen when he goes from sitting to standing I think there is at least a component of orthostatic hypotension.  I think he could potentially benefit from having the dose of his antihypertensives decreased.  Given his heart history, expressed preference to defer to cardiologist on this point as they have upcoming appointment.    I will see him back for follow-up after that appointment to follow-up on dizziness, and I will see him back earlier for annual wellness.        Past Medical Hx:  Past Medical History:   Diagnosis Date    Acute on chronic diastolic CHF (congestive heart failure) 6/6/2022    Arthritis     Atrial fibrillation, persistent 5/3/2022    Persistent atrial fibrillation status post extensive ablation and pulmonary vein isolation by Dr. Aguilar on 6/3/2022, with reoccurring rapid A. fib, and then successful cardioversion on 7/1/2022    BPH (benign prostatic hyperplasia)     CHF (congestive heart failure)     COPD (chronic obstructive pulmonary disease)     Coronary artery disease of bypass graft of native heart with stable angina pectoris     (1) Coronary artery disease, s/p CABG in the past. Cardiac catheterization in July 2016 showed patentLIMA graft to the LAD and occluded SVGs. Native LAD is 100% occluded in the mid portion. Native LCx has no significant disease. Native RCA is 100% occluded and it is a . Repeat cath in June, 2021, underwent stent placement to diagnonal branch.    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 Lauren Valencia        Past Surgical Hx:  Past Surgical History:   Procedure Laterality Date    BLADDER SURGERY      CARDIAC CATHETERIZATION  07/2016    CARDIAC CATHETERIZATION N/A 8/9/2021    Procedure: Left Heart Cath with possible angioplasty;  Surgeon: Jack Ladd MD;  Location: [facility] CATH INVASIVE LOCATION;  Service: Cardiovascular;  Laterality: N/A;  Please schedule the procedure with Dr. Jack Ladd MD on 8/9/2021    CARDIAC ELECTROPHYSIOLOGY PROCEDURE N/A 6/3/2022    Procedure: Ablation atrial fibrillation;  Surgeon: Irineo Aguilar MD;  Location:  [facility] CATH INVASIVE LOCATION;  Service: Cardiovascular;  Laterality: N/A;    CARDIAC ELECTROPHYSIOLOGY PROCEDURE N/A 6/3/2022    Procedure: Ablation atrial flutter/CTI;  Surgeon: Irineo Aguilar MD;  Location:  [facility] CATH INVASIVE LOCATION;  Service: Cardiovascular;  Laterality: N/A;    CARDIAC SURGERY      HEART BYPASS    COLONOSCOPY  2011    CORONARY ARTERY BYPASS GRAFT      CYSTOSCOPY  03/19/2019    WITH BILATERAL RETROGRADE PYELOGRAPHY    ELECTRICAL CARDIOVERSION  5/12/2022         ENDOSCOPY  2016    PROSTATE SURGERY         Current Meds:    Current Outpatient Medications:     acetaminophen (TYLENOL) 325 MG tablet, Take 2 tablets by mouth Every 6 (Six) Hours As Needed for Mild Pain., Disp: , Rfl:     allopurinol 200 MG tablet, Take 200 mg by mouth Daily., Disp: 90 tablet, Rfl: 2    amiodarone (PACERONE) 200 MG tablet, Take 1 tablet by mouth every 12 (twelve) hours for 6 days, then  1 tablet daily for 24 days. (Patient taking differently: Take 0.5 tablets by mouth Daily.), Disp: 36 tablet, Rfl: 1    aspirin (aspirin) 81 MG EC tablet, Take 1 tablet by mouth Daily., Disp: 30 tablet, Rfl: 1    calcium carbonate (OS-LIANNA) 600 MG tablet,  Take 1 tablet by mouth Every Night., Disp: , Rfl:     cetirizine (zyrTEC) 10 MG tablet, Take 1 tablet by mouth Daily., Disp: 90 tablet, Rfl: 3    Coenzyme Q10 100 MG tablet, Take 1 tablet by mouth Daily., Disp: , Rfl:     Diclofenac Sodium (VOLTAREN) 1 % gel gel, Apply 2 g topically to the appropriate area as directed 4 (Four) Times a Day., Disp: , Rfl:     finasteride (PROSCAR) 5 MG tablet, Take 1 tablet by mouth Daily., Disp: 90 tablet, Rfl: 4    fluticasone (FLONASE) 50 MCG/ACT nasal spray, Administer 1 spray into the nostril(s) as directed by provider Every Night., Disp: , Rfl:     Fluticasone-Umeclidin-Vilant (Trelegy Ellipta) 100-62.5-25 MCG/ACT inhaler, Inhale 1 puff Daily., Disp: 1 each, Rfl: 11    ipratropium-albuterol (DUO-NEB) 0.5-2.5 mg/3 ml nebulizer, Take 3 mL by nebulization 4 (Four) Times a Day As Needed for Wheezing., Disp: 360 mL, Rfl: 3    isosorbide mononitrate (IMDUR) 30 MG 24 hr tablet, Take 1 tablet by mouth daily. (Patient taking differently: Take 1 tablet by mouth Daily. 1/2 tablet), Disp: 90 tablet, Rfl: 3    Livalo 1 MG tablet tablet, Take 1 tablet by mouth every night., Disp: 90 tablet, Rfl: 3    metoprolol succinate XL (TOPROL-XL) 25 MG 24 hr tablet, Take 1 tablet by mouth Every 12 (Twelve) Hours. (Patient taking differently: Take 1 tablet by mouth Daily.), Disp: 90 tablet, Rfl: 3    montelukast (SINGULAIR) 10 MG tablet, Take 1 tablet by mouth Every Night., Disp: 90 tablet, Rfl: 3    nitroglycerin (NITROSTAT) 0.4 MG SL tablet, Place 1 tablet under the tongue Every 5 (Five) Minutes As Needed for Chest Pain for up to 30 days. Take no more than 3 doses in 15 minutes., Disp: 30 tablet, Rfl: 0    pantoprazole (PROTONIX) 40 MG EC tablet, Take 1 tablet by mouth Daily., Disp: 90 tablet, Rfl: 2    rivaroxaban (Xarelto) 15 MG tablet, Take 1 tablet by mouth daily with dinner., Disp: 30 tablet, Rfl: 11    sacubitril-valsartan (Entresto) 24-26 MG tablet, Take 1 tablet by mouth 2 (Two) Times a Day.,  Disp: 180 tablet, Rfl: 3    Semaglutide-Weight Management (Wegovy) 1 MG/0.5ML solution auto-injector, Inject 0.5 mL under the skin into the appropriate area as directed 1 (One) Time Per Week., Disp: 0.5 mL, Rfl: 3    tamsulosin (FLOMAX) 0.4 MG capsule 24 hr capsule, Take 1 capsule by mouth Daily., Disp: 90 capsule, Rfl: 4    VITAMIN B COMPLEX-C PO, Take 1 tablet by mouth Daily., Disp: , Rfl:     vitamin C (ASCORBIC ACID) 250 MG tablet, Take 1 tablet by mouth Daily., Disp: , Rfl:     Allergies:  Allergies   Allergen Reactions    Carvedilol Swelling and Anaphylaxis    Levaquin [Levofloxacin] Unknown - Low Severity       Family Hx:  Family History   Problem Relation Age of Onset    Hypertension Mother     Heart disease Father     Other Brother         GASTROINTESTINAL CANCER    Kidney cancer Brother         Social History:  Social History     Socioeconomic History    Marital status:    Tobacco Use    Smoking status: Former     Current packs/day: 0.00     Average packs/day: 0.5 packs/day for 40.0 years (20.0 ttl pk-yrs)     Types: Cigarettes     Start date:      Quit date:      Years since quittin.0     Passive exposure: Past    Smokeless tobacco: Never   Vaping Use    Vaping status: Never Used   Substance and Sexual Activity    Alcohol use: Not Currently    Drug use: Never    Sexual activity: Defer       Review of Systems  Review of Systems   Constitutional:  Negative for unexpected weight change.   Respiratory:  Positive for shortness of breath (with physical activity). Negative for cough and wheezing. Chest tightness: intermittently.   Cardiovascular:  Negative for chest pain, palpitations and leg swelling.   Gastrointestinal:  Negative for abdominal pain, nausea and vomiting.   Neurological:  Positive for dizziness (sitting to standing), weakness, light-headedness and headaches. Negative for syncope.       Objective:     /57 (BP Location: Left arm, Patient Position: Sitting, Cuff Size:  "Large Adult)   Pulse 68   Ht 177.8 cm (70\")   Wt 105 kg (230 lb 6.4 oz)   SpO2 96%   BMI 33.06 kg/m²   Physical Exam  Constitutional:       General: He is not in acute distress.     Appearance: Normal appearance. He is obese. He is not ill-appearing, toxic-appearing or diaphoretic.   Cardiovascular:      Rate and Rhythm: Normal rate.   Pulmonary:      Effort: Pulmonary effort is normal.      Breath sounds: Normal breath sounds.   Neurological:      Mental Status: He is alert.          Assessment/Plan:     Diagnoses and all orders for this visit:    1. Dizziness (Primary)  2. Other form of dyspnea    Today, he reports a bothersome symptom, primarily dizziness.  It is worth noting that this is a chronic issue for him dating at least back to September as even at that time, his cardiologist noted in his office note that he was decreasing the dose of amlodipine, which Layo has come off completely since then.  Although Layo has a history of hypertension, that had improved with weight loss on an GLP-1 to the point where he had reported hypotensive episodes in the past and had medications that needed to be discontinued.  At the moment, he continues to take several medications related to his heart that affect blood pressure including metoprolol, Entresto, and Imdur.    We discussed the differential diagnosis for dizziness that is broad.  I obtained an EKG due to his history of atrial fibrillation and though it did show normal sinus rhythm, it also demonstrated repolarization abnormalities suggestive of ischemia.  He does have history of myocardial infarction in the past and and is status post bypass.  I pressed him extensively on any heart related symptoms, and he firmly denies any recent symptoms of chest pain. He does see cardiologist Dr. Stevenson.  He denies symptoms of heart failure exacerbation but I would repeat a BNP today.  Given he is history of shortness of breath, I do think it is worth obtaining a chest " x-ray to rule out any pneumonia with dizziness.  I would also repeat BMP and CBC to rule out electrolyte abnormalities or anemia.    However, I think the most likely cause of his symptoms is orthostatic hypotension.  His blood pressure went from 146/66 while sitting to 125/61 upon standing.  As symptoms tend to happen when he goes from sitting to standing I think there is at least a component of orthostatic hypotension.  I think he could potentially benefit from having the dose of his antihypertensives decreased.  Given his heart history, expressed preference to defer to cardiologist on this point as they have upcoming appointment.      -     ECG 12 Lead  -     XR Chest 2 View; Future  -     Comprehensive metabolic panel; Future  -     CBC No Differential; Future  -     BNP; Future                  Follow-up:     Return in about 1 month (around 2/27/2025) for [facility].    Preventative:  Health Maintenance   Topic Date Due    ANNUAL WELLNESS VISIT  01/17/2025    RSV Vaccine - Adults (1 - 1-dose 75+ series) 03/18/2025 (Originally 3/17/2017)    COVID-19 Vaccine (2 - 2024-25 season) 04/18/2025 (Originally 9/1/2024)    LIPID PANEL  09/16/2025    BMI FOLLOWUP  01/03/2026    TDAP/TD VACCINES (2 - Tdap) 01/09/2033    INFLUENZA VACCINE  Completed    Pneumococcal Vaccine 65+  Completed    ZOSTER VACCINE  Completed         This document has been electronically signed by Tevin Zavala MD on January 27, 2025 12:41 EST       Parts of this note are electronic transcriptions/translations of spoken language to printed text using the Dragon Dictation system.

## 2025-01-27 NOTE — PROGRESS NOTES
I have reviewed results of chest x-ray.  This does show potential for a left-sided pneumonia.  I would recommend treatment with 5 days of Augmentin and doxycycline.  He should hold any of his mineral supplementation, including calcium, while on doxycycline.  Medications has been sent in the pharmacy.    ?  This document has been electronically signed by Tevin Zavala MD on January 27, 2025 14:42 EST

## 2025-01-28 DIAGNOSIS — D50.9 IRON DEFICIENCY ANEMIA, UNSPECIFIED IRON DEFICIENCY ANEMIA TYPE: Primary | ICD-10-CM

## 2025-01-28 DIAGNOSIS — D64.9 NORMOCYTIC ANEMIA: Primary | ICD-10-CM

## 2025-01-28 LAB
FERRITIN SERPL-MCNC: 17.9 NG/ML (ref 30–400)
IRON 24H UR-MRATE: 30 MCG/DL (ref 59–158)
IRON SATN MFR SERPL: 8 % (ref 20–50)
TIBC SERPL-MCNC: 361 MCG/DL (ref 298–536)
TRANSFERRIN SERPL-MCNC: 242 MG/DL (ref 200–360)

## 2025-01-28 RX ORDER — FERROUS SULFATE 325(65) MG
325 TABLET ORAL
Qty: 90 TABLET | Refills: 0 | Status: SHIPPED | OUTPATIENT
Start: 2025-01-28 | End: 2025-01-29

## 2025-01-28 NOTE — PROGRESS NOTES
I have reviewed lab work.  Hemoglobin has decreased from 13.3 in November to 10.6, consistent with anemia.  I have ordered some additional labs to look for the cause of this.  Anemia can be associated with dizziness, fatigue, and lightheadedness, which we discussed at our appointment yesterday.    Renal function is stable and in fact has improved from 2 months ago on CMP.  Remainder of CMP is normal.      BNP is elevated at 2489 but not significantly so and as he was euvolemic on physical exam I do not think he is in heart failure exacerbation.    Thank you,    Tevin Zavala    ?  This document has been electronically signed by Tevin Zavala MD on January 28, 2025 09:30 EST

## 2025-01-29 ENCOUNTER — HOSPITAL ENCOUNTER (INPATIENT)
Facility: HOSPITAL | Age: 85
LOS: 5 days | Discharge: HOME | DRG: 871 | End: 2025-02-03
Attending: EMERGENCY MEDICINE | Admitting: INTERNAL MEDICINE
Payer: MEDICARE

## 2025-01-29 ENCOUNTER — APPOINTMENT (OUTPATIENT)
Dept: CARDIOLOGY | Facility: HOSPITAL | Age: 85
DRG: 871 | End: 2025-01-29
Payer: MEDICARE

## 2025-01-29 ENCOUNTER — APPOINTMENT (OUTPATIENT)
Dept: GENERAL RADIOLOGY | Facility: HOSPITAL | Age: 85
DRG: 871 | End: 2025-01-29
Payer: MEDICARE

## 2025-01-29 DIAGNOSIS — J18.9 PNEUMONIA DUE TO INFECTIOUS ORGANISM, UNSPECIFIED LATERALITY, UNSPECIFIED PART OF LUNG: ICD-10-CM

## 2025-01-29 DIAGNOSIS — J18.9 MULTIFOCAL PNEUMONIA: ICD-10-CM

## 2025-01-29 DIAGNOSIS — R26.2 DIFFICULTY WALKING: ICD-10-CM

## 2025-01-29 DIAGNOSIS — Z78.9 DECREASED ACTIVITIES OF DAILY LIVING (ADL): ICD-10-CM

## 2025-01-29 DIAGNOSIS — I50.9 ACUTE ON CHRONIC CONGESTIVE HEART FAILURE, UNSPECIFIED HEART FAILURE TYPE: Primary | ICD-10-CM

## 2025-01-29 DIAGNOSIS — I50.43 ACUTE ON CHRONIC COMBINED SYSTOLIC AND DIASTOLIC CONGESTIVE HEART FAILURE: ICD-10-CM

## 2025-01-29 DIAGNOSIS — J44.1 COPD WITH ACUTE EXACERBATION: ICD-10-CM

## 2025-01-29 DIAGNOSIS — J43.9 PULMONARY EMPHYSEMA, UNSPECIFIED EMPHYSEMA TYPE: ICD-10-CM

## 2025-01-29 DIAGNOSIS — I34.0 MODERATE MITRAL REGURGITATION: ICD-10-CM

## 2025-01-29 DIAGNOSIS — I35.0 MILD AORTIC VALVE STENOSIS: ICD-10-CM

## 2025-01-29 DIAGNOSIS — R79.89 ELEVATED TROPONIN: ICD-10-CM

## 2025-01-29 LAB
ALBUMIN SERPL-MCNC: 3.6 G/DL (ref 3.5–5.2)
ALBUMIN/GLOB SERPL: 1.3 G/DL
ALP SERPL-CCNC: 54 U/L (ref 39–117)
ALT SERPL W P-5'-P-CCNC: 8 U/L (ref 1–41)
ANION GAP SERPL CALCULATED.3IONS-SCNC: 12.4 MMOL/L (ref 5–15)
APTT PPP: 42.6 SECONDS (ref 24.2–34.2)
ARTERIAL PATENCY WRIST A: ABNORMAL
ASCENDING AORTA: 2.7 CM
AST SERPL-CCNC: 17 U/L (ref 1–40)
ATMOSPHERIC PRESS: 738.9 MMHG
AV MEAN PRESS GRAD SYS DOP V1V2: 18 MMHG
AV VMAX SYS DOP: 292 CM/SEC
BASE EXCESS BLDA CALC-SCNC: 0 MMOL/L (ref -2–2)
BASOPHILS # BLD AUTO: 0.03 10*3/MM3 (ref 0–0.2)
BASOPHILS NFR BLD AUTO: 0.2 % (ref 0–1.5)
BDY SITE: ABNORMAL
BH CV ECHO MEAS - AO MAX PG: 34.1 MMHG
BH CV ECHO MEAS - AO ROOT DIAM: 2.8 CM
BH CV ECHO MEAS - AO V2 VTI: 63.9 CM
BH CV ECHO MEAS - AVA(I,D): 1.28 CM2
BH CV ECHO MEAS - EDV(CUBED): 117.6 ML
BH CV ECHO MEAS - EDV(MOD-SP2): 171 ML
BH CV ECHO MEAS - EDV(MOD-SP4): 134 ML
BH CV ECHO MEAS - EF(MOD-SP2): 43.3 %
BH CV ECHO MEAS - EF(MOD-SP4): 40.7 %
BH CV ECHO MEAS - ESV(CUBED): 35.9 ML
BH CV ECHO MEAS - ESV(MOD-SP2): 97 ML
BH CV ECHO MEAS - ESV(MOD-SP4): 79.4 ML
BH CV ECHO MEAS - FS: 32.7 %
BH CV ECHO MEAS - IVS/LVPW: 0.85 CM
BH CV ECHO MEAS - IVSD: 1.3 CM
BH CV ECHO MEAS - LA DIMENSION: 3.9 CM
BH CV ECHO MEAS - LAT PEAK E' VEL: 12.4 CM/SEC
BH CV ECHO MEAS - LV MASS(C)D: 226.4 GRAMS
BH CV ECHO MEAS - LV MAX PG: 2.03 MMHG
BH CV ECHO MEAS - LV MEAN PG: 1 MMHG
BH CV ECHO MEAS - LV V1 MAX: 71.3 CM/SEC
BH CV ECHO MEAS - LV V1 VTI: 18.1 CM
BH CV ECHO MEAS - LVIDD: 4.9 CM
BH CV ECHO MEAS - LVIDS: 3.3 CM
BH CV ECHO MEAS - LVOT AREA: 4.5 CM2
BH CV ECHO MEAS - LVOT DIAM: 2.4 CM
BH CV ECHO MEAS - LVPWD: 1.3 CM
BH CV ECHO MEAS - MED PEAK E' VEL: 7.2 CM/SEC
BH CV ECHO MEAS - MV A MAX VEL: 82.8 CM/SEC
BH CV ECHO MEAS - MV DEC SLOPE: 650 CM/SEC2
BH CV ECHO MEAS - MV DEC TIME: 0.16 SEC
BH CV ECHO MEAS - MV E MAX VEL: 103 CM/SEC
BH CV ECHO MEAS - MV E/A: 1.24
BH CV ECHO MEAS - PA V2 MAX: 102.5 CM/SEC
BH CV ECHO MEAS - RAP SYSTOLE: 10 MMHG
BH CV ECHO MEAS - RV MAX PG: 1.68 MMHG
BH CV ECHO MEAS - RV V1 MAX: 64.7 CM/SEC
BH CV ECHO MEAS - RV V1 VTI: 12.2 CM
BH CV ECHO MEAS - RVSP: 53 MMHG
BH CV ECHO MEAS - SV(LVOT): 81.9 ML
BH CV ECHO MEAS - SV(MOD-SP2): 74 ML
BH CV ECHO MEAS - SV(MOD-SP4): 54.6 ML
BH CV ECHO MEAS - TAPSE (>1.6): 1.97 CM
BH CV ECHO MEAS - TR MAX PG: 43.3 MMHG
BH CV ECHO MEAS - TR MAX VEL: 329 CM/SEC
BH CV ECHO MEASUREMENTS AVERAGE E/E' RATIO: 10.51
BH CV XLRA - TDI S': 5.4 CM/SEC
BILIRUB SERPL-MCNC: 0.5 MG/DL (ref 0–1.2)
BILIRUB UR QL STRIP: NEGATIVE
BUN BLDA-MCNC: 16 MG/DL (ref 8–23)
BUN SERPL-MCNC: 16 MG/DL (ref 8–23)
BUN/CREAT SERPL: 9.4 (ref 7–25)
CA-I BLDA-SCNC: 1.14 MMOL/L (ref 1.13–1.32)
CALCIUM SPEC-SCNC: 8.1 MG/DL (ref 8.6–10.5)
CHLORIDE BLDA-SCNC: 103 MMOL/L (ref 98–107)
CHLORIDE SERPL-SCNC: 102 MMOL/L (ref 98–107)
CK MB SERPL-CCNC: 23.32 NG/ML
CK SERPL-CCNC: 213 U/L (ref 20–200)
CLARITY UR: CLEAR
CO2 BLDA-SCNC: 22.4 MMOL/L (ref 23–27)
CO2 SERPL-SCNC: 21.6 MMOL/L (ref 22–29)
COLOR UR: YELLOW
CREAT BLDA-MCNC: 1.62 MG/DL (ref 0.6–1.3)
CREAT SERPL-MCNC: 1.7 MG/DL (ref 0.76–1.27)
D-LACTATE SERPL-SCNC: 1.6 MMOL/L
D-LACTATE SERPL-SCNC: 1.6 MMOL/L (ref 0.5–2)
DEPRECATED RDW RBC AUTO: 49 FL (ref 37–54)
EGFRCR SERPLBLD CKD-EPI 2021: 39.8 ML/MIN/1.73
EOSINOPHIL # BLD AUTO: 0.05 10*3/MM3 (ref 0–0.4)
EOSINOPHIL NFR BLD AUTO: 0.4 % (ref 0.3–6.2)
ERYTHROCYTE [DISTWIDTH] IN BLOOD BY AUTOMATED COUNT: 15.1 % (ref 12.3–15.4)
FLUAV SUBTYP SPEC NAA+PROBE: NOT DETECTED
FLUBV RNA ISLT QL NAA+PROBE: NOT DETECTED
GAS FLOW AIRWAY: 2 LPM
GEN 5 1HR TROPONIN T REFLEX: 249 NG/L
GLOBULIN UR ELPH-MCNC: 2.7 GM/DL
GLUCOSE BLDC GLUCOMTR-MCNC: 144 MG/DL (ref 70–99)
GLUCOSE BLDC GLUCOMTR-MCNC: 164 MG/DL (ref 70–99)
GLUCOSE BLDC GLUCOMTR-MCNC: 180 MG/DL (ref 70–99)
GLUCOSE BLDC GLUCOMTR-MCNC: 185 MG/DL (ref 70–99)
GLUCOSE SERPL-MCNC: 157 MG/DL (ref 65–99)
GLUCOSE UR STRIP-MCNC: NEGATIVE MG/DL
HCO3 BLDA-SCNC: 23.4 MMOL/L (ref 22–26)
HCT VFR BLD AUTO: 31.8 % (ref 37.5–51)
HCT VFR BLD CALC: 31 % (ref 38–51)
HEMODILUTION: NO
HGB BLD-MCNC: 10.1 G/DL (ref 13–17.7)
HGB BLDA-MCNC: 10.4 G/DL (ref 12–18)
HGB UR QL STRIP.AUTO: NEGATIVE
HOLD SPECIMEN: NORMAL
HOLD SPECIMEN: NORMAL
IMM GRANULOCYTES # BLD AUTO: 0.07 10*3/MM3 (ref 0–0.05)
IMM GRANULOCYTES NFR BLD AUTO: 0.6 % (ref 0–0.5)
INHALED O2 CONCENTRATION: 28 %
INR PPP: 1.54 (ref 0.86–1.15)
IVRT: 77 MS
KETONES UR QL STRIP: NEGATIVE
L PNEUMO1 AG UR QL IA: NEGATIVE
LEUKOCYTE ESTERASE UR QL STRIP.AUTO: NEGATIVE
LIPASE SERPL-CCNC: 23 U/L (ref 13–60)
LYMPHOCYTES # BLD AUTO: 1.28 10*3/MM3 (ref 0.7–3.1)
LYMPHOCYTES NFR BLD AUTO: 10.4 % (ref 19.6–45.3)
MAGNESIUM SERPL-MCNC: 1.6 MG/DL (ref 1.6–2.4)
MCH RBC QN AUTO: 27.9 PG (ref 26.6–33)
MCHC RBC AUTO-ENTMCNC: 31.8 G/DL (ref 31.5–35.7)
MCV RBC AUTO: 87.8 FL (ref 79–97)
MODALITY: ABNORMAL
MONOCYTES # BLD AUTO: 1.25 10*3/MM3 (ref 0.1–0.9)
MONOCYTES NFR BLD AUTO: 10.1 % (ref 5–12)
MRSA DNA SPEC QL NAA+PROBE: NORMAL
NEUTROPHILS NFR BLD AUTO: 78.3 % (ref 42.7–76)
NEUTROPHILS NFR BLD AUTO: 9.66 10*3/MM3 (ref 1.7–7)
NITRITE UR QL STRIP: NEGATIVE
NRBC BLD AUTO-RTO: 0 /100 WBC (ref 0–0.2)
NT-PROBNP SERPL-MCNC: 4121 PG/ML (ref 0–1800)
PCO2 BLDA: 32.5 MM HG (ref 35–45)
PH BLDA: 7.46 PH UNITS (ref 7.35–7.45)
PH UR STRIP.AUTO: 5.5 [PH] (ref 5–8)
PLATELET # BLD AUTO: 221 10*3/MM3 (ref 140–450)
PMV BLD AUTO: 10 FL (ref 6–12)
PO2 BLD: 229 MM[HG] (ref 0–500)
PO2 BLDA: 64 MM HG (ref 80–100)
POTASSIUM BLDA-SCNC: 4.4 MMOL/L (ref 3.5–5)
POTASSIUM SERPL-SCNC: 4.2 MMOL/L (ref 3.5–5.2)
PROCALCITONIN SERPL-MCNC: 0.46 NG/ML (ref 0–0.25)
PROT SERPL-MCNC: 6.3 G/DL (ref 6–8.5)
PROT UR QL STRIP: NEGATIVE
PROTHROMBIN TIME: 18.8 SECONDS (ref 11.8–14.9)
QT INTERVAL: 315 MS
QT INTERVAL: 348 MS
QTC INTERVAL: 457 MS
QTC INTERVAL: 459 MS
RBC # BLD AUTO: 3.62 10*6/MM3 (ref 4.14–5.8)
RSV RNA NPH QL NAA+NON-PROBE: NOT DETECTED
S PNEUM AG SPEC QL LA: NEGATIVE
SAO2 % BLDCOA: 93.6 % (ref 95–99)
SARS-COV-2 RNA RESP QL NAA+PROBE: NOT DETECTED
SODIUM BLD-SCNC: 139 MMOL/L (ref 131–143)
SODIUM SERPL-SCNC: 136 MMOL/L (ref 136–145)
SP GR UR STRIP: 1.01 (ref 1–1.03)
TROPONIN T % DELTA: 41
TROPONIN T NUMERIC DELTA: 73 NG/L
TROPONIN T SERPL HS-MCNC: 176 NG/L
UROBILINOGEN UR QL STRIP: NORMAL
WBC NRBC COR # BLD AUTO: 12.34 10*3/MM3 (ref 3.4–10.8)
WHOLE BLOOD HOLD COAG: NORMAL
WHOLE BLOOD HOLD SPECIMEN: NORMAL

## 2025-01-29 PROCEDURE — 25010000002 ACETAMINOPHEN 10 MG/ML SOLUTION: Performed by: EMERGENCY MEDICINE

## 2025-01-29 PROCEDURE — 25010000002 ENOXAPARIN PER 10 MG: Performed by: INTERNAL MEDICINE

## 2025-01-29 PROCEDURE — 25010000002 MORPHINE PER 10 MG: Performed by: EMERGENCY MEDICINE

## 2025-01-29 PROCEDURE — 93306 TTE W/DOPPLER COMPLETE: CPT | Performed by: INTERNAL MEDICINE

## 2025-01-29 PROCEDURE — 87641 MR-STAPH DNA AMP PROBE: CPT | Performed by: STUDENT IN AN ORGANIZED HEALTH CARE EDUCATION/TRAINING PROGRAM

## 2025-01-29 PROCEDURE — 82948 REAGENT STRIP/BLOOD GLUCOSE: CPT

## 2025-01-29 PROCEDURE — 87899 AGENT NOS ASSAY W/OPTIC: CPT | Performed by: STUDENT IN AN ORGANIZED HEALTH CARE EDUCATION/TRAINING PROGRAM

## 2025-01-29 PROCEDURE — 82803 BLOOD GASES ANY COMBINATION: CPT

## 2025-01-29 PROCEDURE — 86738 MYCOPLASMA ANTIBODY: CPT | Performed by: STUDENT IN AN ORGANIZED HEALTH CARE EDUCATION/TRAINING PROGRAM

## 2025-01-29 PROCEDURE — 63710000001 REVEFENACIN 175 MCG/3ML SOLUTION: Performed by: STUDENT IN AN ORGANIZED HEALTH CARE EDUCATION/TRAINING PROGRAM

## 2025-01-29 PROCEDURE — 87040 BLOOD CULTURE FOR BACTERIA: CPT | Performed by: EMERGENCY MEDICINE

## 2025-01-29 PROCEDURE — 94799 UNLISTED PULMONARY SVC/PX: CPT

## 2025-01-29 PROCEDURE — 93010 ELECTROCARDIOGRAM REPORT: CPT | Performed by: INTERNAL MEDICINE

## 2025-01-29 PROCEDURE — 99222 1ST HOSP IP/OBS MODERATE 55: CPT | Performed by: INTERNAL MEDICINE

## 2025-01-29 PROCEDURE — 85025 COMPLETE CBC W/AUTO DIFF WBC: CPT | Performed by: EMERGENCY MEDICINE

## 2025-01-29 PROCEDURE — 82550 ASSAY OF CK (CPK): CPT | Performed by: INTERNAL MEDICINE

## 2025-01-29 PROCEDURE — 99291 CRITICAL CARE FIRST HOUR: CPT

## 2025-01-29 PROCEDURE — 99223 1ST HOSP IP/OBS HIGH 75: CPT | Performed by: STUDENT IN AN ORGANIZED HEALTH CARE EDUCATION/TRAINING PROGRAM

## 2025-01-29 PROCEDURE — 94640 AIRWAY INHALATION TREATMENT: CPT

## 2025-01-29 PROCEDURE — 25010000002 FUROSEMIDE PER 20 MG: Performed by: EMERGENCY MEDICINE

## 2025-01-29 PROCEDURE — 93306 TTE W/DOPPLER COMPLETE: CPT

## 2025-01-29 PROCEDURE — 83880 ASSAY OF NATRIURETIC PEPTIDE: CPT | Performed by: EMERGENCY MEDICINE

## 2025-01-29 PROCEDURE — 81003 URINALYSIS AUTO W/O SCOPE: CPT | Performed by: STUDENT IN AN ORGANIZED HEALTH CARE EDUCATION/TRAINING PROGRAM

## 2025-01-29 PROCEDURE — 36600 WITHDRAWAL OF ARTERIAL BLOOD: CPT

## 2025-01-29 PROCEDURE — 83605 ASSAY OF LACTIC ACID: CPT

## 2025-01-29 PROCEDURE — 85610 PROTHROMBIN TIME: CPT | Performed by: EMERGENCY MEDICINE

## 2025-01-29 PROCEDURE — 25010000002 CEFEPIME PER 500 MG: Performed by: STUDENT IN AN ORGANIZED HEALTH CARE EDUCATION/TRAINING PROGRAM

## 2025-01-29 PROCEDURE — 87449 NOS EACH ORGANISM AG IA: CPT | Performed by: STUDENT IN AN ORGANIZED HEALTH CARE EDUCATION/TRAINING PROGRAM

## 2025-01-29 PROCEDURE — 84145 PROCALCITONIN (PCT): CPT | Performed by: STUDENT IN AN ORGANIZED HEALTH CARE EDUCATION/TRAINING PROGRAM

## 2025-01-29 PROCEDURE — 25810000003 SODIUM CHLORIDE 0.9 % SOLUTION: Performed by: EMERGENCY MEDICINE

## 2025-01-29 PROCEDURE — 83605 ASSAY OF LACTIC ACID: CPT | Performed by: EMERGENCY MEDICINE

## 2025-01-29 PROCEDURE — 80047 BASIC METABLC PNL IONIZED CA: CPT

## 2025-01-29 PROCEDURE — 84484 ASSAY OF TROPONIN QUANT: CPT | Performed by: EMERGENCY MEDICINE

## 2025-01-29 PROCEDURE — 85730 THROMBOPLASTIN TIME PARTIAL: CPT | Performed by: EMERGENCY MEDICINE

## 2025-01-29 PROCEDURE — 93005 ELECTROCARDIOGRAM TRACING: CPT

## 2025-01-29 PROCEDURE — 93005 ELECTROCARDIOGRAM TRACING: CPT | Performed by: EMERGENCY MEDICINE

## 2025-01-29 PROCEDURE — 80053 COMPREHEN METABOLIC PANEL: CPT | Performed by: EMERGENCY MEDICINE

## 2025-01-29 PROCEDURE — 82553 CREATINE MB FRACTION: CPT | Performed by: INTERNAL MEDICINE

## 2025-01-29 PROCEDURE — 25010000002 CEFEPIME PER 500 MG: Performed by: EMERGENCY MEDICINE

## 2025-01-29 PROCEDURE — 25010000002 FUROSEMIDE PER 20 MG: Performed by: INTERNAL MEDICINE

## 2025-01-29 PROCEDURE — 25010000002 VANCOMYCIN 5 G RECONSTITUTED SOLUTION: Performed by: EMERGENCY MEDICINE

## 2025-01-29 PROCEDURE — 36415 COLL VENOUS BLD VENIPUNCTURE: CPT

## 2025-01-29 PROCEDURE — 83735 ASSAY OF MAGNESIUM: CPT | Performed by: EMERGENCY MEDICINE

## 2025-01-29 PROCEDURE — 87637 SARSCOV2&INF A&B&RSV AMP PRB: CPT | Performed by: EMERGENCY MEDICINE

## 2025-01-29 PROCEDURE — 83690 ASSAY OF LIPASE: CPT | Performed by: EMERGENCY MEDICINE

## 2025-01-29 PROCEDURE — 71045 X-RAY EXAM CHEST 1 VIEW: CPT

## 2025-01-29 RX ORDER — FUROSEMIDE 10 MG/ML
40 INJECTION INTRAMUSCULAR; INTRAVENOUS ONCE
Status: COMPLETED | OUTPATIENT
Start: 2025-01-29 | End: 2025-01-29

## 2025-01-29 RX ORDER — SODIUM CHLORIDE 9 MG/ML
40 INJECTION, SOLUTION INTRAVENOUS AS NEEDED
Status: DISCONTINUED | OUTPATIENT
Start: 2025-01-29 | End: 2025-02-03 | Stop reason: HOSPADM

## 2025-01-29 RX ORDER — FUROSEMIDE 10 MG/ML
20 INJECTION INTRAMUSCULAR; INTRAVENOUS
Status: DISCONTINUED | OUTPATIENT
Start: 2025-01-29 | End: 2025-01-29

## 2025-01-29 RX ORDER — NICOTINE POLACRILEX 4 MG
15 LOZENGE BUCCAL
Status: DISCONTINUED | OUTPATIENT
Start: 2025-01-29 | End: 2025-01-29

## 2025-01-29 RX ORDER — POLYETHYLENE GLYCOL 3350 17 G/17G
17 POWDER, FOR SOLUTION ORAL DAILY PRN
Status: DISCONTINUED | OUTPATIENT
Start: 2025-01-29 | End: 2025-02-03 | Stop reason: HOSPADM

## 2025-01-29 RX ORDER — SODIUM CHLORIDE 0.9 % (FLUSH) 0.9 %
10 SYRINGE (ML) INJECTION AS NEEDED
Status: DISCONTINUED | OUTPATIENT
Start: 2025-01-29 | End: 2025-02-03 | Stop reason: HOSPADM

## 2025-01-29 RX ORDER — IBUPROFEN 600 MG/1
1 TABLET ORAL
Status: DISCONTINUED | OUTPATIENT
Start: 2025-01-29 | End: 2025-01-29

## 2025-01-29 RX ORDER — BISACODYL 10 MG
10 SUPPOSITORY, RECTAL RECTAL DAILY PRN
Status: DISCONTINUED | OUTPATIENT
Start: 2025-01-29 | End: 2025-02-03 | Stop reason: HOSPADM

## 2025-01-29 RX ORDER — FUROSEMIDE 10 MG/ML
40 INJECTION INTRAMUSCULAR; INTRAVENOUS
Status: DISCONTINUED | OUTPATIENT
Start: 2025-01-29 | End: 2025-01-30

## 2025-01-29 RX ORDER — ALBUTEROL SULFATE 0.83 MG/ML
2.5 SOLUTION RESPIRATORY (INHALATION) EVERY 6 HOURS PRN
Status: DISCONTINUED | OUTPATIENT
Start: 2025-01-29 | End: 2025-02-03 | Stop reason: HOSPADM

## 2025-01-29 RX ORDER — DEXTROSE MONOHYDRATE 25 G/50ML
25 INJECTION, SOLUTION INTRAVENOUS
Status: DISCONTINUED | OUTPATIENT
Start: 2025-01-29 | End: 2025-01-29

## 2025-01-29 RX ORDER — VANCOMYCIN 2 GRAM/500 ML IN 0.9 % SODIUM CHLORIDE INTRAVENOUS
20 ONCE
Status: COMPLETED | OUTPATIENT
Start: 2025-01-29 | End: 2025-01-29

## 2025-01-29 RX ORDER — NITROGLYCERIN 0.4 MG/1
0.4 TABLET SUBLINGUAL
Status: DISCONTINUED | OUTPATIENT
Start: 2025-01-29 | End: 2025-02-03 | Stop reason: HOSPADM

## 2025-01-29 RX ORDER — BUDESONIDE 0.5 MG/2ML
0.5 INHALANT ORAL
Status: DISCONTINUED | OUTPATIENT
Start: 2025-01-29 | End: 2025-01-30

## 2025-01-29 RX ORDER — METOPROLOL SUCCINATE 25 MG/1
25 TABLET, EXTENDED RELEASE ORAL EVERY 12 HOURS
Status: DISCONTINUED | OUTPATIENT
Start: 2025-01-29 | End: 2025-02-03 | Stop reason: HOSPADM

## 2025-01-29 RX ORDER — ACETAMINOPHEN 10 MG/ML
1000 INJECTION, SOLUTION INTRAVENOUS ONCE
Status: COMPLETED | OUTPATIENT
Start: 2025-01-29 | End: 2025-01-29

## 2025-01-29 RX ORDER — NICOTINE POLACRILEX 4 MG
15 LOZENGE BUCCAL
Status: DISCONTINUED | OUTPATIENT
Start: 2025-01-29 | End: 2025-02-03 | Stop reason: HOSPADM

## 2025-01-29 RX ORDER — INSULIN LISPRO 100 [IU]/ML
2-7 INJECTION, SOLUTION INTRAVENOUS; SUBCUTANEOUS
Status: DISCONTINUED | OUTPATIENT
Start: 2025-01-29 | End: 2025-01-31

## 2025-01-29 RX ORDER — ARFORMOTEROL TARTRATE 15 UG/2ML
15 SOLUTION RESPIRATORY (INHALATION)
Status: DISCONTINUED | OUTPATIENT
Start: 2025-01-29 | End: 2025-01-30

## 2025-01-29 RX ORDER — ASPIRIN 81 MG/1
324 TABLET, CHEWABLE ORAL ONCE
Status: DISCONTINUED | OUTPATIENT
Start: 2025-01-29 | End: 2025-01-29

## 2025-01-29 RX ORDER — DEXTROSE MONOHYDRATE 25 G/50ML
25 INJECTION, SOLUTION INTRAVENOUS
Status: DISCONTINUED | OUTPATIENT
Start: 2025-01-29 | End: 2025-02-03 | Stop reason: HOSPADM

## 2025-01-29 RX ORDER — AMOXICILLIN 250 MG
2 CAPSULE ORAL 2 TIMES DAILY PRN
Status: DISCONTINUED | OUTPATIENT
Start: 2025-01-29 | End: 2025-02-03 | Stop reason: HOSPADM

## 2025-01-29 RX ORDER — SUCRALFATE 1 G/1
1 TABLET ORAL
COMMUNITY
End: 2025-02-10

## 2025-01-29 RX ORDER — IPRATROPIUM BROMIDE AND ALBUTEROL SULFATE 2.5; .5 MG/3ML; MG/3ML
3 SOLUTION RESPIRATORY (INHALATION) ONCE
Status: COMPLETED | OUTPATIENT
Start: 2025-01-29 | End: 2025-01-29

## 2025-01-29 RX ORDER — ISOSORBIDE MONONITRATE 30 MG/1
15 TABLET, EXTENDED RELEASE ORAL DAILY
Status: DISCONTINUED | OUTPATIENT
Start: 2025-01-29 | End: 2025-02-03 | Stop reason: HOSPADM

## 2025-01-29 RX ORDER — ENOXAPARIN SODIUM 100 MG/ML
1 INJECTION SUBCUTANEOUS EVERY 12 HOURS
Status: DISCONTINUED | OUTPATIENT
Start: 2025-01-29 | End: 2025-01-31

## 2025-01-29 RX ORDER — IBUPROFEN 600 MG/1
1 TABLET ORAL
Status: DISCONTINUED | OUTPATIENT
Start: 2025-01-29 | End: 2025-02-03 | Stop reason: HOSPADM

## 2025-01-29 RX ORDER — SODIUM CHLORIDE 0.9 % (FLUSH) 0.9 %
10 SYRINGE (ML) INJECTION EVERY 12 HOURS SCHEDULED
Status: DISCONTINUED | OUTPATIENT
Start: 2025-01-29 | End: 2025-02-03 | Stop reason: HOSPADM

## 2025-01-29 RX ORDER — BISACODYL 5 MG/1
5 TABLET, DELAYED RELEASE ORAL DAILY PRN
Status: DISCONTINUED | OUTPATIENT
Start: 2025-01-29 | End: 2025-02-03 | Stop reason: HOSPADM

## 2025-01-29 RX ADMIN — CEFEPIME 2000 MG: 2 INJECTION, POWDER, FOR SOLUTION INTRAVENOUS at 04:17

## 2025-01-29 RX ADMIN — FUROSEMIDE 40 MG: 10 INJECTION, SOLUTION INTRAMUSCULAR; INTRAVENOUS at 17:12

## 2025-01-29 RX ADMIN — BUDESONIDE 0.5 MG: 0.5 SUSPENSION RESPIRATORY (INHALATION) at 09:34

## 2025-01-29 RX ADMIN — REVEFENACIN 175 MCG: 175 SOLUTION RESPIRATORY (INHALATION) at 09:34

## 2025-01-29 RX ADMIN — METOPROLOL SUCCINATE 25 MG: 25 TABLET, EXTENDED RELEASE ORAL at 09:26

## 2025-01-29 RX ADMIN — ARFORMOTEROL TARTRATE 15 MCG: 15 SOLUTION RESPIRATORY (INHALATION) at 09:34

## 2025-01-29 RX ADMIN — CEFEPIME 2000 MG: 2 INJECTION, POWDER, FOR SOLUTION INTRAVENOUS at 17:12

## 2025-01-29 RX ADMIN — MORPHINE SULFATE 4 MG: 4 INJECTION, SOLUTION INTRAMUSCULAR; INTRAVENOUS at 05:23

## 2025-01-29 RX ADMIN — ARFORMOTEROL TARTRATE 15 MCG: 15 SOLUTION RESPIRATORY (INHALATION) at 18:52

## 2025-01-29 RX ADMIN — SACUBITRIL AND VALSARTAN 1 TABLET: 24; 26 TABLET, FILM COATED ORAL at 20:45

## 2025-01-29 RX ADMIN — BUDESONIDE 0.5 MG: 0.5 SUSPENSION RESPIRATORY (INHALATION) at 18:51

## 2025-01-29 RX ADMIN — Medication 10 ML: at 20:45

## 2025-01-29 RX ADMIN — ENOXAPARIN SODIUM 110 MG: 100 INJECTION SUBCUTANEOUS at 22:55

## 2025-01-29 RX ADMIN — IPRATROPIUM BROMIDE AND ALBUTEROL SULFATE 3 ML: .5; 3 SOLUTION RESPIRATORY (INHALATION) at 04:07

## 2025-01-29 RX ADMIN — ENOXAPARIN SODIUM 110 MG: 100 INJECTION SUBCUTANEOUS at 11:30

## 2025-01-29 RX ADMIN — ISOSORBIDE MONONITRATE 15 MG: 30 TABLET, EXTENDED RELEASE ORAL at 11:30

## 2025-01-29 RX ADMIN — METOPROLOL SUCCINATE 25 MG: 25 TABLET, EXTENDED RELEASE ORAL at 20:45

## 2025-01-29 RX ADMIN — ACETAMINOPHEN 1000 MG: 10 INJECTION, SOLUTION INTRAVENOUS at 04:17

## 2025-01-29 RX ADMIN — VANCOMYCIN HYDROCHLORIDE 2000 MG: 5 INJECTION, POWDER, LYOPHILIZED, FOR SOLUTION INTRAVENOUS at 04:18

## 2025-01-29 RX ADMIN — FUROSEMIDE 40 MG: 10 INJECTION, SOLUTION INTRAMUSCULAR; INTRAVENOUS at 04:18

## 2025-01-29 NOTE — ED PROVIDER NOTES
Time: 4:12 AM EST  Date of encounter:  1/29/2025  Independent Historian/Clinical History and Information was obtained by:   Patient    History is limited by: N/A    Chief Complaint: shortness of breath      History of Present Illness:  Patient is a 84 y.o. year old male who presents to the emergency department for evaluation of Shortness of breath.  He also reports some left-sided chest pain.  The symptoms started around 8 PM last night.  At baseline he wears 2 L nasal cannula.  He does have a history of COPD as well as CHF.  He did receive Solu-Medrol, aspirin and DuoNeb en route.  He has noticed worsening lower extremity edema.  Patient is currently on Augmentin and doxycycline for pneumonia that was seen on x-ray on 1/27/2025.  He does report a nonproductive cough.      Patient Care Team  Primary Care Provider: Tevin Zavala MD    Past Medical History:     Allergies   Allergen Reactions    Carvedilol Swelling and Anaphylaxis    Levaquin [Levofloxacin] Unknown - Low Severity     Past Medical History:   Diagnosis Date    Acute on chronic diastolic CHF (congestive heart failure) 6/6/2022    Arthritis     Atrial fibrillation, persistent 5/3/2022    Persistent atrial fibrillation status post extensive ablation and pulmonary vein isolation by Dr. Aguilar on 6/3/2022, with reoccurring rapid A. fib, and then successful cardioversion on 7/1/2022    BPH (benign prostatic hyperplasia)     CHF (congestive heart failure)     COPD (chronic obstructive pulmonary disease)     Coronary artery disease of bypass graft of native heart with stable angina pectoris     (1) Coronary artery disease, s/p CABG in the past. Cardiac catheterization in July 2016 showed patentLIMA graft to the LAD and occluded SVGs. Native LAD is 100% occluded in the mid portion. Native LCx has no significant disease. Native RCA is 100% occluded and it is a . Repeat cath in June, 2021, underwent stent placement to diagnonal branch.    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 Lauren Valencia      Past Surgical History:   Procedure Laterality Date    BLADDER SURGERY      CARDIAC CATHETERIZATION  07/2016    CARDIAC CATHETERIZATION N/A 8/9/2021    Procedure: Left Heart Cath with possible angioplasty;  Surgeon: Jack Ladd MD;  Location: [facility] CATH INVASIVE LOCATION;  Service: Cardiovascular;  Laterality: N/A;  Please schedule the procedure with Dr. Jack Ladd MD on 8/9/2021    CARDIAC ELECTROPHYSIOLOGY PROCEDURE N/A 6/3/2022    Procedure: Ablation atrial fibrillation;  Surgeon: Irineo Aguilar MD;  Location:  [facility] CATH INVASIVE LOCATION;  Service: Cardiovascular;  Laterality: N/A;    CARDIAC ELECTROPHYSIOLOGY PROCEDURE N/A 6/3/2022    Procedure: Ablation atrial flutter/CTI;  Surgeon: Irineo Aguilar MD;  Location:  [facility] CATH INVASIVE LOCATION;  Service: Cardiovascular;  Laterality: N/A;    CARDIAC SURGERY      HEART BYPASS    COLONOSCOPY  2011    CORONARY ARTERY BYPASS GRAFT      CYSTOSCOPY  03/19/2019    WITH BILATERAL RETROGRADE PYELOGRAPHY    ELECTRICAL CARDIOVERSION  5/12/2022         ENDOSCOPY  2016    PROSTATE SURGERY       Family History   Problem Relation Age of Onset    Hypertension Mother     Heart disease Father     Other Brother         GASTROINTESTINAL CANCER    Kidney cancer Brother        Home Medications:  Prior to Admission medications    Medication Sig Start Date End Date Taking? Authorizing Provider   acetaminophen (TYLENOL) 325 MG tablet Take 2 tablets by mouth  Every 6 (Six) Hours As Needed for Mild Pain.    ProviderSammy MD   allopurinol 200 MG tablet Take 200 mg by mouth Daily. 6/5/24   Noble Kc MD   amiodarone (PACERONE) 200 MG tablet Take 1 tablet by mouth every 12 (twelve) hours for 6 days, then  1 tablet daily for 24 days.  Patient taking differently: Take 0.5 tablets by mouth Daily. 9/30/24   Darnell Stevenson MD   amoxicillin-clavulanate (AUGMENTIN) 875-125 MG per tablet Take 1 tablet by mouth 2 (Two) Times a Day for 5 days. 1/27/25 2/1/25  Tevin Zavala MD   aspirin (aspirin) 81 MG EC tablet Take 1 tablet by mouth Daily. 8/15/22   Darnell Stevenson MD   calcium carbonate (OS-LIANNA) 600 MG tablet Take 1 tablet by mouth Every Night.    Sammy Gomez MD   cetirizine (zyrTEC) 10 MG tablet Take 1 tablet by mouth Daily. 12/2/22   Lauren Ferris APRN   Coenzyme Q10 100 MG tablet Take 1 tablet by mouth Daily.    ProviderSammy MD   Diclofenac Sodium (VOLTAREN) 1 % gel gel Apply 2 g topically to the appropriate area as directed 4 (Four) Times a Day. 9/29/22   Ever Flores MD   doxycycline (VIBRAMYCIN) 100 MG capsule Take 1 capsule by mouth 2 (Two) Times a Day for 5 days. 1/27/25 2/1/25  Tevin Zavala MD   ferrous sulfate 325 (65 FE) MG tablet Take 1 tablet by mouth Daily With Breakfast. 1/28/25   Tevin Zavala MD   finasteride (PROSCAR) 5 MG tablet Take 1 tablet by mouth Daily. 10/29/24   Ivy Peng APRN   fluticasone (FLONASE) 50 MCG/ACT nasal spray Administer 1 spray into the nostril(s) as directed by provider Every Night. 12/28/21 1/27/25  Sammy Gomez MD   Fluticasone-Umeclidin-Vilant (Trelegy Ellipta) 100-62.5-25 MCG/ACT inhaler Inhale 1 puff Daily. 9/24/24   Kolton Brink MD   ipratropium-albuterol (DUO-NEB) 0.5-2.5 mg/3 ml nebulizer Take 3 mL by nebulization 4 (Four) Times a Day As Needed for Wheezing. 3/18/24   Kolton Brink MD   isosorbide mononitrate (IMDUR) 30 MG 24 hr tablet Take 1 tablet by mouth  daily.  Patient taking differently: Take 1 tablet by mouth Daily. 1/2 tablet 7/8/24   Mely Smith APRN   Livalo 1 MG tablet tablet Take 1 tablet by mouth every night. 6/10/24   Mely Smith APRN   metoprolol succinate XL (TOPROL-XL) 25 MG 24 hr tablet Take 1 tablet by mouth Every 12 (Twelve) Hours.  Patient taking differently: Take 1 tablet by mouth Daily. 8/29/24   Mely Smith APRN   montelukast (SINGULAIR) 10 MG tablet Take 1 tablet by mouth Every Night. 9/24/24   Kolton Brink MD   nitroglycerin (NITROSTAT) 0.4 MG SL tablet Place 1 tablet under the tongue Every 5 (Five) Minutes As Needed for Chest Pain for up to 30 days. Take no more than 3 doses in 15 minutes. 12/26/24 1/27/25  Tevin Zavala MD   pantoprazole (PROTONIX) 40 MG EC tablet Take 1 tablet by mouth Daily. 9/16/24   Darnell Stevenson MD   rivaroxaban (Xarelto) 15 MG tablet Take 1 tablet by mouth daily with dinner. 2/28/24   Darnell Stevenson MD   sacubitril-valsartan (Entresto) 24-26 MG tablet Take 1 tablet by mouth 2 (Two) Times a Day. 11/27/24   Jessica Chery APRN   Semaglutide-Weight Management (Wegovy) 1 MG/0.5ML solution auto-injector Inject 0.5 mL under the skin into the appropriate area as directed 1 (One) Time Per Week. 1/3/25   Tevin Zavala MD   tamsulosin (FLOMAX) 0.4 MG capsule 24 hr capsule Take 1 capsule by mouth Daily. 10/29/24   Ivy Pneg APRN   VITAMIN B COMPLEX-C PO Take 1 tablet by mouth Daily.    Provider, MD Sammy   vitamin C (ASCORBIC ACID) 250 MG tablet Take 1 tablet by mouth Daily.    ProviderSammy MD   dilTIAZem CD (CARDIZEM CD) 180 MG 24 hr capsule Take 1 capsule by mouth Daily for 30 days. 6/11/22 7/1/22  Ever Flores MD        Social History:   Social History     Tobacco Use    Smoking status: Former     Current packs/day: 0.00     Average packs/day: 0.5 packs/day for 40.0 years (20.0 ttl pk-yrs)     Types: Cigarettes     Start date: 1958     Quit date: 1998     Years  "since quittin.0     Passive exposure: Past    Smokeless tobacco: Never   Vaping Use    Vaping status: Never Used   Substance Use Topics    Alcohol use: Not Currently    Drug use: Never         Review of Systems:  Review of Systems   Constitutional:  Negative for chills and fever.   HENT:  Negative for congestion, ear pain and sore throat.    Eyes:  Negative for pain.   Respiratory:  Positive for cough and shortness of breath. Negative for chest tightness.    Cardiovascular:  Positive for chest pain and leg swelling.   Gastrointestinal:  Negative for abdominal pain, diarrhea, nausea and vomiting.   Genitourinary:  Negative for flank pain and hematuria.   Musculoskeletal:  Negative for joint swelling.   Skin:  Negative for pallor.   Neurological:  Negative for seizures and headaches.   All other systems reviewed and are negative.       Physical Exam:  /70   Pulse 113   Temp 99.2 °F (37.3 °C) (Oral)   Resp 20   Ht 177.8 cm (70\")   Wt 105 kg (231 lb 7.7 oz)   SpO2 94%   BMI 33.21 kg/m²     Physical Exam  Constitutional:       Appearance: Normal appearance. He is ill-appearing.   HENT:      Head: Normocephalic and atraumatic.      Nose: Nose normal.      Mouth/Throat:      Mouth: Mucous membranes are moist.   Eyes:      Extraocular Movements: Extraocular movements intact.      Conjunctiva/sclera: Conjunctivae normal.      Pupils: Pupils are equal, round, and reactive to light.   Cardiovascular:      Rate and Rhythm: Tachycardia present. Rhythm irregular.      Pulses: Normal pulses.      Heart sounds: Normal heart sounds.   Pulmonary:      Effort: Pulmonary effort is normal.      Breath sounds: Wheezing and rhonchi present.   Abdominal:      General: There is no distension.      Palpations: Abdomen is soft.      Tenderness: There is no abdominal tenderness.   Musculoskeletal:         General: Normal range of motion.      Cervical back: Normal range of motion.   Skin:     General: Skin is warm and dry.    "   Capillary Refill: Capillary refill takes less than 2 seconds.   Neurological:      General: No focal deficit present.      Mental Status: He is alert and oriented to person, place, and time. Mental status is at baseline.   Psychiatric:         Mood and Affect: Mood normal.         Behavior: Behavior normal.                    Medical Decision Making:      Comorbidities that affect care:    Atrial Fibrillation, Chronic Kidney Disease, Congestive Heart Failure, COPD, Coronary Artery Disease    External Notes reviewed:    Previous Radiological Studies: Chest x-ray on 1/27/2025 showed hazy left basilar has been opacity concerning for pneumonia      The following orders were placed and all results were independently analyzed by me:  Orders Placed This Encounter   Procedures    COVID PRE-OP / PRE-PROCEDURE SCREENING ORDER (NO ISOLATION) - Swab, Nasopharynx    COVID-19, FLU A/B, RSV PCR 1 HR TAT - Swab, Nasopharynx    Blood Culture - Blood,    Blood Culture - Blood,    XR Chest 1 View    [place] Draw    High Sensitivity Troponin T    Comprehensive Metabolic Panel    Lipase    BNP    Magnesium    CBC Auto Differential    Lactic Acid, Plasma    Protime-INR    aPTT    Arterial Blood Gas,H&H,Lytes,Lactate    High Sensitivity Troponin T 1Hr    Blood Gas, Arterial -    NPO Diet NPO Type: Strict NPO    Undress & Gown    Continuous Pulse Oximetry    Inpatient Hospitalist Consult    Oxygen Therapy- Nasal Cannula; Titrate 1-6 LPM Per SpO2; 90 - 95%    POC Chem Panel    POC Lactate    ECG 12 Lead ED Triage Standing Order; Chest Pain    ECG 12 Lead ED Triage Standing Order; Chest Pain    ECG 12 Lead Rhythm Change    Insert Peripheral IV    CBC & Differential    Green Top (Gel)    Lavender Top    Gold Top - SST    Light Blue Top       Medications Given in the Emergency Department:  Medications   sodium chloride 0.9 % flush 10 mL (has no administration in time range)   aspirin chewable tablet 324 mg (has no administration in time  range)   vancomycin IVPB 2000 mg in 0.9% Sodium Chloride 500 mL (2,000 mg Intravenous New Bag 1/29/25 0418)   morphine injection 4 mg (has no administration in time range)   cefepime 2000 mg IVPB in 100 mL NS (VTB) (2,000 mg Intravenous New Bag 1/29/25 0417)   ipratropium-albuterol (DUO-NEB) nebulizer solution 3 mL (3 mL Nebulization Given 1/29/25 0407)   furosemide (LASIX) injection 40 mg (40 mg Intravenous Given 1/29/25 0418)   acetaminophen (OFIRMEV) injection 1,000 mg (1,000 mg Intravenous Given 1/29/25 0417)        ED Course:    ED Course as of 01/29/25 0504 Wed Jan 29, 2025 0417 ECG 12 Lead ED Triage Standing Order; Chest Pain  Sinus tachycardia with PVCs present diffuse ST depression.  Normal [place] and QTc EKG interpreted by me [LD]      ED Course User Index  [LD] Nan Disla MD       Labs:    Lab Results (last 24 hours)       Procedure Component Value Units Date/Time    High Sensitivity Troponin T [[id number]]  (Abnormal) Collected: 01/29/25 0325    Specimen: Blood Updated: 01/29/25 0408     HS Troponin T 176 ng/L     Narrative:      High Sensitive Troponin T Reference Range:  <14.0 ng/L- Negative Female for AMI  <22.0 ng/L- Negative Male for AMI  >=14 - Abnormal Female indicating possible myocardial injury.  >=22 - Abnormal Male indicating possible myocardial injury.   Clinicians would have to utilize clinical acumen, EKG, Troponin, and serial changes to determine if it is an Acute Myocardial Infarction or myocardial injury due to an underlying chronic condition.         CBC & Differential [[id number]]  (Abnormal) Collected: 01/29/25 0325    Specimen: Blood Updated: 01/29/25 0335    Narrative:      The following orders were created for panel order CBC & Differential.  Procedure                               Abnormality         Status                     ---------                               -----------         ------                     CBC Auto Differential[[id number]]        Abnormal             Final result                 Please view results for these tests on the individual orders.    Comprehensive Metabolic Panel [[id number]]  (Abnormal) Collected: 01/29/25 0325    Specimen: Blood Updated: 01/29/25 0353     Glucose 157 mg/dL      BUN 16 mg/dL      Creatinine 1.70 mg/dL      Sodium 136 mmol/L      Potassium 4.2 mmol/L      Chloride 102 mmol/L      CO2 21.6 mmol/L      Calcium 8.1 mg/dL      Total Protein 6.3 g/dL      Albumin 3.6 g/dL      ALT (SGPT) 8 U/L      AST (SGOT) 17 U/L      Alkaline Phosphatase 54 U/L      Total Bilirubin 0.5 mg/dL      Globulin 2.7 gm/dL      A/G Ratio 1.3 g/dL      BUN/Creatinine Ratio 9.4     Anion Gap 12.4 mmol/L      eGFR 39.8 mL/min/1.73     Narrative:      GFR Categories in Chronic Kidney Disease (CKD)      GFR Category          GFR (mL/min/1.73)    Interpretation  G1                     90 or greater         Normal or high (1)  G2                      60-89                Mild decrease (1)  G3a                   45-59                Mild to moderate decrease  G3b                   30-44                Moderate to severe decrease  G4                    15-29                Severe decrease  G5                    14 or less           Kidney failure          (1)In the absence of evidence of kidney disease, neither GFR category G1 or G2 fulfill the criteria for CKD.    eGFR calculation 2021 CKD-EPI creatinine equation, which does not include race as a factor    Lipase [[id number]]  (Normal) Collected: 01/29/25 0325    Specimen: Blood Updated: 01/29/25 0353     Lipase 23 U/L     BNP [[id number]]  (Abnormal) Collected: 01/29/25 0325    Specimen: Blood Updated: 01/29/25 0351     proBNP 4,121.0 pg/mL     Narrative:      This assay is used as an aid in the diagnosis of individuals suspected of having heart failure. It can be used as an aid in the diagnosis of acute decompensated heart failure (ADHF) in patients presenting with signs and symptoms of ADHF to the emergency department  (ED). In addition, NT-proBNP of <300 pg/mL indicates ADHF is not likely.    Age Range Result Interpretation  NT-proBNP Concentration (pg/mL:      <50             Positive            >450                   Gray                 300-450                    Negative             <300    50-75           Positive            >900                  Gray                300-900                  Negative            <300      >75             Positive            >1800                  Gray                300-1800                  Negative            <300    Magnesium [[id number]]  (Normal) Collected: 01/29/25 0325    Specimen: Blood Updated: 01/29/25 0353     Magnesium 1.6 mg/dL     CBC Auto Differential [[id number]]  (Abnormal) Collected: 01/29/25 0325    Specimen: Blood Updated: 01/29/25 0335     WBC 12.34 10*3/mm3      RBC 3.62 10*6/mm3      Hemoglobin 10.1 g/dL      Hematocrit 31.8 %      MCV 87.8 fL      MCH 27.9 pg      MCHC 31.8 g/dL      RDW 15.1 %      RDW-SD 49.0 fl      MPV 10.0 fL      Platelets 221 10*3/mm3      Neutrophil % 78.3 %      Lymphocyte % 10.4 %      Monocyte % 10.1 %      Eosinophil % 0.4 %      Basophil % 0.2 %      Immature Grans % 0.6 %      Neutrophils, Absolute 9.66 10*3/mm3      Lymphocytes, Absolute 1.28 10*3/mm3      Monocytes, Absolute 1.25 10*3/mm3      Eosinophils, Absolute 0.05 10*3/mm3      Basophils, Absolute 0.03 10*3/mm3      Immature Grans, Absolute 0.07 10*3/mm3      nRBC 0.0 /100 WBC     Lactic Acid, Plasma [[id number]]  (Normal) Collected: 01/29/25 0325    Specimen: Blood Updated: 01/29/25 0350     Lactate 1.6 mmol/L     Protime-INR [[id number]]  (Abnormal) Collected: 01/29/25 0325    Specimen: Blood Updated: 01/29/25 0412     Protime 18.8 Seconds      INR 1.54    Narrative:      Suggested Therapeutic Ranges For Oral Anticoagulant Therapy:  Level of Therapy                      INR Target Range  Standard Dose                            2.0-3.0  High Dose                                 2.5-3.5  Patients not receiving anticoagulant  Therapy Normal Range                     0.86-1.15    aPTT [[id number]]  (Abnormal) Collected: 01/29/25 0325    Specimen: Blood Updated: 01/29/25 0412     PTT 42.6 seconds     COVID PRE-OP / PRE-PROCEDURE SCREENING ORDER (NO ISOLATION) - Swab, Nasopharynx [[id number]]  (Normal) Collected: 01/29/25 0327    Specimen: Swab from Nasopharynx Updated: 01/29/25 0412    Narrative:      The following orders were created for panel order COVID PRE-OP / PRE-PROCEDURE SCREENING ORDER (NO ISOLATION) - Swab, Nasopharynx.  Procedure                               Abnormality         Status                     ---------                               -----------         ------                     COVID-19, FLU A/B, RSV P...[[id number]]  Normal              Final result                 Please view results for these tests on the individual orders.    COVID-19, FLU A/B, RSV PCR 1 HR TAT - Swab, Nasopharynx [[id number]]  (Normal) Collected: 01/29/25 0327    Specimen: Swab from Nasopharynx Updated: 01/29/25 0412     COVID19 Not Detected     Influenza A PCR Not Detected     Influenza B PCR Not Detected     RSV, PCR Not Detected    Narrative:      Fact sheet for providers: https://www.fda.gov/media/832178/download    Fact sheet for patients: https://www.fda.gov/media/052375/download    Test performed by PCR.    Blood Culture - Blood, Arm, Left [[id number]] Collected: 01/29/25 0407    Specimen: Blood from Arm, Left Updated: 01/29/25 0412    Blood Culture - Blood, Arm, Right [[id number]] Collected: 01/29/25 0407    Specimen: Blood from Arm, Right Updated: 01/29/25 0411    POC Chem Panel [[id number]]  (Abnormal) Collected: 01/29/25 0410    Specimen: Arterial Blood Updated: 01/29/25 0415     Glucose 180 mg/dL      Comment: Serial Number: [id number]:  [id number]        Sodium 139 mmol/L      POC Potassium 4.4 mmol/L      Ionized Calcium 1.14 mmol/L      Chloride 103 mmol/L      Creatinine 1.62 mg/dL      BUN  16 mg/dL      CO2 Content 22.4 mmol/L     Blood Gas, Arterial - [[id number]]  (Abnormal) Collected: 01/29/25 0410    Specimen: Arterial Blood Updated: 01/29/25 0415     Site Left Brachial     Irineo's Test N/A     pH, Arterial 7.465 pH units      pCO2, Arterial 32.5 mm Hg      pO2, Arterial 64.0 mm Hg      HCO3, Arterial 23.4 mmol/L      Base Excess, Arterial 0.0 mmol/L      Comment: Serial Number: [id number]:  [id number]        O2 Saturation, Arterial 93.6 %      Hemoglobin, Blood Gas 10.4 g/dL      Hematocrit, Blood Gas 31.0 %      Barometric Pressure for Blood Gas 738.9000 mmHg      Modality Cannula     FIO2 28 %      Flow Rate 2.0000 lpm      Hemodilution No     PO2/FIO2 229    POC Lactate [[id number]]  (Normal) Collected: 01/29/25 0410    Specimen: Arterial Blood Updated: 01/29/25 0415     Lactate 1.6 mmol/L      Comment: Serial Number: [id number]:  [id number]                Imaging:    XR Chest 1 View    Result Date: 1/29/2025  XR CHEST 1 VW-  Date of exam: 1/29/2025, 3:20 A.M.  Indication: Chest pain.  Comparison: 1/27/2025.  FINDINGS: A single AP (or PA) upright portable view of the chest is provided for review. Bilateral infiltrates are seen, especially in the lung bases. The findings may represent infectious multifocal pneumonia. Pulmonary edema cannot be excluded. There may be mild cardiomegaly. No pneumothorax. No pneumomediastinum. External artifacts obscure detail. The patient has undergone median sternotomy and suspected CABG (coronary artery bypass graft) surgery. The thoracic aorta is atherosclerotic. Chronic calcified granulomatous disease involves the chest. There are degenerative changes of the shoulders and spine. Tiny bilateral pleural effusions are possible.       New or increased bilateral infiltrates are seen. The findings may represent infectious multifocal pneumonia. Pulmonary edema cannot be excluded.    Portions of this note were completed with a voice recognition program.   Electronically  Signed By-Layo Peng MD On:1/29/2025 3:32 AM         Differential Diagnosis and Discussion:    Chest Pain:  Based on the patient's signs and symptoms, I considered aortic dissection, myocardial infaction, pulmonary embolism, cardiac tamponade, pericarditis, pneumothorax, musculoskeletal chest pain and other differential diagnosis as an etiology of the patient's chest pain.   Dyspnea: Differential diagnosis includes but is not limited to metabolic acidosis, neurological disorders, psychogenic, asthma, pneumothorax, upper airway obstruction, COPD, pneumonia, noncardiogenic pulmonary edema, interstitial lung disease, anemia, congestive heart failure, and pulmonary embolism    PROCEDURES:    Labs were collected in the emergency department and all labs were reviewed and interpreted by me.  X-ray were performed in the emergency department and all X-ray impressions were independently interpreted by me.  An EKG was performed and the EKG was interpreted by me.    ECG 12 Lead ED Triage Standing Order; Chest Pain   Preliminary Result   HEART ZTWG=615  bpm   RR Htrvbqiy=169  ms   [place] Ddqohvld=771  ms   P Horizontal Axis=-73  deg   P Front Axis=-55  deg   QRSD Jftgnrsk=075  ms   QT Zsfwaoro=692  ms   TGoR=120  ms   QRS Axis=88  deg   T Wave Axis=242  deg   - ABNORMAL ECG -   Sinus or ectopic atrial tachycardia   Ventricular bigeminy   Probable left atrial enlargement   Borderline right axis deviation   Abnormal lateral Q waves   Repol abnrm suggests ischemia, diffuse leads   Date and Time of Study:2025-01-29 03:10:10      ECG 12 Lead Rhythm Change    (Results Pending)       Procedures    MDM  Number of Diagnoses or Management Options  Acute on chronic congestive heart failure, unspecified heart failure type  COPD with acute exacerbation  Elevated troponin  Pneumonia due to infectious organism, unspecified laterality, unspecified part of lung  Diagnosis management comments: On arrival patient is tachycardic with a rate of 130.   EKG showed sinus tachycardia with PVCs and diffuse ST depression.  Patient had diffuse wheezes.  He was given nebulizer treatment.  Labs did show elevated BNP of 4000.  This is up when compared to previous.  Patient was given Lasix.  Troponin is also elevated to 176.  Patient is currently on anticoagulation.  Chest x-ray showed bilateral infiltrates.  Patient is currently being treated for pneumonia.  Will start broad-spectrum antibiotics.  Discussed patient with hospitalist and will admit for further care       Amount and/or Complexity of Data Reviewed  Clinical lab tests: reviewed  Tests in the radiology section of CPT®: reviewed  Review and summarize past medical records: yes  Independent visualization of images, tracings, or specimens: yes    Risk of Complications, Morbidity, and/or Mortality  Presenting problems: moderate  Management options: moderate             Total Critical Care time of 45 minutes. Total critical care time documented does not include time spent on separately billed procedures for services of nurses or physician assistants. I personally saw and examined the patient. I have reviewed all diagnostic interpretations and treatment plans as written. I was present for the key portions of any procedures performed and the inclusive time noted in any critical care statement. Critical care time includes patient management by me, time spent at the patients bedside,  time to review lab and imaging results, discussing patient care, documentation in the medical record, and time spent with family or caregiver.          Patient Care Considerations:          Consultants/Shared Management Plan:    Hospitalist: I have discussed the case with Dr Kelly who agrees to accept the patient for admission.    Social Determinants of Health:    Patient has presented with family members who are responsible, reliable and will ensure follow up care.      Disposition and Care Coordination:    Admit:   Through independent  evaluation of the patient's history, physical, and imperical data, the patient meets criteria for inpatient admission to the hospital.        Final diagnoses:   Acute on chronic congestive heart failure, unspecified heart failure type   COPD with acute exacerbation   Elevated troponin   Pneumonia due to infectious organism, unspecified laterality, unspecified part of lung        ED Disposition       ED Disposition   Decision to Admit    Condition   --    Comment   --               This medical record created using voice recognition software.             Nan Disla MD  01/29/25 [id number]

## 2025-01-29 NOTE — CONSULTS
[facility]   Cardiology Consult Note    Patient Name: Layo Cee  : 1940  MRN: [id number]  Primary Care Physician:  Tevin Zavala MD  Referring Physician: No Known Provider    Date of admission: 2025    Subjective   Subjective     Reason for Consultation : Congestive heart failure, elevated troponin    Chief Complaint : Shortness of breath, chills    HPI:  Layo Cee is a 84 y.o. male with coronary artery disease, previous angioplasty,, CABG, atrial fibrillation/flutter, previous ablation, on anticoagulation, chronic combined heart failure, hypertension and hyperlipidemia.  He also has COPD on home oxygen and chronic kidney disease.  He presented to the emergency room because of significant shortness of breath, swelling of the feet, fatigue and chills.  5 days back, he was seen PCP office for fatigue and weakness, chest x-ray showed pneumonia and he was started on antibiotics.  Shortness of breath progressed to get worse and yesterday he also noticed swelling of the feet which is unusual for him.  He felt heart racing and tightness and pain all across his chest.    In the ER, he was noted to be tachycardic, in sinus rhythm.  He was hypoxic.  Chest x-ray showed multifocal pneumonia and also pulmonary vascular congestion.    Review of Systems   All systems were reviewed and negative except for: Shortness of breath, cough, chills, chest discomfort, palpitations.    Personal History     Past Medical History:   Diagnosis Date    Acute on chronic diastolic CHF (congestive heart failure) 2022    Arthritis     Atrial fibrillation, persistent 5/3/2022    Persistent atrial fibrillation status post extensive ablation and pulmonary vein isolation by Dr. Aguilar on 6/3/2022, with reoccurring rapid A. fib, and then successful cardioversion on 2022    BPH (benign prostatic hyperplasia)     CHF (congestive heart failure)     COPD (chronic obstructive pulmonary disease)     Coronary artery disease  of bypass graft of native heart with stable angina pectoris     (1) Coronary artery disease, s/p CABG in the past. Cardiac catheterization in July 2016 showed patentLIMA graft to the LAD and occluded SVGs. Native LAD is 100% occluded in the mid portion. Native LCx has no significant disease. Native RCA is 100% occluded and it is a . Repeat cath in June, 2021, underwent stent placement to diagnonal branch.    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 Lauren Valencia       (1) Coronary artery disease, status post coronary artery bypass grafting in the past. Cardiac catheterization done in July 2016 showed patent left internal mammary graft to the LAD and occluded saphenous venous graft. Native LAD is 100% occluded in the mid portion. Native circumflex artery has no significant disease. Native right coronary artery is also 100% occluded and it is a chronic total occlusion. The right coronary artery is reconstituted with collaterals from the left side.  Patient underwent repeat cardiac catheterization in June, 2021 and underwent angioplasty stent placement to diagonal branch.       (2) Ischemic cardiomyopathy with recovery of cardiac function.  (3) Chronic kidney disease, stage 3.   (4) Chronic obstructive pulmonary disease  (5) Hypertension. (6) Hyperlipidemia.   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   (8) Typical atrial flutter status post radiofrequency ablation by Dr. Aguilar  on 11/30/2018   (9) Persistent atrial fibrillation status post extensive ablation and pulmonary vein isolation by Dr. Aguilar on 6/3/2022      Family History: family history includes Heart disease in his father; Hypertension in his mother; Kidney cancer in his brother; Other in his brother. Otherwise pertinent FHx was reviewed and not pertinent to current issue.    Social History:  reports that he quit smoking about 27 years ago. His smoking use included cigarettes. He started smoking about 67 years ago. He has a 20 pack-year smoking history. He has been exposed to tobacco smoke. He has never used smokeless tobacco. He reports that he does not currently use alcohol. He reports that he does not use drugs.    Home Medications:  Allopurinol, B Complex-C, Coenzyme Q10, Diclofenac Sodium, Fluticasone-Umeclidin-Vilant, Semaglutide-Weight Management, acetaminophen, amoxicillin-clavulanate, aspirin, calcium carbonate, cetirizine, dilTIAZem CD, doxycycline, finasteride, fluticasone, ipratropium-albuterol, isosorbide mononitrate, metoprolol succinate XL, montelukast, nitroglycerin, pantoprazole, pitavastatin calcium, rivaroxaban, sacubitril-valsartan, sucralfate, and tamsulosin    Allergies:  Allergies   Allergen Reactions    Carvedilol Swelling and Anaphylaxis    Levaquin [Levofloxacin] Unknown - Low Severity       Objective    Objective     Vitals:   Temp:  [97.4 °F (36.3 °C)-99.2 °F (37.3 °C)] 97.4 °F (36.3 °C)  Heart Rate:  [] 87  Resp:  [20-28] 20  BP: (124-158)/(62-94) 152/82  Flow (L/min) (Oxygen Therapy):  [2] 2      Physical Exam:   Constitutional: Awake, alert, in mild distress, nasal cannula on   Eyes: PERRLA, sclerae anicteric, no conjunctival injection   HENT: NCAT, mucous membranes moist   Neck: Supple, no thyromegaly, no lymphadenopathy, trachea midline   Respiratory: Bilateral wheezing present, bilateral basal crackles present   Cardiovascular: Tachycardic, regular, 2/6 systolic murmur heard, no  S3,   Gastrointestinal: Positive bowel sounds, soft, nontender, nondistended   Musculoskeletal: Trace edema bilaterally, no clubbing or cyanosis to extremities   Psychiatric: Appropriate affect, cooperative   Neurologic: Oriented x 3,speech clear   Skin: No rashes     Result Review    Result Review:  I have personally reviewed the results from the time of this admission to 1/29/2025 13:00 EST and agree with these findings:  [x]  Laboratory  []  Microbiology  [x]  Radiology  [x]  EKG/Telemetry   [x]  Cardiology/Vascular   []  Pathology  [x]  Old records  []  Other:  Most notable findings include:     CMP          11/29/2024    14:42 1/27/2025    13:25 1/29/2025    03:25 1/29/2025    04:10   CMP   Glucose 102  80  157     BUN 14  12  16     Creatinine 1.56  1.41  1.70  1.62    EGFR 44.1  49.8  39.8     Sodium 134  141  136     Potassium 4.5  4.1  4.2     Chloride 98  105  102     Calcium 8.9  8.8  8.1     Total Protein 6.9  6.2  6.3     Albumin 3.8  3.6  3.6     Globulin 3.1  2.6  2.7     Total Bilirubin 0.4  0.3  0.5     Alkaline Phosphatase 72  57  54     AST (SGOT) 14  17  17     ALT (SGPT) 9  15  8     Albumin/Globulin Ratio 1.2  1.4  1.3     BUN/Creatinine Ratio 9.0  8.5  9.4     Anion Gap 10.7  10.6  12.4        CBC          11/29/2024    14:42 1/27/2025    13:25 1/29/2025    03:25   CBC   WBC 9.85  8.91  12.34    RBC 4.23  3.69  3.62    Hemoglobin 12.6  10.6  10.1    Hematocrit 39.5  32.7  31.8    MCV 93.4  88.6  87.8    MCH 29.8  28.7  27.9    MCHC 31.9  32.4  31.8    RDW 15.7  14.2  15.1    Platelets 230  244  221        Latest Reference Range & Units 01/27/25 13:25 01/29/25 03:25 01/29/25 05:06   Creatine Kinase 20 - 200 U/L   213 (H)   CKMB <=10.40 ng/mL   23.32 (H)   HS Troponin T <22 ng/L  176 (C) 249 (C)   Troponin T % Delta Abnormal if >/= 20%    41 (C)   Troponin T Numeric Delta Abnormal if >/=3 ng/L   73 (C)   proBNP 0.0 - 1,800.0 pg/mL 2,489.0 (H) 4,121.0 (H)      Results for orders placed during the  hospital encounter of 01/29/25    Adult Transthoracic Echo Complete W/ Cont if Necessary Per Protocol    Interpretation Summary    Left ventricular systolic function is low normal. Left ventricular ejection fraction appears to be 46 - 50%.    Left ventricular wall thickness is consistent with mild concentric hypertrophy.    Left ventricular diastolic function is consistent with (grade II w/high LAP) pseudonormalization.    The left atrial cavity is mildly dilated.    Aortic valve is moderately calcified with restricted opening.  Mild to moderate aortic valve stenosis is present.  The peak and mean gradients across aortic valve are 30/18 mmHg    There is mild tricuspid regurgitation.  Estimated right ventricular systolic pressure from tricuspid regurgitation is moderately elevated (45-55 mmHg).       EKG done today showed sinus tachycardia, normal axis, nonspecific ST/T wave changes in multiple leads.      Assessment & Plan   Assessment / Plan     Brief Patient Summary:  Layo Cee is a 84 y.o. male  with coronary artery disease, previous angioplasty,, CABG, atrial fibrillation/flutter, previous ablation, on anticoagulation, chronic combined heart failure, hypertension and hyperlipidemia.  Currently admitted with shortness of breath, chills.    Active Hospital Problems:  Active Hospital Problems    Diagnosis     **Multifocal pneumonia     Acute exacerbation of CHF (congestive heart failure)      Acute on chronic combined heart failure : Clinically volume overloaded, chest x-ray shows pulmonary vascular congestion.  He has some edema.  proBNP is elevated.     Elevated troponin : Volume overload with systemic infection, and sinus tachycardia on presentation.  He has some constant chest discomfort which is mild.  This is likely related to type II MI from systemic infection, CKD and CHF exacerbation.    Multifocal pneumonia : Elevated procalcitonin, on broad-spectrum antibiotics    Paroxysmal atrial fibrillation :  Currently in sinus rhythm and sinus tachycardia, received ablations in the past.    COPD with exacerbation    Plan:     Start IV Lasix 40 mg twice daily  Restart home metoprolol, Entresto  Will do Lovenox injections for 48 hours per ACS protocol.  Holding Xarelto while on Lovenox  Antibiotics, bronchodilators, steroids per primary team and pulmonology  Repeat labs a.m.    We will follow    Electronically signed by Darnell Stevenson MD, 01/29/25, 9:58 AM EST.

## 2025-01-29 NOTE — PROGRESS NOTES
[facility]   Hospitalist Progress Note  Date: 2025  Patient Name: Layo Cee  : 1940  MRN: [id number]  Date of admission: 2025    Subjective   Subjective     Chief Complaint:   Shortness of breath, chills    Summary:   Layo Cee is a 84 y.o. male with past medical history of CAD status post CABG, atrial fibrillation/flutter on rivaroxaban, CHF, hypertension, hyperlipidemia, GERD, COPD on 2 L home oxygen, CKD presented to the ED for evaluation of sudden onset of chills, shortness of breath that started around 8 PM.  Noted to have some chest pain across the chest.  Describes it as aching.  Used nebulizer treatments with no improvement in the symptoms.  Patient waited until 1 AM and he continued to experience shortness of breath and called EMS.  Patient recently diagnosed with pneumonia and started on Augmentin and doxycycline outpatient on 2025.  Received nebulizer treatments and Solu-Medrol by EMS.  For the past few months patient did not had any swelling in the bilateral lower extremities and tonight when he started experiencing shortness of breath he was noted to have bilateral lower extremity edema as well.  Noted to have some cough and intermittent sputum production.  Denies dysuria, nausea, vomiting, abdominal pain, fevers.     Interval Followup:   No acute events overnight, patient does state his breathing is mildly improved, patient evaluated by cardiology, patient continues on supplemental oxygen.  Per patient he only wears oxygen at night typically    Objective   Objective     Vitals:   Temp:  [97.4 °F (36.3 °C)-99.2 °F (37.3 °C)] 97.4 °F (36.3 °C)  Heart Rate:  [] 87  Resp:  [20-28] 20  BP: (124-158)/(62-94) 152/82  Flow (L/min) (Oxygen Therapy):  [2] 2    Physical Exam   GEN: No acute distress  HEENT: Moist mucous membranes  LUNGS: Equal chest rise bilaterally  CARDIAC: Regular rate and rhythm  NEURO: Moving all 4 extremities spontaneously  SKIN: No obvious  breakdown    Result Review    Result Review:  I have personally reviewed the results as below  [x]  Laboratory CBC, CMP personally reviewed  CBC          11/29/2024    14:42 1/27/2025    13:25 1/29/2025    03:25   CBC   WBC 9.85  8.91  12.34    RBC 4.23  3.69  3.62    Hemoglobin 12.6  10.6  10.1    Hematocrit 39.5  32.7  31.8    MCV 93.4  88.6  87.8    MCH 29.8  28.7  27.9    MCHC 31.9  32.4  31.8    RDW 15.7  14.2  15.1    Platelets 230  244  221      CMP          11/29/2024    14:42 1/27/2025    13:25 1/29/2025    03:25 1/29/2025    04:10   CMP   Glucose 102  80  157     BUN 14  12  16     Creatinine 1.56  1.41  1.70  1.62    EGFR 44.1  49.8  39.8     Sodium 134  141  136     Potassium 4.5  4.1  4.2     Chloride 98  105  102     Calcium 8.9  8.8  8.1     Total Protein 6.9  6.2  6.3     Albumin 3.8  3.6  3.6     Globulin 3.1  2.6  2.7     Total Bilirubin 0.4  0.3  0.5     Alkaline Phosphatase 72  57  54     AST (SGOT) 14  17  17     ALT (SGPT) 9  15  8     Albumin/Globulin Ratio 1.2  1.4  1.3     BUN/Creatinine Ratio 9.0  8.5  9.4     Anion Gap 10.7  10.6  12.4       []  Microbiology  []  Radiology  []  EKG/Telemetry   []  Cardiology/Vascular   []  Pathology  []  Old records  []  Other:    Scheduled medications:  arformoterol, 15 mcg, Nebulization, BID - RT  budesonide, 0.5 mg, Nebulization, BID - RT  cefepime, 2,000 mg, Intravenous, Q12H  enoxaparin, 1 mg/kg, Subcutaneous, Q12H  furosemide, 40 mg, Intravenous, BID Diuretics  insulin lispro, 2-7 Units, Subcutaneous, 4x Daily AC & at Bedtime  isosorbide mononitrate, 15 mg, Oral, Daily  metoprolol succinate XL, 25 mg, Oral, Q12H  revefenacin, 175 mcg, Nebulization, Daily - RT  sodium chloride, 10 mL, Intravenous, Q12H  [START ON 1/30/2025] vancomycin, 1,250 mg, Intravenous, Q24H      As needed medications:    albuterol    senna-docusate sodium **AND** polyethylene glycol **AND** bisacodyl **AND** bisacodyl    dextrose    dextrose    glucagon (human recombinant)     nitroglycerin    Pharmacy to Dose enoxaparin (LOVENOX)    Pharmacy to dose vancomycin    sodium chloride    sodium chloride    sodium chloride  Infusions:  Pharmacy to Dose enoxaparin (LOVENOX),   Pharmacy to dose vancomycin,            Assessment & Plan   Assessment / Plan     Assessment:  CHF exacerbation  Elevated troponin, unclear type I versus type II in setting of tachycardia, shortness of breath for few hours  Pulmonary edema  Worsening multifocal pneumonia  Sepsis, present on admission, tachycardia, tachypnea, elevated white count, pneumonia  COPD on 2 L home oxygen  CAD status post CABG followed by stents x 2  Atrial fibrillation on rivaroxaban, history of ablation  CKD stage IIIb  CHF  GERD  Hypertension    Plan:  Patient admitted to the hospital for further workup and management of above  Cardiology consulted secondary to abnormal EKG, elevated troponins  Patient has some decompensated heart failure, repeat echocardiogram is ordered and pending  Patient with elevated procalcitonin, complaining of chills, started on vancomycin and cefepime, will continue pending further workup  Blood cultures ordered and pending  Sputum cultures  Strep pneumo, Legionella, mycoplasma IgM, MRSA swab of the nares  Received 40 mg IV of Lasix in the emergency department, receiving additional 20 this a.m.  Continue Xarelto  Continue Brovana, Pulmicort  Continue bronchodilator protocol  Cardiac diet  Sliding scale insulin  Further cardiac workup per cardiology  If no clinical improvement in respirations over the next 24 hours may benefit from pulmonology consultation, patient follows with Dr. Brink in clinic     Reviewed patient's labs and imaging, and discussed with patient and nurse at bedside.    VTE Prophylaxis:  Pharmacologic & mechanical VTE prophylaxis orders are present.        CODE STATUS:   Level Of Support Discussed With: Patient  Code Status (Patient has no pulse and is not breathing): CPR (Attempt to  Resuscitate)  Medical Interventions (Patient has pulse or is breathing): Full Support      Electronically signed by Douglas Scott MD, 1/29/2025, 10:11 EST.

## 2025-01-29 NOTE — PLAN OF CARE
Goal Outcome Evaluation:   New admit this shift. Complaints of dull ache across chest. Significantly SOA with exertion. On 2LNC. Swelling in LE and abdomen distended. IV ABX continued. Will continue current plan of care.

## 2025-01-29 NOTE — PROGRESS NOTES
"[facility] Clinical Pharmacy Services: Vancomycin Pharmacokinetic Initial Consult Note    Layo Cee is a 84 y.o. male who is on day  of pharmacy to dose vancomycin for Pneumonia.    Consult Information  Consulting Provider: Leticia  Planned Duration of Therapy: 7 days  Loading Dose  Given: 2000 mg today @0418  PK/PD Target: -600 mg/L.hr   Other Antimicrobials: Cefepime    Imaging Reviewed?: Yes    Microbiology Data    MRSA PCR ordered   blood cx ordered    Vitals/Labs  Ht: 177.8 cm (70\"); Wt: 105 kg (231 lb 7.7 oz)  Temp (24hrs), Av.1 °F (37.3 °C), Min:98.9 °F (37.2 °C), Max:99.2 °F (37.3 °C)   Estimated Creatinine Clearance: 42.7 mL/min (A) (by C-G formula based on SCr of 1.62 mg/dL (H)).       Results from last 7 days   Lab Units 25  0410 25  0325 25  1325   CREATININE mg/dL 1.62* 1.70* 1.41*   WBC 10*3/mm3  --  12.34* 8.91     Assessment/Plan:    Vancomycin Dose:  1250 mg IV every 24 hours; which provides the following predicted parameters:  AUC24,ss: 552 mg/L.hr  Probability of AUC24 > 400: 83 %  Ctrough,ss: 18.2 mg/L  Probability of Ctrough,ss > 20: 41 %  Probability of nephrotoxicity (Lodise FATOUMATA ): 14 %    Vanc Random planned for tomorrow.    Pharmacy will follow patient's kidney function and will adjust doses and obtain levels as necessary. Thank you for involving pharmacy in this patient's care. Please contact pharmacy with any questions or concerns.                           Eve Samson  Clinical Pharmacist    "

## 2025-01-29 NOTE — H&P
[facility]  Date: 2025   Patient Name: Layo Cee  : 1940  MRN: [id number]  Primary Care Physician:  Tevin Zavala MD  Date of admission: 2025    Subjective   Subjective     Chief Complaint: Shortness of breath     HPI:    Layo Cee is a 84 y.o. male with past medical history of CAD status post CABG, atrial fibrillation/flutter on rivaroxaban, CHF, hypertension, hyperlipidemia, GERD, COPD on 2 L home oxygen, CKD presented to the ED for evaluation of sudden onset of chills, shortness of breath that started around 8 PM.  Noted to have some chest pain across the chest.  Describes it as aching.  Used nebulizer treatments with no improvement in the symptoms.  Patient waited until 1 AM and he continued to experience shortness of breath and called EMS.  Patient recently diagnosed with pneumonia and started on Augmentin and doxycycline outpatient on 2025.  Received nebulizer treatments and Solu-Medrol by EMS.  For the past few months patient did not had any swelling in the bilateral lower extremities and tonight when he started experiencing shortness of breath he was noted to have bilateral lower extremity edema as well.  Noted to have some cough and intermittent sputum production.  Denies dysuria, nausea, vomiting, abdominal pain, fevers.    Upon arrival, vital signs temperature 99.2, pulse 130, respiratory 20, blood pressure 158/84 on 2 L saturating around 92%.  ABG 7.4 6/32/64 on 2 L, initial troponin 176, repeat troponin pending.  proBNP 4121.  Creatinine 1.70, baseline around 1.5, rest of the CMP with no significant findings, normal lactic acid, WBC 12.34, hemoglobin 10.1, platelets 221.  COVID flu RSV negative.  Blood cultures in process.  Chest x-ray showed new or increased bilateral infiltrates are seen.  The findings may represent infectious multifocal pneumonia.  Pulm edema cannot be excluded.  Received cefepime, vancomycin, Lasix in the  ED.  Patient has been admitted for further evaluation and management of acute shortness of breath, CHF exacerbation, pulmonary edema, possible infectious multifocal pneumonia      Personal History     Past Medical History:   Diagnosis Date    Acute on chronic diastolic CHF (congestive heart failure) 6/6/2022    Arthritis     Atrial fibrillation, persistent 5/3/2022    Persistent atrial fibrillation status post extensive ablation and pulmonary vein isolation by Dr. Aguilar on 6/3/2022, with reoccurring rapid A. fib, and then successful cardioversion on 7/1/2022    BPH (benign prostatic hyperplasia)     CHF (congestive heart failure)     COPD (chronic obstructive pulmonary disease)     Coronary artery disease of bypass graft of native heart with stable angina pectoris     (1) Coronary artery disease, s/p CABG in the past. Cardiac catheterization in July 2016 showed patentLIMA graft to the LAD and occluded SVGs. Native LAD is 100% occluded in the mid portion. Native LCx has no significant disease. Native RCA is 100% occluded and it is a . Repeat cath in June, 2021, underwent stent placement to diagnonal branch.    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 Lauren Valencia          Past Surgical History:   Procedure Laterality Date    BLADDER SURGERY      CARDIAC CATHETERIZATION  07/2016    CARDIAC CATHETERIZATION N/A 8/9/2021    Procedure: Left Heart Cath with possible angioplasty;  Surgeon: Jack Ladd MD;  Location: Replaced by [facility] INVASIVE  LOCATION;  Service: Cardiovascular;  Laterality: N/A;  Please schedule the procedure with Dr. Jack Ladd MD on 2021    CARDIAC ELECTROPHYSIOLOGY PROCEDURE N/A 6/3/2022    Procedure: Ablation atrial fibrillation;  Surgeon: Irineo Aguilar MD;  Location:  [facility] CATH INVASIVE LOCATION;  Service: Cardiovascular;  Laterality: N/A;    CARDIAC ELECTROPHYSIOLOGY PROCEDURE N/A 6/3/2022    Procedure: Ablation atrial flutter/CTI;  Surgeon: Irineo Aguilar MD;  Location:  [facility] CATH INVASIVE LOCATION;  Service: Cardiovascular;  Laterality: N/A;    CARDIAC SURGERY      HEART BYPASS    COLONOSCOPY      CORONARY ARTERY BYPASS GRAFT      CYSTOSCOPY  2019    WITH BILATERAL RETROGRADE PYELOGRAPHY    ELECTRICAL CARDIOVERSION  2022         ENDOSCOPY  2016    PROSTATE SURGERY           Family History   Problem Relation Age of Onset    Hypertension Mother     Heart disease Father     Other Brother         GASTROINTESTINAL CANCER    Kidney cancer Brother          Social History     Socioeconomic History    Marital status:    Tobacco Use    Smoking status: Former     Current packs/day: 0.00     Average packs/day: 0.5 packs/day for 40.0 years (20.0 ttl pk-yrs)     Types: Cigarettes     Start date:      Quit date:      Years since quittin.0     Passive exposure: Past    Smokeless tobacco: Never   Vaping Use    Vaping status: Never Used   Substance and Sexual Activity    Alcohol use: Not Currently    Drug use: Never    Sexual activity: Defer         Home Medications:  Allopurinol, B Complex-C, Coenzyme Q10, Diclofenac Sodium, Fluticasone-Umeclidin-Vilant, Semaglutide-Weight Management, acetaminophen, amiodarone, amoxicillin-clavulanate, aspirin, calcium carbonate, cetirizine, dilTIAZem CD, doxycycline, ferrous sulfate, finasteride, fluticasone, ipratropium-albuterol, isosorbide mononitrate, metoprolol succinate XL, montelukast, nitroglycerin, pantoprazole, pitavastatin calcium, rivaroxaban,  sacubitril-valsartan, tamsulosin, and vitamin C    Allergies:  Allergies   Allergen Reactions    Carvedilol Swelling and Anaphylaxis    Levaquin [Levofloxacin] Unknown - Low Severity         Objective   Objective     Vitals:   Temp:  [99.2 °F (37.3 °C)] 99.2 °F (37.3 °C)  Heart Rate:  [110-130] 110  Resp:  [20-28] 20  BP: (128-158)/(70-94) 145/94  Flow (L/min) (Oxygen Therapy):  [2] 2    Physical Exam    Constitutional: Awake, alert, no acute distress   Eyes: Pupils equal, sclerae anicteric, no conjunctival injection   HENT: NCAT, mucous membranes moist   Neck: Supple, no thyromegaly, no lymphadenopathy, trachea midline   Respiratory: Bilateral air entry present, mild diffuse rhonchi bilaterally, mild respiratory distress   Cardiovascular: Regular, tachycardia with occasional PVC on monitor, no murmurs Gastrointestinal: Positive bowel sounds, soft, nontender, nondistended   Musculoskeletal: 1+ bilateral lower extremity edema, no clubbing or cyanosis to extremities   Psychiatric: Appropriate affect, cooperative   Neurologic: Oriented x 3, speech clear    Result Review    Result Review:  I have personally reviewed the results from the time of this admission to 1/29/2025 05:46 EST and agree with these findings:  [x]  Laboratory  []  Microbiology  [x]  Radiology  [x]  EKG/Telemetry   []  Cardiology/Vascular   []  Pathology  []  Old records  []  Other:        Imaging Results (Last 24 Hours)       Procedure Component Value Units Date/Time    XR Chest 1 View [[id number]] Collected: 01/29/25 0330     Updated: 01/29/25 0334    Narrative:      XR CHEST 1 VW-     Date of exam: 1/29/2025, 3:20 A.M.     Indication: Chest pain.     Comparison: 1/27/2025.     FINDINGS:  A single AP (or PA) upright portable view of the chest is provided for  review. Bilateral infiltrates are seen, especially in the lung bases.  The findings may represent infectious multifocal pneumonia. Pulmonary  edema cannot be excluded. There may be mild  cardiomegaly. No  pneumothorax. No pneumomediastinum. External artifacts obscure detail.  The patient has undergone median sternotomy and suspected CABG (coronary  artery bypass graft) surgery. The thoracic aorta is atherosclerotic.  Chronic calcified granulomatous disease involves the chest. There are  degenerative changes of the shoulders and spine. Tiny bilateral pleural  effusions are possible.          Impression:      New or increased bilateral infiltrates are seen. The findings may  represent infectious multifocal pneumonia. Pulmonary edema cannot be  excluded.           Portions of this note were completed with a voice recognition program.        Electronically Signed By-Layo Peng MD On:1/29/2025 3:32 AM                aspirin, 324 mg, Oral, Once  vancomycin, 20 mg/kg, Intravenous, Once         Assessment & Plan   Assessment / Plan     Assessment/Plan:   CHF exacerbation  Elevated troponin, unclear type I versus type II in setting of tachycardia, shortness of breath for few hours  Pulmonary edema  Worsening multifocal pneumonia  Sepsis, present on admission, tachycardia, tachypnea, elevated white count, pneumonia  COPD on 2 L home oxygen  CAD status post CABG followed by stents x 2  Atrial fibrillation on rivaroxaban, history of ablation  CKD stage IIIb  CHF  GERD  Hypertension    Plan  Admit to inpatient, telemetry  Follow-up on repeat troponin levels   Initial EKG showed ST depression and T wave inversions with rapid rate.  Repeat EKG after decreased rate showed ST depressions and T wave inversions in the anterolateral leads but similar to previous EKGs.  Cardiology consult, patient follows Dr. Stevenson outpatient  Cardiac echo  Continue cefepime and vancomycin  Follow-up on urine Legionella, urine strep, MRSA swab  Received Lasix 40 mg IV in the ED, reassess and redose later in the day  Continue rivaroxaban  Brovana, Pulmicort, revefenacin  Bronchodilator protocol  Bronchopulmonary hygiene  Albuterol  every 6 hours as needed  N.p.o. except sips with meds until evaluation by cardiology  Hypoglycemic protocol  POC glucose every 6 hours  Resume other appropriate home medications once reconciled      VTE Prophylaxis:  Mechanical & pharmacologic VTE prophylaxis orders are signed & held.     CODE STATUS:    Level Of Support Discussed With: Patient  Code Status (Patient has no pulse and is not breathing): CPR (Attempt to Resuscitate)  Medical Interventions (Patient has pulse or is breathing): Full Support      Admission Status:  I believe this patient meets inpatient status.    Part of this note may be an electronic transcription/translation of spoken language to printed text using the Dragon Dictation System    Poli Kelly MD

## 2025-01-30 ENCOUNTER — APPOINTMENT (OUTPATIENT)
Dept: CT IMAGING | Facility: HOSPITAL | Age: 85
DRG: 871 | End: 2025-01-30
Payer: MEDICARE

## 2025-01-30 LAB
ALBUMIN SERPL-MCNC: 3.2 G/DL (ref 3.5–5.2)
ALBUMIN/GLOB SERPL: 1.3 G/DL
ALP SERPL-CCNC: 45 U/L (ref 39–117)
ALT SERPL W P-5'-P-CCNC: 15 U/L (ref 1–41)
ANION GAP SERPL CALCULATED.3IONS-SCNC: 10 MMOL/L (ref 5–15)
AST SERPL-CCNC: 45 U/L (ref 1–40)
BASOPHILS # BLD AUTO: 0.02 10*3/MM3 (ref 0–0.2)
BASOPHILS NFR BLD AUTO: 0.1 % (ref 0–1.5)
BILIRUB SERPL-MCNC: 0.3 MG/DL (ref 0–1.2)
BUN SERPL-MCNC: 20 MG/DL (ref 8–23)
BUN/CREAT SERPL: 12.3 (ref 7–25)
CALCIUM SPEC-SCNC: 8.2 MG/DL (ref 8.6–10.5)
CHLORIDE SERPL-SCNC: 106 MMOL/L (ref 98–107)
CK MB SERPL-CCNC: 39.61 NG/ML
CK SERPL-CCNC: 276 U/L (ref 20–200)
CO2 SERPL-SCNC: 24 MMOL/L (ref 22–29)
CREAT SERPL-MCNC: 1.63 MG/DL (ref 0.76–1.27)
DEPRECATED RDW RBC AUTO: 49 FL (ref 37–54)
EGFRCR SERPLBLD CKD-EPI 2021: 41.8 ML/MIN/1.73
EOSINOPHIL # BLD AUTO: 0.02 10*3/MM3 (ref 0–0.4)
EOSINOPHIL NFR BLD AUTO: 0.1 % (ref 0.3–6.2)
ERYTHROCYTE [DISTWIDTH] IN BLOOD BY AUTOMATED COUNT: 15 % (ref 12.3–15.4)
GEN 5 1HR TROPONIN T REFLEX: 643 NG/L
GLOBULIN UR ELPH-MCNC: 2.5 GM/DL
GLUCOSE BLDC GLUCOMTR-MCNC: 111 MG/DL (ref 70–99)
GLUCOSE BLDC GLUCOMTR-MCNC: 119 MG/DL (ref 70–99)
GLUCOSE BLDC GLUCOMTR-MCNC: 121 MG/DL (ref 70–99)
GLUCOSE BLDC GLUCOMTR-MCNC: 125 MG/DL (ref 70–99)
GLUCOSE SERPL-MCNC: 142 MG/DL (ref 65–99)
HCT VFR BLD AUTO: 30.4 % (ref 37.5–51)
HGB BLD-MCNC: 9.3 G/DL (ref 13–17.7)
IMM GRANULOCYTES # BLD AUTO: 0.11 10*3/MM3 (ref 0–0.05)
IMM GRANULOCYTES NFR BLD AUTO: 0.6 % (ref 0–0.5)
LYMPHOCYTES # BLD AUTO: 1.01 10*3/MM3 (ref 0.7–3.1)
LYMPHOCYTES NFR BLD AUTO: 5.7 % (ref 19.6–45.3)
M PNEUMO IGM SER QL: POSITIVE
MAGNESIUM SERPL-MCNC: 2 MG/DL (ref 1.6–2.4)
MCH RBC QN AUTO: 27.4 PG (ref 26.6–33)
MCHC RBC AUTO-ENTMCNC: 30.6 G/DL (ref 31.5–35.7)
MCV RBC AUTO: 89.4 FL (ref 79–97)
MONOCYTES # BLD AUTO: 1.01 10*3/MM3 (ref 0.1–0.9)
MONOCYTES NFR BLD AUTO: 5.7 % (ref 5–12)
NEUTROPHILS NFR BLD AUTO: 15.48 10*3/MM3 (ref 1.7–7)
NEUTROPHILS NFR BLD AUTO: 87.8 % (ref 42.7–76)
NRBC BLD AUTO-RTO: 0 /100 WBC (ref 0–0.2)
PHOSPHATE SERPL-MCNC: 3.2 MG/DL (ref 2.5–4.5)
PLATELET # BLD AUTO: 211 10*3/MM3 (ref 140–450)
PMV BLD AUTO: 10.6 FL (ref 6–12)
POTASSIUM SERPL-SCNC: 4.2 MMOL/L (ref 3.5–5.2)
PROCALCITONIN SERPL-MCNC: 0.56 NG/ML (ref 0–0.25)
PROT SERPL-MCNC: 5.7 G/DL (ref 6–8.5)
RBC # BLD AUTO: 3.4 10*6/MM3 (ref 4.14–5.8)
SODIUM SERPL-SCNC: 140 MMOL/L (ref 136–145)
TROPONIN T % DELTA: 1
TROPONIN T NUMERIC DELTA: 4 NG/L
TROPONIN T SERPL HS-MCNC: 639 NG/L
VANCOMYCIN SERPL-MCNC: 15.31 MCG/ML (ref 5–40)
WBC NRBC COR # BLD AUTO: 17.65 10*3/MM3 (ref 3.4–10.8)

## 2025-01-30 PROCEDURE — 94799 UNLISTED PULMONARY SVC/PX: CPT

## 2025-01-30 PROCEDURE — 71250 CT THORAX DX C-: CPT

## 2025-01-30 PROCEDURE — 97165 OT EVAL LOW COMPLEX 30 MIN: CPT

## 2025-01-30 PROCEDURE — 80053 COMPREHEN METABOLIC PANEL: CPT | Performed by: INTERNAL MEDICINE

## 2025-01-30 PROCEDURE — 85025 COMPLETE CBC W/AUTO DIFF WBC: CPT | Performed by: INTERNAL MEDICINE

## 2025-01-30 PROCEDURE — 82550 ASSAY OF CK (CPK): CPT | Performed by: INTERNAL MEDICINE

## 2025-01-30 PROCEDURE — 25010000002 VANCOMYCIN 5 G RECONSTITUTED SOLUTION: Performed by: STUDENT IN AN ORGANIZED HEALTH CARE EDUCATION/TRAINING PROGRAM

## 2025-01-30 PROCEDURE — 94664 DEMO&/EVAL PT USE INHALER: CPT

## 2025-01-30 PROCEDURE — 87070 CULTURE OTHR SPECIMN AEROBIC: CPT | Performed by: STUDENT IN AN ORGANIZED HEALTH CARE EDUCATION/TRAINING PROGRAM

## 2025-01-30 PROCEDURE — 99232 SBSQ HOSP IP/OBS MODERATE 35: CPT | Performed by: INTERNAL MEDICINE

## 2025-01-30 PROCEDURE — 80202 ASSAY OF VANCOMYCIN: CPT | Performed by: STUDENT IN AN ORGANIZED HEALTH CARE EDUCATION/TRAINING PROGRAM

## 2025-01-30 PROCEDURE — 25010000002 ENOXAPARIN PER 10 MG: Performed by: INTERNAL MEDICINE

## 2025-01-30 PROCEDURE — 25010000002 CEFEPIME PER 500 MG: Performed by: STUDENT IN AN ORGANIZED HEALTH CARE EDUCATION/TRAINING PROGRAM

## 2025-01-30 PROCEDURE — 82948 REAGENT STRIP/BLOOD GLUCOSE: CPT

## 2025-01-30 PROCEDURE — 82553 CREATINE MB FRACTION: CPT | Performed by: INTERNAL MEDICINE

## 2025-01-30 PROCEDURE — 84484 ASSAY OF TROPONIN QUANT: CPT | Performed by: INTERNAL MEDICINE

## 2025-01-30 PROCEDURE — 84100 ASSAY OF PHOSPHORUS: CPT | Performed by: INTERNAL MEDICINE

## 2025-01-30 PROCEDURE — 97161 PT EVAL LOW COMPLEX 20 MIN: CPT

## 2025-01-30 PROCEDURE — 99223 1ST HOSP IP/OBS HIGH 75: CPT | Performed by: INTERNAL MEDICINE

## 2025-01-30 PROCEDURE — 87205 SMEAR GRAM STAIN: CPT | Performed by: STUDENT IN AN ORGANIZED HEALTH CARE EDUCATION/TRAINING PROGRAM

## 2025-01-30 PROCEDURE — 99232 SBSQ HOSP IP/OBS MODERATE 35: CPT | Performed by: STUDENT IN AN ORGANIZED HEALTH CARE EDUCATION/TRAINING PROGRAM

## 2025-01-30 PROCEDURE — 25810000003 SODIUM CHLORIDE 0.9 % SOLUTION: Performed by: STUDENT IN AN ORGANIZED HEALTH CARE EDUCATION/TRAINING PROGRAM

## 2025-01-30 PROCEDURE — 63710000001 REVEFENACIN 175 MCG/3ML SOLUTION: Performed by: STUDENT IN AN ORGANIZED HEALTH CARE EDUCATION/TRAINING PROGRAM

## 2025-01-30 PROCEDURE — 83735 ASSAY OF MAGNESIUM: CPT | Performed by: INTERNAL MEDICINE

## 2025-01-30 PROCEDURE — 25010000002 FUROSEMIDE PER 20 MG: Performed by: INTERNAL MEDICINE

## 2025-01-30 PROCEDURE — 84145 PROCALCITONIN (PCT): CPT | Performed by: STUDENT IN AN ORGANIZED HEALTH CARE EDUCATION/TRAINING PROGRAM

## 2025-01-30 PROCEDURE — 94760 N-INVAS EAR/PLS OXIMETRY 1: CPT

## 2025-01-30 RX ORDER — MONTELUKAST SODIUM 10 MG/1
10 TABLET ORAL NIGHTLY
Status: DISCONTINUED | OUTPATIENT
Start: 2025-01-30 | End: 2025-02-03 | Stop reason: HOSPADM

## 2025-01-30 RX ORDER — ALLOPURINOL 100 MG/1
100 TABLET ORAL EVERY MORNING
Status: DISCONTINUED | OUTPATIENT
Start: 2025-01-31 | End: 2025-02-03 | Stop reason: HOSPADM

## 2025-01-30 RX ORDER — IPRATROPIUM BROMIDE AND ALBUTEROL SULFATE 2.5; .5 MG/3ML; MG/3ML
3 SOLUTION RESPIRATORY (INHALATION)
Status: DISCONTINUED | OUTPATIENT
Start: 2025-01-30 | End: 2025-02-03 | Stop reason: HOSPADM

## 2025-01-30 RX ORDER — PANTOPRAZOLE SODIUM 40 MG/1
40 TABLET, DELAYED RELEASE ORAL DAILY
Status: DISCONTINUED | OUTPATIENT
Start: 2025-01-30 | End: 2025-02-03 | Stop reason: HOSPADM

## 2025-01-30 RX ORDER — TAMSULOSIN HYDROCHLORIDE 0.4 MG/1
0.4 CAPSULE ORAL DAILY
Status: DISCONTINUED | OUTPATIENT
Start: 2025-01-30 | End: 2025-02-03 | Stop reason: HOSPADM

## 2025-01-30 RX ORDER — ALUMINA, MAGNESIA, AND SIMETHICONE 2400; 2400; 240 MG/30ML; MG/30ML; MG/30ML
15 SUSPENSION ORAL EVERY 6 HOURS PRN
Status: DISCONTINUED | OUTPATIENT
Start: 2025-01-30 | End: 2025-02-03 | Stop reason: HOSPADM

## 2025-01-30 RX ORDER — FUROSEMIDE 10 MG/ML
60 INJECTION INTRAMUSCULAR; INTRAVENOUS
Status: DISCONTINUED | OUTPATIENT
Start: 2025-01-30 | End: 2025-02-01

## 2025-01-30 RX ORDER — METOLAZONE 5 MG/1
5 TABLET ORAL ONCE
Status: COMPLETED | OUTPATIENT
Start: 2025-01-30 | End: 2025-01-30

## 2025-01-30 RX ORDER — ARFORMOTEROL TARTRATE 15 UG/2ML
15 SOLUTION RESPIRATORY (INHALATION)
Status: DISCONTINUED | OUTPATIENT
Start: 2025-01-30 | End: 2025-02-03 | Stop reason: HOSPADM

## 2025-01-30 RX ORDER — CETIRIZINE HYDROCHLORIDE 10 MG/1
10 TABLET ORAL DAILY
Status: DISCONTINUED | OUTPATIENT
Start: 2025-01-30 | End: 2025-02-03 | Stop reason: HOSPADM

## 2025-01-30 RX ORDER — BUDESONIDE 0.5 MG/2ML
0.5 INHALANT ORAL
Status: DISCONTINUED | OUTPATIENT
Start: 2025-01-30 | End: 2025-02-03 | Stop reason: HOSPADM

## 2025-01-30 RX ADMIN — ISOSORBIDE MONONITRATE 15 MG: 30 TABLET, EXTENDED RELEASE ORAL at 08:35

## 2025-01-30 RX ADMIN — REVEFENACIN 175 MCG: 175 SOLUTION RESPIRATORY (INHALATION) at 10:00

## 2025-01-30 RX ADMIN — BUDESONIDE 0.5 MG: 0.5 INHALANT RESPIRATORY (INHALATION) at 18:21

## 2025-01-30 RX ADMIN — FUROSEMIDE 60 MG: 10 INJECTION, SOLUTION INTRAMUSCULAR; INTRAVENOUS at 17:00

## 2025-01-30 RX ADMIN — METOPROLOL SUCCINATE 25 MG: 25 TABLET, EXTENDED RELEASE ORAL at 08:36

## 2025-01-30 RX ADMIN — CEFEPIME 2000 MG: 2 INJECTION, POWDER, FOR SOLUTION INTRAVENOUS at 05:11

## 2025-01-30 RX ADMIN — SACUBITRIL AND VALSARTAN 1 TABLET: 24; 26 TABLET, FILM COATED ORAL at 21:33

## 2025-01-30 RX ADMIN — IPRATROPIUM BROMIDE AND ALBUTEROL SULFATE 3 ML: .5; 3 SOLUTION RESPIRATORY (INHALATION) at 18:21

## 2025-01-30 RX ADMIN — ARFORMOTEROL TARTRATE 15 MCG: 15 SOLUTION RESPIRATORY (INHALATION) at 10:00

## 2025-01-30 RX ADMIN — VANCOMYCIN HYDROCHLORIDE 1250 MG: 5 INJECTION, POWDER, LYOPHILIZED, FOR SOLUTION INTRAVENOUS at 03:36

## 2025-01-30 RX ADMIN — IPRATROPIUM BROMIDE AND ALBUTEROL SULFATE 3 ML: .5; 3 SOLUTION RESPIRATORY (INHALATION) at 22:27

## 2025-01-30 RX ADMIN — MONTELUKAST 10 MG: 10 TABLET, FILM COATED ORAL at 21:33

## 2025-01-30 RX ADMIN — BUDESONIDE 0.5 MG: 0.5 SUSPENSION RESPIRATORY (INHALATION) at 10:00

## 2025-01-30 RX ADMIN — CEFEPIME 2000 MG: 2 INJECTION, POWDER, FOR SOLUTION INTRAVENOUS at 16:37

## 2025-01-30 RX ADMIN — METOLAZONE 5 MG: 5 TABLET ORAL at 12:14

## 2025-01-30 RX ADMIN — SACUBITRIL AND VALSARTAN 1 TABLET: 24; 26 TABLET, FILM COATED ORAL at 08:36

## 2025-01-30 RX ADMIN — FUROSEMIDE 40 MG: 10 INJECTION, SOLUTION INTRAMUSCULAR; INTRAVENOUS at 08:36

## 2025-01-30 RX ADMIN — IPRATROPIUM BROMIDE AND ALBUTEROL SULFATE 3 ML: .5; 3 SOLUTION RESPIRATORY (INHALATION) at 15:07

## 2025-01-30 RX ADMIN — Medication 10 ML: at 21:35

## 2025-01-30 RX ADMIN — ENOXAPARIN SODIUM 110 MG: 100 INJECTION SUBCUTANEOUS at 12:14

## 2025-01-30 RX ADMIN — ARFORMOTEROL TARTRATE 15 MCG: 15 SOLUTION RESPIRATORY (INHALATION) at 18:21

## 2025-01-30 RX ADMIN — Medication 10 ML: at 08:36

## 2025-01-30 RX ADMIN — METOPROLOL SUCCINATE 25 MG: 25 TABLET, EXTENDED RELEASE ORAL at 21:33

## 2025-01-30 RX ADMIN — ALUMINUM HYDROXIDE, MAGNESIUM HYDROXIDE, AND DIMETHICONE 15 ML: 400; 400; 40 SUSPENSION ORAL at 23:16

## 2025-01-30 NOTE — PROGRESS NOTES
[facility]     Cardiology Progress Note    Patient Name: Layo Cee  : 1940  MRN: [id number]  Primary Care Physician:  Tevin Zavala MD  Date of admission: 2025    Subjective   Subjective     Chief Complaint: Follow-up visit for shortness of breath, congestive heart failure, chest discomfort    Interval HPI:    Patient reports no significant improvement in shortness of breath from yesterday.  Chest pain improved.  Denies any palpitations.  He continues to report dizziness.    Review of Systems   All systems were reviewed and negative except for: Shortness of breath, cough, wheezing, chest pain    Objective   Objective     Vitals:   Temp:  [97.4 °F (36.3 °C)-98.1 °F (36.7 °C)] 97.5 °F (36.4 °C)  Heart Rate:  [81-97] 82  Resp:  [20] 20  BP: (120-152)/(59-87) 128/69  Flow (L/min) (Oxygen Therapy):  [2] 2  Physical Exam      General : Alert, awake, in mild distress because shortness of breath, nasal cannula on  CVS : Regular rate and rhythm, no murmur, rubs or gallops  Lungs: Bilateral wheezing present, bilateral crackles present  Abdomen: Soft, nontender, bowel sounds heard in all 4 quadrants  Extremities: Warm, well-perfused, no pedal edema    Scheduled Meds:arformoterol, 15 mcg, Nebulization, BID - RT  budesonide, 0.5 mg, Nebulization, BID - RT  cefepime, 2,000 mg, Intravenous, Q12H  enoxaparin, 1 mg/kg, Subcutaneous, Q12H  furosemide, 60 mg, Intravenous, BID Diuretics  insulin lispro, 2-7 Units, Subcutaneous, 4x Daily AC & at Bedtime  isosorbide mononitrate, 15 mg, Oral, Daily  metOLazone, 5 mg, Oral, Once  metoprolol succinate XL, 25 mg, Oral, Q12H  revefenacin, 175 mcg, Nebulization, Daily - RT  sacubitril-valsartan, 1 tablet, Oral, BID  sodium chloride, 10 mL, Intravenous, Q12H  vancomycin, 1,250 mg, Intravenous, Q24H      Continuous Infusions:Pharmacy to Dose enoxaparin (LOVENOX),   Pharmacy to dose vancomycin,            Result Review    Result Review:  I have personally reviewed the  results from the time of this admission to 1/30/2025 09:21 EST and agree with these findings:  [x]  Laboratory  []  Microbiology  [x]  Radiology  [x]  EKG/Telemetry   [x]  Cardiology/Vascular   []  Pathology  []  Old records  []  Other:  Most notable findings include:     CBC          1/27/2025    13:25 1/29/2025    03:25 1/30/2025    04:51   CBC   WBC 8.91  12.34  17.65    RBC 3.69  3.62  3.40    Hemoglobin 10.6  10.1  9.3    Hematocrit 32.7  31.8  30.4    MCV 88.6  87.8  89.4    MCH 28.7  27.9  27.4    MCHC 32.4  31.8  30.6    RDW 14.2  15.1  15.0    Platelets 244  221  211      CMP          1/27/2025    13:25 1/29/2025    03:25 1/29/2025    04:10 1/30/2025    04:51   CMP   Glucose 80  157   142    BUN 12  16   20    Creatinine 1.41  1.70  1.62  1.63    EGFR 49.8  39.8   41.8    Sodium 141  136   140    Potassium 4.1  4.2   4.2    Chloride 105  102   106    Calcium 8.8  8.1   8.2    Total Protein 6.2  6.3   5.7    Albumin 3.6  3.6   3.2    Globulin 2.6  2.7   2.5    Total Bilirubin 0.3  0.5   0.3    Alkaline Phosphatase 57  54   45    AST (SGOT) 17  17   45    ALT (SGPT) 15  8   15    Albumin/Globulin Ratio 1.4  1.3   1.3    BUN/Creatinine Ratio 8.5  9.4   12.3    Anion Gap 10.6  12.4   10.0       CARDIAC LABS:     Latest Reference Range & Units 01/29/25 03:25 01/29/25 05:06 01/30/25 04:51 01/30/25 06:37   Creatine Kinase 20 - 200 U/L  213 (H)  276 (H)   CKMB <=10.40 ng/mL  23.32 (H)  39.61 (H)   HS Troponin T <22 ng/L 176 (C) 249 (C) 639 (C) 643 (C)   Troponin T % Delta Abnormal if >/= 20%   41 (C)  1   Troponin T Numeric Delta ng/L  73 (C)  4   proBNP 0.0 - 1,800.0 pg/mL 4,121.0 (H)        Results for orders placed during the hospital encounter of 01/29/25    Adult Transthoracic Echo Complete W/ Cont if Necessary Per Protocol    Interpretation Summary    Left ventricular systolic function is low normal. Left ventricular ejection fraction appears to be 46 - 50%.    Left ventricular wall thickness is consistent  with mild concentric hypertrophy.    Left ventricular diastolic function is consistent with (grade II w/high LAP) pseudonormalization.    The left atrial cavity is mildly dilated.    Aortic valve is moderately calcified with restricted opening.  Mild to moderate aortic valve stenosis is present.  The peak and mean gradients across aortic valve are 30/18 mmHg    There is mild tricuspid regurgitation.  Estimated right ventricular systolic pressure from tricuspid regurgitation is moderately elevated (45-55 mmHg).       Assessment & Plan   Assessment / Plan     Brief Patient Summary:  Layo Cee is a 84 y.o. male with coronary artery disease, previous angioplasty,, CABG, atrial fibrillation/flutter, previous ablation, on anticoagulation, chronic combined heart failure, hypertension and hyperlipidemia.  Currently admitted with shortness of breath, chills.     Active Hospital Problems:  Active Hospital Problems    Diagnosis     **Multifocal pneumonia     Acute exacerbation of CHF (congestive heart failure)      Acute on chronic combined heart failure : Clinically volume overloaded, chest x-ray shows pulmonary vascular congestion.  He has some edema.  proBNP is elevated.      Elevated troponin : Volume overload with systemic infection, and sinus tachycardia on presentation.  He has some constant chest discomfort which is mild.  Suspect this is related to type II MI from systemic infection, CKD and CHF exacerbation.     Multifocal pneumonia : Elevated procalcitonin, on broad-spectrum antibiotics     Paroxysmal atrial fibrillation : Currently in sinus rhythm and sinus tachycardia, had ablations in the past.     COPD with exacerbation      Plan:     Increase IV Lasix to 60 mg twice daily  5 mg of p.o. metolazone today  Continue Lovenox per ACS protocol for another 24 hours  Continue metoprolol, Entresto  Continue statins    Antibiotics, bronchodilators, steroids per primary team and pulmonology  Repeat labs a.m.  We will  follow       CODE STATUS:   Level Of Support Discussed With: Patient  Code Status (Patient has no pulse and is not breathing): CPR (Attempt to Resuscitate)  Medical Interventions (Patient has pulse or is breathing): Full Support      Electronically signed by Darnell Stevenson MD, 01/30/25, 9:21 AM EST.

## 2025-01-30 NOTE — PLAN OF CARE
Goal Outcome Evaluation:  Plan of Care Reviewed With: patient, spouse        Progress: no change  Outcome Evaluation: Patient presents with deficits in balance, endurance, transfers, and ambulation. Patient will benefit from skilled PT services to address these mobility deficits and decrease risk of falls.    Anticipated Discharge Disposition (PT): skilled nursing facility

## 2025-01-30 NOTE — CONSULTS
[facility]   Consult Note    Patient Name: Layo Cee  : 1940  MRN: [id number]  Primary Care Physician:  Tevin Zavala MD  Referring Physician: No Known Provider  Date of admission: 2025    Inpatient Pulmonology Consult  Consult performed by: Dylan Kimball DO  Consult ordered by: Cas Goodwin MD        Subjective   Subjective     Reason for Consult/ Chief Complaint: Shortness of breath and chills    History of Present Illness  Layo Cee is a 84 y.o. male past medical history of COPD, CHF, tobacco abuse of cigarettes in remission, GERD, s/p CABG sent to the ED for evaluation of shortness of breath and chills.  In the ED, troponins were elevated and CXR demonstrating new or increased bilateral infiltrates.  Service was consulted to assist in management treatment patient's acute issues.  Upon assessment, patient lying in bed on 2 L nasal cannula no permastore distress.  Findings improved with the patient.  Patient stating that over the last couple days excessive shortness of breath as well as some chills.  Patient reporting increased tremors in his hands.  Reporting increased edema as well.  Patient denies being around any known sick contacts, but patient did test positive for mycoplasma pneumonia    Review of Systems     Personal History     Past Medical History:   Diagnosis Date    Acute on chronic diastolic CHF (congestive heart failure) 2022    Arthritis     Atrial fibrillation, persistent 5/3/2022    Persistent atrial fibrillation status post extensive ablation and pulmonary vein isolation by Dr. Aguilar on 6/3/2022, with reoccurring rapid A. fib, and then successful cardioversion on 2022    BPH (benign prostatic hyperplasia)     CHF (congestive heart failure)     COPD (chronic obstructive pulmonary disease)     Coronary artery disease of bypass graft of native heart with stable angina pectoris     (1) Coronary artery disease, s/p CABG in the past. Cardiac  catheterization in July 2016 showed patentLIMA graft to the LAD and occluded SVGs. Native LAD is 100% occluded in the mid portion. Native LCx has no significant disease. Native RCA is 100% occluded and it is a . Repeat cath in June, 2021, underwent stent placement to diagnonal branch.    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 Lauren Valencia        Past Surgical History:   Procedure Laterality Date    BLADDER SURGERY      CARDIAC CATHETERIZATION  07/2016    CARDIAC CATHETERIZATION N/A 8/9/2021    Procedure: Left Heart Cath with possible angioplasty;  Surgeon: Jack Ladd MD;  Location: [facility] CATH INVASIVE LOCATION;  Service: Cardiovascular;  Laterality: N/A;  Please schedule the procedure with Dr. Jack Ladd MD on 8/9/2021    CARDIAC ELECTROPHYSIOLOGY PROCEDURE N/A 6/3/2022    Procedure: Ablation atrial fibrillation;  Surgeon: Irineo Aguilar MD;  Location: [facility] CATH INVASIVE LOCATION;  Service: Cardiovascular;  Laterality: N/A;    CARDIAC ELECTROPHYSIOLOGY PROCEDURE N/A 6/3/2022    Procedure: Ablation atrial flutter/CTI;  Surgeon: Irineo Aguilar MD;  Location: [facility] CATH INVASIVE LOCATION;  Service: Cardiovascular;  Laterality: N/A;    CARDIAC SURGERY      HEART BYPASS    COLONOSCOPY  2011    CORONARY ARTERY BYPASS GRAFT      CYSTOSCOPY  03/19/2019    WITH BILATERAL RETROGRADE PYELOGRAPHY    ELECTRICAL CARDIOVERSION  5/12/2022         ENDOSCOPY  2016    PROSTATE SURGERY         Family History: family history  includes Heart disease in his father; Hypertension in his mother; Kidney cancer in his brother; Other in his brother. Otherwise pertinent FHx was reviewed and not pertinent to current issue.    Social History:  reports that he quit smoking about 27 years ago. His smoking use included cigarettes. He started smoking about 67 years ago. He has a 20 pack-year smoking history. He has been exposed to tobacco smoke. He has never used smokeless tobacco. He reports that he does not currently use alcohol. He reports that he does not use drugs.    Home Medications:   Allopurinol, B Complex-C, Coenzyme Q10, Diclofenac Sodium, Fluticasone-Umeclidin-Vilant, Semaglutide-Weight Management, acetaminophen, amoxicillin-clavulanate, aspirin, calcium carbonate, cetirizine, dilTIAZem CD, doxycycline, finasteride, fluticasone, ipratropium-albuterol, isosorbide mononitrate, metoprolol succinate XL, montelukast, nitroglycerin, pantoprazole, pitavastatin calcium, rivaroxaban, sacubitril-valsartan, sucralfate, and tamsulosin    Allergies:  Allergies   Allergen Reactions    Carvedilol Swelling and Anaphylaxis    Levaquin [Levofloxacin] Unknown - Low Severity       Objective    Objective     Vitals:  Temp:  [97.2 °F (36.2 °C)-98.1 °F (36.7 °C)] 97.5 °F (36.4 °C)  Heart Rate:  [71-94] 80  Resp:  [20] 20  BP: (120-143)/(46-81) 143/81  Flow (L/min) (Oxygen Therapy):  [2-22] 2    Physical Exam  Pleasant male with scattered crackles  Result Review    Result Review:  I have personally reviewed the results from the time of this admission to 1/30/2025 17:26 EST and agree with these findings:  [x]  Laboratory  [x]  Microbiology  [x]  Radiology  []  EKG/Telemetry   []  Cardiology/Vascular   []  Pathology  []  Old records  []  Other:    Most notable findings include: Chest x-ray showing interstitial infiltrate    Assessment & Plan   Assessment / Plan     Brief Patient Summary:  Layo Cee is a 84 y.o. male who presented with shortness of breath and  chills found to be positive for mycoplasma pneumonia    Active Hospital Problems:  Active Hospital Problems    Diagnosis     **Multifocal pneumonia     Acute exacerbation of CHF (congestive heart failure)        Plan:   Chest x-ray reviewed with interstitial infiltrate  Patient could have both pulmonary edema and pneumonia  No significant mucus production  Will obtain CT scan of the chest to help delineate if this is more of an infectious or a fluid issue  Cardiology following  And meantime continue vancomycin and cefepime  If no improvement or chest x-ray shows multifocal pneumonia may benefit from bronchoscopy  Continue with diuretics  Electronically signed by ARACELI Vega, 01/30/25, 6:14 PM EST.    I personally seen  examined this patient and the medical decision making and assessment and plan reflect my and I assume responsibility for the care of this patient    Electronically signed by Dylan Kimball DO, 01/30/25, 5:26 PM EST.

## 2025-01-30 NOTE — PLAN OF CARE
Goal Outcome Evaluation:   No acute events this shift. On 2LNC, sometimes room air during day. Up to chair this shift. Diuresing. Will continue current plan of care.

## 2025-01-30 NOTE — THERAPY EVALUATION
Patient Name: Layo Cee  : 1940    MRN: [id number]                              Today's Date: 2025       Admit Date: 2025    Visit Dx:     ICD-10-CM ICD-9-CM   1. Acute on chronic congestive heart failure, unspecified heart failure type  I50.9 428.0   2. COPD with acute exacerbation  J44.1 491.21   3. Elevated troponin  R79.89 790.6   4. Pneumonia due to infectious organism, unspecified laterality, unspecified part of lung  J18.9 486   5. Decreased activities of daily living (ADL)  Z78.9 V49.89     Patient Active Problem List   Diagnosis    Unstable angina    Coronary artery disease of bypass graft of native heart with stable angina pectoris    Class 2 obesity due to excess calories without serious comorbidity with body mass index (BMI) of 35.0 to 35.9 in adult    Gout    Typical atrial flutter    Essential hypertension    Frequent PVCs    Mixed hyperlipidemia    Lateral epicondylitis    Allergic rhinitis    Seasonal allergies    Tobacco abuse, in remission    Vitamin D deficiency    Gastroesophageal reflux disease with esophagitis without hemorrhage    Ischemic cardiomyopathy    Centrilobular emphysema    Gastroesophageal reflux disease    Atrial fibrillation, persistent    COPD (chronic obstructive pulmonary disease)    Chronic anticoagulation    Diastolic dysfunction    S/P CABG (coronary artery bypass graft)    Stage 3a chronic kidney disease    Persistent atrial fibrillation    S/P ablation of atrial fibrillation    Atrial fibrillation, unspecified type    SOB (shortness of breath)    Chest pain    Mild aortic valve stenosis    Moderate mitral regurgitation    Benign prostatic hyperplasia with lower urinary tract symptoms    Chronic systolic congestive heart failure    Acute exacerbation of CHF (congestive heart failure)    Multifocal pneumonia     Past Medical History:   Diagnosis Date    Acute on chronic diastolic CHF (congestive heart failure) 2022    Arthritis     Atrial  fibrillation, persistent 5/3/2022    Persistent atrial fibrillation status post extensive ablation and pulmonary vein isolation by Dr. Aguilar on 6/3/2022, with reoccurring rapid A. fib, and then successful cardioversion on 7/1/2022    BPH (benign prostatic hyperplasia)     CHF (congestive heart failure)     COPD (chronic obstructive pulmonary disease)     Coronary artery disease of bypass graft of native heart with stable angina pectoris     (1) Coronary artery disease, s/p CABG in the past. Cardiac catheterization in July 2016 showed patentLIMA graft to the LAD and occluded SVGs. Native LAD is 100% occluded in the mid portion. Native LCx has no significant disease. Native RCA is 100% occluded and it is a . Repeat cath in June, 2021, underwent stent placement to diagnonal branch.    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 Lauren Valencia      Past Surgical History:   Procedure Laterality Date    BLADDER SURGERY      CARDIAC CATHETERIZATION  07/2016    CARDIAC CATHETERIZATION N/A 8/9/2021    Procedure: Left Heart Cath with possible angioplasty;  Surgeon: Jack Ladd MD;  Location: [facility] INVASIVE LOCATION;  Service: Cardiovascular;  Laterality: N/A;  Please schedule the procedure with Dr. Jack Ladd MD on 8/9/2021    CARDIAC ELECTROPHYSIOLOGY PROCEDURE N/A 6/3/2022    Procedure: Ablation atrial fibrillation;  Surgeon: Irineo Aguilar MD;  Location: [facility] CATH INVASIVE LOCATION;  Service: Cardiovascular;  Laterality: N/A;     CARDIAC ELECTROPHYSIOLOGY PROCEDURE N/A 6/3/2022    Procedure: Ablation atrial flutter/CTI;  Surgeon: Irineo Aguilar MD;  Location: [facility] CATH INVASIVE LOCATION;  Service: Cardiovascular;  Laterality: N/A;    CARDIAC SURGERY      HEART BYPASS    COLONOSCOPY  2011    CORONARY ARTERY BYPASS GRAFT      CYSTOSCOPY  03/19/2019    WITH BILATERAL RETROGRADE PYELOGRAPHY    ELECTRICAL CARDIOVERSION  5/12/2022         ENDOSCOPY  2016    PROSTATE SURGERY        General Information       Row Name 01/30/25 1037          OT Time and Intention    Document Type evaluation  -AV     Mode of Treatment individual therapy;occupational therapy  -AV       Row Name 01/30/25 1037          General Information    Patient Profile Reviewed yes  -AV     Prior Level of Function independent:;ADL's;transfer  stands to shower (tub). stands at sink to groom. elevated  commode w/ grab bar. ambulates with occasional use of STC. 2L O2 at night only.  -AV     Existing Precautions/Restrictions fall;oxygen therapy device and L/min  -AV     Barriers to Rehab none identified  -AV       Row Name 01/30/25 1037          Occupational Profile    Reason for Services/Referral (Occupational Profile) Patient is a 84 year old male admitted to [facility] on 1/29/25 with shortness of air.   OT consulted due to recent decline in ADL/ transfer independence.  No previous OT services for current condition.  -AV       Row Name 01/30/25 1037          Living Environment    People in Home spouse  -AV       Row Name 01/30/25 1037          Home Main Entrance    Number of Stairs, Main Entrance two  -AV       Row Name 01/30/25 1037          Stairs Within Home, Primary    Number of Stairs, Within Home, Primary none  -AV       Row Name 01/30/25 1037          Cognition    Orientation Status (Cognition) --  alert, pleasant and cooperative. able to retain information and follow commands.  -AV       Row Name 01/30/25 1037          Safety Issues/Impairments Affecting  Functional Mobility    Impairments Affecting Function (Mobility) endurance/activity tolerance  likely balance  -AV               User Key  (r) = Recorded By, (t) = Taken By, (c) = Cosigned By      Initials Name Provider Type    Mendoza Garcia OT Occupational Therapist                     Mobility/ADL's       Row Name 01/30/25 1042          Transfers    Comment, (Transfers) supine to long-sitting min assist. further testing deferred due to shortness of air. pt also reporting he has not been able to get out of bed yet at all due to SOA.  -AV       Row Name 01/30/25 1042          Activities of Daily Living    BADL Assessment/Intervention --  Independent feeding and grooming with setup in bed. Mod assist bathing/ dressing. Independent use of urinal in bed/ no BM during hospitalization. SOA with minimal functional tasks.  -AV               User Key  (r) = Recorded By, (t) = Taken By, (c) = Cosigned By      Initials Name Provider Type    Mendoza Garcia OT Occupational Therapist                   Obj/Interventions       Row Name 01/30/25 1044          Sensory Assessment (Somatosensory)    Sensory Assessment (Somatosensory) UE sensation intact  -AV       Row Name 01/30/25 1044          Vision Assessment/Intervention    Visual Impairment/Limitations WFL;corrective lenses for reading  -AV       Row Name 01/30/25 1044          Range of Motion Comprehensive    General Range of Motion bilateral upper extremity ROM WFL  -AV     Comment, General Range of Motion AROM  -AV       Row Name 01/30/25 1044          Strength Comprehensive (MMT)    Comment, General Manual Muscle Testing (MMT) Assessment 4/5 Bilateral biceps, triceps and   -AV       Row Name 01/30/25 1044          Motor Skills    Motor Skills coordination;functional endurance  -AV     Coordination --  right dominant- functional w/ reports of some difficulty with button manipulation prior to onset  -AV     Functional Endurance fair minus  -AV       Row Name  01/30/25 1044          Balance    Balance Assessment sitting dynamic balance  -AV     Dynamic Sitting Balance independent  -AV     Comment, Balance standing deferred: SOA  -AV               User Key  (r) = Recorded By, (t) = Taken By, (c) = Cosigned By      Initials Name Provider Type    Mendoza Garcia, OT Occupational Therapist                   Goals/Plan       Row Name 01/30/25 1047          Transfer Goal 1 (OT)    Activity/Assistive Device (Transfer Goal 1, OT) transfers, all;walker, rolling  -AV     [place] Level/Cues Needed (Transfer Goal 1, OT) modified independence  -AV     Time Frame (Transfer Goal 1, OT) long term goal (LTG);10 days  -AV       Row Name 01/30/25 1047          Bathing Goal 1 (OT)    Activity/Device (Bathing Goal 1, OT) bathing skills, all;tub bench  -AV     [place] Level/Cues Needed (Bathing Goal 1, OT) modified independence  -AV     Time Frame (Bathing Goal 1, OT) long term goal (LTG);10 days  -AV       Row Name 01/30/25 1047          Dressing Goal 1 (OT)    Activity/Device (Dressing Goal 1, OT) dressing skills, all  -AV     [place]/Cues Needed (Dressing Goal 1, OT) modified independence  -AV     Time Frame (Dressing Goal 1, OT) long term goal (LTG);10 days  -AV       Row Name 01/30/25 1047          Toileting Goal 1 (OT)    Activity/Device (Toileting Goal 1, OT) toileting skills, all;raised toilet seat  -AV     [place] Level/Cues Needed (Toileting Goal 1, OT) modified independence  -AV     Time Frame (Toileting Goal 1, OT) long term goal (LTG);10 days  -AV       Row Name 01/30/25 1047          Grooming Goal 1 (OT)    Activity/Device (Grooming Goal 1, OT) grooming skills, all  Standing at sink  -AV     [place] (Grooming Goal 1, OT) modified independence  -AV     Time Frame (Grooming Goal 1, OT) long term goal (LTG);10 days  -AV       Row Name 01/30/25 1047          Problem Specific Goal 1 (OT)    Problem Specific Goal 1 (OT) Patient will demonstrate fair  endurance/activity tolerance needed to support ADLs.  -AV     Time Frame (Problem Specific Goal 1, OT) long term goal (LTG);10 days  -AV       Row Name 01/30/25 4632          Therapy Assessment/Plan (OT)    Planned Therapy Interventions (OT) activity tolerance training;BADL retraining;functional balance retraining;occupation/activity based interventions;patient/caregiver education/training;ROM/therapeutic exercise;transfer/mobility retraining  -AV               User Key  (r) = Recorded By, (t) = Taken By, (c) = Cosigned By      Initials Name Provider Type    AV Mendoza Howard, DOROTHY Occupational Therapist                   Clinical Impression       Row Name 01/30/25 1041          Pain Assessment    Additional Documentation Pain Scale: FACES Pre/Post-Treatment (Group)  -AV       Row Name 01/30/25 104          Pain Scale: FACES Pre/Post-Treatment    Pain: FACES Scale, Pretreatment 0-->no hurt  -AV     Posttreatment Pain Rating 0-->no hurt  -AV       Row Name 01/30/25 1044          Plan of Care Review    Plan of Care Reviewed With patient;spouse  -AV     Progress no change  First session for evaluation  -AV     Outcome Evaluation Patient presents with limitations of endurance/activity tolerance and likely balance which impede his ability to perform ADL and transfers as prior.  The skills of a therapist will be required to safely and effectively implement treatment plan to restore maximal level of function.  -AV       Row Name 01/30/25 2401          Therapy Assessment/Plan (OT)    Patient/Family Therapy Goal Statement (OT) Regain independence  -AV     Rehab Potential (OT) good  -AV     Criteria for Skilled Therapeutic Interventions Met (OT) yes;meets criteria;skilled treatment is necessary  -AV     Therapy Frequency (OT) 5 times/wk  -AV       Row Name 01/30/25 4855          Therapy Plan Review/Discharge Plan (OT)    Equipment Needs Upon Discharge (OT) walker, rolling;tub bench  -AV     Anticipated Discharge Disposition  (OT) skilled nursing facility  -AV       Row Name 01/30/25 1045          Vital Signs    O2 Delivery Pre Treatment nasal cannula  2L  -AV     O2 Delivery Intra Treatment nasal cannula  2L  -AV     O2 Delivery Post Treatment nasal cannula  2L  -AV       Row Name 01/30/25 1045          Positioning and Restraints    Pre-Treatment Position in bed  -AV     Post Treatment Position bed  -AV     In Bed call light within reach;encouraged to call for assist;with family/caregiver  Wife  -AV               User Key  (r) = Recorded By, (t) = Taken By, (c) = Cosigned By      Initials Name Provider Type    Mendoza Garcia OT Occupational Therapist                   Outcome Measures       Row Name 01/30/25 1048          How much help from another is currently needed...    Putting on and taking off regular lower body clothing? 2  -AV     Bathing (including washing, rinsing, and drying) 2  -AV     Toileting (which includes using toilet bed pan or urinal) 2  -AV     Putting on and taking off regular upper body clothing 3  -AV     Taking care of personal grooming (such as brushing teeth) 4  -AV     Eating meals 4  -AV     AM-PAC 6 Clicks Score (OT) 17  -AV       Row Name 01/30/25 0835          How much help from another person do you currently need...    Turning from your back to your side while in flat bed without using bedrails? 3  -SB     Moving from lying on back to sitting on the side of a flat bed without bedrails? 3  -SB     Moving to and from a bed to a chair (including a wheelchair)? 3  -SB     Standing up from a chair using your arms (e.g., wheelchair, bedside chair)? 3  -SB     Climbing 3-5 steps with a railing? 2  -SB     To walk in hospital room? 3  -SB     AM-PAC 6 Clicks Score (PT) 17  -SB       Row Name 01/30/25 1048          Functional Assessment    Outcome Measure Options AM-PAC 6 Clicks Daily Activity (OT);Optimal Instrument  -AV       Row Name 01/30/25 1048          Optimal Instrument    Optimal Instrument Optimal  - 3  -AV     Bending/Stooping 3  -AV     Standing 5  -AV     Reaching 1  -AV     From the list, choose the 3 activities you would most like to be able to do without any difficulty Bending/stooping;Standing;Reaching  -AV     Total Score Optimal - 3 9  -AV               User Key  (r) = Recorded By, (t) = Taken By, (c) = Cosigned By      Initials Name Provider Type    AV Mendoza Howard OT Occupational Therapist    Apple Mayberry, RN Registered Nurse                    Occupational Therapy Education       Title: PT OT SLP Therapies (Done)       Topic: Occupational Therapy (Done)       Point: ADL training (Done)       Description:   Instruct learner(s) on proper safety adaptation and remediation techniques during self care or transfers.   Instruct in proper use of assistive devices.                  Learning Progress Summary            Patient Acceptance, E, VU by AV at 1/30/2025 1049   Significant Other Acceptance, E, VU by AV at 1/30/2025 1049                      Point: Home exercise program (Done)       Description:   Instruct learner(s) on appropriate technique for monitoring, assisting and/or progressing therapeutic exercises/activities.                  Learning Progress Summary            Patient Acceptance, E, VU by AV at 1/30/2025 1049   Significant Other Acceptance, E, VU by AV at 1/30/2025 1049                      Point: Precautions (Done)       Description:   Instruct learner(s) on prescribed precautions during self-care and functional transfers.                  Learning Progress Summary            Patient Acceptance, E, VU by AV at 1/30/2025 1049   Significant Other Acceptance, E, VU by AV at 1/30/2025 1049                      Point: Body mechanics (Done)       Description:   Instruct learner(s) on proper positioning and spine alignment during self-care, functional mobility activities and/or exercises.                  Learning Progress Summary            Patient Acceptance, E, VU by AV at 1/30/2025  1049   Significant Other Acceptance, E, VU by AV at 1/30/2025 1049                                      User Key       Initials Effective Dates Name Provider Type Discipline    AV 06/16/21 -  Mendoza Howard OT Occupational Therapist OT                  OT Recommendation and Plan  Planned Therapy Interventions (OT): activity tolerance training, BADL retraining, functional balance retraining, occupation/activity based interventions, patient/caregiver education/training, ROM/therapeutic exercise, transfer/mobility retraining  Therapy Frequency (OT): 5 times/wk  Plan of Care Review  Plan of Care Reviewed With: patient, spouse  Progress: no change (First session for evaluation)  Outcome Evaluation: Patient presents with limitations of endurance/activity tolerance and likely balance which impede his ability to perform ADL and transfers as prior.  The skills of a therapist will be required to safely and effectively implement treatment plan to restore maximal level of function.     Time Calculation:   Evaluation Complexity (OT)  Review Occupational Profile/Medical/Therapy History Complexity: expanded/moderate complexity  Assessment, Occupational Performance/Identification of Deficit Complexity: 1-3 performance deficits  Clinical Decision Making Complexity (OT): problem focused assessment/low complexity  Overall Complexity of Evaluation (OT): low complexity     Time Calculation- OT       Row Name 01/30/25 1050             Time Calculation- OT    OT Received On 01/30/25  -AV      OT Goal Re-Cert Due Date 02/08/25  -AV         Untimed Charges    OT Eval/Re-eval Minutes 35  -AV         Total Minutes    Untimed Charges Total Minutes 35  -AV       Total Minutes 35  -AV                User Key  (r) = Recorded By, (t) = Taken By, (c) = Cosigned By      Initials Name Provider Type    AV Mendoza Howard OT Occupational Therapist                  Therapy Charges for Today       Code Description Service Date Service Provider Modifiers  Qty    [id number]  OT EVAL LOW COMPLEXITY 3 1/30/2025 Mendoza Howard, OT GO 1                 Mendoza Howard OT  1/30/2025

## 2025-01-30 NOTE — PROGRESS NOTES
[facility]   Hospitalist Progress Note  Date: 2025  Patient Name: Layo Cee  : 1940  MRN: [id number]  Date of admission: 2025    Subjective   Subjective     Chief Complaint:   Shortness of breath, chills    Summary:   Layo Cee is a 84 y.o. male with past medical history of CAD status post CABG, atrial fibrillation/flutter on rivaroxaban, CHF, hypertension, hyperlipidemia, GERD, COPD on 2 L home oxygen, CKD presented to the ED for evaluation of sudden onset of chills, shortness of breath that started around 8 PM.  Noted to have some chest pain across the chest.  Describes it as aching.  Used nebulizer treatments with no improvement in the symptoms.  Patient waited until 1 AM and he continued to experience shortness of breath and called EMS.  Patient recently diagnosed with pneumonia and started on Augmentin and doxycycline outpatient on 2025.  Received nebulizer treatments and Solu-Medrol by EMS.  For the past few months patient did not had any swelling in the bilateral lower extremities and tonight when he started experiencing shortness of breath he was noted to have bilateral lower extremity edema as well.  Noted to have some cough and intermittent sputum production.  Denies dysuria, nausea, vomiting, abdominal pain, fevers.  Patient was admitted and started on treatment for multifocal pneumonia and also diuretics for suspected component of decompensated heart failure.    Interval Followup:   No acute events overnight, patient states his dyspnea remains persistent and he is having to use oxygen around-the-clock as opposed to just at night as he usually does for COPD.  Denies having any chest pain currently.    Objective   Objective     Vitals:   Temp:  [97.2 °F (36.2 °C)-98.1 °F (36.7 °C)] 97.2 °F (36.2 °C)  Heart Rate:  [71-97] 71  Resp:  [20] 20  BP: (120-149)/(46-87) 140/46  Flow (L/min) (Oxygen Therapy):  [2-22] 2    Physical Exam   GEN: No acute distress  HEENT: Moist  mucous membranes  LUNGS: Equal chest rise bilaterally, diminished breath sounds at the bases bilaterally  CARDIAC: Regular rate and rhythm.  1+ pitting edema to the mid tibia bilaterally  NEURO: Moving all 4 extremities spontaneously  SKIN: No obvious breakdown    Result Review    Result Review:  I have personally reviewed the results as below  [x]  Laboratory CBC, CMP personally reviewed  CBC          1/27/2025    13:25 1/29/2025    03:25 1/30/2025    04:51   CBC   WBC 8.91  12.34  17.65    RBC 3.69  3.62  3.40    Hemoglobin 10.6  10.1  9.3    Hematocrit 32.7  31.8  30.4    MCV 88.6  87.8  89.4    MCH 28.7  27.9  27.4    MCHC 32.4  31.8  30.6    RDW 14.2  15.1  15.0    Platelets 244  221  211      CMP          1/27/2025    13:25 1/29/2025    03:25 1/29/2025    04:10 1/30/2025    04:51   CMP   Glucose 80  157   142    BUN 12  16   20    Creatinine 1.41  1.70  1.62  1.63    EGFR 49.8  39.8   41.8    Sodium 141  136   140    Potassium 4.1  4.2   4.2    Chloride 105  102   106    Calcium 8.8  8.1   8.2    Total Protein 6.2  6.3   5.7    Albumin 3.6  3.6   3.2    Globulin 2.6  2.7   2.5    Total Bilirubin 0.3  0.5   0.3    Alkaline Phosphatase 57  54   45    AST (SGOT) 17  17   45    ALT (SGPT) 15  8   15    Albumin/Globulin Ratio 1.4  1.3   1.3    BUN/Creatinine Ratio 8.5  9.4   12.3    Anion Gap 10.6  12.4   10.0      []  Microbiology  []  Radiology  []  EKG/Telemetry   []  Cardiology/Vascular   []  Pathology  []  Old records  []  Other:    Scheduled medications:  arformoterol, 15 mcg, Nebulization, BID - RT  budesonide, 0.5 mg, Nebulization, BID - RT  cefepime, 2,000 mg, Intravenous, Q12H  enoxaparin, 1 mg/kg, Subcutaneous, Q12H  furosemide, 60 mg, Intravenous, BID Diuretics  insulin lispro, 2-7 Units, Subcutaneous, 4x Daily AC & at Bedtime  isosorbide mononitrate, 15 mg, Oral, Daily  metoprolol succinate XL, 25 mg, Oral, Q12H  revefenacin, 175 mcg, Nebulization, Daily - RT  sacubitril-valsartan, 1 tablet, Oral,  BID  sodium chloride, 10 mL, Intravenous, Q12H  vancomycin, 1,250 mg, Intravenous, Q24H      As needed medications:    albuterol    senna-docusate sodium **AND** polyethylene glycol **AND** bisacodyl **AND** bisacodyl    dextrose    dextrose    glucagon (human recombinant)    nitroglycerin    Pharmacy to Dose enoxaparin (LOVENOX)    Pharmacy to dose vancomycin    sodium chloride    sodium chloride    sodium chloride  Infusions:  Pharmacy to Dose enoxaparin (LOVENOX),   Pharmacy to dose vancomycin,            Assessment & Plan   Assessment / Plan     Assessment:  Acute on chronic heart failure with intermediate ejection fraction  Elevated troponin, suspected type II in setting of tachycardia and decompensated heart failure per cardiology  Cardiogenic pulmonary edema  Worsening multifocal pneumonia  Moderate aortic stenosis  Sepsis, present on admission, tachycardia, tachypnea, elevated white count, pneumonia  COPD on 2 L home oxygen  CAD status post CABG followed by stents x 2  Atrial fibrillation on rivaroxaban, history of ablation  CKD stage IIIb  CHF  GERD  Hypertension    Plan:  Patient admitted to the hospital for further workup and management of above  Cardiology consulted secondary to abnormal EKG, elevated troponins-suspect this is demand ischemia from acute infectious process and tachycardia on admission  Patient remains short of breath, diuretics increased to 60 mg twice daily on 01/30/2025 for suspected pulm edema component to hypoxia  Patient with rising WBCs, recheck procalcitonin today to assess if pneumonia is being appropriately treated.  Will get sputum cultures.  Add bronchopulmonary hygiene and mucolytics.  Will get pulmonary consultation given lack of improvement.  Blood cultures remain negative so far  Strep pneumo, Legionella negative, mycoplasma IgM pending, MRSA swab of the nares pending  Continue Xarelto  Continue Brovana, Pulmicort  Continue bronchodilator protocol  Cardiac diet  Sliding  scale insulin  Further cardiac workup per cardiology     Reviewed patient's labs and imaging, and discussed with patient and nurse at bedside.    VTE Prophylaxis:  Pharmacologic & mechanical VTE prophylaxis orders are present.        CODE STATUS:   Level Of Support Discussed With: Patient  Code Status (Patient has no pulse and is not breathing): CPR (Attempt to Resuscitate)  Medical Interventions (Patient has pulse or is breathing): Full Support

## 2025-01-30 NOTE — PLAN OF CARE
Goal Outcome Evaluation:  Plan of Care Reviewed With: patient, spouse        Progress: no change (First session for evaluation)  Outcome Evaluation: Patient presents with limitations of endurance/activity tolerance and likely balance which impede his ability to perform ADL and transfers as prior.  The skills of a therapist will be required to safely and effectively implement treatment plan to restore maximal level of function.    Anticipated Discharge Disposition (OT): skilled nursing facility

## 2025-01-30 NOTE — PROGRESS NOTES
[facility]: Vancomycin Monitoring Note    Layo Cee is a 84 y.o. male who is on day 2/7 of pharmacy to dose vancomycin for Pneumonia.    Previous Vancomycin Dose:   1250 mg IV every  24  hours  Imaging Reviewed?: Yes  Updated Cultures and Sensitivities:   Microbiology Results (last 10 days)       Procedure Component Value - Date/Time    Legionella Antigen, Urine - Urine, Urine, Clean Catch [[id number]]  (Normal) Collected: 01/29/25 1038    Lab Status: Final result Specimen: Urine, Clean Catch Updated: 01/29/25 1112     LEGIONELLA ANTIGEN, URINE Negative    S. Pneumo Ag Urine or CSF - Urine, Urine, Clean Catch [[id number]]  (Normal) Collected: 01/29/25 1038    Lab Status: Final result Specimen: Urine, Clean Catch Updated: 01/29/25 1113     Strep Pneumo Ag Negative    MRSA Screen, PCR (Inpatient) - Swab, Nares [[id number]]  (Normal) Collected: 01/29/25 0905    Lab Status: Final result Specimen: Swab from Nares Updated: 01/29/25 1027     MRSA PCR No MRSA Detected    Narrative:      The negative predictive value of this diagnostic test is high and should only be used to consider de-escalating anti-MRSA therapy. A positive result may indicate colonization with MRSA and must be correlated clinically.    Blood Culture - Blood, Arm, Left [[id number]]  (Normal) Collected: 01/29/25 0407    Lab Status: Preliminary result Specimen: Blood from Arm, Left Updated: 01/30/25 0415     Blood Culture No growth at 24 hours    Narrative:      Less than seven (7) mL's of blood was collected.  Insufficient quantity may yield false negative results.    Blood Culture - Blood, Arm, Right [[id number]]  (Normal) Collected: 01/29/25 0407    Lab Status: Preliminary result Specimen: Blood from Arm, Right Updated: 01/30/25 0415     Blood Culture No growth at 24 hours    Narrative:      Less than seven (7) mL's of blood was collected.  Insufficient quantity may yield false negative results.    COVID PRE-OP /  "PRE-PROCEDURE SCREENING ORDER (NO ISOLATION) - Swab, Nasopharynx [[id number]]  (Normal) Collected: 25    Lab Status: Final result Specimen: Swab from Nasopharynx Updated: 25    Narrative:      The following orders were created for panel order COVID PRE-OP / PRE-PROCEDURE SCREENING ORDER (NO ISOLATION) - Swab, Nasopharynx.  Procedure                               Abnormality         Status                     ---------                               -----------         ------                     COVID-19, FLU A/B, RSV P...[[id number]]  Normal              Final result                 Please view results for these tests on the individual orders.    COVID-19, FLU A/B, RSV PCR 1 HR TAT - Swab, Nasopharynx [[id number]]  (Normal) Collected: 25    Lab Status: Final result Specimen: Swab from Nasopharynx Updated: 25     COVID19 Not Detected     Influenza A PCR Not Detected     Influenza B PCR Not Detected     RSV, PCR Not Detected    Narrative:      Fact sheet for providers: https://www.fda.gov/media/945081/download    Fact sheet for patients: https://www.fda.gov/media/393862/download    Test performed by PCR.              Vitals/Labs  Ht: 177.8 cm (70\"); Wt: 105 kg (231 lb 7.7 oz)   Temp (24hrs), Av.7 °F (36.5 °C), Min:97.2 °F (36.2 °C), Max:98.1 °F (36.7 °C)   Estimated Creatinine Clearance: 42.4 mL/min (A) (by C-G formula based on SCr of 1.63 mg/dL (H)).       Results from last 7 days   Lab Units 25  1159 25  0451 25  0410 25  0325 25  1325   VANCOMYCIN RM mcg/mL 15.31  --   --   --   --    CREATININE mg/dL  --  1.63* 1.62* 1.70* 1.41*   WBC 10*3/mm3  --  17.65*  --  12.34* 8.91     Assessment/Plan    Current Vancomycin Dose:  1250 mg IV every 24 hours; which provides the following predicted parameters:  AUC24,ss: 518 mg/L.hr  Probability of AUC24 > 400: 86 %  Ctrough,ss: 17.6 mg/L  Probability of Ctrough,ss > 20: 34 %  Probability of " nephrotoxicity (Lodise FATOUMATA 2009): 13 %        We will continue to monitor patient changes and renal function     Thank you for involving pharmacy in this patient's care. Please contact pharmacy with any questions or concerns.    Eve Samson  Clinical Pharmacist

## 2025-01-30 NOTE — PLAN OF CARE
Goal Outcome Evaluation:  Plan of Care Reviewed With: patient        Progress: no change  Outcome Evaluation: Patient A&O x4. VS stable. No c/o pain or discomfort on shift. Medications administered per MAR. Blood glucose monitored. No significant changes or events to report on shift. All needs met at this time. Plan of care ongoing.

## 2025-01-30 NOTE — THERAPY EVALUATION
Acute Care - Physical Therapy Initial Evaluation  YEYO Back     Patient Name: Layo Cee  : 1940  MRN: [id number]  Today's Date: 2025      Visit Dx:     ICD-10-CM ICD-9-CM   1. Acute on chronic congestive heart failure, unspecified heart failure type  I50.9 428.0   2. COPD with acute exacerbation  J44.1 491.21   3. Elevated troponin  R79.89 790.6   4. Pneumonia due to infectious organism, unspecified laterality, unspecified part of lung  J18.9 486   5. Decreased activities of daily living (ADL)  Z78.9 V49.89   6. 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 stable angina pectoris    Class 2 obesity due to excess calories without serious comorbidity with body mass index (BMI) of 35.0 to 35.9 in adult    Gout    Typical atrial flutter    Essential hypertension    Frequent PVCs    Mixed hyperlipidemia    Lateral epicondylitis    Allergic rhinitis    Seasonal allergies    Tobacco abuse, in remission    Vitamin D deficiency    Gastroesophageal reflux disease with esophagitis without hemorrhage    Ischemic cardiomyopathy    Centrilobular emphysema    Gastroesophageal reflux disease    Atrial fibrillation, persistent    COPD (chronic obstructive pulmonary disease)    Chronic anticoagulation    Diastolic dysfunction    S/P CABG (coronary artery bypass graft)    Stage 3a chronic kidney disease    Persistent atrial fibrillation    S/P ablation of atrial fibrillation    Atrial fibrillation, unspecified type    SOB (shortness of breath)    Chest pain    Mild aortic valve stenosis    Moderate mitral regurgitation    Benign prostatic hyperplasia with lower urinary tract symptoms    Chronic systolic congestive heart failure    Acute exacerbation of CHF (congestive heart failure)    Multifocal pneumonia     Past Medical History:   Diagnosis Date    Acute on chronic diastolic CHF (congestive heart  failure) 6/6/2022    Arthritis     Atrial fibrillation, persistent 5/3/2022    Persistent atrial fibrillation status post extensive ablation and pulmonary vein isolation by Dr. Aguliar on 6/3/2022, with reoccurring rapid A. fib, and then successful cardioversion on 7/1/2022    BPH (benign prostatic hyperplasia)     CHF (congestive heart failure)     COPD (chronic obstructive pulmonary disease)     Coronary artery disease of bypass graft of native heart with stable angina pectoris     (1) Coronary artery disease, s/p CABG in the past. Cardiac catheterization in July 2016 showed patentLIMA graft to the LAD and occluded SVGs. Native LAD is 100% occluded in the mid portion. Native LCx has no significant disease. Native RCA is 100% occluded and it is a . Repeat cath in June, 2021, underwent stent placement to diagnonal branch.    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 Lauren Valencia      Past Surgical History:   Procedure Laterality Date    BLADDER SURGERY      CARDIAC CATHETERIZATION  07/2016    CARDIAC CATHETERIZATION N/A 8/9/2021    Procedure: Left Heart Cath with possible angioplasty;  Surgeon: Jack Ladd MD;  Location: [facility] INVASIVE LOCATION;  Service: Cardiovascular;  Laterality: N/A;  Please schedule the procedure with Dr. Jack Ladd MD on 8/9/2021    CARDIAC ELECTROPHYSIOLOGY PROCEDURE N/A 6/3/2022    Procedure: Ablation atrial fibrillation;  Surgeon: Irineo Aguilar MD;  Location: [facility] INVASIVE LOCATION;   Service: Cardiovascular;  Laterality: N/A;    CARDIAC ELECTROPHYSIOLOGY PROCEDURE N/A 6/3/2022    Procedure: Ablation atrial flutter/CTI;  Surgeon: Irineo Aguilar MD;  Location: [facility] INVASIVE LOCATION;  Service: Cardiovascular;  Laterality: N/A;    CARDIAC SURGERY      HEART BYPASS    COLONOSCOPY  2011    CORONARY ARTERY BYPASS GRAFT      CYSTOSCOPY  03/19/2019    WITH BILATERAL RETROGRADE PYELOGRAPHY    ELECTRICAL CARDIOVERSION  5/12/2022         ENDOSCOPY  2016    PROSTATE SURGERY       PT Assessment (Last 12 Hours)       PT Evaluation and Treatment       Row Name 01/30/25 1300          Physical Therapy Time and Intention    Document Type evaluation  -AV     Mode of Treatment individual therapy;physical therapy  -AV       Row Name 01/30/25 1300          General Information    Patient Profile Reviewed yes  -AV     Patient Observations alert;cooperative;agree to therapy  -AV     Prior Level of Function independent:;all household mobility;gait;transfer;ADL's  Ambulated with STC as needed. Also has RW to use if needed. 2 L O2 at night.  -AV     Equipment Currently Used at Home cane, straight  As needed; Also has RW to use if needed  -AV     Existing Precautions/Restrictions fall  -AV       Row Name 01/30/25 1300          Living Environment    Current Living Arrangements home  -AV     Home Accessibility stairs to enter home;stairs within home  -AV     People in Home spouse  -AV       Row Name 01/30/25 1300          Home Main Entrance    Number of Stairs, Main Entrance two  -AV     Stair Railings, Main Entrance none  Reports there are posts to hold onto  -AV       Row Name 01/30/25 1300          Stairs Within Home, Primary    Number of Stairs, Within Home, Primary none  -AV       Row Name 01/30/25 1300          Pain    Pretreatment Pain Rating 0/10 - no pain  -AV     Posttreatment Pain Rating 0/10 - no pain  -AV       Row Name 01/30/25 1300          Cognition    Orientation Status (Cognition) oriented x 3   -AV       Row Name 01/30/25 1300          Range of Motion (ROM)    Range of Motion bilateral lower extremities;ROM is WFL  -AV       Row Name 01/30/25 1300          Bed Mobility    Comment, (Bed Mobility) Patient seated upright in recliner upon therapist entry  -AV       Row Name 01/30/25 1300          Transfers    Transfers sit-stand transfer;stand-sit transfer  -AV       Row Name 01/30/25 1300          Sit-Stand Transfer    Sit-Stand [place] (Transfers) minimum assist (75% patient effort)  -AV     Assistive Device (Sit-Stand Transfers) walker, front-wheeled  -AV       Row Name 01/30/25 1300          Stand-Sit Transfer    Stand-Sit [place] (Transfers) minimum assist (75% patient effort)  -AV     Assistive Device (Stand-Sit Transfers) walker, front-wheeled  -AV       Row Name 01/30/25 1300          Gait/Stairs (Locomotion)    Gait/Stairs Locomotion gait/ambulation independence;gait/ambulation assistive device;distance ambulated  -AV     [place] Level (Gait) minimum assist (75% patient effort)  -AV     Assistive Device (Gait) walker, front-wheeled  -AV     Distance in Feet (Gait) 40  -AV     Deviations/Abnormal Patterns (Gait) gait speed decreased;stride length decreased  -AV     Bilateral Gait Deviations forward flexed posture  -AV       Row Name 01/30/25 1300          Safety Issues/Impairments Affecting Functional Mobility    Impairments Affecting Function (Mobility) balance;endurance/activity tolerance;strength  -AV       Row Name 01/30/25 1300          Balance    Balance Assessment standing dynamic balance  -AV     Dynamic Standing Balance minimal assist  -AV     Position/Device Used, Standing Balance supported;walker, front-wheeled  -AV       Row Name 01/30/25 1300          Plan of Care Review    Plan of Care Reviewed With patient;spouse  -AV     Progress no change  -AV     Outcome Evaluation Patient presents with deficits in balance, endurance, transfers, and ambulation. Patient will benefit from  skilled PT services to address these mobility deficits and decrease risk of falls.  -AV       Row Name 01/30/25 1300          Positioning and Restraints    Pre-Treatment Position sitting in chair/recliner  -AV     Post Treatment Position chair  -AV     In Bed sitting;call light within reach;encouraged to call for assist;with family/caregiver  No alarms active upon entry  -AV       Row Name 01/30/25 1300          Therapy Assessment/Plan (PT)    Rehab Potential (PT) good  -AV     Criteria for Skilled Interventions Met (PT) yes;meets criteria  -AV     Therapy Frequency (PT) daily  -AV     Predicted Duration of Therapy Intervention (PT) 10 days  -AV     Problem List (PT) problems related to;balance;mobility;strength  -AV     Activity Limitations Related to Problem List (PT) unable to transfer safely;unable to ambulate safely  -AV       Row Name 01/30/25 1300          PT Evaluation Complexity    History, PT Evaluation Complexity no personal factors and/or comorbidities  -AV     Examination of Body Systems (PT Eval Complexity) total of 4 or more elements  -AV     Clinical Presentation (PT Evaluation Complexity) stable  -AV     Clinical Decision Making (PT Evaluation Complexity) low complexity  -AV     Overall Complexity (PT Evaluation Complexity) low complexity  -AV       Row Name 01/30/25 1300          Therapy Plan Review/Discharge Plan (PT)    Therapy Plan Review (PT) evaluation/treatment results reviewed;patient;spouse/significant other  -AV       Row Name 01/30/25 1300          Physical Therapy Goals    Bed Mobility Goal Selection (PT) bed mobility, PT goal 1  -AV     Transfer Goal Selection (PT) transfer, PT goal 1  -AV     Gait Training Goal Selection (PT) gait training, PT goal 1  -AV       Row Name 01/30/25 1300          Bed Mobility Goal 1 (PT)    Activity/Assistive Device (Bed Mobility Goal 1, PT) sit to supine/supine to sit  -AV     [place] Level/Cues Needed (Bed Mobility Goal 1, PT) modified independence   -AV     Time Frame (Bed Mobility Goal 1, PT) 10 days  -AV       Row Name 01/30/25 1300          Transfer Goal 1 (PT)    Activity/Assistive Device (Transfer Goal 1, PT) sit-to-stand/stand-to-sit;bed-to-chair/chair-to-bed;walker, rolling  -AV     [place] Level/Cues Needed (Transfer Goal 1, PT) modified independence  -AV     Time Frame (Transfer Goal 1, PT) 10 days  -AV       Row Name 01/30/25 1300          Gait Training Goal 1 (PT)    Activity/Assistive Device (Gait Training Goal 1, PT) gait (walking locomotion);assistive device use;walker, rolling  -AV     [place] Level (Gait Training Goal 1, PT) modified independence  -AV     Distance (Gait Training Goal 1, PT) 200  -AV     Time Frame (Gait Training Goal 1, PT) 10 days  -AV               User Key  (r) = Recorded By, (t) = Taken By, (c) = Cosigned By      Initials Name Provider Type    AV Aaron Alberts, PT Physical Therapist                    Physical Therapy Education       Title: PT OT SLP Therapies (In Progress)       Topic: Physical Therapy (In Progress)       Point: Mobility training (Done)       Learning Progress Summary            Patient Acceptance, E,TB, VU by AV at 1/30/2025 1344                      Point: Home exercise program (Not Started)       Learner Progress:  Not documented in this visit.              Point: Body mechanics (Done)       Learning Progress Summary            Patient Acceptance, E,TB, VU by AV at 1/30/2025 1344                      Point: Precautions (Done)       Learning Progress Summary            Patient Acceptance, E,TB, VU by AV at 1/30/2025 1344                                      User Key       Initials Effective Dates Name Provider Type Discipline    AV 06/11/21 -  Aaron Alberts, SHARAD Physical Therapist PT                  PT Recommendation and Plan  Anticipated Discharge Disposition (PT): skilled nursing facility  Planned Therapy Interventions (PT): balance training, bed mobility training, gait training, home  exercise program, neuromuscular re-education, strengthening, transfer training  Therapy Frequency (PT): daily  Plan of Care Reviewed With: patient, spouse  Progress: no change  Outcome Evaluation: Patient presents with deficits in balance, endurance, transfers, and ambulation. Patient will benefit from skilled PT services to address these mobility deficits and decrease risk of falls.   Outcome Measures       Row Name 01/30/25 1300             How much help from another person do you currently need...    Turning from your back to your side while in flat bed without using bedrails? 3  -AV      Moving from lying on back to sitting on the side of a flat bed without bedrails? 3  -AV      Moving to and from a bed to a chair (including a wheelchair)? 3  -AV      Standing up from a chair using your arms (e.g., wheelchair, bedside chair)? 3  -AV      Climbing 3-5 steps with a railing? 2  -AV      To walk in hospital room? 3  -AV      AM-PAC 6 Clicks Score (PT) 17  -AV         Functional Assessment    Outcome Measure Options AM-PAC 6 Clicks Basic Mobility (PT)  -AV                User Key  (r) = Recorded By, (t) = Taken By, (c) = Cosigned By      Initials Name Provider Type    AV Aaron Alberts, PT Physical Therapist                     Time Calculation:    PT Charges       Row Name 01/30/25 1343             Time Calculation    PT Received On 01/30/25  -AV      PT Goal Re-Cert Due Date 02/08/25  -AV         Untimed Charges    PT Eval/Re-eval Minutes 35  -AV         Total Minutes    Untimed Charges Total Minutes 35  -AV       Total Minutes 35  -AV                User Key  (r) = Recorded By, (t) = Taken By, (c) = Cosigned By      Initials Name Provider Type    Aaron Kendrick, PT Physical Therapist                  Therapy Charges for Today       Code Description Service Date Service Provider Modifiers Qty    55512213372 HC PT EVAL LOW COMPLEXITY 3 1/30/2025 Aaron Alberts, PT GP 1            PT G-Codes  Outcome  Measure Options: AM-PAC 6 Clicks Basic Mobility (PT)  AM-PAC 6 Clicks Score (PT): 17  AM-PAC 6 Clicks Score (OT): 17    Aaron Alberts, PT  1/30/2025

## 2025-01-31 LAB
ANION GAP SERPL CALCULATED.3IONS-SCNC: 11.3 MMOL/L (ref 5–15)
BASOPHILS # BLD AUTO: 0.04 10*3/MM3 (ref 0–0.2)
BASOPHILS NFR BLD AUTO: 0.3 % (ref 0–1.5)
BUN SERPL-MCNC: 32 MG/DL (ref 8–23)
BUN/CREAT SERPL: 17.1 (ref 7–25)
CALCIUM SPEC-SCNC: 8.4 MG/DL (ref 8.6–10.5)
CHLORIDE SERPL-SCNC: 99 MMOL/L (ref 98–107)
CK SERPL-CCNC: 95 U/L (ref 20–200)
CO2 SERPL-SCNC: 28.7 MMOL/L (ref 22–29)
CREAT SERPL-MCNC: 1.87 MG/DL (ref 0.76–1.27)
DEPRECATED RDW RBC AUTO: 49.7 FL (ref 37–54)
EGFRCR SERPLBLD CKD-EPI 2021: 35.5 ML/MIN/1.73
EOSINOPHIL # BLD AUTO: 0.04 10*3/MM3 (ref 0–0.4)
EOSINOPHIL NFR BLD AUTO: 0.3 % (ref 0.3–6.2)
ERYTHROCYTE [DISTWIDTH] IN BLOOD BY AUTOMATED COUNT: 15.1 % (ref 12.3–15.4)
GLUCOSE BLDC GLUCOMTR-MCNC: 108 MG/DL (ref 70–99)
GLUCOSE BLDC GLUCOMTR-MCNC: 112 MG/DL (ref 70–99)
GLUCOSE SERPL-MCNC: 96 MG/DL (ref 65–99)
HCT VFR BLD AUTO: 35 % (ref 37.5–51)
HGB BLD-MCNC: 10.7 G/DL (ref 13–17.7)
IMM GRANULOCYTES # BLD AUTO: 0.08 10*3/MM3 (ref 0–0.05)
IMM GRANULOCYTES NFR BLD AUTO: 0.6 % (ref 0–0.5)
LYMPHOCYTES # BLD AUTO: 2.53 10*3/MM3 (ref 0.7–3.1)
LYMPHOCYTES NFR BLD AUTO: 18.8 % (ref 19.6–45.3)
MAGNESIUM SERPL-MCNC: 2.4 MG/DL (ref 1.6–2.4)
MCH RBC QN AUTO: 27.5 PG (ref 26.6–33)
MCHC RBC AUTO-ENTMCNC: 30.6 G/DL (ref 31.5–35.7)
MCV RBC AUTO: 90 FL (ref 79–97)
MONOCYTES # BLD AUTO: 1.32 10*3/MM3 (ref 0.1–0.9)
MONOCYTES NFR BLD AUTO: 9.8 % (ref 5–12)
NEUTROPHILS NFR BLD AUTO: 70.2 % (ref 42.7–76)
NEUTROPHILS NFR BLD AUTO: 9.46 10*3/MM3 (ref 1.7–7)
NRBC BLD AUTO-RTO: 0 /100 WBC (ref 0–0.2)
NT-PROBNP SERPL-MCNC: 5968 PG/ML (ref 0–1800)
PLATELET # BLD AUTO: 242 10*3/MM3 (ref 140–450)
PMV BLD AUTO: 10.8 FL (ref 6–12)
POTASSIUM SERPL-SCNC: 3.7 MMOL/L (ref 3.5–5.2)
RBC # BLD AUTO: 3.89 10*6/MM3 (ref 4.14–5.8)
SODIUM SERPL-SCNC: 139 MMOL/L (ref 136–145)
VANCOMYCIN SERPL-MCNC: 7.3 MCG/ML (ref 5–40)
WBC NRBC COR # BLD AUTO: 13.47 10*3/MM3 (ref 3.4–10.8)

## 2025-01-31 PROCEDURE — 25010000002 FUROSEMIDE PER 20 MG: Performed by: INTERNAL MEDICINE

## 2025-01-31 PROCEDURE — 94799 UNLISTED PULMONARY SVC/PX: CPT

## 2025-01-31 PROCEDURE — 99232 SBSQ HOSP IP/OBS MODERATE 35: CPT | Performed by: STUDENT IN AN ORGANIZED HEALTH CARE EDUCATION/TRAINING PROGRAM

## 2025-01-31 PROCEDURE — 82948 REAGENT STRIP/BLOOD GLUCOSE: CPT | Performed by: INTERNAL MEDICINE

## 2025-01-31 PROCEDURE — 83735 ASSAY OF MAGNESIUM: CPT | Performed by: STUDENT IN AN ORGANIZED HEALTH CARE EDUCATION/TRAINING PROGRAM

## 2025-01-31 PROCEDURE — 83880 ASSAY OF NATRIURETIC PEPTIDE: CPT | Performed by: STUDENT IN AN ORGANIZED HEALTH CARE EDUCATION/TRAINING PROGRAM

## 2025-01-31 PROCEDURE — 25010000002 CEFEPIME PER 500 MG: Performed by: STUDENT IN AN ORGANIZED HEALTH CARE EDUCATION/TRAINING PROGRAM

## 2025-01-31 PROCEDURE — 97116 GAIT TRAINING THERAPY: CPT

## 2025-01-31 PROCEDURE — 82550 ASSAY OF CK (CPK): CPT | Performed by: INTERNAL MEDICINE

## 2025-01-31 PROCEDURE — 97530 THERAPEUTIC ACTIVITIES: CPT

## 2025-01-31 PROCEDURE — 94664 DEMO&/EVAL PT USE INHALER: CPT

## 2025-01-31 PROCEDURE — 99232 SBSQ HOSP IP/OBS MODERATE 35: CPT | Performed by: INTERNAL MEDICINE

## 2025-01-31 PROCEDURE — 25810000003 SODIUM CHLORIDE 0.9 % SOLUTION: Performed by: STUDENT IN AN ORGANIZED HEALTH CARE EDUCATION/TRAINING PROGRAM

## 2025-01-31 PROCEDURE — 85025 COMPLETE CBC W/AUTO DIFF WBC: CPT | Performed by: STUDENT IN AN ORGANIZED HEALTH CARE EDUCATION/TRAINING PROGRAM

## 2025-01-31 PROCEDURE — 25010000002 VANCOMYCIN 5 G RECONSTITUTED SOLUTION: Performed by: STUDENT IN AN ORGANIZED HEALTH CARE EDUCATION/TRAINING PROGRAM

## 2025-01-31 PROCEDURE — 63710000001 REVEFENACIN 175 MCG/3ML SOLUTION: Performed by: STUDENT IN AN ORGANIZED HEALTH CARE EDUCATION/TRAINING PROGRAM

## 2025-01-31 PROCEDURE — 25010000002 FUROSEMIDE PER 20 MG: Performed by: STUDENT IN AN ORGANIZED HEALTH CARE EDUCATION/TRAINING PROGRAM

## 2025-01-31 PROCEDURE — 80048 BASIC METABOLIC PNL TOTAL CA: CPT | Performed by: STUDENT IN AN ORGANIZED HEALTH CARE EDUCATION/TRAINING PROGRAM

## 2025-01-31 PROCEDURE — 80202 ASSAY OF VANCOMYCIN: CPT | Performed by: STUDENT IN AN ORGANIZED HEALTH CARE EDUCATION/TRAINING PROGRAM

## 2025-01-31 RX ORDER — AZITHROMYCIN 250 MG/1
500 TABLET, FILM COATED ORAL
Status: COMPLETED | OUTPATIENT
Start: 2025-01-31 | End: 2025-02-02

## 2025-01-31 RX ADMIN — SACUBITRIL AND VALSARTAN 1 TABLET: 24; 26 TABLET, FILM COATED ORAL at 22:21

## 2025-01-31 RX ADMIN — AZITHROMYCIN DIHYDRATE 500 MG: 250 TABLET ORAL at 08:26

## 2025-01-31 RX ADMIN — Medication 10 ML: at 08:29

## 2025-01-31 RX ADMIN — REVEFENACIN 175 MCG: 175 SOLUTION RESPIRATORY (INHALATION) at 06:25

## 2025-01-31 RX ADMIN — ARFORMOTEROL TARTRATE 15 MCG: 15 SOLUTION RESPIRATORY (INHALATION) at 06:26

## 2025-01-31 RX ADMIN — SACUBITRIL AND VALSARTAN 1 TABLET: 24; 26 TABLET, FILM COATED ORAL at 08:26

## 2025-01-31 RX ADMIN — ARFORMOTEROL TARTRATE 15 MCG: 15 SOLUTION RESPIRATORY (INHALATION) at 19:46

## 2025-01-31 RX ADMIN — CEFEPIME 2000 MG: 2 INJECTION, POWDER, FOR SOLUTION INTRAVENOUS at 04:26

## 2025-01-31 RX ADMIN — IPRATROPIUM BROMIDE AND ALBUTEROL SULFATE 3 ML: .5; 3 SOLUTION RESPIRATORY (INHALATION) at 03:24

## 2025-01-31 RX ADMIN — CETIRIZINE HYDROCHLORIDE 10 MG: 10 TABLET, FILM COATED ORAL at 08:26

## 2025-01-31 RX ADMIN — BUDESONIDE 0.5 MG: 0.5 INHALANT RESPIRATORY (INHALATION) at 06:25

## 2025-01-31 RX ADMIN — IPRATROPIUM BROMIDE AND ALBUTEROL SULFATE 3 ML: .5; 3 SOLUTION RESPIRATORY (INHALATION) at 06:21

## 2025-01-31 RX ADMIN — RIVAROXABAN 15 MG: 15 TABLET, FILM COATED ORAL at 18:14

## 2025-01-31 RX ADMIN — BUDESONIDE 0.5 MG: 0.5 INHALANT RESPIRATORY (INHALATION) at 19:46

## 2025-01-31 RX ADMIN — ALLOPURINOL 100 MG: 100 TABLET ORAL at 07:21

## 2025-01-31 RX ADMIN — IPRATROPIUM BROMIDE AND ALBUTEROL SULFATE 3 ML: .5; 3 SOLUTION RESPIRATORY (INHALATION) at 23:52

## 2025-01-31 RX ADMIN — METOPROLOL SUCCINATE 25 MG: 25 TABLET, EXTENDED RELEASE ORAL at 08:26

## 2025-01-31 RX ADMIN — CEFEPIME 2000 MG: 2 INJECTION, POWDER, FOR SOLUTION INTRAVENOUS at 16:35

## 2025-01-31 RX ADMIN — VANCOMYCIN HYDROCHLORIDE 1250 MG: 5 INJECTION, POWDER, LYOPHILIZED, FOR SOLUTION INTRAVENOUS at 04:26

## 2025-01-31 RX ADMIN — ISOSORBIDE MONONITRATE 15 MG: 30 TABLET, EXTENDED RELEASE ORAL at 08:26

## 2025-01-31 RX ADMIN — MONTELUKAST 10 MG: 10 TABLET, FILM COATED ORAL at 22:21

## 2025-01-31 RX ADMIN — IPRATROPIUM BROMIDE AND ALBUTEROL SULFATE 3 ML: .5; 3 SOLUTION RESPIRATORY (INHALATION) at 10:43

## 2025-01-31 RX ADMIN — VANCOMYCIN HYDROCHLORIDE 1250 MG: 5 INJECTION, POWDER, LYOPHILIZED, FOR SOLUTION INTRAVENOUS at 15:12

## 2025-01-31 RX ADMIN — PANTOPRAZOLE SODIUM 40 MG: 40 TABLET, DELAYED RELEASE ORAL at 08:26

## 2025-01-31 RX ADMIN — METOPROLOL SUCCINATE 25 MG: 25 TABLET, EXTENDED RELEASE ORAL at 22:21

## 2025-01-31 RX ADMIN — FUROSEMIDE 60 MG: 10 INJECTION, SOLUTION INTRAMUSCULAR; INTRAVENOUS at 18:14

## 2025-01-31 RX ADMIN — TAMSULOSIN HYDROCHLORIDE 0.4 MG: 0.4 CAPSULE ORAL at 08:26

## 2025-01-31 RX ADMIN — IPRATROPIUM BROMIDE AND ALBUTEROL SULFATE 3 ML: .5; 3 SOLUTION RESPIRATORY (INHALATION) at 15:11

## 2025-01-31 RX ADMIN — IPRATROPIUM BROMIDE AND ALBUTEROL SULFATE 3 ML: .5; 3 SOLUTION RESPIRATORY (INHALATION) at 19:46

## 2025-01-31 NOTE — PROGRESS NOTES
[facility] Clinical Pharmacy Services: Vancomycin Monitoring Note    Layo Cee is a 84 y.o. male who is on day 3/7 of pharmacy to dose vancomycin for Pneumonia.    Previous Vancomycin Dose:   1250 mg IV every  24  hours  Imaging Reviewed?: Yes  Updated Cultures and Sensitivities:   Microbiology Results (last 10 days)       Procedure Component Value - Date/Time    Respiratory Culture - Sputum, Cough [[id number]] Collected: 01/30/25 1805    Lab Status: Preliminary result Specimen: Sputum from Cough Updated: 01/31/25 1202     Respiratory Culture Rare growth The culture consists of normal respiratory wesley. This is a preliminary report; final report to follow.     Gram Stain Moderate (3+) WBCs seen      Few (2+) Mucous strands      Rare (1+) Epithelial cells per low power field      Few (2+) Gram positive cocci in pairs    Legionella Antigen, Urine - Urine, Urine, Clean Catch [[id number]]  (Normal) Collected: 01/29/25 1038    Lab Status: Final result Specimen: Urine, Clean Catch Updated: 01/29/25 1112     LEGIONELLA ANTIGEN, URINE Negative    S. Pneumo Ag Urine or CSF - Urine, Urine, Clean Catch [[id number]]  (Normal) Collected: 01/29/25 1038    Lab Status: Final result Specimen: Urine, Clean Catch Updated: 01/29/25 1113     Strep Pneumo Ag Negative    MRSA Screen, PCR (Inpatient) - Swab, Nares [[id number]]  (Normal) Collected: 01/29/25 0905    Lab Status: Final result Specimen: Swab from Nares Updated: 01/29/25 1027     MRSA PCR No MRSA Detected    Narrative:      The negative predictive value of this diagnostic test is high and should only be used to consider de-escalating anti-MRSA therapy. A positive result may indicate colonization with MRSA and must be correlated clinically.    Blood Culture - Blood, Arm, Left [[id number]]  (Normal) Collected: 01/29/25 0407    Lab Status: Preliminary result Specimen: Blood from Arm, Left Updated: 01/31/25 0415     Blood Culture No growth at 2 days    Narrative:      Less than  "seven (7) mL's of blood was collected.  Insufficient quantity may yield false negative results.    Blood Culture - Blood, Arm, Right [[id number]]  (Normal) Collected: 25    Lab Status: Preliminary result Specimen: Blood from Arm, Right Updated: 25     Blood Culture No growth at 2 days    Narrative:      Less than seven (7) mL's of blood was collected.  Insufficient quantity may yield false negative results.    COVID PRE-OP / PRE-PROCEDURE SCREENING ORDER (NO ISOLATION) - Swab, Nasopharynx [[id number]]  (Normal) Collected: 25    Lab Status: Final result Specimen: Swab from Nasopharynx Updated: 25    Narrative:      The following orders were created for panel order COVID PRE-OP / PRE-PROCEDURE SCREENING ORDER (NO ISOLATION) - Swab, Nasopharynx.  Procedure                               Abnormality         Status                     ---------                               -----------         ------                     COVID-19, FLU A/B, RSV P...[[id number]]  Normal              Final result                 Please view results for these tests on the individual orders.    COVID-19, FLU A/B, RSV PCR 1 HR TAT - Swab, Nasopharynx [[id number]]  (Normal) Collected: 25    Lab Status: Final result Specimen: Swab from Nasopharynx Updated: 25     COVID19 Not Detected     Influenza A PCR Not Detected     Influenza B PCR Not Detected     RSV, PCR Not Detected    Narrative:      Fact sheet for providers: https://www.fda.gov/media/294649/download    Fact sheet for patients: https://www.fda.gov/media/664223/download    Test performed by PCR.    Mycoplasma Pneumoniae Antibody, IgM - Blood, [[id number]]  (Abnormal) Collected: 25    Lab Status: Final result Specimen: Blood Updated: 25 1344     Mycoplasma pneumo IgM Positive              Vitals/Labs  Ht: 177.8 cm (70\"); Wt: 105 kg (231 lb 7.7 oz)   Temp (24hrs), Av.8 °F (36.6 °C), Min:97.3 °F (36.3 °C), " Max:98.4 °F (36.9 °C)   Estimated Creatinine Clearance: 37 mL/min (A) (by C-G formula based on SCr of 1.87 mg/dL (H)).       Results from last 7 days   Lab Units 01/31/25  1149 01/31/25  0614 01/30/25  1159 01/30/25  0451 01/29/25  0410 01/29/25  0325   VANCOMYCIN RM mcg/mL 7.30  --  15.31  --   --   --    CREATININE mg/dL  --  1.87*  --  1.63* 1.62* 1.70*   WBC 10*3/mm3  --  13.47*  --  17.65*  --  12.34*     Assessment/Plan    Current Vancomycin Dose:  1250 mg IV every 24 hours; which provides the following predicted parameters:  AUC24,ss: 473 mg/L.hr  Probability of AUC24 > 400: 72 %  Ctrough,ss: 16.6 mg/L  Probability of Ctrough,ss > 20: 30 %  Probability of nephrotoxicity (Lodise FATOUMATA 2009): 12 %        2.  Vancomycin random ordered for AM.  3. We will continue to monitor patient changes and renal function     Thank you for involving pharmacy in this patient's care. Please contact pharmacy with any questions or concerns.    Eve Samson  Clinical Pharmacist

## 2025-01-31 NOTE — PLAN OF CARE
Goal Outcome Evaluation:              Outcome Evaluation: Patient A&Ox4, VSS overnight. No significant changes this shift. Administered meds per MAR. No needs at this time. Plan of care ongoing.

## 2025-01-31 NOTE — THERAPY TREATMENT NOTE
Acute Care - Physical Therapy Progress Note  YEYO Back     Patient Name: Layo Cee  : 1940  MRN: [id number]  Today's Date: 2025      Visit Dx:     ICD-10-CM ICD-9-CM   1. Acute on chronic congestive heart failure, unspecified heart failure type  I50.9 428.0   2. COPD with acute exacerbation  J44.1 491.21   3. Elevated troponin  R79.89 790.6   4. Pneumonia due to infectious organism, unspecified laterality, unspecified part of lung  J18.9 486   5. Decreased activities of daily living (ADL)  Z78.9 V49.89   6. 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 stable angina pectoris    Class 2 obesity due to excess calories without serious comorbidity with body mass index (BMI) of 35.0 to 35.9 in adult    Gout    Typical atrial flutter    Essential hypertension    Frequent PVCs    Mixed hyperlipidemia    Lateral epicondylitis    Allergic rhinitis    Seasonal allergies    Tobacco abuse, in remission    Vitamin D deficiency    Gastroesophageal reflux disease with esophagitis without hemorrhage    Ischemic cardiomyopathy    Centrilobular emphysema    Gastroesophageal reflux disease    Atrial fibrillation, persistent    COPD (chronic obstructive pulmonary disease)    Chronic anticoagulation    Diastolic dysfunction    S/P CABG (coronary artery bypass graft)    Stage 3a chronic kidney disease    Persistent atrial fibrillation    S/P ablation of atrial fibrillation    Atrial fibrillation, unspecified type    SOB (shortness of breath)    Chest pain    Mild aortic valve stenosis    Moderate mitral regurgitation    Benign prostatic hyperplasia with lower urinary tract symptoms    Chronic systolic congestive heart failure    Acute exacerbation of CHF (congestive heart failure)    Multifocal pneumonia     Past Medical History:   Diagnosis Date    Acute on chronic diastolic CHF (congestive heart failure)  6/6/2022    Arthritis     Atrial fibrillation, persistent 5/3/2022    Persistent atrial fibrillation status post extensive ablation and pulmonary vein isolation by Dr. Aguilar on 6/3/2022, with reoccurring rapid A. fib, and then successful cardioversion on 7/1/2022    BPH (benign prostatic hyperplasia)     CHF (congestive heart failure)     COPD (chronic obstructive pulmonary disease)     Coronary artery disease of bypass graft of native heart with stable angina pectoris     (1) Coronary artery disease, s/p CABG in the past. Cardiac catheterization in July 2016 showed patentLIMA graft to the LAD and occluded SVGs. Native LAD is 100% occluded in the mid portion. Native LCx has no significant disease. Native RCA is 100% occluded and it is a . Repeat cath in June, 2021, underwent stent placement to diagnonal branch.    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 Lauren Valencia      Past Surgical History:   Procedure Laterality Date    BLADDER SURGERY      CARDIAC CATHETERIZATION  07/2016    CARDIAC CATHETERIZATION N/A 8/9/2021    Procedure: Left Heart Cath with possible angioplasty;  Surgeon: Jack Ladd MD;  Location: [facility] beds at the [facility] INVASIVE LOCATION;  Service: Cardiovascular;  Laterality: N/A;  Please schedule the procedure with Dr. Jack Ladd MD on 8/9/2021    CARDIAC ELECTROPHYSIOLOGY PROCEDURE N/A 6/3/2022    Procedure: Ablation atrial fibrillation;  Surgeon: Irineo Aguilar MD;  Location: [facility] CATH INVASIVE LOCATION;  Service:  Cardiovascular;  Laterality: N/A;    CARDIAC ELECTROPHYSIOLOGY PROCEDURE N/A 6/3/2022    Procedure: Ablation atrial flutter/CTI;  Surgeon: Irineo Aguilar MD;  Location: [facility] INVASIVE LOCATION;  Service: Cardiovascular;  Laterality: N/A;    CARDIAC SURGERY      HEART BYPASS    COLONOSCOPY  2011    CORONARY ARTERY BYPASS GRAFT      CYSTOSCOPY  03/19/2019    WITH BILATERAL RETROGRADE PYELOGRAPHY    ELECTRICAL CARDIOVERSION  5/12/2022         ENDOSCOPY  2016    PROSTATE SURGERY       PT Assessment (Last 12 Hours)       PT Evaluation and Treatment       Row Name 01/31/25 1600          Physical Therapy Time and Intention    Subjective Information no complaints  -CS     Document Type therapy note (daily note)  -CS     Mode of Treatment individual therapy;physical therapy  -CS     Patient Effort good  -CS     Symptoms Noted During/After Treatment fatigue;shortness of breath;dizziness  -CS       Row Name 01/31/25 1600          Pain    Pretreatment Pain Rating 0/10 - no pain  -CS     Posttreatment Pain Rating 0/10 - no pain  -CS       Row Name 01/31/25 1600          Bed Mobility    Bed Mobility supine-sit-supine  -CS     Supine-Sit-Supine [place] (Bed Mobility) supervision  -CS     Assistive Device (Bed Mobility) bed rails  -CS       Row Name 01/31/25 1600          Sit-Stand Transfer    Sit-Stand [place] (Transfers) contact guard  -CS     Assistive Device (Sit-Stand Transfers) walker, front-wheeled  -CS       Row Name 01/31/25 1600          Stand-Sit Transfer    Stand-Sit [place] (Transfers) contact guard  -CS     Assistive Device (Stand-Sit Transfers) walker, front-wheeled  -CS       Row Name 01/31/25 1600          Gait/Stairs (Locomotion)    [place] Level (Gait) contact guard;1 person to manage equipment  -CS     Assistive Device (Gait) walker, front-wheeled  -CS     Distance in Feet (Gait) 700  4 standing rest breaks during above distance with each rest break ~ 30 seconds  -CS     Pattern  (Gait) 4-point;step-through  -CS     Deviations/Abnormal Patterns (Gait) gait speed decreased;stride length decreased  -CS     Bilateral Gait Deviations forward flexed posture  -CS       Row Name 01/31/25 1600          Vital Signs    Pretreatment Heart Rate (beats/min) 81  -CS     Intratreatment Heart Rate (beats/min) 105  -CS     Posttreatment Heart Rate (beats/min) 78  -CS     Pre SpO2 (%) 98  -CS     O2 Delivery Pre Treatment --  2 LPM NC  -CS     Intra SpO2 (%) 97  -CS     O2 Delivery Intra Treatment --  2 LPM NC  -CS     Post SpO2 (%) 98  -CS     O2 Delivery Post Treatment --  2 LPM NC  -CS     Pre Patient Position Sitting  -CS     Intra Patient Position Standing  -CS     Post Patient Position Sitting  -CS       Row Name 01/31/25 1600          Positioning and Restraints    Pre-Treatment Position in bed  -CS     Post Treatment Position bed  -CS     In Bed fowlers;call light within reach;encouraged to call for assist;with family/caregiver  no alarms active upon entering room  -CS       Row Name 01/31/25 1600          Progress Summary (PT)    Progress Toward Functional Goals (PT) progress toward functional goals is good  -CS               User Key  (r) = Recorded By, (t) = Taken By, (c) = Cosigned By      Initials Name Provider Type    Oral Parish PTA Physical Therapist Assistant                    Physical Therapy Education       Title: PT OT SLP Therapies (In Progress)       Topic: Physical Therapy (In Progress)       Point: Mobility training (Done)       Learning Progress Summary            Patient Acceptance, E,TB, VU by AV at 1/30/2025 1344                      Point: Home exercise program (Not Started)       Learner Progress:  Not documented in this visit.              Point: Body mechanics (Done)       Learning Progress Summary            Patient Acceptance, E,TB, VU by AV at 1/30/2025 1344                      Point: Precautions (Done)       Learning Progress Summary            Patient Acceptance,  5 E,TB, VU by AV at 1/30/2025 1344                                      User Key       Initials Effective Dates Name Provider Type Discipline    AV 06/11/21 -  Aaron Alberts, PT Physical Therapist PT                  PT Recommendation and Plan  Anticipated Discharge Disposition (PT): home with home health, home with assist  Progress Summary (PT)  Progress Toward Functional Goals (PT): progress toward functional goals is good   Outcome Measures       Row Name 01/31/25 1600 01/30/25 1300          How much help from another person do you currently need...    Turning from your back to your side while in flat bed without using bedrails? 3  -CS 3  -AV     Moving from lying on back to sitting on the side of a flat bed without bedrails? 3  -CS 3  -AV     Moving to and from a bed to a chair (including a wheelchair)? 3  -CS 3  -AV     Standing up from a chair using your arms (e.g., wheelchair, bedside chair)? 3  -CS 3  -AV     Climbing 3-5 steps with a railing? 3  -CS 2  -AV     To walk in hospital room? 3  -CS 3  -AV     AM-PAC 6 Clicks Score (PT) 18  -CS 17  -AV        Functional Assessment    Outcome Measure Options AM-PAC 6 Clicks Basic Mobility (PT)  -CS AM-PAC 6 Clicks Basic Mobility (PT)  -AV               User Key  (r) = Recorded By, (t) = Taken By, (c) = Cosigned By      Initials Name Provider Type    AV Aaron Alberts, PT Physical Therapist    Oral Parish PTA Physical Therapist Assistant                     Time Calculation:    PT Charges       Row Name 01/31/25 1624             Time Calculation    Start Time 1318  -CS      PT Received On 01/31/25  -CS         Timed Charges    93397 - Gait Training Minutes  21  -CS      37033 - PT Therapeutic Activity Minutes 19  -CS         Total Minutes    Timed Charges Total Minutes 40  -CS       Total Minutes 40  -CS                User Key  (r) = Recorded By, (t) = Taken By, (c) = Cosigned By      Initials Name Provider Type    Oral Parish PTA Physical  Therapist Assistant                  Therapy Charges for Today       Code Description Service Date Service Provider Modifiers Qty    06936116006  GAIT TRAINING EA 15 MIN 1/31/2025 Oral Tovar PTA GP 2    [id number]  PT THERAPEUTIC ACT EA 15 MIN 1/31/2025 Oral Tovar PTA GP 1            PT G-Codes  Outcome Measure Options: AM-PAC 6 Clicks Basic Mobility (PT)  AM-PAC 6 Clicks Score (PT): 18  AM-PAC 6 Clicks Score (OT): 17    Oral Tovar PTA  1/31/2025

## 2025-01-31 NOTE — PROGRESS NOTES
[facility]     Cardiology Progress Note    Patient Name: Layo Cee  : 1940  MRN: [id number]  Primary Care Physician:  Tevin Zavala MD  Date of admission: 2025    Subjective   Subjective     Chief Complaint: Follow-up visit for shortness of breath, congestive heart failure, chest discomfort.     Interval HPI:    Patient continues to report shortness of breath and fatigue, no significant improvement from admission.  Chest pain/discomfort subsided.  He is also reporting severe constipation, has not had a bowel movement in 3 days.    Review of Systems   All systems were reviewed and negative except for: Shortness of breath, cough, wheezing, and constipation    Objective   Objective     Vitals:   Temp:  [97.2 °F (36.2 °C)-98.4 °F (36.9 °C)] 97.3 °F (36.3 °C)  Heart Rate:  [65-94] 85  Resp:  [18-20] 18  BP: (121-152)/(46-85) 130/50  Flow (L/min) (Oxygen Therapy):  [2-22] 2  Physical Exam      General : Alert, awake, in mild distress because shortness of breath, nasal cannula on  CVS : Regular rate and rhythm, no murmur, rubs or gallops  Lungs: Bilateral wheezing present, bilateral crackles present  Abdomen: Soft, nontender, bowel sounds heard in all 4 quadrants  Extremities: Warm, well-perfused, no pedal edema.     Scheduled Meds:allopurinol, 100 mg, Oral, QAM  arformoterol, 15 mcg, Nebulization, BID - RT   And  budesonide, 0.5 mg, Nebulization, BID - RT   And  revefenacin, 175 mcg, Nebulization, Daily - RT  azithromycin, 500 mg, Oral, Q24H  cefepime, 2,000 mg, Intravenous, Q12H  cetirizine, 10 mg, Oral, Daily  enoxaparin, 1 mg/kg, Subcutaneous, Q12H  furosemide, 60 mg, Intravenous, BID Diuretics  insulin lispro, 2-7 Units, Subcutaneous, 4x Daily AC & at Bedtime  ipratropium-albuterol, 3 mL, Nebulization, Q4H - RT  isosorbide mononitrate, 15 mg, Oral, Daily  metoprolol succinate XL, 25 mg, Oral, Q12H  montelukast, 10 mg, Oral, Nightly  pantoprazole, 40 mg, Oral, Daily  sacubitril-valsartan, 1  tablet, Oral, BID  sodium chloride, 10 mL, Intravenous, Q12H  tamsulosin, 0.4 mg, Oral, Daily  vancomycin, 1,250 mg, Intravenous, Q24H      Continuous Infusions:Pharmacy to Dose enoxaparin (LOVENOX),   Pharmacy to dose vancomycin,            Result Review    Result Review:  I have personally reviewed the results from the time of this admission to 1/31/2025 09:08 EST and agree with these findings:  [x]  Laboratory  []  Microbiology  [x]  Radiology  [x]  EKG/Telemetry   [x]  Cardiology/Vascular   []  Pathology  []  Old records  []  Other:  Most notable findings include:     CBC          1/29/2025    03:25 1/30/2025    04:51 1/31/2025    06:14   CBC   WBC 12.34  17.65  13.47    RBC 3.62  3.40  3.89    Hemoglobin 10.1  9.3  10.7    Hematocrit 31.8  30.4  35.0    MCV 87.8  89.4  90.0    MCH 27.9  27.4  27.5    MCHC 31.8  30.6  30.6    RDW 15.1  15.0  15.1    Platelets 221  211  242      CMP          1/29/2025    03:25 1/29/2025    04:10 1/30/2025    04:51 1/31/2025    06:14   CMP   Glucose 157   142  96    BUN 16   20  32    Creatinine 1.70  1.62  1.63  1.87    EGFR 39.8   41.8  35.5    Sodium 136   140  139    Potassium 4.2   4.2  3.7    Chloride 102   106  99    Calcium 8.1   8.2  8.4    Total Protein 6.3   5.7     Albumin 3.6   3.2     Globulin 2.7   2.5     Total Bilirubin 0.5   0.3     Alkaline Phosphatase 54   45     AST (SGOT) 17   45     ALT (SGPT) 8   15     Albumin/Globulin Ratio 1.3   1.3     BUN/Creatinine Ratio 9.4   12.3  17.1    Anion Gap 12.4   10.0  11.3       CARDIAC LABS:     Latest Reference Range & Units 01/29/25 03:25 01/29/25 05:06 01/30/25 04:51 01/30/25 06:37   Creatine Kinase 20 - 200 U/L  213 (H)  276 (H)   CKMB <=10.40 ng/mL  23.32 (H)  39.61 (H)   HS Troponin T <22 ng/L 176 (C) 249 (C) 639 (C) 643 (C)   Troponin T % Delta Abnormal if >/= 20%   41 (C)  1   Troponin T Numeric Delta ng/L  73 (C)  4   proBNP 0.0 - 1,800.0 pg/mL 4,121.0 (H)        Results for orders placed during the hospital  encounter of 01/29/25    Adult Transthoracic Echo Complete W/ Cont if Necessary Per Protocol    Interpretation Summary    Left ventricular systolic function is low normal. Left ventricular ejection fraction appears to be 46 - 50%.    Left ventricular wall thickness is consistent with mild concentric hypertrophy.    Left ventricular diastolic function is consistent with (grade II w/high LAP) pseudonormalization.    The left atrial cavity is mildly dilated.    Aortic valve is moderately calcified with restricted opening.  Mild to moderate aortic valve stenosis is present.  The peak and mean gradients across aortic valve are 30/18 mmHg    There is mild tricuspid regurgitation.  Estimated right ventricular systolic pressure from tricuspid regurgitation is moderately elevated (45-55 mmHg).         Assessment & Plan   Assessment / Plan     Brief Patient Summary:  Layo Cee is a 84 y.o. male with coronary artery disease, previous angioplasty,, CABG, atrial fibrillation/flutter, previous ablation, on anticoagulation, chronic combined heart failure, hypertension and hyperlipidemia.  Currently admitted with shortness of breath, chills.     Active Hospital Problems:  Active Hospital Problems    Diagnosis     **Multifocal pneumonia     Acute exacerbation of CHF (congestive heart failure)      Acute on chronic combined heart failure : Clinically volume overloaded, chest x-ray shows pulmonary vascular congestion.  He has some edema.  proBNP is elevated.  Undergoing IV diuresis, creatinine trending up.  No significant diuresis during the past 48 hours     Elevated troponin : Volume overload with systemic infection, and sinus tachycardia on presentation.  He has some constant chest discomfort which is mild.  Suspect this is related to type II MI from systemic infection, CKD and CHF exacerbation.     Multifocal pneumonia : Elevated procalcitonin, on broad-spectrum antibiotics     Paroxysmal atrial fibrillation : Currently in  sinus rhythm and sinus tachycardia, had ablations in the past.     COPD with exacerbation      Plan:     Continue IV Lasix, but decrease the dose to once daily  Continue metoprolol, Entresto  Continue statins  Discontinue IV Lovenox, will restart home Xarelto from this evening  Antibiotics, bronchodilators, steroids per primary team and pulmonology  Repeat labs a.m.  We will follow, discussed with hospitalist team       CODE STATUS:   Level Of Support Discussed With: Patient  Code Status (Patient has no pulse and is not breathing): CPR (Attempt to Resuscitate)  Medical Interventions (Patient has pulse or is breathing): Full Support    Electronically signed by Darnell Stevenson MD, 01/31/25, 9:08 AM EST.

## 2025-01-31 NOTE — PROGRESS NOTES
Respiratory Therapist Broncho-Pulmonary Hygiene Progress Note      Patient Name:  Layo Cee  YOB: 1940    Layo eCe meets the qualification for Level 2 of the Bronco-Pulmonary Hygiene Protocol. This was based on my daily patient assessment and includes review of chest x-ray results, cough ability and quality, oxygenation, secretions or risk for secretion development and patient mobility.     Broncho-Pulmonary Hygiene Assessment:    Level of Movement: Actively changing positions without assistance  Alert/ oriented/ cooperative    Breath Sounds: Diminished and/or coarse rhonchi    Cough: Strong, effective    Chest X-Ray: Mild consolidation and/or atelectasis.    Sputum Productions: None or small amount of thin or watery secretions with effective cough    History and Physical: New onset of bronchitis or existing chronic pulmonary conditions.  **(not in an exacerbation)    SpO2 to Oxygen Need: greater than 92% on room air or  less than 3L nasal canula    Current SpO2 is: 97% on 2L    Based on this information, I have completed the following interventions: Teach/Instruct patient on cough and deep breathe and MetaNeb       Electronically signed by Trina Yuan RRT, 01/31/25, 10:34 AM EST.

## 2025-01-31 NOTE — PLAN OF CARE
Goal Outcome Evaluation:              Outcome Evaluation: No significant changes this shift. Remains on 2LNC. SOA noted with exertion. Lasix held this AM due to BP. No issues noted at this time.

## 2025-01-31 NOTE — PROGRESS NOTES
[facility]     Progress Note    Patient Name: Layo Cee  : 1940  MRN: [id number]  Primary Care Physician:  Tevin Zavala MD  Date of admission: 2025    Subjective   Subjective     Chief Complaint: Reason for consultation shortness of breath    History of Present Illness  Patient Reports feeling better  Still short of breath    Review of Systems    Objective   Objective     Vitals:   Temp:  [97.3 °F (36.3 °C)-98.4 °F (36.9 °C)] 97.7 °F (36.5 °C)  Heart Rate:  [65-94] 85  Resp:  [18-20] 18  BP: (115-152)/(42-85) 120/55  Flow (L/min) (Oxygen Therapy):  [2] 2    Physical Exam   Pleasant male  Mildly diminished breath sounds both bases  Result Review    Result Review:  I have personally reviewed the results from the time of this admission to 2025 17:58 EST and agree with these findings:  []  Laboratory list / accordion  []  Microbiology  []  Radiology  []  EKG/Telemetry   []  Cardiology/Vascular   []  Pathology  []  Old records  []  Other:  Most notable findings include: CT scan showing no evidence of pneumonia      Assessment & Plan   Assessment / Plan     Brief Patient Summary:  Layo Cee is a 84 y.o. male who mid to the hospital with shortness of breath    Active Hospital Problems:  Active Hospital Problems    Diagnosis     **Multifocal pneumonia     Acute exacerbation of CHF (congestive heart failure)      Plan:   No evidence of pneumonia seen on CT scan  Does have small bilateral pleural effusions  Does have positive for micro plasma pneumonia  Would recommend continuing treatment of antibiotics  Diuretics per cardiology and primary service    VTE Prophylaxis:  Pharmacologic & mechanical VTE prophylaxis orders are present.        CODE STATUS:    Level Of Support Discussed With: Patient  Code Status (Patient has no pulse and is not breathing): CPR (Attempt to Resuscitate)  Medical Interventions (Patient has pulse or is breathing): Full Support      Dylan Kimball,  DO    Electronically signed by Dylan Kimball DO, 01/31/25, 5:59 PM EST.

## 2025-02-01 LAB
ANION GAP SERPL CALCULATED.3IONS-SCNC: 11.2 MMOL/L (ref 5–15)
BACTERIA SPEC RESP CULT: NORMAL
BASOPHILS # BLD AUTO: 0.03 10*3/MM3 (ref 0–0.2)
BASOPHILS NFR BLD AUTO: 0.3 % (ref 0–1.5)
BUN SERPL-MCNC: 31 MG/DL (ref 8–23)
BUN/CREAT SERPL: 17.9 (ref 7–25)
CALCIUM SPEC-SCNC: 8.7 MG/DL (ref 8.6–10.5)
CHLORIDE SERPL-SCNC: 99 MMOL/L (ref 98–107)
CO2 SERPL-SCNC: 26.8 MMOL/L (ref 22–29)
CREAT SERPL-MCNC: 1.73 MG/DL (ref 0.76–1.27)
DEPRECATED RDW RBC AUTO: 49.5 FL (ref 37–54)
EGFRCR SERPLBLD CKD-EPI 2021: 38.9 ML/MIN/1.73
EOSINOPHIL # BLD AUTO: 0.2 10*3/MM3 (ref 0–0.4)
EOSINOPHIL NFR BLD AUTO: 2.1 % (ref 0.3–6.2)
ERYTHROCYTE [DISTWIDTH] IN BLOOD BY AUTOMATED COUNT: 15.1 % (ref 12.3–15.4)
GLUCOSE SERPL-MCNC: 108 MG/DL (ref 65–99)
GRAM STN SPEC: NORMAL
HCT VFR BLD AUTO: 32.5 % (ref 37.5–51)
HGB BLD-MCNC: 10.1 G/DL (ref 13–17.7)
IMM GRANULOCYTES # BLD AUTO: 0.03 10*3/MM3 (ref 0–0.05)
IMM GRANULOCYTES NFR BLD AUTO: 0.3 % (ref 0–0.5)
LYMPHOCYTES # BLD AUTO: 2.19 10*3/MM3 (ref 0.7–3.1)
LYMPHOCYTES NFR BLD AUTO: 23.2 % (ref 19.6–45.3)
MAGNESIUM SERPL-MCNC: 2.2 MG/DL (ref 1.6–2.4)
MCH RBC QN AUTO: 27.6 PG (ref 26.6–33)
MCHC RBC AUTO-ENTMCNC: 31.1 G/DL (ref 31.5–35.7)
MCV RBC AUTO: 88.8 FL (ref 79–97)
MONOCYTES # BLD AUTO: 0.89 10*3/MM3 (ref 0.1–0.9)
MONOCYTES NFR BLD AUTO: 9.4 % (ref 5–12)
NEUTROPHILS NFR BLD AUTO: 6.1 10*3/MM3 (ref 1.7–7)
NEUTROPHILS NFR BLD AUTO: 64.7 % (ref 42.7–76)
NRBC BLD AUTO-RTO: 0 /100 WBC (ref 0–0.2)
PLATELET # BLD AUTO: 243 10*3/MM3 (ref 140–450)
PMV BLD AUTO: 10.5 FL (ref 6–12)
POTASSIUM SERPL-SCNC: 3.7 MMOL/L (ref 3.5–5.2)
RBC # BLD AUTO: 3.66 10*6/MM3 (ref 4.14–5.8)
SODIUM SERPL-SCNC: 137 MMOL/L (ref 136–145)
VANCOMYCIN SERPL-MCNC: 13.53 MCG/ML (ref 5–40)
WBC NRBC COR # BLD AUTO: 9.44 10*3/MM3 (ref 3.4–10.8)

## 2025-02-01 PROCEDURE — 80048 BASIC METABOLIC PNL TOTAL CA: CPT | Performed by: STUDENT IN AN ORGANIZED HEALTH CARE EDUCATION/TRAINING PROGRAM

## 2025-02-01 PROCEDURE — 63710000001 REVEFENACIN 175 MCG/3ML SOLUTION: Performed by: STUDENT IN AN ORGANIZED HEALTH CARE EDUCATION/TRAINING PROGRAM

## 2025-02-01 PROCEDURE — 94664 DEMO&/EVAL PT USE INHALER: CPT

## 2025-02-01 PROCEDURE — 99232 SBSQ HOSP IP/OBS MODERATE 35: CPT | Performed by: STUDENT IN AN ORGANIZED HEALTH CARE EDUCATION/TRAINING PROGRAM

## 2025-02-01 PROCEDURE — 25010000002 CEFEPIME PER 500 MG: Performed by: STUDENT IN AN ORGANIZED HEALTH CARE EDUCATION/TRAINING PROGRAM

## 2025-02-01 PROCEDURE — 99233 SBSQ HOSP IP/OBS HIGH 50: CPT | Performed by: INTERNAL MEDICINE

## 2025-02-01 PROCEDURE — 25010000002 FUROSEMIDE PER 20 MG: Performed by: INTERNAL MEDICINE

## 2025-02-01 PROCEDURE — 94799 UNLISTED PULMONARY SVC/PX: CPT

## 2025-02-01 PROCEDURE — 99232 SBSQ HOSP IP/OBS MODERATE 35: CPT | Performed by: INTERNAL MEDICINE

## 2025-02-01 PROCEDURE — 83735 ASSAY OF MAGNESIUM: CPT | Performed by: STUDENT IN AN ORGANIZED HEALTH CARE EDUCATION/TRAINING PROGRAM

## 2025-02-01 PROCEDURE — 80202 ASSAY OF VANCOMYCIN: CPT | Performed by: STUDENT IN AN ORGANIZED HEALTH CARE EDUCATION/TRAINING PROGRAM

## 2025-02-01 PROCEDURE — 63710000001 PREDNISONE PER 1 MG: Performed by: INTERNAL MEDICINE

## 2025-02-01 PROCEDURE — 85025 COMPLETE CBC W/AUTO DIFF WBC: CPT | Performed by: STUDENT IN AN ORGANIZED HEALTH CARE EDUCATION/TRAINING PROGRAM

## 2025-02-01 RX ORDER — PREDNISONE 20 MG/1
40 TABLET ORAL
Status: DISCONTINUED | OUTPATIENT
Start: 2025-02-01 | End: 2025-02-03 | Stop reason: HOSPADM

## 2025-02-01 RX ORDER — FUROSEMIDE 10 MG/ML
60 INJECTION INTRAMUSCULAR; INTRAVENOUS DAILY
Status: DISCONTINUED | OUTPATIENT
Start: 2025-02-01 | End: 2025-02-02

## 2025-02-01 RX ADMIN — BUDESONIDE 0.5 MG: 0.5 INHALANT RESPIRATORY (INHALATION) at 07:30

## 2025-02-01 RX ADMIN — IPRATROPIUM BROMIDE AND ALBUTEROL SULFATE 3 ML: .5; 3 SOLUTION RESPIRATORY (INHALATION) at 02:28

## 2025-02-01 RX ADMIN — Medication 10 ML: at 08:47

## 2025-02-01 RX ADMIN — REVEFENACIN 175 MCG: 175 SOLUTION RESPIRATORY (INHALATION) at 07:30

## 2025-02-01 RX ADMIN — ISOSORBIDE MONONITRATE 15 MG: 30 TABLET, EXTENDED RELEASE ORAL at 08:46

## 2025-02-01 RX ADMIN — ARFORMOTEROL TARTRATE 15 MCG: 15 SOLUTION RESPIRATORY (INHALATION) at 19:13

## 2025-02-01 RX ADMIN — RIVAROXABAN 15 MG: 15 TABLET, FILM COATED ORAL at 17:02

## 2025-02-01 RX ADMIN — Medication 10 ML: at 20:31

## 2025-02-01 RX ADMIN — TAMSULOSIN HYDROCHLORIDE 0.4 MG: 0.4 CAPSULE ORAL at 08:46

## 2025-02-01 RX ADMIN — FUROSEMIDE 60 MG: 10 INJECTION, SOLUTION INTRAMUSCULAR; INTRAVENOUS at 08:47

## 2025-02-01 RX ADMIN — CEFEPIME 2000 MG: 2 INJECTION, POWDER, FOR SOLUTION INTRAVENOUS at 04:22

## 2025-02-01 RX ADMIN — PANTOPRAZOLE SODIUM 40 MG: 40 TABLET, DELAYED RELEASE ORAL at 08:46

## 2025-02-01 RX ADMIN — ALLOPURINOL 100 MG: 100 TABLET ORAL at 08:00

## 2025-02-01 RX ADMIN — SACUBITRIL AND VALSARTAN 1 TABLET: 24; 26 TABLET, FILM COATED ORAL at 20:30

## 2025-02-01 RX ADMIN — IPRATROPIUM BROMIDE AND ALBUTEROL SULFATE 3 ML: .5; 3 SOLUTION RESPIRATORY (INHALATION) at 07:30

## 2025-02-01 RX ADMIN — IPRATROPIUM BROMIDE AND ALBUTEROL SULFATE 3 ML: .5; 3 SOLUTION RESPIRATORY (INHALATION) at 11:25

## 2025-02-01 RX ADMIN — IPRATROPIUM BROMIDE AND ALBUTEROL SULFATE 3 ML: .5; 3 SOLUTION RESPIRATORY (INHALATION) at 19:13

## 2025-02-01 RX ADMIN — AMOXICILLIN AND CLAVULANATE POTASSIUM 1 TABLET: 875; 125 TABLET, FILM COATED ORAL at 20:30

## 2025-02-01 RX ADMIN — MONTELUKAST 10 MG: 10 TABLET, FILM COATED ORAL at 20:30

## 2025-02-01 RX ADMIN — ARFORMOTEROL TARTRATE 15 MCG: 15 SOLUTION RESPIRATORY (INHALATION) at 07:30

## 2025-02-01 RX ADMIN — METOPROLOL SUCCINATE 25 MG: 25 TABLET, EXTENDED RELEASE ORAL at 20:30

## 2025-02-01 RX ADMIN — AZITHROMYCIN DIHYDRATE 500 MG: 250 TABLET ORAL at 08:46

## 2025-02-01 RX ADMIN — BUDESONIDE 0.5 MG: 0.5 INHALANT RESPIRATORY (INHALATION) at 19:13

## 2025-02-01 RX ADMIN — IPRATROPIUM BROMIDE AND ALBUTEROL SULFATE 3 ML: .5; 3 SOLUTION RESPIRATORY (INHALATION) at 16:02

## 2025-02-01 RX ADMIN — SACUBITRIL AND VALSARTAN 1 TABLET: 24; 26 TABLET, FILM COATED ORAL at 08:46

## 2025-02-01 RX ADMIN — CETIRIZINE HYDROCHLORIDE 10 MG: 10 TABLET, FILM COATED ORAL at 08:46

## 2025-02-01 RX ADMIN — PREDNISONE 40 MG: 20 TABLET ORAL at 17:02

## 2025-02-01 NOTE — PROGRESS NOTES
[facility]   Hospitalist Progress Note  Date: 2025  Patient Name: Layo Cee  : 1940  MRN: [id number]  Date of admission: 2025    Subjective   Subjective     Chief Complaint:   Shortness of breath, chills    Summary:   Layo Cee is a 84 y.o. male with past medical history of CAD status post CABG, atrial fibrillation/flutter on rivaroxaban, CHF, hypertension, hyperlipidemia, GERD, COPD on 2 L home oxygen, CKD presented to the ED for evaluation of sudden onset of chills, shortness of breath that started around 8 PM.  Noted to have some chest pain across the chest.  Describes it as aching.  Used nebulizer treatments with no improvement in the symptoms.  Patient waited until 1 AM and he continued to experience shortness of breath and called EMS.  Patient recently diagnosed with pneumonia and started on Augmentin and doxycycline outpatient on 2025.  Received nebulizer treatments and Solu-Medrol by EMS.  For the past few months patient did not had any swelling in the bilateral lower extremities and tonight when he started experiencing shortness of breath he was noted to have bilateral lower extremity edema as well.  Noted to have some cough and intermittent sputum production.  Denies dysuria, nausea, vomiting, abdominal pain, fevers.  Patient was admitted and started on treatment for multifocal pneumonia and also diuretics for suspected component of decompensated heart failure.    Interval Followup:   No acute events overnight, still with significant dyspnea on exertion.  Denies having any chest pain.  No fevers or chills.    Objective   Objective     Vitals:   Temp:  [97.3 °F (36.3 °C)-98.7 °F (37.1 °C)] 98.7 °F (37.1 °C)  Heart Rate:  [] 112  Resp:  [16-20] 20  BP: (101-135)/(46-84) 118/54  Flow (L/min) (Oxygen Therapy):  [2] 2    Physical Exam   GEN: No acute distress  HEENT: Moist mucous membranes  LUNGS: Equal chest rise bilaterally, diminished breath sounds at the bases  bilaterally  CARDIAC: Regular rate and rhythm.  1+ pitting edema to the mid tibia bilaterally  NEURO: Moving all 4 extremities spontaneously  SKIN: No obvious breakdown    Result Review    Result Review:  I have personally reviewed the results as below  [x]  Laboratory CBC, CMP personally reviewed  CBC          1/30/2025    04:51 1/31/2025    06:14 2/1/2025    04:52   CBC   WBC 17.65  13.47  9.44    RBC 3.40  3.89  3.66    Hemoglobin 9.3  10.7  10.1    Hematocrit 30.4  35.0  32.5    MCV 89.4  90.0  88.8    MCH 27.4  27.5  27.6    MCHC 30.6  30.6  31.1    RDW 15.0  15.1  15.1    Platelets 211  242  243      CMP          1/30/2025    04:51 1/31/2025    06:14 2/1/2025    04:52   CMP   Glucose 142  96  108    BUN 20  32  31    Creatinine 1.63  1.87  1.73    EGFR 41.8  35.5  38.9    Sodium 140  139  137    Potassium 4.2  3.7  3.7    Chloride 106  99  99    Calcium 8.2  8.4  8.7    Total Protein 5.7      Albumin 3.2      Globulin 2.5      Total Bilirubin 0.3      Alkaline Phosphatase 45      AST (SGOT) 45      ALT (SGPT) 15      Albumin/Globulin Ratio 1.3      BUN/Creatinine Ratio 12.3  17.1  17.9    Anion Gap 10.0  11.3  11.2      []  Microbiology  []  Radiology  []  EKG/Telemetry   []  Cardiology/Vascular   []  Pathology  []  Old records  []  Other:    Scheduled medications:  allopurinol, 100 mg, Oral, QAM  amoxicillin-clavulanate, 1 tablet, Oral, Q12H  arformoterol, 15 mcg, Nebulization, BID - RT   And  budesonide, 0.5 mg, Nebulization, BID - RT   And  revefenacin, 175 mcg, Nebulization, Daily - RT  azithromycin, 500 mg, Oral, Q24H  cetirizine, 10 mg, Oral, Daily  furosemide, 60 mg, Intravenous, Daily  ipratropium-albuterol, 3 mL, Nebulization, Q4H - RT  isosorbide mononitrate, 15 mg, Oral, Daily  metoprolol succinate XL, 25 mg, Oral, Q12H  montelukast, 10 mg, Oral, Nightly  pantoprazole, 40 mg, Oral, Daily  rivaroxaban, 15 mg, Oral, Daily With Dinner  sacubitril-valsartan, 1 tablet, Oral, BID  sodium chloride, 10 mL,  Intravenous, Q12H  tamsulosin, 0.4 mg, Oral, Daily      As needed medications:    albuterol    aluminum-magnesium hydroxide-simethicone    senna-docusate sodium **AND** polyethylene glycol **AND** bisacodyl **AND** bisacodyl    dextrose    dextrose    glucagon (human recombinant)    nitroglycerin    sodium chloride    sodium chloride    sodium chloride  Infusions:            Assessment & Plan   Assessment / Plan     Assessment:  Acute on chronic heart failure with intermediate ejection fraction (echo 1/29/2025 with EF 46 to 50%)  Elevated troponin, suspected type II in setting of tachycardia and decompensated heart failure per cardiology  Cardiogenic pulmonary edema  Mycoplasma pneumonia  Moderate aortic stenosis  Sepsis, present on admission, tachycardia, tachypnea, elevated white count, pneumonia  COPD on 2 L home oxygen  CAD status post CABG followed by stents x 2  Atrial fibrillation on rivaroxaban, history of ablation  CKD stage IIIb  CHF  GERD  Hypertension    Plan:  Patient admitted to the hospital for further workup and management of above  Cardiology on board, recommendations appreciated  Diuretics decreased to daily given rising creatinine, suspect there is a component of his aortic stenosis playing a role  Patient with rising WBCs on 01/30/2025, mycoplasma returned positive.  Started on azithromycin on 01/31/2025, complete 3 days of treatment on 02/02/2025.  Patient also de-escalated to Augmentin and will complete treatment course on 02/02/2025 as well  Blood cultures remain negative so far  Pulmonary consulted, appreciate recommendations.,  Sputum culture without any pathogenic organisms, antibiotic de-escalation as noted above  Continue Xarelto  Continue Brovana, Pulmicort  Continue bronchodilator protocol and bronchopulmonary hygiene  Cardiac diet  Sliding scale insulin discontinued as patient did not require any for many days  Further management pending clinical course        Reviewed patient's labs  and imaging, and discussed with patient and nurse at bedside.    VTE Prophylaxis:  Pharmacologic & mechanical VTE prophylaxis orders are present.      Dispo: Patient remains admitted for decompensated heart failure with component of mycoplasma pneumonia.  Slowly improving.  Likely home with home health versus SNF once stable      CODE STATUS:   Level Of Support Discussed With: Patient  Code Status (Patient has no pulse and is not breathing): CPR (Attempt to Resuscitate)  Medical Interventions (Patient has pulse or is breathing): Full Support

## 2025-02-01 NOTE — PROGRESS NOTES
Pulmonary / Critical Care Progress Note      Patient Name: Layo Cee  : 1940  MRN: [id number]  Primary Care Physician:  Tevin Zavala MD  Date of admission: 2025    Subjective   Subjective   Follow-up for mycoplasma infection, congestive heart failure, acute hypoxic respiratory failure    Over past 24 hours: Remains on azithromycin and Augmentin courses.  Remains on Brovana, Pulmicort, Yupelri.  Sierra on Xarelto for A-fib remains on Entresto    No acute events overnight.     This morning,   Currently on 2 L  Resting in bed  Feels little worse today  Still reporting some exertional dyspnea  Patient has conversational dyspnea as well  Denies any chest pain or chest tightness  Still wheezing sound  No fever or chills  Started on diuretics    Objective   Objective     Vitals:   Temp:  [97.3 °F (36.3 °C)-98.2 °F (36.8 °C)] 97.7 °F (36.5 °C)  Heart Rate:  [] 87  Resp:  [16-18] 18  BP: (101-135)/(42-84) 101/84  Flow (L/min) (Oxygen Therapy):  [2] 2  Physical Exam   Vital Signs Reviewed   General:  WDWN, Alert, NAD.  Chronically ill-appearing elderly male, lying in bed  HEENT:  PERRL, EOMI.  OP, nares clear, no sinus tenderness  Neck:  Supple, no JVD, no thyromegaly  Chest:  good aeration, diminished breath sounds bilaterally with scattered crackles, no increased work of breathing noted on 2 L while at rest  CV: RRR, no MGR, pulses 2+, equal.  Abd:  Soft, NT, ND, + BS, no HSM, obese  EXT:  no clubbing, no cyanosis, no edema  Neuro:  A&Ox3, CN grossly intact, no focal deficits.  Skin: No rashes or lesions noted      Result Review    Result Review:  I have personally reviewed the results from the time of this admission to 2025 07:07 EST and agree with these findings:  [x]  Laboratory  [x]  Microbiology  [x]  Radiology  [x]  EKG/Telemetry   [x]  Cardiology/Vascular   []  Pathology  []  Old records  []  Other:  Most notable findings include:         Lab 25  0452 25  0614 25  0451  01/29/25  0410 01/29/25  0325 01/27/25  1325   WBC 9.44 13.47* 17.65*  --  12.34* 8.91   HEMOGLOBIN 10.1* 10.7* 9.3*  --  10.1* 10.6*   HEMATOCRIT 32.5* 35.0* 30.4*  --  31.8* 32.7*   PLATELETS 243 242 211  --  221 244   SODIUM 137 139 140  --  136 141   POTASSIUM 3.7 3.7 4.2  --  4.2 4.1   CHLORIDE 99 99 106  --  102 105   CO2 26.8 28.7 24.0  --  21.6* 25.4   BUN 31* 32* 20  --  16 12   CREATININE 1.73* 1.87* 1.63* 1.62* 1.70* 1.41*   GLUCOSE 108* 96 142*  --  157* 80   CALCIUM 8.7 8.4* 8.2*  --  8.1* 8.8   PHOSPHORUS  --   --  3.2  --   --   --    TOTAL PROTEIN  --   --  5.7*  --  6.3 6.2   ALBUMIN  --   --  3.2*  --  3.6 3.6   GLOBULIN  --   --  2.5  --  2.7 2.6            Assessment & Plan   Assessment / Plan     Active Hospital Problems:  Active Hospital Problems    Diagnosis     **Multifocal pneumonia     Acute exacerbation of CHF (congestive heart failure)      Impression:   Acute hypoxic respiratory failure  Acute on chronic diastolic heart failure, EF 46 to 50% with grade 2 diastolic dysfunction and mild to moderate aortic valve stenosis  Acute on chronic COPD exacerbation, FEV1 56%  Exertional dyspnea  Pneumonia, unknown organism  Mycoplasma respiratory infection  CKD  Obesity BMI 33    Plan:   -Continue to maintain pO2 greater than 90%  -Chest CT reviewed with small bilateral pleural effusions right greater than left with some pulmonary edema and vascular congestion  -No need for thoracentesis at this time  -Continue azithromycin and Augmentin for a total of 3 days  -Continue Lasix 60 mg IV daily  -Continue to monitor renal panel electrolytes.  Replace electrolytes necessary  -Continue Brovana, Pulmicort, Yupelri  -Continue Entresto and Xarelto  -Continue bronchopulmonary hygiene.  Encourage flutter  -Encourage activity as tolerated and incentive spirometer use  -PT/OT on board.  Appreciate assistance  -Cardiology following, appreciate input  -Patient will benefit from rehab  -Follow-up pulmonary clinic 1  to 2 weeks after discharge    VTE Prophylaxis:  Pharmacologic & mechanical VTE prophylaxis orders are present.        CODE STATUS:   Level Of Support Discussed With: Patient  Code Status (Patient has no pulse and is not breathing): CPR (Attempt to Resuscitate)  Medical Interventions (Patient has pulse or is breathing): Full Support      I personally reviewed pertinent labs, imaging and provider notes. Discussed with bedside nurse and will discuss with primary service.     Electronically signed by ARACELI Vega, 02/01/25, 1:00 PM EST.    This visit was performed by BOTH a physician and an APC. I personally evaluated and examined the patient. I performed all aspects of MDM as documented. , I have reviewed and confirmed the accuracy of the patient's history as documented in this note., and I have reexamined the patient and the results are consistent with the previously documented exam. I have updated the documentation as necessary.     Electronically signed by Kolton Brink MD, 02/01/25, 3:56 PM EST.     Electronically signed by Kolton Brink MD, 2/1/2025, 07:07 EST.

## 2025-02-01 NOTE — PLAN OF CARE
Goal Outcome Evaluation:              Outcome Evaluation: Patient A&Ox4, VSS on 2L nc. Administered scheduled medications per MAR. No complaints of pain overnight. Some shortness of breath on exertion. No needs at this time. Plan of care ongoing.

## 2025-02-01 NOTE — PROGRESS NOTES
[facility]     Cardiology Progress Note    Patient Name: Layo Cee  : 1940  MRN: [id number]  Primary Care Physician:  Tevin Zavala MD  Date of admission: 2025    Subjective   Subjective     Chief Complaint: Follow-up visit for shortness of breath, congestive heart failure, chest discomfort.     Interval HPI:    Patient reports shortness of breath but is showing slow improvement over the past couple of days.  Denies cough.  No chest discomfort/pressure at this time.    Review of Systems   All systems were reviewed and negative except for: Shortness of breath, cough, wheezing, and constipation    Objective   Objective     Vitals:   Temp:  [97.3 °F (36.3 °C)-98.2 °F (36.8 °C)] 97.4 °F (36.3 °C)  Heart Rate:  [] 91  Resp:  [16-20] 20  BP: (101-135)/(42-84) 125/56  Flow (L/min) (Oxygen Therapy):  [2] 2  Physical Exam      General : Alert, awake, in mild distress because shortness of breath, nasal cannula on  CVS : Regular rate and rhythm, no murmur, rubs or gallops  Lungs: Bilateral wheezing present, bilateral crackles present  Abdomen: Soft, nontender, bowel sounds heard in all 4 quadrants  Extremities: Warm, well-perfused, no pedal edema.     Scheduled Meds:allopurinol, 100 mg, Oral, QAM  amoxicillin-clavulanate, 1 tablet, Oral, Q12H  arformoterol, 15 mcg, Nebulization, BID - RT   And  budesonide, 0.5 mg, Nebulization, BID - RT   And  revefenacin, 175 mcg, Nebulization, Daily - RT  azithromycin, 500 mg, Oral, Q24H  cetirizine, 10 mg, Oral, Daily  furosemide, 60 mg, Intravenous, Daily  ipratropium-albuterol, 3 mL, Nebulization, Q4H - RT  isosorbide mononitrate, 15 mg, Oral, Daily  metoprolol succinate XL, 25 mg, Oral, Q12H  montelukast, 10 mg, Oral, Nightly  pantoprazole, 40 mg, Oral, Daily  rivaroxaban, 15 mg, Oral, Daily With Dinner  sacubitril-valsartan, 1 tablet, Oral, BID  sodium chloride, 10 mL, Intravenous, Q12H  tamsulosin, 0.4 mg, Oral, Daily             Result Review    Result  Review:  I have personally reviewed the results from the time of this admission to 2/1/2025 10:58 EST and agree with these findings:  [x]  Laboratory  []  Microbiology  [x]  Radiology  [x]  EKG/Telemetry   [x]  Cardiology/Vascular   []  Pathology  []  Old records  []  Other:  Most notable findings include:     CBC          1/30/2025    04:51 1/31/2025    06:14 2/1/2025    04:52   CBC   WBC 17.65  13.47  9.44    RBC 3.40  3.89  3.66    Hemoglobin 9.3  10.7  10.1    Hematocrit 30.4  35.0  32.5    MCV 89.4  90.0  88.8    MCH 27.4  27.5  27.6    MCHC 30.6  30.6  31.1    RDW 15.0  15.1  15.1    Platelets 211  242  243      CMP          1/30/2025    04:51 1/31/2025    06:14 2/1/2025    04:52   CMP   Glucose 142  96  108    BUN 20  32  31    Creatinine 1.63  1.87  1.73    EGFR 41.8  35.5  38.9    Sodium 140  139  137    Potassium 4.2  3.7  3.7    Chloride 106  99  99    Calcium 8.2  8.4  8.7    Total Protein 5.7      Albumin 3.2      Globulin 2.5      Total Bilirubin 0.3      Alkaline Phosphatase 45      AST (SGOT) 45      ALT (SGPT) 15      Albumin/Globulin Ratio 1.3      BUN/Creatinine Ratio 12.3  17.1  17.9    Anion Gap 10.0  11.3  11.2       Assessment & Plan   Assessment / Plan     Brief Patient Summary:  Layo Cee is a 84 y.o. male with coronary artery disease, previous angioplasty,, CABG, atrial fibrillation/flutter, previous ablation, on anticoagulation, chronic combined heart failure, hypertension and hyperlipidemia.  Currently admitted with shortness of breath, chills.     Active Hospital Problems:  Active Hospital Problems    Diagnosis     **Multifocal pneumonia     Acute exacerbation of CHF (congestive heart failure)      Acute on chronic combined heart failure : Clinically volume overloaded, chest x-ray shows pulmonary vascular congestion.  He has some edema.  proBNP is elevated.  Undergoing IV diuresis, creatinine trending up.  Diuretics decreased yesterday, currently stable    Elevated troponin : Volume  overload with systemic infection, and sinus tachycardia on presentation.  He has some constant chest discomfort which is mild.  Suspect this is related to type II MI from systemic infection, CKD and CHF exacerbation.     Multifocal pneumonia : Elevated procalcitonin, on broad-spectrum antibiotics, leukocytosis improving.  Positive for mycoplasma     Paroxysmal atrial fibrillation : Currently in sinus rhythm and sinus tachycardia, had ablations in the past.     COPD with exacerbation      Plan:     Continue IV Lasix once daily  Continue metoprolol, Entresto  Continue statins  Continue Xarelto for anticoagulation  Antibiotics, bronchodilators, steroids per primary team and pulmonology  Repeat labs a.m.    PT/OT       CODE STATUS:   Level Of Support Discussed With: Patient  Code Status (Patient has no pulse and is not breathing): CPR (Attempt to Resuscitate)  Medical Interventions (Patient has pulse or is breathing): Full Support    Electronically signed by Darnell Stevenson MD, 02/01/25, 10:58 AM EST.

## 2025-02-01 NOTE — PLAN OF CARE
Goal Outcome Evaluation:  Plan of Care Reviewed With: patient        Progress: improving  Outcome Evaluation: patient alert and oriented x4,  VSS, O2 at 2L per NC, instructed on importance of using I.S., verbalized understanding, no needs or complaints noted, will continue plan of care

## 2025-02-02 LAB
ALBUMIN SERPL-MCNC: 3.5 G/DL (ref 3.5–5.2)
ALBUMIN/GLOB SERPL: 1.3 G/DL
ALP SERPL-CCNC: 50 U/L (ref 39–117)
ALT SERPL W P-5'-P-CCNC: 20 U/L (ref 1–41)
ANION GAP SERPL CALCULATED.3IONS-SCNC: 9.6 MMOL/L (ref 5–15)
AST SERPL-CCNC: 18 U/L (ref 1–40)
BASOPHILS # BLD AUTO: 0.02 10*3/MM3 (ref 0–0.2)
BASOPHILS NFR BLD AUTO: 0.2 % (ref 0–1.5)
BILIRUB SERPL-MCNC: 0.3 MG/DL (ref 0–1.2)
BUN SERPL-MCNC: 33 MG/DL (ref 8–23)
BUN/CREAT SERPL: 18.8 (ref 7–25)
CALCIUM SPEC-SCNC: 9 MG/DL (ref 8.6–10.5)
CHLORIDE SERPL-SCNC: 99 MMOL/L (ref 98–107)
CO2 SERPL-SCNC: 27.4 MMOL/L (ref 22–29)
CREAT SERPL-MCNC: 1.76 MG/DL (ref 0.76–1.27)
DEPRECATED RDW RBC AUTO: 47.8 FL (ref 37–54)
EGFRCR SERPLBLD CKD-EPI 2021: 38.1 ML/MIN/1.73
EOSINOPHIL # BLD AUTO: 0 10*3/MM3 (ref 0–0.4)
EOSINOPHIL NFR BLD AUTO: 0 % (ref 0.3–6.2)
ERYTHROCYTE [DISTWIDTH] IN BLOOD BY AUTOMATED COUNT: 14.8 % (ref 12.3–15.4)
GLOBULIN UR ELPH-MCNC: 2.8 GM/DL
GLUCOSE SERPL-MCNC: 158 MG/DL (ref 65–99)
HCT VFR BLD AUTO: 34.8 % (ref 37.5–51)
HGB BLD-MCNC: 10.7 G/DL (ref 13–17.7)
IMM GRANULOCYTES # BLD AUTO: 0.05 10*3/MM3 (ref 0–0.05)
IMM GRANULOCYTES NFR BLD AUTO: 0.5 % (ref 0–0.5)
LYMPHOCYTES # BLD AUTO: 0.94 10*3/MM3 (ref 0.7–3.1)
LYMPHOCYTES NFR BLD AUTO: 9.9 % (ref 19.6–45.3)
MAGNESIUM SERPL-MCNC: 2.3 MG/DL (ref 1.6–2.4)
MCH RBC QN AUTO: 27.3 PG (ref 26.6–33)
MCHC RBC AUTO-ENTMCNC: 30.7 G/DL (ref 31.5–35.7)
MCV RBC AUTO: 88.8 FL (ref 79–97)
MONOCYTES # BLD AUTO: 0.49 10*3/MM3 (ref 0.1–0.9)
MONOCYTES NFR BLD AUTO: 5.2 % (ref 5–12)
NEUTROPHILS NFR BLD AUTO: 7.97 10*3/MM3 (ref 1.7–7)
NEUTROPHILS NFR BLD AUTO: 84.2 % (ref 42.7–76)
NRBC BLD AUTO-RTO: 0 /100 WBC (ref 0–0.2)
PLATELET # BLD AUTO: 255 10*3/MM3 (ref 140–450)
PMV BLD AUTO: 10.6 FL (ref 6–12)
POTASSIUM SERPL-SCNC: 4.5 MMOL/L (ref 3.5–5.2)
PROT SERPL-MCNC: 6.3 G/DL (ref 6–8.5)
RBC # BLD AUTO: 3.92 10*6/MM3 (ref 4.14–5.8)
SODIUM SERPL-SCNC: 136 MMOL/L (ref 136–145)
WBC NRBC COR # BLD AUTO: 9.47 10*3/MM3 (ref 3.4–10.8)

## 2025-02-02 PROCEDURE — 94799 UNLISTED PULMONARY SVC/PX: CPT

## 2025-02-02 PROCEDURE — 63710000001 PREDNISONE PER 1 MG: Performed by: INTERNAL MEDICINE

## 2025-02-02 PROCEDURE — 83735 ASSAY OF MAGNESIUM: CPT | Performed by: STUDENT IN AN ORGANIZED HEALTH CARE EDUCATION/TRAINING PROGRAM

## 2025-02-02 PROCEDURE — 97530 THERAPEUTIC ACTIVITIES: CPT

## 2025-02-02 PROCEDURE — 99232 SBSQ HOSP IP/OBS MODERATE 35: CPT | Performed by: INTERNAL MEDICINE

## 2025-02-02 PROCEDURE — 80053 COMPREHEN METABOLIC PANEL: CPT | Performed by: INTERNAL MEDICINE

## 2025-02-02 PROCEDURE — 94664 DEMO&/EVAL PT USE INHALER: CPT

## 2025-02-02 PROCEDURE — 99233 SBSQ HOSP IP/OBS HIGH 50: CPT | Performed by: INTERNAL MEDICINE

## 2025-02-02 PROCEDURE — 97116 GAIT TRAINING THERAPY: CPT

## 2025-02-02 PROCEDURE — 63710000001 REVEFENACIN 175 MCG/3ML SOLUTION: Performed by: STUDENT IN AN ORGANIZED HEALTH CARE EDUCATION/TRAINING PROGRAM

## 2025-02-02 PROCEDURE — 25010000002 FUROSEMIDE PER 20 MG: Performed by: INTERNAL MEDICINE

## 2025-02-02 PROCEDURE — 94761 N-INVAS EAR/PLS OXIMETRY MLT: CPT

## 2025-02-02 PROCEDURE — 94760 N-INVAS EAR/PLS OXIMETRY 1: CPT

## 2025-02-02 PROCEDURE — 99232 SBSQ HOSP IP/OBS MODERATE 35: CPT | Performed by: STUDENT IN AN ORGANIZED HEALTH CARE EDUCATION/TRAINING PROGRAM

## 2025-02-02 PROCEDURE — 85025 COMPLETE CBC W/AUTO DIFF WBC: CPT | Performed by: STUDENT IN AN ORGANIZED HEALTH CARE EDUCATION/TRAINING PROGRAM

## 2025-02-02 RX ORDER — METOLAZONE 5 MG/1
5 TABLET ORAL ONCE
Status: COMPLETED | OUTPATIENT
Start: 2025-02-02 | End: 2025-02-02

## 2025-02-02 RX ORDER — FUROSEMIDE 40 MG/1
40 TABLET ORAL DAILY
Status: DISCONTINUED | OUTPATIENT
Start: 2025-02-03 | End: 2025-02-03

## 2025-02-02 RX ADMIN — METOLAZONE 5 MG: 5 TABLET ORAL at 12:15

## 2025-02-02 RX ADMIN — SENNOSIDES AND DOCUSATE SODIUM 2 TABLET: 50; 8.6 TABLET ORAL at 13:56

## 2025-02-02 RX ADMIN — Medication 10 ML: at 09:18

## 2025-02-02 RX ADMIN — IPRATROPIUM BROMIDE AND ALBUTEROL SULFATE 3 ML: .5; 3 SOLUTION RESPIRATORY (INHALATION) at 16:31

## 2025-02-02 RX ADMIN — IPRATROPIUM BROMIDE AND ALBUTEROL SULFATE 3 ML: .5; 3 SOLUTION RESPIRATORY (INHALATION) at 00:24

## 2025-02-02 RX ADMIN — BUDESONIDE 0.5 MG: 0.5 INHALANT RESPIRATORY (INHALATION) at 19:21

## 2025-02-02 RX ADMIN — METOPROLOL SUCCINATE 25 MG: 25 TABLET, EXTENDED RELEASE ORAL at 20:25

## 2025-02-02 RX ADMIN — METOPROLOL SUCCINATE 25 MG: 25 TABLET, EXTENDED RELEASE ORAL at 09:17

## 2025-02-02 RX ADMIN — IPRATROPIUM BROMIDE AND ALBUTEROL SULFATE 3 ML: .5; 3 SOLUTION RESPIRATORY (INHALATION) at 06:37

## 2025-02-02 RX ADMIN — POLYETHYLENE GLYCOL 3350 17 G: 17 POWDER, FOR SOLUTION ORAL at 13:56

## 2025-02-02 RX ADMIN — SACUBITRIL AND VALSARTAN 1 TABLET: 24; 26 TABLET, FILM COATED ORAL at 20:25

## 2025-02-02 RX ADMIN — ARFORMOTEROL TARTRATE 15 MCG: 15 SOLUTION RESPIRATORY (INHALATION) at 19:21

## 2025-02-02 RX ADMIN — SACUBITRIL AND VALSARTAN 1 TABLET: 24; 26 TABLET, FILM COATED ORAL at 09:18

## 2025-02-02 RX ADMIN — PANTOPRAZOLE SODIUM 40 MG: 40 TABLET, DELAYED RELEASE ORAL at 09:18

## 2025-02-02 RX ADMIN — PREDNISONE 40 MG: 20 TABLET ORAL at 09:17

## 2025-02-02 RX ADMIN — MONTELUKAST 10 MG: 10 TABLET, FILM COATED ORAL at 20:25

## 2025-02-02 RX ADMIN — BUDESONIDE 0.5 MG: 0.5 INHALANT RESPIRATORY (INHALATION) at 06:37

## 2025-02-02 RX ADMIN — AZITHROMYCIN DIHYDRATE 500 MG: 250 TABLET ORAL at 09:18

## 2025-02-02 RX ADMIN — IPRATROPIUM BROMIDE AND ALBUTEROL SULFATE 3 ML: .5; 3 SOLUTION RESPIRATORY (INHALATION) at 13:09

## 2025-02-02 RX ADMIN — REVEFENACIN 175 MCG: 175 SOLUTION RESPIRATORY (INHALATION) at 06:47

## 2025-02-02 RX ADMIN — AMOXICILLIN AND CLAVULANATE POTASSIUM 1 TABLET: 875; 125 TABLET, FILM COATED ORAL at 09:17

## 2025-02-02 RX ADMIN — ALLOPURINOL 100 MG: 100 TABLET ORAL at 06:27

## 2025-02-02 RX ADMIN — ISOSORBIDE MONONITRATE 15 MG: 30 TABLET, EXTENDED RELEASE ORAL at 09:18

## 2025-02-02 RX ADMIN — TAMSULOSIN HYDROCHLORIDE 0.4 MG: 0.4 CAPSULE ORAL at 09:17

## 2025-02-02 RX ADMIN — FUROSEMIDE 60 MG: 10 INJECTION, SOLUTION INTRAMUSCULAR; INTRAVENOUS at 09:17

## 2025-02-02 RX ADMIN — IPRATROPIUM BROMIDE AND ALBUTEROL SULFATE 3 ML: .5; 3 SOLUTION RESPIRATORY (INHALATION) at 19:21

## 2025-02-02 RX ADMIN — AMOXICILLIN AND CLAVULANATE POTASSIUM 1 TABLET: 875; 125 TABLET, FILM COATED ORAL at 20:25

## 2025-02-02 RX ADMIN — Medication 10 ML: at 20:25

## 2025-02-02 RX ADMIN — RIVAROXABAN 15 MG: 15 TABLET, FILM COATED ORAL at 17:14

## 2025-02-02 RX ADMIN — IPRATROPIUM BROMIDE AND ALBUTEROL SULFATE 3 ML: .5; 3 SOLUTION RESPIRATORY (INHALATION) at 22:50

## 2025-02-02 RX ADMIN — CETIRIZINE HYDROCHLORIDE 10 MG: 10 TABLET, FILM COATED ORAL at 09:18

## 2025-02-02 RX ADMIN — ARFORMOTEROL TARTRATE 15 MCG: 15 SOLUTION RESPIRATORY (INHALATION) at 06:37

## 2025-02-02 NOTE — THERAPY TREATMENT NOTE
Acute Care - Physical Therapy Progress Note  YEYO Back     Patient Name: Layo Cee  : 1940  MRN: [id number]  Today's Date: 2025      Visit Dx:     ICD-10-CM ICD-9-CM   1. Acute on chronic congestive heart failure, unspecified heart failure type  I50.9 428.0   2. COPD with acute exacerbation  J44.1 491.21   3. Elevated troponin  R79.89 790.6   4. Pneumonia due to infectious organism, unspecified laterality, unspecified part of lung  J18.9 486   5. Decreased activities of daily living (ADL)  Z78.9 V49.89   6. 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 stable angina pectoris    Class 2 obesity due to excess calories without serious comorbidity with body mass index (BMI) of 35.0 to 35.9 in adult    Gout    Typical atrial flutter    Essential hypertension    Frequent PVCs    Mixed hyperlipidemia    Lateral epicondylitis    Allergic rhinitis    Seasonal allergies    Tobacco abuse, in remission    Vitamin D deficiency    Gastroesophageal reflux disease with esophagitis without hemorrhage    Ischemic cardiomyopathy    Centrilobular emphysema    Gastroesophageal reflux disease    Atrial fibrillation, persistent    COPD (chronic obstructive pulmonary disease)    Chronic anticoagulation    Diastolic dysfunction    S/P CABG (coronary artery bypass graft)    Stage 3a chronic kidney disease    Persistent atrial fibrillation    S/P ablation of atrial fibrillation    Atrial fibrillation, unspecified type    SOB (shortness of breath)    Chest pain    Mild aortic valve stenosis    Moderate mitral regurgitation    Benign prostatic hyperplasia with lower urinary tract symptoms    Chronic systolic congestive heart failure    Acute exacerbation of CHF (congestive heart failure)    Multifocal pneumonia     Past Medical History:   Diagnosis Date    Acute on chronic diastolic CHF (congestive heart failure)  6/6/2022    Arthritis     Atrial fibrillation, persistent 5/3/2022    Persistent atrial fibrillation status post extensive ablation and pulmonary vein isolation by Dr. Aguilar on 6/3/2022, with reoccurring rapid A. fib, and then successful cardioversion on 7/1/2022    BPH (benign prostatic hyperplasia)     CHF (congestive heart failure)     COPD (chronic obstructive pulmonary disease)     Coronary artery disease of bypass graft of native heart with stable angina pectoris     (1) Coronary artery disease, s/p CABG in the past. Cardiac catheterization in July 2016 showed patentLIMA graft to the LAD and occluded SVGs. Native LAD is 100% occluded in the mid portion. Native LCx has no significant disease. Native RCA is 100% occluded and it is a . Repeat cath in June, 2021, underwent stent placement to diagnonal branch.    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 Lauren Valencia      Past Surgical History:   Procedure Laterality Date    BLADDER SURGERY      CARDIAC CATHETERIZATION  07/2016    CARDIAC CATHETERIZATION N/A 8/9/2021    Procedure: Left Heart Cath with possible angioplasty;  Surgeon: Jack Ladd MD;  Location: [facility] INVASIVE LOCATION;  Service: Cardiovascular;  Laterality: N/A;  Please schedule the procedure with Dr. Jack Ladd MD on 8/9/2021    CARDIAC ELECTROPHYSIOLOGY PROCEDURE N/A 6/3/2022    Procedure: Ablation atrial fibrillation;  Surgeon: Irineo Aguilar MD;  Location: [facility] CATH INVASIVE LOCATION;  Service:  Cardiovascular;  Laterality: N/A;    CARDIAC ELECTROPHYSIOLOGY PROCEDURE N/A 6/3/2022    Procedure: Ablation atrial flutter/CTI;  Surgeon: Irineo Aguilar MD;  Location: [facility] INVASIVE LOCATION;  Service: Cardiovascular;  Laterality: N/A;    CARDIAC SURGERY      HEART BYPASS    COLONOSCOPY  2011    CORONARY ARTERY BYPASS GRAFT      CYSTOSCOPY  03/19/2019    WITH BILATERAL RETROGRADE PYELOGRAPHY    ELECTRICAL CARDIOVERSION  5/12/2022         ENDOSCOPY  2016    PROSTATE SURGERY       PT Assessment (Last 12 Hours)       PT Evaluation and Treatment       Row Name 02/02/25 1309          Physical Therapy Time and Intention    Subjective Information no complaints  -CS     Document Type therapy note (daily note)  -CS     Mode of Treatment individual therapy;physical therapy  -CS     Patient Effort good  -CS     Symptoms Noted During/After Treatment fatigue;shortness of breath  -CS     Comment Pt. and spouse report previous arrangements for cardiopulmonary rehab here at [facility] on the 12th of Feb.  -CS       Row Name 02/02/25 1309          Pain    Pretreatment Pain Rating 0/10 - no pain  -CS     Posttreatment Pain Rating 0/10 - no pain  -CS       Row Name 02/02/25 1309          Sit-Stand Transfer    Sit-Stand [place] (Transfers) standby assist  -CS     Assistive Device (Sit-Stand Transfers) walker, front-wheeled  -CS       Row Name 02/02/25 1309          Stand-Sit Transfer    Stand-Sit [place] (Transfers) standby assist  -CS     Assistive Device (Stand-Sit Transfers) walker, front-wheeled  -CS       Row Name 02/02/25 1309          Gait/Stairs (Locomotion)    [place] Level (Gait) standby assist  -CS     Assistive Device (Gait) walker, front-wheeled  -CS     Distance in Feet (Gait) 700  2 standing rest breaks during above distance at ~ 350' and then an additional 180'  -CS     Pattern (Gait) 4-point;step-through  -CS     Deviations/Abnormal Patterns (Gait) gait speed decreased;stride length  decreased  -CS     Bilateral Gait Deviations forward flexed posture  -CS       Row Name 02/02/25 1309          Vital Signs    Pretreatment Heart Rate (beats/min) 65  -CS     Intratreatment Heart Rate (beats/min) 101  -CS     Posttreatment Heart Rate (beats/min) 85  -CS     Pre SpO2 (%) 98  -CS     O2 Delivery Pre Treatment room air  -CS     Intra SpO2 (%) 93  -CS     O2 Delivery Intra Treatment room air  -CS     Post SpO2 (%) 98  -CS     O2 Delivery Post Treatment room air  -CS     Pre Patient Position Sitting  -CS     Intra Patient Position Standing  -CS     Post Patient Position Sitting  -CS     Rest Breaks  2  standing rest breaks during gait  -CS     Activity Duration 16  minutes  -CS       Row Name 02/02/25 1309          Positioning and Restraints    Pre-Treatment Position sitting in chair/recliner  -CS     Post Treatment Position chair  -CS     In Chair sitting;call light within reach;encouraged to call for assist;with family/caregiver  no alarms active upon entering room  -CS       Row Name 02/02/25 1309          Progress Summary (PT)    Progress Toward Functional Goals (PT) progress toward functional goals is good  -CS               User Key  (r) = Recorded By, (t) = Taken By, (c) = Cosigned By      Initials Name Provider Type    Oral [place] PTA Physical Therapist Assistant                    Physical Therapy Education       Title: PT OT SLP Therapies (In Progress)       Topic: Physical Therapy (In Progress)       Point: Mobility training (Done)       Learning Progress Summary            Patient Acceptance, E,TB, VU by AV at 1/30/2025 1344                      Point: Home exercise program (Not Started)       Learner Progress:  Not documented in this visit.              Point: Body mechanics (Done)       Learning Progress Summary            Patient Acceptance, E,TB, VU by AV at 1/30/2025 1344                      Point: Precautions (Done)       Learning Progress Summary            Patient Acceptance,  E,TB, VU by AV at 1/30/2025 1344                                      User Key       Initials Effective Dates Name Provider Type Discipline    AV 06/11/21 -  Aaron Alberts, PT Physical Therapist PT                  PT Recommendation and Plan  Anticipated Discharge Disposition (PT): home with assist (cardiopulmonary rehab)  Progress Summary (PT)  Progress Toward Functional Goals (PT): progress toward functional goals is good   Outcome Measures       Row Name 02/02/25 1300 01/31/25 1600          How much help from another person do you currently need...    Turning from your back to your side while in flat bed without using bedrails? 3  -CS 3  -CS     Moving from lying on back to sitting on the side of a flat bed without bedrails? 3  -CS 3  -CS     Moving to and from a bed to a chair (including a wheelchair)? 3  -CS 3  -CS     Standing up from a chair using your arms (e.g., wheelchair, bedside chair)? 4  -CS 3  -CS     Climbing 3-5 steps with a railing? 3  -CS 3  -CS     To walk in hospital room? 3  -CS 3  -CS     AM-PAC 6 Clicks Score (PT) 19  -CS 18  -CS        Functional Assessment    Outcome Measure Options AM-PAC 6 Clicks Basic Mobility (PT)  -CS AM-PAC 6 Clicks Basic Mobility (PT)  -CS               User Key  (r) = Recorded By, (t) = Taken By, (c) = Cosigned By      Initials Name Provider Type    CS Oral Tov[place] PTA Physical Therapist Assistant                     Time Calculation:    PT Charges       Row Name 02/02/25 1322             Time Calculation    Start Time 1135  -CS      PT Received On 02/02/25  -CS         Timed Charges    28935 - Gait Training Minutes  16  -CS      08744 - PT Therapeutic Activity Minutes 8  -CS         Total Minutes    Timed Charges Total Minutes 24  -CS       Total Minutes 24  -CS                User Key  (r) = Recorded By, (t) = Taken By, (c) = Cosigned By      Initials Name Provider Type    CS Oral Tovar PTA Physical Therapist Assistant                  Therapy  Charges for Today       Code Description Service Date Service Provider Modifiers Qty    56328057362 HC GAIT TRAINING EA 15 MIN 2/2/2025 Oral Tovar PTA GP 1    84742761234 HC PT THERAPEUTIC ACT EA 15 MIN 2/2/2025 Oral Tovar PTA GP 1            PT G-Codes  Outcome Measure Options: AM-PAC 6 Clicks Basic Mobility (PT)  AM-PAC 6 Clicks Score (PT): 19  AM-PAC 6 Clicks Score (OT): 17    Oral Tovar PTA  2/2/2025

## 2025-02-02 NOTE — PROGRESS NOTES
[facility]     Cardiology Progress Note    Patient Name: Layo Cee  : 1940  MRN: [id number]  Primary Care Physician:  Tevin Zavala MD  Date of admission: 2025    Subjective   Subjective     Chief Complaint: Follow-up visit for shortness of breath, congestive heart failure, chest discomfort.     Interval HPI:    Patient finally reports improvement in shortness of breath.  He ambulated in the hallways without much difficulty today.  No major dizziness.  Chest pain completely subsided.      Review of Systems   All systems were reviewed and negative except for: Shortness of breath, cough, wheezing, and constipation    Objective   Objective     Vitals:   Temp:  [97.3 °F (36.3 °C)-98.8 °F (37.1 °C)] 97.3 °F (36.3 °C)  Heart Rate:  [72-99] 99  Resp:  [18-20] 20  BP: (132-161)/(55-85) 137/68  Flow (L/min) (Oxygen Therapy):  [2] 2  Physical Exam      General : Alert, awake, in mild distress because shortness of breath, nasal cannula on  CVS : Regular rate and rhythm, no murmur, rubs or gallops  Lungs: Bilateral wheezing present, minimal crackles scattered, mostly on left side  Abdomen: Soft, nontender, bowel sounds heard in all 4 quadrants  Extremities: Warm, well-perfused, no pedal edema.     Scheduled Meds:allopurinol, 100 mg, Oral, QAM  amoxicillin-clavulanate, 1 tablet, Oral, Q12H  arformoterol, 15 mcg, Nebulization, BID - RT   And  budesonide, 0.5 mg, Nebulization, BID - RT   And  revefenacin, 175 mcg, Nebulization, Daily - RT  cetirizine, 10 mg, Oral, Daily  furosemide, 60 mg, Intravenous, Daily  ipratropium-albuterol, 3 mL, Nebulization, Q4H - RT  isosorbide mononitrate, 15 mg, Oral, Daily  metoprolol succinate XL, 25 mg, Oral, Q12H  montelukast, 10 mg, Oral, Nightly  pantoprazole, 40 mg, Oral, Daily  predniSONE, 40 mg, Oral, Daily With Breakfast  rivaroxaban, 15 mg, Oral, Daily With Dinner  sacubitril-valsartan, 1 tablet, Oral, BID  sodium chloride, 10 mL, Intravenous, Q12H  tamsulosin, 0.4  mg, Oral, Daily             Result Review    Result Review:  I have personally reviewed the results from the time of this admission to 2/2/2025 13:34 EST and agree with these findings:  [x]  Laboratory  []  Microbiology  [x]  Radiology  [x]  EKG/Telemetry   [x]  Cardiology/Vascular   []  Pathology  []  Old records  []  Other:  Most notable findings include:     CBC          1/31/2025    06:14 2/1/2025    04:52 2/2/2025    05:19   CBC   WBC 13.47  9.44  9.47    RBC 3.89  3.66  3.92    Hemoglobin 10.7  10.1  10.7    Hematocrit 35.0  32.5  34.8    MCV 90.0  88.8  88.8    MCH 27.5  27.6  27.3    MCHC 30.6  31.1  30.7    RDW 15.1  15.1  14.8    Platelets 242  243  255      CMP          1/31/2025    06:14 2/1/2025    04:52 2/2/2025    05:19   CMP   Glucose 96  108  158    BUN 32  31  33    Creatinine 1.87  1.73  1.76    EGFR 35.5  38.9  38.1    Sodium 139  137  136    Potassium 3.7  3.7  4.5    Chloride 99  99  99    Calcium 8.4  8.7  9.0    Total Protein   6.3    Albumin   3.5    Globulin   2.8    Total Bilirubin   0.3    Alkaline Phosphatase   50    AST (SGOT)   18    ALT (SGPT)   20    Albumin/Globulin Ratio   1.3    BUN/Creatinine Ratio 17.1  17.9  18.8    Anion Gap 11.3  11.2  9.6       Assessment & Plan   Assessment / Plan     Brief Patient Summary:  Layo Cee is a 84 y.o. male with coronary artery disease, previous angioplasty,, CABG, atrial fibrillation/flutter, previous ablation, on anticoagulation, chronic combined heart failure, hypertension and hyperlipidemia.  Currently admitted with shortness of breath, chills.     Active Hospital Problems:  Active Hospital Problems    Diagnosis     **Multifocal pneumonia     Acute exacerbation of CHF (congestive heart failure)      Acute on chronic combined heart failure : Admitted with volume overload, elevated proBNP.  Received IV diuretics, creatinine above baseline but stable    Elevated troponin : Volume overload with systemic infection, and sinus tachycardia on  presentation.  He has some constant chest discomfort which is mild.  Suspect this is related to type II MI from systemic infection, CKD and CHF exacerbation.     Multifocal pneumonia : Elevated procalcitonin, on broad-spectrum antibiotics, leukocytosis improving.  Positive for mycoplasma.     Paroxysmal atrial fibrillation : Currently in sinus rhythm and sinus tachycardia, had ablations in the past.     COPD with exacerbation : Steroids started yesterday      Plan:     Continue IV Lasix today, to be changed to oral form starting tomorrow  Received 5 mg of metolazone per pulmonology  Continue metoprolol, Entresto  Continue statins  Continue Xarelto for anticoagulation  Antibiotics, bronchodilators, steroids per primary team and pulmonology  Repeat labs a.m.    PT/OT to continue, will benefit from inpatient rehab       CODE STATUS:   Level Of Support Discussed With: Patient  Code Status (Patient has no pulse and is not breathing): CPR (Attempt to Resuscitate)  Medical Interventions (Patient has pulse or is breathing): Full Support      Electronically signed by Darnell Stevenson MD, 02/02/25, 1:36 PM EST.

## 2025-02-02 NOTE — PROGRESS NOTES
[facility]   Hospitalist Progress Note  Date: 2025  Patient Name: Layo Cee  : 1940  MRN: [id number]  Date of admission: 2025    Subjective   Subjective     Chief Complaint:   Shortness of breath, chills    Summary:   Layo Cee is a 84 y.o. male with past medical history of CAD status post CABG, atrial fibrillation/flutter on rivaroxaban, CHF, hypertension, hyperlipidemia, GERD, COPD on 2 L home oxygen, CKD presented to the ED for evaluation of sudden onset of chills, shortness of breath that started around 8 PM.  Noted to have some chest pain across the chest.  Describes it as aching.  Used nebulizer treatments with no improvement in the symptoms.  Patient waited until 1 AM and he continued to experience shortness of breath and called EMS.  Patient recently diagnosed with pneumonia and started on Augmentin and doxycycline outpatient on 2025.  Received nebulizer treatments and Solu-Medrol by EMS.  For the past few months patient did not had any swelling in the bilateral lower extremities and tonight when he started experiencing shortness of breath he was noted to have bilateral lower extremity edema as well.  Noted to have some cough and intermittent sputum production.  Denies dysuria, nausea, vomiting, abdominal pain, fevers.  Patient was admitted and started on treatment for multifocal pneumonia and also diuretics for suspected component of decompensated heart failure.    Interval Followup:   No acute events overnight, dyspnea has actually improved.  Denies chest pain.  No productive cough.  No fevers.    Objective   Objective     Vitals:   Temp:  [97.3 °F (36.3 °C)-98.8 °F (37.1 °C)] 97.3 °F (36.3 °C)  Heart Rate:  [] 90  Resp:  [18-20] 20  BP: (118-161)/(54-85) 132/85  Flow (L/min) (Oxygen Therapy):  [2] 2    Physical Exam   GEN: No acute distress  HEENT: Moist mucous membranes  LUNGS: Equal chest rise bilaterally, breath sounds improved with increasing  aeration  CARDIAC: Regular rate and rhythm.  1+ pitting edema to the mid tibia bilaterally  NEURO: Moving all 4 extremities spontaneously  SKIN: No obvious breakdown    Result Review    Result Review:  I have personally reviewed the results as below  [x]  Laboratory CBC, CMP personally reviewed  CBC          1/31/2025    06:14 2/1/2025    04:52 2/2/2025    05:19   CBC   WBC 13.47  9.44  9.47    RBC 3.89  3.66  3.92    Hemoglobin 10.7  10.1  10.7    Hematocrit 35.0  32.5  34.8    MCV 90.0  88.8  88.8    MCH 27.5  27.6  27.3    MCHC 30.6  31.1  30.7    RDW 15.1  15.1  14.8    Platelets 242  243  255      CMP          1/31/2025    06:14 2/1/2025    04:52 2/2/2025    05:19   CMP   Glucose 96  108  158    BUN 32  31  33    Creatinine 1.87  1.73  1.76    EGFR 35.5  38.9  38.1    Sodium 139  137  136    Potassium 3.7  3.7  4.5    Chloride 99  99  99    Calcium 8.4  8.7  9.0    Total Protein   6.3    Albumin   3.5    Globulin   2.8    Total Bilirubin   0.3    Alkaline Phosphatase   50    AST (SGOT)   18    ALT (SGPT)   20    Albumin/Globulin Ratio   1.3    BUN/Creatinine Ratio 17.1  17.9  18.8    Anion Gap 11.3  11.2  9.6      []  Microbiology  []  Radiology  []  EKG/Telemetry   []  Cardiology/Vascular   []  Pathology  []  Old records  []  Other:    Scheduled medications:  allopurinol, 100 mg, Oral, QAM  amoxicillin-clavulanate, 1 tablet, Oral, Q12H  arformoterol, 15 mcg, Nebulization, BID - RT   And  budesonide, 0.5 mg, Nebulization, BID - RT   And  revefenacin, 175 mcg, Nebulization, Daily - RT  cetirizine, 10 mg, Oral, Daily  furosemide, 60 mg, Intravenous, Daily  ipratropium-albuterol, 3 mL, Nebulization, Q4H - RT  isosorbide mononitrate, 15 mg, Oral, Daily  metoprolol succinate XL, 25 mg, Oral, Q12H  montelukast, 10 mg, Oral, Nightly  pantoprazole, 40 mg, Oral, Daily  predniSONE, 40 mg, Oral, Daily With Breakfast  rivaroxaban, 15 mg, Oral, Daily With Dinner  sacubitril-valsartan, 1 tablet, Oral, BID  sodium chloride,  10 mL, Intravenous, Q12H  tamsulosin, 0.4 mg, Oral, Daily      As needed medications:    albuterol    aluminum-magnesium hydroxide-simethicone    senna-docusate sodium **AND** polyethylene glycol **AND** bisacodyl **AND** bisacodyl    dextrose    dextrose    glucagon (human recombinant)    nitroglycerin    sodium chloride    sodium chloride    sodium chloride  Infusions:            Assessment & Plan   Assessment / Plan     Assessment:  Acute on chronic heart failure with intermediate ejection fraction (echo 1/29/2025 with EF 46 to 50%)  Elevated troponin, suspected type II in setting of tachycardia and decompensated heart failure per cardiology  Cardiogenic pulmonary edema  Mycoplasma pneumonia  Moderate aortic stenosis  Sepsis, present on admission, tachycardia, tachypnea, elevated white count, pneumonia  COPD on 2 L home oxygen  CAD status post CABG followed by stents x 2  Atrial fibrillation on rivaroxaban, history of ablation  CKD stage IIIb  CHF  GERD  Hypertension    Plan:  Patient admitted to the hospital for further workup and management of above  Cardiology on board, recommendations appreciated  Diuretics decreased to daily given rising creatinine, suspect there is a component of his aortic stenosis playing a role  Patient with rising WBCs on 01/30/2025, mycoplasma returned positive.  Started on azithromycin on 01/31/2025, completes 3 days of treatment on 02/02/2025.  Patient also de-escalated to Augmentin and will complete treatment course on 02/02/2025 as well  Continue steroids initiated on 02/01/2025, complete 5-day course  Blood cultures remain negative so far  Pulmonary consulted, appreciate recommendations.,  Sputum culture without any pathogenic organisms, antibiotic de-escalation as noted above  Continue Xarelto  Continue Brovana, Pulmicort  Continue bronchodilator protocol and bronchopulmonary hygiene  Cardiac diet  Sliding scale insulin discontinued as patient did not require any for many  days  Further management pending clinical course        Reviewed patient's labs and imaging, and discussed with patient and nurse at bedside.    VTE Prophylaxis:  Pharmacologic & mechanical VTE prophylaxis orders are present.      Dispo: Patient remains admitted for decompensated heart failure with component of mycoplasma pneumonia.  Slowly improving.  Case management consult for SNF placement planning for discharge in next 24 to 48 hours      CODE STATUS:   Level Of Support Discussed With: Patient  Code Status (Patient has no pulse and is not breathing): CPR (Attempt to Resuscitate)  Medical Interventions (Patient has pulse or is breathing): Full Support

## 2025-02-02 NOTE — PROGRESS NOTES
Pulmonary / Critical Care Progress Note      Patient Name: Layo Cee  : 1940  MRN: [id number]  Primary Care Physician:  Tevin Zavala MD  Date of admission: 2025    Subjective   Subjective   Follow-up for mycoplasma infection, congestive heart failure, acute hypoxic respiratory failure    Over past 24 hours: Remains on azithromycin and Augmentin courses.  Remains on Brovana, Pulmicort, Yupelri.  Remains on Xarelto for A-fib remains on Entresto.  Started on Lasix 60 daily IV    No acute events overnight.     This morning,   Remains on 2 L  Currently in bed  Feels okay today  Still with exertional dyspnea  Less conversational dyspnea  Denies any chest pain or chest tightness  Resolving wheezes  No fever or chills  Diuresing well  -1.4 L urine output    Objective   Objective     Vitals:   Temp:  [97.4 °F (36.3 °C)-98.8 °F (37.1 °C)] 98.8 °F (37.1 °C)  Heart Rate:  [] 90  Resp:  [18-20] 20  BP: (118-161)/(54-68) 158/64  Flow (L/min) (Oxygen Therapy):  [2] 2  Physical Exam   Vital Signs Reviewed   General:  WDWN, Alert, NAD.  Chronically ill-appearing elderly male, lying in bed  HEENT:  PERRL, EOMI.  OP, nares clear, no sinus tenderness  Neck:  Supple, no JVD, no thyromegaly  Chest:  good aeration, diminished breath sounds bilaterally with scattered crackles, no increased work of breathing noted on 2 L while at rest  CV: RRR, no MGR, pulses 2+, equal.  Abd:  Soft, NT, ND, + BS, no HSM, obese  EXT:  no clubbing, no cyanosis, no edema  Neuro:  A&Ox3, CN grossly intact, no focal deficits.  Skin: No rashes or lesions noted      Result Review    Result Review:  I have personally reviewed the results from the time of this admission to 2025 06:59 EST and agree with these findings:  [x]  Laboratory  [x]  Microbiology  [x]  Radiology  [x]  EKG/Telemetry   [x]  Cardiology/Vascular   []  Pathology  []  Old records  []  Other:  Most notable findings include:         Lab 25  0519 25  0452  01/31/25  0614 01/30/25  0451 01/29/25  0410 01/29/25  0325 01/27/25  1325   WBC 9.47 9.44 13.47* 17.65*  --  12.34* 8.91   HEMOGLOBIN 10.7* 10.1* 10.7* 9.3*  --  10.1* 10.6*   HEMATOCRIT 34.8* 32.5* 35.0* 30.4*  --  31.8* 32.7*   PLATELETS 255 243 242 211  --  221 244   SODIUM 136 137 139 140  --  136 141   POTASSIUM 4.5 3.7 3.7 4.2  --  4.2 4.1   CHLORIDE 99 99 99 106  --  102 105   CO2 27.4 26.8 28.7 24.0  --  21.6* 25.4   BUN 33* 31* 32* 20  --  16 12   CREATININE 1.76* 1.73* 1.87* 1.63* 1.62* 1.70* 1.41*   GLUCOSE 158* 108* 96 142*  --  157* 80   CALCIUM 9.0 8.7 8.4* 8.2*  --  8.1* 8.8   PHOSPHORUS  --   --   --  3.2  --   --   --    TOTAL PROTEIN 6.3  --   --  5.7*  --  6.3 6.2   ALBUMIN 3.5  --   --  3.2*  --  3.6 3.6   GLOBULIN 2.8  --   --  2.5  --  2.7 2.6            Assessment & Plan   Assessment / Plan     Active Hospital Problems:  Active Hospital Problems    Diagnosis     **Multifocal pneumonia     Acute exacerbation of CHF (congestive heart failure)      Impression:   Acute hypoxic respiratory failure  Acute on chronic diastolic heart failure, EF 46 to 50% with grade 2 diastolic dysfunction and mild to moderate aortic valve stenosis  Acute on chronic COPD exacerbation, FEV1 56%  Exertional dyspnea  Pneumonia, unknown organism  Mycoplasma respiratory infection  CKD  Obesity BMI 33    Plan:   -Continue to maintain pO2 greater than 90%  -Chest CT reviewed with small bilateral pleural effusions right greater than left with some pulmonary edema and vascular congestion  -No need for thoracentesis at this time  -Continue azithromycin and Augmentin for a total of 3 days  -Continue Lasix 60 mg IV daily.  Give metolazone 5 mg x 1  -Continue to monitor renal panel electrolytes.  Replace electrolytes necessary  -Continue Brovana, Pulmicort, Yupelri  -Continue Entresto and Xarelto  -Continue bronchopulmonary hygiene.  Encourage flutter  -Encourage activity as tolerated and incentive spirometer use  -PT/OT on  board.  Appreciate assistance  -Cardiology following, appreciate input  -Patient will benefit from rehab  -Follow-up pulmonary clinic 1 to 2 weeks after discharge    VTE Prophylaxis:  Pharmacologic & mechanical VTE prophylaxis orders are present.    CODE STATUS:   Level Of Support Discussed With: Patient  Code Status (Patient has no pulse and is not breathing): CPR (Attempt to Resuscitate)  Medical Interventions (Patient has pulse or is breathing): Full Support      I personally reviewed pertinent labs, imaging and provider notes. Discussed with bedside nurse and will discuss with primary service.     Electronically signed by ARACELI Vega, 02/02/25, 10:31 AM EST.    This visit was performed by BOTH a physician and an APC. I personally evaluated and examined the patient. I performed all aspects of MDM as documented. , I have reviewed and confirmed the accuracy of the patient's history as documented in this note., and I have reexamined the patient and the results are consistent with the previously documented exam. I have updated the documentation as necessary.     Electronically signed by Kolton Brink MD, 02/02/25, 3:33 PM EST.     Electronically signed by Kolton Brink MD, 2/2/2025, 06:59 EST.

## 2025-02-02 NOTE — PLAN OF CARE
Goal Outcome Evaluation:  Plan of Care Reviewed With: patient        Progress: improving  Outcome Evaluation: patient alert and oriented x4, on RA now, ambulated in [place] with PT today without oxygen, sats stayed above 91%, multiple visitors today, no complaints of pain or needs, able to get IS up to 2500, will continue to monitor, continue with plan of care

## 2025-02-02 NOTE — PLAN OF CARE
Goal Outcome Evaluation:  Plan of Care Reviewed With: patient        Progress: no change  Outcome Evaluation: PT is AAOx4, VSS, 2L/NC, SOB with activity, no c/o pain, N/V/D, standby assist, appears to have rested throughout the night, no acute events overnight, bed in low/locked position, alarm audible, call light within reach, continue with current POC at this time.

## 2025-02-03 ENCOUNTER — READMISSION MANAGEMENT (OUTPATIENT)
Dept: CALL CENTER | Facility: HOSPITAL | Age: 85
End: 2025-02-03
Payer: MEDICARE

## 2025-02-03 VITALS
WEIGHT: 213.85 LBS | DIASTOLIC BLOOD PRESSURE: 55 MMHG | HEART RATE: 67 BPM | BODY MASS INDEX: 30.61 KG/M2 | SYSTOLIC BLOOD PRESSURE: 132 MMHG | RESPIRATION RATE: 20 BRPM | HEIGHT: 70 IN | OXYGEN SATURATION: 95 % | TEMPERATURE: 97.7 F

## 2025-02-03 PROBLEM — J15.69 PNEUMONIA DUE TO OTHER GRAM-NEGATIVE BACTERIA: Status: ACTIVE | Noted: 2025-02-03

## 2025-02-03 LAB
ANION GAP SERPL CALCULATED.3IONS-SCNC: 12 MMOL/L (ref 5–15)
BACTERIA SPEC AEROBE CULT: NORMAL
BACTERIA SPEC AEROBE CULT: NORMAL
BASOPHILS # BLD AUTO: 0.03 10*3/MM3 (ref 0–0.2)
BASOPHILS NFR BLD AUTO: 0.2 % (ref 0–1.5)
BUN SERPL-MCNC: 38 MG/DL (ref 8–23)
BUN/CREAT SERPL: 19.1 (ref 7–25)
CALCIUM SPEC-SCNC: 8.9 MG/DL (ref 8.6–10.5)
CHLORIDE SERPL-SCNC: 97 MMOL/L (ref 98–107)
CO2 SERPL-SCNC: 23 MMOL/L (ref 22–29)
CREAT SERPL-MCNC: 1.99 MG/DL (ref 0.76–1.27)
DEPRECATED RDW RBC AUTO: 48.4 FL (ref 37–54)
EGFRCR SERPLBLD CKD-EPI 2021: 32.9 ML/MIN/1.73
EOSINOPHIL # BLD AUTO: 0.05 10*3/MM3 (ref 0–0.4)
EOSINOPHIL NFR BLD AUTO: 0.4 % (ref 0.3–6.2)
ERYTHROCYTE [DISTWIDTH] IN BLOOD BY AUTOMATED COUNT: 15.1 % (ref 12.3–15.4)
GLUCOSE SERPL-MCNC: 107 MG/DL (ref 65–99)
HCT VFR BLD AUTO: 32.2 % (ref 37.5–51)
HGB BLD-MCNC: 10.1 G/DL (ref 13–17.7)
IMM GRANULOCYTES # BLD AUTO: 0.12 10*3/MM3 (ref 0–0.05)
IMM GRANULOCYTES NFR BLD AUTO: 0.8 % (ref 0–0.5)
LYMPHOCYTES # BLD AUTO: 2.42 10*3/MM3 (ref 0.7–3.1)
LYMPHOCYTES NFR BLD AUTO: 17.1 % (ref 19.6–45.3)
MAGNESIUM SERPL-MCNC: 2.4 MG/DL (ref 1.6–2.4)
MCH RBC QN AUTO: 27.7 PG (ref 26.6–33)
MCHC RBC AUTO-ENTMCNC: 31.4 G/DL (ref 31.5–35.7)
MCV RBC AUTO: 88.2 FL (ref 79–97)
MONOCYTES # BLD AUTO: 1.09 10*3/MM3 (ref 0.1–0.9)
MONOCYTES NFR BLD AUTO: 7.7 % (ref 5–12)
NEUTROPHILS NFR BLD AUTO: 10.42 10*3/MM3 (ref 1.7–7)
NEUTROPHILS NFR BLD AUTO: 73.8 % (ref 42.7–76)
NRBC BLD AUTO-RTO: 0 /100 WBC (ref 0–0.2)
PLATELET # BLD AUTO: 268 10*3/MM3 (ref 140–450)
PMV BLD AUTO: 10.3 FL (ref 6–12)
POTASSIUM SERPL-SCNC: 4.3 MMOL/L (ref 3.5–5.2)
RBC # BLD AUTO: 3.65 10*6/MM3 (ref 4.14–5.8)
SODIUM SERPL-SCNC: 132 MMOL/L (ref 136–145)
WBC NRBC COR # BLD AUTO: 14.13 10*3/MM3 (ref 3.4–10.8)

## 2025-02-03 PROCEDURE — 63710000001 PREDNISONE PER 1 MG: Performed by: INTERNAL MEDICINE

## 2025-02-03 PROCEDURE — 99232 SBSQ HOSP IP/OBS MODERATE 35: CPT | Performed by: INTERNAL MEDICINE

## 2025-02-03 PROCEDURE — 83735 ASSAY OF MAGNESIUM: CPT | Performed by: STUDENT IN AN ORGANIZED HEALTH CARE EDUCATION/TRAINING PROGRAM

## 2025-02-03 PROCEDURE — 94799 UNLISTED PULMONARY SVC/PX: CPT

## 2025-02-03 PROCEDURE — 99239 HOSP IP/OBS DSCHRG MGMT >30: CPT | Performed by: STUDENT IN AN ORGANIZED HEALTH CARE EDUCATION/TRAINING PROGRAM

## 2025-02-03 PROCEDURE — 80048 BASIC METABOLIC PNL TOTAL CA: CPT | Performed by: STUDENT IN AN ORGANIZED HEALTH CARE EDUCATION/TRAINING PROGRAM

## 2025-02-03 PROCEDURE — 63710000001 REVEFENACIN 175 MCG/3ML SOLUTION: Performed by: STUDENT IN AN ORGANIZED HEALTH CARE EDUCATION/TRAINING PROGRAM

## 2025-02-03 PROCEDURE — 85025 COMPLETE CBC W/AUTO DIFF WBC: CPT | Performed by: STUDENT IN AN ORGANIZED HEALTH CARE EDUCATION/TRAINING PROGRAM

## 2025-02-03 PROCEDURE — 94664 DEMO&/EVAL PT USE INHALER: CPT

## 2025-02-03 RX ORDER — FUROSEMIDE 40 MG/1
40 TABLET ORAL DAILY
Status: DISCONTINUED | OUTPATIENT
Start: 2025-02-04 | End: 2025-02-03 | Stop reason: HOSPADM

## 2025-02-03 RX ORDER — FUROSEMIDE 40 MG/1
40 TABLET ORAL DAILY
Qty: 30 TABLET | Refills: 0 | Status: SHIPPED | OUTPATIENT
Start: 2025-02-04 | End: 2025-02-10

## 2025-02-03 RX ORDER — PREDNISONE 20 MG/1
40 TABLET ORAL
Qty: 4 TABLET | Refills: 0 | Status: SHIPPED | OUTPATIENT
Start: 2025-02-04 | End: 2025-02-06

## 2025-02-03 RX ADMIN — TAMSULOSIN HYDROCHLORIDE 0.4 MG: 0.4 CAPSULE ORAL at 08:11

## 2025-02-03 RX ADMIN — IPRATROPIUM BROMIDE AND ALBUTEROL SULFATE 3 ML: .5; 3 SOLUTION RESPIRATORY (INHALATION) at 11:31

## 2025-02-03 RX ADMIN — BISACODYL 5 MG: 5 TABLET, COATED ORAL at 08:12

## 2025-02-03 RX ADMIN — PREDNISONE 40 MG: 20 TABLET ORAL at 08:12

## 2025-02-03 RX ADMIN — PANTOPRAZOLE SODIUM 40 MG: 40 TABLET, DELAYED RELEASE ORAL at 08:12

## 2025-02-03 RX ADMIN — CETIRIZINE HYDROCHLORIDE 10 MG: 10 TABLET, FILM COATED ORAL at 08:11

## 2025-02-03 RX ADMIN — METOPROLOL SUCCINATE 25 MG: 25 TABLET, EXTENDED RELEASE ORAL at 08:12

## 2025-02-03 RX ADMIN — ALLOPURINOL 100 MG: 100 TABLET ORAL at 06:23

## 2025-02-03 RX ADMIN — BUDESONIDE 0.5 MG: 0.5 INHALANT RESPIRATORY (INHALATION) at 07:06

## 2025-02-03 RX ADMIN — REVEFENACIN 175 MCG: 175 SOLUTION RESPIRATORY (INHALATION) at 07:06

## 2025-02-03 RX ADMIN — ISOSORBIDE MONONITRATE 15 MG: 30 TABLET, EXTENDED RELEASE ORAL at 08:12

## 2025-02-03 RX ADMIN — ARFORMOTEROL TARTRATE 15 MCG: 15 SOLUTION RESPIRATORY (INHALATION) at 07:06

## 2025-02-03 RX ADMIN — Medication 10 ML: at 08:13

## 2025-02-03 RX ADMIN — IPRATROPIUM BROMIDE AND ALBUTEROL SULFATE 3 ML: .5; 3 SOLUTION RESPIRATORY (INHALATION) at 03:09

## 2025-02-03 RX ADMIN — SACUBITRIL AND VALSARTAN 1 TABLET: 24; 26 TABLET, FILM COATED ORAL at 08:12

## 2025-02-03 NOTE — PLAN OF CARE
Went over discharge instructions with patient and wife , verbalized understanding, verbalized understanding that medication will be brought up by pharmacy, removed IV, site free from redness or bleeding, all personal belongings returned, returned tele to monitor room, will take patient down to personal vehicle by wheelchair when medication arrives      Problem: Adult Inpatient Plan of Care  Goal: Plan of Care Review  Outcome: Met  Goal: Patient-Specific Goal (Individualized)  Outcome: Met  Goal: Absence of Hospital-Acquired Illness or Injury  Outcome: Met  Intervention: Identify and Manage Fall Risk  Recent Flowsheet Documentation  Taken 2/3/2025 1131 by Martha Ariza RN  Safety Promotion/Fall Prevention:   safety round/check completed   room organization consistent   nonskid shoes/slippers when out of bed   fall prevention program maintained  Taken 2/3/2025 1022 by Martha Ariza RN  Safety Promotion/Fall Prevention:   safety round/check completed   room organization consistent   nonskid shoes/slippers when out of bed   fall prevention program maintained  Taken 2/3/2025 0939 by Martha Ariza RN  Safety Promotion/Fall Prevention:   safety round/check completed   room organization consistent   nonskid shoes/slippers when out of bed   fall prevention program maintained  Taken 2/3/2025 0815 by Martha Ariza RN  Safety Promotion/Fall Prevention:   safety round/check completed   room organization consistent   nonskid shoes/slippers when out of bed   fall prevention program maintained  Taken 2/3/2025 0813 by Martha Ariza RN  Safety Promotion/Fall Prevention:   safety round/check completed   room organization consistent   nonskid shoes/slippers when out of bed   fall prevention program maintained  Taken 2/3/2025 0715 by Martha Ariza RN  Safety Promotion/Fall Prevention:   safety round/check completed   room organization consistent   nonskid shoes/slippers when out of bed   fall prevention program  maintained  Intervention: Prevent Skin Injury  Recent Flowsheet Documentation  Taken 2/3/2025 1131 by Martha Ariza RN  Body Position: position changed independently  Taken 2/3/2025 1022 by Martha Ariza RN  Body Position: sitting up in bed  Taken 2/3/2025 0939 by Martha Ariza RN  Body Position: position changed independently  Taken 2/3/2025 0815 by Martha Ariza RN  Body Position: position changed independently  Skin Protection: transparent dressing maintained  Taken 2/3/2025 0813 by Martha Ariza RN  Body Position: dangle, side of bed  Taken 2/3/2025 0715 by Martha Ariza RN  Body Position: position changed independently  Intervention: Prevent and Manage VTE (Venous Thromboembolism) Risk  Recent Flowsheet Documentation  Taken 2/3/2025 0815 by Martha Ariza RN  VTE Prevention/Management: (xarelto) other (see comments)  Intervention: Prevent Infection  Recent Flowsheet Documentation  Taken 2/3/2025 1131 by Martha Ariza RN  Infection Prevention:   single patient room provided   rest/sleep promoted   personal protective equipment utilized   hand hygiene promoted  Taken 2/3/2025 1022 by Martha Ariza RN  Infection Prevention:   hand hygiene promoted   personal protective equipment utilized   rest/sleep promoted   single patient room provided  Taken 2/3/2025 0939 by Martha Ariza RN  Infection Prevention:   hand hygiene promoted   personal protective equipment utilized   rest/sleep promoted   single patient room provided  Taken 2/3/2025 0815 by Martha Ariza RN  Infection Prevention:   hand hygiene promoted   personal protective equipment utilized   rest/sleep promoted   single patient room provided  Taken 2/3/2025 0813 by Martha Ariza RN  Infection Prevention:   hand hygiene promoted   personal protective equipment utilized   rest/sleep promoted   single patient room provided  Taken 2/3/2025 0715 by Martha Ariza RN  Infection Prevention:   hand hygiene promoted   personal  protective equipment utilized   rest/sleep promoted   single patient room provided  Goal: Optimal Comfort and Wellbeing  Outcome: Met  Intervention: Provide Person-Centered Care  Recent Flowsheet Documentation  Taken 2/3/2025 0815 by Martha Ariza RN  Trust Relationship/Rapport:   care explained   choices provided   thoughts/feelings acknowledged   questions encouraged   questions answered  Goal: Readiness for Transition of Care  Outcome: Met     Problem: Sepsis/Septic Shock  Goal: Optimal Coping  Outcome: Met  Intervention: Support Patient and Family Response  Recent Flowsheet Documentation  Taken 2/3/2025 0815 by Martha Ariza RN  Supportive Measures:   active listening utilized   counseling provided  Family/Support System Care:   support provided   self-care encouraged  Goal: Absence of Bleeding  Outcome: Met  Goal: Blood Glucose Level Within Target Range  Outcome: Met  Goal: Absence of Infection Signs and Symptoms  Outcome: Met  Intervention: Initiate Sepsis Management  Recent Flowsheet Documentation  Taken 2/3/2025 1131 by Martha Ariza RN  Infection Prevention:   single patient room provided   rest/sleep promoted   personal protective equipment utilized   hand hygiene promoted  Taken 2/3/2025 1022 by Martha Ariza RN  Infection Prevention:   hand hygiene promoted   personal protective equipment utilized   rest/sleep promoted   single patient room provided  Taken 2/3/2025 0939 by Martha Ariza RN  Infection Prevention:   hand hygiene promoted   personal protective equipment utilized   rest/sleep promoted   single patient room provided  Taken 2/3/2025 0815 by Martha Ariza RN  Infection Prevention:   hand hygiene promoted   personal protective equipment utilized   rest/sleep promoted   single patient room provided  Taken 2/3/2025 0813 by Martha Ariza RN  Infection Prevention:   hand hygiene promoted   personal protective equipment utilized   rest/sleep promoted   single patient room  provided  Taken 2/3/2025 0715 by Martha Ariza RN  Infection Prevention:   hand hygiene promoted   personal protective equipment utilized   rest/sleep promoted   single patient room provided  Intervention: Promote Stabilization  Recent Flowsheet Documentation  Taken 2/3/2025 0815 by Martha Ariza RN  Fluid/Electrolyte Management: fluids provided  Intervention: Promote Recovery  Recent Flowsheet Documentation  Taken 2/3/2025 1131 by Martha Ariza RN  Activity Management: up in chair  Taken 2/3/2025 1022 by Martha Ariza RN  Activity Management: activity encouraged  Taken 2/3/2025 0939 by Martha Ariza RN  Activity Management: activity encouraged  Taken 2/3/2025 0815 by Martha Ariza RN  Activity Management: activity encouraged  Sleep/Rest Enhancement: awakenings minimized  Taken 2/3/2025 0813 by Martha Ariza RN  Activity Management: sitting, edge of bed  Taken 2/3/2025 0715 by Martha Ariza RN  Activity Management: activity encouraged  Goal: Optimal Nutrition Delivery  Outcome: Met     Problem: Pneumonia  Goal: Fluid Balance  Outcome: Met  Intervention: Monitor and Manage Fluid Balance  Recent Flowsheet Documentation  Taken 2/3/2025 0815 by Martha Ariza RN  Fluid/Electrolyte Management: fluids provided  Goal: Absence of Infection Signs and Symptoms  Outcome: Met  Goal: Effective Oxygenation and Ventilation  Outcome: Met  Intervention: Promote Airway Secretion Clearance  Recent Flowsheet Documentation  Taken 2/3/2025 1131 by Martha Ariza RN  Activity Management: up in chair  Taken 2/3/2025 1022 by Martha Ariza RN  Activity Management: activity encouraged  Taken 2/3/2025 0939 by Martha Ariza RN  Activity Management: activity encouraged  Taken 2/3/2025 0815 by Martha Ariza RN  Activity Management: activity encouraged  Cough And Deep Breathing: done independently per patient  Taken 2/3/2025 0813 by Martha Ariza RN  Activity Management: sitting, edge of bed  Taken 2/3/2025  0715 by Ariza, Martha, RN  Activity Management: activity encouraged  Intervention: Optimize Oxygenation and Ventilation  Recent Flowsheet Documentation  Taken 2/3/2025 1022 by Martha Ariza RN  Head of Bed (HOB) Positioning: HOB at 60 degrees  Taken 2/3/2025 0939 by Martha Ariza RN  Head of Bed (HOB) Positioning: HOB at 30-45 degrees  Taken 2/3/2025 0815 by Martha Ariza RN  Head of Bed (HOB) Positioning: HOB at 60 degrees  Taken 2/3/2025 0813 by Martha Ariza RN  Head of Bed (HOB) Positioning: HOB at 90 degrees  Taken 2/3/2025 0715 by Martha Ariza RN  Head of Bed (HOB) Positioning: HOB at 45 degrees

## 2025-02-03 NOTE — PROGRESS NOTES
Pulmonary / Critical Care Progress Note      Patient Name: Layo Cee  : 1940  MRN: [id number]  Primary Care Physician:  Tevin Zavala MD  Date of admission: 2025    Subjective   Subjective   Follow-up for mycoplasma infection, congestive heart failure, acute hypoxic respiratory failure    Over past 24 hours: Remains on azithromycin and Augmentin courses.  Remains on Brovana, Pulmicort, Yupelri.  Remains on Xarelto for A-fib remains on Entresto.  Started on Lasix 60 daily IV    No acute events overnight.     This morning,   Currently on room air  Sitting on edge of bed  Reports feeling well today  Still with some exertional dyspnea  No conversational dyspnea  Denies any chest pain or chest tightness  Reports feeling ready to go home    Objective   Objective     Vitals:   Temp:  [97.2 °F (36.2 °C)-98.3 °F (36.8 °C)] 97.7 °F (36.5 °C)  Heart Rate:  [] 67  Resp:  [18-20] 20  BP: (121-138)/(55-63) 132/55  Flow (L/min) (Oxygen Therapy):  [2] 2  Physical Exam   Vital Signs Reviewed   General:  WDWN, Alert, NAD.  Chronically ill-appearing elderly male, lying in bed  HEENT:  PERRL, EOMI.  OP, nares clear, no sinus tenderness  Neck:  Supple, no JVD, no thyromegaly  Chest:  good aeration, diminished breath sounds bilaterally with scattered crackles, no increased work of breathing noted on 2 L while at rest  CV: RRR, no MGR, pulses 2+, equal.  Abd:  Soft, NT, ND, + BS, no HSM, obese  EXT:  no clubbing, no cyanosis, no edema  Neuro:  A&Ox3, CN grossly intact, no focal deficits.  Skin: No rashes or lesions noted      Result Review    Result Review:  I have personally reviewed the results from the time of this admission to 2/3/2025 12:34 EST and agree with these findings:  [x]  Laboratory  [x]  Microbiology  [x]  Radiology  [x]  EKG/Telemetry   [x]  Cardiology/Vascular   []  Pathology  []  Old records  []  Other:  Most notable findings include:         Lab 25  0458 25  0519 25  0452  01/31/25  0614 01/30/25  0451 01/29/25  0410 01/29/25  0325 01/27/25  1325   WBC 14.13* 9.47 9.44 13.47* 17.65*  --  12.34* 8.91   HEMOGLOBIN 10.1* 10.7* 10.1* 10.7* 9.3*  --  10.1* 10.6*   HEMATOCRIT 32.2* 34.8* 32.5* 35.0* 30.4*  --  31.8* 32.7*   PLATELETS 268 255 243 242 211  --  221 244   SODIUM 132* 136 137 139 140  --  136 141   POTASSIUM 4.3 4.5 3.7 3.7 4.2  --  4.2 4.1   CHLORIDE 97* 99 99 99 106  --  102 105   CO2 23.0 27.4 26.8 28.7 24.0  --  21.6* 25.4   BUN 38* 33* 31* 32* 20  --  16 12   CREATININE 1.99* 1.76* 1.73* 1.87* 1.63* 1.62* 1.70* 1.41*   GLUCOSE 107* 158* 108* 96 142*  --  157* 80   CALCIUM 8.9 9.0 8.7 8.4* 8.2*  --  8.1* 8.8   PHOSPHORUS  --   --   --   --  3.2  --   --   --    TOTAL PROTEIN  --  6.3  --   --  5.7*  --  6.3 6.2   ALBUMIN  --  3.5  --   --  3.2*  --  3.6 3.6   GLOBULIN  --  2.8  --   --  2.5  --  2.7 2.6            Assessment & Plan   Assessment / Plan     Active Hospital Problems:  Active Hospital Problems    Diagnosis     **Multifocal pneumonia     Pneumonia due to other gram-negative bacteria     Acute exacerbation of CHF (congestive heart failure)      Impression:   Acute on chronic CHF diastolic  COPD exacerbation  Mycoplasma pneumonia respiratory infection  Acute hypoxic respiratory failure  Obesity  Plan:   -Continue diuresis  Complete course of Augmentin and Zithromax  CT chest showed small bilateral pleural effusions does not need thoracentesis  Continue neb treatments in the form of Pulmicort and Brovana  Continue Xarelto and Entresto  Follow-up in pulmonary clinic  VTE Prophylaxis:  Pharmacologic & mechanical VTE prophylaxis orders are present.    CODE STATUS:   Level Of Support Discussed With: Patient  Code Status (Patient has no pulse and is not breathing): CPR (Attempt to Resuscitate)  Medical Interventions (Patient has pulse or is breathing): Full Support    I personally reviewed pertinent labs, imaging and provider notes. Discussed with bedside nurse and will  discuss with primary service.     Electronically signed by ARACELI Vega, 2/3/2025, 12:34 EST.  This visit was performed by both a physician and an APC.  I personally evaluated and examined the patient.  I performed all aspects of MDM as documented.  I have reviewed and confirmed the accuracy of the patient's history as documented in this note and I have reexamined the patient and the results are consistent with the previously documented exam.  I have updated the documentation as necessary. Electronically signed by Trevor East MD, 02/03/25, 3:09 PM EST.

## 2025-02-03 NOTE — CASE MANAGEMENT/SOCIAL WORK
"COPD education was given to Mr. Cee via the booklet, \"Understanding and Living with COPD\". Discussion of the COPD zones (Green/Yellow/Red) was completed with emphasizes on what to do in the yellow and red zones. COPD action plan was reviewed with instruction on rescue and maintenance medications, the function of each and the importance of using them as prescribed. Patient states he uses Trelegy daily and albuterol as needed and will be starting pulmonary rehab next week.   "

## 2025-02-03 NOTE — PLAN OF CARE
Goal Outcome Evaluation:  Plan of Care Reviewed With: patient        Progress: no change  Outcome Evaluation: A&O x4. VSS. No complaints from patient on shift. All needs met at this time. Care ongoing.

## 2025-02-03 NOTE — PROGRESS NOTES
[facility]     Cardiology Progress Note    Patient Name: Layo Cee  : 1940  MRN: [id number]  Primary Care Physician:  Tevin Zavala MD  Date of admission: 2025    Subjective   Subjective     Chief Complaint: Follow-up visit for shortness of breath, congestive heart failure, chest discomfort.     Interval HPI:    Shortness of breath improved and almost back to baseline.  Denies chest pain.  Dizziness improved.  Ambulated in the hallways yesterday without difficulties.    Review of Systems   All systems were reviewed and negative except for: Shortness of breath, cough, wheezing, and constipation    Objective   Objective     Vitals:   Temp:  [97.2 °F (36.2 °C)-98.3 °F (36.8 °C)] 98.3 °F (36.8 °C)  Heart Rate:  [] 99  Resp:  [18-20] 20  BP: (121-138)/(57-68) 138/63  Flow (L/min) (Oxygen Therapy):  [2] 2  Physical Exam      General : Alert, awake, in mild distress because shortness of breath, nasal cannula on  CVS : Regular rate and rhythm, no murmur, rubs or gallops  Lungs: No significant wheezing or crackles  Abdomen: Soft, nontender, bowel sounds heard in all 4 quadrants  Extremities: Warm, well-perfused, no pedal edema.     Scheduled Meds:allopurinol, 100 mg, Oral, QAM  arformoterol, 15 mcg, Nebulization, BID - RT   And  budesonide, 0.5 mg, Nebulization, BID - RT   And  revefenacin, 175 mcg, Nebulization, Daily - RT  cetirizine, 10 mg, Oral, Daily  [START ON 2025] furosemide, 40 mg, Oral, Daily  ipratropium-albuterol, 3 mL, Nebulization, Q4H - RT  isosorbide mononitrate, 15 mg, Oral, Daily  metoprolol succinate XL, 25 mg, Oral, Q12H  montelukast, 10 mg, Oral, Nightly  pantoprazole, 40 mg, Oral, Daily  predniSONE, 40 mg, Oral, Daily With Breakfast  rivaroxaban, 15 mg, Oral, Daily With Dinner  sacubitril-valsartan, 1 tablet, Oral, BID  sodium chloride, 10 mL, Intravenous, Q12H  tamsulosin, 0.4 mg, Oral, Daily           Result Review    Result Review:  I have personally reviewed the  results from the time of this admission to 2/3/2025 09:52 EST and agree with these findings:  [x]  Laboratory  []  Microbiology  [x]  Radiology  [x]  EKG/Telemetry   [x]  Cardiology/Vascular   []  Pathology  []  Old records  []  Other:  Most notable findings include:     CBC          2/1/2025    04:52 2/2/2025    05:19 2/3/2025    04:58   CBC   WBC 9.44  9.47  14.13    RBC 3.66  3.92  3.65    Hemoglobin 10.1  10.7  10.1    Hematocrit 32.5  34.8  32.2    MCV 88.8  88.8  88.2    MCH 27.6  27.3  27.7    MCHC 31.1  30.7  31.4    RDW 15.1  14.8  15.1    Platelets 243  255  268      CMP          2/1/2025    04:52 2/2/2025    05:19 2/3/2025    04:58   CMP   Glucose 108  158  107    BUN 31  33  38    Creatinine 1.73  1.76  1.99    EGFR 38.9  38.1  32.9    Sodium 137  136  132    Potassium 3.7  4.5  4.3    Chloride 99  99  97    Calcium 8.7  9.0  8.9    Total Protein  6.3     Albumin  3.5     Globulin  2.8     Total Bilirubin  0.3     Alkaline Phosphatase  50     AST (SGOT)  18     ALT (SGPT)  20     Albumin/Globulin Ratio  1.3     BUN/Creatinine Ratio 17.9  18.8  19.1    Anion Gap 11.2  9.6  12.0       Assessment & Plan   Assessment / Plan     Brief Patient Summary:  Layo Cee is a 84 y.o. male with coronary artery disease, previous angioplasty,, CABG, atrial fibrillation/flutter, previous ablation, on anticoagulation, chronic combined heart failure, hypertension and hyperlipidemia.  Currently admitted with shortness of breath, chills.     Active Hospital Problems:  Active Hospital Problems    Diagnosis     **Multifocal pneumonia     Acute exacerbation of CHF (congestive heart failure)      Acute on chronic combined heart failure : Admitted with volume overload, elevated proBNP.  Creatinine above baseline, likely related to diuretics    Elevated troponin : Volume overload with systemic infection, and sinus tachycardia on presentation.  He has some constant chest discomfort which is mild.  Suspect this is related to  type II MI from systemic infection, CKD and CHF exacerbation.     Multifocal pneumonia : Elevated procalcitonin, on broad-spectrum antibiotics, leukocytosis improving.  Positive for mycoplasma.     Paroxysmal atrial fibrillation : Currently in sinus rhythm and sinus tachycardia, had ablations in the past.     COPD with exacerbation : Steroids started yesterday      Plan:     Diuretic holiday today, start Lasix 40 mg p.o. daily from tomorrow  Continue metoprolol, Entresto  Continue statins  Continue Xarelto for anticoagulation  Antibiotics, bronchodilators, steroids per primary team and pulmonology  Repeat labs a.m.    PT/OT to continue,   Stable for discharge from cardiac standpoint on current medications  Follow-up in cardiology clinic in 2 weeks after discharge.    Discussed with hospitalist team    CODE STATUS:   Level Of Support Discussed With: Patient  Code Status (Patient has no pulse and is not breathing): CPR (Attempt to Resuscitate)  Medical Interventions (Patient has pulse or is breathing): Full Support      Electronically signed by Darnell Stevenson MD, 02/02/25, 1:36 PM EST.

## 2025-02-03 NOTE — OUTREACH NOTE
Prep Survey      Flowsheet Row Responses   [facility] patient discharged from? Back   Is LACE score < 7 ? No   Eligibility [facility] Back   Date of Admission 01/29/25   Date of Discharge 02/03/25   Discharge diagnosis Multifocal pneumonia   Does the patient have one of the following disease processes/diagnoses(primary or secondary)? Pneumonia   Prep survey completed? Yes            Adrianna LOVE - Registered Nurse

## 2025-02-03 NOTE — DISCHARGE SUMMARY
[facility]         HOSPITALIST  DISCHARGE SUMMARY    Patient Name: Layo Cee  : 1940  MRN: [id number]    Date of Admission: 2025  Date of Discharge: 2025  Primary Care Physician: Tevin Zavala MD    Consults       Date and Time Order Name Status Description    2025 12:43 PM Inpatient Pulmonology Consult Completed     2025  6:09 AM Inpatient Cardiology Consult Completed     2025  4:10 AM Inpatient Hospitalist Consult              Active and Resolved Hospital Problems:  Active Hospital Problems    Diagnosis POA    **Multifocal pneumonia [J18.9] Yes    Pneumonia due to other gram-negative bacteria [J15.69] Yes    Acute exacerbation of CHF (congestive heart failure) [I50.9] Yes      Resolved Hospital Problems   No resolved problems to display.       Hospital Course     Hospital Course:  Layo Cee is a 84 y.o. male past medical history of CAD status post CABG, atrial fibrillation/flutter on rivaroxaban, CHF, hypertension, hyperlipidemia, GERD, COPD on 2 L home oxygen, CKD presented to the ED for evaluation of sudden onset of chills, shortness of breath that started around 8 PM. Noted to have some chest pain across the chest. Describes it as aching. Used nebulizer treatments with no improvement in the symptoms. Patient waited until 1 AM and he continued to experience shortness of breath and called EMS. Patient recently diagnosed with pneumonia and started on Augmentin and doxycycline outpatient on 2025. Received nebulizer treatments and Solu-Medrol by EMS. For the past few months patient did not had any swelling in the bilateral lower extremities and tonight when he started experiencing shortness of breath he was noted to have bilateral lower extremity edema as well. Noted to have some cough and intermittent sputum production. Denies dysuria, nausea, vomiting, abdominal pain, fevers. Patient was admitted and started on treatment for multifocal  pneumonia and also diuretics for suspected component of decompensated heart failure.  Patient had slow improvement with aggressive diuresis at which point pulmonary initiated patient on steroids.  Patient was also found to be mycoplasma positive for which she was started on 3 days of azithromycin which she completed.  He was transitioned from IV cefepime to Augmentin and completed a total of 7 days of antibiotics.  He had eventual improvement and was able to ambulate without oxygen.  He takes oxygen at home as needed anyways and was requesting discharge home with home health.  He was discharged home in stable condition after clearance from pulmonary and cardiology.        DISCHARGE Follow Up Recommendations for labs and diagnostics: Follow-up with PCP within 1 week and have repeat labs done.  Follow-up with cardiology and pulmonary in 1 to 2 weeks and discussed medication changes that are required.  Take medication as directed and return to the ER symptoms worsen or recur.      Day of Discharge     Vital Signs:  Temp:  [97.2 °F (36.2 °C)-98.3 °F (36.8 °C)] 98.3 °F (36.8 °C)  Heart Rate:  [] 99  Resp:  [18-20] 20  BP: (121-138)/(57-68) 138/63  Flow (L/min) (Oxygen Therapy):  [2] 2  Physical Exam: GEN: No acute distress  HEENT: Moist mucous membranes  LUNGS: Equal chest rise bilaterally, breath sounds improved with increasing aeration  CARDIAC: Regular rate and rhythm.  1+ pitting edema to the mid tibia bilaterally  NEURO: Moving all 4 extremities spontaneously  SKIN: No obvious breakdown         Discharge Details        Discharge Medications        New Medications        Instructions Start Date   furosemide 40 MG tablet  Commonly known as: LASIX   40 mg, Oral, Daily   Start Date: February 4, 2025     predniSONE 20 MG tablet  Commonly known as: DELTASONE   40 mg, Oral, Daily With Breakfast   Start Date: February 4, 2025            Changes to Medications        Instructions Start Date   Allopurinol 200 MG  tablet  What changed:   how much to take  when to take this   200 mg, Oral, Daily      isosorbide mononitrate 30 MG 24 hr tablet  Commonly known as: IMDUR  What changed: how much to take   30 mg, Oral, Daily             Continue These Medications        Instructions Start Date   acetaminophen 325 MG tablet  Commonly known as: TYLENOL   650 mg, Oral, As Needed      aspirin 81 MG EC tablet   81 mg, Oral, Daily      calcium carbonate 600 MG tablet  Commonly known as: OS-LIANNA   600 mg, Nightly      cetirizine 10 MG tablet  Commonly known as: zyrTEC   10 mg, Oral, Daily      Coenzyme Q10 100 MG tablet   100 mg, Oral, Daily      Diclofenac Sodium 1 % gel gel  Commonly known as: VOLTAREN   2 g, Topical, 4 Times Daily      Entresto 24-26 MG tablet  Generic drug: sacubitril-valsartan   1 tablet, Oral, 2 Times Daily      finasteride 5 MG tablet  Commonly known as: PROSCAR   5 mg, Oral, Daily      fluticasone 50 MCG/ACT nasal spray  Commonly known as: FLONASE   1 spray, Nasal, Nightly      ipratropium-albuterol 0.5-2.5 mg/3 ml nebulizer  Commonly known as: DUO-NEB   3 mL, Nebulization, 4 Times Daily PRN      Livalo 1 MG tablet tablet  Generic drug: pitavastatin calcium   1 mg, Oral, Nightly      metoprolol succinate XL 25 MG 24 hr tablet  Commonly known as: TOPROL-XL   25 mg, Oral, Every 12 Hours      montelukast 10 MG tablet  Commonly known as: SINGULAIR   10 mg, Oral, Nightly      nitroglycerin 0.4 MG SL tablet  Commonly known as: NITROSTAT   0.4 mg, Sublingual, Every 5 Minutes PRN, Take no more than 3 doses in 15 minutes.      pantoprazole 40 MG EC tablet  Commonly known as: PROTONIX   40 mg, Oral, Daily      sucralfate 1 g tablet  Commonly known as: CARAFATE   1 g, Oral, 3 Times Daily With Meals      tamsulosin 0.4 MG capsule 24 hr capsule  Commonly known as: FLOMAX   0.4 mg, Oral, Daily      Trelegy Ellipta 100-62.5-25 MCG/ACT inhaler  Generic drug: Fluticasone-Umeclidin-Vilant   1 puff, Inhalation, Daily - RT      VITAMIN  B COMPLEX-C PO   1 tablet, Oral, Daily      Wegovy 1 MG/0.5ML solution auto-injector  Generic drug: Semaglutide-Weight Management   1 mg, Subcutaneous, Weekly      Xarelto 15 MG tablet  Generic drug: rivaroxaban   15 mg, Oral, Daily With Dinner             Stop These Medications      amoxicillin-clavulanate 875-125 MG per tablet  Commonly known as: AUGMENTIN     doxycycline 100 MG capsule  Commonly known as: VIBRAMYCIN              Allergies   Allergen Reactions    Carvedilol Swelling and Anaphylaxis    Levaquin [Levofloxacin] Unknown - Low Severity       Discharge Disposition:  [place]-Health [place] [facility]    Diet:  Hospital:  Diet Order   Procedures    Diet: Cardiac; Healthy Heart (2-3 Na+); Fluid Consistency: Thin (IDDSI 0)       Discharge Activity:   Activity Instructions       Activity as Tolerated              CODE STATUS:  Code Status and Medical Interventions: CPR (Attempt to Resuscitate); Full Support   Ordered at: 01/29/25 0546     Level Of Support Discussed With:    Patient     Code Status (Patient has no pulse and is not breathing):    CPR (Attempt to Resuscitate)     Medical Interventions (Patient has pulse or is breathing):    Full Support         Future Appointments   Date Time Provider Department Center   2/12/2025  1:00 PM [facility] PUL REHAB - INITAL EVAL [facility] CAR [facility]   2/28/2025  1:15 PM Tevin Zavala MD [facility] PC CSPRG [facility]   3/13/2025  1:15 PM Ivy Peng APRN [facility] U ETRING [facility]   3/17/2025 11:45 AM Darnell Stevenson MD [facility] CD ETOWN [facility]   3/17/2025  1:30 PM Kolton Brink MD [facility] PCC ETW [facility]       Additional Instructions for the Follow-ups that You Need to Schedule       Discharge Follow-up with PCP   As directed       Currently Documented PCP:    Tevin Zavala MD    PCP Phone Number:    [phone]     Follow Up Details: Follow-up with PCP within 1 week and have repeat labs done                Pertinent  and/or Most Recent Results     PROCEDURES:   None    LAB RESULTS:      Lab 02/03/25  0456  02/02/25 0519 02/01/25 0452 01/31/25 0614 01/30/25 0637 01/30/25 0451 01/29/25  0506 01/29/25  0410 01/29/25  0325   WBC 14.13* 9.47 9.44 13.47*  --  17.65*  --   --  12.34*   HEMOGLOBIN 10.1* 10.7* 10.1* 10.7*  --  9.3*  --   --  10.1*   HEMATOCRIT 32.2* 34.8* 32.5* 35.0*  --  30.4*  --   --  31.8*   PLATELETS 268 255 243 242  --  211  --   --  221   NEUTROS ABS 10.42* 7.97* 6.10 9.46*  --  15.48*  --   --  9.66*   IMMATURE GRANS (ABS) 0.12* 0.05 0.03 0.08*  --  0.11*  --   --  0.07*   LYMPHS ABS 2.42 0.94 2.19 2.53  --  1.01  --   --  1.28   MONOS ABS 1.09* 0.49 0.89 1.32*  --  1.01*  --   --  1.25*   EOS ABS 0.05 0.00 0.20 0.04  --  0.02  --   --  0.05   MCV 88.2 88.8 88.8 90.0  --  89.4  --   --  87.8   PROCALCITONIN  --   --   --   --  0.56*  --  0.46*  --   --    LACTATE  --   --   --   --   --   --   --  1.6 1.6   PROTIME  --   --   --   --   --   --   --   --  18.8*   APTT  --   --   --   --   --   --   --   --  42.6*         Lab 02/03/25 0458 02/02/25 0519 02/01/25 0452 01/31/25 0614 01/30/25 0451   SODIUM 132* 136 137 139 140   POTASSIUM 4.3 4.5 3.7 3.7 4.2   CHLORIDE 97* 99 99 99 106   CO2 23.0 27.4 26.8 28.7 24.0   ANION GAP 12.0 9.6 11.2 11.3 10.0   BUN 38* 33* 31* 32* 20   CREATININE 1.99* 1.76* 1.73* 1.87* 1.63*   EGFR 32.9* 38.1* 38.9* 35.5* 41.8*   GLUCOSE 107* 158* 108* 96 142*   CALCIUM 8.9 9.0 8.7 8.4* 8.2*   MAGNESIUM 2.4 2.3 2.2 2.4 2.0   PHOSPHORUS  --   --   --   --  3.2         Lab 02/02/25  0519 01/30/25  0451 01/29/25  0325 01/27/25  1325   TOTAL PROTEIN 6.3 5.7* 6.3 6.2   ALBUMIN 3.5 3.2* 3.6 3.6   GLOBULIN 2.8 2.5 2.7 2.6   ALT (SGPT) 20 15 8 15   AST (SGOT) 18 45* 17 17   BILIRUBIN 0.3 0.3 0.5 0.3   ALK PHOS 50 45 54 57   LIPASE  --   --  23  --          Lab 01/31/25  0614 01/30/25  0637 01/30/25  0451 01/29/25  0506 01/29/25  0325 01/27/25  1325   PROBNP 5,968.0*  --   --   --  4,121.0* 2,489.0*   HSTROP T  --  643* 639* 249* 176*  --    PROTIME  --   --   --   --  18.8*  --     INR  --   --   --   --  1.54*  --              Lab 01/27/25  1325   IRON 30*   IRON SATURATION (TSAT) 8*   TIBC 361   TRANSFERRIN 242   FERRITIN 17.90*         Lab 01/29/25  0410   PH, ARTERIAL 7.465*   PCO2, ARTERIAL 32.5*   PO2 ART 64.0*   O2 SATURATION ART 93.6*   FIO2 28   HCO3 ART 23.4   BASE EXCESS ART 0.0     Brief Urine Lab Results  (Last result in the past 365 days)        Color   Clarity   Blood   Leuk Est   Nitrite   Protein   CREAT   Urine HCG        01/29/25 1038 Yellow   Clear   Negative   Negative   Negative   Negative                 Microbiology Results (last 10 days)       Procedure Component Value - Date/Time    Respiratory Culture - Sputum, Cough [[id number]] Collected: 01/30/25 1805    Lab Status: Final result Specimen: Sputum from Cough Updated: 02/01/25 1314     Respiratory Culture Rare growth Normal respiratory wesley. No S. aureus or Pseudomonas aeruginosa detected. Final report.     Gram Stain Moderate (3+) WBCs seen      Few (2+) Mucous strands      Rare (1+) Epithelial cells per low power field      Few (2+) Gram positive cocci in pairs    Legionella Antigen, Urine - Urine, Urine, Clean Catch [[id number]]  (Normal) Collected: 01/29/25 1038    Lab Status: Final result Specimen: Urine, Clean Catch Updated: 01/29/25 1112     LEGIONELLA ANTIGEN, URINE Negative    S. Pneumo Ag Urine or CSF - Urine, Urine, Clean Catch [[id number]]  (Normal) Collected: 01/29/25 1038    Lab Status: Final result Specimen: Urine, Clean Catch Updated: 01/29/25 1113     Strep Pneumo Ag Negative    MRSA Screen, PCR (Inpatient) - Swab, Nares [[id number]]  (Normal) Collected: 01/29/25 0905    Lab Status: Final result Specimen: Swab from Nares Updated: 01/29/25 1027     MRSA PCR No MRSA Detected    Narrative:      The negative predictive value of this diagnostic test is high and should only be used to consider de-escalating anti-MRSA therapy. A positive result may indicate colonization with MRSA and must be correlated  clinically.    Blood Culture - Blood, Arm, Left [[id number]]  (Normal) Collected: 01/29/25 0407    Lab Status: Final result Specimen: Blood from Arm, Left Updated: 02/03/25 0415     Blood Culture No growth at 5 days    Narrative:      Less than seven (7) mL's of blood was collected.  Insufficient quantity may yield false negative results.    Blood Culture - Blood, Arm, Right [[id number]]  (Normal) Collected: 01/29/25 0407    Lab Status: Final result Specimen: Blood from Arm, Right Updated: 02/03/25 0415     Blood Culture No growth at 5 days    Narrative:      Less than seven (7) mL's of blood was collected.  Insufficient quantity may yield false negative results.    COVID PRE-OP / PRE-PROCEDURE SCREENING ORDER (NO ISOLATION) - Swab, Nasopharynx [[id number]]  (Normal) Collected: 01/29/25 0327    Lab Status: Final result Specimen: Swab from Nasopharynx Updated: 01/29/25 0412    Narrative:      The following orders were created for panel order COVID PRE-OP / PRE-PROCEDURE SCREENING ORDER (NO ISOLATION) - Swab, Nasopharynx.  Procedure                               Abnormality         Status                     ---------                               -----------         ------                     COVID-19, FLU A/B, RSV P...[[id number]]  Normal              Final result                 Please view results for these tests on the individual orders.    COVID-19, FLU A/B, RSV PCR 1 HR TAT - Swab, Nasopharynx [[id number]]  (Normal) Collected: 01/29/25 0327    Lab Status: Final result Specimen: Swab from Nasopharynx Updated: 01/29/25 0412     COVID19 Not Detected     Influenza A PCR Not Detected     Influenza B PCR Not Detected     RSV, PCR Not Detected    Narrative:      Fact sheet for providers: https://www.fda.gov/media/532906/download    Fact sheet for patients: https://www.fda.gov/media/998647/download    Test performed by PCR.    Mycoplasma Pneumoniae Antibody, IgM - Blood, [[id number]]  (Abnormal) Collected: 01/29/25 0325     Lab Status: Final result Specimen: Blood Updated: 01/30/25 1344     Mycoplasma pneumo IgM Positive            CT Chest Without Contrast Diagnostic    Result Date: 1/31/2025  Impression: Small bilateral pleural effusions are seen, right greater than left. There may be mild pulmonary edema with vascular congestion, probably decreased in degree since the 1/29/2025 study, allowing for differences in modalities. Otherwise, no significant acute findings are appreciated. Please see above comments for further detail.    Portions of this note were completed with a voice recognition program.  Electronically Signed By-Layo Peng MD On:1/31/2025 12:18 AM      XR Chest 1 View    Result Date: 1/29/2025  Impression: New or increased bilateral infiltrates are seen. The findings may represent infectious multifocal pneumonia. Pulmonary edema cannot be excluded.    Portions of this note were completed with a voice recognition program.   Electronically Signed By-Layo Peng MD On:1/29/2025 3:32 AM      XR Chest 2 View    Result Date: 1/27/2025  Impression: Impression: Hazy left basilar airspace opacities, which may be due to atelectasis and/or pneumonia. Electronically Signed: Rose Kaye  1/27/2025 2:19 PM EST  Workstation ID: [id number]    US Renal Bilateral    Result Date: 1/8/2025  Impression: 1. Bilateral renal cysts. 2. Kidneys are otherwise grossly unremarkable in appearance. 3. Grossly unremarkable appearance of the bladder. Electronically Signed: Arturo Ruiz MD  1/8/2025 11:53 AM EST  Workstation ID: [id number]             Results for orders placed during the hospital encounter of 01/29/25    Adult Transthoracic Echo Complete W/ Cont if Necessary Per Protocol    Interpretation Summary    Left ventricular systolic function is low normal. Left ventricular ejection fraction appears to be 46 - 50%.    Left ventricular wall thickness is consistent with mild concentric hypertrophy.    Left ventricular diastolic function is  consistent with (grade II w/high LAP) pseudonormalization.    The left atrial cavity is mildly dilated.    Aortic valve is moderately calcified with restricted opening.  Mild to moderate aortic valve stenosis is present.  The peak and mean gradients across aortic valve are 30/18 mmHg    There is mild tricuspid regurgitation.  Estimated right ventricular systolic pressure from tricuspid regurgitation is moderately elevated (45-55 mmHg).      Labs Pending at Discharge:        Time spent on Discharge including face to face service: 38 minutes    Electronically signed by Cas Goodwin MD, 02/03/25, 10:33 AM EST.

## 2025-02-04 ENCOUNTER — TRANSITIONAL CARE MANAGEMENT TELEPHONE ENCOUNTER (OUTPATIENT)
Dept: CALL CENTER | Facility: HOSPITAL | Age: 85
End: 2025-02-04
Payer: MEDICARE

## 2025-02-04 NOTE — PROGRESS NOTES
"Enter Query Response Below      Query Response: Acute hypoxic respiratory failure was not supported             If applicable, please update the problem list.       Patient: Layo Cee        : 1940  Account: [id number]           Admit Date: 2025        How to Respond to this query:       a. Click New Note     b. Answer query within the yellow box.                c. Update the Problem List, if applicable.      If you have any questions about this query contact me at: [email or web address]       ,     On 2025, 84 year old male with history of CHF, COPD on 2L oxygen baseline at home presented with left chest pain, shortness of breath , found to have Mycoplasma pneumonia and exacerbation of CHF after study.  \"Acute hypoxic respiratory failure\" is being noted beginning on pulmonary progress notes  with reported \" Still reporting some exertional dyspnea.   Patient has conversational dyspnea as well......good aeration, diminished breath sounds bilaterally with scattered crackles, no increased work of breathing noted on 2 L while at rest.\"      -ABG's on 2L ()- pH7.465, pCO2 32.5, pO2 64, O2 sats. 93.6%.     -Patient remained on 2L per NC in the stay and was weaned to room air on discharge 2/3.     After study, was acute hypoxic respiratory failure clinically supported during this admission?    Acute hypoxic respiratory failure was supported with additional clinical indicators:____________  Acute hypoxic respiratory failure was not supported; chronic hypoxic respiratory failure present  Other- specify_____________  Unable to determine        By submitting this query, we are merely seeking further clarification of documentation to accurately reflect all conditions that you are monitoring, evaluating, treating or that extend the hospitalization or utilize additional resources of care. Please utilize your independent clinical judgment when addressing the question(s) above.     This " query and your response, once completed, will be entered into the legal medical record.    Sincerely,  Angel Kang  Clinical Documentation Integrity Program

## 2025-02-04 NOTE — OUTREACH NOTE
Call Center TCM Note      Flowsheet Row Responses   [facility] patient discharged from? Back   Does the patient have one of the following disease processes/diagnoses(primary or secondary)? Pneumonia   TCM attempt successful? Yes  [VR for Riana and Jack Cee]   Call start time 1113   Unsuccessful attempts Attempt 1   Call end time 1116   Discharge diagnosis Multifocal pneumonia   Person spoke with today (if not patient) and relationship Riana, wife   Meds reviewed with patient/caregiver? Yes   Is the patient having any side effects they believe may be caused by any medication additions or changes? No   Does the patient have all medications ordered at discharge? Yes   Is the patient taking all medications as directed (includes completed medication regime)? Yes   Medication comments lasix and prednisone   Comments Hospital f/u 2/10/25@1115am, Cardiology 2/6/25, Pulmo 2/12/25   Does the patient have an appointment with their PCP within 7-14 days of discharge? Yes   DME comments Pt uses nocturnal O2 and has pulse ox for monitoring   Pulse Ox monitoring Intermittent   Pulse Ox device source Patient   O2 Sat comments Hasn't checked since home   Psychosocial issues? No   Did the patient receive a copy of their discharge instructions? Yes   Nursing interventions Reviewed instructions with patient   What is the patient's perception of their health status since discharge? Improving  [Pt is doing much better now per wife--she is aware to monitor for increased resp issues, pt is using IS and has neb for use.  Encouraged qd wts and reviewed wt gain parameters with wife.  She has no questions and reports been through this many times]   Nursing Interventions Nurse provided patient education   Is the patient/caregiver able to teach back the hierarchy of who to call/visit for symptoms/problems? PCP, Specialist, Home health nurse, Urgent Care, ED, 911 Yes   TCM call completed? Yes   Call end time 1116            Carly  BRENDON Marte    2/4/2025, 11:19 EST

## 2025-02-06 ENCOUNTER — LAB (OUTPATIENT)
Facility: HOSPITAL | Age: 85
End: 2025-02-06
Payer: MEDICARE

## 2025-02-06 ENCOUNTER — OFFICE VISIT (OUTPATIENT)
Dept: CARDIOLOGY | Facility: CLINIC | Age: 85
End: 2025-02-06
Payer: MEDICARE

## 2025-02-06 VITALS
BODY MASS INDEX: 30.92 KG/M2 | SYSTOLIC BLOOD PRESSURE: 127 MMHG | HEIGHT: 70 IN | HEART RATE: 59 BPM | WEIGHT: 216 LBS | DIASTOLIC BLOOD PRESSURE: 43 MMHG

## 2025-02-06 DIAGNOSIS — I35.0 MILD AORTIC VALVE STENOSIS: ICD-10-CM

## 2025-02-06 DIAGNOSIS — R06.02 SOB (SHORTNESS OF BREATH): ICD-10-CM

## 2025-02-06 DIAGNOSIS — I25.708 CORONARY ARTERY DISEASE OF BYPASS GRAFT OF NATIVE HEART WITH STABLE ANGINA PECTORIS: Chronic | ICD-10-CM

## 2025-02-06 DIAGNOSIS — I48.19 ATRIAL FIBRILLATION, PERSISTENT: ICD-10-CM

## 2025-02-06 DIAGNOSIS — I25.5 ISCHEMIC CARDIOMYOPATHY: ICD-10-CM

## 2025-02-06 DIAGNOSIS — I48.19 ATRIAL FIBRILLATION, PERSISTENT: Primary | ICD-10-CM

## 2025-02-06 DIAGNOSIS — I10 ESSENTIAL HYPERTENSION: ICD-10-CM

## 2025-02-06 LAB
ALBUMIN SERPL-MCNC: 4 G/DL (ref 3.5–5.2)
ALBUMIN/GLOB SERPL: 1.6 G/DL
ALP SERPL-CCNC: 52 U/L (ref 39–117)
ALT SERPL W P-5'-P-CCNC: 20 U/L (ref 1–41)
ANION GAP SERPL CALCULATED.3IONS-SCNC: 16 MMOL/L (ref 5–15)
AST SERPL-CCNC: 16 U/L (ref 1–40)
BILIRUB SERPL-MCNC: 0.3 MG/DL (ref 0–1.2)
BUN SERPL-MCNC: 35 MG/DL (ref 8–23)
BUN/CREAT SERPL: 17.1 (ref 7–25)
CALCIUM SPEC-SCNC: 9.2 MG/DL (ref 8.6–10.5)
CHLORIDE SERPL-SCNC: 97 MMOL/L (ref 98–107)
CO2 SERPL-SCNC: 27 MMOL/L (ref 22–29)
CREAT SERPL-MCNC: 2.05 MG/DL (ref 0.76–1.27)
DEPRECATED RDW RBC AUTO: 45.9 FL (ref 37–54)
EGFRCR SERPLBLD CKD-EPI 2021: 31.8 ML/MIN/1.73
ERYTHROCYTE [DISTWIDTH] IN BLOOD BY AUTOMATED COUNT: 14.4 % (ref 12.3–15.4)
GLOBULIN UR ELPH-MCNC: 2.5 GM/DL
GLUCOSE SERPL-MCNC: 82 MG/DL (ref 65–99)
HCT VFR BLD AUTO: 37.7 % (ref 37.5–51)
HGB BLD-MCNC: 12.2 G/DL (ref 13–17.7)
MCH RBC QN AUTO: 28.2 PG (ref 26.6–33)
MCHC RBC AUTO-ENTMCNC: 32.4 G/DL (ref 31.5–35.7)
MCV RBC AUTO: 87.1 FL (ref 79–97)
NT-PROBNP SERPL-MCNC: 1679 PG/ML (ref 0–1800)
PLATELET # BLD AUTO: 352 10*3/MM3 (ref 140–450)
PMV BLD AUTO: 10.9 FL (ref 6–12)
POTASSIUM SERPL-SCNC: 4.1 MMOL/L (ref 3.5–5.2)
PROT SERPL-MCNC: 6.5 G/DL (ref 6–8.5)
RBC # BLD AUTO: 4.33 10*6/MM3 (ref 4.14–5.8)
SODIUM SERPL-SCNC: 140 MMOL/L (ref 136–145)
WBC NRBC COR # BLD AUTO: 17.46 10*3/MM3 (ref 3.4–10.8)

## 2025-02-06 PROCEDURE — 85027 COMPLETE CBC AUTOMATED: CPT

## 2025-02-06 PROCEDURE — 80053 COMPREHEN METABOLIC PANEL: CPT

## 2025-02-06 PROCEDURE — 83880 ASSAY OF NATRIURETIC PEPTIDE: CPT

## 2025-02-06 PROCEDURE — 36415 COLL VENOUS BLD VENIPUNCTURE: CPT

## 2025-02-06 NOTE — PROGRESS NOTES
Labs done today reviewed.  Kidney function(creatinine, BUN) is worse than discharge levels.  proBNP, marker of fluid retention is back to normal.  It was close to 6000 while in the hospital.    Recommend to stop taking Lasix for now.  He may increase the fluid intake for the next few days to help with recovery of kidney function.  Continue all other medications, follow-up with pulmonology next week as scheduled.    Please call with any worsening or recurrent symptoms.      Electronically signed by Darnell Stevenson MD, 02/06/25, 5:18 PM EST.

## 2025-02-07 ENCOUNTER — TELEPHONE (OUTPATIENT)
Dept: CARDIOLOGY | Facility: CLINIC | Age: 85
End: 2025-02-07
Payer: MEDICARE

## 2025-02-07 NOTE — PROGRESS NOTES
"Enter Query Response Below      Query Response: Severe sepsis causing acute organ failure, type 2 MI             If applicable, please update the problem list.       Patient: Layo Cee        : 1940  Account: [id number]           Admit Date: 2025        How to Respond to this query:       a. Click New Note     b. Answer query within the yellow box.                c. Update the Problem List, if applicable.      If you have any questions about this query contact me at: [email or web address]       ,     On 2025, 84 year old male with history of CHF, CKD 3b, COPD on 2L oxygen baseline at home presented with left chest pain, shortness of breath , found to have Mycoplasma pneumonia, exacerbation of CHF, sepsis after study.      -Per cardiology progress note 2/3 \"Elevated troponin : Volume overload with systemic infection, and sinus tachycardia on presentation.  He has some constant chest discomfort which is mild.  Suspect this is related to type II MI from systemic infection, CKD and CHF exacerbation.\"  Troponin levels were 176 and 249 ()-> 639 and 643 ().      -Treatments: Cardiology and pulmonary consults, oxygen 2L, IV and po antibiotics, IV Lasix, Duonebs, Metolazone, monitoring of labs and intake/output.     Please clarify if patient treated/monitored for the following:     Severe sepsis causing acute organ failure, type 2 MI  Sepsis without organ dysfunction  Other (please specify) ___________  Unable to determine    By submitting this query, we are merely seeking further clarification of documentation to accurately reflect all conditions that you are monitoring, evaluating, treating or that extend the hospitalization or utilize additional resources of care. Please utilize your independent clinical judgment when addressing the question(s) above.     This query and your response, once completed, will be entered into the legal medical record.    Sincerely,  Angel" Jorge Luis  Clinical Documentation Integrity Program

## 2025-02-07 NOTE — TELEPHONE ENCOUNTER
----- Message from Darnell Stevenson sent at 2/6/2025  5:18 PM EST -----  Labs done today reviewed.  Kidney function(creatinine, BUN) is worse than discharge levels.  proBNP, marker of fluid retention is back to normal.  It was close to 6000 while in the hospital.    Recommend to stop taking Lasix for now.  He may increase the fluid intake for the next few days to help with recovery of kidney function.  Continue all other medications, follow-up with pulmonology next week as scheduled.    Please call with any worsening or recurrent symptoms.      Electronically signed by Darnell Stevenson MD, 02/06/25, 5:18 PM EST.

## 2025-02-09 RX ORDER — AMIODARONE HYDROCHLORIDE 200 MG/1
200 TABLET ORAL EVERY OTHER DAY
COMMUNITY

## 2025-02-10 ENCOUNTER — OFFICE VISIT (OUTPATIENT)
Dept: FAMILY MEDICINE CLINIC | Facility: CLINIC | Age: 85
End: 2025-02-10
Payer: MEDICARE

## 2025-02-10 VITALS
WEIGHT: 219 LBS | HEART RATE: 80 BPM | SYSTOLIC BLOOD PRESSURE: 103 MMHG | BODY MASS INDEX: 31.35 KG/M2 | DIASTOLIC BLOOD PRESSURE: 49 MMHG | OXYGEN SATURATION: 100 % | HEIGHT: 70 IN

## 2025-02-10 DIAGNOSIS — R94.4 DECREASED GFR: ICD-10-CM

## 2025-02-10 DIAGNOSIS — Z91.89 AT INCREASED RISK FOR EMERGENCY HOSPITAL ADMISSION: ICD-10-CM

## 2025-02-10 DIAGNOSIS — Z09 HOSPITAL DISCHARGE FOLLOW-UP: Primary | ICD-10-CM

## 2025-02-10 PROCEDURE — 1126F AMNT PAIN NOTED NONE PRSNT: CPT | Performed by: STUDENT IN AN ORGANIZED HEALTH CARE EDUCATION/TRAINING PROGRAM

## 2025-02-10 PROCEDURE — 3074F SYST BP LT 130 MM HG: CPT | Performed by: STUDENT IN AN ORGANIZED HEALTH CARE EDUCATION/TRAINING PROGRAM

## 2025-02-10 PROCEDURE — 3078F DIAST BP <80 MM HG: CPT | Performed by: STUDENT IN AN ORGANIZED HEALTH CARE EDUCATION/TRAINING PROGRAM

## 2025-02-10 PROCEDURE — 99214 OFFICE O/P EST MOD 30 MIN: CPT | Performed by: STUDENT IN AN ORGANIZED HEALTH CARE EDUCATION/TRAINING PROGRAM

## 2025-02-10 NOTE — PROGRESS NOTES
Subjective:       Layo Cee is a 84 y.o. male with a concurrent medical history of atrial fibrillation status post ablation, coronary artery disease status post bypass, heart failure with reduced ejection fraction secondary to ischemic cardiomyopathy, hypertension, gout, benign prostate hyperplasia, COPD, prediabetes, obesity, iron deficiency anemia and worsening renal insufficiency and past medical history of myocardial infarction who presents for hospital discharge follow-up.    I last saw Layo on 1/27/2024 for 1 month follow-up after treatment for a COPD exacerbation.  Although cough had improved, he continued to have dyspnea, so I obtained a chest x-ray and labs.    Initial lab work showed a normal white count and a BNP that was only slightly elevated compared to baseline value 2 months ago.  He had not reported any lower extremity peripheral edema at that time.          Chest x-ray showed pneumonia versus atelectasis.        On that basis, I started him on guideline directed therapy for community-acquired pneumonia with doxycycline and Augmentin.    His symptoms subsequently worsened, prompting him to present to the hospital for evaluation.  At that time, white count worsened and was then elevated and repeat chest x-ray showed worsening pneumonia despite use of guideline directed antibiotics.    He was noted to be in sepsis and started on IV antibiotics for pneumonia.  Additionally, the strain on his heart from pneumonia and sepsis appears to have caused heart failure exacerbation and demand ischemia as he also at that time did report symptoms of fluid overload requiring diuresis.    I have reviewed the discharge summary written by Dr. Goodwin.  He was discharged with a new prescription for daily 40 mg Lasix and to complete a course of oral prednisone, which he has finished.  Inpatient physician recommended follow-up with us for repeat labs.  However, Layo has already seen his cardiologist in the  interval on 2/6/2025, and labs were obtained at that time.    I have reviewed the note written by his cardiologist.              BNP had normalized with diuresis, but creatinine and GFR have worsened further.  Lasix was stopped at that visit.    Today, Layo tells me he is overall unchanged since his visit with cardiology last week.  He continues to feel better after hospital discharge but does note some remaining fatigue, dyspnea, dizziness, and lightheadedness.  Blood pressure for me is normotensive but soft at 103/49 and we did discuss that he should be checking his blood pressure regularly at home and if he has any overt hypotension, especially if this is persistent and symptomatic, he should contact his cardiologist to determine if medications such as metoprolol can be adjusted.    Additionally, Layo has follow-up with pulmonology.  I encouraged him to keep this follow-up.  I think dyspnea and fatigue is caused by a combination of iron deficiency anemia (we have only just started iron replacement and he has been referred to GI - given his recent demand ischemia I do not think he is a candidate for colonoscopy at this time), COPD, and heart failure.  We did discuss the potential that off diuretic symptoms may come back.  I told him that he should be weighing himself daily and communicating with us if symptoms worsen acutely and he begins to put on weight.  We discussed that he is at risk for readmission even with guideline directed treatment and he should have a low threshold to contact me for other providers if symptoms worsen.    Follow-up in just 2 weeks so I can keep a close eye on him for now while his body recovers.        Past Medical Hx:  Past Medical History:   Diagnosis Date    Acute on chronic diastolic CHF (congestive heart failure) 6/6/2022    Arthritis     Atrial fibrillation, persistent 5/3/2022    Persistent atrial fibrillation status post extensive ablation and pulmonary vein isolation by   Lauren on 6/3/2022, with reoccurring rapid A. fib, and then successful cardioversion on 7/1/2022    BPH (benign prostatic hyperplasia)     CHF (congestive heart failure)     COPD (chronic obstructive pulmonary disease)     Coronary artery disease of bypass graft of native heart with stable angina pectoris     (1) Coronary artery disease, s/p CABG in the past. Cardiac catheterization in July 2016 showed patentLIMA graft to the LAD and occluded SVGs. Native LAD is 100% occluded in the mid portion. Native LCx has no significant disease. Native RCA is 100% occluded and it is a . Repeat cath in June, 2021, underwent stent placement to diagnonal branch.    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 Lauren Valencia        Past Surgical Hx:  Past Surgical History:   Procedure Laterality Date    BLADDER SURGERY      CARDIAC CATHETERIZATION  07/2016    CARDIAC CATHETERIZATION N/A 8/9/2021    Procedure: Left Heart Cath with possible angioplasty;  Surgeon: Jack Ladd MD;  Location: [facility] INVASIVE LOCATION;  Service: Cardiovascular;  Laterality: N/A;  Please schedule the procedure with Dr. Jack Ladd MD on 8/9/2021    CARDIAC ELECTROPHYSIOLOGY PROCEDURE N/A 6/3/2022    Procedure: Ablation atrial fibrillation;  Surgeon: Irineo Aguilar MD;  Location: [facility] INVASIVE LOCATION;  Service: Cardiovascular;  Laterality: N/A;    CARDIAC ELECTROPHYSIOLOGY PROCEDURE N/A 6/3/2022    Procedure: Ablation atrial flutter/CTI;  Surgeon: Irineo Aguilar  MD Gilberto;  Location: [facility] INVASIVE LOCATION;  Service: Cardiovascular;  Laterality: N/A;    CARDIAC SURGERY      HEART BYPASS    COLONOSCOPY  2011    CORONARY ARTERY BYPASS GRAFT      CYSTOSCOPY  03/19/2019    WITH BILATERAL RETROGRADE PYELOGRAPHY    ELECTRICAL CARDIOVERSION  5/12/2022         ENDOSCOPY  2016    PROSTATE SURGERY         Current Meds:    Current Outpatient Medications:     acetaminophen (TYLENOL) 325 MG tablet, Take 2 tablets by mouth As Needed for Mild Pain., Disp: , Rfl:     allopurinol 200 MG tablet, Take 200 mg by mouth Daily. (Patient taking differently: Take 100 mg by mouth Every Morning.), Disp: 90 tablet, Rfl: 2    amiodarone (PACERONE) 200 MG tablet, Take 1 tablet by mouth Every Other Day. (Patient taking differently: Take 0.5 tablets by mouth Every Other Day.), Disp: , Rfl:     aspirin (aspirin) 81 MG EC tablet, Take 1 tablet by mouth Daily., Disp: 30 tablet, Rfl: 1    calcium carbonate (OS-LIANNA) 600 MG tablet, Take 1 tablet by mouth Every Night., Disp: , Rfl:     cetirizine (zyrTEC) 10 MG tablet, Take 1 tablet by mouth Daily., Disp: 90 tablet, Rfl: 3    Coenzyme Q10 100 MG tablet, Take 1 tablet by mouth Daily., Disp: , Rfl:     Diclofenac Sodium (VOLTAREN) 1 % gel gel, Apply 2 g topically to the appropriate area as directed 4 (Four) Times a Day., Disp: , Rfl:     finasteride (PROSCAR) 5 MG tablet, Take 1 tablet by mouth Daily., Disp: 90 tablet, Rfl: 4    fluticasone (FLONASE) 50 MCG/ACT nasal spray, Administer 1 spray into the nostril(s) as directed by provider Every Night., Disp: , Rfl:     Fluticasone-Umeclidin-Vilant (Trelegy Ellipta) 100-62.5-25 MCG/ACT inhaler, Inhale 1 puff Daily., Disp: 1 each, Rfl: 11    ipratropium-albuterol (DUO-NEB) 0.5-2.5 mg/3 ml nebulizer, Take 3 mL by nebulization 4 (Four) Times a Day As Needed for Wheezing., Disp: 360 mL, Rfl: 3    isosorbide mononitrate (IMDUR) 30 MG 24 hr tablet, Take 1 tablet by mouth daily. (Patient taking differently: Take 0.5  tablets by mouth Daily.), Disp: 90 tablet, Rfl: 3    Livalo 1 MG tablet tablet, Take 1 tablet by mouth every night., Disp: 90 tablet, Rfl: 3    metoprolol succinate XL (TOPROL-XL) 25 MG 24 hr tablet, Take 1 tablet by mouth Every 12 (Twelve) Hours., Disp: 90 tablet, Rfl: 3    montelukast (SINGULAIR) 10 MG tablet, Take 1 tablet by mouth Every Night., Disp: 90 tablet, Rfl: 3    nitroglycerin (NITROSTAT) 0.4 MG SL tablet, Place 1 tablet under the tongue Every 5 (Five) Minutes As Needed for Chest Pain for up to 30 days. Take no more than 3 doses in 15 minutes., Disp: 30 tablet, Rfl: 0    pantoprazole (PROTONIX) 40 MG EC tablet, Take 1 tablet by mouth Daily., Disp: 90 tablet, Rfl: 2    rivaroxaban (Xarelto) 15 MG tablet, Take 1 tablet by mouth daily with dinner., Disp: 30 tablet, Rfl: 11    sacubitril-valsartan (Entresto) 24-26 MG tablet, Take 1 tablet by mouth 2 (Two) Times a Day., Disp: 180 tablet, Rfl: 3    tamsulosin (FLOMAX) 0.4 MG capsule 24 hr capsule, Take 1 capsule by mouth Daily., Disp: 90 capsule, Rfl: 4    VITAMIN B COMPLEX-C PO, Take 1 tablet by mouth Daily., Disp: , Rfl:     Allergies:  Allergies   Allergen Reactions    Carvedilol Swelling and Anaphylaxis    Levaquin [Levofloxacin] Unknown - Low Severity       Family Hx:  Family History   Problem Relation Age of Onset    Hypertension Mother     Heart disease Father     Other Brother         GASTROINTESTINAL CANCER    Kidney cancer Brother         Social History:  Social History     Socioeconomic History    Marital status:    Tobacco Use    Smoking status: Former     Current packs/day: 0.00     Average packs/day: 0.5 packs/day for 40.0 years (20.0 ttl pk-yrs)     Types: Cigarettes     Start date:      Quit date:      Years since quittin.1     Passive exposure: Past    Smokeless tobacco: Never   Vaping Use    Vaping status: Never Used   Substance and Sexual Activity    Alcohol use: Not Currently    Drug use: Never    Sexual activity: Defer  "      Review of Systems  Review of Systems   Constitutional:  Positive for fatigue.   Respiratory:  Positive for shortness of breath.    Cardiovascular:  Negative for chest pain and leg swelling.   Neurological:  Positive for dizziness, weakness and light-headedness.       Objective:     /49 (BP Location: Left arm, Patient Position: Sitting, Cuff Size: Large Adult)   Pulse 80   Ht 177.8 cm (70\")   Wt 99.3 kg (219 lb)   SpO2 100%   BMI 31.42 kg/m²   Physical Exam  Constitutional:       General: He is not in acute distress.     Appearance: Normal appearance. He is obese. He is not ill-appearing, toxic-appearing or diaphoretic.   Cardiovascular:      Rate and Rhythm: Normal rate.   Pulmonary:      Effort: Pulmonary effort is normal. No respiratory distress.      Breath sounds: Normal breath sounds. No stridor. No wheezing, rhonchi or rales.   Neurological:      Mental Status: He is alert.   Psychiatric:         Mood and Affect: Mood normal.         Behavior: Behavior normal.          Assessment/Plan:     Diagnoses and all orders for this visit:    1. Hospital discharge follow-up (Primary)  2. Decreased GFR  3. At increased risk for emergency hospital admission     last saw Layo on 1/27/2024 for 1 month follow-up after treatment for a COPD exacerbation.  Although cough had improved, he continued to have dyspnea, so I obtained a chest x-ray and labs.    Initial lab work showed a normal white count and a BNP that was only slightly elevated compared to baseline value 2 months ago.  He had not reported any lower extremity peripheral edema at that time.          Chest x-ray showed pneumonia versus atelectasis.        On that basis, I started him on guideline directed therapy for community-acquired pneumonia with doxycycline and Augmentin.    His symptoms subsequently worsened, prompting him to present to the hospital for evaluation.  At that time, white count worsened and was then elevated and repeat chest x-ray " showed worsening pneumonia despite use of guideline directed antibiotics.    He was noted to be in sepsis and started on IV antibiotics for pneumonia.  Additionally, the strain on his heart from pneumonia and sepsis appears to have caused heart failure exacerbation and demand ischemia as he also at that time did report symptoms of fluid overload requiring diuresis.    I have reviewed the discharge summary written by Dr. Goodwin.  He was discharged with a new prescription for daily 40 mg Lasix and to complete a course of oral prednisone, which he has finished.  Inpatient physician recommended follow-up with us for repeat labs.  However, Layo has already seen his cardiologist in the interval on 2/6/2025, and labs were obtained at that time.    I have reviewed the note written by his cardiologist.              BNP had normalized with diuresis, but creatinine and GFR have worsened further.  Lasix was stopped at that visit.    Today, Layo tells me he is overall unchanged since his visit with cardiology last week.  He continues to feel better after hospital discharge but does note some remaining fatigue, dyspnea, dizziness, and lightheadedness.  Blood pressure for me is normotensive but soft at 103/49 and we did discuss that he should be checking his blood pressure regularly at home and if he has any overt hypotension, especially if this is persistent and symptomatic, he should contact his cardiologist to determine if medications such as metoprolol can be adjusted.    Additionally, Layo has follow-up with pulmonology.  I encouraged him to keep this follow-up.  I think dyspnea and fatigue is caused by a combination of iron deficiency anemia (we have only just started iron replacement and he has been referred to GI - given his recent demand ischemia I do not think he is a candidate for colonoscopy at this time), COPD, and heart failure.  We did discuss the potential that off diuretic symptoms may come back.  I told him that  he should be weighing himself daily and communicating with us if symptoms worsen acutely and he begins to put on weight.  We discussed that he is at risk for readmission even with guideline directed treatment and he should have a low threshold to contact me for other providers if symptoms worsen.    I will also refer him to nephrology today.  He did have previous renal ultrasound that did not show evidence of medical renal disease but given persistent renal insufficiency now declining with diuresis, think we want to do everything we can to preserve renal function as it is likely if he has another heart failure exacerbation he will require diuresis again.    Follow-up in just 2 weeks so I can keep a close eye on him for now while his body recovers.        -     Ambulatory Referral to Nephrology                      Follow-up:     Return in about 2 weeks (around 2/24/2025) for CHF Recheck .    Preventative:  Health Maintenance   Topic Date Due    ANNUAL WELLNESS VISIT  01/17/2025    RSV Vaccine - Adults (1 - 1-dose 75+ series) 03/18/2025 (Originally 3/17/2017)    COVID-19 Vaccine (2 - 2024-25 season) 04/18/2025 (Originally 9/1/2024)    BMI FOLLOWUP  02/26/2025    LIPID PANEL  09/16/2025    TDAP/TD VACCINES (2 - Tdap) 01/09/2033    INFLUENZA VACCINE  Completed    Pneumococcal Vaccine 65+  Completed    ZOSTER VACCINE  Completed           This document has been electronically signed by Tevin Zavala MD on February 10, 2025 12:36 EST       Parts of this note are electronic transcriptions/translations of spoken language to printed text using the Dragon Dictation system.

## 2025-02-10 NOTE — ASSESSMENT & PLAN NOTE
Pressure well-controlled in office today.  He recently had episodes of hypotension and some other antihypertensive medications were discontinued, especially since he lost weight after starting Wegovy.  As of now, we will continue metoprolol and Entresto.

## 2025-02-10 NOTE — ASSESSMENT & PLAN NOTE
He remained in normal sinus rhythm throughout his recent hospital stay, denies any major palpitations.  Continue amiodarone 200 mg every other day for rhythm control along with metoprolol.  Continue Xarelto for anticoagulation.

## 2025-02-10 NOTE — ASSESSMENT & PLAN NOTE
Echocardiogram done recent hospital stay showed mild to moderate aortic stenosis.  Will continue to monitor clinically, repeat echo again next year.

## 2025-02-10 NOTE — ASSESSMENT & PLAN NOTE
Echocardiogram done recent hospitalization showed ejection fraction 45-50%, slightly lower than previous levels.  He was discharged on oral Lasix (previously not on loop diuretics).  He is euvolemic on physical examination and appears on the drier side.  Will check basic metabolic panel now and discontinue Lasix as needed.  Entresto and metoprolol will be continued.    Encouraged to keep follow-up appointment with pulmonology next week since he is still reporting shortness of breath.

## 2025-02-10 NOTE — PROGRESS NOTES
CARDIOLOGY FOLLOW-UP PROGRESS NOTE        Chief Complaint  Follow-up, Shortness of Breath, and Dizziness    Subjective            Layo Cee presents to [facility] CARDIOLOGY  History of Present Illness    Mr Cee is here for a follow-up visit from recent hospital stay.  He was discharged the hospital 3 days back, after being treated for community-acquired pneumonia, COPD exacerbation and heart failure exacerbation.  He was discharged on oral diuretics and steroids.  Per patient, he felt fine for the next 2 days after discharge.  He is done with steroid course.  Yesterday and today, he is again feeling more short of breath and fatigue.  He has no chest pain or palpitations.      Past History:    (1) Coronary artery disease, status post coronary artery bypass grafting in the past. Cardiac catheterization done in July 2016 showed patent left internal mammary graft to the LAD and occluded saphenous venous graft. Native LAD is 100% occluded in the mid portion. Native circumflex artery has no significant disease. Native right coronary artery is also 100% occluded and it is a chronic total occlusion. The right coronary artery is reconstituted with collaterals from the left side.  Patient underwent repeat cardiac catheterization in June, 2021 and underwent angioplasty stent placement to diagonal branch.       (2) Ischemic cardiomyopathy with recovery of cardiac function.  (3) Chronic kidney disease, stage 3.   (4) Chronic obstructive pulmonary disease  (5) Hypertension. (6) Hyperlipidemia.   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   (8) Typical atrial flutter status post radiofrequency ablation by Dr. Aguilar on 11/30/2018   (9) Persistent atrial fibrillation status post extensive ablation and pulmonary vein isolation by Dr. Aguilar on 6/3/2022    Medical History:  Past Medical History:   Diagnosis Date    Acute on chronic diastolic  CHF (congestive heart failure) 6/6/2022    Arthritis     Atrial fibrillation, persistent 5/3/2022    Persistent atrial fibrillation status post extensive ablation and pulmonary vein isolation by Dr. Aguilar on 6/3/2022, with reoccurring rapid A. fib, and then successful cardioversion on 7/1/2022    BPH (benign prostatic hyperplasia)     CHF (congestive heart failure)     COPD (chronic obstructive pulmonary disease)     Coronary artery disease of bypass graft of native heart with stable angina pectoris     (1) Coronary artery disease, s/p CABG in the past. Cardiac catheterization in July 2016 showed patentLIMA graft to the LAD and occluded SVGs. Native LAD is 100% occluded in the mid portion. Native LCx has no significant disease. Native RCA is 100% occluded and it is a . Repeat cath in June, 2021, underwent stent placement to diagnonal branch.    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 Lauren Valencia        Family History: family history includes Heart disease in his father; Hypertension in his mother; Kidney cancer in his brother; Other in his brother.     Social History: reports that he quit smoking about 27 years ago. His smoking use included cigarettes. He started smoking about 67 years ago. He has a 20 pack-year smoking history. He has been exposed to tobacco smoke. He has never used smokeless tobacco. He reports that he does not currently use alcohol. He reports that he does not use drugs.    Allergies: Carvedilol and Levaquin  [levofloxacin]    Current Outpatient Medications on File Prior to Visit   Medication Sig    acetaminophen (TYLENOL) 325 MG tablet Take 2 tablets by mouth As Needed for Mild Pain.    allopurinol 200 MG tablet Take 200 mg by mouth Daily. (Patient taking differently: Take 100 mg by mouth Every Morning.)    aspirin (aspirin) 81 MG EC tablet Take 1 tablet by mouth Daily.    calcium carbonate (OS-LIANNA) 600 MG tablet Take 1 tablet by mouth Every Night.    cetirizine (zyrTEC) 10 MG tablet Take 1 tablet by mouth Daily.    Coenzyme Q10 100 MG tablet Take 1 tablet by mouth Daily.    Diclofenac Sodium (VOLTAREN) 1 % gel gel Apply 2 g topically to the appropriate area as directed 4 (Four) Times a Day.    finasteride (PROSCAR) 5 MG tablet Take 1 tablet by mouth Daily.    Fluticasone-Umeclidin-Vilant (Trelegy Ellipta) 100-62.5-25 MCG/ACT inhaler Inhale 1 puff Daily.    furosemide (LASIX) 40 MG tablet Take 1 tablet by mouth Daily for 30 days.    ipratropium-albuterol (DUO-NEB) 0.5-2.5 mg/3 ml nebulizer Take 3 mL by nebulization 4 (Four) Times a Day As Needed for Wheezing.    isosorbide mononitrate (IMDUR) 30 MG 24 hr tablet Take 1 tablet by mouth daily. (Patient taking differently: Take 0.5 tablets by mouth Daily.)    Livalo 1 MG tablet tablet Take 1 tablet by mouth every night.    metoprolol succinate XL (TOPROL-XL) 25 MG 24 hr tablet Take 1 tablet by mouth Every 12 (Twelve) Hours.    montelukast (SINGULAIR) 10 MG tablet Take 1 tablet by mouth Every Night.    pantoprazole (PROTONIX) 40 MG EC tablet Take 1 tablet by mouth Daily.    rivaroxaban (Xarelto) 15 MG tablet Take 1 tablet by mouth daily with dinner.    sacubitril-valsartan (Entresto) 24-26 MG tablet Take 1 tablet by mouth 2 (Two) Times a Day.    Semaglutide-Weight Management (Wegovy) 1 MG/0.5ML solution auto-injector Inject 0.5 mL under the skin into the appropriate area as directed 1 (One) Time Per Week.    sucralfate (CARAFATE) 1 g tablet Take 1 tablet by mouth 3 (Three)  "Times a Day With Meals.    tamsulosin (FLOMAX) 0.4 MG capsule 24 hr capsule Take 1 capsule by mouth Daily.    VITAMIN B COMPLEX-C PO Take 1 tablet by mouth Daily.    amiodarone (PACERONE) 200 MG tablet Take 1 tablet by mouth Every Other Day.    fluticasone (FLONASE) 50 MCG/ACT nasal spray Administer 1 spray into the nostril(s) as directed by provider Every Night.    nitroglycerin (NITROSTAT) 0.4 MG SL tablet Place 1 tablet under the tongue Every 5 (Five) Minutes As Needed for Chest Pain for up to 30 days. Take no more than 3 doses in 15 minutes.     Review of Systems   Constitutional:  Positive for fatigue.   Respiratory:  Positive for cough and shortness of breath. Negative for wheezing.    Cardiovascular:  Negative for chest pain, palpitations and leg swelling.   Gastrointestinal:  Negative for nausea and vomiting.   Neurological:  Positive for dizziness. Negative for syncope.        Objective     /43   Pulse 59   Ht 177.8 cm (70\")   Wt 98 kg (216 lb)   BMI 30.99 kg/m²       Physical Exam    General : Alert, awake, no acute distress  Neck : Supple, no carotid bruit, no jugular venous distention  CVS : Regular rate and rhythm, no murmur, rubs or gallops  Lungs: Bilateral faint wheezing heard, no crackles  Abdomen: Soft, nontender, bowel sounds heard in all 4 quadrants  Extremities: Warm, well-perfused, no pedal edema    Result Review :     The following data was reviewed by: Darnell Stevenson MD on 02/06/2025:    CMP          2/2/2025    05:19 2/3/2025    04:58   CMP   Glucose 158  107    BUN 33  38    Creatinine 1.76  1.99    EGFR 38.1  32.9    Sodium 136  132    Potassium 4.5  4.3    Chloride 99  97    Calcium 9.0  8.9    Total Protein 6.3     Albumin 3.5     Globulin 2.8     Total Bilirubin 0.3     Alkaline Phosphatase 50     AST (SGOT) 18     ALT (SGPT) 20     Albumin/Globulin Ratio 1.3     BUN/Creatinine Ratio 18.8  19.1    Anion Gap 9.6  12.0      CBC          2/2/2025    05:19 2/3/2025    04:58 "   CBC   WBC 9.47  14.13    RBC 3.92  3.65    Hemoglobin 10.7  10.1    Hematocrit 34.8  32.2    MCV 88.8  88.2    MCH 27.3  27.7    MCHC 30.7  31.4    RDW 14.8  15.1    Platelets 255  268        Lipid Panel          9/16/2024    11:21   Lipid Panel   Total Cholesterol 113    Triglycerides 127    HDL Cholesterol 33    VLDL Cholesterol 23    LDL Cholesterol  57    LDL/HDL Ratio 1.65           Data reviewed: Cardiology studies        Results for orders placed during the hospital encounter of 01/29/25    Adult Transthoracic Echo Complete W/ Cont if Necessary Per Protocol    Interpretation Summary    Left ventricular systolic function is low normal. Left ventricular ejection fraction appears to be 46 - 50%.    Left ventricular wall thickness is consistent with mild concentric hypertrophy.    Left ventricular diastolic function is consistent with (grade II w/high LAP) pseudonormalization.    The left atrial cavity is mildly dilated.    Aortic valve is moderately calcified with restricted opening.  Mild to moderate aortic valve stenosis is present.  The peak and mean gradients across aortic valve are 30/18 mmHg    There is mild tricuspid regurgitation.  Estimated right ventricular systolic pressure from tricuspid regurgitation is moderately elevated (45-55 mmHg).                 Assessment and Plan        Diagnoses and all orders for this visit:    1. Atrial fibrillation, persistent (Primary)  Assessment & Plan:  He remained in normal sinus rhythm throughout his recent hospital stay, denies any major palpitations.  Continue amiodarone 200 mg every other day for rhythm control along with metoprolol.  Continue Xarelto for anticoagulation.    Orders:  -     CBC (No Diff); Future    2. Ischemic cardiomyopathy  Assessment & Plan:  Echocardiogram done recent hospitalization showed ejection fraction 45-50%, slightly lower than previous levels.  He was discharged on oral Lasix (previously not on loop diuretics).  He is euvolemic on  physical examination and appears on the drier side.  Will check basic metabolic panel now and discontinue Lasix as needed.  Entresto and metoprolol will be continued.    Encouraged to keep follow-up appointment with pulmonology next week since he is still reporting shortness of breath.    Orders:  -     Comprehensive Metabolic Panel; Future  -     proBNP; Future    3. SOB (shortness of breath)  -     proBNP; Future    4. 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  Continue aspirin, statins beta-blockers.      5. Essential hypertension  Assessment & Plan:  Pressure well-controlled in office today.  He recently had episodes of hypotension and some other antihypertensive medications were discontinued, especially since he lost weight after starting Wegovy.  As of now, we will continue metoprolol and Entresto.      6. Mild aortic valve stenosis  Assessment & Plan:  Echocardiogram done recent hospital stay showed mild to moderate aortic stenosis.  Will continue to monitor clinically, repeat echo again next year.                Follow Up     Return for Keep the appointment in March 17th .    Patient was given instructions and counseling regarding his condition or for health maintenance advice. Please see specific information pulled into the AVS if appropriate.

## 2025-02-11 LAB
QT INTERVAL: 315 MS
QT INTERVAL: 348 MS
QTC INTERVAL: 457 MS
QTC INTERVAL: 459 MS

## 2025-02-12 ENCOUNTER — OFFICE VISIT (OUTPATIENT)
Dept: PULMONOLOGY | Facility: CLINIC | Age: 85
End: 2025-02-12
Payer: MEDICARE

## 2025-02-12 ENCOUNTER — READMISSION MANAGEMENT (OUTPATIENT)
Dept: CALL CENTER | Facility: HOSPITAL | Age: 85
End: 2025-02-12
Payer: MEDICARE

## 2025-02-12 ENCOUNTER — OFFICE VISIT (OUTPATIENT)
Dept: CARDIAC REHAB | Facility: HOSPITAL | Age: 85
End: 2025-02-12
Payer: MEDICARE

## 2025-02-12 VITALS
BODY MASS INDEX: 31.94 KG/M2 | DIASTOLIC BLOOD PRESSURE: 60 MMHG | HEART RATE: 72 BPM | OXYGEN SATURATION: 99 % | WEIGHT: 223.11 LBS | SYSTOLIC BLOOD PRESSURE: 124 MMHG | HEIGHT: 70 IN

## 2025-02-12 VITALS
HEART RATE: 70 BPM | BODY MASS INDEX: 31.35 KG/M2 | HEIGHT: 70 IN | RESPIRATION RATE: 14 BRPM | SYSTOLIC BLOOD PRESSURE: 112 MMHG | TEMPERATURE: 98.6 F | OXYGEN SATURATION: 98 % | DIASTOLIC BLOOD PRESSURE: 64 MMHG | WEIGHT: 219 LBS

## 2025-02-12 DIAGNOSIS — E66.812 CLASS 2 OBESITY DUE TO EXCESS CALORIES WITHOUT SERIOUS COMORBIDITY WITH BODY MASS INDEX (BMI) OF 35.0 TO 35.9 IN ADULT: ICD-10-CM

## 2025-02-12 DIAGNOSIS — I50.9 ACUTE ON CHRONIC CONGESTIVE HEART FAILURE, UNSPECIFIED HEART FAILURE TYPE: ICD-10-CM

## 2025-02-12 DIAGNOSIS — R06.02 SOB (SHORTNESS OF BREATH): ICD-10-CM

## 2025-02-12 DIAGNOSIS — J18.9 MULTIFOCAL PNEUMONIA: ICD-10-CM

## 2025-02-12 DIAGNOSIS — E66.09 CLASS 2 OBESITY DUE TO EXCESS CALORIES WITHOUT SERIOUS COMORBIDITY WITH BODY MASS INDEX (BMI) OF 35.0 TO 35.9 IN ADULT: ICD-10-CM

## 2025-02-12 DIAGNOSIS — J43.9 PULMONARY EMPHYSEMA, UNSPECIFIED EMPHYSEMA TYPE: ICD-10-CM

## 2025-02-12 DIAGNOSIS — I50.22 CHRONIC SYSTOLIC CONGESTIVE HEART FAILURE: ICD-10-CM

## 2025-02-12 DIAGNOSIS — J30.1 SEASONAL ALLERGIC RHINITIS DUE TO POLLEN: Primary | ICD-10-CM

## 2025-02-12 DIAGNOSIS — J43.2 CENTRILOBULAR EMPHYSEMA: Primary | ICD-10-CM

## 2025-02-12 DIAGNOSIS — K21.00 GASTROESOPHAGEAL REFLUX DISEASE WITH ESOPHAGITIS WITHOUT HEMORRHAGE: Chronic | ICD-10-CM

## 2025-02-12 DIAGNOSIS — J43.2 CENTRILOBULAR EMPHYSEMA: ICD-10-CM

## 2025-02-12 PROCEDURE — G0238 OTH RESP PROC, INDIV: HCPCS

## 2025-02-12 PROCEDURE — G0237 THERAPEUTIC PROCD STRG ENDUR: HCPCS

## 2025-02-12 NOTE — OUTREACH NOTE
COPD/PN Week 2 Survey      Flowsheet Row Responses   [facility] patient discharged from? Back   Does the patient have one of the following disease processes/diagnoses(primary or secondary)? Pneumonia   Week 2 attempt successful? Yes   Call start time 1537   Call end time 1539   Discharge diagnosis Multifocal pneumonia   Person spoke with today (if not patient) and relationship patient   Meds reviewed with patient/caregiver? Yes   Is the patient having any side effects they believe may be caused by any medication additions or changes? No   Does the patient have all medications ordered at discharge? Yes   Is the patient taking all medications as directed (includes completed medication regime)? Yes   Comments regarding appointments Rehab has started   Does the patient have a primary care provider?  Yes   Does the patient have an appointment with their PCP or specialist within 7 days of discharge? Yes   Comments regarding PCP Patient has seen PCP   Has the patient kept scheduled appointments due by today? Yes   DME comments Pt uses nocturnal O2 and has pulse ox for monitoring   Pulse Ox monitoring Intermittent   Psychosocial issues? No   Did the patient receive a copy of their discharge instructions? Yes   Nursing interventions Reviewed instructions with patient   What is the patient's perception of their health status since discharge? Improving   Is the patient/caregiver able to teach back importance of completing antibiotic course of treatment? --  [Completed]   Week 2 call completed? Yes   Graduated Yes   Is the patient interested in additional calls from an ambulatory ? No   Would this patient benefit from a Referral to Amb Social Work? No   Wrap up additional comments Pt states he is feeling better.  Denies needs.   Call end time 1539            RITA GONZALES - Registered Nurse

## 2025-02-12 NOTE — PROGRESS NOTES
Primary Care Provider  Tevin Zavala MD     Referring Provider  Cas Goodwin MD     Chief Complaint  Emphysema, Hospital Follow Up Visit, COPD, Shortness of Breath, and Dizziness    Subjective          Layo Cee presents to [facility]  History of Present Illness  Layo Cee is a 84 y.o. male patient   History of Present Illness  The patient is an 84-year-old male with a history of diastolic heart failure, moderately severe COPD, and restrictive airway disease. He is on Trelegy Ellipta, albuterol as needed, and Singulair.    He reports a slight improvement in his condition since his last hospital visit. He has not observed any current leg swelling, and his weight remains stable within a 2-pound range of his hospital discharge weight. He has been experiencing intermittent dizziness for approximately one year, which he attributes to a drop in blood pressure. This morning, he experienced a dizzy spell upon exiting his vehicle, necessitating support from a post for several minutes. He is scheduled to start rehabilitation today at 1:00 PM. He has appointments with Dr. Galvan on Monday and is maintaining a record of his weight, blood pressure, and pulse every two weeks. He is currently on Flomax, administered at night, and Toprol-XL, which was reduced from two to one tablet due to associated dizziness.    He uses a nebulizer as needed and found relief from steroids during his hospital stay. He reports no need for medication refills as his pharmacist typically manages this. He is also on montelukast, taken once at night, and Zyrtec, which he is considering discontinuing. He ensures to rinse his mouth after using the inhaler. He is currently using a Trelegy inhaler and does not require a rescue inhaler.    MEDICATIONS  Current: Trelegy Ellipta, albuterol, Flomax, Toprol-XL, Singulair, Zyrtec, Flonase.    Review of Systems     General:  No Fatigue, No Fever No  weight loss or loss of appetite  HEENT: No dysphagia, No Visual Changes, no rhinorrhea  Respiratory:  + cough,+Dyspnea, intermittent phlegm, No Pleuritic Pain, no wheezing, no hemoptysis.  Cardiovascular: Denies chest pain, denies palpitations,+HALL, No Chest Pressure  Gastrointestinal:  No Abdominal Pain, No Nausea, No Vomiting, No Diarrhea  Genitourinary:  No Dysuria, No Frequency, No Hesitancy  Musculoskeletal: No muscle pain or swelling  Endocrine:  No Heat Intolerance, No Cold Intolerance  Hematologic:  No Bleeding, No Bruising  Psychiatric:  No Anxiety, No Depression  Neurologic:  No Confusion, no Dysarthria, No Headaches  Skin:  No Rash, No Open Wounds    Family History   Problem Relation Age of Onset    Hypertension Mother     Heart disease Father     Other Brother         GASTROINTESTINAL CANCER    Kidney cancer Brother         Social History     Socioeconomic History    Marital status:    Tobacco Use    Smoking status: Former     Current packs/day: 0.00     Average packs/day: 0.5 packs/day for 40.0 years (20.0 ttl pk-yrs)     Types: Cigarettes     Start date:      Quit date:      Years since quittin.1     Passive exposure: Past    Smokeless tobacco: Never   Vaping Use    Vaping status: Never Used   Substance and Sexual Activity    Alcohol use: Not Currently    Drug use: Never    Sexual activity: Defer        Past Medical History:   Diagnosis Date    Acute on chronic diastolic CHF (congestive heart failure) 2022    Arthritis     Atrial fibrillation, persistent 5/3/2022    Persistent atrial fibrillation status post extensive ablation and pulmonary vein isolation by Dr. Aguilar on 6/3/2022, with reoccurring rapid A. fib, and then successful cardioversion on 2022    BPH (benign prostatic hyperplasia)     CHF (congestive heart failure)     COPD (chronic obstructive pulmonary disease)     Coronary artery disease of bypass graft of native heart with stable angina pectoris     (1) Coronary  artery disease, s/p CABG in the past. Cardiac catheterization in July 2016 showed patentLIMA graft to the LAD and occluded SVGs. Native LAD is 100% occluded in the mid portion. Native LCx has no significant disease. Native RCA is 100% occluded and it is a . Repeat cath in June, 2021, underwent stent placement to diagnonal branch.    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 Lauren Valencia         Immunization History   Administered Date(s) Administered    COVID-19 (NIKKY) 11/04/2021    Fluzone  >6mos 10/08/2019    Fluzone High-Dose 65+YRS 09/24/2024    Fluzone High-Dose 65+yrs 09/28/2021, 10/05/2022, 09/11/2023    Fluzone Quad >6mos (Multi-dose) 10/08/2019    Influenza, Unspecified 10/08/2019    Pneumococcal Conjugate 13-Valent (PCV13) 04/13/2018    Pneumococcal Polysaccharide (PPSV23) 06/07/2019    Shingrix 04/29/2021, 11/22/2021    Td (TDVAX) 01/09/2023         Allergies   Allergen Reactions    Carvedilol Swelling and Anaphylaxis    Levaquin [Levofloxacin] Unknown - Low Severity          Current Outpatient Medications:     acetaminophen (TYLENOL) 325 MG tablet, Take 2 tablets by mouth As Needed for Mild Pain., Disp: , Rfl:     allopurinol 200 MG tablet, Take 200 mg by mouth Daily. (Patient taking differently: Take 100 mg by mouth Every Morning.), Disp: 90 tablet, Rfl: 2    amiodarone (PACERONE) 200 MG tablet, Take 1 tablet by mouth Every Other Day. (Patient taking differently: Take 0.5 tablets by mouth Every Other Day.), Disp: , Rfl:      aspirin (aspirin) 81 MG EC tablet, Take 1 tablet by mouth Daily., Disp: 30 tablet, Rfl: 1    calcium carbonate (OS-LIANNA) 600 MG tablet, Take 1 tablet by mouth Every Night., Disp: , Rfl:     Coenzyme Q10 100 MG tablet, Take 1 tablet by mouth Daily., Disp: , Rfl:     Diclofenac Sodium (VOLTAREN) 1 % gel gel, Apply 2 g topically to the appropriate area as directed 4 (Four) Times a Day., Disp: , Rfl:     finasteride (PROSCAR) 5 MG tablet, Take 1 tablet by mouth Daily., Disp: 90 tablet, Rfl: 4    fluticasone (FLONASE) 50 MCG/ACT nasal spray, Administer 1 spray into the nostril(s) as directed by provider Every Night., Disp: , Rfl:     Fluticasone-Umeclidin-Vilant (Trelegy Ellipta) 100-62.5-25 MCG/ACT inhaler, Inhale 1 puff Daily., Disp: 1 each, Rfl: 11    ipratropium-albuterol (DUO-NEB) 0.5-2.5 mg/3 ml nebulizer, Take 3 mL by nebulization 4 (Four) Times a Day As Needed for Wheezing., Disp: 360 mL, Rfl: 3    isosorbide mononitrate (IMDUR) 30 MG 24 hr tablet, Take 1 tablet by mouth daily. (Patient taking differently: Take 0.5 tablets by mouth Daily.), Disp: 90 tablet, Rfl: 3    Livalo 1 MG tablet tablet, Take 1 tablet by mouth every night., Disp: 90 tablet, Rfl: 3    metoprolol succinate XL (TOPROL-XL) 25 MG 24 hr tablet, Take 1 tablet by mouth Every 12 (Twelve) Hours., Disp: 90 tablet, Rfl: 3    montelukast (SINGULAIR) 10 MG tablet, Take 1 tablet by mouth Every Night., Disp: 90 tablet, Rfl: 3    nitroglycerin (NITROSTAT) 0.4 MG SL tablet, Place 1 tablet under the tongue Every 5 (Five) Minutes As Needed for Chest Pain for up to 30 days. Take no more than 3 doses in 15 minutes., Disp: 30 tablet, Rfl: 0    pantoprazole (PROTONIX) 40 MG EC tablet, Take 1 tablet by mouth Daily., Disp: 90 tablet, Rfl: 2    rivaroxaban (Xarelto) 15 MG tablet, Take 1 tablet by mouth daily with dinner., Disp: 30 tablet, Rfl: 11    sacubitril-valsartan (Entresto) 24-26 MG tablet, Take 1 tablet by mouth 2 (Two) Times a Day., Disp: 180 tablet, Rfl: 3    " tamsulosin (FLOMAX) 0.4 MG capsule 24 hr capsule, Take 1 capsule by mouth Daily., Disp: 90 capsule, Rfl: 4    VITAMIN B COMPLEX-C PO, Take 1 tablet by mouth Daily., Disp: , Rfl:      Objective   Physical Exam  Physical Exam  \    Vital Signs:   /64 (BP Location: Right arm, Patient Position: Sitting, Cuff Size: Adult)   Pulse 70   Temp 98.6 °F (37 °C) (Temporal)   Resp 14   Ht 177.8 cm (70\")   Wt 99.3 kg (219 lb)   SpO2 98% Comment: room air  BMI 31.42 kg/m²       Vital Signs Reviewed  WDWN, Alert, in mild resp distress, normal conversational.   HEENT:  PERRL, EOMI.  OP, nares clear, no sinus tenderness  Mallampatti classification : 1  Neck:  Supple, no JVD, no thyromegaly  Lymph: no axillary, cervical, supraclavicular lymphadenopathy noted bilaterally  Chest:  good aeration, bilateral diminished breath sounds, no wheezing, crackles or rhonchi, resonant to percussion b/l  CV: RRR, no MGR, pulses 2+, equal.  Abd:  Soft, NT, ND, + BS, no HSM  EXT:  no clubbing, no cyanosis, No BLE edema  Neuro:  A&Ox3, CN grossly intact, no focal deficits.  Skin: No rashes or lesions noted     Result Review :   The following data was reviewed by: Kolton Brink MD on 02/12/2025:  Common labs          2/2/2025    05:19 2/3/2025    04:58 2/6/2025    09:52   Common Labs   Glucose 158  107  82    BUN 33  38  35    Creatinine 1.76  1.99  2.05    Sodium 136  132  140    Potassium 4.5  4.3  4.1    Chloride 99  97  97    Calcium 9.0  8.9  9.2    Albumin 3.5   4.0    Total Bilirubin 0.3   0.3    Alkaline Phosphatase 50   52    AST (SGOT) 18   16    ALT (SGPT) 20   20    WBC 9.47  14.13  17.46    Hemoglobin 10.7  10.1  12.2    Hematocrit 34.8  32.2  37.7    Platelets 255  268  352      CMP          2/2/2025    05:19 2/3/2025    04:58 2/6/2025    09:52   CMP   Glucose 158  107  82    BUN 33  38  35    Creatinine 1.76  1.99  2.05    EGFR 38.1  32.9  31.8    Sodium 136  132  140    Potassium 4.5  4.3  4.1    Chloride 99  97  97  "   Calcium 9.0  8.9  9.2    Total Protein 6.3   6.5    Albumin 3.5   4.0    Globulin 2.8   2.5    Total Bilirubin 0.3   0.3    Alkaline Phosphatase 50   52    AST (SGOT) 18   16    ALT (SGPT) 20   20    Albumin/Globulin Ratio 1.3   1.6    BUN/Creatinine Ratio 18.8  19.1  17.1    Anion Gap 9.6  12.0  16.0      CBC          2/2/2025    05:19 2/3/2025    04:58 2/6/2025    09:52   CBC   WBC 9.47  14.13  17.46    RBC 3.92  3.65  4.33    Hemoglobin 10.7  10.1  12.2    Hematocrit 34.8  32.2  37.7    MCV 88.8  88.2  87.1    MCH 27.3  27.7  28.2    MCHC 30.7  31.4  32.4    RDW 14.8  15.1  14.4    Platelets 255  268  352      CBC w/diff          2/2/2025    05:19 2/3/2025    04:58 2/6/2025    09:52   CBC w/Diff   WBC 9.47  14.13  17.46    RBC 3.92  3.65  4.33    Hemoglobin 10.7  10.1  12.2    Hematocrit 34.8  32.2  37.7    MCV 88.8  88.2  87.1    MCH 27.3  27.7  28.2    MCHC 30.7  31.4  32.4    RDW 14.8  15.1  14.4    Platelets 255  268  352    Neutrophil Rel % 84.2  73.8     Immature Granulocyte Rel % 0.5  0.8     Lymphocyte Rel % 9.9  17.1     Monocyte Rel % 5.2  7.7     Eosinophil Rel % 0.0  0.4     Basophil Rel % 0.2  0.2       Data reviewed : Radiologic studies CT chest from 1.30/2025 reviewed.     Results  Imaging  CT scan showed fluid buildup.    Testing  Lung function is between 40 to 50% on diffusion capacity and spirometry shows less than one half of the lung working.        Narrative & Impression   CT CHEST WO CONTRAST DIAGNOSTIC-     Date of exam: 1/30/2025 6:54 PM     Comparisons: 1/29/2025; 1/27/2025; 11/29/2024; 10/26/2022.     INDICATIONS:   84-year-old male w/ hypoxia; i50.9-heart failure, unspecified;  j44.1-chronic obstructive pulmonary disease with (acute) exacerbation;  r79.89-other specified abnormal findings of blood chemistry;  j18.9-pneumonia, unspecified organism; z78.9-other specified health  status; r26.2-difficulty in walking, not elsewhere classified;  SOA/SOB/shortness of air/shortness of  breath.     TECHNIQUE:   Axial CT images were obtained of the chest without contrast  administration. Reconstructed 2D coronal and sagittal images were also  obtained. Automated exposure control and iterative construction methods  were used.     FINDINGS:  There are new small bilateral pleural effusions, right larger than left.  Chronic calcified granulomatous disease involves the chest. The patient  has undergone median sternotomy and CABG (coronary artery bypass graft)  surgery. Native coronary artery calcifications are seen. Atherosclerotic  changes are present elsewhere. No cardiac enlargement. No significant  pericardial effusion. The central tracheobronchial tree is well aerated  without filling defect. There is suspected bibasilar subsegmental  atelectasis. Mild interstitial opacities are also seen in the bilateral  lower lobes (left greater than right) and, to a lesser extent, in the  right middle lobe and lingular segment of the left upper lobe. These  findings may represent pulmonary edema. Chronic interstitial lung  disease is possible. Atypical respiratory infectious processes cannot be  excluded entirely but are thought to be less likely. Please correlate  clinically. Degenerative changes are seen throughout the imaged spine.  There may be diffuse idiopathic skeletal hyperostosis (DISH). No acute  fracture. No aggressive osseous lesion is suggested. There are  gallstones. No acute cholecystitis is seen. No acute findings are  appreciated the partially imaged upper abdomen.        IMPRESSION:  Small bilateral pleural effusions are seen, right greater than left.  There may be mild pulmonary edema with vascular congestion, probably  decreased in degree since the 1/29/2025 study, allowing for differences  in modalities. Otherwise, no significant acute findings are appreciated.  Please see above comments for further detail.           Portions of this note were completed with a voice recognition program.      Electronically Signed By-Layo Peng MD On:1/31/2025 12:18 AM          PFT interpretation     Spirometry shows moderately severe obstructive defect.  FEV1/FVC is 61.   FEV1 is 1.63 liters, 56 percent of predicted.  FVC is 2.67 liters, 68 percent of predicted.     There is no significant response to bronchodilator administration.     Lung volumes:  Lung volumes show low total capacity.   Total lung capacity is 5.1 liters, 70 percent of predicted.  Residual volume is 2.43 liters, 84 percent of predicted.        Diffusion capacity is severely decreased, 38 percent of predicted.      Flow volume loop is compatible with mixed obstructive and restrictive defect.     Comparision:  No previous study for comparison.     Conclusion:  Low FEV1/FVC with low FEV1, low FVC, low total capacity and low diffusion capacity.  Suggests moderately severe mixed obstructive and restrictive airway disease.  Please correlate clinically.           Assessment and Plan    Diagnoses and all orders for this visit:    1. Seasonal allergic rhinitis due to pollen (Primary)    2. Chronic systolic congestive heart failure    3. Acute on chronic congestive heart failure, unspecified heart failure type    4. Class 2 obesity due to excess calories without serious comorbidity with body mass index (BMI) of 35.0 to 35.9 in adult    5. Pulmonary emphysema, unspecified emphysema type    6. Centrilobular emphysema    7. SOB (shortness of breath)    8. Multifocal pneumonia    9. Gastroesophageal reflux disease with esophagitis without hemorrhage      Assessment & Plan  1. Chronic Obstructive Pulmonary Disease (COPD).  His lung function is currently at 40 to 50 percent, as indicated by his diffusion capacity and spirometry results. This suggests that less than half of his lung is functioning optimally. His pulmonary sounds have improved compared to his previous hospital visit. He is advised to continue with his current medication regimen, which includes  Flonase, Trelegy Ellipta, albuterol as needed, Singulair, and the nebulizer. He is also advised to reduce his Zyrtec intake and use it only as needed. He is encouraged to participate in pulmonary rehabilitation sessions twice a week for a duration of 3 to 4 months.    2. Diastolic Heart Failure.  He reports feeling a little better since his last hospital visit and has not noticed any leg swelling. His weight is stable within 2 pounds of his hospital discharge weight. He is advised to monitor his weight, blood pressure, and pulse regularly. He is also advised to be cautious when changing positions from lying down to standing up to avoid dizziness.    3. Medication Management.  He is currently taking Flomax at night, which may cause dizziness. He is also on Toprol-XL, which was reduced from two to one tablet at night due to dizziness. He is advised to continue rinsing his mouth after using the inhaler to prevent oral thrush. No new refills are needed as his pharmacist usually sends them automatically. Stop zyrtec and use it only as needed.     Starting pulm rehab today.     Follow-up  The patient will follow up in 4 months.    Follow Up   Return in about 4 months (around 6/12/2025).  Patient was given instructions and counseling regarding his condition or for health maintenance advice. Please see specific information pulled into the AVS if appropriate.     Patient or patient representative verbalized consent for the use of Ambient Listening during the visit with  Kolton Brink MD for chart documentation. 2/12/2025  08:53 EST    Electronically signed by Kolton Brink MD, 2/12/2025, 09:18 EST.

## 2025-02-12 NOTE — PROGRESS NOTES
Phase II and III Education    Discipline Teaching:  Cardiac Rehab Phase II []      Cardiac Rehab Phase III []      Pulmonary Rehab II [x]      Pulmonary Rehab III []      Peripheral Artery Disease []        Class Given:  Asthma Management []      Beginners Cardiac Rehab []      Beginners Pulmonary Rehab [x]      CAD I []      CAD II []      CHF []      Diabetes Mellitus []     Diet & Cholesterol []     Diet Consult []      Energy Conservation []     Exercise [x]     Grocery Store Tour []     Harmonica Therapy []     High Blood Pressure []     Living with COPD [x]     Medication Safety []     Peripheral Artery Disease []     S & S of Infection []      Stress []        Education Topics:  Angina []      Arrhythmias []      Asthma Management []      Beginning Cardiac Rehab []      Blood Pressure []     Blood Sugar []     Breathing Retrainment [x]     CHF []     Cooking []     Daily Weight []     Diabetes Mellitus []      Diet []     Energy Conservation []     Exercise [x]      Foot Care []      Grocery Store Tour []      Heart Disease []      Heart Function []      Incision Care []     Living with COPD [x]     Medication Safety []     PAD Symptoms/Treatment []     Reconditioning Exercises []     S & S Infection []     Smoking Consult []     Stress Management [x]     Test Results []       Teaching Aids:  Video [x]      PAD Handout []      Pulmonary Workbook [x]      CAD Teaching Packet []      Stress Teaching Packet []     Exercise Teaching Packet []      Diet Teaching Packet []      CHF Teaching Packet []      Blood Pressure Teaching Packet []      Smoking Cessation Packet []      Medication Safety Handout []      Pflex Training []      Diabetes Teaching Packet []      Harmonica Therapy Packet []        Teaching Method:  Discussion [x]      Written Information [x]      Audio Visual [x]      Demonstration [x]        Teaching Recipient:  Patient [x]      Family []      Friend []      Legal Guardian []      Primary Care  Giver []      Significant Other []        Barriers To Learning:  None [x]      Auditory []      Cultural []      Emotional []      Financial []      Language []    Mental []      Motivation []      Physical []      Reading Skills []      Hindu []      Verbal/Cognitive []      Visual []      Written/Cognitive []        Teaching Response:  Verified Via Teach back [x]      Return Demo Via Teach Back [x]      Reinforcement Needed []      Unable to Return Demo []      Unable to Comprehend []      Declines Teaching  []        Additional Education Topics:       Follow Up Instruction Comment:   Chief Complaint  Pulmonary Rehab Phase II    Layo Cee presents to [facility]    Physical Exam  Constitutional:       Appearance: Normal appearance.   HENT:      Head: Normocephalic.   Cardiovascular:      Rate and Rhythm: Normal rate. Rhythm irregular.   Pulmonary:      Effort: Pulmonary effort is normal.      Breath sounds: Normal breath sounds.   Skin:     General: Skin is warm and dry.      Capillary Refill: Capillary refill takes less than 2 seconds.   Neurological:      Mental Status: He is alert and oriented to person, place, and time.   Psychiatric:         Mood and Affect: Mood normal.         Behavior: Behavior normal.      Result Review :          Assessment and Plan    Diagnoses and all orders for this visit:    1. Centrilobular emphysema (Primary)        Asher Ruffin, CRT

## 2025-02-13 ENCOUNTER — TREATMENT (OUTPATIENT)
Dept: CARDIAC REHAB | Facility: HOSPITAL | Age: 85
End: 2025-02-13
Payer: MEDICARE

## 2025-02-13 DIAGNOSIS — J43.2 CENTRILOBULAR EMPHYSEMA: Primary | ICD-10-CM

## 2025-02-13 PROCEDURE — G0239 OTH RESP PROC, GROUP: HCPCS

## 2025-02-18 ENCOUNTER — TREATMENT (OUTPATIENT)
Dept: CARDIAC REHAB | Facility: HOSPITAL | Age: 85
End: 2025-02-18
Payer: MEDICARE

## 2025-02-18 DIAGNOSIS — J43.2 CENTRILOBULAR EMPHYSEMA: Primary | ICD-10-CM

## 2025-02-18 PROCEDURE — G0239 OTH RESP PROC, GROUP: HCPCS

## 2025-02-20 ENCOUNTER — TREATMENT (OUTPATIENT)
Dept: CARDIAC REHAB | Facility: HOSPITAL | Age: 85
End: 2025-02-20
Payer: MEDICARE

## 2025-02-20 DIAGNOSIS — J43.2 CENTRILOBULAR EMPHYSEMA: Primary | ICD-10-CM

## 2025-02-20 PROCEDURE — G0239 OTH RESP PROC, GROUP: HCPCS

## 2025-02-21 ENCOUNTER — TELEPHONE (OUTPATIENT)
Dept: CARDIOLOGY | Facility: CLINIC | Age: 85
End: 2025-02-21
Payer: MEDICARE

## 2025-02-21 NOTE — TELEPHONE ENCOUNTER
Take the furosemide 40 mg daily through Monday, and call and let us know how his weight is on Monday.

## 2025-02-21 NOTE — TELEPHONE ENCOUNTER
SW patient's wife. Went over recommendations. Patient wife verbalized understanding and appreciation.

## 2025-02-21 NOTE — TELEPHONE ENCOUNTER
Patient wife called, left  that patient has experienced a 3 lb weight gain wit SOA. Patient's  wife states she was giving patient a 40 mg lasix.     SW patient and wife. Patient has been steadily gaining about 1 lb daily but last night patient has 3 lb weight gain swelling in ankles and SOA with activity.     Patient wife states he has been doing really well since discharge until the last few days.     Please advise

## 2025-02-24 NOTE — TELEPHONE ENCOUNTER
Patient wife called stating patient took lasix as advise, has shown improvement- SOA has improved and patient has had a 4 lb weight loss.    Please advise

## 2025-02-24 NOTE — TELEPHONE ENCOUNTER
SW patient wife, went over recommendations. SW provider, patient to only take furosemide if patient experiences increase SOA or Swelling to preserve kidney functions. Patient wife verbalized understanding and appreciation.

## 2025-02-25 ENCOUNTER — TREATMENT (OUTPATIENT)
Dept: CARDIAC REHAB | Facility: HOSPITAL | Age: 85
End: 2025-02-25
Payer: MEDICARE

## 2025-02-25 DIAGNOSIS — J43.2 CENTRILOBULAR EMPHYSEMA: Primary | ICD-10-CM

## 2025-02-25 PROCEDURE — G0239 OTH RESP PROC, GROUP: HCPCS

## 2025-02-27 ENCOUNTER — OFFICE VISIT (OUTPATIENT)
Dept: FAMILY MEDICINE CLINIC | Facility: CLINIC | Age: 85
End: 2025-02-27
Payer: MEDICARE

## 2025-02-27 ENCOUNTER — TREATMENT (OUTPATIENT)
Dept: CARDIAC REHAB | Facility: HOSPITAL | Age: 85
End: 2025-02-27
Payer: MEDICARE

## 2025-02-27 VITALS
HEIGHT: 70 IN | SYSTOLIC BLOOD PRESSURE: 117 MMHG | DIASTOLIC BLOOD PRESSURE: 54 MMHG | OXYGEN SATURATION: 96 % | HEART RATE: 72 BPM | WEIGHT: 225.3 LBS | BODY MASS INDEX: 32.25 KG/M2

## 2025-02-27 DIAGNOSIS — H93.8X2 EAR CONGESTION, LEFT: ICD-10-CM

## 2025-02-27 DIAGNOSIS — J43.2 CENTRILOBULAR EMPHYSEMA: Primary | ICD-10-CM

## 2025-02-27 DIAGNOSIS — I50.9 CONGESTIVE HEART FAILURE, UNSPECIFIED HF CHRONICITY, UNSPECIFIED HEART FAILURE TYPE: Primary | ICD-10-CM

## 2025-02-27 PROCEDURE — 3074F SYST BP LT 130 MM HG: CPT | Performed by: STUDENT IN AN ORGANIZED HEALTH CARE EDUCATION/TRAINING PROGRAM

## 2025-02-27 PROCEDURE — 99214 OFFICE O/P EST MOD 30 MIN: CPT | Performed by: STUDENT IN AN ORGANIZED HEALTH CARE EDUCATION/TRAINING PROGRAM

## 2025-02-27 PROCEDURE — 3078F DIAST BP <80 MM HG: CPT | Performed by: STUDENT IN AN ORGANIZED HEALTH CARE EDUCATION/TRAINING PROGRAM

## 2025-02-27 PROCEDURE — 93798 PHYS/QHP OP CAR RHAB W/ECG: CPT

## 2025-02-27 PROCEDURE — 1126F AMNT PAIN NOTED NONE PRSNT: CPT | Performed by: STUDENT IN AN ORGANIZED HEALTH CARE EDUCATION/TRAINING PROGRAM

## 2025-02-27 RX ORDER — AZELASTINE 1 MG/ML
2 SPRAY, METERED NASAL 2 TIMES DAILY
Qty: 30 ML | Refills: 12 | Status: SHIPPED | OUTPATIENT
Start: 2025-02-27

## 2025-02-27 RX ORDER — FUROSEMIDE 40 MG/1
40 TABLET ORAL DAILY PRN
COMMUNITY

## 2025-02-27 RX ORDER — GLUCOSAMINE HCL 500 MG
TABLET ORAL
COMMUNITY

## 2025-02-27 NOTE — PROGRESS NOTES
Subjective:       Layo Cee is a 84 y.o. male with a concurrent medical history of atrial fibrillation status post ablation, coronary artery disease status post bypass, heart failure with reduced ejection fraction secondary to ischemic cardiomyopathy, hypertension, gout, benign prostate hyperplasia, COPD, prediabetes, obesity, iron deficiency anemia and worsening renal insufficiency and past medical history of myocardial infarction who presents for 2-week recheck for heart failure.    I last saw Layo on 2/10/2025 for hospital discharge follow-up.  Please see my visit note from that encounter for further details but in summary, he was admitted with pneumonia that then triggered a cascade of other complications including worsening heart failure.  Creatinine and GFR worsened with diuresis, and Lasix had been stopped by cardiologist.  Additionally, at his last visit he had reported Fatigue, dyspnea, dizziness and lightheadedness.    We had also diagnosed him with iron deficiency anemia and he has been referred to GI.      Chart review demonstrates subsequent to our recent appointment he did develop worsening swelling and took a short course of Lasix.  I have reviewed the documentation from cardiology and it does appear that they are being careful and judicious with the use of this medication due to his declining renal function.    Today, he tells me overall he is improved. Dizziness is improved.      Weight today is 225, a roughly 6 pound weight gain from his 2/10/2025 visit with me.  Oxygenation is reassuring at 96%.    He has been instructed by cardiology to take lasix only PRN for swelling for now.     Next appointment with cardiologist is coming up soon on 3/17/2025.    I have also reviewed the note from his pulmonology visit on 2/12/2025.  He has begun pulmonary rehabilitation but it appears his lungs are overall improved from hospitalization.    Anemia appears to be improving. Hemoglobin improving on  iron - appointment with GI is later this year.     Appointment with Nephrology coming up soon.     Will deescalate to six week follow up.      Past Medical Hx:  Past Medical History:   Diagnosis Date    Acute on chronic diastolic CHF (congestive heart failure) 6/6/2022    Arthritis     Atrial fibrillation, persistent 5/3/2022    Persistent atrial fibrillation status post extensive ablation and pulmonary vein isolation by Dr. Aguilar on 6/3/2022, with reoccurring rapid A. fib, and then successful cardioversion on 7/1/2022    BPH (benign prostatic hyperplasia)     CHF (congestive heart failure)     COPD (chronic obstructive pulmonary disease)     Coronary artery disease of bypass graft of native heart with stable angina pectoris     (1) Coronary artery disease, s/p CABG in the past. Cardiac catheterization in July 2016 showed patentLIMA graft to the LAD and occluded SVGs. Native LAD is 100% occluded in the mid portion. Native LCx has no significant disease. Native RCA is 100% occluded and it is a . Repeat cath in June, 2021, underwent stent placement to diagnonal branch.    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 Lauren Valencia        Past Surgical Hx:  Past Surgical History:   Procedure Laterality Date    BLADDER SURGERY      CARDIAC CATHETERIZATION  07/2016    CARDIAC CATHETERIZATION N/A 8/9/2021    Procedure: Left Heart Cath with possible angioplasty;  Surgeon: Jack Ladd MD;  Location: [facility] CATH INVASIVE LOCATION;  Service:  Cardiovascular;  Laterality: N/A;  Please schedule the procedure with Dr. Jack Ladd MD on 8/9/2021    CARDIAC ELECTROPHYSIOLOGY PROCEDURE N/A 6/3/2022    Procedure: Ablation atrial fibrillation;  Surgeon: Irineo Aguilar MD;  Location:  [facility] CATH INVASIVE LOCATION;  Service: Cardiovascular;  Laterality: N/A;    CARDIAC ELECTROPHYSIOLOGY PROCEDURE N/A 6/3/2022    Procedure: Ablation atrial flutter/CTI;  Surgeon: Irineo Aguilar MD;  Location:  [facility] CATH INVASIVE LOCATION;  Service: Cardiovascular;  Laterality: N/A;    CARDIAC SURGERY      HEART BYPASS    COLONOSCOPY  2011    CORONARY ARTERY BYPASS GRAFT      CYSTOSCOPY  03/19/2019    WITH BILATERAL RETROGRADE PYELOGRAPHY    ELECTRICAL CARDIOVERSION  5/12/2022         ENDOSCOPY  2016    PROSTATE SURGERY         Current Meds:    Current Outpatient Medications:     acetaminophen (TYLENOL) 325 MG tablet, Take 2 tablets by mouth As Needed for Mild Pain., Disp: , Rfl:     allopurinol 200 MG tablet, Take 200 mg by mouth Daily. (Patient taking differently: Take 100 mg by mouth Every Morning.), Disp: 90 tablet, Rfl: 2    amiodarone (PACERONE) 200 MG tablet, Take 1 tablet by mouth Every Other Day. (Patient taking differently: Take 0.5 tablets by mouth Every Other Day.), Disp: , Rfl:     aspirin (aspirin) 81 MG EC tablet, Take 1 tablet by mouth Daily., Disp: 30 tablet, Rfl: 1    calcium carbonate (OS-LIANNA) 600 MG tablet, Take 1 tablet by mouth Every Night., Disp: , Rfl:     Coenzyme Q10 100 MG tablet, Take 1 tablet by mouth Daily., Disp: , Rfl:     Cranberry 400 MG tablet, Take  by mouth., Disp: , Rfl:     Diclofenac Sodium (VOLTAREN) 1 % gel gel, Apply 2 g topically to the appropriate area as directed 4 (Four) Times a Day., Disp: , Rfl:     finasteride (PROSCAR) 5 MG tablet, Take 1 tablet by mouth Daily., Disp: 90 tablet, Rfl: 4    fluticasone (FLONASE) 50 MCG/ACT nasal spray, Administer 1 spray into the nostril(s) as directed by provider Every Night., Disp: ,  Rfl:     Fluticasone-Umeclidin-Vilant (Trelegy Ellipta) 100-62.5-25 MCG/ACT inhaler, Inhale 1 puff Daily., Disp: 1 each, Rfl: 11    furosemide (LASIX) 40 MG tablet, Take 1 tablet by mouth Daily As Needed., Disp: , Rfl:     ipratropium-albuterol (DUO-NEB) 0.5-2.5 mg/3 ml nebulizer, Take 3 mL by nebulization 4 (Four) Times a Day As Needed for Wheezing., Disp: 360 mL, Rfl: 3    isosorbide mononitrate (IMDUR) 30 MG 24 hr tablet, Take 1 tablet by mouth daily. (Patient taking differently: Take 0.5 tablets by mouth Daily.), Disp: 90 tablet, Rfl: 3    Livalo 1 MG tablet tablet, Take 1 tablet by mouth every night., Disp: 90 tablet, Rfl: 3    metoprolol succinate XL (TOPROL-XL) 25 MG 24 hr tablet, Take 1 tablet by mouth Every 12 (Twelve) Hours., Disp: 90 tablet, Rfl: 3    montelukast (SINGULAIR) 10 MG tablet, Take 1 tablet by mouth Every Night., Disp: 90 tablet, Rfl: 3    nitroglycerin (NITROSTAT) 0.4 MG SL tablet, Place 1 tablet under the tongue Every 5 (Five) Minutes As Needed for Chest Pain for up to 30 days. Take no more than 3 doses in 15 minutes., Disp: 30 tablet, Rfl: 0    NON FORMULARY, Uqra, Disp: , Rfl:     pantoprazole (PROTONIX) 40 MG EC tablet, Take 1 tablet by mouth Daily., Disp: 90 tablet, Rfl: 2    rivaroxaban (Xarelto) 15 MG tablet, Take 1 tablet by mouth daily with dinner., Disp: 30 tablet, Rfl: 11    sacubitril-valsartan (Entresto) 24-26 MG tablet, Take 1 tablet by mouth 2 (Two) Times a Day., Disp: 180 tablet, Rfl: 3    tamsulosin (FLOMAX) 0.4 MG capsule 24 hr capsule, Take 1 capsule by mouth Daily., Disp: 90 capsule, Rfl: 4    VITAMIN B COMPLEX-C PO, Take 1 tablet by mouth Daily., Disp: , Rfl:     azelastine (ASTELIN) 0.1 % nasal spray, Administer 2 sprays into the nostril(s) as directed by provider 2 (Two) Times a Day. Use in each nostril as directed, Disp: 30 mL, Rfl: 12    Allergies:  Allergies   Allergen Reactions    Carvedilol Swelling and Anaphylaxis    Levaquin [Levofloxacin] Unknown - Low Severity  "      Family Hx:  Family History   Problem Relation Age of Onset    Hypertension Mother     Heart disease Father     Other Brother         GASTROINTESTINAL CANCER    Kidney cancer Brother         Social History:  Social History     Socioeconomic History    Marital status:    Tobacco Use    Smoking status: Former     Current packs/day: 0.00     Average packs/day: 0.5 packs/day for 40.0 years (20.0 ttl pk-yrs)     Types: Cigarettes     Start date:      Quit date:      Years since quittin.1     Passive exposure: Past    Smokeless tobacco: Never   Vaping Use    Vaping status: Never Used   Substance and Sexual Activity    Alcohol use: Not Currently    Drug use: Never    Sexual activity: Defer       Review of Systems  Review of Systems   Respiratory:  Positive for shortness of breath (improved).    Cardiovascular:  Positive for leg swelling (improved).   Neurological:  Positive for dizziness (improved).       Objective:     /54 (BP Location: Left arm, Patient Position: Sitting, Cuff Size: Large Adult)   Pulse 72   Ht 177.8 cm (70\")   Wt 102 kg (225 lb 4.8 oz)   SpO2 96%   BMI 32.33 kg/m²   Physical Exam  Constitutional:       General: He is not in acute distress.     Appearance: He is obese. He is not ill-appearing, toxic-appearing or diaphoretic.   Pulmonary:      Effort: Pulmonary effort is normal. No respiratory distress.      Breath sounds: Normal breath sounds. No stridor. No wheezing, rhonchi or rales.   Musculoskeletal:      Right lower leg: Edema (trace) present.      Left lower leg: Edema (1+) present.   Neurological:      Mental Status: He is alert.   Psychiatric:         Mood and Affect: Mood normal.         Behavior: Behavior normal.          Assessment/Plan:     Diagnoses and all orders for this visit:    1. Congestive heart failure, unspecified HF chronicity, unspecified heart failure type (Primary)    I last saw Layo on 2/10/2025 for hospital discharge follow-up.  Please see " my visit note from that encounter for further details but in summary, he was admitted with pneumonia that then triggered a cascade of other complications including worsening heart failure.  Creatinine and GFR worsened with diuresis, and Lasix had been stopped by cardiologist.  Additionally, at his last visit he had reported Fatigue, dyspnea, dizziness and lightheadedness.    We had also diagnosed him with iron deficiency anemia and he has been referred to GI.      Chart review demonstrates subsequent to our recent appointment he did develop worsening swelling and took a short course of Lasix.  I have reviewed the documentation from cardiology and it does appear that they are being careful and judicious with the use of this medication due to his declining renal function.    Today, he tells me overall he is improved. Dizziness is improved.      Weight today is 225, a roughly 6 pound weight gain from his 2/10/2025 visit with me.  Oxygenation is reassuring at 96%.    He has been instructed by cardiology to take lasix only PRN for swelling for now.     Next appointment with cardiologist is coming up soon on 3/17/2025.    I have also reviewed the note from his pulmonology visit on 2/12/2025.  He has begun pulmonary rehabilitation but it appears his lungs are overall improved from hospitalization.    Anemia appears to be improving. Hemoglobin improving on iron - appointment with GI is later this year.     Appointment with Nephrology coming up soon.     Will deescalate to six week follow up.    2. Ear congestion, left    He has noticed itchiness of the left year. Fluid behind tympanic membrane. Will trial short course of astelin.       -     azelastine (ASTELIN) 0.1 % nasal spray; Administer 2 sprays into the nostril(s) as directed by provider 2 (Two) Times a Day. Use in each nostril as directed  Dispense: 30 mL; Refill: 12        Follow-up:     Return in about 6 weeks (around 4/10/2025) for Coordination of care  .    Preventative:  Health Maintenance   Topic Date Due    ANNUAL WELLNESS VISIT  01/17/2025    BMI FOLLOWUP  02/26/2025    RSV Vaccine - Adults (1 - 1-dose 75+ series) 03/18/2025 (Originally 3/17/2017)    COVID-19 Vaccine (2 - 2024-25 season) 04/18/2025 (Originally 9/1/2024)    LIPID PANEL  09/16/2025    TDAP/TD VACCINES (2 - Tdap) 01/09/2033    INFLUENZA VACCINE  Completed    Pneumococcal Vaccine 50+  Completed    ZOSTER VACCINE  Completed       This document has been electronically signed by Tevin Zavala MD on February 27, 2025 12:10 EST       Parts of this note are electronic transcriptions/translations of spoken language to printed text using the Dragon Dictation system.

## 2025-03-04 ENCOUNTER — TREATMENT (OUTPATIENT)
Dept: CARDIAC REHAB | Facility: HOSPITAL | Age: 85
End: 2025-03-04
Payer: MEDICARE

## 2025-03-04 DIAGNOSIS — J43.2 CENTRILOBULAR EMPHYSEMA: Primary | ICD-10-CM

## 2025-03-04 PROCEDURE — G0239 OTH RESP PROC, GROUP: HCPCS

## 2025-03-06 ENCOUNTER — TREATMENT (OUTPATIENT)
Dept: CARDIAC REHAB | Facility: HOSPITAL | Age: 85
End: 2025-03-06
Payer: MEDICARE

## 2025-03-06 DIAGNOSIS — J43.2 CENTRILOBULAR EMPHYSEMA: Primary | ICD-10-CM

## 2025-03-06 PROCEDURE — G0239 OTH RESP PROC, GROUP: HCPCS

## 2025-03-11 ENCOUNTER — TELEPHONE (OUTPATIENT)
Dept: CARDIOLOGY | Facility: CLINIC | Age: 85
End: 2025-03-11
Payer: MEDICARE

## 2025-03-11 ENCOUNTER — TREATMENT (OUTPATIENT)
Dept: CARDIAC REHAB | Facility: HOSPITAL | Age: 85
End: 2025-03-11
Payer: MEDICARE

## 2025-03-11 DIAGNOSIS — I48.19 ATRIAL FIBRILLATION, PERSISTENT: ICD-10-CM

## 2025-03-11 DIAGNOSIS — J43.2 CENTRILOBULAR EMPHYSEMA: Primary | ICD-10-CM

## 2025-03-11 PROCEDURE — G0239 OTH RESP PROC, GROUP: HCPCS

## 2025-03-11 NOTE — TELEPHONE ENCOUNTER
[facility] received a fax that requires your attention. The document has been indexed to the patient’s chart for your review.      Reason for sending: EXTERNAL MEDICAL RECORD NOTIFICATION     Documents Description: [facility]TO RE[facility]LL[facility]-3.10.25     Name of Sender: [facility]     Date [facility]ed: [facility]

## 2025-03-13 ENCOUNTER — APPOINTMENT (OUTPATIENT)
Dept: CARDIAC REHAB | Facility: HOSPITAL | Age: 85
End: 2025-03-13
Payer: MEDICARE

## 2025-03-13 ENCOUNTER — TREATMENT (OUTPATIENT)
Dept: CARDIAC REHAB | Facility: HOSPITAL | Age: 85
End: 2025-03-13
Payer: MEDICARE

## 2025-03-13 ENCOUNTER — OFFICE VISIT (OUTPATIENT)
Dept: UROLOGY | Age: 85
End: 2025-03-13
Payer: MEDICARE

## 2025-03-13 VITALS — BODY MASS INDEX: 32.19 KG/M2 | RESPIRATION RATE: 16 BRPM | WEIGHT: 224.87 LBS | HEIGHT: 70 IN

## 2025-03-13 DIAGNOSIS — N39.0 RECURRENT UTI: ICD-10-CM

## 2025-03-13 DIAGNOSIS — N40.1 BENIGN PROSTATIC HYPERPLASIA WITH LOWER URINARY TRACT SYMPTOMS, SYMPTOM DETAILS UNSPECIFIED: Primary | ICD-10-CM

## 2025-03-13 DIAGNOSIS — J43.2 CENTRILOBULAR EMPHYSEMA: Primary | ICD-10-CM

## 2025-03-13 LAB
BILIRUB BLD-MCNC: NEGATIVE MG/DL
CLARITY, POC: CLEAR
COLOR UR: YELLOW
EXPIRATION DATE: ABNORMAL
GLUCOSE UR STRIP-MCNC: NEGATIVE MG/DL
KETONES UR QL: NEGATIVE
LEUKOCYTE EST, POC: NEGATIVE
Lab: ABNORMAL
NITRITE UR-MCNC: NEGATIVE MG/ML
PH UR: 5.5 [PH] (ref 5–8)
PROT UR STRIP-MCNC: ABNORMAL MG/DL
RBC # UR STRIP: NEGATIVE /UL
SP GR UR: 1.02 (ref 1–1.03)
URINE VOLUME: 0
UROBILINOGEN UR QL: ABNORMAL

## 2025-03-13 PROCEDURE — G0239 OTH RESP PROC, GROUP: HCPCS

## 2025-03-13 RX ORDER — PREDNISONE 10 MG/1
TABLET ORAL
COMMUNITY
Start: 2025-03-10

## 2025-03-13 RX ORDER — SULFAMETHOXAZOLE AND TRIMETHOPRIM 800; 160 MG/1; MG/1
TABLET ORAL
Qty: 6 TABLET | Refills: 0 | Status: SHIPPED | OUTPATIENT
Start: 2025-03-13

## 2025-03-13 RX ORDER — DOXYCYCLINE 100 MG/1
100 CAPSULE ORAL DAILY
COMMUNITY
Start: 2025-03-10 | End: 2025-03-18

## 2025-03-13 RX ORDER — BENZONATATE 100 MG/1
100 CAPSULE ORAL 2 TIMES DAILY PRN
COMMUNITY
Start: 2025-03-10 | End: 2025-03-18

## 2025-03-13 NOTE — PROGRESS NOTES
"Chief Complaint: Benign Prostatic Hypertrophy    Subjective         Benign Prostatic Hypertrophy      Layo Cee is a 84 y.o. male presents to [facility] to be seen for f/u BPH.    At last visit recommended to continue tamsulosin as well as finasteride and a PCR culture.    His PCR culture was subsequently positive for a urinary tract infection and he was treated with Bactrim for 10 days.    He states he is still having burning with urinating.     States this never improved.     He continues to drink coffee x 4 cups and green/ black tea x 2 glasses daily. Drinks no water. Has cut down on mountain dew.     He states some straining at times.     decent stream.     Nocturia x 0-1    Denies leakage.        Previous:  The patient called the office about a month after we last saw him with complaints of bladder pain and blood in the urine he apparently was seen in urgent care and he was given an antibiotic.    His urine culture results from [facility] revealed 8000 colonies per milliliter of Citrobacter koseri however no sensitivity result was sent given the low colony count.    At last visit we recommended to continue tamsulosin 0.4 mg daily as well as finasteride.    Urine today is revealing small blood small leukocyte esterase and his PVR is 0.    He is voiding 5-6 x a day.     Drinking nearly a pot of coffee a day.     He drinks a mountian dew a day as well.    He is drinking tea as well.     He states he is with straining and urgency.    He states he continues to have burning every time he voids.          Previous:    At last visit we had him tamsulosin 0.8mg q day  and finasteride 5mg q day.    He states the tamsulosin made him more dizzy.     He went back to 0.4  mq day of tamsulosin.     He was seen on 10/18/24 at urgent care in [place] for uti symtpms \"Patient presents here with dysuria, frequency, urgency, hesitancy, denies any blood in urine. Denies scrotal pain " "or swelling and no back/flank pain. Symptoms started yesterday afternoon. He has a recurrent history of prostatitis and UTI's. He sees a urologist. Has taken a cranberry pill and tried cranberry juice for symptoms, but seen no relief.\"    He was given bactrim ds for 10 days.     Subsequent cx returned positive for citrobacter koseri 67589 cfu/ ml pan sensitive.    He was on atbx for 5 days and then began having gross hematuria.     He was having clots in the urine and dark red blood in the urine.    He states that he had issues with retention and then ended up being able to go freely after 3-4 x trying to go.    He states he is still having issues with soreness and straining.    This is his first uti and gross hematuria episode in some time.             Previous:   to see if this would help his stream.     Stream is better.     Not straining.      Nocturia x 0.      No urgency or frequency denies straining.        Previous:   Patient's been having some more trouble with shortness of air last few days.  He is going to call cardiology today.      history of a CABG with atrial fibrillation.   7/21 coronary stent  aspirin 81.     No  GH      hydrocele on the left side.  About the same.     PVR     10/22  49     Previous     1/19 scrotal ultrasound-large left hydrocele, normal testicles, no testicular torsion.     Hematuria last year     10/20 cystoscopy-4 cm prostate with a TUR defect.  Some regrowth on the right lateral side about a centimeter above the verumontanum.  Still pretty open.  Mild trabeculations throughout.  Negative otherwise  9/20 CT urogram-bladder wall thickening along the base and right lateral wall.  Likely related to prostate.  Bilateral simple renal cyst.  Negative otherwise    non-smoker     History of TURP around 2015, Dr. Shi    Patient was admitted in 2019 with gross hematuria and clot retention and was on CBI for 48 hours.    PSA 11/2019 1.29        Objective     Past Medical History: "   Diagnosis Date    Acute on chronic diastolic CHF (congestive heart failure) 6/6/2022    Arthritis     Atrial fibrillation, persistent 5/3/2022    Persistent atrial fibrillation status post extensive ablation and pulmonary vein isolation by Dr. Aguilar on 6/3/2022, with reoccurring rapid A. fib, and then successful cardioversion on 7/1/2022    BPH (benign prostatic hyperplasia)     CHF (congestive heart failure)     COPD (chronic obstructive pulmonary disease)     Coronary artery disease of bypass graft of native heart with stable angina pectoris     (1) Coronary artery disease, s/p CABG in the past. Cardiac catheterization in July 2016 showed patentLIMA graft to the LAD and occluded SVGs. Native LAD is 100% occluded in the mid portion. Native LCx has no significant disease. Native RCA is 100% occluded and it is a . Repeat cath in June, 2021, underwent stent placement to diagnonal branch.    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 Lauren Valencia        Past Surgical History:   Procedure Laterality Date    BLADDER SURGERY      CARDIAC CATHETERIZATION  07/2016    CARDIAC CATHETERIZATION N/A 8/9/2021    Procedure: Left Heart Cath with possible angioplasty;  Surgeon: Jack Ladd MD;  Location: [facility] INVASIVE LOCATION;  Service: Cardiovascular;  Laterality: N/A;  Please schedule the procedure with Dr. Jack Ladd MD on 8/9/2021    CARDIAC ELECTROPHYSIOLOGY PROCEDURE N/A 6/3/2022    Procedure: Ablation atrial fibrillation;  Surgeon:  Irineo Aguilar MD;  Location: [facility] CATH INVASIVE LOCATION;  Service: Cardiovascular;  Laterality: N/A;    CARDIAC ELECTROPHYSIOLOGY PROCEDURE N/A 6/3/2022    Procedure: Ablation atrial flutter/CTI;  Surgeon: Irineo Aguilar MD;  Location: [facility] CATH INVASIVE LOCATION;  Service: Cardiovascular;  Laterality: N/A;    CARDIAC SURGERY      HEART BYPASS    COLONOSCOPY  2011    CORONARY ARTERY BYPASS GRAFT      CYSTOSCOPY  03/19/2019    WITH BILATERAL RETROGRADE PYELOGRAPHY    ELECTRICAL CARDIOVERSION  5/12/2022         ENDOSCOPY  2016    PROSTATE SURGERY           Current Outpatient Medications:     acetaminophen (TYLENOL) 325 MG tablet, Take 2 tablets by mouth As Needed for Mild Pain., Disp: , Rfl:     allopurinol 200 MG tablet, Take 200 mg by mouth Daily. (Patient taking differently: Take 100 mg by mouth Every Morning.), Disp: 90 tablet, Rfl: 2    amiodarone (PACERONE) 200 MG tablet, Take 1 tablet by mouth Every Other Day. (Patient taking differently: Take 0.5 tablets by mouth Every Other Day.), Disp: , Rfl:     aspirin (aspirin) 81 MG EC tablet, Take 1 tablet by mouth Daily., Disp: 30 tablet, Rfl: 1    azelastine (ASTELIN) 0.1 % nasal spray, Administer 2 sprays into the nostril(s) as directed by provider 2 (Two) Times a Day. Use in each nostril as directed, Disp: 30 mL, Rfl: 12    benzonatate (TESSALON) 100 MG capsule, Take 1 capsule by mouth 2 (Two) Times a Day As Needed., Disp: , Rfl:     calcium carbonate (OS-LIANNA) 600 MG tablet, Take 1 tablet by mouth Every Night., Disp: , Rfl:     Coenzyme Q10 100 MG tablet, Take 1 tablet by mouth Daily., Disp: , Rfl:     Cranberry 400 MG tablet, Take  by mouth., Disp: , Rfl:     Diclofenac Sodium (VOLTAREN) 1 % gel gel, Apply 2 g topically to the appropriate area as directed 4 (Four) Times a Day., Disp: , Rfl:     doxycycline (VIBRAMYCIN) 100 MG capsule, Take 1 capsule by mouth Daily., Disp: , Rfl:     finasteride (PROSCAR) 5 MG tablet, Take 1 tablet by mouth Daily.,  Disp: 90 tablet, Rfl: 4    Fluticasone-Umeclidin-Vilant (Trelegy Ellipta) 100-62.5-25 MCG/ACT inhaler, Inhale 1 puff Daily., Disp: 1 each, Rfl: 11    furosemide (LASIX) 40 MG tablet, Take 1 tablet by mouth Daily As Needed., Disp: , Rfl:     ipratropium-albuterol (DUO-NEB) 0.5-2.5 mg/3 ml nebulizer, Take 3 mL by nebulization 4 (Four) Times a Day As Needed for Wheezing., Disp: 360 mL, Rfl: 3    isosorbide mononitrate (IMDUR) 30 MG 24 hr tablet, Take 1 tablet by mouth daily. (Patient taking differently: Take 0.5 tablets by mouth Daily.), Disp: 90 tablet, Rfl: 3    Livalo 1 MG tablet tablet, Take 1 tablet by mouth every night., Disp: 90 tablet, Rfl: 3    metoprolol succinate XL (TOPROL-XL) 25 MG 24 hr tablet, Take 1 tablet by mouth Every 12 (Twelve) Hours., Disp: 90 tablet, Rfl: 3    montelukast (SINGULAIR) 10 MG tablet, Take 1 tablet by mouth Every Night., Disp: 90 tablet, Rfl: 3    NON FORMULARY, Uqra, Disp: , Rfl:     pantoprazole (PROTONIX) 40 MG EC tablet, Take 1 tablet by mouth Daily., Disp: 90 tablet, Rfl: 2    predniSONE (DELTASONE) 10 MG (21) dose pack, , Disp: , Rfl:     rivaroxaban (Xarelto) 15 MG tablet, Take 1 tablet by mouth Daily With Dinner., Disp: 90 tablet, Rfl: 1    sacubitril-valsartan (Entresto) 24-26 MG tablet, Take 1 tablet by mouth 2 (Two) Times a Day., Disp: 180 tablet, Rfl: 3    tamsulosin (FLOMAX) 0.4 MG capsule 24 hr capsule, Take 1 capsule by mouth Daily., Disp: 90 capsule, Rfl: 4    VITAMIN B COMPLEX-C PO, Take 1 tablet by mouth Daily., Disp: , Rfl:     fluticasone (FLONASE) 50 MCG/ACT nasal spray, Administer 1 spray into the nostril(s) as directed by provider Every Night., Disp: , Rfl:     nitroglycerin (NITROSTAT) 0.4 MG SL tablet, Place 1 tablet under the tongue Every 5 (Five) Minutes As Needed for Chest Pain for up to 30 days. Take no more than 3 doses in 15 minutes., Disp: 30 tablet, Rfl: 0    sulfamethoxazole-trimethoprim (Bactrim DS) 800-160 MG per tablet, 1 tablet by mouth twice a  "day starting the day before cystoscopy, Disp: 6 tablet, Rfl: 0    Allergies   Allergen Reactions    Carvedilol Swelling and Anaphylaxis    Levaquin [Levofloxacin] Unknown - Low Severity        Family History   Problem Relation Age of Onset    Hypertension Mother     Heart disease Father     Other Brother         GASTROINTESTINAL CANCER    Kidney cancer Brother        Social History     Socioeconomic History    Marital status:    Tobacco Use    Smoking status: Former     Current packs/day: 0.00     Average packs/day: 0.5 packs/day for 40.0 years (20.0 ttl pk-yrs)     Types: Cigarettes     Start date:      Quit date:      Years since quittin.2     Passive exposure: Past    Smokeless tobacco: Never   Vaping Use    Vaping status: Never Used   Substance and Sexual Activity    Alcohol use: Not Currently    Drug use: Never    Sexual activity: Defer       Vital Signs:   Resp 16   Ht 177.8 cm (70\")   Wt 102 kg (224 lb 13.9 oz)   BMI 32.27 kg/m²      Physical Exam     Result Review :   The following data was reviewed by: ARACELI Decker on 2024:  Results for orders placed or performed in visit on 25   Bladder Scan    Collection Time: 25  1:15 PM   Result Value Ref Range    Urine Volume 0    POC Urinalysis Dipstick, Automated    Collection Time: 25  1:51 PM    Specimen: Urine   Result Value Ref Range    Color Yellow Yellow, Straw, Dark Yellow, Cely    Clarity, UA Clear Clear    Specific Gravity  1.025 1.005 - 1.030    pH, Urine 5.5 5.0 - 8.0    Leukocytes Negative Negative    Nitrite, UA Negative Negative    Protein, POC Trace (A) Negative mg/dL    Glucose, UA Negative Negative mg/dL    Ketones, UA Negative Negative    Urobilinogen, UA 0.2 E.U./dL Normal, 0.2 E.U./dL    Bilirubin Negative Negative    Blood, UA Negative Negative    Lot Number [id number]     Expiration Date            Bladder Scan interpretation 2025    Estimation of residual urine via BVI 3000 " Cee Bladder Scan  MA/nurse performing: nic martinez Ma   Residual Urine: 0 ml  Indication: Benign prostatic hyperplasia with lower urinary tract symptoms, symptom details unspecified    Recurrent UTI   Position: Supine  Examination: Incremental scanning of the suprapubic area using 2.0 MHz transducer using copious amounts of acoustic gel.   Findings: An anechoic area was demonstrated which represented the bladder, with measurement of residual urine as noted. I inspected this myself. In that the residual urine was stable or insignificant, refer to plan for treatment and plan necessary at this time.          Procedures        Assessment and Plan    Diagnoses and all orders for this visit:    1. Benign prostatic hyperplasia with lower urinary tract symptoms, symptom details unspecified (Primary)  -     Bladder Scan  -     POC Urinalysis Dipstick, Automated  -     Cystoscopy; Future    2. Recurrent UTI  -     sulfamethoxazole-trimethoprim (Bactrim DS) 800-160 MG per tablet; 1 tablet by mouth twice a day starting the day before cystoscopy  Dispense: 6 tablet; Refill: 0      He will continue tamsulosin 0.4mg q day.  Continue finasteride 5mg daily.    Urine today not concerning for UTI.    Given persistent intermittent burning with urination we will go ahead and proceed with a cystoscopy to evaluate lower urinary tract for any type of obstruction/stricture.      I spent 10 minutes caring for Layo on this date of service. This time includes time spent by me in the following activities:reviewing tests, obtaining and/or reviewing a separately obtained history, performing a medically appropriate examination and/or evaluation , counseling and educating the patient/family/caregiver, ordering medications, tests, or procedures, and documenting information in the medical record  Follow Up   Return for With Dr. Linares For Cystoscopy.  Patient was given instructions and counseling regarding his condition or for health  maintenance advice. Please see specific information pulled into the AVS if appropriate.         This document has been electronically signed by ARACELI Decker  March 13, 2025 13:59 EDT

## 2025-03-17 ENCOUNTER — OFFICE VISIT (OUTPATIENT)
Dept: CARDIOLOGY | Facility: CLINIC | Age: 85
End: 2025-03-17
Payer: MEDICARE

## 2025-03-17 VITALS
DIASTOLIC BLOOD PRESSURE: 85 MMHG | HEART RATE: 87 BPM | HEIGHT: 70 IN | WEIGHT: 222 LBS | BODY MASS INDEX: 31.78 KG/M2 | SYSTOLIC BLOOD PRESSURE: 123 MMHG

## 2025-03-17 DIAGNOSIS — I10 ESSENTIAL HYPERTENSION: ICD-10-CM

## 2025-03-17 DIAGNOSIS — I48.19 ATRIAL FIBRILLATION, PERSISTENT: ICD-10-CM

## 2025-03-17 DIAGNOSIS — I25.5 ISCHEMIC CARDIOMYOPATHY: Primary | ICD-10-CM

## 2025-03-17 RX ORDER — TORSEMIDE 20 MG/1
20 TABLET ORAL EVERY OTHER DAY
Qty: 45 TABLET | Refills: 1 | Status: SHIPPED | OUTPATIENT
Start: 2025-03-17

## 2025-03-18 ENCOUNTER — APPOINTMENT (OUTPATIENT)
Dept: CARDIAC REHAB | Facility: HOSPITAL | Age: 85
End: 2025-03-18
Payer: MEDICARE

## 2025-03-18 ENCOUNTER — TREATMENT (OUTPATIENT)
Dept: CARDIAC REHAB | Facility: HOSPITAL | Age: 85
End: 2025-03-18
Payer: MEDICARE

## 2025-03-18 DIAGNOSIS — J43.2 CENTRILOBULAR EMPHYSEMA: Primary | ICD-10-CM

## 2025-03-18 PROCEDURE — G0239 OTH RESP PROC, GROUP: HCPCS

## 2025-03-20 ENCOUNTER — TREATMENT (OUTPATIENT)
Dept: CARDIAC REHAB | Facility: HOSPITAL | Age: 85
End: 2025-03-20
Payer: MEDICARE

## 2025-03-20 ENCOUNTER — APPOINTMENT (OUTPATIENT)
Dept: CARDIAC REHAB | Facility: HOSPITAL | Age: 85
End: 2025-03-20
Payer: MEDICARE

## 2025-03-20 DIAGNOSIS — J43.2 CENTRILOBULAR EMPHYSEMA: Primary | ICD-10-CM

## 2025-03-20 PROCEDURE — G0239 OTH RESP PROC, GROUP: HCPCS

## 2025-03-23 NOTE — ASSESSMENT & PLAN NOTE
Most recent ejection fraction is 45 to 50%.  He is reporting 14 shortness of breath and swelling when off diuretics.  Of note, he was on torsemide in the past which was discontinued after he lost significant amount of weight.  Options were discussed.  Will restart torsemide at a dose of 20 mg every other day.  Continue Entresto and metoprolol.  Complete metabolic panel will be done today.

## 2025-03-23 NOTE — ASSESSMENT & PLAN NOTE
He is in regular rhythm, denies any palpitations.  Continue metoprolol for rate and rhythm management.  Continue Xarelto for anticoagulation.  Checking CBC today.

## 2025-03-23 NOTE — ASSESSMENT & PLAN NOTE
Pressure very well-controlled.  Antihypertensive medication regimen has been trimmed down recently after he lost weight.  Metoprolol and lower dose of Entresto will be continued.

## 2025-03-23 NOTE — PROGRESS NOTES
CARDIOLOGY FOLLOW-UP PROGRESS NOTE        Chief Complaint  Follow-up, Coronary Artery Disease, and Atrial Fibrillation    Subjective            Layo Cee presents to [facility] CARDIOLOGY  History of Present Illness      Mr. Cee is here for a follow-up visit.  He was last seen in the office on 2/6/2025 as a follow-up from the hospital stay.  He reports feeling much better at this time.  He still has occasional shortness of breath.  He had episodes of pedal edema recently and started taking torsemide again.  Of note, he was previously on regular torsemide 10 mg daily along with various antihypertensive medications.  Once he lost significant amount of weight, diuretics were discontinued and we also cut down on various antihypertensive medications.  He had admission to the hospital in late January because of respiratory failure, pneumonia and also fluid overload.  Since then, he noticed intermittent swelling increased shortness of breath if he does not take diuretics.    He has not had any chest pain.  He continues to report dizziness and some weakness.  Shortness of breath is stable and at his baseline.      Past History:    (1) Coronary artery disease, status post coronary artery bypass grafting in the past. Cardiac catheterization done in July 2016 showed patent left internal mammary graft to the LAD and occluded saphenous venous graft. Native LAD is 100% occluded in the mid portion. Native circumflex artery has no significant disease. Native right coronary artery is also 100% occluded and it is a chronic total occlusion. The right coronary artery is reconstituted with collaterals from the left side.  Patient underwent repeat cardiac catheterization in June, 2021 and underwent angioplasty stent placement to diagonal branch.       (2) Ischemic cardiomyopathy with recovery of cardiac function.  (3) Chronic kidney disease, stage 3.   (4) Chronic obstructive pulmonary disease  (5)  Hypertension. (6) Hyperlipidemia.   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   (8) Typical atrial flutter status post radiofrequency ablation by Dr. Aguilar on 11/30/2018   (9) Persistent atrial fibrillation status post extensive ablation and pulmonary vein isolation by Dr. Aguilar on 6/3/2022    Medical History:  Past Medical History:   Diagnosis Date    Acute on chronic diastolic CHF (congestive heart failure) 6/6/2022    Arthritis     Atrial fibrillation, persistent 5/3/2022    Persistent atrial fibrillation status post extensive ablation and pulmonary vein isolation by Dr. Aguilar on 6/3/2022, with reoccurring rapid A. fib, and then successful cardioversion on 7/1/2022    BPH (benign prostatic hyperplasia)     CHF (congestive heart failure)     COPD (chronic obstructive pulmonary disease)     Coronary artery disease of bypass graft of native heart with stable angina pectoris     (1) Coronary artery disease, s/p CABG in the past. Cardiac catheterization in July 2016 showed patentLIMA graft to the LAD and occluded SVGs. Native LAD is 100% occluded in the mid portion. Native LCx has no significant disease. Native RCA is 100% occluded and it is a . Repeat cath in June, 2021, underwent stent placement to diagnonal branch.    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 Lauren Valencia        Family History: family history includes Heart disease in his father; Hypertension in his mother;  Kidney cancer in his brother; Other in his brother.     Social History: reports that he quit smoking about 27 years ago. His smoking use included cigarettes. He started smoking about 67 years ago. He has a 20 pack-year smoking history. He has been exposed to tobacco smoke. He has never used smokeless tobacco. He reports that he does not currently use alcohol. He reports that he does not use drugs.    Allergies: Carvedilol and Levaquin [levofloxacin]    Current Outpatient Medications on File Prior to Visit   Medication Sig    acetaminophen (TYLENOL) 325 MG tablet Take 2 tablets by mouth As Needed for Mild Pain.    allopurinol 200 MG tablet Take 200 mg by mouth Daily. (Patient taking differently: Take 100 mg by mouth Every Morning.)    amiodarone (PACERONE) 200 MG tablet Take 1 tablet by mouth Every Other Day. (Patient taking differently: Take 0.5 tablets by mouth Every Other Day.)    aspirin (aspirin) 81 MG EC tablet Take 1 tablet by mouth Daily.    azelastine (ASTELIN) 0.1 % nasal spray Administer 2 sprays into the nostril(s) as directed by provider 2 (Two) Times a Day. Use in each nostril as directed    calcium carbonate (OS-LIANNA) 600 MG tablet Take 1 tablet by mouth Every Night.    Coenzyme Q10 100 MG tablet Take 1 tablet by mouth Daily.    Cranberry 400 MG tablet Take  by mouth.    Diclofenac Sodium (VOLTAREN) 1 % gel gel Apply 2 g topically to the appropriate area as directed 4 (Four) Times a Day.    finasteride (PROSCAR) 5 MG tablet Take 1 tablet by mouth Daily.    Fluticasone-Umeclidin-Vilant (Trelegy Ellipta) 100-62.5-25 MCG/ACT inhaler Inhale 1 puff Daily.    ipratropium-albuterol (DUO-NEB) 0.5-2.5 mg/3 ml nebulizer Take 3 mL by nebulization 4 (Four) Times a Day As Needed for Wheezing.    isosorbide mononitrate (IMDUR) 30 MG 24 hr tablet Take 1 tablet by mouth daily. (Patient taking differently: Take 0.5 tablets by mouth Daily.)    Livalo 1 MG tablet tablet Take 1 tablet by mouth every night.    metoprolol  "succinate XL (TOPROL-XL) 25 MG 24 hr tablet Take 1 tablet by mouth Every 12 (Twelve) Hours.    montelukast (SINGULAIR) 10 MG tablet Take 1 tablet by mouth Every Night.    NON FORMULARY Uqra    pantoprazole (PROTONIX) 40 MG EC tablet Take 1 tablet by mouth Daily.    predniSONE (DELTASONE) 10 MG (21) dose pack     rivaroxaban (Xarelto) 15 MG tablet Take 1 tablet by mouth Daily With Dinner.    sacubitril-valsartan (Entresto) 24-26 MG tablet Take 1 tablet by mouth 2 (Two) Times a Day.    sulfamethoxazole-trimethoprim (Bactrim DS) 800-160 MG per tablet 1 tablet by mouth twice a day starting the day before cystoscopy    tamsulosin (FLOMAX) 0.4 MG capsule 24 hr capsule Take 1 capsule by mouth Daily.    VITAMIN B COMPLEX-C PO Take 1 tablet by mouth Daily.    fluticasone (FLONASE) 50 MCG/ACT nasal spray Administer 1 spray into the nostril(s) as directed by provider Every Night.    nitroglycerin (NITROSTAT) 0.4 MG SL tablet Place 1 tablet under the tongue Every 5 (Five) Minutes As Needed for Chest Pain for up to 30 days. Take no more than 3 doses in 15 minutes.         Review of Systems   Respiratory:  Positive for cough and shortness of breath. Negative for wheezing.    Cardiovascular:  Negative for chest pain, palpitations and leg swelling.   Gastrointestinal:  Negative for nausea and vomiting.   Neurological:  Positive for dizziness. Negative for syncope.        Objective     /85   Pulse 87   Ht 177.8 cm (70\")   Wt 101 kg (222 lb)   BMI 31.85 kg/m²       Physical Exam    General : Alert, awake, no acute distress  Neck : Supple, no carotid bruit, no jugular venous distention  CVS : Regular rate and rhythm, no murmur, rubs or gallops  Lungs: Clear to auscultation bilaterally, no crackles or rhonchi  Abdomen: Soft, nontender, bowel sounds heard in all 4 quadrants  Extremities: Warm, well-perfused, 1+ edema bilaterally    Result Review :     The following data was reviewed by: Darnell Stevenson MD on " 03/17/2025:    CMP          2/2/2025    05:19 2/3/2025    04:58 2/6/2025    09:52   CMP   Glucose 158  107  82    BUN 33  38  35    Creatinine 1.76  1.99  2.05    EGFR 38.1  32.9  31.8    Sodium 136  132  140    Potassium 4.5  4.3  4.1    Chloride 99  97  97    Calcium 9.0  8.9  9.2    Total Protein 6.3   6.5    Albumin 3.5   4.0    Globulin 2.8   2.5    Total Bilirubin 0.3   0.3    Alkaline Phosphatase 50   52    AST (SGOT) 18   16    ALT (SGPT) 20   20    Albumin/Globulin Ratio 1.3   1.6    BUN/Creatinine Ratio 18.8  19.1  17.1    Anion Gap 9.6  12.0  16.0      CBC          2/2/2025    05:19 2/3/2025    04:58 2/6/2025    09:52   CBC   WBC 9.47  14.13  17.46    RBC 3.92  3.65  4.33    Hemoglobin 10.7  10.1  12.2    Hematocrit 34.8  32.2  37.7    MCV 88.8  88.2  87.1    MCH 27.3  27.7  28.2    MCHC 30.7  31.4  32.4    RDW 14.8  15.1  14.4    Platelets 255  268  352        Lipid Panel          9/16/2024    11:21   Lipid Panel   Total Cholesterol 113    Triglycerides 127    HDL Cholesterol 33    VLDL Cholesterol 23    LDL Cholesterol  57    LDL/HDL Ratio 1.65           Data reviewed: Cardiology studies        Results for orders placed during the hospital encounter of 01/29/25    Adult Transthoracic Echo Complete W/ Cont if Necessary Per Protocol    Interpretation Summary    Left ventricular systolic function is low normal. Left ventricular ejection fraction appears to be 46 - 50%.    Left ventricular wall thickness is consistent with mild concentric hypertrophy.    Left ventricular diastolic function is consistent with (grade II w/high LAP) pseudonormalization.    The left atrial cavity is mildly dilated.    Aortic valve is moderately calcified with restricted opening.  Mild to moderate aortic valve stenosis is present.  The peak and mean gradients across aortic valve are 30/18 mmHg    There is mild tricuspid regurgitation.  Estimated right ventricular systolic pressure from tricuspid regurgitation is moderately  elevated (45-55 mmHg).      Results for orders placed during the hospital encounter of 07/08/21    Stress Test With Myocardial Perfusion One Day    Interpretation Summary  · Myocardial perfusion imaging indicates a small-sized infarct located in the inferior wall with mild mansoor-infarct ischemia.  · Left ventricular ejection fraction is borderline normal. (Calculated EF = 49%).  · Diaphragmatic attenuation artifact is present.  · Findings consistent with a normal ECG stress test.  · Occasional isolated PVCs are noted at rest and also during stress.  · Impressions are consistent with an intermediate risk study.               Assessment and Plan        Diagnoses and all orders for this visit:    1. Ischemic cardiomyopathy (Primary)  Assessment & Plan:  Most recent ejection fraction is 45 to 50%.  He is reporting 14 shortness of breath and swelling when off diuretics.  Of note, he was on torsemide in the past which was discontinued after he lost significant amount of weight.  Options were discussed.  Will restart torsemide at a dose of 20 mg every other day.  Continue Entresto and metoprolol.  Complete metabolic panel will be done today.    Orders:  -     torsemide (DEMADEX) 20 MG tablet; Take 1 tablet by mouth Every Other Day.  Dispense: 45 tablet; Refill: 1  -     CBC (No Diff); Future  -     Comprehensive Metabolic Panel; Future    2. Atrial fibrillation, persistent  Assessment & Plan:  He is in regular rhythm, denies any palpitations.  Continue metoprolol for rate and rhythm management.  Continue Xarelto for anticoagulation.  Checking CBC today.      3. Essential hypertension  Assessment & Plan:  Pressure very well-controlled.  Antihypertensive medication regimen has been trimmed down recently after he lost weight.  Metoprolol and lower dose of Entresto will be continued.                Follow Up     Return in about 2 months (around 5/17/2025) for Next scheduled follow up, OK to use a new patient slot if need  .    Patient was given instructions and counseling regarding his condition or for health maintenance advice. Please see specific information pulled into the AVS if appropriate.

## 2025-03-25 ENCOUNTER — APPOINTMENT (OUTPATIENT)
Dept: CARDIAC REHAB | Facility: HOSPITAL | Age: 85
End: 2025-03-25
Payer: MEDICARE

## 2025-03-25 ENCOUNTER — TREATMENT (OUTPATIENT)
Dept: CARDIAC REHAB | Facility: HOSPITAL | Age: 85
End: 2025-03-25
Payer: MEDICARE

## 2025-03-25 ENCOUNTER — LAB (OUTPATIENT)
Dept: LAB | Facility: HOSPITAL | Age: 85
End: 2025-03-25
Payer: MEDICARE

## 2025-03-25 ENCOUNTER — TRANSCRIBE ORDERS (OUTPATIENT)
Dept: LAB | Facility: HOSPITAL | Age: 85
End: 2025-03-25
Payer: MEDICARE

## 2025-03-25 DIAGNOSIS — I48.0 PAROXYSMAL ATRIAL FIBRILLATION: ICD-10-CM

## 2025-03-25 DIAGNOSIS — I50.20 SYSTOLIC CONGESTIVE HEART FAILURE, UNSPECIFIED HF CHRONICITY: ICD-10-CM

## 2025-03-25 DIAGNOSIS — I25.5 ISCHEMIC CARDIOMYOPATHY: ICD-10-CM

## 2025-03-25 DIAGNOSIS — M10.00 IDIOPATHIC GOUT, UNSPECIFIED CHRONICITY, UNSPECIFIED SITE: ICD-10-CM

## 2025-03-25 DIAGNOSIS — N18.32 STAGE 3B CHRONIC KIDNEY DISEASE: ICD-10-CM

## 2025-03-25 DIAGNOSIS — I10 HYPERTENSION, ESSENTIAL: ICD-10-CM

## 2025-03-25 DIAGNOSIS — M10.00 IDIOPATHIC GOUT, UNSPECIFIED CHRONICITY, UNSPECIFIED SITE: Primary | ICD-10-CM

## 2025-03-25 DIAGNOSIS — J43.2 CENTRILOBULAR EMPHYSEMA: Primary | ICD-10-CM

## 2025-03-25 LAB
25(OH)D3 SERPL-MCNC: 57.2 NG/ML (ref 30–100)
ALBUMIN SERPL-MCNC: 3.4 G/DL (ref 3.5–5.2)
ALBUMIN/GLOB SERPL: 1 G/DL
ALP SERPL-CCNC: 57 U/L (ref 39–117)
ALT SERPL W P-5'-P-CCNC: 17 U/L (ref 1–41)
ANION GAP SERPL CALCULATED.3IONS-SCNC: 10.4 MMOL/L (ref 5–15)
AST SERPL-CCNC: 16 U/L (ref 1–40)
BACTERIA UR QL AUTO: NORMAL /HPF
BASOPHILS # BLD AUTO: 0.05 10*3/MM3 (ref 0–0.2)
BASOPHILS NFR BLD AUTO: 0.5 % (ref 0–1.5)
BILIRUB SERPL-MCNC: 0.5 MG/DL (ref 0–1.2)
BILIRUB UR QL STRIP: NEGATIVE
BUN SERPL-MCNC: 11 MG/DL (ref 8–23)
BUN/CREAT SERPL: 7.2 (ref 7–25)
CALCIUM SPEC-SCNC: 9.1 MG/DL (ref 8.6–10.5)
CHLORIDE SERPL-SCNC: 101 MMOL/L (ref 98–107)
CLARITY UR: ABNORMAL
CO2 SERPL-SCNC: 24.6 MMOL/L (ref 22–29)
COLOR UR: ABNORMAL
CREAT SERPL-MCNC: 1.53 MG/DL (ref 0.76–1.27)
CREAT UR-MCNC: 130.5 MG/DL
DEPRECATED RDW RBC AUTO: 51.2 FL (ref 37–54)
EGFRCR SERPLBLD CKD-EPI 2021: 44.8 ML/MIN/1.73
EOSINOPHIL # BLD AUTO: 0.2 10*3/MM3 (ref 0–0.4)
EOSINOPHIL NFR BLD AUTO: 2 % (ref 0.3–6.2)
ERYTHROCYTE [DISTWIDTH] IN BLOOD BY AUTOMATED COUNT: 15.8 % (ref 12.3–15.4)
GLOBULIN UR ELPH-MCNC: 3.4 GM/DL
GLUCOSE SERPL-MCNC: 99 MG/DL (ref 65–99)
GLUCOSE UR STRIP-MCNC: NEGATIVE MG/DL
HCT VFR BLD AUTO: 38.6 % (ref 37.5–51)
HGB BLD-MCNC: 12.7 G/DL (ref 13–17.7)
HGB UR QL STRIP.AUTO: NEGATIVE
HYALINE CASTS UR QL AUTO: NORMAL /LPF
IMM GRANULOCYTES # BLD AUTO: 0.04 10*3/MM3 (ref 0–0.05)
IMM GRANULOCYTES NFR BLD AUTO: 0.4 % (ref 0–0.5)
KETONES UR QL STRIP: NEGATIVE
LEUKOCYTE ESTERASE UR QL STRIP.AUTO: NEGATIVE
LYMPHOCYTES # BLD AUTO: 1.69 10*3/MM3 (ref 0.7–3.1)
LYMPHOCYTES NFR BLD AUTO: 17.2 % (ref 19.6–45.3)
MCH RBC QN AUTO: 29.2 PG (ref 26.6–33)
MCHC RBC AUTO-ENTMCNC: 32.9 G/DL (ref 31.5–35.7)
MCV RBC AUTO: 88.7 FL (ref 79–97)
MONOCYTES # BLD AUTO: 0.94 10*3/MM3 (ref 0.1–0.9)
MONOCYTES NFR BLD AUTO: 9.6 % (ref 5–12)
NEUTROPHILS NFR BLD AUTO: 6.92 10*3/MM3 (ref 1.7–7)
NEUTROPHILS NFR BLD AUTO: 70.3 % (ref 42.7–76)
NITRITE UR QL STRIP: NEGATIVE
NRBC BLD AUTO-RTO: 0 /100 WBC (ref 0–0.2)
PH UR STRIP.AUTO: 7.5 [PH] (ref 5–8)
PHOSPHATE SERPL-MCNC: 2.5 MG/DL (ref 2.5–4.5)
PLATELET # BLD AUTO: 245 10*3/MM3 (ref 140–450)
PMV BLD AUTO: 10.8 FL (ref 6–12)
POTASSIUM SERPL-SCNC: 4.2 MMOL/L (ref 3.5–5.2)
PROT ?TM UR-MCNC: 19.1 MG/DL
PROT SERPL-MCNC: 6.8 G/DL (ref 6–8.5)
PROT UR QL STRIP: ABNORMAL
PROT/CREAT UR: 0.15 MG/G{CREAT}
PTH-INTACT SERPL-MCNC: 112 PG/ML (ref 15–65)
RBC # BLD AUTO: 4.35 10*6/MM3 (ref 4.14–5.8)
RBC # UR STRIP: NORMAL /HPF
REF LAB TEST METHOD: NORMAL
SODIUM SERPL-SCNC: 136 MMOL/L (ref 136–145)
SP GR UR STRIP: 1.02 (ref 1–1.03)
SQUAMOUS #/AREA URNS HPF: NORMAL /HPF
URATE SERPL-MCNC: 6.9 MG/DL (ref 3.4–7)
UROBILINOGEN UR QL STRIP: ABNORMAL
WBC # UR STRIP: NORMAL /HPF
WBC NRBC COR # BLD AUTO: 9.84 10*3/MM3 (ref 3.4–10.8)

## 2025-03-25 PROCEDURE — 84100 ASSAY OF PHOSPHORUS: CPT

## 2025-03-25 PROCEDURE — 84156 ASSAY OF PROTEIN URINE: CPT

## 2025-03-25 PROCEDURE — 36415 COLL VENOUS BLD VENIPUNCTURE: CPT

## 2025-03-25 PROCEDURE — 82570 ASSAY OF URINE CREATININE: CPT

## 2025-03-25 PROCEDURE — 85025 COMPLETE CBC W/AUTO DIFF WBC: CPT

## 2025-03-25 PROCEDURE — 82306 VITAMIN D 25 HYDROXY: CPT

## 2025-03-25 PROCEDURE — G0239 OTH RESP PROC, GROUP: HCPCS

## 2025-03-25 PROCEDURE — 80053 COMPREHEN METABOLIC PANEL: CPT

## 2025-03-25 PROCEDURE — 84550 ASSAY OF BLOOD/URIC ACID: CPT

## 2025-03-25 PROCEDURE — 81001 URINALYSIS AUTO W/SCOPE: CPT

## 2025-03-25 PROCEDURE — 83970 ASSAY OF PARATHORMONE: CPT

## 2025-03-27 ENCOUNTER — APPOINTMENT (OUTPATIENT)
Dept: CARDIAC REHAB | Facility: HOSPITAL | Age: 85
End: 2025-03-27
Payer: MEDICARE

## 2025-03-27 ENCOUNTER — TREATMENT (OUTPATIENT)
Dept: CARDIAC REHAB | Facility: HOSPITAL | Age: 85
End: 2025-03-27
Payer: MEDICARE

## 2025-03-27 DIAGNOSIS — J43.2 CENTRILOBULAR EMPHYSEMA: Primary | ICD-10-CM

## 2025-03-27 PROCEDURE — G0239 OTH RESP PROC, GROUP: HCPCS

## 2025-03-28 ENCOUNTER — RESULTS FOLLOW-UP (OUTPATIENT)
Dept: LAB | Facility: HOSPITAL | Age: 85
End: 2025-03-28
Payer: MEDICARE

## 2025-03-28 NOTE — PROGRESS NOTES
Labs done earlier this week are reassuring.  Creatinine, marker of kidney function is 1.53 and back to his baseline, improved from before.  Potassium levels and sodium levels are within normal limits.  Blood counts including hemoglobin are stable.  WBC count is back to normal.    We recommend to continue medications as discussed during last office visit.      Electronically signed by Darnell Stevenson MD, 03/28/25, 2:43 PM EDT.

## 2025-04-01 ENCOUNTER — TREATMENT (OUTPATIENT)
Dept: CARDIAC REHAB | Facility: HOSPITAL | Age: 85
End: 2025-04-01
Payer: MEDICARE

## 2025-04-01 DIAGNOSIS — J43.2 CENTRILOBULAR EMPHYSEMA: Primary | ICD-10-CM

## 2025-04-01 PROCEDURE — G0239 OTH RESP PROC, GROUP: HCPCS

## 2025-04-03 ENCOUNTER — TREATMENT (OUTPATIENT)
Dept: CARDIAC REHAB | Facility: HOSPITAL | Age: 85
End: 2025-04-03
Payer: MEDICARE

## 2025-04-03 DIAGNOSIS — J43.2 CENTRILOBULAR EMPHYSEMA: Primary | ICD-10-CM

## 2025-04-03 PROCEDURE — G0239 OTH RESP PROC, GROUP: HCPCS

## 2025-04-08 ENCOUNTER — TREATMENT (OUTPATIENT)
Dept: CARDIAC REHAB | Facility: HOSPITAL | Age: 85
End: 2025-04-08
Payer: MEDICARE

## 2025-04-08 DIAGNOSIS — J43.2 CENTRILOBULAR EMPHYSEMA: Primary | ICD-10-CM

## 2025-04-08 PROCEDURE — G0239 OTH RESP PROC, GROUP: HCPCS

## 2025-04-09 NOTE — ANESTHESIA PROCEDURE NOTES
Airway  Urgency: elective    Date/Time: 6/3/2022 8:19 AM  Airway not difficult    General Information and Staff    Patient location during procedure: OR  CRNA/CAA: Nivia Pearce CRNA    Indications and Patient Condition  Indications for airway management: airway protection    Preoxygenated: yes  Mask difficulty assessment: 1 - vent by mask    Final Airway Details  Final airway type: endotracheal airway      Successful airway: ETT  Cuffed: yes   Successful intubation technique: direct laryngoscopy  Endotracheal tube insertion site: oral  Blade: Gaby  Blade size: 4  ETT size (mm): 8.0  Cormack-Lehane Classification: grade I - full view of glottis  Placement verified by: chest auscultation and capnometry   Measured from: lips  ETT/EBT  to lips (cm): 23  Number of attempts at approach: 1  Assessment: edentulous, lips, teeth, and gum same as pre-op and atraumatic intubation    Additional Comments   ett cuff up at MOP             Smoking cessation

## 2025-04-10 ENCOUNTER — LAB (OUTPATIENT)
Dept: LAB | Facility: HOSPITAL | Age: 85
End: 2025-04-10
Payer: MEDICARE

## 2025-04-10 ENCOUNTER — OFFICE VISIT (OUTPATIENT)
Dept: FAMILY MEDICINE CLINIC | Facility: CLINIC | Age: 85
End: 2025-04-10
Payer: MEDICARE

## 2025-04-10 ENCOUNTER — TREATMENT (OUTPATIENT)
Dept: CARDIAC REHAB | Facility: HOSPITAL | Age: 85
End: 2025-04-10
Payer: MEDICARE

## 2025-04-10 ENCOUNTER — APPOINTMENT (OUTPATIENT)
Dept: CARDIAC REHAB | Facility: HOSPITAL | Age: 85
End: 2025-04-10
Payer: MEDICARE

## 2025-04-10 ENCOUNTER — HOSPITAL ENCOUNTER (OUTPATIENT)
Dept: GENERAL RADIOLOGY | Facility: HOSPITAL | Age: 85
Discharge: HOME | End: 2025-04-10
Payer: MEDICARE

## 2025-04-10 VITALS
HEART RATE: 72 BPM | BODY MASS INDEX: 31.87 KG/M2 | SYSTOLIC BLOOD PRESSURE: 107 MMHG | WEIGHT: 222.6 LBS | HEIGHT: 70 IN | OXYGEN SATURATION: 97 % | DIASTOLIC BLOOD PRESSURE: 49 MMHG

## 2025-04-10 DIAGNOSIS — R06.09 DYSPNEA ON EXERTION: Primary | ICD-10-CM

## 2025-04-10 DIAGNOSIS — R06.09 DYSPNEA ON EXERTION: ICD-10-CM

## 2025-04-10 DIAGNOSIS — J43.2 CENTRILOBULAR EMPHYSEMA: Primary | ICD-10-CM

## 2025-04-10 DIAGNOSIS — J44.1 COPD WITH EXACERBATION: ICD-10-CM

## 2025-04-10 PROCEDURE — 71046 X-RAY EXAM CHEST 2 VIEWS: CPT

## 2025-04-10 PROCEDURE — 85027 COMPLETE CBC AUTOMATED: CPT

## 2025-04-10 PROCEDURE — 3074F SYST BP LT 130 MM HG: CPT | Performed by: STUDENT IN AN ORGANIZED HEALTH CARE EDUCATION/TRAINING PROGRAM

## 2025-04-10 PROCEDURE — 3078F DIAST BP <80 MM HG: CPT | Performed by: STUDENT IN AN ORGANIZED HEALTH CARE EDUCATION/TRAINING PROGRAM

## 2025-04-10 PROCEDURE — 83880 ASSAY OF NATRIURETIC PEPTIDE: CPT

## 2025-04-10 PROCEDURE — G0239 OTH RESP PROC, GROUP: HCPCS

## 2025-04-10 PROCEDURE — 80053 COMPREHEN METABOLIC PANEL: CPT

## 2025-04-10 PROCEDURE — 36415 COLL VENOUS BLD VENIPUNCTURE: CPT

## 2025-04-10 PROCEDURE — 99214 OFFICE O/P EST MOD 30 MIN: CPT | Performed by: STUDENT IN AN ORGANIZED HEALTH CARE EDUCATION/TRAINING PROGRAM

## 2025-04-10 PROCEDURE — 1126F AMNT PAIN NOTED NONE PRSNT: CPT | Performed by: STUDENT IN AN ORGANIZED HEALTH CARE EDUCATION/TRAINING PROGRAM

## 2025-04-10 PROCEDURE — 93000 ELECTROCARDIOGRAM COMPLETE: CPT | Performed by: STUDENT IN AN ORGANIZED HEALTH CARE EDUCATION/TRAINING PROGRAM

## 2025-04-10 PROCEDURE — 84145 PROCALCITONIN (PCT): CPT

## 2025-04-10 RX ORDER — PREDNISONE 20 MG/1
40 TABLET ORAL DAILY
Qty: 10 TABLET | Refills: 0 | Status: SHIPPED | OUTPATIENT
Start: 2025-04-10 | End: 2025-04-15

## 2025-04-10 NOTE — PROGRESS NOTES
Subjective:       Layo Cee is a 85 y.o. male with a concurrent medical history of atrial fibrillation status post ablation, coronary artery disease status post bypass, heart failure with reduced ejection fraction secondary to ischemic cardiomyopathy, hypertension, gout, benign prostate hyperplasia, COPD, prediabetes, obesity, iron deficiency anemia and renal insufficiency and past medical history of myocardial infarction who presents for close coronation of care.    Our last visit was approximately 6 weeks ago on 2/27/2025.  At the time I was seeing him for heart failure follow-up in the setting of a hospital discharge earlier this year.  At that time, symptoms had improved enough to de-escalate follow-up to 6 weeks.    Today, he reports worsening cough and wheezing that has been worsening recently.      Intermittent cough - intermittently productive.     See review of systems for further details including other pertinent positives and negatives.    Has been taking diuretic half a pill every day. Took a full pill yesterday and symptoms improved.       Vital signs are stable today.  He is normotensive with a blood pressure 107/79.  Heart rate is normal at 72 although nurse did notice some bradycardia when initially taking vitals.  Oxygen is strong at 97%. History of heart failure - weight today is 222 pounds, from 225 pounds at last visit.    In terms of heart failure, currently takes     On physical exam, I do hear some decreased air movement bilaterally.  He is not ill-appearing or toxic-appearing but coughs frequently throughout exam.      ECG obtained due to bradycardia and demonstrates atrial fibrillation, paired ventricular complexes, and findings consistent with ischemia. Layo does have a history of ischemic disease, and the ischemic findings have been seen on previous ECGs, such as the ECG I obtained earlier this year and the ECG performed by Cardiology in November of 2024. The prior ECG from  cardiology is below.         These findings have been seen on his ECGs       Differential for possible causes of cough in this patient would include COPD exacerbation (we had treated him for this in the past), heart failure exacerbation (heart function had worsened at recent admission), pneumonia (I treated him for this previously and despite the use of antibiotics he ultimately required admission).    I suspect COPD exacerbation and would start steroids today.  There may be a heart failure component and as he is improving with full dose diuretic, would continue this for at least 1 week.    Would obtain CBC and procalcitonin to look at potential markers of infection, CMP to reassess electrolytes and renal function, and BNP to assess for any worsening of heart failure.  Will obtain chest x-ray to rule out pneumonia.    Because of his age, significant medical comorbidities, I want to keep a very close eye on him while we treat him.  Would schedule him for a a follow-up in just 1 week to see how he is doing.  We would again discuss symptoms that would prompt him to present to the hospital on a more emergent basis      Past Medical Hx:  Past Medical History:   Diagnosis Date    Acute on chronic diastolic CHF (congestive heart failure) 6/6/2022    Arthritis     Atrial fibrillation, persistent 5/3/2022    Persistent atrial fibrillation status post extensive ablation and pulmonary vein isolation by Dr. Aguilar on 6/3/2022, with reoccurring rapid A. fib, and then successful cardioversion on 7/1/2022    BPH (benign prostatic hyperplasia)     CHF (congestive heart failure)     COPD (chronic obstructive pulmonary disease)     Coronary artery disease of bypass graft of native heart with stable angina pectoris     (1) Coronary artery disease, s/p CABG in the past. Cardiac catheterization in July 2016 showed patentLIMA graft to the LAD and occluded SVGs. Native LAD is 100% occluded in the mid portion. Native LCx has no significant  disease. Native RCA is 100% occluded and it is a . Repeat cath in June, 2021, underwent stent placement to diagnonal branch.    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 Lauren Valencia        Past Surgical Hx:  Past Surgical History:   Procedure Laterality Date    BLADDER SURGERY      CARDIAC CATHETERIZATION  07/2016    CARDIAC CATHETERIZATION N/A 8/9/2021    Procedure: Left Heart Cath with possible angioplasty;  Surgeon: Jack Ladd MD;  Location: [facility] CATH INVASIVE LOCATION;  Service: Cardiovascular;  Laterality: N/A;  Please schedule the procedure with Dr. Jack Ladd MD on 8/9/2021    CARDIAC ELECTROPHYSIOLOGY PROCEDURE N/A 6/3/2022    Procedure: Ablation atrial fibrillation;  Surgeon: Irineo Aguilar MD;  Location: [facility] CATH INVASIVE LOCATION;  Service: Cardiovascular;  Laterality: N/A;    CARDIAC ELECTROPHYSIOLOGY PROCEDURE N/A 6/3/2022    Procedure: Ablation atrial flutter/CTI;  Surgeon: Irineo Aguilar MD;  Location: [facility] CATH INVASIVE LOCATION;  Service: Cardiovascular;  Laterality: N/A;    CARDIAC SURGERY      HEART BYPASS    COLONOSCOPY  2011    CORONARY ARTERY BYPASS GRAFT      CYSTOSCOPY  03/19/2019    WITH BILATERAL RETROGRADE PYELOGRAPHY    ELECTRICAL CARDIOVERSION  5/12/2022         ENDOSCOPY  2016    PROSTATE SURGERY         Current Meds:    Current Outpatient Medications:     acetaminophen (TYLENOL) 325 MG tablet, Take 2 tablets by mouth As Needed for Mild Pain., Disp: , Rfl:     allopurinol 200 MG  tablet, Take 200 mg by mouth Daily. (Patient taking differently: Take 100 mg by mouth Every Morning.), Disp: 90 tablet, Rfl: 2    amiodarone (PACERONE) 200 MG tablet, Take 1 tablet by mouth Every Other Day. (Patient taking differently: Take 0.5 tablets by mouth Every Other Day.), Disp: , Rfl:     aspirin (aspirin) 81 MG EC tablet, Take 1 tablet by mouth Daily., Disp: 30 tablet, Rfl: 1    azelastine (ASTELIN) 0.1 % nasal spray, Administer 2 sprays into the nostril(s) as directed by provider 2 (Two) Times a Day. Use in each nostril as directed, Disp: 30 mL, Rfl: 12    calcium carbonate (OS-LIANNA) 600 MG tablet, Take 1 tablet by mouth Every Night., Disp: , Rfl:     Coenzyme Q10 100 MG tablet, Take 1 tablet by mouth Daily., Disp: , Rfl:     Cranberry 400 MG tablet, Take  by mouth., Disp: , Rfl:     Diclofenac Sodium (VOLTAREN) 1 % gel gel, Apply 2 g topically to the appropriate area as directed 4 (Four) Times a Day., Disp: , Rfl:     finasteride (PROSCAR) 5 MG tablet, Take 1 tablet by mouth Daily., Disp: 90 tablet, Rfl: 4    fluticasone (FLONASE) 50 MCG/ACT nasal spray, Administer 1 spray into the nostril(s) as directed by provider Every Night., Disp: , Rfl:     Fluticasone-Umeclidin-Vilant (Trelegy Ellipta) 100-62.5-25 MCG/ACT inhaler, Inhale 1 puff Daily., Disp: 1 each, Rfl: 11    ipratropium-albuterol (DUO-NEB) 0.5-2.5 mg/3 ml nebulizer, Take 3 mL by nebulization 4 (Four) Times a Day As Needed for Wheezing., Disp: 360 mL, Rfl: 3    isosorbide mononitrate (IMDUR) 30 MG 24 hr tablet, Take 1 tablet by mouth daily. (Patient taking differently: Take 0.5 tablets by mouth Daily.), Disp: 90 tablet, Rfl: 3    Livalo 1 MG tablet tablet, Take 1 tablet by mouth every night., Disp: 90 tablet, Rfl: 3    metoprolol succinate XL (TOPROL-XL) 25 MG 24 hr tablet, Take 1 tablet by mouth Every 12 (Twelve) Hours., Disp: 90 tablet, Rfl: 3    montelukast (SINGULAIR) 10 MG tablet, Take 1 tablet by mouth Every Night., Disp: 90 tablet, Rfl: 3     nitroglycerin (NITROSTAT) 0.4 MG SL tablet, Place 1 tablet under the tongue Every 5 (Five) Minutes As Needed for Chest Pain for up to 30 days. Take no more than 3 doses in 15 minutes., Disp: 30 tablet, Rfl: 0    NON FORMULARY, Uqra, Disp: , Rfl:     pantoprazole (PROTONIX) 40 MG EC tablet, Take 1 tablet by mouth Daily., Disp: 90 tablet, Rfl: 2    rivaroxaban (Xarelto) 15 MG tablet, Take 1 tablet by mouth Daily With Dinner., Disp: 90 tablet, Rfl: 1    sacubitril-valsartan (Entresto) 24-26 MG tablet, Take 1 tablet by mouth 2 (Two) Times a Day., Disp: 180 tablet, Rfl: 3    tamsulosin (FLOMAX) 0.4 MG capsule 24 hr capsule, Take 1 capsule by mouth Daily., Disp: 90 capsule, Rfl: 4    torsemide (DEMADEX) 20 MG tablet, Take 1 tablet by mouth Every Other Day. (Patient taking differently: Take 0.5 tablets by mouth Every Other Day.), Disp: 45 tablet, Rfl: 1    VITAMIN B COMPLEX-C PO, Take 1 tablet by mouth Daily., Disp: , Rfl:     predniSONE (DELTASONE) 20 MG tablet, Take 2 tablets by mouth Daily for 5 days., Disp: 10 tablet, Rfl: 0    Allergies:  Allergies   Allergen Reactions    Carvedilol Swelling and Anaphylaxis    Levaquin [Levofloxacin] Unknown - Low Severity       Family Hx:  Family History   Problem Relation Age of Onset    Hypertension Mother     Heart disease Father     Other Brother         GASTROINTESTINAL CANCER    Kidney cancer Brother         Social History:  Social History     Socioeconomic History    Marital status:    Tobacco Use    Smoking status: Former     Current packs/day: 0.00     Average packs/day: 0.5 packs/day for 40.0 years (20.0 ttl pk-yrs)     Types: Cigarettes     Start date:      Quit date:      Years since quittin.2     Passive exposure: Past    Smokeless tobacco: Never   Vaping Use    Vaping status: Never Used   Substance and Sexual Activity    Alcohol use: Not Currently    Drug use: Never    Sexual activity: Defer       Review of Systems  Review of Systems  "  Constitutional:  Negative for chills and fever.   Respiratory:  Positive for cough, chest tightness and shortness of breath. Negative for wheezing.    Cardiovascular:  Negative for chest pain and leg swelling.       Objective:     /49 (BP Location: Left arm, Patient Position: Sitting, Cuff Size: Large Adult)   Pulse 72   Ht 177.8 cm (70\")   Wt 101 kg (222 lb 9.6 oz)   SpO2 97%   BMI 31.94 kg/m²   Physical Exam  Constitutional:       General: He is not in acute distress.     Appearance: Normal appearance. He is not ill-appearing, toxic-appearing or diaphoretic.   Cardiovascular:      Rate and Rhythm: Normal rate.   Pulmonary:      Effort: Pulmonary effort is normal. No respiratory distress.      Breath sounds: No stridor. No wheezing or rales.      Comments: Decreased breath sounds bilaterally.  Patient coughs frequently throughout exam.  Neurological:      Mental Status: He is alert.   Psychiatric:         Mood and Affect: Mood normal.          Assessment/Plan:     Diagnoses and all orders for this visit:    1. Dyspnea on exertion (Primary)    Our last visit was approximately 6 weeks ago on 2/27/2025.  At the time I was seeing him for heart failure follow-up in the setting of a hospital discharge earlier this year.  At that time, symptoms had improved enough to de-escalate follow-up to 6 weeks.    Today, he reports worsening cough and wheezing that has been worsening recently.      Intermittent cough - intermittently productive.     See review of systems for further details including other pertinent positives and negatives.    Has been taking diuretic half a pill every day. Took a full pill yesterday and symptoms improved.       Vital signs are stable today.  He is normotensive with a blood pressure 107/79.  Heart rate is normal at 72 although nurse did notice some bradycardia when initially taking vitals.  Oxygen is strong at 97%. History of heart failure - weight today is 222 pounds, from 225 pounds " at last visit.    In terms of heart failure, currently takes     On physical exam, I do hear some decreased air movement bilaterally.  He is not ill-appearing or toxic-appearing but coughs frequently throughout exam.      ECG obtained due to bradycardia and demonstrates atrial fibrillation, paired ventricular complexes, and findings consistent with ischemia. Layo does have a history of ischemic disease, and the ischemic findings have been seen on previous ECGs, such as the ECG I obtained earlier this year and the ECG performed by Cardiology in November of 2024. The prior ECG from cardiology is below.         These findings have been seen on his ECGs       Differential for possible causes of cough in this patient would include COPD exacerbation (we had treated him for this in the past), heart failure exacerbation (heart function had worsened at recent admission), pneumonia (I treated him for this previously and despite the use of antibiotics he ultimately required admission).    I suspect COPD exacerbation and would start steroids today.  There may be a heart failure component and as he is improving with full dose diuretic, would continue this for at least 1 week.    Would obtain CBC and procalcitonin to look at potential markers of infection, CMP to reassess electrolytes and renal function, and BNP to assess for any worsening of heart failure.  Will obtain chest x-ray to rule out pneumonia.    Because of his age, significant medical comorbidities, I want to keep a very close eye on him while we treat him.  Would schedule him for a a follow-up in just 1 week to see how he is doing.  We would again discuss symptoms that would prompt him to present to the hospital on a more emergent basis    -     XR Chest 2 View; Future  -     CBC No Differential; Future  -     Comprehensive metabolic panel; Future  -     Procalcitonin; Future  -     BNP; Future  -     ECG 12 Lead    2. COPD with exacerbation  -     predniSONE  (DELTASONE) 20 MG tablet; Take 2 tablets by mouth Daily for 5 days.  Dispense: 10 tablet; Refill: 0          Follow-up:     Return in about 1 week (around 4/17/2025) for Dyspnea Recheck .    Preventative:  Health Maintenance   Topic Date Due    RSV Vaccine - Adults (1 - 1-dose 75+ series) Never done    ANNUAL WELLNESS VISIT  01/17/2025    COVID-19 Vaccine (2 - 2024-25 season) 04/18/2025 (Originally 9/1/2024)    INFLUENZA VACCINE  07/01/2025    LIPID PANEL  09/16/2025    TDAP/TD VACCINES (2 - Tdap) 01/09/2033    Pneumococcal Vaccine 50+  Completed    ZOSTER VACCINE  Completed           This document has been electronically signed by Tevin Zavala MD on April 10, 2025 13:08 EDT       Parts of this note are electronic transcriptions/translations of spoken language to printed text using the Dragon Dictation system.

## 2025-04-11 ENCOUNTER — RESULTS FOLLOW-UP (OUTPATIENT)
Dept: FAMILY MEDICINE CLINIC | Facility: CLINIC | Age: 85
End: 2025-04-11
Payer: MEDICARE

## 2025-04-11 LAB
ALBUMIN SERPL-MCNC: 3.8 G/DL (ref 3.5–5.2)
ALBUMIN/GLOB SERPL: 1.4 G/DL
ALP SERPL-CCNC: 56 U/L (ref 39–117)
ALT SERPL W P-5'-P-CCNC: 13 U/L (ref 1–41)
ANION GAP SERPL CALCULATED.3IONS-SCNC: 11.6 MMOL/L (ref 5–15)
AST SERPL-CCNC: 19 U/L (ref 1–40)
BILIRUB SERPL-MCNC: 0.3 MG/DL (ref 0–1.2)
BUN SERPL-MCNC: 13 MG/DL (ref 8–23)
BUN/CREAT SERPL: 8.2 (ref 7–25)
CALCIUM SPEC-SCNC: 9.3 MG/DL (ref 8.6–10.5)
CHLORIDE SERPL-SCNC: 100 MMOL/L (ref 98–107)
CO2 SERPL-SCNC: 26.4 MMOL/L (ref 22–29)
CREAT SERPL-MCNC: 1.58 MG/DL (ref 0.76–1.27)
DEPRECATED RDW RBC AUTO: 51.9 FL (ref 37–54)
EGFRCR SERPLBLD CKD-EPI 2021: 43.1 ML/MIN/1.73
ERYTHROCYTE [DISTWIDTH] IN BLOOD BY AUTOMATED COUNT: 15.5 % (ref 12.3–15.4)
GLOBULIN UR ELPH-MCNC: 2.8 GM/DL
GLUCOSE SERPL-MCNC: 89 MG/DL (ref 65–99)
HCT VFR BLD AUTO: 40.8 % (ref 37.5–51)
HGB BLD-MCNC: 12.6 G/DL (ref 13–17.7)
MCH RBC QN AUTO: 28.4 PG (ref 26.6–33)
MCHC RBC AUTO-ENTMCNC: 30.9 G/DL (ref 31.5–35.7)
MCV RBC AUTO: 91.9 FL (ref 79–97)
NT-PROBNP SERPL-MCNC: 1910 PG/ML (ref 0–1800)
PLATELET # BLD AUTO: 225 10*3/MM3 (ref 140–450)
PMV BLD AUTO: 10.5 FL (ref 6–12)
POTASSIUM SERPL-SCNC: 4.1 MMOL/L (ref 3.5–5.2)
PROCALCITONIN SERPL-MCNC: 0.06 NG/ML (ref 0–0.25)
PROT SERPL-MCNC: 6.6 G/DL (ref 6–8.5)
RBC # BLD AUTO: 4.44 10*6/MM3 (ref 4.14–5.8)
SODIUM SERPL-SCNC: 138 MMOL/L (ref 136–145)
WBC NRBC COR # BLD AUTO: 9.09 10*3/MM3 (ref 3.4–10.8)

## 2025-04-11 NOTE — PROGRESS NOTES
WBC and procalcitonin normal. BNP elevated though lower than it was during admission - will continue full dose diuretic until our appointment next week.     Thank you,    Tevin Zavala      This document has been electronically signed by Tevin Zavala MD on April 11, 2025 07:57 EDT

## 2025-04-12 NOTE — PROGRESS NOTES
XR read demonstrates findings seen on prior chest XR have improved. I have reviewed the images and I think that CHF exacerbation - being treated with increased diuretic - rather than a pneumonia, is responsible for his symptoms. I will be seeing him in just one week for close follow up.     Thank you,    Tevin Zavala       This document has been electronically signed by Tevin Zavala MD on April 11, 2025 21:03 EDT

## 2025-04-15 ENCOUNTER — TREATMENT (OUTPATIENT)
Dept: CARDIAC REHAB | Facility: HOSPITAL | Age: 85
End: 2025-04-15
Payer: MEDICARE

## 2025-04-15 DIAGNOSIS — J43.2 CENTRILOBULAR EMPHYSEMA: Primary | ICD-10-CM

## 2025-04-15 PROCEDURE — G0239 OTH RESP PROC, GROUP: HCPCS

## 2025-04-16 ENCOUNTER — APPOINTMENT (OUTPATIENT)
Dept: GENERAL RADIOLOGY | Facility: HOSPITAL | Age: 85
DRG: 291 | End: 2025-04-16
Payer: MEDICARE

## 2025-04-16 ENCOUNTER — HOSPITAL ENCOUNTER (INPATIENT)
Facility: HOSPITAL | Age: 85
LOS: 3 days | Discharge: HOME | DRG: 291 | End: 2025-04-19
Attending: EMERGENCY MEDICINE | Admitting: HOSPITALIST
Payer: MEDICARE

## 2025-04-16 ENCOUNTER — APPOINTMENT (OUTPATIENT)
Dept: CT IMAGING | Facility: HOSPITAL | Age: 85
DRG: 291 | End: 2025-04-16
Payer: MEDICARE

## 2025-04-16 ENCOUNTER — TELEPHONE (OUTPATIENT)
Dept: CARDIOLOGY | Facility: CLINIC | Age: 85
End: 2025-04-16
Payer: MEDICARE

## 2025-04-16 DIAGNOSIS — Z78.9 DECREASED ACTIVITIES OF DAILY LIVING (ADL): ICD-10-CM

## 2025-04-16 DIAGNOSIS — R06.02 SHORTNESS OF BREATH: Primary | ICD-10-CM

## 2025-04-16 DIAGNOSIS — R26.2 DIFFICULTY WALKING: ICD-10-CM

## 2025-04-16 DIAGNOSIS — I25.5 ISCHEMIC CARDIOMYOPATHY: ICD-10-CM

## 2025-04-16 LAB
ALBUMIN SERPL-MCNC: 4.3 G/DL (ref 3.5–5.2)
ALBUMIN/GLOB SERPL: 1.6 G/DL
ALP SERPL-CCNC: 59 U/L (ref 39–117)
ALT SERPL W P-5'-P-CCNC: 26 U/L (ref 1–41)
ANION GAP SERPL CALCULATED.3IONS-SCNC: 15.1 MMOL/L (ref 5–15)
AST SERPL-CCNC: 23 U/L (ref 1–40)
BASOPHILS # BLD AUTO: 0.04 10*3/MM3 (ref 0–0.2)
BASOPHILS NFR BLD AUTO: 0.2 % (ref 0–1.5)
BILIRUB SERPL-MCNC: 0.6 MG/DL (ref 0–1.2)
BUN SERPL-MCNC: 24 MG/DL (ref 8–23)
BUN/CREAT SERPL: 15.1 (ref 7–25)
CALCIUM SPEC-SCNC: 9.3 MG/DL (ref 8.6–10.5)
CHLORIDE SERPL-SCNC: 96 MMOL/L (ref 98–107)
CO2 SERPL-SCNC: 26.9 MMOL/L (ref 22–29)
CREAT SERPL-MCNC: 1.59 MG/DL (ref 0.76–1.27)
D-LACTATE SERPL-SCNC: 1.4 MMOL/L (ref 0.5–2)
DEPRECATED RDW RBC AUTO: 55.8 FL (ref 37–54)
EGFRCR SERPLBLD CKD-EPI 2021: 42.8 ML/MIN/1.73
EOSINOPHIL # BLD AUTO: 0.06 10*3/MM3 (ref 0–0.4)
EOSINOPHIL NFR BLD AUTO: 0.4 % (ref 0.3–6.2)
ERYTHROCYTE [DISTWIDTH] IN BLOOD BY AUTOMATED COUNT: 16.7 % (ref 12.3–15.4)
FLUAV RNA RESP QL NAA+PROBE: NOT DETECTED
FLUBV RNA RESP QL NAA+PROBE: NOT DETECTED
GEN 5 1HR TROPONIN T REFLEX: 42 NG/L
GLOBULIN UR ELPH-MCNC: 2.7 GM/DL
GLUCOSE SERPL-MCNC: 116 MG/DL (ref 65–99)
HCT VFR BLD AUTO: 42.6 % (ref 37.5–51)
HGB BLD-MCNC: 13.5 G/DL (ref 13–17.7)
HOLD SPECIMEN: NORMAL
HOLD SPECIMEN: NORMAL
IMM GRANULOCYTES # BLD AUTO: 0.17 10*3/MM3 (ref 0–0.05)
IMM GRANULOCYTES NFR BLD AUTO: 1 % (ref 0–0.5)
L PNEUMO1 AG UR QL IA: NEGATIVE
LYMPHOCYTES # BLD AUTO: 1.63 10*3/MM3 (ref 0.7–3.1)
LYMPHOCYTES NFR BLD AUTO: 10 % (ref 19.6–45.3)
MAGNESIUM SERPL-MCNC: 1.9 MG/DL (ref 1.6–2.4)
MCH RBC QN AUTO: 29.3 PG (ref 26.6–33)
MCHC RBC AUTO-ENTMCNC: 31.7 G/DL (ref 31.5–35.7)
MCV RBC AUTO: 92.6 FL (ref 79–97)
MONOCYTES # BLD AUTO: 1.46 10*3/MM3 (ref 0.1–0.9)
MONOCYTES NFR BLD AUTO: 9 % (ref 5–12)
NEUTROPHILS NFR BLD AUTO: 12.89 10*3/MM3 (ref 1.7–7)
NEUTROPHILS NFR BLD AUTO: 79.4 % (ref 42.7–76)
NRBC BLD AUTO-RTO: 0 /100 WBC (ref 0–0.2)
NT-PROBNP SERPL-MCNC: 4541 PG/ML (ref 0–1800)
PHOSPHATE SERPL-MCNC: 3.1 MG/DL (ref 2.5–4.5)
PLATELET # BLD AUTO: 212 10*3/MM3 (ref 140–450)
PMV BLD AUTO: 10.9 FL (ref 6–12)
POTASSIUM SERPL-SCNC: 3.7 MMOL/L (ref 3.5–5.2)
PROCALCITONIN SERPL-MCNC: 0.18 NG/ML (ref 0–0.25)
PROT SERPL-MCNC: 7 G/DL (ref 6–8.5)
RBC # BLD AUTO: 4.6 10*6/MM3 (ref 4.14–5.8)
RSV RNA RESP QL NAA+PROBE: NOT DETECTED
S PNEUM AG SPEC QL LA: NEGATIVE
SARS-COV-2 RNA RESP QL NAA+PROBE: NOT DETECTED
SODIUM SERPL-SCNC: 138 MMOL/L (ref 136–145)
TROPONIN T % DELTA: 11
TROPONIN T NUMERIC DELTA: 4 NG/L
TROPONIN T SERPL HS-MCNC: 38 NG/L
WBC NRBC COR # BLD AUTO: 16.25 10*3/MM3 (ref 3.4–10.8)
WHOLE BLOOD HOLD COAG: NORMAL
WHOLE BLOOD HOLD SPECIMEN: NORMAL

## 2025-04-16 PROCEDURE — 25010000002 METHYLPREDNISOLONE PER 125 MG: Performed by: HOSPITALIST

## 2025-04-16 PROCEDURE — 83605 ASSAY OF LACTIC ACID: CPT | Performed by: EMERGENCY MEDICINE

## 2025-04-16 PROCEDURE — 71250 CT THORAX DX C-: CPT

## 2025-04-16 PROCEDURE — 85025 COMPLETE CBC W/AUTO DIFF WBC: CPT | Performed by: EMERGENCY MEDICINE

## 2025-04-16 PROCEDURE — 94799 UNLISTED PULMONARY SVC/PX: CPT

## 2025-04-16 PROCEDURE — 87449 NOS EACH ORGANISM AG IA: CPT | Performed by: HOSPITALIST

## 2025-04-16 PROCEDURE — 25010000002 FUROSEMIDE PER 20 MG: Performed by: EMERGENCY MEDICINE

## 2025-04-16 PROCEDURE — 84484 ASSAY OF TROPONIN QUANT: CPT | Performed by: EMERGENCY MEDICINE

## 2025-04-16 PROCEDURE — 25010000002 AZITHROMYCIN PER 500 MG: Performed by: HOSPITALIST

## 2025-04-16 PROCEDURE — 93010 ELECTROCARDIOGRAM REPORT: CPT | Performed by: SPECIALIST

## 2025-04-16 PROCEDURE — 83735 ASSAY OF MAGNESIUM: CPT | Performed by: HOSPITALIST

## 2025-04-16 PROCEDURE — 83880 ASSAY OF NATRIURETIC PEPTIDE: CPT | Performed by: EMERGENCY MEDICINE

## 2025-04-16 PROCEDURE — 80053 COMPREHEN METABOLIC PANEL: CPT | Performed by: EMERGENCY MEDICINE

## 2025-04-16 PROCEDURE — 84100 ASSAY OF PHOSPHORUS: CPT | Performed by: HOSPITALIST

## 2025-04-16 PROCEDURE — 25010000002 CEFTRIAXONE PER 250 MG: Performed by: HOSPITALIST

## 2025-04-16 PROCEDURE — 71045 X-RAY EXAM CHEST 1 VIEW: CPT

## 2025-04-16 PROCEDURE — 87040 BLOOD CULTURE FOR BACTERIA: CPT | Performed by: EMERGENCY MEDICINE

## 2025-04-16 PROCEDURE — 99223 1ST HOSP IP/OBS HIGH 75: CPT | Performed by: HOSPITALIST

## 2025-04-16 PROCEDURE — 87899 AGENT NOS ASSAY W/OPTIC: CPT | Performed by: HOSPITALIST

## 2025-04-16 PROCEDURE — 36415 COLL VENOUS BLD VENIPUNCTURE: CPT

## 2025-04-16 PROCEDURE — 94760 N-INVAS EAR/PLS OXIMETRY 1: CPT

## 2025-04-16 PROCEDURE — 93005 ELECTROCARDIOGRAM TRACING: CPT | Performed by: EMERGENCY MEDICINE

## 2025-04-16 PROCEDURE — 25810000003 SODIUM CHLORIDE 0.9 % SOLUTION 250 ML FLEX CONT: Performed by: HOSPITALIST

## 2025-04-16 PROCEDURE — 84145 PROCALCITONIN (PCT): CPT | Performed by: EMERGENCY MEDICINE

## 2025-04-16 PROCEDURE — 99285 EMERGENCY DEPT VISIT HI MDM: CPT

## 2025-04-16 PROCEDURE — 87637 SARSCOV2&INF A&B&RSV AMP PRB: CPT | Performed by: EMERGENCY MEDICINE

## 2025-04-16 PROCEDURE — 94640 AIRWAY INHALATION TREATMENT: CPT

## 2025-04-16 RX ORDER — SODIUM CHLORIDE 9 MG/ML
40 INJECTION, SOLUTION INTRAVENOUS AS NEEDED
Status: DISCONTINUED | OUTPATIENT
Start: 2025-04-16 | End: 2025-04-19 | Stop reason: HOSPADM

## 2025-04-16 RX ORDER — POLYETHYLENE GLYCOL 3350 17 G/17G
17 POWDER, FOR SOLUTION ORAL DAILY PRN
Status: DISCONTINUED | OUTPATIENT
Start: 2025-04-16 | End: 2025-04-19 | Stop reason: HOSPADM

## 2025-04-16 RX ORDER — FLUTICASONE PROPIONATE 50 MCG
1 SPRAY, SUSPENSION (ML) NASAL NIGHTLY PRN
Status: DISCONTINUED | OUTPATIENT
Start: 2025-04-16 | End: 2025-04-19 | Stop reason: HOSPADM

## 2025-04-16 RX ORDER — BISACODYL 10 MG
10 SUPPOSITORY, RECTAL RECTAL DAILY PRN
Status: DISCONTINUED | OUTPATIENT
Start: 2025-04-16 | End: 2025-04-19 | Stop reason: HOSPADM

## 2025-04-16 RX ORDER — ALLOPURINOL 100 MG/1
200 TABLET ORAL DAILY
Status: DISCONTINUED | OUTPATIENT
Start: 2025-04-17 | End: 2025-04-19 | Stop reason: HOSPADM

## 2025-04-16 RX ORDER — BUDESONIDE 0.5 MG/2ML
0.5 INHALANT ORAL
Status: DISCONTINUED | OUTPATIENT
Start: 2025-04-16 | End: 2025-04-19 | Stop reason: HOSPADM

## 2025-04-16 RX ORDER — TAMSULOSIN HYDROCHLORIDE 0.4 MG/1
0.4 CAPSULE ORAL DAILY
Status: DISCONTINUED | OUTPATIENT
Start: 2025-04-17 | End: 2025-04-19 | Stop reason: HOSPADM

## 2025-04-16 RX ORDER — ASPIRIN 81 MG/1
81 TABLET ORAL DAILY
Status: DISCONTINUED | OUTPATIENT
Start: 2025-04-17 | End: 2025-04-19 | Stop reason: HOSPADM

## 2025-04-16 RX ORDER — METOPROLOL SUCCINATE 25 MG/1
25 TABLET, EXTENDED RELEASE ORAL EVERY 12 HOURS
Status: DISCONTINUED | OUTPATIENT
Start: 2025-04-16 | End: 2025-04-19 | Stop reason: HOSPADM

## 2025-04-16 RX ORDER — ONDANSETRON 2 MG/ML
4 INJECTION INTRAMUSCULAR; INTRAVENOUS EVERY 6 HOURS PRN
Status: DISCONTINUED | OUTPATIENT
Start: 2025-04-16 | End: 2025-04-19 | Stop reason: HOSPADM

## 2025-04-16 RX ORDER — AMIODARONE HYDROCHLORIDE 200 MG/1
200 TABLET ORAL EVERY OTHER DAY
Status: DISCONTINUED | OUTPATIENT
Start: 2025-04-18 | End: 2025-04-19 | Stop reason: HOSPADM

## 2025-04-16 RX ORDER — ONDANSETRON 4 MG/1
4 TABLET, ORALLY DISINTEGRATING ORAL EVERY 6 HOURS PRN
Status: DISCONTINUED | OUTPATIENT
Start: 2025-04-16 | End: 2025-04-19 | Stop reason: HOSPADM

## 2025-04-16 RX ORDER — ACETAMINOPHEN 650 MG/1
650 SUPPOSITORY RECTAL EVERY 4 HOURS PRN
Status: DISCONTINUED | OUTPATIENT
Start: 2025-04-16 | End: 2025-04-19 | Stop reason: HOSPADM

## 2025-04-16 RX ORDER — SODIUM CHLORIDE 0.9 % (FLUSH) 0.9 %
10 SYRINGE (ML) INJECTION AS NEEDED
Status: DISCONTINUED | OUTPATIENT
Start: 2025-04-16 | End: 2025-04-19 | Stop reason: HOSPADM

## 2025-04-16 RX ORDER — FUROSEMIDE 10 MG/ML
40 INJECTION INTRAMUSCULAR; INTRAVENOUS
Status: DISCONTINUED | OUTPATIENT
Start: 2025-04-17 | End: 2025-04-19 | Stop reason: HOSPADM

## 2025-04-16 RX ORDER — BISACODYL 5 MG/1
5 TABLET, DELAYED RELEASE ORAL DAILY PRN
Status: DISCONTINUED | OUTPATIENT
Start: 2025-04-16 | End: 2025-04-19 | Stop reason: HOSPADM

## 2025-04-16 RX ORDER — AZELASTINE 1 MG/ML
2 SPRAY, METERED NASAL 2 TIMES DAILY
Status: DISCONTINUED | OUTPATIENT
Start: 2025-04-16 | End: 2025-04-19 | Stop reason: HOSPADM

## 2025-04-16 RX ORDER — FINASTERIDE 5 MG/1
5 TABLET, FILM COATED ORAL DAILY
Status: DISCONTINUED | OUTPATIENT
Start: 2025-04-17 | End: 2025-04-19 | Stop reason: HOSPADM

## 2025-04-16 RX ORDER — ISOSORBIDE MONONITRATE 30 MG/1
30 TABLET, EXTENDED RELEASE ORAL DAILY
Status: DISCONTINUED | OUTPATIENT
Start: 2025-04-17 | End: 2025-04-19 | Stop reason: HOSPADM

## 2025-04-16 RX ORDER — ACETAMINOPHEN 325 MG/1
650 TABLET ORAL EVERY 4 HOURS PRN
Status: DISCONTINUED | OUTPATIENT
Start: 2025-04-16 | End: 2025-04-19 | Stop reason: HOSPADM

## 2025-04-16 RX ORDER — ACETAMINOPHEN 325 MG/1
650 TABLET ORAL AS NEEDED
Status: DISCONTINUED | OUTPATIENT
Start: 2025-04-16 | End: 2025-04-16

## 2025-04-16 RX ORDER — ARFORMOTEROL TARTRATE 15 UG/2ML
15 SOLUTION RESPIRATORY (INHALATION)
Status: DISCONTINUED | OUTPATIENT
Start: 2025-04-16 | End: 2025-04-19 | Stop reason: HOSPADM

## 2025-04-16 RX ORDER — PANTOPRAZOLE SODIUM 40 MG/1
40 TABLET, DELAYED RELEASE ORAL
Status: DISCONTINUED | OUTPATIENT
Start: 2025-04-17 | End: 2025-04-19 | Stop reason: HOSPADM

## 2025-04-16 RX ORDER — AMOXICILLIN 250 MG
2 CAPSULE ORAL 2 TIMES DAILY PRN
Status: DISCONTINUED | OUTPATIENT
Start: 2025-04-16 | End: 2025-04-19 | Stop reason: HOSPADM

## 2025-04-16 RX ORDER — IPRATROPIUM BROMIDE AND ALBUTEROL SULFATE 2.5; .5 MG/3ML; MG/3ML
3 SOLUTION RESPIRATORY (INHALATION) EVERY 4 HOURS PRN
Status: DISCONTINUED | OUTPATIENT
Start: 2025-04-16 | End: 2025-04-19 | Stop reason: HOSPADM

## 2025-04-16 RX ORDER — SODIUM CHLORIDE 0.9 % (FLUSH) 0.9 %
10 SYRINGE (ML) INJECTION EVERY 12 HOURS SCHEDULED
Status: DISCONTINUED | OUTPATIENT
Start: 2025-04-16 | End: 2025-04-19 | Stop reason: HOSPADM

## 2025-04-16 RX ORDER — FUROSEMIDE 10 MG/ML
40 INJECTION INTRAMUSCULAR; INTRAVENOUS ONCE
Status: COMPLETED | OUTPATIENT
Start: 2025-04-16 | End: 2025-04-16

## 2025-04-16 RX ORDER — ACETAMINOPHEN 160 MG/5ML
650 SOLUTION ORAL EVERY 4 HOURS PRN
Status: DISCONTINUED | OUTPATIENT
Start: 2025-04-16 | End: 2025-04-19 | Stop reason: HOSPADM

## 2025-04-16 RX ORDER — MONTELUKAST SODIUM 10 MG/1
10 TABLET ORAL NIGHTLY
Status: DISCONTINUED | OUTPATIENT
Start: 2025-04-16 | End: 2025-04-19 | Stop reason: HOSPADM

## 2025-04-16 RX ORDER — NITROGLYCERIN 0.4 MG/1
0.4 TABLET SUBLINGUAL
Status: DISCONTINUED | OUTPATIENT
Start: 2025-04-16 | End: 2025-04-19 | Stop reason: HOSPADM

## 2025-04-16 RX ADMIN — FUROSEMIDE 40 MG: 10 INJECTION, SOLUTION INTRAMUSCULAR; INTRAVENOUS at 14:22

## 2025-04-16 RX ADMIN — METHYLPREDNISOLONE SODIUM SUCCINATE 40 MG: 125 INJECTION INTRAMUSCULAR; INTRAVENOUS at 18:09

## 2025-04-16 RX ADMIN — BUDESONIDE 0.5 MG: 0.5 INHALANT RESPIRATORY (INHALATION) at 19:08

## 2025-04-16 RX ADMIN — CEFTRIAXONE SODIUM 2000 MG: 2 INJECTION, POWDER, FOR SOLUTION INTRAMUSCULAR; INTRAVENOUS at 18:09

## 2025-04-16 RX ADMIN — ARFORMOTEROL TARTRATE 15 MCG: 15 SOLUTION RESPIRATORY (INHALATION) at 19:05

## 2025-04-16 RX ADMIN — MONTELUKAST 10 MG: 10 TABLET, FILM COATED ORAL at 20:21

## 2025-04-16 RX ADMIN — Medication 10 ML: at 20:21

## 2025-04-16 RX ADMIN — AZITHROMYCIN DIHYDRATE 500 MG: 500 INJECTION, POWDER, LYOPHILIZED, FOR SOLUTION INTRAVENOUS at 18:09

## 2025-04-16 RX ADMIN — METOPROLOL SUCCINATE 25 MG: 25 TABLET, EXTENDED RELEASE ORAL at 18:09

## 2025-04-16 RX ADMIN — AZELASTINE HYDROCHLORIDE 2 SPRAY: 137 SPRAY, METERED NASAL at 20:21

## 2025-04-16 NOTE — TELEPHONE ENCOUNTER
Patient and wife called stating patient was seen by PVC yesterday, had EKG and was found to be out of rhythm. Patient has been having issues with fluid retention, and today just started having chest discomfort/ ache- patient doesn't feel well.     Advised patient to go to ER for evaluation for worsening symptoms. Went over medications. Patient wife has been giving patient a whole tablet of torsemide to help with fluid overload, but main concern of patient felling poorly and can tell his heart is out of rhythm.     Patient takes amiodarone 1/2 tablet, patient wife would like to know if giving him a whole amiodarone would help    BP//77-88 irregular    Please advise

## 2025-04-16 NOTE — TELEPHONE ENCOUNTER
Agree with ER precautions for severe symptoms.  Currently taking toprol 25 mg twice daily, recommend increasing to 50 mg twice daily.  Currently taking torsemide 20 mg every other day, take 20 mg daily x 5 days then resume every other day.  Bring bp and weight log to appt next week.

## 2025-04-16 NOTE — TELEPHONE ENCOUNTER
MANPREET patients wife. Clarification on metoprolol, patient only takes 25 mg at bedtime. Advised patient to take a 25 mg now. Patient did take a whole tablet of torsemide today, not urinating as much as expected. Patient is having difficulty breathing, with continued chest discomfort.     Advised patient to go to ER for evaluation. Patient wife verbalized understanding and appreciation.

## 2025-04-16 NOTE — PLAN OF CARE
Goal Outcome Evaluation:      Patient alert and able to make needs known. Shortness of breath noted when ambulating. Wife at bedside. Will continue with plan of care

## 2025-04-16 NOTE — CASE MANAGEMENT/SOCIAL WORK
Discharge Planning Assessment   [place]     Patient Name: Layo Cee  MRN: [id number]  Today's Date: 4/16/2025    Admit Date: 4/16/2025        Discharge Needs Assessment       Row Name 04/16/25 1711       Living Environment    People in Home spouse (P)     Current Living Arrangements home (P)     Potentially Unsafe Housing Conditions none (P)     In the past 12 months has the electric, gas, oil, or water company threatened to shut off services in your home? No (P)     Primary Care Provided by self (P)     Provides Primary Care For no one (P)     Family Caregiver if Needed spouse (P)     Quality of Family Relationships helpful;supportive;involved (P)     Able to Return to Prior Arrangements yes (P)        Resource/Environmental Concerns    Resource/Environmental Concerns none (P)     Transportation Concerns none (P)        Transportation Needs    In the past 12 months, has lack of transportation kept you from medical appointments or from getting medications? no (P)     In the past 12 months, has lack of transportation kept you from meetings, work, or from getting things needed for daily living? No (P)        Food Insecurity    Within the past 12 months, you worried that your food would run out before you got the money to buy more. Never true (P)     Within the past 12 months, the food you bought just didn't last and you didn't have money to get more. Never true (P)        Transition Planning    Patient/Family Anticipates Transition to home (P)     Patient/Family Anticipated Services at Transition none (P)     Transportation Anticipated family or friend will provide (P)        Discharge Needs Assessment    Readmission Within the Last 30 Days no previous admission in last 30 days (P)     Current Outpatient/Agency/Support Group other (see comments) (P)   outpatient cardio rehab    Equipment Currently Used at Home none (P)     Concerns to be Addressed no discharge needs identified (P)     Do you want help finding or  keeping work or a job? I do not need or want help (P)     Do you want help with school or training? For example, starting or completing job training or getting a high school diploma, GED or equivalent No (P)     Anticipated Changes Related to Illness none (P)     Equipment Needed After Discharge none (P)                    Discharge Plan    No documentation.                      Demographic Summary       Row Name 04/16/25 1710       General Information    Preferred Language English (P)                    Functional Status       Row Name 04/16/25 1710       Physical Activity    On average, how many days per week do you engage in moderate to strenuous exercise (like a brisk walk)? 2 days (P)     On average, how many minutes do you engage in exercise at this level? 40 min (P)     Number of minutes of exercise per week 80 (P)        Functional Status, IADL    Medications independent (P)     Meal Preparation independent (P)     Housekeeping independent (P)     Laundry independent (P)     Shopping independent (P)     If for any reason you need help with day-to-day activities such as bathing, preparing meals, shopping, managing finances, etc., do you get the help you need? I don't need any help (P)                    Psychosocial       Row Name 04/16/25 1710       Mental Health    Little interest or pleasure in doing things Not at all (P)     Feeling down, depressed, or hopeless Not at all (P)        Stress    Do you feel stress - tense, restless, nervous, or anxious, or unable to sleep at night because your mind is troubled all the time - these days? Not at all (P)        Coping/Stress    Sources of Support spouse;adult child(padmini) (P)        Developmental Stage (Eriksson's Stages of Development)    Developmental Stage Stage 8 (65 years-death/Late Adulthood) Integrity vs. Despair (P)                    Abuse/Neglect       Row Name 04/16/25 1711       Personal Safety    Feels Unsafe at Home or Work/School no (P)     Feels  Threatened by Someone no (P)     Does Anyone Try to Keep You From Having Contact with Others or Doing Things Outside Your Home? no (P)     Physical Signs of Abuse Present no (P)                    Legal       Row Name 04/16/25 1726       Financial Resource Strain    How hard is it for you to pay for the very basics like food, housing, medical care, and heating? Not hard (P)                   Sw met with pt and pt wife at bedside on this date. Pt lives at home with wife. Pt does not use any medical equipment. Pt does use outpatient cardio rehab twice a week here at the hospital. Pt denies any needs at this time.  [place], Social Work Student

## 2025-04-16 NOTE — Clinical Note
Level of Care: Telemetry [5]   Diagnosis: SOB (shortness of breath) [532142]   Admitting Physician: ALEKSANDRA BANKS [[id number]]   Attending Physician: ALEKSANDRA BANKS [[id number]]   Certification: I Certify That Inpatient Hospital Services Are Medically Necessary For Greater Than 2 Midnights

## 2025-04-16 NOTE — H&P
[facility]  Date: 2025   Patient Name: Layo Cee  : 1940  MRN: [id number]  Primary Care Physician:  Tevin Zavala MD  Date of admission: 2025    Subjective shortness of breath  Subjective     Chief Complaint: Shortness of breath    HPI:    Layo eCe is a 85 y.o. male with a past medical history of atrial fibrillation, BPH, congestive heart failure, COPD on 2 L of nocturnal oxygen, essential hypertension who started having trouble breathing this morning he feels as though he cannot get enough air in.  Patient is in cardiopulmonary rehab.  Wife states that the patient is a pound lighter on his home scale.    On arrival to the ED, patient's temperature 98.3, pulse 98, respiratory 34, blood pressure 168/76, and he is on 2 L of oxygen saturating 92%    Chest x-ray shows no active disease.    CT of the chest without contrast: Mild interlobular septal thickening perihilar groundglass opacities.  Favored to represent mild pulmonary edema.  Viral/atypical infection can appear similar.  Emphysema.    On labs, patient's troponin is 38, and next troponin is 42.  With a delta T of 4.  proBNP 4541.  Anion gap of 15.1.  Creatinine 1.58.  Glucose of 116.  Lactate of 1.4.  White blood cell count of 16.25.  Patient's viral panel negative for COVID influenza.    QTc is 400.  Personal History     Past Medical History:  Past Medical History:   Diagnosis Date    Acute on chronic diastolic CHF (congestive heart failure) 2022    Arthritis     Atrial fibrillation, persistent 5/3/2022    Persistent atrial fibrillation status post extensive ablation and pulmonary vein isolation by Dr. Aguilar on 6/3/2022, with reoccurring rapid A. fib, and then successful cardioversion on 2022    BPH (benign prostatic hyperplasia)     CHF (congestive heart failure)     COPD (chronic obstructive pulmonary disease)     Coronary artery disease of bypass graft of native heart with stable  angina pectoris     (1) Coronary artery disease, s/p CABG in the past. Cardiac catheterization in July 2016 showed patentLIMA graft to the LAD and occluded SVGs. Native LAD is 100% occluded in the mid portion. Native LCx has no significant disease. Native RCA is 100% occluded and it is a . Repeat cath in June, 2021, underwent stent placement to diagnonal branch.    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 Lauren Valencia        Past Surgical History:  Past Surgical History:   Procedure Laterality Date    BLADDER SURGERY      CARDIAC CATHETERIZATION  07/2016    CARDIAC CATHETERIZATION N/A 8/9/2021    Procedure: Left Heart Cath with possible angioplasty;  Surgeon: Jack Ladd MD;  Location: [facility] CATH INVASIVE LOCATION;  Service: Cardiovascular;  Laterality: N/A;  Please schedule the procedure with Dr. Jack Ladd MD on 8/9/2021    CARDIAC ELECTROPHYSIOLOGY PROCEDURE N/A 6/3/2022    Procedure: Ablation atrial fibrillation;  Surgeon: Irineo Aguilar MD;  Location: [facility] CATH INVASIVE LOCATION;  Service: Cardiovascular;  Laterality: N/A;    CARDIAC ELECTROPHYSIOLOGY PROCEDURE N/A 6/3/2022    Procedure: Ablation atrial flutter/CTI;  Surgeon: Irineo Aguilar MD;  Location: [facility] CATH INVASIVE LOCATION;  Service: Cardiovascular;  Laterality: N/A;    CARDIAC SURGERY      HEART BYPASS    COLONOSCOPY  2011    CORONARY ARTERY BYPASS GRAFT      CYSTOSCOPY  03/19/2019    WITH BILATERAL RETROGRADE PYELOGRAPHY    ELECTRICAL  CARDIOVERSION  2022         ENDOSCOPY  2016    PROSTATE SURGERY         Family History:   Family History   Problem Relation Age of Onset    Hypertension Mother     Heart disease Father     Other Brother         GASTROINTESTINAL CANCER    Kidney cancer Brother        Social History:   Social History     Socioeconomic History    Marital status:    Tobacco Use    Smoking status: Former     Current packs/day: 0.00     Average packs/day: 0.5 packs/day for 40.0 years (20.0 ttl pk-yrs)     Types: Cigarettes     Start date:      Quit date:      Years since quittin.3     Passive exposure: Past    Smokeless tobacco: Never   Vaping Use    Vaping status: Never Used   Substance and Sexual Activity    Alcohol use: Not Currently    Drug use: Never    Sexual activity: Defer       Home Medications:  Allopurinol, B Complex-C, Coenzyme Q10, Cranberry, Diclofenac Sodium, Fluticasone-Umeclidin-Vilant, acetaminophen, amiodarone, aspirin, azelastine, calcium carbonate, dilTIAZem CD, finasteride, fluticasone, ipratropium-albuterol, isosorbide mononitrate, metoprolol succinate XL, montelukast, nitroglycerin, pantoprazole, pitavastatin calcium, rivaroxaban, sacubitril-valsartan, tamsulosin, and torsemide    Allergies:  Allergies   Allergen Reactions    Carvedilol Swelling and Anaphylaxis    Levaquin [Levofloxacin] Unknown - Low Severity       Review of Systems   All systems were reviewed and negative except for: Shortness of breath    Objective   Objective     Vitals:   Temp:  [99.3 °F (37.4 °C)] 99.3 °F (37.4 °C)  Heart Rate:  [] 88  Resp:  [23-34] 25  BP: (116-168)/(52-93) 126/57  Flow (L/min) (Oxygen Therapy):  [2-3] 3    Physical Exam    Constitutional: Awake, alert, no acute distress   Eyes: Pupils equal, sclerae anicteric, no conjunctival injection   HENT: NCAT, mucous membranes moist   Neck: Supple, no thyromegaly, no lymphadenopathy, trachea midline   Respiratory: Diminished   Cardiovascular: RRR, no  murmurs, rubs, or gallops, palpable pedal pulses bilaterally   Gastrointestinal: Positive bowel sounds, soft, nontender, nondistended   Musculoskeletal: No bilateral ankle edema, no clubbing or cyanosis to extremities   Psychiatric: Appropriate affect, cooperative   Neurologic: Oriented x 3, strength symmetric in all extremities, Cranial Nerves grossly intact to confrontation, speech clear   Skin: No rashes     Result Review    Result Review:  I have personally reviewed the results from the time of this admission to 4/16/2025 16:22 EDT and agree with these findings:  [x]  Laboratory  [x]  Microbiology  [x]  Radiology  [x]  EKG/Telemetry   [x]  Cardiology/Vascular   [x]  Pathology  [x]  Old records  []  Other:      Assessment & Plan   Assessment / Plan   #1 CHF exacerbation  -EF 46 to 50%.  -GDMT: Patient is on Entresto, torsemide, Imdur.  Patient not on Jardiance or Farxiga.  -Lasix 40 twice daily.    #2 atrial fibrillation continue home amiodarone, metoprolol, rivaroxaban    #3 COPD possible mild exacerbation  -DuoNeb, Brovana, Pulmicort, RT for bronchopulmonary hygiene, steroids.      #4-Possible pneumonia   -Will empirically cover with antibiotics for possible pneumonia with low threshold to stop since patient has leukocytosis.  - Follow respiratory panel.    #5 BPH continue Flomax, Proscar    #6 CKD patient close to his baseline of 1.6.  Patient is at 1.5 today.      VTE Prophylaxis:  Mechanical & pharmacologic VTE prophylaxis orders are signed & held.         CODE STATUS:    Code Status (Patient has no pulse and is not breathing): CPR (Attempt to Resuscitate)  Medical Interventions (Patient has pulse or is breathing): Full Support  Level Of Support Discussed With: Patient      Admission Status:  I believe this patient meets inpatient status.    Electronically signed by Aicha Zhang DO, 04/16/25, 4:22 PM EDT.

## 2025-04-16 NOTE — ED PROVIDER NOTES
"Time: 2:08 PM EDT  Date of encounter:  4/16/2025  Independent Historian/Clinical History and Information was obtained by:   Patient and spouse  Chief Complaint: Shortness of breath    History is limited by: N/A    History of Present Illness:  Patient is a 85 y.o. year old male who presents to the emergency department for evaluation of shortness of breath.  Patient reports symptoms began this morning around 6 AM.  Patient complains of \"trouble breathing.  Patient describes as feeling he cannot get the air in.  Patient does report having some chills this morning.  Additionally the patient complains of some pressure across his chest and points to his mid chest area.  Patient does have a history of COPD and is on home oxygen at 2 L at bedtime only.  Patient denies any documented fevers.    Patient Care Team  Primary Care Provider: Tevin Zavala MD    Past Medical History:     Allergies   Allergen Reactions    Carvedilol Swelling and Anaphylaxis    Levaquin [Levofloxacin] Unknown - Low Severity     Past Medical History:   Diagnosis Date    Acute on chronic diastolic CHF (congestive heart failure) 6/6/2022    Arthritis     Atrial fibrillation, persistent 5/3/2022    Persistent atrial fibrillation status post extensive ablation and pulmonary vein isolation by Dr. Aguilar on 6/3/2022, with reoccurring rapid A. fib, and then successful cardioversion on 7/1/2022    BPH (benign prostatic hyperplasia)     CHF (congestive heart failure)     COPD (chronic obstructive pulmonary disease)     Coronary artery disease of bypass graft of native heart with stable angina pectoris     (1) Coronary artery disease, s/p CABG in the past. Cardiac catheterization in July 2016 showed patentLIMA graft to the LAD and occluded SVGs. Native LAD is 100% occluded in the mid portion. Native LCx has no significant disease. Native RCA is 100% occluded and it is a . Repeat cath in June, 2021, underwent stent placement to diagnonal branch.    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 Lauren Valencia      Past Surgical History:   Procedure Laterality Date    BLADDER SURGERY      CARDIAC CATHETERIZATION  07/2016    CARDIAC CATHETERIZATION N/A 8/9/2021    Procedure: Left Heart Cath with possible angioplasty;  Surgeon: Jack Ladd MD;  Location: [facility] CATH INVASIVE LOCATION;  Service: Cardiovascular;  Laterality: N/A;  Please schedule the procedure with Dr. Jack Ladd MD on 8/9/2021    CARDIAC ELECTROPHYSIOLOGY PROCEDURE N/A 6/3/2022    Procedure: Ablation atrial fibrillation;  Surgeon: Irineo Aguilar MD;  Location:  [facility] CATH INVASIVE LOCATION;  Service: Cardiovascular;  Laterality: N/A;    CARDIAC ELECTROPHYSIOLOGY PROCEDURE N/A 6/3/2022    Procedure: Ablation atrial flutter/CTI;  Surgeon: Irineo Aguilar MD;  Location:  [facility] CATH INVASIVE LOCATION;  Service: Cardiovascular;  Laterality: N/A;    CARDIAC SURGERY      HEART BYPASS    COLONOSCOPY  2011    CORONARY ARTERY BYPASS GRAFT      CYSTOSCOPY  03/19/2019    WITH BILATERAL RETROGRADE PYELOGRAPHY    ELECTRICAL CARDIOVERSION  5/12/2022         ENDOSCOPY  2016    PROSTATE SURGERY       Family History   Problem Relation Age of Onset    Hypertension Mother     Heart disease Father     Other Brother         GASTROINTESTINAL CANCER    Kidney cancer Brother        Home Medications:  Prior to Admission medications    Medication Sig Start Date End Date Taking? Authorizing Provider   acetaminophen (TYLENOL) 325 MG tablet Take 2  tablets by mouth As Needed for Mild Pain.    ProviderSammy MD   allopurinol 200 MG tablet Take 200 mg by mouth Daily.  Patient taking differently: Take 100 mg by mouth Every Morning. 6/5/24   Noble Kc MD   amiodarone (PACERONE) 200 MG tablet Take 1 tablet by mouth Every Other Day.  Patient taking differently: Take 0.5 tablets by mouth Every Other Day.    Sammy Gomez MD   aspirin (aspirin) 81 MG EC tablet Take 1 tablet by mouth Daily. 8/15/22   Darnell Stevenson MD   azelastine (ASTELIN) 0.1 % nasal spray Administer 2 sprays into the nostril(s) as directed by provider 2 (Two) Times a Day. Use in each nostril as directed 2/27/25   Tevin Zavala MD   calcium carbonate (OS-LIANNA) 600 MG tablet Take 1 tablet by mouth Every Night.    ProviderSammy MD   Coenzyme Q10 100 MG tablet Take 1 tablet by mouth Daily.    Sammy Gomez MD   Cranberry 400 MG tablet Take  by mouth.    ProviderSammy MD   Diclofenac Sodium (VOLTAREN) 1 % gel gel Apply 2 g topically to the appropriate area as directed 4 (Four) Times a Day. 9/29/22   Ever Flores MD   finasteride (PROSCAR) 5 MG tablet Take 1 tablet by mouth Daily. 10/29/24   Ivy Peng APRN   fluticasone (FLONASE) 50 MCG/ACT nasal spray Administer 1 spray into the nostril(s) as directed by provider Every Night. 12/28/21 4/10/25  Sammy Gomez MD   Fluticasone-Umeclidin-Vilant (Trelegy Ellipta) 100-62.5-25 MCG/ACT inhaler Inhale 1 puff Daily. 9/24/24   Kolton Brink MD   ipratropium-albuterol (DUO-NEB) 0.5-2.5 mg/3 ml nebulizer Take 3 mL by nebulization 4 (Four) Times a Day As Needed for Wheezing. 3/18/24   Kolton Brink MD   isosorbide mononitrate (IMDUR) 30 MG 24 hr tablet Take 1 tablet by mouth daily.  Patient taking differently: Take 0.5 tablets by mouth Daily. 7/8/24   Mely Smith APRN   Livalo 1 MG tablet tablet Take 1 tablet by mouth every night. 6/10/24   Mely Smith APRN   metoprolol succinate XL  (TOPROL-XL) 25 MG 24 hr tablet Take 1 tablet by mouth Every 12 (Twelve) Hours. 24   Mely Smith APRN   montelukast (SINGULAIR) 10 MG tablet Take 1 tablet by mouth Every Night. 24   Kolton Brink MD   nitroglycerin (NITROSTAT) 0.4 MG SL tablet Place 1 tablet under the tongue Every 5 (Five) Minutes As Needed for Chest Pain for up to 30 days. Take no more than 3 doses in 15 minutes. 12/26/24 4/10/25  Tevin Zavala MD   NON FORMULARY [place]    ProviderSammy MD   pantoprazole (PROTONIX) 40 MG EC tablet Take 1 tablet by mouth Daily. 24   Darnell Stevenson MD   predniSONE (DELTASONE) 20 MG tablet Take 2 tablets by mouth Daily for 5 days. 4/10/25 4/15/25  Tevin Zavala MD   rivaroxaban (Xarelto) 15 MG tablet Take 1 tablet by mouth Daily With Dinner. 3/11/25   Darnell Stevenson MD   sacubitril-valsartan (Entresto) 24-26 MG tablet Take 1 tablet by mouth 2 (Two) Times a Day. 24   Jessica Chery APRN   tamsulosin (FLOMAX) 0.4 MG capsule 24 hr capsule Take 1 capsule by mouth Daily. 10/29/24   Ivy Peng APRN   torsemide (DEMADEX) 20 MG tablet Take 1 tablet by mouth Every Other Day.  Patient taking differently: Take 0.5 tablets by mouth Every Other Day. 3/17/25   Darnell Stevenson MD   VITAMIN B COMPLEX-C PO Take 1 tablet by mouth Daily.    Provider, MD Sammy   dilTIAZem CD (CARDIZEM CD) 180 MG 24 hr capsule Take 1 capsule by mouth Daily for 30 days. 22  Ever Flores MD        Social History:   Social History     Tobacco Use    Smoking status: Former     Current packs/day: 0.00     Average packs/day: 0.5 packs/day for 40.0 years (20.0 ttl pk-yrs)     Types: Cigarettes     Start date:      Quit date:      Years since quittin.3     Passive exposure: Past    Smokeless tobacco: Never   Vaping Use    Vaping status: Never Used   Substance Use Topics    Alcohol use: Not Currently    Drug use: Never         Review of Systems:  Review of Systems  "  Constitutional:  Negative for chills and fever.   HENT:  Negative for congestion, ear pain and sore throat.    Eyes:  Negative for pain.   Respiratory:  Positive for shortness of breath. Negative for cough and chest tightness.    Cardiovascular:  Positive for chest pain.   Gastrointestinal:  Negative for abdominal pain, diarrhea, nausea and vomiting.   Genitourinary:  Negative for flank pain and hematuria.   Musculoskeletal:  Negative for joint swelling.   Skin:  Negative for pallor.   Neurological:  Negative for seizures and headaches.   All other systems reviewed and are negative.       Physical Exam:  /52   Pulse 87   Temp 99.3 °F (37.4 °C) (Oral)   Resp 23   Ht 177.8 cm (70\")   Wt 97.3 kg (214 lb 8.1 oz)   SpO2 93%   BMI 30.78 kg/m²     Physical Exam    Vital signs were reviewed under triage note.  General appearance - Patient appears well-developed and well-nourished.  Patient is in no acute distress.  Patient is ill-appearing.  Head - Normocephalic, atraumatic.  Pupils - Equal, round, reactive to light.  Extraocular muscles are intact.  Conjunctiva is clear.  Nasal - Normal inspection.  No evidence of trauma or epistaxis.  Tympanic membranes - Gray, intact without erythema or retractions.  Oral mucosa - Pink and moist without lesions or erythema.  Uvula is midline.  Chest wall - Atraumatic.  Chest wall is nontender.  There are no vesicular rashes noted.  Neck - Supple.  Trachea was midline.  There is no palpable lymphadenopathy or thyromegaly.  There are no meningeal signs  Lungs - The patient was tachypneic with accessory muscle usage.  Auscultation revealed moderately decreased breath sounds.  Heart - Regular rate and rhythm.  There is frequent ectopy.    Abdomen - Soft.  Bowel sounds are present.  There is no palpable tenderness.  There is no rebound, guarding, or rigidity.  There are no palpable masses.  There are no pulsatile masses.  Back - Spine is straight and midline.  There is no CVA " tenderness.  Extremities - Intact x4 with full range of motion.  There is 1+ palpable edema.  Pulses are intact x4 and equal.  Neurologic - Patient is awake, alert, and oriented x3.  Cranial nerves II through XII are grossly intact.  Motor and sensory functions grossly intact.  Cerebellar function was normal.  Integument - There are no rashes.  There are no petechia or purpura lesions noted.  There are no vesicular lesions noted.           Procedures:  Procedures      Medical Decision Making:      Comorbidities that affect care:    CHF, hypertension, OCD, COPD, CAD, CHF, BPH, GERD, atrial fibrillation    External Notes reviewed:    Previous Clinic Note: Office visit with Dr. Tevin Zavala on 4/10/2025 was reviewed by me.      The following orders were placed and all results were independently analyzed by me:  Orders Placed This Encounter   Procedures    COVID-19, FLU A/B, RSV PCR 1 HR TAT - Swab, Nasopharynx    Blood Culture - Blood,    Blood Culture - Blood,    XR Chest 1 View    CT Chest Without Contrast Diagnostic    [place] Draw    Comprehensive Metabolic Panel    BNP    High Sensitivity Troponin T    CBC Auto Differential    High Sensitivity Troponin T 1Hr    Lactic Acid, Plasma    Procalcitonin    Diet: Regular/House; Fluid Consistency: Thin (IDDSI 0)    Undress & Gown    Continuous Pulse Oximetry    Vital Signs    Hospitalist (on-call MD unless specified)    Oxygen Therapy- Nasal Cannula; Titrate 1-6 LPM Per SpO2; 90 - 95%    ECG 12 Lead ED Triage Standing Order; SOA    Insert Peripheral IV    Inpatient Admission    CBC & Differential    Green Top (Gel)    Lavender Top    Gold Top - SST    Light Blue Top       Medications Given in the Emergency Department:  Medications   sodium chloride 0.9 % flush 10 mL (has no administration in time range)   furosemide (LASIX) injection 40 mg (40 mg Intravenous Given 4/16/25 1422)        ED Course:    ED Course as of 04/16/25 1511   Wed Apr 16, 2025   1406 EKG performed at  1135 was interpreted by me to show a normal sinus rhythm with a ventricular rate of 82 bpm.  Patient has frequent PVCs.  The CO interval is 173 ms.  P waves are normal.  QRS intervals normal.  Axis was at 73 degrees.  Patient is known to have some nonspecific ST-T wave change identified with some diffuse ST segment depression.  QT corrected was 400 ms.  This EKG was noted with the prior dated 1/29/2025 is unchanged. [TB]      ED Course User Index  [TB] Kevin Benton DO       The patient was seen and evaluated in the ED by me.  The above history and physical examination was performed as documented.  Diagnostic data was obtained.  Results reviewed.  Findings were discussed with the patient.  Patient appeared to be in congestive heart failure.  Patient was treated with IV Lasix.  Hospital service was consulted for admission.    Labs:    Lab Results (last 24 hours)       Procedure Component Value Units Date/Time    CBC & Differential [[id number]]  (Abnormal) Collected: 04/16/25 1141    Specimen: Blood Updated: 04/16/25 1148    Narrative:      The following orders were created for panel order CBC & Differential.  Procedure                               Abnormality         Status                     ---------                               -----------         ------                     CBC Auto Differential[[id number]]        Abnormal            Final result                 Please view results for these tests on the individual orders.    Comprehensive Metabolic Panel [[id number]]  (Abnormal) Collected: 04/16/25 1141    Specimen: Blood Updated: 04/16/25 1210     Glucose 116 mg/dL      BUN 24 mg/dL      Creatinine 1.59 mg/dL      Sodium 138 mmol/L      Potassium 3.7 mmol/L      Comment: Slight hemolysis detected by analyzer. Result may be falsely elevated.        Chloride 96 mmol/L      CO2 26.9 mmol/L      Calcium 9.3 mg/dL      Total Protein 7.0 g/dL      Albumin 4.3 g/dL      ALT (SGPT) 26 U/L      AST (SGOT) 23 U/L       Alkaline Phosphatase 59 U/L      Total Bilirubin 0.6 mg/dL      Globulin 2.7 gm/dL      A/G Ratio 1.6 g/dL      BUN/Creatinine Ratio 15.1     Anion Gap 15.1 mmol/L      eGFR 42.8 mL/min/1.73     Narrative:      GFR Categories in Chronic Kidney Disease (CKD)      GFR Category          GFR (mL/min/1.73)    Interpretation  G1                     90 or greater         Normal or high (1)  G2                      60-89                Mild decrease (1)  G3a                   45-59                Mild to moderate decrease  G3b                   30-44                Moderate to severe decrease  G4                    15-29                Severe decrease  G5                    14 or less           Kidney failure          (1)In the absence of evidence of kidney disease, neither GFR category G1 or G2 fulfill the criteria for CKD.    eGFR calculation 2021 CKD-EPI creatinine equation, which does not include race as a factor    BNP [[id number]]  (Abnormal) Collected: 04/16/25 1141    Specimen: Blood Updated: 04/16/25 1204     proBNP 4,541.0 pg/mL     Narrative:      This assay is used as an aid in the diagnosis of individuals suspected of having heart failure. It can be used as an aid in the diagnosis of acute decompensated heart failure (ADHF) in patients presenting with signs and symptoms of ADHF to the emergency department (ED). In addition, NT-proBNP of <300 pg/mL indicates ADHF is not likely.    Age Range Result Interpretation  NT-proBNP Concentration (pg/mL:      <50             Positive            >450                   Gray                 300-450                    Negative             <300    50-75           Positive            >900                  Gray                300-900                  Negative            <300      >75             Positive            >1800                  Gray                300-1800                  Negative            <300    High Sensitivity Troponin T [[id number]]  (Abnormal) Collected:  04/16/25 1141    Specimen: Blood Updated: 04/16/25 1206     HS Troponin T 38 ng/L     Narrative:      High Sensitive Troponin T Reference Range:  <14.0 ng/L- Negative Female for AMI  <22.0 ng/L- Negative Male for AMI  >=14 - Abnormal Female indicating possible myocardial injury.  >=22 - Abnormal Male indicating possible myocardial injury.   Clinicians would have to utilize clinical acumen, EKG, Troponin, and serial changes to determine if it is an Acute Myocardial Infarction or myocardial injury due to an underlying chronic condition.         CBC Auto Differential [[id number]]  (Abnormal) Collected: 04/16/25 1141    Specimen: Blood Updated: 04/16/25 1148     WBC 16.25 10*3/mm3      RBC 4.60 10*6/mm3      Hemoglobin 13.5 g/dL      Hematocrit 42.6 %      MCV 92.6 fL      MCH 29.3 pg      MCHC 31.7 g/dL      RDW 16.7 %      RDW-SD 55.8 fl      MPV 10.9 fL      Platelets 212 10*3/mm3      Neutrophil % 79.4 %      Lymphocyte % 10.0 %      Monocyte % 9.0 %      Eosinophil % 0.4 %      Basophil % 0.2 %      Immature Grans % 1.0 %      Neutrophils, Absolute 12.89 10*3/mm3      Lymphocytes, Absolute 1.63 10*3/mm3      Monocytes, Absolute 1.46 10*3/mm3      Eosinophils, Absolute 0.06 10*3/mm3      Basophils, Absolute 0.04 10*3/mm3      Immature Grans, Absolute 0.17 10*3/mm3      nRBC 0.0 /100 WBC     High Sensitivity Troponin T 1Hr [[id number]]  (Abnormal) Collected: 04/16/25 1243    Specimen: Blood Updated: 04/16/25 1312     HS Troponin T 42 ng/L      Troponin T Numeric Delta 4 ng/L      Troponin T % Delta 11    Narrative:      High Sensitive Troponin T Reference Range:  <14.0 ng/L- Negative Female for AMI  <22.0 ng/L- Negative Male for AMI  >=14 - Abnormal Female indicating possible myocardial injury.  >=22 - Abnormal Male indicating possible myocardial injury.   Clinicians would have to utilize clinical acumen, EKG, Troponin, and serial changes to determine if it is an Acute Myocardial Infarction or myocardial injury due to  "an underlying chronic condition.         Procalcitonin [[id number]]  (Normal) Collected: 04/16/25 1243    Specimen: Blood Updated: 04/16/25 1443     Procalcitonin 0.18 ng/mL     Narrative:      As a Marker for Sepsis (Non-Neonates):    1. <0.5 ng/mL represents a low risk of severe sepsis and/or septic shock.  2. >2 ng/mL represents a high risk of severe sepsis and/or septic shock.    As a Marker for Lower Respiratory Tract Infections that require antibiotic therapy:    PCT on Admission    Antibiotic Therapy       6-12 Hrs later    >0.5                Strongly Recommended  >0.25 - <0.5        Recommended  0.1 - 0.25          Discouraged              Remeasure/reassess PCT  <0.1                Strongly Discouraged     Remeasure/reassess PCT    As 28 day mortality risk marker: \"Change in Procalcitonin Result\" (>80% or <=80%) if Day 0 (or Day 1) and Day 4 values are available. Refer to [email or web address]-[email or web address]    Change in PCT <=80%  A decrease of PCT levels below or equal to 80% defines a positive change in PCT test result representing a higher risk for 28-day all-cause mortality of patients diagnosed with severe sepsis for septic shock.    Change in PCT >80%  A decrease of PCT levels of more than 80% defines a negative change in PCT result representing a lower risk for 28-day all-cause mortality of patients diagnosed with severe sepsis or septic shock.    This test is Prognostic not Diagnostic, if elevated correlate with clinical findings before administering antibiotic treatment.        COVID-19, FLU A/B, RSV PCR 1 HR TAT - Swab, Nasopharynx [[id number]] Collected: 04/16/25 1421    Specimen: Swab from Nasopharynx Updated: 04/16/25 1429    Blood Culture - Blood, Arm, Right [[id number]] Collected: 04/16/25 1445    Specimen: Blood from Arm, Right Updated: 04/16/25 1451    Blood Culture - Blood, Arm, Left [[id number]] Collected: 04/16/25 1445    Specimen: Blood from Arm, Left Updated: 04/16/25 1451    Lactic " Acid, Plasma [[id number]] Collected: 04/16/25 1445    Specimen: Blood from Arm, Left Updated: 04/16/25 1458             Imaging:    XR Chest 1 View  Result Date: 4/16/2025  XR CHEST 1 VW Date of Exam: 4/16/2025 11:53 AM EDT Indication: SOA Triage Protocol Comparison: 4/10/2025 Findings: Cardiomediastinal silhouette is unremarkable. Previous sternotomy. There is scattered small granulomata throughout the lungs. No airspace disease, pneumothorax, nor pleural effusion. No acute osseous abnormality identified.     Impression: No active disease. Electronically Signed: Mathew Alegria MD  4/16/2025 12:03 PM EDT  Workstation ID: [id number]        Differential Diagnosis and Discussion:    Dyspnea: Differential diagnosis includes but is not limited to metabolic acidosis, neurological disorders, psychogenic, asthma, pneumothorax, upper airway obstruction, COPD, pneumonia, noncardiogenic pulmonary edema, interstitial lung disease, anemia, congestive heart failure, and pulmonary embolism    Labs were collected in the emergency department and all labs were reviewed and interpreted by me.  X-ray were performed in the emergency department and all X-ray impressions were independently interpreted by me.  An EKG was performed and the EKG was interpreted by me.  CT scan was performed in the emergency department and the CT scan radiology impression was interpreted by me.    MDM     Amount and/or Complexity of Data Reviewed  Clinical lab tests: reviewed  Tests in the radiology section of CPT®: reviewed  Tests in the medicine section of CPT®: reviewed  Decide to obtain previous medical records or to obtain history from someone other than the patient: yes             Patient Care Considerations:    SEPSIS was considered but is NOT present in the emergency department as SIRS criteria is not present.      Consultants/Shared Management Plan:    Hospitalist: I have discussed the case with Dr. Zhang who agrees to accept the patient for  admission.    Social Determinants of Health:    Patient has presented with family members who are responsible, reliable and will ensure follow up care.      Disposition and Care Coordination:    Admit:   Through independent evaluation of the patient's history, physical, and imperical data, the patient meets criteria for inpatient admission to the hospital.        Final diagnoses:   Shortness of breath        ED Disposition       ED Disposition   Decision to Admit    Condition   --    Comment   Level of Care: Telemetry [5]  Diagnosis: SOB (shortness of breath) [324566]  Admitting Physician: ALEKSANDRA BANKS [[id number]]  Attending Physician: ALEKSANDRA BANKS [[id number]]  Certification: I Certify That Inpatient Hospital Services Are Medically Necessary  For Greater Than 2 Midnights                 This medical record created using voice recognition software.             Kevin Benton DO  04/20/25 1237

## 2025-04-17 ENCOUNTER — APPOINTMENT (OUTPATIENT)
Dept: GENERAL RADIOLOGY | Facility: HOSPITAL | Age: 85
DRG: 291 | End: 2025-04-17
Payer: MEDICARE

## 2025-04-17 ENCOUNTER — APPOINTMENT (OUTPATIENT)
Dept: CARDIAC REHAB | Facility: HOSPITAL | Age: 85
End: 2025-04-17
Payer: MEDICARE

## 2025-04-17 LAB
ANION GAP SERPL CALCULATED.3IONS-SCNC: 15.1 MMOL/L (ref 5–15)
BASOPHILS # BLD AUTO: 0.02 10*3/MM3 (ref 0–0.2)
BASOPHILS NFR BLD AUTO: 0.2 % (ref 0–1.5)
BUN SERPL-MCNC: 31 MG/DL (ref 8–23)
BUN/CREAT SERPL: 19.1 (ref 7–25)
CALCIUM SPEC-SCNC: 8.4 MG/DL (ref 8.6–10.5)
CHLORIDE SERPL-SCNC: 99 MMOL/L (ref 98–107)
CO2 SERPL-SCNC: 24.9 MMOL/L (ref 22–29)
CREAT SERPL-MCNC: 1.62 MG/DL (ref 0.76–1.27)
DEPRECATED RDW RBC AUTO: 55.6 FL (ref 37–54)
EGFRCR SERPLBLD CKD-EPI 2021: 41.9 ML/MIN/1.73
EOSINOPHIL # BLD AUTO: 0 10*3/MM3 (ref 0–0.4)
EOSINOPHIL NFR BLD AUTO: 0 % (ref 0.3–6.2)
ERYTHROCYTE [DISTWIDTH] IN BLOOD BY AUTOMATED COUNT: 16.5 % (ref 12.3–15.4)
GLUCOSE SERPL-MCNC: 167 MG/DL (ref 65–99)
HCT VFR BLD AUTO: 39.2 % (ref 37.5–51)
HGB BLD-MCNC: 12.5 G/DL (ref 13–17.7)
IMM GRANULOCYTES # BLD AUTO: 0.1 10*3/MM3 (ref 0–0.05)
IMM GRANULOCYTES NFR BLD AUTO: 1.1 % (ref 0–0.5)
LYMPHOCYTES # BLD AUTO: 0.7 10*3/MM3 (ref 0.7–3.1)
LYMPHOCYTES NFR BLD AUTO: 8 % (ref 19.6–45.3)
MAGNESIUM SERPL-MCNC: 2.2 MG/DL (ref 1.6–2.4)
MCH RBC QN AUTO: 29.4 PG (ref 26.6–33)
MCHC RBC AUTO-ENTMCNC: 31.9 G/DL (ref 31.5–35.7)
MCV RBC AUTO: 92.2 FL (ref 79–97)
MONOCYTES # BLD AUTO: 0.18 10*3/MM3 (ref 0.1–0.9)
MONOCYTES NFR BLD AUTO: 2.1 % (ref 5–12)
NEUTROPHILS NFR BLD AUTO: 7.77 10*3/MM3 (ref 1.7–7)
NEUTROPHILS NFR BLD AUTO: 88.6 % (ref 42.7–76)
NRBC BLD AUTO-RTO: 0 /100 WBC (ref 0–0.2)
PHOSPHATE SERPL-MCNC: 4.9 MG/DL (ref 2.5–4.5)
PLATELET # BLD AUTO: 200 10*3/MM3 (ref 140–450)
PMV BLD AUTO: 10.9 FL (ref 6–12)
POTASSIUM SERPL-SCNC: 4 MMOL/L (ref 3.5–5.2)
RBC # BLD AUTO: 4.25 10*6/MM3 (ref 4.14–5.8)
SODIUM SERPL-SCNC: 139 MMOL/L (ref 136–145)
WBC NRBC COR # BLD AUTO: 8.77 10*3/MM3 (ref 3.4–10.8)

## 2025-04-17 PROCEDURE — 94799 UNLISTED PULMONARY SVC/PX: CPT

## 2025-04-17 PROCEDURE — 80048 BASIC METABOLIC PNL TOTAL CA: CPT | Performed by: HOSPITALIST

## 2025-04-17 PROCEDURE — 25010000002 METHYLPREDNISOLONE PER 125 MG: Performed by: HOSPITALIST

## 2025-04-17 PROCEDURE — 71045 X-RAY EXAM CHEST 1 VIEW: CPT

## 2025-04-17 PROCEDURE — 25010000002 CEFTRIAXONE PER 250 MG: Performed by: HOSPITALIST

## 2025-04-17 PROCEDURE — 25810000003 SODIUM CHLORIDE 0.9 % SOLUTION 250 ML FLEX CONT: Performed by: HOSPITALIST

## 2025-04-17 PROCEDURE — 84100 ASSAY OF PHOSPHORUS: CPT | Performed by: HOSPITALIST

## 2025-04-17 PROCEDURE — 83735 ASSAY OF MAGNESIUM: CPT | Performed by: HOSPITALIST

## 2025-04-17 PROCEDURE — 94664 DEMO&/EVAL PT USE INHALER: CPT

## 2025-04-17 PROCEDURE — 97161 PT EVAL LOW COMPLEX 20 MIN: CPT

## 2025-04-17 PROCEDURE — 99232 SBSQ HOSP IP/OBS MODERATE 35: CPT | Performed by: INTERNAL MEDICINE

## 2025-04-17 PROCEDURE — 25010000002 AZITHROMYCIN PER 500 MG: Performed by: HOSPITALIST

## 2025-04-17 PROCEDURE — 25010000002 FUROSEMIDE PER 20 MG: Performed by: HOSPITALIST

## 2025-04-17 PROCEDURE — 85025 COMPLETE CBC W/AUTO DIFF WBC: CPT | Performed by: HOSPITALIST

## 2025-04-17 PROCEDURE — 97165 OT EVAL LOW COMPLEX 30 MIN: CPT

## 2025-04-17 RX ADMIN — AZITHROMYCIN DIHYDRATE 500 MG: 500 INJECTION, POWDER, LYOPHILIZED, FOR SOLUTION INTRAVENOUS at 19:38

## 2025-04-17 RX ADMIN — AZELASTINE HYDROCHLORIDE 2 SPRAY: 137 SPRAY, METERED NASAL at 09:48

## 2025-04-17 RX ADMIN — CEFTRIAXONE SODIUM 2000 MG: 2 INJECTION, POWDER, FOR SOLUTION INTRAMUSCULAR; INTRAVENOUS at 18:37

## 2025-04-17 RX ADMIN — ISOSORBIDE MONONITRATE 30 MG: 30 TABLET, EXTENDED RELEASE ORAL at 09:47

## 2025-04-17 RX ADMIN — MONTELUKAST 10 MG: 10 TABLET, FILM COATED ORAL at 20:37

## 2025-04-17 RX ADMIN — ARFORMOTEROL TARTRATE 15 MCG: 15 SOLUTION RESPIRATORY (INHALATION) at 08:18

## 2025-04-17 RX ADMIN — ALLOPURINOL 200 MG: 100 TABLET ORAL at 09:47

## 2025-04-17 RX ADMIN — FUROSEMIDE 40 MG: 10 INJECTION, SOLUTION INTRAMUSCULAR; INTRAVENOUS at 09:47

## 2025-04-17 RX ADMIN — METHYLPREDNISOLONE SODIUM SUCCINATE 40 MG: 125 INJECTION INTRAMUSCULAR; INTRAVENOUS at 05:40

## 2025-04-17 RX ADMIN — FINASTERIDE 5 MG: 5 TABLET, FILM COATED ORAL at 09:47

## 2025-04-17 RX ADMIN — Medication 10 ML: at 20:37

## 2025-04-17 RX ADMIN — RIVAROXABAN 15 MG: 15 TABLET, FILM COATED ORAL at 18:37

## 2025-04-17 RX ADMIN — METOPROLOL SUCCINATE 25 MG: 25 TABLET, EXTENDED RELEASE ORAL at 18:37

## 2025-04-17 RX ADMIN — Medication 10 ML: at 09:51

## 2025-04-17 RX ADMIN — ARFORMOTEROL TARTRATE 15 MCG: 15 SOLUTION RESPIRATORY (INHALATION) at 19:20

## 2025-04-17 RX ADMIN — ASPIRIN 81 MG: 81 TABLET, COATED ORAL at 09:47

## 2025-04-17 RX ADMIN — METOPROLOL SUCCINATE 25 MG: 25 TABLET, EXTENDED RELEASE ORAL at 05:40

## 2025-04-17 RX ADMIN — FUROSEMIDE 40 MG: 10 INJECTION, SOLUTION INTRAMUSCULAR; INTRAVENOUS at 18:37

## 2025-04-17 RX ADMIN — AZELASTINE HYDROCHLORIDE 2 SPRAY: 137 SPRAY, METERED NASAL at 20:37

## 2025-04-17 RX ADMIN — BUDESONIDE 0.5 MG: 0.5 INHALANT RESPIRATORY (INHALATION) at 08:18

## 2025-04-17 RX ADMIN — TAMSULOSIN HYDROCHLORIDE 0.4 MG: 0.4 CAPSULE ORAL at 09:47

## 2025-04-17 RX ADMIN — BUDESONIDE 0.5 MG: 0.5 INHALANT RESPIRATORY (INHALATION) at 19:21

## 2025-04-17 RX ADMIN — PANTOPRAZOLE SODIUM 40 MG: 40 TABLET, DELAYED RELEASE ORAL at 05:40

## 2025-04-17 NOTE — PLAN OF CARE
Goal Outcome Evaluation:  Plan of Care Reviewed With: patient        Progress: no change (First session for evaluation)  Outcome Evaluation: Patient presents with limitations of balance and endurance/activity tolerance which impede his ability to perform ADL and transfers as prior.  The skills of a therapist will be required to safely and effectively implement treatment plan to restore maximal level of function.    Anticipated Discharge Disposition (OT): home with assist, home with home health

## 2025-04-17 NOTE — THERAPY EVALUATION
Respiratory Therapist Broncho-Pulmonary Hygiene Progress Note      Patient Name:  Layo Cee  YOB: 1940    Layo Cee meets the qualification for Level 1 of the Bronco-Pulmonary Hygiene Protocol. This was based on my daily patient assessment and includes review of chest x-ray results, cough ability and quality, oxygenation, secretions or risk for secretion development and patient mobility.     Broncho-Pulmonary Hygiene Assessment:    Level of Movement: Actively changing positions-requires assistance  Disoriented/Follows Commands    Breath Sounds: Diminished and/or coarse rhonchi    Cough: Ineffective, weak or not cough efforts    Chest X-Ray: Possible signs of consolidation and/or atelectasis or clear.     Sputum Productions: None or small amount of thin or watery secretions with effective cough    History and Physical: COPD with mucus plugging or retained secretions    SpO2 to Oxygen Need: greater than 92% on room air or  less than 3L nasal canula    Current SpO2 is: 97 on 3    Based on this information, I have completed the following interventions: Teach/Instruct patient on cough and deep breathe      Electronically signed by Falguni Jacobo CRT, 04/16/25, 8:07 PM EDT.

## 2025-04-17 NOTE — THERAPY EVALUATION
Patient Name: Layo Cee  : 1940    MRN: [id number]                              Today's Date: 2025       Admit Date: 2025    Visit Dx:     ICD-10-CM ICD-9-CM   1. Shortness of breath  R06.02 786.05   2. Difficulty walking  R26.2 719.7   3. Decreased activities of daily living (ADL)  Z78.9 V49.89     Patient Active Problem List   Diagnosis    Unstable angina    Coronary artery disease of bypass graft of native heart with stable angina pectoris    Class 2 obesity due to excess calories without serious comorbidity with body mass index (BMI) of 35.0 to 35.9 in adult    Gout    Typical atrial flutter    Essential hypertension    Frequent PVCs    Mixed hyperlipidemia    Lateral epicondylitis    Allergic rhinitis    Seasonal allergies    Tobacco abuse, in remission    Vitamin D deficiency    Gastroesophageal reflux disease with esophagitis without hemorrhage    Ischemic cardiomyopathy    Centrilobular emphysema    Gastroesophageal reflux disease    Atrial fibrillation, persistent    COPD (chronic obstructive pulmonary disease)    Chronic anticoagulation    Diastolic dysfunction    S/P CABG (coronary artery bypass graft)    Stage 3a chronic kidney disease    S/P ablation of atrial fibrillation    Atrial fibrillation, unspecified type    SOB (shortness of breath)    Chest pain    Mild aortic valve stenosis    Moderate mitral regurgitation    Benign prostatic hyperplasia with lower urinary tract symptoms    Chronic systolic congestive heart failure    Acute exacerbation of CHF (congestive heart failure)    Multifocal pneumonia    Pneumonia due to other gram-negative bacteria     Past Medical History:   Diagnosis Date    Acute on chronic diastolic CHF (congestive heart failure) 2022    Arthritis     Atrial fibrillation, persistent 5/3/2022    Persistent atrial fibrillation status post extensive ablation and pulmonary vein isolation by Dr. Aguilar on 6/3/2022, with reoccurring rapid A. fib, and then  successful cardioversion on 7/1/2022    BPH (benign prostatic hyperplasia)     CHF (congestive heart failure)     COPD (chronic obstructive pulmonary disease)     Coronary artery disease of bypass graft of native heart with stable angina pectoris     (1) Coronary artery disease, s/p CABG in the past. Cardiac catheterization in July 2016 showed patentLIMA graft to the LAD and occluded SVGs. Native LAD is 100% occluded in the mid portion. Native LCx has no significant disease. Native RCA is 100% occluded and it is a . Repeat cath in June, 2021, underwent stent placement to diagnonal branch.    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 Lauren Valencia      Past Surgical History:   Procedure Laterality Date    BLADDER SURGERY      CARDIAC CATHETERIZATION  07/2016    CARDIAC CATHETERIZATION N/A 8/9/2021    Procedure: Left Heart Cath with possible angioplasty;  Surgeon: Jack Ladd MD;  Location: [facility] INVASIVE LOCATION;  Service: Cardiovascular;  Laterality: N/A;  Please schedule the procedure with Dr. Jack Ladd MD on 8/9/2021    CARDIAC ELECTROPHYSIOLOGY PROCEDURE N/A 6/3/2022    Procedure: Ablation atrial fibrillation;  Surgeon: Irineo Aguilar MD;  Location: [facility] CATH INVASIVE LOCATION;  Service: Cardiovascular;  Laterality: N/A;    CARDIAC ELECTROPHYSIOLOGY PROCEDURE N/A 6/3/2022    Procedure: Ablation atrial flutter/CTI;  Surgeon: Irineo Aguilar MD;  Location: [facility] CATH INVASIVE LOCATION;  Service:  Cardiovascular;  Laterality: N/A;    CARDIAC SURGERY      HEART BYPASS    COLONOSCOPY  2011    CORONARY ARTERY BYPASS GRAFT      CYSTOSCOPY  03/19/2019    WITH BILATERAL RETROGRADE PYELOGRAPHY    ELECTRICAL CARDIOVERSION  5/12/2022         ENDOSCOPY  2016    PROSTATE SURGERY        General Information       Row Name 04/17/25 1137          OT Time and Intention    Document Type evaluation  -AV     Mode of Treatment individual therapy;occupational therapy  -AV     Patient Effort good  -AV       Row Name 04/17/25 1137          General Information    Patient Profile Reviewed yes  -AV     Prior Level of Function independent:;ADL's;transfer  Stands to shower (tub).  Stands at sink to groom.  Elevated commode with grab bar.  Ambulates without assistive device.  2 L O2 at night.  Cardiopulmonary rehab.  -AV     Existing Precautions/Restrictions oxygen therapy device and L/min  -AV     Barriers to Rehab none identified  -AV       Row Name 04/17/25 1137          Occupational Profile    Reason for Services/Referral (Occupational Profile) Patient is a 85 year old male admitted to [facility] on 4/16/2025 with shortness of air.  He is currently on fourth floor/3 L O2.   OT consulted due to recent decline in ADL/transfer independence.  No previous OT services for current condition.  -AV       Row Name 04/17/25 1137          Living Environment    People in Home spouse  -AV       Row Name 04/17/25 1137          Home Main Entrance    Number of Stairs, Main Entrance two  -AV       Row Name 04/17/25 1137          Stairs Within Home, Primary    Number of Stairs, Within Home, Primary none  -AV       Row Name 04/17/25 1137          Cognition    Orientation Status (Cognition) --  Patient is alert, pleasant and cooperative- able to retain information and follow commands.  -AV       Row Name 04/17/25 1137          Safety Issues/Impairments Affecting Functional Mobility    Impairments Affecting Function (Mobility)  balance;endurance/activity tolerance  -AV               User Key  (r) = Recorded By, (t) = Taken By, (c) = Cosigned By      Initials Name Provider Type    Mendoza Garcia OT Occupational Therapist                     Mobility/ADL's       Row Name 04/17/25 1138          Bed Mobility    Bed Mobility bed mobility (all) activities  -AV     All Activities, [place] (Bed Mobility) independent  -AV       Row Name 04/17/25 1138          Transfers    Comment, (Transfers) See GA/min assist with RW  -AV       Row Name 04/17/25 1138          Activities of Daily Living    BADL Assessment/Intervention --  Independent feeding.  Min assist grooming with set up while seated.  CGA/min assist bathing and dressing.  Independent use of urinal in standing position/no bowel movement during hospitalization.  -AV               User Key  (r) = Recorded By, (t) = Taken By, (c) = Cosigned By      Initials Name Provider Type    Mendoza Garcia OT Occupational Therapist                   Obj/Interventions       Row Name 04/17/25 1139          Sensory Assessment (Somatosensory)    Sensory Assessment (Somatosensory) UE sensation intact  -AV       [facility] Name 04/17/25 1139          Vision Assessment/Intervention    Visual Impairment/Limitations WFL;corrective lenses for reading  -AV       Row Name 04/17/25 1139          Range of Motion Comprehensive    General Range of Motion bilateral upper extremity ROM WFL  -AV     Comment, General Range of Motion AROM  -AV       [facility] Name 04/17/25 1139          Strength Comprehensive (MMT)    Comment, General Manual Muscle Testing (MMT) Assessment 4/5 bilateral biceps, triceps and   -AV       [facility] Name 04/17/25 1139          Motor Skills    Motor Skills coordination;functional endurance  -AV     Coordination --  Right dominant-difficulty manipulating buttons prior to onset  -AV     Functional Endurance Fair minus  -AV       Row Name 04/17/25 1139          Balance    Dynamic Standing Balance contact  guard;minimal assist  -AV               User Key  (r) = Recorded By, (t) = Taken By, (c) = Cosigned By      Initials Name Provider Type    AV Mendoza Howard, DOROTHY Occupational Therapist                   Goals/Plan       Row Name 04/17/25 1140          Transfer Goal 1 (OT)    Activity/Assistive Device (Transfer Goal 1, OT) transfers, all;walker, rolling  -AV     [place] Level/Cues Needed (Transfer Goal 1, OT) modified independence  -AV     Time Frame (Transfer Goal 1, OT) long term goal (LTG);10 days  -AV       Row Name 04/17/25 1140          Bathing Goal 1 (OT)    Activity/Device (Bathing Goal 1, OT) bathing skills, all;tub bench  -AV     [place] Level/Cues Needed (Bathing Goal 1, OT) modified independence  -AV     Time Frame (Bathing Goal 1, OT) long term goal (LTG);10 days  -AV       Row Name 04/17/25 1140          Dressing Goal 1 (OT)    Activity/Device (Dressing Goal 1, OT) dressing skills, all  -AV     [place]/Cues Needed (Dressing Goal 1, OT) modified independence  -AV     Time Frame (Dressing Goal 1, OT) long term goal (LTG);10 days  -AV       Row Name 04/17/25 1140          Toileting Goal 1 (OT)    Activity/Device (Toileting Goal 1, OT) toileting skills, all;raised toilet seat  -AV     [place] Level/Cues Needed (Toileting Goal 1, OT) modified independence  -AV     Time Frame (Toileting Goal 1, OT) long term goal (LTG);10 days  -AV       Row Name 04/17/25 1140          Grooming Goal 1 (OT)    Activity/Device (Grooming Goal 1, OT) grooming skills, all  standing at sink  -AV     [place] (Grooming Goal 1, OT) modified independence  -AV     Time Frame (Grooming Goal 1, OT) long term goal (LTG);10 days  -AV       Row Name 04/17/25 1140          Problem Specific Goal 1 (OT)    Problem Specific Goal 1 (OT) Patient will demonstrate fair plus endurance/activity tolerance needed to support ADLs.  -AV     Time Frame (Problem Specific Goal 1, OT) long term goal (LTG);10 days  -AV       Row Name  04/17/25 1140          Therapy Assessment/Plan (OT)    Planned Therapy Interventions (OT) activity tolerance training;BADL retraining;functional balance retraining;occupation/activity based interventions;patient/caregiver education/training;ROM/therapeutic exercise;transfer/mobility retraining  -AV               User Key  (r) = Recorded By, (t) = Taken By, (c) = Cosigned By      Initials Name Provider Type    AV Mendoza Howard OT Occupational Therapist                   Clinical Impression       Row Name 04/17/25 1139          Pain Assessment    Additional Documentation Pain Scale: FACES Pre/Post-Treatment (Group)  -AV       Row Name 04/17/25 1139          Pain Scale: FACES Pre/Post-Treatment    Pain: FACES Scale, Pretreatment 0-->no hurt  -AV     Posttreatment Pain Rating 0-->no hurt  -AV       Row Name 04/17/25 1139          Plan of Care Review    Plan of Care Reviewed With patient  -AV     Progress no change  First session for evaluation  -AV     Outcome Evaluation Patient presents with limitations of balance and endurance/activity tolerance which impede his ability to perform ADL and transfers as prior.  The skills of a therapist will be required to safely and effectively implement treatment plan to restore maximal level of function.  -AV       Row Name 04/17/25 1139          Therapy Assessment/Plan (OT)    Patient/Family Therapy Goal Statement (OT) Regain independence to return home  -AV     Rehab Potential (OT) good  -AV     Criteria for Skilled Therapeutic Interventions Met (OT) yes;meets criteria;skilled treatment is necessary  -AV     Therapy Frequency (OT) 5 times/wk  -AV       Row Name 04/17/25 1139          Therapy Plan Review/Discharge Plan (OT)    Equipment Needs Upon Discharge (OT) walker, rolling;tub bench  -AV     Anticipated Discharge Disposition (OT) home with assist;home with home health  -AV       Row Name 04/17/25 1139          Vital Signs    O2 Delivery Pre Treatment nasal cannula  3 L  -AV      O2 Delivery Intra Treatment nasal cannula  3 L  -AV     O2 Delivery Post Treatment nasal cannula  3 L  -AV       Row Name 04/17/25 1139          Positioning and Restraints    Pre-Treatment Position in bed  EOB  -AV     Post Treatment Position bed  -AV     In Bed sitting EOB;call light within reach;encouraged to call for assist;exit alarm on  -AV               User Key  (r) = Recorded By, (t) = Taken By, (c) = Cosigned By      Initials Name Provider Type    Mendoza Garcia, DOROTHY Occupational Therapist                   Outcome Measures       Row Name 04/17/25 1141          How much help from another is currently needed...    Putting on and taking off regular lower body clothing? 3  -AV     Bathing (including washing, rinsing, and drying) 3  -AV     Toileting (which includes using toilet bed pan or urinal) 3  -AV     Putting on and taking off regular upper body clothing 4  -AV     Taking care of personal grooming (such as brushing teeth) 4  -AV     Eating meals 4  -AV     AM-PAC 6 Clicks Score (OT) 21  -AV       Row Name 04/17/25 1000          How much help from another person do you currently need...    Turning from your back to your side while in flat bed without using bedrails? 4  -DP     Moving from lying on back to sitting on the side of a flat bed without bedrails? 4  -DP     Moving to and from a bed to a chair (including a wheelchair)? 4  -DP     Standing up from a chair using your arms (e.g., wheelchair, bedside chair)? 4  -DP     Climbing 3-5 steps with a railing? 4  -DP     To walk in hospital room? 4  -DP     AM-PAC 6 Clicks Score (PT) 24  -DP       Row Name 04/17/25 1141          Functional Assessment    Outcome Measure Options AM-PAC 6 Clicks Daily Activity (OT);Optimal Instrument  -AV       Row Name 04/17/25 1141          Optimal Instrument    Optimal Instrument Optimal - 3  -AV     Bending/Stooping 2  -AV     Standing 2  -AV     Reaching 1  -AV     From the list, choose the 3 activities you would  most like to be able to do without any difficulty Bending/stooping;Standing;Reaching  -AV     Total Score Optimal - 3 5  -AV               User Key  (r) = Recorded By, (t) = Taken By, (c) = Cosigned By      Initials Name Provider Type    AV Mendoza Howard, OT Occupational Therapist    Milagro Biggs, PT Physical Therapist                    Occupational Therapy Education       Title: PT OT SLP Therapies (Done)       Topic: Occupational Therapy (Done)       Point: ADL training (Done)       Learning Progress Summary            Patient Acceptance, E, VU by AV at 4/17/2025 1142                      Point: Home exercise program (Done)       Learning Progress Summary            Patient Acceptance, E, VU by AV at 4/17/2025 1142                      Point: Precautions (Done)       Learning Progress Summary            Patient Acceptance, E, VU by AV at 4/17/2025 1142                      Point: Body mechanics (Done)       Learning Progress Summary            Patient Acceptance, E, VU by AV at 4/17/2025 1142                                      User Key       Initials Effective Dates Name Provider Type Discipline     06/16/21 -  Mendoza Howard OT Occupational Therapist OT                  OT Recommendation and Plan  Planned Therapy Interventions (OT): activity tolerance training, BADL retraining, functional balance retraining, occupation/activity based interventions, patient/caregiver education/training, ROM/therapeutic exercise, transfer/mobility retraining  Therapy Frequency (OT): 5 times/wk  Plan of Care Review  Plan of Care Reviewed With: patient  Progress: no change (First session for evaluation)  Outcome Evaluation: Patient presents with limitations of balance and endurance/activity tolerance which impede his ability to perform ADL and transfers as prior.  The skills of a therapist will be required to safely and effectively implement treatment plan to restore maximal level of function.     Time Calculation:    Evaluation Complexity (OT)  Review Occupational Profile/Medical/Therapy History Complexity: expanded/moderate complexity  Assessment, Occupational Performance/Identification of Deficit Complexity: 1-3 performance deficits  Clinical Decision Making Complexity (OT): problem focused assessment/low complexity  Overall Complexity of Evaluation (OT): low complexity     Time Calculation- OT       Row Name 04/17/25 1142             Time Calculation- OT    OT Received On 04/17/25  -AV      OT Goal Re-Cert Due Date 04/26/25  -AV         Untimed Charges    OT Eval/Re-eval Minutes 32  -AV         Total Minutes    Untimed Charges Total Minutes 32  -AV       Total Minutes 32  -AV                User Key  (r) = Recorded By, (t) = Taken By, (c) = Cosigned By      Initials Name Provider Type    AV Mendoza Howard OT Occupational Therapist                  Therapy Charges for Today       Code Description Service Date Service Provider Modifiers Qty    [id number] HC OT EVAL LOW COMPLEXITY 3 4/17/2025 Mendoza Howard OT GO 1                 Mendoza Howard OT  4/17/2025

## 2025-04-17 NOTE — PLAN OF CARE
Goal Outcome Evaluation:  Plan of Care Reviewed With: patient        Progress: improving  Outcome Evaluation: Pt a/o, on 3LNC, No complaints during the night, call light within reach, will continue plan of care.

## 2025-04-17 NOTE — THERAPY EVALUATION
Acute Care - Physical Therapy Initial Evaluation  YEYO Back     Patient Name: Layo Cee  : 1940  MRN: [id number]  Today's Date: 2025      Visit Dx:     ICD-10-CM ICD-9-CM   1. Shortness of breath  R06.02 786.05   2. 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 stable angina pectoris    Class 2 obesity due to excess calories without serious comorbidity with body mass index (BMI) of 35.0 to 35.9 in adult    Gout    Typical atrial flutter    Essential hypertension    Frequent PVCs    Mixed hyperlipidemia    Lateral epicondylitis    Allergic rhinitis    Seasonal allergies    Tobacco abuse, in remission    Vitamin D deficiency    Gastroesophageal reflux disease with esophagitis without hemorrhage    Ischemic cardiomyopathy    Centrilobular emphysema    Gastroesophageal reflux disease    Atrial fibrillation, persistent    COPD (chronic obstructive pulmonary disease)    Chronic anticoagulation    Diastolic dysfunction    S/P CABG (coronary artery bypass graft)    Stage 3a chronic kidney disease    S/P ablation of atrial fibrillation    Atrial fibrillation, unspecified type    SOB (shortness of breath)    Chest pain    Mild aortic valve stenosis    Moderate mitral regurgitation    Benign prostatic hyperplasia with lower urinary tract symptoms    Chronic systolic congestive heart failure    Acute exacerbation of CHF (congestive heart failure)    Multifocal pneumonia    Pneumonia due to other gram-negative bacteria     Past Medical History:   Diagnosis Date    Acute on chronic diastolic CHF (congestive heart failure) 2022    Arthritis     Atrial fibrillation, persistent 5/3/2022    Persistent atrial fibrillation status post extensive ablation and pulmonary vein isolation by Dr. Aguilar on 6/3/2022, with reoccurring rapid A. fib, and then successful cardioversion on 2022    BPH (benign  prostatic hyperplasia)     CHF (congestive heart failure)     COPD (chronic obstructive pulmonary disease)     Coronary artery disease of bypass graft of native heart with stable angina pectoris     (1) Coronary artery disease, s/p CABG in the past. Cardiac catheterization in July 2016 showed patentLIMA graft to the LAD and occluded SVGs. Native LAD is 100% occluded in the mid portion. Native LCx has no significant disease. Native RCA is 100% occluded and it is a . Repeat cath in June, 2021, underwent stent placement to diagnonal branch.    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 Lauren Valencia      Past Surgical History:   Procedure Laterality Date    BLADDER SURGERY      CARDIAC CATHETERIZATION  07/2016    CARDIAC CATHETERIZATION N/A 8/9/2021    Procedure: Left Heart Cath with possible angioplasty;  Surgeon: Jack Ladd MD;  Location: [facility] CATH INVASIVE LOCATION;  Service: Cardiovascular;  Laterality: N/A;  Please schedule the procedure with Dr. Jack Ladd MD on 8/9/2021    CARDIAC ELECTROPHYSIOLOGY PROCEDURE N/A 6/3/2022    Procedure: Ablation atrial fibrillation;  Surgeon: Irineo Aguilar MD;  Location: [facility] CATH INVASIVE LOCATION;  Service: Cardiovascular;  Laterality: N/A;    CARDIAC ELECTROPHYSIOLOGY PROCEDURE N/A 6/3/2022    Procedure: Ablation atrial flutter/CTI;  Surgeon: Irineo Aguilar MD;  Location: [facility] CATH INVASIVE LOCATION;  Service: Cardiovascular;  Laterality: N/A;    CARDIAC SURGERY      HEART  BYPASS    COLONOSCOPY  2011    CORONARY ARTERY BYPASS GRAFT      CYSTOSCOPY  03/19/2019    WITH BILATERAL RETROGRADE PYELOGRAPHY    ELECTRICAL CARDIOVERSION  5/12/2022         ENDOSCOPY  2016    PROSTATE SURGERY       PT Assessment (Last 12 Hours)       PT Evaluation and Treatment       Row Name 04/17/25 1000          Physical Therapy Time and Intention    Subjective Information complains of;dyspnea  -DP     Document Type evaluation  -DP     Mode of Treatment individual therapy;physical therapy  -DP     Patient Effort good  -DP     Symptoms Noted During/After Treatment dizziness  -DP       Row Name 04/17/25 1000          General Information    Patient Profile Reviewed yes  -DP     Patient Observations alert;cooperative;agree to therapy  -DP     Prior Level of Function independent:;all household mobility;community mobility;gait;transfer;bed mobility;ADL's  -DP     Equipment Currently Used at Home none  has a straight cane and rolling walker if needed  -DP       Row Name 04/17/25 1000          Living Environment    Current Living Arrangements home  -DP     People in Home spouse  -DP     Primary Care Provided by self  -DP       Row Name 04/17/25 1000          Home Use of Assistive/Adaptive Equipment    Equipment Currently Used at Home oxygen  2L at night if needed  -DP       Row Name 04/17/25 1000          Cognition    Orientation Status (Cognition) oriented x 3  -DP       Row Name 04/17/25 1000          Range of Motion (ROM)    Range of Motion ROM is WFL  -DP       Row Name 04/17/25 1000          Strength (Manual Muscle Testing)    Strength (Manual Muscle Testing) strength is WFL  -DP       Row Name 04/17/25 1000          Bed Mobility    Bed Mobility supine-sit;sit-supine  -DP     Supine-Sit [place] (Bed Mobility) independent  -DP     Sit-Supine [place] (Bed Mobility) independent  -DP       Row Name 04/17/25 1000          Transfers    Transfers sit-stand transfer;stand-sit transfer  -DP       Row Name  04/17/25 1000          Sit-Stand Transfer    Sit-Stand [place] (Transfers) contact guard  -DP       Row Name 04/17/25 1000          Stand-Sit Transfer    Stand-Sit [place] (Transfers) contact guard  -DP       Row Name 04/17/25 1000          Gait/Stairs (Locomotion)    Gait/Stairs Locomotion gait/ambulation independence  -DP     [place] Level (Gait) contact guard  -DP     Patient was able to Ambulate yes  -DP     Distance in Feet (Gait) 60  short spell of dizziness during ambulation in room  -DP       Row Name 04/17/25 1000          Balance    Balance Assessment standing dynamic balance  -DP     Dynamic Standing Balance contact guard  -DP       Row Name 04/17/25 1000          Plan of Care Review    Outcome Evaluation Patient demonstrates safety and independence in bed mobility, transfers, and ambulation. He has adequate strength, balance, and mobility to return to baseline function at home. He is not in need of inpatient skilled physical therapy services at this time but would benefit from home health for endurance.  -DP       [facility] Name 04/17/25 1000          Therapy Assessment/Plan (PT)    Rehab Potential (PT) good  -DP     Criteria for Skilled Interventions Met (PT) no problems identified which require skilled intervention  -DP     Therapy Frequency (PT) evaluation only  -DP       [facility] Name 04/17/25 1000          PT Evaluation Complexity    History, PT Evaluation Complexity no personal factors and/or comorbidities  -DP     Examination of Body Systems (PT Eval Complexity) total of 4 or more elements  -DP     Clinical Presentation (PT Evaluation Complexity) stable  -DP     Clinical Decision Making (PT Evaluation Complexity) low complexity  -DP     Overall Complexity (PT Evaluation Complexity) low complexity  -DP       Row Name 04/17/25 1000          Physical Therapy Goals    Problem Specific Goal Selection (PT) problem specific goal 1, PT  -DP       Row Name 04/17/25 1000          Problem Specific Goal 1  (PT)    Problem Specific Goal 1 (PT) complete PT evaluation  -DP     Time Frame (Problem Specific Goal 1, PT) 1 day  -DP     Progress/Outcome (Problem Specific Goal 1, PT) goal met  -DP               User Key  (r) = Recorded By, (t) = Taken By, (c) = Cosigned By      Initials Name Provider Type    Milagro Biggs PT Physical Therapist                      PT Recommendation and Plan  Anticipated Discharge Disposition (PT): home with home health  Therapy Frequency (PT): evaluation only  Outcome Evaluation: Patient demonstrates safety and independence in bed mobility, transfers, and ambulation. He has adequate strength, balance, and mobility to return to baseline function at home. He is not in need of inpatient skilled physical therapy services at this time but would benefit from home health for endurance.   Outcome Measures       Row Name 04/17/25 1000             How much help from another person do you currently need...    Turning from your back to your side while in flat bed without using bedrails? 4  -DP      Moving from lying on back to sitting on the side of a flat bed without bedrails? 4  -DP      Moving to and from a bed to a chair (including a wheelchair)? 4  -DP      Standing up from a chair using your arms (e.g., wheelchair, bedside chair)? 4  -DP      Climbing 3-5 steps with a railing? 4  -DP      To walk in hospital room? 4  -DP      AM-PAC 6 Clicks Score (PT) 24  -DP                User Key  (r) = Recorded By, (t) = Taken By, (c) = Cosigned By      Initials Name Provider Type    Milagro Biggs PT Physical Therapist                     Time Calculation:    PT Charges       Row Name 04/17/25 [facility]             Time Calculation    PT Received On 04/17/25  -DP         Untimed Charges    PT Eval/Re-eval Minutes 25  -DP         Total Minutes    Untimed Charges Total Minutes 25  -DP       Total Minutes 25  -DP                User Key  (r) = Recorded By, (t) = Taken By, (c) = Cosigned By      Initials  Name Provider Type    DP Milagro Zhang PT Physical Therapist                      PT G-Codes  AM-PAC 6 Clicks Score (PT): 24    Milagro Zhang PT  4/17/2025

## 2025-04-17 NOTE — PLAN OF CARE
Goal Outcome Evaluation:              Outcome Evaluation: Patient demonstrates safety and independence in bed mobility, transfers, and ambulation. He has adequate strength, balance, and mobility to return to baseline function at home. He is not in need of inpatient skilled physical therapy services at this time but would benefit from home health for endurance.    Anticipated Discharge Disposition (PT): home with home health

## 2025-04-17 NOTE — PROGRESS NOTES
[facility]   Hospitalist Progress Note  Date: 2025  Patient Name: Layo Cee  : 1940  MRN: [id number]  Date of admission: 2025  Room/Bed: 402/2      Subjective   Subjective     Chief Complaint:   Shortness of breath    Summary:  Layo Cee is an 85 y.o. male with medical history of atrial fibrillation, BPH, HFpEF, COPD on 2 L of nocturnal oxygen, essential hypertension who started having trouble breathing morning of presentation, 2025.  He feels as though he cannot get enough air in.  Patient is in cardiopulmonary rehab.  Wife states the patient is a pound lighter on his home scale. On arrival to the ED, patient's temperature 98.3, pulse 98, respiratory 34, blood pressure 168/76, and he is on 2 L of oxygen saturating 92%. Chest x-ray shows no active disease. CT of the chest without contrast: Mild interlobular septal thickening perihilar groundglass opacities.  Favored to represent mild pulmonary edema.  Viral/atypical infection can appear similar.  Emphysema.     On labs, patient's troponin is 38, and next troponin is 42.  With a delta T of 4.  proBNP 4541.  Anion gap of 15.1.  Creatinine 1.58.  Glucose of 116.  Lactate of 1.4.  White blood cell count of 16.25.  Patient's viral panel negative for COVID influenza.    Interval Followup:   Patient feels better than when he presented to the hospital however still having dyspnea.  Patient continues on 3 L nasal cannula.  Discussion with patient denies any infectious type symptoms.  With elevated proBNP and mild pulmonary edema/small bilateral pleural effusions on CT scan, suspect symptoms most likely resulting from fluid overload. At this time patient has been scheduled for IV diuresis, remains on Lasix at this time.       Objective   Objective     Vitals:   Temp:  [97.3 °F (36.3 °C)-98.2 °F (36.8 °C)] 97.9 °F (36.6 °C)  Heart Rate:  [] 70  Resp:  [18-25] 19  BP: (116-158)/(57-93) 146/81  Flow (L/min) (Oxygen Therapy):  [3]  3    Physical Exam               Constitutional: Awake, alert, no acute distress, wife at bedside              Eyes: Pupils equal, sclerae anicteric, no conjunctival injection              HENT: NCAT, mucous membranes moist              Neck: Supple, no thyromegaly, no lymphadenopathy, trachea midline              Respiratory: Diminished at bases, no crackles no wheezing no rhonchi              Cardiovascular: RRR, no murmurs, rubs, or gallops, palpable pedal pulses bilaterally              Gastrointestinal: Positive bowel sounds, soft, nontender, nondistended              Musculoskeletal: No bilateral ankle edema, no clubbing or cyanosis to extremities              Neurologic: Oriented x 3, strength symmetric in all extremities, Cranial Nerves grossly intact to confrontation, speech clear    Result Review    Result Review:  I have personally reviewed these results:  [x]  Laboratory      Lab 04/17/25  0455 04/16/25  1445 04/16/25  1243 04/16/25  1141   WBC 8.77  --   --  16.25*   HEMOGLOBIN 12.5*  --   --  13.5   HEMATOCRIT 39.2  --   --  42.6   PLATELETS 200  --   --  212   NEUTROS ABS 7.77*  --   --  12.89*   IMMATURE GRANS (ABS) 0.10*  --   --  0.17*   LYMPHS ABS 0.70  --   --  1.63   MONOS ABS 0.18  --   --  1.46*   EOS ABS 0.00  --   --  0.06   MCV 92.2  --   --  92.6   PROCALCITONIN  --   --  0.18  --    LACTATE  --  1.4  --   --          Lab 04/17/25  0455 04/16/25  1243 04/16/25  1141   SODIUM 139  --  138   POTASSIUM 4.0  --  3.7   CHLORIDE 99  --  96*   CO2 24.9  --  26.9   ANION GAP 15.1*  --  15.1*   BUN 31*  --  24*   CREATININE 1.62*  --  1.59*   EGFR 41.9*  --  42.8*   GLUCOSE 167*  --  116*   CALCIUM 8.4*  --  9.3   MAGNESIUM 2.2 1.9  --    PHOSPHORUS 4.9* 3.1  --          Lab 04/16/25  1141   TOTAL PROTEIN 7.0   ALBUMIN 4.3   GLOBULIN 2.7   ALT (SGPT) 26   AST (SGOT) 23   BILIRUBIN 0.6   ALK PHOS 59         Lab 04/16/25  1243 04/16/25  1141   PROBNP  --  4,541.0*   HSTROP T 42* 38*                  Brief Urine Lab Results  (Last result in the past 365 days)        Color   Clarity   Blood   Leuk Est   Nitrite   Protein   CREAT   Urine HCG        03/25/25 1242 Dark Yellow   Cloudy   Negative   Negative   Negative   30 mg/dL (1+)           03/25/25 1242             130.5               [x]  Microbiology   Microbiology Results (last 10 days)       Procedure Component Value - Date/Time    Legionella Antigen, Urine - Urine, Urine, Clean Catch [[id number]]  (Normal) Collected: 04/16/25 1714    Lab Status: Final result Specimen: Urine, Clean Catch Updated: 04/16/25 1827     LEGIONELLA ANTIGEN, URINE Negative    S. Pneumo Ag Urine or CSF - Urine, Urine, Clean Catch [[id number]]  (Normal) Collected: 04/16/25 1714    Lab Status: Final result Specimen: Urine, Clean Catch Updated: 04/16/25 1827     Strep Pneumo Ag Negative    COVID-19, FLU A/B, RSV PCR 1 HR TAT - Swab, Nasopharynx [[id number]]  (Normal) Collected: 04/16/25 1421    Lab Status: Final result Specimen: Swab from Nasopharynx Updated: 04/16/25 1518     COVID19 Not Detected     Influenza A PCR Not Detected     Influenza B PCR Not Detected     RSV, PCR Not Detected    Narrative:      Fact sheet for providers: https://www.fda.gov/media/041740/download    Fact sheet for patients: https://www.fda.gov/media/552916/download    Test performed by PCR.          [x]  Radiology  XR Chest 1 View  Result Date: 4/17/2025  Right lower lobe infiltrate or atelectasis with a small right pleural effusion. Electronically Signed: Leon Harley MD  4/17/2025 6:03 AM EDT  Workstation ID: [id number]    CT Chest Without Contrast Diagnostic  Result Date: 4/16/2025  Impression: Mild interlobular septal thickening perihilar groundglass opacities, favored to represent mild pulmonary edema. Viral/atypical infection can appear similar. Small bilateral pleural effusions with adjacent atelectasis Emphysema. Electronically Signed: Carlos Enrique Osuna MD  4/16/2025 3:50 PM EDT  Workstation ID:  [id number]    XR Chest 1 View  Result Date: 4/16/2025  Impression: No active disease. Electronically Signed: Mathew Alegria MD  4/16/2025 12:03 PM EDT  Workstation ID: [id number]    XR Chest 2 View  Result Date: 4/11/2025  Impression: Findings suggest improving congestive heart failure and pulmonary infiltrates. Electronically Signed: Darek Alejo MD  4/11/2025 5:01 PM EDT  Workstation ID: [id number]    []  EKG/Telemetry   []  Cardiology/Vascular   []  Pathology  []  Old records  []  Other:    Assessment & Plan   Assessment / Plan     Assessment:  HFpEF in acute exacerbation  Atrial fibrillation  COPD  Coronary disease status post bypass  Hypertension  BPH  CKD    Plan:  Patient remains admitted to hospital for further care and management  Continue patient with scheduled diuretics, remains on Lasix 40 mg IV twice daily  Discontinuing steroids at this time  Home cardiac medications resumed  Continue to monitor on telemetry at this time  Patient started with ceftriaxone azithromycin for concern of pneumonia, leukocytosis on arrival, normal Pro-Bin  Will continue antibiotics 1 more day monitoring response  Continue to monitor patient's renal function closely     Discussed with RN.    VTE Prophylaxis:  Pharmacologic & mechanical VTE prophylaxis orders are present.        CODE STATUS:   Code Status (Patient has no pulse and is not breathing): CPR (Attempt to Resuscitate)  Medical Interventions (Patient has pulse or is breathing): Full Support  Level Of Support Discussed With: Patient      Electronically signed by Pradeep Arndt MD, 4/17/2025, 14:05 EDT.

## 2025-04-18 LAB
ANION GAP SERPL CALCULATED.3IONS-SCNC: 11 MMOL/L (ref 5–15)
BASOPHILS # BLD AUTO: 0.04 10*3/MM3 (ref 0–0.2)
BASOPHILS NFR BLD AUTO: 0.2 % (ref 0–1.5)
BUN SERPL-MCNC: 36 MG/DL (ref 8–23)
BUN/CREAT SERPL: 22.6 (ref 7–25)
CALCIUM SPEC-SCNC: 8.4 MG/DL (ref 8.6–10.5)
CHLORIDE SERPL-SCNC: 101 MMOL/L (ref 98–107)
CO2 SERPL-SCNC: 26 MMOL/L (ref 22–29)
CREAT SERPL-MCNC: 1.59 MG/DL (ref 0.76–1.27)
DEPRECATED RDW RBC AUTO: 55 FL (ref 37–54)
EGFRCR SERPLBLD CKD-EPI 2021: 42.8 ML/MIN/1.73
EOSINOPHIL # BLD AUTO: 0 10*3/MM3 (ref 0–0.4)
EOSINOPHIL NFR BLD AUTO: 0 % (ref 0.3–6.2)
ERYTHROCYTE [DISTWIDTH] IN BLOOD BY AUTOMATED COUNT: 16.3 % (ref 12.3–15.4)
GLUCOSE SERPL-MCNC: 135 MG/DL (ref 65–99)
HCT VFR BLD AUTO: 37.5 % (ref 37.5–51)
HGB BLD-MCNC: 12 G/DL (ref 13–17.7)
IMM GRANULOCYTES # BLD AUTO: 0.15 10*3/MM3 (ref 0–0.05)
IMM GRANULOCYTES NFR BLD AUTO: 0.8 % (ref 0–0.5)
LYMPHOCYTES # BLD AUTO: 1.57 10*3/MM3 (ref 0.7–3.1)
LYMPHOCYTES NFR BLD AUTO: 8.1 % (ref 19.6–45.3)
MAGNESIUM SERPL-MCNC: 2.3 MG/DL (ref 1.6–2.4)
MCH RBC QN AUTO: 29.6 PG (ref 26.6–33)
MCHC RBC AUTO-ENTMCNC: 32 G/DL (ref 31.5–35.7)
MCV RBC AUTO: 92.6 FL (ref 79–97)
MONOCYTES # BLD AUTO: 1.12 10*3/MM3 (ref 0.1–0.9)
MONOCYTES NFR BLD AUTO: 5.8 % (ref 5–12)
NEUTROPHILS NFR BLD AUTO: 16.51 10*3/MM3 (ref 1.7–7)
NEUTROPHILS NFR BLD AUTO: 85.1 % (ref 42.7–76)
NRBC BLD AUTO-RTO: 0 /100 WBC (ref 0–0.2)
PHOSPHATE SERPL-MCNC: 3.9 MG/DL (ref 2.5–4.5)
PLATELET # BLD AUTO: 197 10*3/MM3 (ref 140–450)
PMV BLD AUTO: 11 FL (ref 6–12)
POTASSIUM SERPL-SCNC: 4 MMOL/L (ref 3.5–5.2)
QT INTERVAL: 343 MS
QTC INTERVAL: 400 MS
RBC # BLD AUTO: 4.05 10*6/MM3 (ref 4.14–5.8)
SODIUM SERPL-SCNC: 138 MMOL/L (ref 136–145)
WBC NRBC COR # BLD AUTO: 19.39 10*3/MM3 (ref 3.4–10.8)

## 2025-04-18 PROCEDURE — 94799 UNLISTED PULMONARY SVC/PX: CPT

## 2025-04-18 PROCEDURE — 83735 ASSAY OF MAGNESIUM: CPT | Performed by: HOSPITALIST

## 2025-04-18 PROCEDURE — 84100 ASSAY OF PHOSPHORUS: CPT | Performed by: HOSPITALIST

## 2025-04-18 PROCEDURE — 99232 SBSQ HOSP IP/OBS MODERATE 35: CPT | Performed by: INTERNAL MEDICINE

## 2025-04-18 PROCEDURE — 25810000003 SODIUM CHLORIDE 0.9 % SOLUTION 250 ML FLEX CONT: Performed by: HOSPITALIST

## 2025-04-18 PROCEDURE — 25010000002 AZITHROMYCIN PER 500 MG: Performed by: HOSPITALIST

## 2025-04-18 PROCEDURE — 94664 DEMO&/EVAL PT USE INHALER: CPT

## 2025-04-18 PROCEDURE — 25010000002 FUROSEMIDE PER 20 MG: Performed by: HOSPITALIST

## 2025-04-18 PROCEDURE — 85025 COMPLETE CBC W/AUTO DIFF WBC: CPT | Performed by: HOSPITALIST

## 2025-04-18 PROCEDURE — 25010000002 CEFTRIAXONE PER 250 MG: Performed by: HOSPITALIST

## 2025-04-18 PROCEDURE — 97110 THERAPEUTIC EXERCISES: CPT

## 2025-04-18 PROCEDURE — 80048 BASIC METABOLIC PNL TOTAL CA: CPT | Performed by: HOSPITALIST

## 2025-04-18 RX ADMIN — CEFTRIAXONE SODIUM 2000 MG: 2 INJECTION, POWDER, FOR SOLUTION INTRAMUSCULAR; INTRAVENOUS at 17:36

## 2025-04-18 RX ADMIN — TAMSULOSIN HYDROCHLORIDE 0.4 MG: 0.4 CAPSULE ORAL at 09:18

## 2025-04-18 RX ADMIN — ISOSORBIDE MONONITRATE 30 MG: 30 TABLET, EXTENDED RELEASE ORAL at 09:17

## 2025-04-18 RX ADMIN — AMIODARONE HYDROCHLORIDE 200 MG: 200 TABLET ORAL at 09:17

## 2025-04-18 RX ADMIN — ARFORMOTEROL TARTRATE 15 MCG: 15 SOLUTION RESPIRATORY (INHALATION) at 09:29

## 2025-04-18 RX ADMIN — RIVAROXABAN 15 MG: 15 TABLET, FILM COATED ORAL at 17:35

## 2025-04-18 RX ADMIN — FUROSEMIDE 40 MG: 10 INJECTION, SOLUTION INTRAMUSCULAR; INTRAVENOUS at 09:18

## 2025-04-18 RX ADMIN — Medication 10 ML: at 09:18

## 2025-04-18 RX ADMIN — ALLOPURINOL 200 MG: 100 TABLET ORAL at 09:18

## 2025-04-18 RX ADMIN — METOPROLOL SUCCINATE 25 MG: 25 TABLET, EXTENDED RELEASE ORAL at 05:48

## 2025-04-18 RX ADMIN — MONTELUKAST 10 MG: 10 TABLET, FILM COATED ORAL at 20:24

## 2025-04-18 RX ADMIN — ASPIRIN 81 MG: 81 TABLET, COATED ORAL at 09:18

## 2025-04-18 RX ADMIN — PANTOPRAZOLE SODIUM 40 MG: 40 TABLET, DELAYED RELEASE ORAL at 05:48

## 2025-04-18 RX ADMIN — BUDESONIDE 0.5 MG: 0.5 INHALANT RESPIRATORY (INHALATION) at 18:52

## 2025-04-18 RX ADMIN — BUDESONIDE 0.5 MG: 0.5 INHALANT RESPIRATORY (INHALATION) at 09:29

## 2025-04-18 RX ADMIN — Medication 10 ML: at 20:24

## 2025-04-18 RX ADMIN — FINASTERIDE 5 MG: 5 TABLET, FILM COATED ORAL at 09:18

## 2025-04-18 RX ADMIN — ARFORMOTEROL TARTRATE 15 MCG: 15 SOLUTION RESPIRATORY (INHALATION) at 18:52

## 2025-04-18 RX ADMIN — AZITHROMYCIN DIHYDRATE 500 MG: 500 INJECTION, POWDER, LYOPHILIZED, FOR SOLUTION INTRAVENOUS at 17:35

## 2025-04-18 RX ADMIN — AZELASTINE HYDROCHLORIDE 2 SPRAY: 137 SPRAY, METERED NASAL at 09:18

## 2025-04-18 RX ADMIN — AZELASTINE HYDROCHLORIDE 2 SPRAY: 137 SPRAY, METERED NASAL at 20:24

## 2025-04-18 RX ADMIN — METOPROLOL SUCCINATE 25 MG: 25 TABLET, EXTENDED RELEASE ORAL at 17:35

## 2025-04-18 RX ADMIN — FUROSEMIDE 40 MG: 10 INJECTION, SOLUTION INTRAMUSCULAR; INTRAVENOUS at 17:35

## 2025-04-18 NOTE — PLAN OF CARE
Goal Outcome Evaluation:              Outcome Evaluation: Patient alert and oriented. On 3L NC. Continues to complain of shortness of breath, but improved throughout the shift. Urinal within reach. Up in the chair during shift. Bed alarm in place. Wife at bedside for majority of the shift.

## 2025-04-18 NOTE — PLAN OF CARE
"Goal Outcome Evaluation:  Plan of Care Reviewed With: patient        Progress: improving  Outcome Evaluation: Pt a/o, on 3LNC, pt still has increase short of air on exertion with some pressure, pt states \"I feel alot better then I did the night before and setup in chair for awhile today.\" Urinal within reach. Bed alarm active. Pt wants to try to be discharge in time for wife's birthday Sunday but not to soon if doctor thinks not ready. Will continue plan of care.                             "

## 2025-04-18 NOTE — PLAN OF CARE
Goal Outcome Evaluation:              Outcome Evaluation: Patient is alert and oriented. Currently on 1L NC. No complaints of pain. Urinal within reach. Complaints of shortness of air with exertion. Up in the chair during shift. IV ABX and lasix administered per MAR. When patient was washing up, tick found on sock. MD notified, tick did not seem engorged. No new orders.

## 2025-04-18 NOTE — PROGRESS NOTES
[facility]   Hospitalist Progress Note  Date: 2025  Patient Name: Layo Cee  : 1940  MRN: [id number]  Date of admission: 2025  Room/Bed: 402/2      Subjective   Subjective     Chief Complaint:   Shortness of breath    Summary:  Layo Cee is an 85 y.o. male with medical history of atrial fibrillation, BPH, HFpEF, COPD on 2 L of nocturnal oxygen, essential hypertension who started having trouble breathing morning of presentation, 2025.  He feels as though he cannot get enough air in.  Patient is in cardiopulmonary rehab.  Wife states the patient is a pound lighter on his home scale. On arrival to the ED, patient's temperature 98.3, pulse 98, respiratory 34, blood pressure 168/76, and he is on 2 L of oxygen saturating 92%. Chest x-ray shows no active disease. CT of the chest without contrast: Mild interlobular septal thickening perihilar groundglass opacities.  Favored to represent mild pulmonary edema.  Viral/atypical infection can appear similar.  Emphysema.     On labs, patient's troponin is 38, and next troponin is 42.  With a delta T of 4.  proBNP 4541.  Anion gap of 15.1.  Creatinine 1.58.  Glucose of 116.  Lactate of 1.4.  White blood cell count of 16.25.  Patient's viral panel negative for COVID influenza.    Interval Followup:   Patient with 1.8 L urinary output overnight.  Patient endorses improved breathing this morning on rounds.  Documented to still on 3 L, however saturating well.  Nurse was able to turn down to 2 L with still maintained O2 saturations.  Able to discussed with patient's cardiologist, aware patient is in the hospital.    Objective   Objective     Vitals:   Temp:  [97.5 °F (36.4 °C)-98.1 °F (36.7 °C)] 98.1 °F (36.7 °C)  Heart Rate:  [] 76  Resp:  [18-20] 18  BP: (124-155)/(66-88) 136/76  Flow (L/min) (Oxygen Therapy):  [2-3] 2    Physical Exam               Constitutional: Awake, alert, no acute distress, wife at bedside              Eyes: Pupils  equal, sclerae anicteric, no conjunctival injection              HENT: NCAT, mucous membranes moist              Neck: Supple, no thyromegaly, no lymphadenopathy, trachea midline              Respiratory: Diminished at bases, faint crackles to right base              Cardiovascular: RRR, no murmurs, rubs, or gallops, palpable pedal pulses bilaterally              Gastrointestinal: Positive bowel sounds, soft, nontender, nondistended              Musculoskeletal: No bilateral ankle edema, no clubbing or cyanosis to extremities              Neurologic: Oriented x 3, strength symmetric in all extremities, Cranial Nerves grossly intact to confrontation, speech clear    Result Review    Result Review:  I have personally reviewed these results:  [x]  Laboratory      Lab 04/18/25  0507 04/17/25  0455 04/16/25  1445 04/16/25  1243 04/16/25  1141   WBC 19.39* 8.77  --   --  16.25*   HEMOGLOBIN 12.0* 12.5*  --   --  13.5   HEMATOCRIT 37.5 39.2  --   --  42.6   PLATELETS 197 200  --   --  212   NEUTROS ABS 16.51* 7.77*  --   --  12.89*   IMMATURE GRANS (ABS) 0.15* 0.10*  --   --  0.17*   LYMPHS ABS 1.57 0.70  --   --  1.63   MONOS ABS 1.12* 0.18  --   --  1.46*   EOS ABS 0.00 0.00  --   --  0.06   MCV 92.6 92.2  --   --  92.6   PROCALCITONIN  --   --   --  0.18  --    LACTATE  --   --  1.4  --   --          Lab 04/18/25  0507 04/17/25  0455 04/16/25  1243 04/16/25  1141   SODIUM 138 139  --  138   POTASSIUM 4.0 4.0  --  3.7   CHLORIDE 101 99  --  96*   CO2 26.0 24.9  --  26.9   ANION GAP 11.0 15.1*  --  15.1*   BUN 36* 31*  --  24*   CREATININE 1.59* 1.62*  --  1.59*   EGFR 42.8* 41.9*  --  42.8*   GLUCOSE 135* 167*  --  116*   CALCIUM 8.4* 8.4*  --  9.3   MAGNESIUM 2.3 2.2 1.9  --    PHOSPHORUS 3.9 4.9* 3.1  --          Lab 04/16/25  1141   TOTAL PROTEIN 7.0   ALBUMIN 4.3   GLOBULIN 2.7   ALT (SGPT) 26   AST (SGOT) 23   BILIRUBIN 0.6   ALK PHOS 59         Lab 04/16/25  1243 04/16/25  1141   PROBNP  --  4,541.0*   HSTROP T 42*  38*                 Brief Urine Lab Results  (Last result in the past 365 days)        Color   Clarity   Blood   Leuk Est   Nitrite   Protein   CREAT   Urine HCG        03/25/25 1242 Dark Yellow   Cloudy   Negative   Negative   Negative   30 mg/dL (1+)           03/25/25 1242             130.5               [x]  Microbiology   Microbiology Results (last 10 days)       Procedure Component Value - Date/Time    Legionella Antigen, Urine - Urine, Urine, Clean Catch [[id number]]  (Normal) Collected: 04/16/25 1714    Lab Status: Final result Specimen: Urine, Clean Catch Updated: 04/16/25 1827     LEGIONELLA ANTIGEN, URINE Negative    S. Pneumo Ag Urine or CSF - Urine, Urine, Clean Catch [[id number]]  (Normal) Collected: 04/16/25 1714    Lab Status: Final result Specimen: Urine, Clean Catch Updated: 04/16/25 1827     Strep Pneumo Ag Negative    Blood Culture - Blood, Arm, Right [[id number]]  (Normal) Collected: 04/16/25 1445    Lab Status: Preliminary result Specimen: Blood from Arm, Right Updated: 04/18/25 1500     Blood Culture No growth at 2 days    Narrative:      Less than seven (7) mL's of blood was collected.  Insufficient quantity may yield false negative results.    Blood Culture - Blood, Arm, Left [[id number]]  (Normal) Collected: 04/16/25 1445    Lab Status: Preliminary result Specimen: Blood from Arm, Left Updated: 04/18/25 1500     Blood Culture No growth at 2 days    Narrative:      Less than seven (7) mL's of blood was collected.  Insufficient quantity may yield false negative results.    COVID-19, FLU A/B, RSV PCR 1 HR TAT - Swab, Nasopharynx [[id number]]  (Normal) Collected: 04/16/25 1421    Lab Status: Final result Specimen: Swab from Nasopharynx Updated: 04/16/25 1518     COVID19 Not Detected     Influenza A PCR Not Detected     Influenza B PCR Not Detected     RSV, PCR Not Detected    Narrative:      Fact sheet for providers: https://www.fda.gov/media/189866/download    Fact sheet for patients:  https://www.fda.gov/media/474048/download    Test performed by PCR.          [x]  Radiology  XR Chest 1 View  Result Date: 4/17/2025  Right lower lobe infiltrate or atelectasis with a small right pleural effusion. Electronically Signed: Leon Harley MD  4/17/2025 6:03 AM EDT  Workstation ID: [id number]    CT Chest Without Contrast Diagnostic  Result Date: 4/16/2025  Impression: Mild interlobular septal thickening perihilar groundglass opacities, favored to represent mild pulmonary edema. Viral/atypical infection can appear similar. Small bilateral pleural effusions with adjacent atelectasis Emphysema. Electronically Signed: Carlos Enrique Osuna MD  4/16/2025 3:50 PM EDT  Workstation ID: [id number]    XR Chest 1 View  Result Date: 4/16/2025  Impression: No active disease. Electronically Signed: Mathew Alegria MD  4/16/2025 12:03 PM EDT  Workstation ID: [id number]    []  EKG/Telemetry   []  Cardiology/Vascular   []  Pathology  []  Old records  []  Other:    Assessment & Plan   Assessment / Plan     Assessment:  HFpEF in acute exacerbation  Atrial fibrillation  COPD  Coronary disease status post bypass  Hypertension  BPH  CKD    Plan:  Patient remains admitted to hospital for further care and management  Continue patient with scheduled diuretics, remains on Lasix 40 mg IV twice daily  Discussed case with patient's primary cardiologist, appointment is already set up for Monday has follow-up  Home cardiac medications resumed  Continue to monitor on telemetry at this time  Patient started with ceftriaxone azithromycin for concern of pneumonia, leukocytosis on arrival, normal Pro-Bin  Continue to monitor patient's renal function closely  Patient found tick when taking off socks.  Tick was not described as engorged, it was not removed from patient as it was not attached.  This tick was not on patient, possibly even in the neck, does not meet criteria at this time for prophylactic dosing.  Will monitor patient's skin looking for  rash     Discussed with RN.  Discussed with cardiology    VTE Prophylaxis:  Pharmacologic & mechanical VTE prophylaxis orders are present.        CODE STATUS:   Code Status (Patient has no pulse and is not breathing): CPR (Attempt to Resuscitate)  Medical Interventions (Patient has pulse or is breathing): Full Support  Level Of Support Discussed With: Patient      Electronically signed by Pradeep Arndt MD, 4/18/2025, 15:56 EDT.

## 2025-04-18 NOTE — THERAPY TREATMENT NOTE
Patient Name: Layo Cee  : 1940    MRN: [id number]                              Today's Date: 2025       Admit Date: 2025    Visit Dx:     ICD-10-CM ICD-9-CM   1. Shortness of breath  R06.02 786.05   2. Difficulty walking  R26.2 719.7   3. Decreased activities of daily living (ADL)  Z78.9 V49.89     Patient Active Problem List   Diagnosis    Unstable angina    Coronary artery disease of bypass graft of native heart with stable angina pectoris    Class 2 obesity due to excess calories without serious comorbidity with body mass index (BMI) of 35.0 to 35.9 in adult    Gout    Typical atrial flutter    Essential hypertension    Frequent PVCs    Mixed hyperlipidemia    Lateral epicondylitis    Allergic rhinitis    Seasonal allergies    Tobacco abuse, in remission    Vitamin D deficiency    Gastroesophageal reflux disease with esophagitis without hemorrhage    Ischemic cardiomyopathy    Centrilobular emphysema    Gastroesophageal reflux disease    Atrial fibrillation, persistent    COPD (chronic obstructive pulmonary disease)    Chronic anticoagulation    Diastolic dysfunction    S/P CABG (coronary artery bypass graft)    Stage 3a chronic kidney disease    S/P ablation of atrial fibrillation    Atrial fibrillation, unspecified type    SOB (shortness of breath)    Chest pain    Mild aortic valve stenosis    Moderate mitral regurgitation    Benign prostatic hyperplasia with lower urinary tract symptoms    Chronic systolic congestive heart failure    Acute exacerbation of CHF (congestive heart failure)    Multifocal pneumonia    Pneumonia due to other gram-negative bacteria     Past Medical History:   Diagnosis Date    Acute on chronic diastolic CHF (congestive heart failure) 2022    Arthritis     Atrial fibrillation, persistent 5/3/2022    Persistent atrial fibrillation status post extensive ablation and pulmonary vein isolation by Dr. Aguilar on 6/3/2022, with reoccurring rapid A. fib, and then  successful cardioversion on 7/1/2022    BPH (benign prostatic hyperplasia)     CHF (congestive heart failure)     COPD (chronic obstructive pulmonary disease)     Coronary artery disease of bypass graft of native heart with stable angina pectoris     (1) Coronary artery disease, s/p CABG in the past. Cardiac catheterization in July 2016 showed patentLIMA graft to the LAD and occluded SVGs. Native LAD is 100% occluded in the mid portion. Native LCx has no significant disease. Native RCA is 100% occluded and it is a . Repeat cath in June, 2021, underwent stent placement to diagnonal branch.    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 Lauren Valencia      Past Surgical History:   Procedure Laterality Date    BLADDER SURGERY      CARDIAC CATHETERIZATION  07/2016    CARDIAC CATHETERIZATION N/A 8/9/2021    Procedure: Left Heart Cath with possible angioplasty;  Surgeon: Jack Ladd MD;  Location: Replaced by [facility] INVASIVE LOCATION;  Service: Cardiovascular;  Laterality: N/A;  Please schedule the procedure with Dr. Jack Ladd MD on 8/9/2021    CARDIAC ELECTROPHYSIOLOGY PROCEDURE N/A 6/3/2022    Procedure: Ablation atrial fibrillation;  Surgeon: Irineo Aguilar MD;  Location: [facility] CATH INVASIVE LOCATION;  Service: Cardiovascular;  Laterality: N/A;    CARDIAC ELECTROPHYSIOLOGY PROCEDURE N/A 6/3/2022    Procedure: Ablation atrial flutter/CTI;  Surgeon: Irineo Aguilar MD;  Location: [facility] CATH INVASIVE LOCATION;  Service:  Cardiovascular;  Laterality: N/A;    CARDIAC SURGERY      HEART BYPASS    COLONOSCOPY  2011    CORONARY ARTERY BYPASS GRAFT      CYSTOSCOPY  03/19/2019    WITH BILATERAL RETROGRADE PYELOGRAPHY    ELECTRICAL CARDIOVERSION  5/12/2022         ENDOSCOPY  2016    PROSTATE SURGERY        General Information       Row Name 04/18/25 0959          OT Time and Intention    Document Type therapy note (daily note)  -AV     Mode of Treatment individual therapy;occupational therapy  -AV     Patient Effort good  -AV       Row Name 04/18/25 0959          General Information    Existing Precautions/Restrictions oxygen therapy device and L/min;fall  -AV       Row Name 04/18/25 0959          Cognition    Orientation Status (Cognition) --  Alert, pleasant and cooperative.  Able to perform upper extremity exercises with minimal cues/demonstration.  -AV       Row Name 04/18/25 0959          Safety Issues/Impairments Affecting Functional Mobility    Impairments Affecting Function (Mobility) balance;endurance/activity tolerance  -AV               User Key  (r) = Recorded By, (t) = Taken By, (c) = Cosigned By      Initials Name Provider Type    AV Mendoza Howard OT Occupational Therapist                     Mobility/ADL's       Row Name 04/18/25 1000          Bed Mobility    Bed Mobility supine-sit  -AV     Supine-Sit [place] (Bed Mobility) independent  -AV     Comment, (Bed Mobility) In preparation for exercises in unsupported sitting  -AV               User Key  (r) = Recorded By, (t) = Taken By, (c) = Cosigned By      Initials Name Provider Type    Mendoza Garcia OT Occupational Therapist                   Obj/Interventions       Row Name 04/18/25 1000          Shoulder (Therapeutic Exercise)    Shoulder (Therapeutic Exercise) strengthening exercise  -AV     Shoulder Strengthening (Therapeutic Exercise) bilateral;horizontal aBduction/aDduction;sitting;resistance band;yellow  Sets of 12  -AV       Row Name 04/18/25 1000           Elbow/Forearm (Therapeutic Exercise)    Elbow/Forearm (Therapeutic Exercise) strengthening exercise  -AV     Elbow/Forearm Strengthening (Therapeutic Exercise) bilateral;flexion;extension;sitting;resistance band;yellow  1 set of 12  -AV       Row Name 04/18/25 1000          Motor Skills    Therapeutic Exercise shoulder;elbow/forearm  Performed in unsupported sitting/on 3 L O2.  Required short rest break after each different exercise.  -AV       Row Name 04/18/25 1000          Balance    Balance Assessment sitting dynamic balance  -AV     Dynamic Sitting Balance independent  -AV               User Key  (r) = Recorded By, (t) = Taken By, (c) = Cosigned By      Initials Name Provider Type    Mendoza Garcia, OT Occupational Therapist                   Goals/Plan    No documentation.                  Clinical Impression       [facility] Name 04/18/25 1002          Pain Scale: FACES Pre/Post-Treatment    Pain: FACES Scale, Pretreatment 0-->no hurt  -AV     Posttreatment Pain Rating 0-->no hurt  -AV       [facility] Name 04/18/25 1002          Plan of Care Review    Progress no change  -AV     Outcome Evaluation Performed upper extremity exercises with light resistance Thera-Band to improve endurance/activity tolerance needed to support ADLs.  Continued OT indicated to remediate/compensate for deficits to maximize independence.  -AV       Row Name 04/18/25 1002          Vital Signs    O2 Delivery Pre Treatment nasal cannula  3 L  -AV     O2 Delivery Intra Treatment nasal cannula  3 L  -AV     O2 Delivery Post Treatment nasal cannula  3 L  -AV       [facility] Name 04/18/25 1002          Positioning and Restraints    Pre-Treatment Position in bed  -AV     Post Treatment Position bed  -AV     In Bed sitting EOB;call light within reach;encouraged to call for assist;with family/caregiver  Wife present  -AV               User Key  (r) = Recorded By, (t) = Taken By, (c) = Cosigned By      Initials Name Provider Type    Mendoza Garcia, OT  Occupational Therapist                   Outcome Measures       Row Name 04/18/25 1003          How much help from another is currently needed...    Putting on and taking off regular lower body clothing? 3  -AV     Bathing (including washing, rinsing, and drying) 3  -AV     Toileting (which includes using toilet bed pan or urinal) 3  -AV     Putting on and taking off regular upper body clothing 4  -AV     Taking care of personal grooming (such as brushing teeth) 4  -AV     Eating meals 4  -AV     AM-PAC 6 Clicks Score (OT) 21  -AV       Row Name 04/18/25 1003          Optimal Instrument    Bending/Stooping 2  -AV     Standing 2  -AV     Reaching 1  -AV               User Key  (r) = Recorded By, (t) = Taken By, (c) = Cosigned By      Initials Name Provider Type    AV Mendoza Howard OT Occupational Therapist                    Occupational Therapy Education       Title: PT OT SLP Therapies (Done)       Topic: Occupational Therapy (Done)       Point: ADL training (Done)       Learning Progress Summary            Patient Acceptance, E, VU by  at 4/17/2025 1142                      Point: Home exercise program (Done)       Learning Progress Summary            Patient Acceptance, E, VU by AV at 4/17/2025 1142                      Point: Precautions (Done)       Learning Progress Summary            Patient Acceptance, E, VU by AV at 4/17/2025 1142                      Point: Body mechanics (Done)       Learning Progress Summary            Patient Acceptance, E, VU by  at 4/17/2025 1142                                      User Key       Initials Effective Dates Name Provider Type Discipline     06/16/21 -  Mendoza Howard OT Occupational Therapist OT                  OT Recommendation and Plan  Planned Therapy Interventions (OT): activity tolerance training, BADL retraining, functional balance retraining, occupation/activity based interventions, patient/caregiver education/training, ROM/therapeutic exercise,  transfer/mobility retraining  Therapy Frequency (OT): 5 times/wk  Plan of Care Review  Plan of Care Reviewed With: patient  Progress: no change  Outcome Evaluation: Performed upper extremity exercises with light resistance Thera-Band to improve endurance/activity tolerance needed to support ADLs.  Continued OT indicated to remediate/compensate for deficits to maximize independence.     Time Calculation:   Evaluation Complexity (OT)  Review Occupational Profile/Medical/Therapy History Complexity: expanded/moderate complexity  Assessment, Occupational Performance/Identification of Deficit Complexity: 1-3 performance deficits  Clinical Decision Making Complexity (OT): problem focused assessment/low complexity  Overall Complexity of Evaluation (OT): low complexity     Time Calculation- OT       Row Name 04/18/25 1003             Time Calculation- OT    OT Received On 04/18/25  -AV      OT Goal Re-Cert Due Date 04/26/25  -AV         Timed Charges    64487 - OT Therapeutic Exercise Minutes 10  -AV         Total Minutes    Timed Charges Total Minutes 10  -AV       Total Minutes 10  -AV                User Key  (r) = Recorded By, (t) = Taken By, (c) = Cosigned By      Initials Name Provider Type    AV Mendoza Howard OT Occupational Therapist                  Therapy Charges for Today       Code Description Service Date Service Provider Modifiers Qty    [id number]  OT EVAL LOW COMPLEXITY 3 4/17/2025 Mendoza Howard OT GO 1    [id number]  OT THER PROC EA 15 MIN 4/18/2025 Mendoza Howard OT GO 1                 Mendoza Howard OT  4/18/2025

## 2025-04-19 ENCOUNTER — READMISSION MANAGEMENT (OUTPATIENT)
Dept: CALL CENTER | Facility: HOSPITAL | Age: 85
End: 2025-04-19
Payer: MEDICARE

## 2025-04-19 VITALS
HEIGHT: 70 IN | HEART RATE: 79 BPM | TEMPERATURE: 97.5 F | DIASTOLIC BLOOD PRESSURE: 90 MMHG | WEIGHT: 217.15 LBS | SYSTOLIC BLOOD PRESSURE: 145 MMHG | OXYGEN SATURATION: 99 % | BODY MASS INDEX: 31.09 KG/M2 | RESPIRATION RATE: 18 BRPM

## 2025-04-19 LAB
ANION GAP SERPL CALCULATED.3IONS-SCNC: 11.3 MMOL/L (ref 5–15)
BASOPHILS # BLD AUTO: 0.01 10*3/MM3 (ref 0–0.2)
BASOPHILS NFR BLD AUTO: 0.1 % (ref 0–1.5)
BUN SERPL-MCNC: 36 MG/DL (ref 8–23)
BUN/CREAT SERPL: 21.7 (ref 7–25)
CALCIUM SPEC-SCNC: 8.1 MG/DL (ref 8.6–10.5)
CHLORIDE SERPL-SCNC: 102 MMOL/L (ref 98–107)
CO2 SERPL-SCNC: 26.7 MMOL/L (ref 22–29)
CREAT SERPL-MCNC: 1.66 MG/DL (ref 0.76–1.27)
DEPRECATED RDW RBC AUTO: 57.9 FL (ref 37–54)
EGFRCR SERPLBLD CKD-EPI 2021: 40.7 ML/MIN/1.73
EOSINOPHIL # BLD AUTO: 0.09 10*3/MM3 (ref 0–0.4)
EOSINOPHIL NFR BLD AUTO: 0.8 % (ref 0.3–6.2)
ERYTHROCYTE [DISTWIDTH] IN BLOOD BY AUTOMATED COUNT: 17 % (ref 12.3–15.4)
GLUCOSE SERPL-MCNC: 100 MG/DL (ref 65–99)
HCT VFR BLD AUTO: 36.6 % (ref 37.5–51)
HGB BLD-MCNC: 11.6 G/DL (ref 13–17.7)
IMM GRANULOCYTES # BLD AUTO: 0.1 10*3/MM3 (ref 0–0.05)
IMM GRANULOCYTES NFR BLD AUTO: 0.9 % (ref 0–0.5)
LYMPHOCYTES # BLD AUTO: 2.6 10*3/MM3 (ref 0.7–3.1)
LYMPHOCYTES NFR BLD AUTO: 23.2 % (ref 19.6–45.3)
MAGNESIUM SERPL-MCNC: 2.2 MG/DL (ref 1.6–2.4)
MCH RBC QN AUTO: 29.5 PG (ref 26.6–33)
MCHC RBC AUTO-ENTMCNC: 31.7 G/DL (ref 31.5–35.7)
MCV RBC AUTO: 93.1 FL (ref 79–97)
MONOCYTES # BLD AUTO: 0.84 10*3/MM3 (ref 0.1–0.9)
MONOCYTES NFR BLD AUTO: 7.5 % (ref 5–12)
NEUTROPHILS NFR BLD AUTO: 67.5 % (ref 42.7–76)
NEUTROPHILS NFR BLD AUTO: 7.57 10*3/MM3 (ref 1.7–7)
NRBC BLD AUTO-RTO: 0 /100 WBC (ref 0–0.2)
PLATELET # BLD AUTO: 176 10*3/MM3 (ref 140–450)
PMV BLD AUTO: 11.1 FL (ref 6–12)
POTASSIUM SERPL-SCNC: 3.4 MMOL/L (ref 3.5–5.2)
RBC # BLD AUTO: 3.93 10*6/MM3 (ref 4.14–5.8)
SODIUM SERPL-SCNC: 140 MMOL/L (ref 136–145)
WBC NRBC COR # BLD AUTO: 11.21 10*3/MM3 (ref 3.4–10.8)

## 2025-04-19 PROCEDURE — 83735 ASSAY OF MAGNESIUM: CPT | Performed by: INTERNAL MEDICINE

## 2025-04-19 PROCEDURE — 94618 PULMONARY STRESS TESTING: CPT

## 2025-04-19 PROCEDURE — 94799 UNLISTED PULMONARY SVC/PX: CPT

## 2025-04-19 PROCEDURE — 25010000002 FUROSEMIDE PER 20 MG: Performed by: HOSPITALIST

## 2025-04-19 PROCEDURE — 94664 DEMO&/EVAL PT USE INHALER: CPT

## 2025-04-19 PROCEDURE — 80048 BASIC METABOLIC PNL TOTAL CA: CPT | Performed by: INTERNAL MEDICINE

## 2025-04-19 PROCEDURE — 99239 HOSP IP/OBS DSCHRG MGMT >30: CPT | Performed by: INTERNAL MEDICINE

## 2025-04-19 PROCEDURE — 85025 COMPLETE CBC W/AUTO DIFF WBC: CPT | Performed by: INTERNAL MEDICINE

## 2025-04-19 RX ORDER — TORSEMIDE 20 MG/1
20 TABLET ORAL DAILY
Qty: 45 TABLET | Refills: 1 | Status: SHIPPED | OUTPATIENT
Start: 2025-04-19

## 2025-04-19 RX ORDER — AMIODARONE HYDROCHLORIDE 200 MG/1
100 TABLET ORAL EVERY OTHER DAY
Start: 2025-04-19 | End: 2025-04-21 | Stop reason: SDUPTHER

## 2025-04-19 RX ORDER — POTASSIUM CHLORIDE 750 MG/1
40 CAPSULE, EXTENDED RELEASE ORAL ONCE
Status: COMPLETED | OUTPATIENT
Start: 2025-04-19 | End: 2025-04-19

## 2025-04-19 RX ADMIN — Medication 10 ML: at 08:36

## 2025-04-19 RX ADMIN — POTASSIUM CHLORIDE 40 MEQ: 750 CAPSULE, EXTENDED RELEASE ORAL at 08:32

## 2025-04-19 RX ADMIN — PANTOPRAZOLE SODIUM 40 MG: 40 TABLET, DELAYED RELEASE ORAL at 05:49

## 2025-04-19 RX ADMIN — ARFORMOTEROL TARTRATE 15 MCG: 15 SOLUTION RESPIRATORY (INHALATION) at 09:08

## 2025-04-19 RX ADMIN — FINASTERIDE 5 MG: 5 TABLET, FILM COATED ORAL at 08:33

## 2025-04-19 RX ADMIN — ALLOPURINOL 200 MG: 100 TABLET ORAL at 08:33

## 2025-04-19 RX ADMIN — BUDESONIDE 0.5 MG: 0.5 INHALANT RESPIRATORY (INHALATION) at 09:08

## 2025-04-19 RX ADMIN — FUROSEMIDE 40 MG: 10 INJECTION, SOLUTION INTRAMUSCULAR; INTRAVENOUS at 08:35

## 2025-04-19 RX ADMIN — ASPIRIN 81 MG: 81 TABLET, COATED ORAL at 08:33

## 2025-04-19 RX ADMIN — ISOSORBIDE MONONITRATE 30 MG: 30 TABLET, EXTENDED RELEASE ORAL at 08:33

## 2025-04-19 RX ADMIN — METOPROLOL SUCCINATE 25 MG: 25 TABLET, EXTENDED RELEASE ORAL at 05:49

## 2025-04-19 RX ADMIN — AZELASTINE HYDROCHLORIDE 2 SPRAY: 137 SPRAY, METERED NASAL at 08:39

## 2025-04-19 RX ADMIN — TAMSULOSIN HYDROCHLORIDE 0.4 MG: 0.4 CAPSULE ORAL at 08:33

## 2025-04-19 NOTE — OUTREACH NOTE
Prep Survey      Flowsheet Row Responses   [place] facility patient discharged from? [facility]   Is LACE score < 7 ? No   Eligibility [facility]   Date of Admission 04/16/25   Date of Discharge 04/19/25   Discharge Disposition Home or Self Care   Discharge diagnosis Shortness of breath   Does the patient have one of the following disease processes/diagnoses(primary or secondary)? Other   Does the patient have Home health ordered? Yes   What is the Home health agency?  [facility]   Is there a DME ordered? No   Prep survey completed? Yes            RITA GONZALES - Registered Nurse

## 2025-04-19 NOTE — PLAN OF CARE
Goal Outcome Evaluation:           Progress: improving  Outcome Evaluation: Pt is a&o, able to make needs known. On 1LNC, tolerating very well. No complaints of pain. Up ad berkley in room, no SOB reported. VSS during shift. Plan to go home todaty. Continue plan of care.

## 2025-04-19 NOTE — DISCHARGE SUMMARY
[facility]         HOSPITALIST  DISCHARGE SUMMARY    Patient Name: Layo Cee  : 1940  MRN: [id number]    Date of Admission: 2025  Date of Discharge:  2025  Primary Care Physician: Tevin Zavala MD    Active and Resolved Hospital Problems:  HFpEF in acute exacerbation  Atrial fibrillation  COPD  Coronary artery disease status post bypass  Hypertension  BPH  CKD    Hospital Course     Hospital Course:  Layo Cee is an 85 y.o. male  with medical history of atrial fibrillation, BPH, HFpEF, COPD on 2 L of nocturnal oxygen, essential hypertension who started having trouble breathing morning of presentation, 2025.  He feels as though he cannot get enough air in.  Patient is in cardiopulmonary rehab.  Wife states the patient is a pound lighter on his home scale. On arrival to the ED, patient's temperature 98.3, pulse 98, respiratory 34, blood pressure 168/76, and he is on 2 L of oxygen saturating 92%. Chest x-ray shows no active disease. CT of the chest without contrast: Mild interlobular septal thickening perihilar groundglass opacities.  Favored to represent mild pulmonary edema.  Viral/atypical infection can appear similar.  Emphysema.  Patient's proBNP elevated to 4541.  Initiated on IV diuretics with good urinary output.  Patient was weaned down on supplemental oxygen, originally on 3, able to wean off of supplemental oxygen.  Walk test performed, did not require supplemental oxygen on discharge.  Patient has been on intermittent dosing of Lasix and torsemide.  Discussed with patient and wife, recommended taking torsemide daily until follow-up appointment with cardiology scheduled for the .  Patient will continue monitoring daily weights at home.  Patient seen on date of discharge, clinically and hemodynamically stable.  Patient provided concerning signs and symptoms prompting immediate medical attention, patient understanding and agreeable    DISCHARGE Follow  Up Recommendations for labs and diagnostics:   Follow-up primary care physician as soon as possible  Patient informed to start taking torsemide daily as opposed to every other day dosing  Patient will continue monitoring daily weights, follow-up clinic in cardiology office on 21st scheduled      Day of Discharge     Vital Signs:  Temp:  [97.3 °F (36.3 °C)-97.9 °F (36.6 °C)] 97.5 °F (36.4 °C)  Heart Rate:  [74-85] 79  Resp:  [18] 18  BP: (122-145)/(59-90) 145/90  Flow (L/min) (Oxygen Therapy):  [1] 1  Physical Exam:               Constitutional: Awake, alert, no acute distress, wife at bedside              Eyes: Pupils equal, sclerae anicteric, no conjunctival injection              HENT: NCAT, mucous membranes moist              Neck: Supple, no thyromegaly, no lymphadenopathy, trachea midline              Respiratory: Diminished at bases, no crackles auscultated              Cardiovascular: RRR, no murmurs, rubs, or gallops, palpable pedal pulses bilaterally              Gastrointestinal: Positive bowel sounds, soft, nontender, nondistended              Musculoskeletal: No bilateral ankle edema, no clubbing or cyanosis to extremities              Neurologic: Oriented x 3, strength symmetric in all extremities, Cranial Nerves grossly intact to confrontation, speech clear    Discharge Details        Discharge Medications        Changes to Medications        Instructions Start Date   Allopurinol 200 MG tablet  What changed:   how much to take  when to take this   200 mg, Oral, Daily      isosorbide mononitrate 30 MG 24 hr tablet  Commonly known as: IMDUR  What changed: how much to take   30 mg, Oral, Daily      metoprolol succinate XL 25 MG 24 hr tablet  Commonly known as: TOPROL-XL  What changed: when to take this   25 mg, Oral, Every 12 Hours             Continue These Medications        Instructions Start Date   acetaminophen 325 MG tablet  Commonly known as: TYLENOL   650 mg, As Needed      amiodarone 200 MG  tablet  Commonly known as: PACERONE   100 mg, Oral, Every Other Day      aspirin 81 MG EC tablet   81 mg, Oral, Daily      azelastine 0.1 % nasal spray  Commonly known as: ASTELIN   2 sprays, Nasal, 2 Times Daily, Use in each nostril as directed      calcium carbonate 600 MG tablet  Commonly known as: OS-LIANNA   600 mg, Nightly      Coenzyme Q10 100 MG tablet   100 mg, Daily      Cranberry 400 MG tablet   400 mg, Daily      Diclofenac Sodium 1 % gel gel  Commonly known as: VOLTAREN   2 g, Topical, 4 Times Daily      Entresto 24-26 MG tablet  Generic drug: sacubitril-valsartan   1 tablet, Oral, 2 Times Daily      finasteride 5 MG tablet  Commonly known as: PROSCAR   5 mg, Oral, Daily      fluticasone 50 MCG/ACT nasal spray  Commonly known as: FLONASE   1 spray, Nightly PRN      ipratropium-albuterol 0.5-2.5 mg/3 ml nebulizer  Commonly known as: DUO-NEB   3 mL, Nebulization, 4 Times Daily PRN      Livalo 1 MG tablet tablet  Generic drug: pitavastatin calcium   1 mg, Oral, Nightly      montelukast 10 MG tablet  Commonly known as: SINGULAIR   10 mg, Oral, Nightly      nitroglycerin 0.4 MG SL tablet  Commonly known as: NITROSTAT   0.4 mg, Sublingual, Every 5 Minutes PRN, Take no more than 3 doses in 15 minutes.      pantoprazole 40 MG EC tablet  Commonly known as: PROTONIX   40 mg, Oral, Daily      rivaroxaban 15 MG tablet  Commonly known as: Xarelto   15 mg, Oral, Daily With Dinner      tamsulosin 0.4 MG capsule 24 hr capsule  Commonly known as: FLOMAX   0.4 mg, Oral, Daily      torsemide 20 MG tablet  Commonly known as: DEMADEX   20 mg, Oral, Daily      Trelegy Ellipta 100-62.5-25 MCG/ACT inhaler  Generic drug: Fluticasone-Umeclidin-Vilant   1 puff, Inhalation, Daily - RT      VITAMIN B COMPLEX-C PO   1 tablet, Daily               Allergies   Allergen Reactions    Carvedilol Swelling and Anaphylaxis    Levaquin [Levofloxacin] Unknown - Low Severity       Discharge Disposition:  Home or Self Care    Diet:  Hospital:No  active diet order      Discharge Activity:   Activity Instructions       Activity as Tolerated              CODE STATUS:  Code Status and Medical Interventions: CPR (Attempt to Resuscitate); Full Support   Ordered at: 04/16/25 1615     Code Status (Patient has no pulse and is not breathing):    CPR (Attempt to Resuscitate)     Medical Interventions (Patient has pulse or is breathing):    Full Support     Level Of Support Discussed With:    Patient         Future Appointments   Date Time Provider Department Center   4/21/2025  1:00 PM Mely Smith [place] [facility] [place] [facility]   5/12/2025  2:30 PM Harley Linares [place] [facility] [place] [facility]   5/15/2025 11:30 AM Darnell Stevenson [place] [facility] [place] [facility]   6/2/2025 11:15 AM Kolton Brink [place] [facility] [place] [facility]   7/18/2025  2:15 PM Laila Martinez [place]N [facility] [place] [facility]       Additional Instructions for the Follow-ups that You Need to Schedule       Discharge Follow-up with PCP   As directed       Currently Documented PCP:    Tevin Zavala MD    PCP Phone Number:    [phone]     Follow Up Details: In less than one week                Pertinent  and/or Most Recent Results     PROCEDURES:   None     LAB RESULTS:      Lab 04/19/25  0442 04/18/25  0507 04/17/25  0455 04/16/25  1445 04/16/25  1243 04/16/25  1141   WBC 11.21* 19.39* 8.77  --   --  16.25*   HEMOGLOBIN 11.6* 12.0* 12.5*  --   --  13.5   HEMATOCRIT 36.6* 37.5 39.2  --   --  42.6   PLATELETS 176 197 200  --   --  212   NEUTROS ABS 7.57* 16.51* 7.77*  --   --  12.89*   IMMATURE GRANS (ABS) 0.10* 0.15* 0.10*  --   --  0.17*   LYMPHS ABS 2.60 1.57 0.70  --   --  1.63   MONOS ABS 0.84 1.12* 0.18  --   --  1.46*   EOS ABS 0.09 0.00 0.00  --   --  0.06   MCV 93.1 92.6 92.2  --   --  92.6   PROCALCITONIN  --   --   --   --  0.18  --    LACTATE  --   --   --  1.4  --   --          Lab 04/19/25  0442 04/18/25  0507 04/17/25  0455 04/16/25  1243 04/16/25  1141   SODIUM 140 138 139  --  138   POTASSIUM 3.4* 4.0 4.0  --  3.7   CHLORIDE  102 101 99  --  96*   CO2 26.7 26.0 24.9  --  26.9   ANION GAP 11.3 11.0 15.1*  --  15.1*   BUN 36* 36* 31*  --  24*   CREATININE 1.66* 1.59* 1.62*  --  1.59*   EGFR 40.7* 42.8* 41.9*  --  42.8*   GLUCOSE 100* 135* 167*  --  116*   CALCIUM 8.1* 8.4* 8.4*  --  9.3   MAGNESIUM 2.2 2.3 2.2 1.9  --    PHOSPHORUS  --  3.9 4.9* 3.1  --          Lab 04/16/25  1141   TOTAL PROTEIN 7.0   ALBUMIN 4.3   GLOBULIN 2.7   ALT (SGPT) 26   AST (SGOT) 23   BILIRUBIN 0.6   ALK PHOS 59         Lab 04/16/25  1243 04/16/25  1141   PROBNP  --  4,541.0*   HSTROP T 42* 38*                 Brief Urine Lab Results  (Last result in the past 365 days)        Color   Clarity   Blood   Leuk Est   Nitrite   Protein   CREAT   Urine HCG        03/25/25 1242 Dark Yellow   Cloudy   Negative   Negative   Negative   30 mg/dL (1+)           03/25/25 1242             130.5               Microbiology Results (last 10 days)       Procedure Component Value - Date/Time    Legionella Antigen, Urine - Urine, Urine, Clean Catch [[id number]]  (Normal) Collected: 04/16/25 1714    Lab Status: Final result Specimen: Urine, Clean Catch Updated: 04/16/25 1827     LEGIONELLA ANTIGEN, URINE Negative    S. Pneumo Ag Urine or CSF - Urine, Urine, Clean Catch [[id number]]  (Normal) Collected: 04/16/25 1714    Lab Status: Final result Specimen: Urine, Clean Catch Updated: 04/16/25 1827     Strep Pneumo Ag Negative    Blood Culture - Blood, Arm, Right [[id number]]  (Normal) Collected: 04/16/25 1445    Lab Status: Preliminary result Specimen: Blood from Arm, Right Updated: 04/19/25 1500     Blood Culture No growth at 3 days    Narrative:      Less than seven (7) mL's of blood was collected.  Insufficient quantity may yield false negative results.    Blood Culture - Blood, Arm, Left [[id number]]  (Normal) Collected: 04/16/25 1445    Lab Status: Preliminary result Specimen: Blood from Arm, Left Updated: 04/19/25 1500     Blood Culture No growth at 3 days    Narrative:      Less than  seven (7) mL's of blood was collected.  Insufficient quantity may yield false negative results.    COVID-19, FLU A/B, RSV PCR 1 HR TAT - Swab, Nasopharynx [[id number]]  (Normal) Collected: 04/16/25 1421    Lab Status: Final result Specimen: Swab from Nasopharynx Updated: 04/16/25 1518     COVID19 Not Detected     Influenza A PCR Not Detected     Influenza B PCR Not Detected     RSV, PCR Not Detected    Narrative:      Fact sheet for providers: https://www.fda.gov/media/567510/download    Fact sheet for patients: https://www.fda.gov/media/658795/download    Test performed by PCR.            XR Chest 1 View  Result Date: 4/17/2025  Impression: Right lower lobe infiltrate or atelectasis with a small right pleural effusion. Electronically Signed: Leon Harley MD  4/17/2025 6:03 AM EDT  Workstation ID: [id number]    CT Chest Without Contrast Diagnostic  Result Date: 4/16/2025  Impression: Impression: Mild interlobular septal thickening perihilar groundglass opacities, favored to represent mild pulmonary edema. Viral/atypical infection can appear similar. Small bilateral pleural effusions with adjacent atelectasis Emphysema. Electronically Signed: Carlos Enrique Osuna MD  4/16/2025 3:50 PM EDT  Workstation ID: [id number]    XR Chest 1 View  Result Date: 4/16/2025  Impression: Impression: No active disease. Electronically Signed: Mathew Alegria MD  4/16/2025 12:03 PM EDT  Workstation ID: [id number]    XR Chest 2 View  Result Date: 4/11/2025  Impression: Impression: Findings suggest improving congestive heart failure and pulmonary infiltrates. Electronically Signed: Darek Alejo MD  4/11/2025 5:01 PM EDT  Workstation ID: [id number]              Results for orders placed during the hospital encounter of 01/29/25    Adult Transthoracic Echo Complete W/ Cont if Necessary Per Protocol    Interpretation Summary    Left ventricular systolic function is low normal. Left ventricular ejection fraction appears to be 46 - 50%.    Left  ventricular wall thickness is consistent with mild concentric hypertrophy.    Left ventricular diastolic function is consistent with (grade II w/high LAP) pseudonormalization.    The left atrial cavity is mildly dilated.    Aortic valve is moderately calcified with restricted opening.  Mild to moderate aortic valve stenosis is present.  The peak and mean gradients across aortic valve are 30/18 mmHg    There is mild tricuspid regurgitation.  Estimated right ventricular systolic pressure from tricuspid regurgitation is moderately elevated (45-55 mmHg).      Labs Pending at Discharge:  Pending Labs       Order Current Status    Blood Culture - Blood, Arm, Left Preliminary result    Blood Culture - Blood, Arm, Right Preliminary result              Time spent on Discharge including face to face service:  40 minutes    Electronically signed by Pradeep Arndt MD, 04/19/25, 7:17 PM EDT.

## 2025-04-19 NOTE — PROGRESS NOTES
Walking Oximetry Progress Note      Patient Name:  Layo Cee  YOB: 1940  Date of Procedure: 04/19/25              ROOM AIR BASELINE   SpO2% 94   Heart Rate 86     EXERCISE ON ROOM AIR SpO2% EXERCISE ON O2 LPM SpO2%   1 MINUTE 94 1 MINUTE     2 MINUTES 95 2 MINUTES     3 MINUTES 95 3 MINUTES     4 MINUTES 95 4 MINUTES     5 MINUTES 96 5 MINUTES     6 MINUTES 96 6 MINUTES                Time to Recovery  less than a minute   SpO2% Post Exercise  96 on 21%.    HR Post Exercise  93     Comments: Patient walked around 4TH floor unit  with a walker for 6 minutes. A slow but steady pace, did not complain of SOB, did say this felt like his typical baseline.          Electronically signed by Sonia Sterling CRT, 04/19/25, 11:13 AM EDT.

## 2025-04-21 ENCOUNTER — TRANSITIONAL CARE MANAGEMENT TELEPHONE ENCOUNTER (OUTPATIENT)
Dept: CALL CENTER | Facility: HOSPITAL | Age: 85
End: 2025-04-21
Payer: MEDICARE

## 2025-04-21 ENCOUNTER — OFFICE VISIT (OUTPATIENT)
Dept: CARDIOLOGY | Facility: CLINIC | Age: 85
End: 2025-04-21
Payer: MEDICARE

## 2025-04-21 ENCOUNTER — OFFICE VISIT (OUTPATIENT)
Dept: FAMILY MEDICINE CLINIC | Facility: CLINIC | Age: 85
End: 2025-04-21
Payer: MEDICARE

## 2025-04-21 VITALS
HEIGHT: 70 IN | HEART RATE: 69 BPM | DIASTOLIC BLOOD PRESSURE: 64 MMHG | OXYGEN SATURATION: 96 % | WEIGHT: 220.2 LBS | SYSTOLIC BLOOD PRESSURE: 134 MMHG | BODY MASS INDEX: 31.52 KG/M2

## 2025-04-21 VITALS
BODY MASS INDEX: 31.61 KG/M2 | HEIGHT: 70 IN | DIASTOLIC BLOOD PRESSURE: 68 MMHG | SYSTOLIC BLOOD PRESSURE: 132 MMHG | WEIGHT: 220.8 LBS | HEART RATE: 70 BPM

## 2025-04-21 DIAGNOSIS — Z09 HOSPITAL DISCHARGE FOLLOW-UP: Primary | ICD-10-CM

## 2025-04-21 DIAGNOSIS — I10 ESSENTIAL HYPERTENSION: ICD-10-CM

## 2025-04-21 DIAGNOSIS — I13.0 HYPERTENSIVE HEART AND CHRONIC KIDNEY DISEASE WITH HEART FAILURE AND STAGE 1 THROUGH STAGE 4 CHRONIC KIDNEY DISEASE, OR UNSPECIFIED CHRONIC KIDNEY DISEASE: ICD-10-CM

## 2025-04-21 DIAGNOSIS — I25.5 ISCHEMIC CARDIOMYOPATHY: Primary | ICD-10-CM

## 2025-04-21 DIAGNOSIS — I48.19 ATRIAL FIBRILLATION, PERSISTENT: ICD-10-CM

## 2025-04-21 LAB
BACTERIA SPEC AEROBE CULT: NORMAL
BACTERIA SPEC AEROBE CULT: NORMAL

## 2025-04-21 PROCEDURE — 1159F MED LIST DOCD IN RCRD: CPT | Performed by: FAMILY MEDICINE

## 2025-04-21 PROCEDURE — 3075F SYST BP GE 130 - 139MM HG: CPT | Performed by: FAMILY MEDICINE

## 2025-04-21 PROCEDURE — 3078F DIAST BP <80 MM HG: CPT | Performed by: FAMILY MEDICINE

## 2025-04-21 PROCEDURE — 1160F RVW MEDS BY RX/DR IN RCRD: CPT | Performed by: FAMILY MEDICINE

## 2025-04-21 PROCEDURE — 99214 OFFICE O/P EST MOD 30 MIN: CPT | Performed by: FAMILY MEDICINE

## 2025-04-21 RX ORDER — AMIODARONE HYDROCHLORIDE 200 MG/1
200 TABLET ORAL DAILY
Qty: 90 TABLET | Refills: 1
Start: 2025-04-21 | End: 2025-04-22 | Stop reason: SDUPTHER

## 2025-04-21 NOTE — OUTREACH NOTE
Call Center TCM Note      Flowsheet Row Responses   [facility] patient discharged from? Back   Does the patient have one of the following disease processes/diagnoses(primary or secondary)? Other   TCM attempt successful? No  [vr for Riana, wife]   Unsuccessful attempts Attempt 1  [attempted call,  noted that pt has both cardio and PCP appts today]            DOTTY Holder Registered Nurse    4/21/2025, 12:00 EDT

## 2025-04-21 NOTE — OUTREACH NOTE
Call Center TCM Note      Flowsheet Row Responses   [facility] patient discharged from? Back   Does the patient have one of the following disease processes/diagnoses(primary or secondary)? Other   TCM attempt successful? Yes   Discharge diagnosis Shortness of breath   TCM call completed? Yes   Wrap up additional comments TCM completed on 4/21/25, PCP appointment within 2 business days of DC fulfills the TCM requirement, no call necessary.            DOTTY MORAN - Registered Nurse    4/21/2025, 14:41 EDT

## 2025-04-21 NOTE — ASSESSMENT & PLAN NOTE
He is in regular rhythm, and has no complaints of palpitations.  Continue current dose amiodarone and metoprolol for rate and rhythm control.  Refill sent for amiodarone.  Continue chronic anticoagulation with Xarelto.

## 2025-04-21 NOTE — ASSESSMENT & PLAN NOTE
Blood pressures are well-controlled, he brings in home blood pressure log showing good control.  Continue current medication regiment.

## 2025-04-21 NOTE — PROGRESS NOTES
Subjective:       Layo Cee is a 85 y.o. male with a concurrent medical history of atrial fibrillation status post ablation, coronary artery disease status post bypass, heart failure with reduced ejection fraction secondary to ischemic cardiomyopathy, hypertension, gout, benign prostate hyperplasia, COPD, prediabetes, obesity, iron deficiency anemia and renal insufficiency who presents for hospital discharge follow-up.    I saw Layo recently in the office on 4/10/2025 for dyspnea.  At that time I treated him for presumed COPD exacerbation with steroids but also noted the potential for heart failure exacerbation and recommended he take full dose diuretic therapy (he had previously been taking half a dose) with follow-up in just 1 week.  Unfortunately, despite close scheduled follow-up, Layo' symptoms worsened, and he was subsequently evaluated in the emergency room and admitted to the hospital inpatient from 4/16/2025 to 4/19/2025.    I have reviewed the discharge summary written by .  Layo presented with dyspnea and was admitted for the same.  He was started on IV diuretics and weaned off oxygen.  He was discharged home and recommended to take full dose torsemide until following up with cardiologist.  Additionally, it does appear some changes were made to his medications.  He now takes 200 mg of allopurinol and he is on 30 mg of isosorbide mononitrate and 25 mg twice daily of metoprolol succinate.    Today, weight is 220 pounds, a decrease in 2 pounds from his 4/10/2025 visit.  Oxygen saturation on room air is 96%.  Blood pressure is 134/64 and heart rate is 69.    On physical exam, Layo looks better than he has in in time I have seen him since January of this year.  He is not ill-appearing or in any acute distress.  Respiratory rate is normal, lungs are clear to auscultation bilaterally and he has no lower extremity edema bilaterally.    Today, Layo tells me he is considerably improved from  his hospitalization.  He followed up with cardiology today and they will continue his current medications for now with full dose diuretic.  He has upcoming lab work to reassess renal function.    We will see him back in approximately 6 to 8 weeks for coordination of care recheck as he has upcoming appointments with cardiology, pulmonology, urology, and nephrology.        Past Medical Hx:  Past Medical History:   Diagnosis Date    Acute on chronic diastolic CHF (congestive heart failure) 6/6/2022    Arthritis     Atrial fibrillation, persistent 5/3/2022    Persistent atrial fibrillation status post extensive ablation and pulmonary vein isolation by Dr. Aguilar on 6/3/2022, with reoccurring rapid A. fib, and then successful cardioversion on 7/1/2022    BPH (benign prostatic hyperplasia)     CHF (congestive heart failure)     COPD (chronic obstructive pulmonary disease)     Coronary artery disease of bypass graft of native heart with stable angina pectoris     (1) Coronary artery disease, s/p CABG in the past. Cardiac catheterization in July 2016 showed patentLIMA graft to the LAD and occluded SVGs. Native LAD is 100% occluded in the mid portion. Native LCx has no significant disease. Native RCA is 100% occluded and it is a . Repeat cath in June, 2021, underwent stent placement to diagnonal branch.    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 Lauren Valencia        Past Surgical Hx:  Past Surgical History:   Procedure Laterality Date    BLADDER  SURGERY      CARDIAC CATHETERIZATION  07/2016    CARDIAC CATHETERIZATION N/A 8/9/2021    Procedure: Left Heart Cath with possible angioplasty;  Surgeon: Jack Ladd MD;  Location: [facility] CATH INVASIVE LOCATION;  Service: Cardiovascular;  Laterality: N/A;  Please schedule the procedure with Dr. Jack Ladd MD on 8/9/2021    CARDIAC ELECTROPHYSIOLOGY PROCEDURE N/A 6/3/2022    Procedure: Ablation atrial fibrillation;  Surgeon: Irineo Aguilar MD;  Location:  [facility] CATH INVASIVE LOCATION;  Service: Cardiovascular;  Laterality: N/A;    CARDIAC ELECTROPHYSIOLOGY PROCEDURE N/A 6/3/2022    Procedure: Ablation atrial flutter/CTI;  Surgeon: Irineo Aguilar MD;  Location: [facility] CATH INVASIVE LOCATION;  Service: Cardiovascular;  Laterality: N/A;    CARDIAC SURGERY      HEART BYPASS    COLONOSCOPY  2011    CORONARY ARTERY BYPASS GRAFT      CYSTOSCOPY  03/19/2019    WITH BILATERAL RETROGRADE PYELOGRAPHY    ELECTRICAL CARDIOVERSION  5/12/2022         ENDOSCOPY  2016    PROSTATE SURGERY         Current Meds:    Current Outpatient Medications:     acetaminophen (TYLENOL) 325 MG tablet, Take 2 tablets by mouth As Needed for Mild Pain., Disp: , Rfl:     allopurinol 200 MG tablet, Take 200 mg by mouth Daily. (Patient taking differently: Take 200 mg by mouth Daily. 0.5 tablet), Disp: 90 tablet, Rfl: 2    amiodarone (PACERONE) 200 MG tablet, Take 1 tablet by mouth Daily., Disp: 90 tablet, Rfl: 1    aspirin (aspirin) 81 MG EC tablet, Take 1 tablet by mouth Daily., Disp: 30 tablet, Rfl: 1    azelastine (ASTELIN) 0.1 % nasal spray, Administer 2 sprays into the nostril(s) as directed by provider 2 (Two) Times a Day. Use in each nostril as directed, Disp: 30 mL, Rfl: 12    calcium carbonate (OS-LIANNA) 600 MG tablet, Take 1 tablet by mouth Every Night., Disp: , Rfl:     Coenzyme Q10 100 MG tablet, Take 1 tablet by mouth Daily., Disp: , Rfl:     Cranberry 400 MG tablet, Take 400 mg by mouth Daily., Disp: , Rfl:     Diclofenac  Sodium (VOLTAREN) 1 % gel gel, Apply 2 g topically to the appropriate area as directed 4 (Four) Times a Day., Disp: , Rfl:     finasteride (PROSCAR) 5 MG tablet, Take 1 tablet by mouth Daily., Disp: 90 tablet, Rfl: 4    fluticasone (FLONASE) 50 MCG/ACT nasal spray, Administer 1 spray into the nostril(s) as directed by provider At Night As Needed for Allergies., Disp: , Rfl:     Fluticasone-Umeclidin-Vilant (Trelegy Ellipta) 100-62.5-25 MCG/ACT inhaler, Inhale 1 puff Daily., Disp: 1 each, Rfl: 11    ipratropium-albuterol (DUO-NEB) 0.5-2.5 mg/3 ml nebulizer, Take 3 mL by nebulization 4 (Four) Times a Day As Needed for Wheezing., Disp: 360 mL, Rfl: 3    isosorbide mononitrate (IMDUR) 30 MG 24 hr tablet, Take 1 tablet by mouth daily., Disp: 90 tablet, Rfl: 3    Livalo 1 MG tablet tablet, Take 1 tablet by mouth every night., Disp: 90 tablet, Rfl: 3    metoprolol succinate XL (TOPROL-XL) 25 MG 24 hr tablet, Take 1 tablet by mouth Every 12 (Twelve) Hours., Disp: 90 tablet, Rfl: 3    montelukast (SINGULAIR) 10 MG tablet, Take 1 tablet by mouth Every Night., Disp: 90 tablet, Rfl: 3    nitroglycerin (NITROSTAT) 0.4 MG SL tablet, Place 1 tablet under the tongue Every 5 (Five) Minutes As Needed for Chest Pain for up to 30 days. Take no more than 3 doses in 15 minutes., Disp: 30 tablet, Rfl: 0    pantoprazole (PROTONIX) 40 MG EC tablet, Take 1 tablet by mouth Daily., Disp: 90 tablet, Rfl: 2    rivaroxaban (Xarelto) 15 MG tablet, Take 1 tablet by mouth Daily With Dinner., Disp: 90 tablet, Rfl: 1    sacubitril-valsartan (Entresto) 24-26 MG tablet, Take 1 tablet by mouth 2 (Two) Times a Day., Disp: 180 tablet, Rfl: 3    tamsulosin (FLOMAX) 0.4 MG capsule 24 hr capsule, Take 1 capsule by mouth Daily., Disp: 90 capsule, Rfl: 4    torsemide (DEMADEX) 20 MG tablet, Take 1 tablet by mouth Daily., Disp: 45 tablet, Rfl: 1    VITAMIN B COMPLEX-C PO, Take 1 tablet by mouth Daily., Disp: , Rfl:     Allergies:  Allergies   Allergen Reactions  "   Carvedilol Swelling and Anaphylaxis    Levaquin [Levofloxacin] Unknown - Low Severity       Family Hx:  Family History   Problem Relation Age of Onset    Hypertension Mother     Heart disease Father     Other Brother         GASTROINTESTINAL CANCER    Kidney cancer Brother         Social History:  Social History     Socioeconomic History    Marital status:    Tobacco Use    Smoking status: Former     Current packs/day: 0.00     Average packs/day: 0.5 packs/day for 40.0 years (20.0 ttl pk-yrs)     Types: Cigarettes     Start date:      Quit date:      Years since quittin.3     Passive exposure: Past    Smokeless tobacco: Never   Vaping Use    Vaping status: Never Used   Substance and Sexual Activity    Alcohol use: Not Currently    Drug use: Never    Sexual activity: Defer       Review of Systems  Review of Systems   Respiratory:  Negative for shortness of breath.        Objective:     /64 (BP Location: Left arm, Patient Position: Sitting, Cuff Size: Large Adult)   Pulse 69   Ht 177.8 cm (70\")   Wt 99.9 kg (220 lb 3.2 oz)   SpO2 96%   BMI 31.60 kg/m²   Physical Exam  Constitutional:       General: He is not in acute distress.     Appearance: Normal appearance. He is obese. He is not ill-appearing, toxic-appearing or diaphoretic.   Pulmonary:      Effort: Pulmonary effort is normal.      Breath sounds: Normal breath sounds.   Musculoskeletal:      Right lower leg: No edema.      Left lower leg: No edema.   Neurological:      Mental Status: He is alert.   Psychiatric:         Mood and Affect: Mood normal.         Behavior: Behavior normal.          Assessment/Plan:     Diagnoses and all orders for this visit:    1. Hospital discharge follow-up (Primary)    I saw Layo recently in the office on 4/10/2025 for dyspnea.  At that time I treated him for presumed COPD exacerbation with steroids but also noted the potential for heart failure exacerbation and recommended he take full dose " diuretic therapy (he had previously been taking half a dose) with follow-up in just 1 week.  Unfortunately, despite close scheduled follow-up, Layo' symptoms worsened, and he was subsequently evaluated in the emergency room and admitted to the hospital inpatient from 4/16/2025 to 4/19/2025.    I have reviewed the discharge summary written by .  Layo presented with dyspnea and was admitted for the same.  He was started on IV diuretics and weaned off oxygen.  He was discharged home and recommended to take full dose torsemide until following up with cardiologist.  Additionally, it does appear some changes were made to his medications.  He now takes 200 mg of allopurinol and he is on 30 mg of isosorbide mononitrate and 25 mg twice daily of metoprolol succinate.    Today, weight is 220 pounds, a decrease in 2 pounds from his 4/10/2025 visit.  Oxygen saturation on room air is 96%.  Blood pressure is 134/64 and heart rate is 69.    On physical exam, Layo looks better than he has in in time I have seen him since January of this year.  He is not ill-appearing or in any acute distress.  Respiratory rate is normal, lungs are clear to auscultation bilaterally and he has no lower extremity edema bilaterally.    Today, Layo tells me he is considerably improved from his hospitalization.  He followed up with cardiology today and they will continue his current medications for now with full dose diuretic.  He has upcoming lab work to reassess renal function.    We will see him back in approximately 6 to 8 weeks for coordination of care recheck as he has upcoming appointments with cardiology, pulmonology, urology, and nephrology.        Follow-up:     Return in about 7 weeks (around 6/10/2025) for Coordination of care.    Preventative:  Health Maintenance   Topic Date Due    RSV Vaccine - Adults (1 - 1-dose 75+ series) Never done    COVID-19 Vaccine (2 - 2024-25 season) 09/01/2024    ANNUAL WELLNESS VISIT  01/17/2025     INFLUENZA VACCINE  07/01/2025    LIPID PANEL  09/16/2025    TDAP/TD VACCINES (2 - Tdap) 01/09/2033    Pneumococcal Vaccine 50+  Completed    ZOSTER VACCINE  Completed           This document has been electronically signed by Tevin Zavala MD on April 21, 2025 14:24 EDT       Parts of this note are electronic transcriptions/translations of spoken language to printed text using the Dragon Dictation system.

## 2025-04-22 ENCOUNTER — APPOINTMENT (OUTPATIENT)
Dept: CARDIAC REHAB | Facility: HOSPITAL | Age: 85
End: 2025-04-22
Payer: MEDICARE

## 2025-04-23 RX ORDER — AMIODARONE HYDROCHLORIDE 200 MG/1
200 TABLET ORAL DAILY
Qty: 90 TABLET | Refills: 3 | Status: SHIPPED | OUTPATIENT
Start: 2025-04-23

## 2025-04-23 NOTE — TELEPHONE ENCOUNTER
Rx Refill Note  Requested Prescriptions     Pending Prescriptions Disp Refills    amiodarone (PACERONE) 200 MG tablet       Sig: Take 1 tablet by mouth Daily.        LAST OFFICE VISIT:  04/21/2025     NEXT OFFICE VISIT:  5/15/2025     Does the medication requests match the last office note:    [x] Yes   [] No    Does this refill request meet protocol details for MA to approve:     [] Yes   [] No   [x] No Protocols Provided

## 2025-04-24 ENCOUNTER — APPOINTMENT (OUTPATIENT)
Dept: CARDIAC REHAB | Facility: HOSPITAL | Age: 85
End: 2025-04-24
Payer: MEDICARE

## 2025-04-24 ENCOUNTER — TREATMENT (OUTPATIENT)
Dept: CARDIAC REHAB | Facility: HOSPITAL | Age: 85
End: 2025-04-24
Payer: MEDICARE

## 2025-04-24 DIAGNOSIS — J43.2 CENTRILOBULAR EMPHYSEMA: Primary | ICD-10-CM

## 2025-04-24 PROCEDURE — G0239 OTH RESP PROC, GROUP: HCPCS

## 2025-04-29 ENCOUNTER — READMISSION MANAGEMENT (OUTPATIENT)
Dept: CALL CENTER | Facility: HOSPITAL | Age: 85
End: 2025-04-29
Payer: MEDICARE

## 2025-04-29 ENCOUNTER — TREATMENT (OUTPATIENT)
Dept: CARDIAC REHAB | Facility: HOSPITAL | Age: 85
End: 2025-04-29
Payer: MEDICARE

## 2025-04-29 ENCOUNTER — APPOINTMENT (OUTPATIENT)
Dept: CARDIAC REHAB | Facility: HOSPITAL | Age: 85
End: 2025-04-29
Payer: MEDICARE

## 2025-04-29 DIAGNOSIS — J43.2 CENTRILOBULAR EMPHYSEMA: Primary | ICD-10-CM

## 2025-04-29 PROCEDURE — G0239 OTH RESP PROC, GROUP: HCPCS

## 2025-04-29 NOTE — OUTREACH NOTE
Medical Week 2 Survey      Flowsheet Row Responses   [facility] patient discharged from? Back   Does the patient have one of the following disease processes/diagnoses(primary or secondary)? Other   Week 2 attempt successful? Yes   Call start time 1800   Discharge diagnosis Shortness of breath   Call end time 1804   Meds reviewed with patient/caregiver? Yes   Is the patient having any side effects they believe may be caused by any medication additions or changes? No   Does the patient have all medications ordered at discharge? Yes   Is the patient taking all medications as directed (includes completed medication regime)? Yes   Does the patient have a primary care provider?  Yes   Does the patient have an appointment with their PCP within 7 days of discharge? Yes   Has the patient kept scheduled appointments due by today? Yes   What is the Home health agency?  [facility]    Has home health visited the patient within 72 hours of discharge? Yes   Home health comments ended , continues with outpt therapy   Psychosocial issues? No   Did the patient receive a copy of their discharge instructions? Yes   Nursing interventions Reviewed instructions with patient   What is the patient's perception of their health status since discharge? Improving   Is the patient/caregiver able to teach back signs and symptoms related to disease process for when to call PCP? Yes   Is the patient/caregiver able to teach back signs and symptoms related to disease process for when to call 911? Yes   Is the patient/caregiver able to teach back the hierarchy of who to call/visit for symptoms/problems? PCP, Specialist, Home health nurse, Urgent Care, ED, 911 Yes   Additional teach back comments states still has exertional SOB   Week 2 Call Completed? Yes   Call end time 1804            Risa FALCON - Registered Nurse

## 2025-05-01 ENCOUNTER — TREATMENT (OUTPATIENT)
Dept: CARDIAC REHAB | Facility: HOSPITAL | Age: 85
End: 2025-05-01
Payer: MEDICARE

## 2025-05-01 ENCOUNTER — APPOINTMENT (OUTPATIENT)
Dept: CARDIAC REHAB | Facility: HOSPITAL | Age: 85
End: 2025-05-01
Payer: MEDICARE

## 2025-05-01 DIAGNOSIS — J43.2 CENTRILOBULAR EMPHYSEMA: Primary | ICD-10-CM

## 2025-05-01 PROCEDURE — G0239 OTH RESP PROC, GROUP: HCPCS

## 2025-05-03 DIAGNOSIS — K21.00 GASTROESOPHAGEAL REFLUX DISEASE WITH ESOPHAGITIS WITHOUT HEMORRHAGE: Chronic | ICD-10-CM

## 2025-05-03 DIAGNOSIS — R06.00 DYSPNEA, UNSPECIFIED TYPE: ICD-10-CM

## 2025-05-03 DIAGNOSIS — J43.9 PULMONARY EMPHYSEMA, UNSPECIFIED EMPHYSEMA TYPE: ICD-10-CM

## 2025-05-03 DIAGNOSIS — J43.2 CENTRILOBULAR EMPHYSEMA: ICD-10-CM

## 2025-05-03 DIAGNOSIS — J30.9 ALLERGIC RHINITIS, UNSPECIFIED SEASONALITY, UNSPECIFIED TRIGGER: ICD-10-CM

## 2025-05-03 DIAGNOSIS — J44.9 CHRONIC OBSTRUCTIVE PULMONARY DISEASE, UNSPECIFIED COPD TYPE: ICD-10-CM

## 2025-05-03 DIAGNOSIS — R05.9 COUGH: ICD-10-CM

## 2025-05-05 RX ORDER — IPRATROPIUM BROMIDE AND ALBUTEROL SULFATE 2.5; .5 MG/3ML; MG/3ML
SOLUTION RESPIRATORY (INHALATION)
Qty: 360 ML | Refills: 3 | Status: SHIPPED | OUTPATIENT
Start: 2025-05-05

## 2025-05-06 ENCOUNTER — LAB (OUTPATIENT)
Dept: LAB | Facility: HOSPITAL | Age: 85
End: 2025-05-06
Payer: MEDICARE

## 2025-05-06 ENCOUNTER — TREATMENT (OUTPATIENT)
Dept: CARDIAC REHAB | Facility: HOSPITAL | Age: 85
End: 2025-05-06
Payer: MEDICARE

## 2025-05-06 ENCOUNTER — APPOINTMENT (OUTPATIENT)
Dept: CARDIAC REHAB | Facility: HOSPITAL | Age: 85
End: 2025-05-06
Payer: MEDICARE

## 2025-05-06 DIAGNOSIS — I13.0 HYPERTENSIVE HEART AND CHRONIC KIDNEY DISEASE WITH HEART FAILURE AND STAGE 1 THROUGH STAGE 4 CHRONIC KIDNEY DISEASE, OR UNSPECIFIED CHRONIC KIDNEY DISEASE: ICD-10-CM

## 2025-05-06 DIAGNOSIS — J43.2 CENTRILOBULAR EMPHYSEMA: Primary | ICD-10-CM

## 2025-05-06 DIAGNOSIS — I25.5 ISCHEMIC CARDIOMYOPATHY: ICD-10-CM

## 2025-05-06 DIAGNOSIS — D50.9 IRON DEFICIENCY ANEMIA, UNSPECIFIED IRON DEFICIENCY ANEMIA TYPE: ICD-10-CM

## 2025-05-06 DIAGNOSIS — I48.19 ATRIAL FIBRILLATION, PERSISTENT: ICD-10-CM

## 2025-05-06 PROCEDURE — 36415 COLL VENOUS BLD VENIPUNCTURE: CPT

## 2025-05-06 PROCEDURE — G0239 OTH RESP PROC, GROUP: HCPCS

## 2025-05-06 PROCEDURE — 83880 ASSAY OF NATRIURETIC PEPTIDE: CPT

## 2025-05-06 RX ORDER — FERROUS SULFATE 325(65) MG
1 TABLET ORAL
Qty: 90 TABLET | Refills: 0 | OUTPATIENT
Start: 2025-05-06

## 2025-05-07 LAB — NT-PROBNP SERPL-MCNC: 2032 PG/ML (ref 0–1800)

## 2025-05-08 ENCOUNTER — APPOINTMENT (OUTPATIENT)
Dept: CARDIAC REHAB | Facility: HOSPITAL | Age: 85
End: 2025-05-08
Payer: MEDICARE

## 2025-05-08 ENCOUNTER — TREATMENT (OUTPATIENT)
Dept: CARDIAC REHAB | Facility: HOSPITAL | Age: 85
End: 2025-05-08
Payer: MEDICARE

## 2025-05-08 DIAGNOSIS — J43.2 CENTRILOBULAR EMPHYSEMA: Primary | ICD-10-CM

## 2025-05-08 PROCEDURE — G0239 OTH RESP PROC, GROUP: HCPCS

## 2025-05-09 ENCOUNTER — READMISSION MANAGEMENT (OUTPATIENT)
Dept: CALL CENTER | Facility: HOSPITAL | Age: 85
End: 2025-05-09
Payer: MEDICARE

## 2025-05-09 NOTE — OUTREACH NOTE
Medical Week 3 Survey      Flowsheet Row Responses   [facility] patient discharged from? Back   Does the patient have one of the following disease processes/diagnoses(primary or secondary)? Other   Week 3 attempt successful? Yes   Call start time 1234   Call end time 1238   Discharge diagnosis Shortness of breath   Person spoke with today (if not patient) and relationship Patient   Meds reviewed with patient/caregiver? Yes   Is the patient having any side effects they believe may be caused by any medication additions or changes? No   Does the patient have all medications ordered at discharge? N/A   Prescription comments No new rxs at time of discharge. No questions or concerns.   Is the patient taking all medications as directed (includes completed medication regime)? Yes   Comments regarding appointments Patient continues with Pulm Rehab and Cardiac Rehab.   Does the patient have a primary care provider?  Yes   Comments regarding PCP PCP--Dr. Tevin Zavlaa   Does the patient have an appointment with their PCP within 7 days of discharge? Yes   Has the patient kept scheduled appointments due by today? Yes   Has home health visited the patient within 72 hours of discharge? N/A   Home health comments ended HH, continues with outpt therapy   DME comments Patient uses Oxygen at night and has pulse ox monitor in the home.   Psychosocial issues? No   Comments Patient states he is still having exertional dyspnea, but is working with pulm rehab and cardiac rehab. He states there is fluid around his heart and Cardiology is aware and monitoring closely.   Did the patient receive a copy of their discharge instructions? Yes   Nursing interventions Reviewed instructions with patient   What is the patient's perception of their health status since discharge? Improving   Is the patient/caregiver able to teach back signs and symptoms related to disease process for when to call PCP? Yes   Is the patient/caregiver able to teach  back signs and symptoms related to disease process for when to call 911? Yes   Is the patient/caregiver able to teach back the hierarchy of who to call/visit for symptoms/problems? PCP, Specialist, Home health nurse, Urgent Care, ED, 911 Yes   If the patient is a current smoker, are they able to teach back resources for cessation? Not a smoker   Week 3 Call Completed? Yes   Graduated Yes   Is the patient interested in additional calls from an ambulatory ? No   Would this patient benefit from a Referral to [facility] Social Work? No   Graduated/Revoked comments Patient denies any needs or concerns at this time.   Call end time 1238            Velma CORREA - Registered Nurse      Velma CORREA - Registered Nurse

## 2025-05-11 NOTE — PROGRESS NOTES
Procedures       Urinalysis was checked today and was negative for signs of infection      Cytoscopy Procedure:     Procedure: Flexible cytoscope was passed per urethra into the bladder without difficulty after proper consent. The bladder was inspected in a systematic meridian fashion.       2.5 cm prostate, previous TUR but almost complete regrowth.  No median lobe      Very elongated bladder with moderate trabeculation.      There were no tumors, lesions, stones, or other abnormalities noted within the bladder. Of note, there was no increased vascularity as well. Both ureteral orifices were identified and were normal in appearance. The flexible cytoscope was removed. The patient tolerated the procedure well.         BPH    Recent admission for CHF    Intermittent dysuria only a mild bother    On tamsulosin and finasteride  Strains at time  No incontinence  Nocturia 0-1    3/25 NP-dysuria-treated with Bactrim -referred for cystoscopy for intermittent dysuria even with treatment with antibiotics      2025 cardiac and pulmonary rehab    Urine culture    2025 Citrobacter-    4 cups coffee daily, 2 cups green or black tea daily.     history of a CABG with atrial fibrillation.   7/21 coronary stent  aspirin 81.  Nitroglycerin      hydrocele on the left side.  About the same.     4/25 1.6, GFR 40    PVR     3/25   000  10/22  49     Previous     1/19 scrotal ultrasound-large left hydrocele, normal testicles, no testicular torsion.     Hematuria last year     10/20 cystoscopy-4 cm prostate with a TUR defect.  Some regrowth on the right lateral side about a centimeter above the verumontanum.  Still pretty open.  Mild trabeculations throughout.  Negative otherwise  9/20 CT urogram-bladder wall thickening along the base and right lateral wall.  Likely related to prostate.  Bilateral simple renal cyst.  Negative otherwise    non-smoker     History of TURP around 2015, Dr. Shi    Patient was admitted in 2019 with gross  hematuria and clot retention and was on CBI for 48 hours.      PSA     11/2019 1.29           BPH  Intermittent dysuria    Cystoscopy negative, patient given reassurance    Continue Flomax and finasteride.    we did  briefly discussed TURP but patient at this time not interested which I think is reasonable with his medical comorbidities      Follow-up with NP in 1 year

## 2025-05-12 ENCOUNTER — LAB (OUTPATIENT)
Facility: HOSPITAL | Age: 85
End: 2025-05-12
Payer: MEDICARE

## 2025-05-12 ENCOUNTER — PROCEDURE VISIT (OUTPATIENT)
Dept: UROLOGY | Age: 85
End: 2025-05-12
Payer: MEDICARE

## 2025-05-12 DIAGNOSIS — N40.1 BENIGN PROSTATIC HYPERPLASIA WITH LOWER URINARY TRACT SYMPTOMS, SYMPTOM DETAILS UNSPECIFIED: Primary | ICD-10-CM

## 2025-05-12 DIAGNOSIS — I48.19 ATRIAL FIBRILLATION, PERSISTENT: ICD-10-CM

## 2025-05-12 DIAGNOSIS — I25.5 ISCHEMIC CARDIOMYOPATHY: ICD-10-CM

## 2025-05-12 LAB
ANION GAP SERPL CALCULATED.3IONS-SCNC: 13.1 MMOL/L (ref 5–15)
BUN SERPL-MCNC: 11 MG/DL (ref 8–23)
BUN/CREAT SERPL: 7.6 (ref 7–25)
CALCIUM SPEC-SCNC: 8.6 MG/DL (ref 8.6–10.5)
CHLORIDE SERPL-SCNC: 104 MMOL/L (ref 98–107)
CO2 SERPL-SCNC: 24.9 MMOL/L (ref 22–29)
CREAT SERPL-MCNC: 1.44 MG/DL (ref 0.76–1.27)
EGFRCR SERPLBLD CKD-EPI 2021: 48.2 ML/MIN/1.73
GLUCOSE SERPL-MCNC: 102 MG/DL (ref 65–99)
POTASSIUM SERPL-SCNC: 3.7 MMOL/L (ref 3.5–5.2)
SODIUM SERPL-SCNC: 142 MMOL/L (ref 136–145)

## 2025-05-12 PROCEDURE — 80048 BASIC METABOLIC PNL TOTAL CA: CPT

## 2025-05-12 PROCEDURE — 52000 CYSTOURETHROSCOPY: CPT | Performed by: UROLOGY

## 2025-05-12 PROCEDURE — 36415 COLL VENOUS BLD VENIPUNCTURE: CPT

## 2025-05-13 ENCOUNTER — TREATMENT (OUTPATIENT)
Dept: CARDIAC REHAB | Facility: HOSPITAL | Age: 85
End: 2025-05-13
Payer: MEDICARE

## 2025-05-13 ENCOUNTER — APPOINTMENT (OUTPATIENT)
Dept: CARDIAC REHAB | Facility: HOSPITAL | Age: 85
End: 2025-05-13
Payer: MEDICARE

## 2025-05-13 DIAGNOSIS — J43.2 CENTRILOBULAR EMPHYSEMA: Primary | ICD-10-CM

## 2025-05-13 PROCEDURE — G0239 OTH RESP PROC, GROUP: HCPCS

## 2025-05-15 ENCOUNTER — APPOINTMENT (OUTPATIENT)
Dept: CARDIAC REHAB | Facility: HOSPITAL | Age: 85
End: 2025-05-15
Payer: MEDICARE

## 2025-05-15 ENCOUNTER — OFFICE VISIT (OUTPATIENT)
Dept: CARDIOLOGY | Facility: CLINIC | Age: 85
End: 2025-05-15
Payer: MEDICARE

## 2025-05-15 ENCOUNTER — TREATMENT (OUTPATIENT)
Dept: CARDIAC REHAB | Facility: HOSPITAL | Age: 85
End: 2025-05-15
Payer: MEDICARE

## 2025-05-15 VITALS
HEIGHT: 70 IN | SYSTOLIC BLOOD PRESSURE: 144 MMHG | DIASTOLIC BLOOD PRESSURE: 73 MMHG | OXYGEN SATURATION: 95 % | WEIGHT: 225 LBS | BODY MASS INDEX: 32.21 KG/M2 | HEART RATE: 72 BPM

## 2025-05-15 DIAGNOSIS — J43.2 CENTRILOBULAR EMPHYSEMA: Primary | ICD-10-CM

## 2025-05-15 DIAGNOSIS — I25.5 ISCHEMIC CARDIOMYOPATHY: Primary | ICD-10-CM

## 2025-05-15 DIAGNOSIS — I48.19 ATRIAL FIBRILLATION, PERSISTENT: ICD-10-CM

## 2025-05-15 DIAGNOSIS — I25.708 CORONARY ARTERY DISEASE OF BYPASS GRAFT OF NATIVE HEART WITH STABLE ANGINA PECTORIS: Chronic | ICD-10-CM

## 2025-05-15 PROCEDURE — G0239 OTH RESP PROC, GROUP: HCPCS

## 2025-05-15 RX ORDER — TORSEMIDE 20 MG/1
20 TABLET ORAL 2 TIMES DAILY
Qty: 90 TABLET | Refills: 1 | Status: SHIPPED | OUTPATIENT
Start: 2025-05-15

## 2025-05-15 RX ORDER — POTASSIUM CHLORIDE 750 MG/1
20 TABLET, EXTENDED RELEASE ORAL DAILY
Qty: 60 TABLET | Refills: 1 | Status: SHIPPED | OUTPATIENT
Start: 2025-05-15

## 2025-05-18 NOTE — ASSESSMENT & PLAN NOTE
Recent hospital admission, discharged on 4/19/2025.  He is reporting increasing shortness of breath.  5 pound weight gain over the past 2 weeks.  He has 2+ pitting edema bilaterally.  Creatinine at baseline per recent labs.  We will increase the dose of torsemide 20 mg twice daily along with potassium chloride 20 mEq daily.  Counseled regarding low-sodium diet, fluid restriction.  Encouraged to keep daily weights.  Continue metoprolol and Entresto.  Will give a closer follow-up.  SGLT2 inhibitors to be added as next step, to be done during follow-up visit.  Will repeat labs in 2 to 3 weeks.

## 2025-05-18 NOTE — PROGRESS NOTES
CARDIOLOGY FOLLOW-UP PROGRESS NOTE        Chief Complaint  Follow-up, Cardiomyopathy, Atrial Fibrillation, and Shortness of Breath    Subjective            Layo BHUMIKA Cee presents to [facility] CARDIOLOGY  History of Present Illness    Mr. Cee is here for a follow-up visit.  He had a recent hospital admission from 4/16/2025 to 4/19/2025 with volume overload and heart failure exacerbation.  He was seen in office 2 days after discharge.  Medications including torsemide 20 mg were continued.    Today, he reports increasing shortness of breath and some weight gain over the past 2 to 3 days.  He is also reporting increasing pedal edema.  No chest pain or palpitations.  He continues to take torsemide as prescribed and even took additional half tablet for 2 days without much benefit.      Past History:    (1) Coronary artery disease, status post coronary artery bypass grafting in the past. Cardiac catheterization done in July 2016 showed patent left internal mammary graft to the LAD and occluded saphenous venous graft. Native LAD is 100% occluded in the mid portion. Native circumflex artery has no significant disease. Native right coronary artery is also 100% occluded and it is a chronic total occlusion. The right coronary artery is reconstituted with collaterals from the left side.  Patient underwent repeat cardiac catheterization in June, 2021 and underwent angioplasty stent placement to diagonal branch.       (2) Ischemic cardiomyopathy with recovery of cardiac function.  (3) Chronic kidney disease, stage 3.   (4) Chronic obstructive pulmonary disease  (5) Hypertension. (6) Hyperlipidemia.   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   (8) Typical atrial flutter status post radiofrequency ablation by Dr. Aguilar on 11/30/2018   (9) Persistent atrial fibrillation status post extensive ablation and pulmonary vein isolation by Dr. Aguilar on  6/3/2022    Medical History:  Past Medical History:   Diagnosis Date    Acute on chronic diastolic CHF (congestive heart failure) 6/6/2022    Arthritis     Atrial fibrillation, persistent 5/3/2022    Persistent atrial fibrillation status post extensive ablation and pulmonary vein isolation by Dr. Aguilar on 6/3/2022, with reoccurring rapid A. fib, and then successful cardioversion on 7/1/2022    BPH (benign prostatic hyperplasia)     CHF (congestive heart failure)     COPD (chronic obstructive pulmonary disease)     Coronary artery disease of bypass graft of native heart with stable angina pectoris     (1) Coronary artery disease, s/p CABG in the past. Cardiac catheterization in July 2016 showed patentLIMA graft to the LAD and occluded SVGs. Native LAD is 100% occluded in the mid portion. Native LCx has no significant disease. Native RCA is 100% occluded and it is a . Repeat cath in June, 2021, underwent stent placement to diagnonal branch.    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 Lauren Valencia        Family History: family history includes Heart disease in his father; Hypertension in his mother; Kidney cancer in his brother; Other in his brother.     Social History: reports that he quit smoking about 27 years ago. His smoking use included cigarettes. He started smoking about 67 years ago. He has a 20 pack-year smoking history. He has been exposed to tobacco smoke. He has never used smokeless tobacco. He reports that he does not currently  use alcohol. He reports that he does not use drugs.    Allergies: Carvedilol and Levaquin [levofloxacin]    Current Outpatient Medications on File Prior to Visit   Medication Sig    acetaminophen (TYLENOL) 325 MG tablet Take 2 tablets by mouth As Needed for Mild Pain.    allopurinol 200 MG tablet Take 200 mg by mouth Daily. (Patient taking differently: Take 200 mg by mouth Daily. 0.5 tablet)    amiodarone (PACERONE) 200 MG tablet Take 1 tablet by mouth Daily.    aspirin (aspirin) 81 MG EC tablet Take 1 tablet by mouth Daily.    azelastine (ASTELIN) 0.1 % nasal spray Administer 2 sprays into the nostril(s) as directed by provider 2 (Two) Times a Day. Use in each nostril as directed    calcium carbonate (OS-LIANNA) 600 MG tablet Take 1 tablet by mouth Every Night.    Coenzyme Q10 100 MG tablet Take 1 tablet by mouth Daily.    Cranberry 400 MG tablet Take 400 mg by mouth Daily.    Diclofenac Sodium (VOLTAREN) 1 % gel gel Apply 2 g topically to the appropriate area as directed 4 (Four) Times a Day.    finasteride (PROSCAR) 5 MG tablet Take 1 tablet by mouth Daily.    Fluticasone-Umeclidin-Vilant (Trelegy Ellipta) 100-62.5-25 MCG/ACT inhaler Inhale 1 puff Daily.    ipratropium-albuterol (DUO-NEB) 0.5-2.5 mg/3 ml nebulizer Inhale the contents of one vial ( 3mls) by nebulizer four times a day as needed for wheezing    isosorbide mononitrate (IMDUR) 30 MG 24 hr tablet Take 1 tablet by mouth daily.    Livalo 1 MG tablet tablet Take 1 tablet by mouth every night.    metoprolol succinate XL (TOPROL-XL) 25 MG 24 hr tablet Take 1 tablet by mouth Every 12 (Twelve) Hours.    montelukast (SINGULAIR) 10 MG tablet Take 1 tablet by mouth Every Night.    pantoprazole (PROTONIX) 40 MG EC tablet Take 1 tablet by mouth Daily.    rivaroxaban (Xarelto) 15 MG tablet Take 1 tablet by mouth Daily With Dinner.    sacubitril-valsartan (Entresto) 24-26 MG tablet Take 1 tablet by mouth 2 (Two) Times a Day.    tamsulosin (FLOMAX) 0.4 MG capsule 24 hr  "capsule Take 1 capsule by mouth Daily.    VITAMIN B COMPLEX-C PO Take 1 tablet by mouth Daily.    fluticasone (FLONASE) 50 MCG/ACT nasal spray Administer 1 spray into the nostril(s) as directed by provider At Night As Needed for Allergies.    nitroglycerin (NITROSTAT) 0.4 MG SL tablet Place 1 tablet under the tongue Every 5 (Five) Minutes As Needed for Chest Pain for up to 30 days. Take no more than 3 doses in 15 minutes.         Review of Systems   Constitutional:  Positive for fatigue.   Respiratory:  Positive for shortness of breath. Negative for cough and wheezing.    Cardiovascular:  Positive for leg swelling. Negative for chest pain and palpitations.   Gastrointestinal:  Negative for nausea and vomiting.   Neurological:  Negative for dizziness and syncope.        Objective     /73   Pulse 72   Ht 177.8 cm (70\")   Wt 102 kg (225 lb)   SpO2 95%   BMI 32.28 kg/m²       Physical Exam    General : Alert, awake, no acute distress  Neck : Supple, no carotid bruit, no jugular venous distention  CVS : Regular rate and rhythm, no murmur, rubs or gallops  Lungs: Clear to auscultation bilaterally, no crackles or rhonchi  Abdomen: Soft, nontender, bowel sounds heard in all 4 quadrants  Extremities: Warm, well-perfused, 2+ pitting edema bilaterally    Result Review :     The following data was reviewed by: Darnell Stevenson MD on 05/15/2025:    CMP          4/18/2025    05:07 4/19/2025    04:42 5/12/2025    13:32   CMP   Glucose 135  100  102    BUN 36  36  11    Creatinine 1.59  1.66  1.44    EGFR 42.8  40.7  48.2    Sodium 138  140  142    Potassium 4.0  3.4  3.7    Chloride 101  102  104    Calcium 8.4  8.1  8.6    BUN/Creatinine Ratio 22.6  21.7  7.6    Anion Gap 11.0  11.3  13.1      CBC          4/17/2025    04:55 4/18/2025    05:07 4/19/2025    04:42   CBC   WBC 8.77  19.39  11.21    RBC 4.25  4.05  3.93    Hemoglobin 12.5  12.0  11.6    Hematocrit 39.2  37.5  36.6    MCV 92.2  92.6  93.1    MCH 29.4  29.6 "  29.5    MCHC 31.9  32.0  31.7    RDW 16.5  16.3  17.0    Platelets 200  197  176        Lipid Panel          9/16/2024    11:21   Lipid Panel   Total Cholesterol 113    Triglycerides 127    HDL Cholesterol 33    VLDL Cholesterol 23    LDL Cholesterol  57    LDL/HDL Ratio 1.65           Data reviewed: Cardiology studies        Results for orders placed during the hospital encounter of 01/29/25    Adult Transthoracic Echo Complete W/ Cont if Necessary Per Protocol    Interpretation Summary    Left ventricular systolic function is low normal. Left ventricular ejection fraction appears to be 46 - 50%.    Left ventricular wall thickness is consistent with mild concentric hypertrophy.    Left ventricular diastolic function is consistent with (grade II w/high LAP) pseudonormalization.    The left atrial cavity is mildly dilated.    Aortic valve is moderately calcified with restricted opening.  Mild to moderate aortic valve stenosis is present.  The peak and mean gradients across aortic valve are 30/18 mmHg    There is mild tricuspid regurgitation.  Estimated right ventricular systolic pressure from tricuspid regurgitation is moderately elevated (45-55 mmHg).      Results for orders placed during the hospital encounter of 07/08/21    Stress Test With Myocardial Perfusion One Day    Interpretation Summary  · Myocardial perfusion imaging indicates a small-sized infarct located in the inferior wall with mild mansoor-infarct ischemia.  · Left ventricular ejection fraction is borderline normal. (Calculated EF = 49%).  · Diaphragmatic attenuation artifact is present.  · Findings consistent with a normal ECG stress test.  · Occasional isolated PVCs are noted at rest and also during stress.  · Impressions are consistent with an intermediate risk study.               Assessment and Plan        Diagnoses and all orders for this visit:    1. Ischemic cardiomyopathy (Primary)  Assessment & Plan:  Recent hospital admission, discharged on  4/19/2025.  He is reporting increasing shortness of breath.  5 pound weight gain over the past 2 weeks.  He has 2+ pitting edema bilaterally.  Creatinine at baseline per recent labs.  We will increase the dose of torsemide 20 mg twice daily along with potassium chloride 20 mEq daily.  Counseled regarding low-sodium diet, fluid restriction.  Encouraged to keep daily weights.  Continue metoprolol and Entresto.  Will give a closer follow-up.  SGLT2 inhibitors to be added as next step, to be done during follow-up visit.  Will repeat labs in 2 to 3 weeks.    Orders:  -     potassium chloride 10 MEQ CR tablet; Take 2 tablets by mouth Daily.  Dispense: 60 tablet; Refill: 1  -     torsemide (DEMADEX) 20 MG tablet; Take 1 tablet by mouth 2 (Two) Times a Day.  Dispense: 90 tablet; Refill: 1    2. Atrial fibrillation, persistent  Assessment & Plan:  Irregular rhythm on auscultation.  Cardizem CD was discontinued during recent hospitalizations.  Will continue amiodarone and metoprolol along with Xarelto for anticoagulation.      3. Coronary artery disease of bypass graft of native heart with stable angina pectoris  Assessment & Plan:  Chest pain free.  Continue aspirin, statins and beta-blockers.                Follow Up     Return in about 1 month (around 6/15/2025) for Next scheduled follow up, ok TO DOUBLE BOOK.    Patient was given instructions and counseling regarding his condition or for health maintenance advice. Please see specific information pulled into the AVS if appropriate.

## 2025-05-18 NOTE — ASSESSMENT & PLAN NOTE
Irregular rhythm on auscultation.  Cardizem CD was discontinued during recent hospitalizations.  Will continue amiodarone and metoprolol along with Xarelto for anticoagulation.

## 2025-05-20 ENCOUNTER — APPOINTMENT (OUTPATIENT)
Dept: CARDIAC REHAB | Facility: HOSPITAL | Age: 85
End: 2025-05-20
Payer: MEDICARE

## 2025-05-20 ENCOUNTER — TREATMENT (OUTPATIENT)
Dept: CARDIAC REHAB | Facility: HOSPITAL | Age: 85
End: 2025-05-20
Payer: MEDICARE

## 2025-05-20 DIAGNOSIS — J43.2 CENTRILOBULAR EMPHYSEMA: Primary | ICD-10-CM

## 2025-05-20 PROCEDURE — G0239 OTH RESP PROC, GROUP: HCPCS

## 2025-05-22 ENCOUNTER — APPOINTMENT (OUTPATIENT)
Dept: CARDIAC REHAB | Facility: HOSPITAL | Age: 85
End: 2025-05-22
Payer: MEDICARE

## 2025-05-22 ENCOUNTER — PATIENT OUTREACH (OUTPATIENT)
Dept: CASE MANAGEMENT | Facility: OTHER | Age: 85
End: 2025-05-22
Payer: MEDICARE

## 2025-05-22 DIAGNOSIS — I50.9 CHRONIC CONGESTIVE HEART FAILURE, UNSPECIFIED HEART FAILURE TYPE: Primary | ICD-10-CM

## 2025-05-22 NOTE — OUTREACH NOTE
AMBULATORY CASE MANAGEMENT NOTE    Names and Relationships of Patient/Support Persons: Contact: NIDA WHITING; Relationship: Emergency Contact -     Patient Outreach  Patient identified as possible candidate for Chronic Care Management from ER list, he had a hospital admission last month.  Spoke with patients spouse regarding the program and its not something she thinks they need right now, they are managing everything alright.     Education Documentation  No documentation found.      Rajani LVOE  Ambulatory Case Management  5/22/2025, 08:30 EDT

## 2025-05-27 ENCOUNTER — TRANSCRIBE ORDERS (OUTPATIENT)
Dept: LAB | Facility: HOSPITAL | Age: 85
End: 2025-05-27
Payer: MEDICARE

## 2025-05-27 ENCOUNTER — APPOINTMENT (OUTPATIENT)
Dept: CARDIAC REHAB | Facility: HOSPITAL | Age: 85
End: 2025-05-27
Payer: MEDICARE

## 2025-05-27 ENCOUNTER — LAB (OUTPATIENT)
Dept: LAB | Facility: HOSPITAL | Age: 85
End: 2025-05-27
Payer: MEDICARE

## 2025-05-27 ENCOUNTER — TREATMENT (OUTPATIENT)
Dept: CARDIAC REHAB | Facility: HOSPITAL | Age: 85
End: 2025-05-27
Payer: MEDICARE

## 2025-05-27 DIAGNOSIS — M10.00 IDIOPATHIC GOUT, UNSPECIFIED CHRONICITY, UNSPECIFIED SITE: ICD-10-CM

## 2025-05-27 DIAGNOSIS — N18.32 STAGE 3B CHRONIC KIDNEY DISEASE: ICD-10-CM

## 2025-05-27 DIAGNOSIS — I48.0 PAROXYSMAL ATRIAL FIBRILLATION: ICD-10-CM

## 2025-05-27 DIAGNOSIS — I50.20 HEART FAILURE WITH REDUCED LEFT VENTRICULAR FUNCTION: ICD-10-CM

## 2025-05-27 DIAGNOSIS — I10 HYPERTENSION, ESSENTIAL: ICD-10-CM

## 2025-05-27 DIAGNOSIS — I10 HYPERTENSION, ESSENTIAL: Primary | ICD-10-CM

## 2025-05-27 DIAGNOSIS — J43.2 CENTRILOBULAR EMPHYSEMA: Primary | ICD-10-CM

## 2025-05-27 LAB
BACTERIA UR QL AUTO: NORMAL /HPF
BASOPHILS # BLD AUTO: 0.02 10*3/MM3 (ref 0–0.2)
BASOPHILS NFR BLD AUTO: 0.2 % (ref 0–1.5)
BILIRUB UR QL STRIP: NEGATIVE
CLARITY UR: CLEAR
COLOR UR: YELLOW
CREAT UR-MCNC: 51.1 MG/DL
DEPRECATED RDW RBC AUTO: 49.9 FL (ref 37–54)
EOSINOPHIL # BLD AUTO: 0 10*3/MM3 (ref 0–0.4)
EOSINOPHIL NFR BLD AUTO: 0 % (ref 0.3–6.2)
ERYTHROCYTE [DISTWIDTH] IN BLOOD BY AUTOMATED COUNT: 14.6 % (ref 12.3–15.4)
GLUCOSE UR STRIP-MCNC: NEGATIVE MG/DL
HCT VFR BLD AUTO: 40.2 % (ref 37.5–51)
HGB BLD-MCNC: 13.1 G/DL (ref 13–17.7)
HGB UR QL STRIP.AUTO: NEGATIVE
HYALINE CASTS UR QL AUTO: NORMAL /LPF
IMM GRANULOCYTES # BLD AUTO: 0.32 10*3/MM3 (ref 0–0.05)
IMM GRANULOCYTES NFR BLD AUTO: 2.6 % (ref 0–0.5)
KETONES UR QL STRIP: NEGATIVE
LEUKOCYTE ESTERASE UR QL STRIP.AUTO: NEGATIVE
LYMPHOCYTES # BLD AUTO: 1.94 10*3/MM3 (ref 0.7–3.1)
LYMPHOCYTES NFR BLD AUTO: 15.5 % (ref 19.6–45.3)
MCH RBC QN AUTO: 30.5 PG (ref 26.6–33)
MCHC RBC AUTO-ENTMCNC: 32.6 G/DL (ref 31.5–35.7)
MCV RBC AUTO: 93.5 FL (ref 79–97)
MONOCYTES # BLD AUTO: 0.9 10*3/MM3 (ref 0.1–0.9)
MONOCYTES NFR BLD AUTO: 7.2 % (ref 5–12)
NEUTROPHILS NFR BLD AUTO: 74.5 % (ref 42.7–76)
NEUTROPHILS NFR BLD AUTO: 9.34 10*3/MM3 (ref 1.7–7)
NITRITE UR QL STRIP: NEGATIVE
NRBC BLD AUTO-RTO: 0 /100 WBC (ref 0–0.2)
PH UR STRIP.AUTO: 6.5 [PH] (ref 5–8)
PLATELET # BLD AUTO: 241 10*3/MM3 (ref 140–450)
PMV BLD AUTO: 10.6 FL (ref 6–12)
PROT ?TM UR-MCNC: 4.4 MG/DL
PROT UR QL STRIP: ABNORMAL
PROT/CREAT UR: 0.09 MG/G{CREAT}
PTH-INTACT SERPL-MCNC: 151 PG/ML (ref 15–65)
RBC # BLD AUTO: 4.3 10*6/MM3 (ref 4.14–5.8)
RBC # UR STRIP: NORMAL /HPF
REF LAB TEST METHOD: NORMAL
SP GR UR STRIP: 1.01 (ref 1–1.03)
SQUAMOUS #/AREA URNS HPF: NORMAL /HPF
UROBILINOGEN UR QL STRIP: ABNORMAL
WBC # UR STRIP: NORMAL /HPF
WBC NRBC COR # BLD AUTO: 12.52 10*3/MM3 (ref 3.4–10.8)

## 2025-05-27 PROCEDURE — 84156 ASSAY OF PROTEIN URINE: CPT

## 2025-05-27 PROCEDURE — 82570 ASSAY OF URINE CREATININE: CPT

## 2025-05-27 PROCEDURE — 80069 RENAL FUNCTION PANEL: CPT

## 2025-05-27 PROCEDURE — 81001 URINALYSIS AUTO W/SCOPE: CPT

## 2025-05-27 PROCEDURE — 85025 COMPLETE CBC W/AUTO DIFF WBC: CPT

## 2025-05-27 PROCEDURE — G0239 OTH RESP PROC, GROUP: HCPCS

## 2025-05-27 PROCEDURE — 82306 VITAMIN D 25 HYDROXY: CPT

## 2025-05-27 PROCEDURE — 36415 COLL VENOUS BLD VENIPUNCTURE: CPT

## 2025-05-27 PROCEDURE — 84550 ASSAY OF BLOOD/URIC ACID: CPT

## 2025-05-27 PROCEDURE — 83970 ASSAY OF PARATHORMONE: CPT

## 2025-05-27 NOTE — PROGRESS NOTES
Patient did one exercise machine and decided to stop for today.  He has a cough and was having difficulty with it being exacerbated.

## 2025-05-28 LAB
25(OH)D3 SERPL-MCNC: 48.1 NG/ML (ref 30–100)
ALBUMIN SERPL-MCNC: 3.9 G/DL (ref 3.5–5.2)
ANION GAP SERPL CALCULATED.3IONS-SCNC: 13.9 MMOL/L (ref 5–15)
BUN SERPL-MCNC: 30 MG/DL (ref 8–23)
BUN/CREAT SERPL: 15.7 (ref 7–25)
CALCIUM SPEC-SCNC: 9.1 MG/DL (ref 8.6–10.5)
CHLORIDE SERPL-SCNC: 104 MMOL/L (ref 98–107)
CO2 SERPL-SCNC: 26.1 MMOL/L (ref 22–29)
CREAT SERPL-MCNC: 1.91 MG/DL (ref 0.76–1.27)
EGFRCR SERPLBLD CKD-EPI 2021: 34.4 ML/MIN/1.73
GLUCOSE SERPL-MCNC: 112 MG/DL (ref 65–99)
PHOSPHATE SERPL-MCNC: 3.3 MG/DL (ref 2.5–4.5)
POTASSIUM SERPL-SCNC: 4 MMOL/L (ref 3.5–5.2)
SODIUM SERPL-SCNC: 144 MMOL/L (ref 136–145)
URATE SERPL-MCNC: 8.5 MG/DL (ref 3.4–7)

## 2025-05-29 ENCOUNTER — APPOINTMENT (OUTPATIENT)
Dept: CARDIAC REHAB | Facility: HOSPITAL | Age: 85
End: 2025-05-29
Payer: MEDICARE

## 2025-05-29 NOTE — PROGRESS NOTES
Primary Care Provider  Tevin Zavala MD   Referring Provider  No ref. provider found    Patient Complaint  Follow-up, Cough (Dry cough going on a week ), and Shortness of Breath    Patient or patient representative verbalized consent for the use of Ambient Listening during the visit with  ARACELI Diamond for chart documentation. 5/30/2025  10:51 EDT      Subjective       History of Presenting Illness  Layo Cee is a pleasant 85 y.o. male  of  Dr. Brink who presents to [facility] with history of diastolic heart failure, moderately severe COPD, and restrictive airway disease, here for follow-up appointment. He is on Trelegy Ellipta, albuterol as needed, and Singulair.  Patient was last seen 2/12/2025 by Dr. Brink.    Most recently patient was at urgent care at [facility] in [place] on 5/27/2025 and was treated with Rocephin and dexamethasone IM injections for an exacerbation of COPD.  He had was previously seen 7 days before that for similar symptoms.  He was given doxycycline and initially had an improvement the symptoms have progressed.  History of Present Illness  The patient is an 85-year-old male who presents for evaluation of cough, suspected pulmonary hypertension, COPD, and medication management.  Patient is here with his spouse.    He reports a persistent cough that worsens with talking. He has been to [place] twice, with the most recent visit occurring 3 days ago. During this visit, he received a Rocephin injection and dexamethasone, which initially improved his symptoms for a day or two before they recurred. He was not prescribed any medications at discharge. However, during a previous visit, he was given Tussionex and Coricidin for his cough. He also received a course of steroids during his first visit, which significantly improved his condition. He experienced a low-grade fever 2 weeks ago but has not had one since. He is not  diabetic and can take steroids.    He has not been evaluated for pulmonary hypertension.  Spouse would like to see if he can be checked for pulmonary hypertension. He reports shortness of breath, particularly when attempting to walk even short distances. His symptoms are more severe in the morning, and he has been largely sedentary since Wednesday. He experiences increased coughing when he moves around, leading him to remain as still as possible. His appetite and fluid intake have decreased, and he appears dehydrated. He has an irregular heartbeat. He does not have leg swelling today. He is currently taking one diuretic daily and has been experiencing significant issues with it.  He does have stage III kidney disease.    He was previously on a once-daily dose of metoprolol, which was increased to twice daily during his last hospital stay. This adjustment has resulted in increased weakness and lower blood pressure. He is scheduled to see Dr. Ramírez in approximately 3 weeks.    He has COPD, which he believes contributes to his symptoms. He uses a rescue inhaler infrequently and takes Trelegy every morning.  He also has duo nebulizer but uses it infrequently as well.    MEDICATIONS  Current: Tussionex, Coricidin, metoprolol, Trelegy, albuterol     He had previously been at [facility] 4/16 through 4/19/2025 for shortness of breath, acute exacerbation of congestive heart failure, concerning for pulmonary edema and elevated BNP at 4551..    At present time patient denies wheezing, headaches, chest pain, weight loss or hemoptysis. Patient denies fevers, chills and night sweats. Layo Cee is able to perform ADLs.      I have personally reviewed the review of systems, past family, social, medical and surgical histories; and agree with their findings.      Review of Systems   Constitutional: Negative.    HENT: Negative.     Respiratory:  Positive for cough and shortness of breath.    Cardiovascular: Negative.     Musculoskeletal: Negative.    Neurological: Negative.    Psychiatric/Behavioral: Negative.           Family History   Problem Relation Age of Onset    Hypertension Mother     Heart disease Father     Other Brother         GASTROINTESTINAL CANCER    Kidney cancer Brother         Social History     Socioeconomic History    Marital status:    Tobacco Use    Smoking status: Former     Current packs/day: 0.00     Average packs/day: 0.5 packs/day for 40.0 years (20.0 ttl pk-yrs)     Types: Cigarettes     Start date:      Quit date:      Years since quittin.4     Passive exposure: Past    Smokeless tobacco: Never   Vaping Use    Vaping status: Never Used   Substance and Sexual Activity    Alcohol use: Not Currently    Drug use: Never    Sexual activity: Defer        Past Medical History:   Diagnosis Date    Acute on chronic diastolic CHF (congestive heart failure) 2022    Arthritis     Atrial fibrillation, persistent 5/3/2022    Persistent atrial fibrillation status post extensive ablation and pulmonary vein isolation by Dr. Aguilar on 6/3/2022, with reoccurring rapid A. fib, and then successful cardioversion on 2022    BPH (benign prostatic hyperplasia)     CHF (congestive heart failure)     COPD (chronic obstructive pulmonary disease)     Coronary artery disease of bypass graft of native heart with stable angina pectoris     (1) Coronary artery disease, s/p CABG in the past. Cardiac catheterization in 2016 showed patentLIMA graft to the LAD and occluded SVGs. Native LAD is 100% occluded in the mid portion. Native LCx has no significant disease. Native RCA is 100% occluded and it is a . Repeat cath in , underwent stent placement to diagnonal branch.    COVID-19 2021    Diverticulitis 10/11/2019    Dysphagia     Essential hypertension 2020    Frequent PVCs 2021    GERD (gastroesophageal reflux disease)     Gross hematuria     Long-term use of high-risk  medication     Lung disease     Mixed hyperlipidemia 08/28/2021    Myocardial infarction 07/2016    OCD (obsessive compulsive disorder)     Renal insufficiency 08/27/2020    Rhinitis, allergic 06/05/2018    Sore throat     Stable angina     Typical atrial flutter 11/30/2018    s/p ablation by Dr. Lauren Valencia         Immunization History   Administered Date(s) Administered    COVID-19 (NIKKY) 11/04/2021    Fluzone  >6mos 10/08/2019    Fluzone High-Dose 65+YRS 09/24/2024    Fluzone High-Dose 65+yrs 09/28/2021, 10/05/2022, 09/11/2023    Fluzone Quad >6mos (Multi-dose) 10/08/2019    Influenza, Unspecified 10/08/2019    Pneumococcal Conjugate 13-Valent (PCV13) 04/13/2018    Pneumococcal Polysaccharide (PPSV23) 06/07/2019    Shingrix 04/29/2021, 11/22/2021    Td (TDVAX) 01/09/2023       Allergies   Allergen Reactions    Carvedilol Swelling and Anaphylaxis    Levaquin [Levofloxacin] Unknown - Low Severity          Current Outpatient Medications:     acetaminophen (TYLENOL) 325 MG tablet, Take 2 tablets by mouth As Needed for Mild Pain., Disp: , Rfl:     allopurinol 200 MG tablet, Take 200 mg by mouth Daily. (Patient taking differently: Take 200 mg by mouth Daily. 0.5 tablet), Disp: 90 tablet, Rfl: 2    amiodarone (PACERONE) 200 MG tablet, Take 1 tablet by mouth Daily., Disp: 90 tablet, Rfl: 3    aspirin (aspirin) 81 MG EC tablet, Take 1 tablet by mouth Daily., Disp: 30 tablet, Rfl: 1    azelastine (ASTELIN) 0.1 % nasal spray, Administer 2 sprays into the nostril(s) as directed by provider 2 (Two) Times a Day. Use in each nostril as directed, Disp: 30 mL, Rfl: 12    calcium carbonate (OS-LIANNA) 600 MG tablet, Take 1 tablet by mouth Every Night., Disp: , Rfl:     Coenzyme Q10 100 MG tablet, Take 1 tablet by mouth Daily., Disp: , Rfl:     Cranberry 400 MG tablet, Take 400 mg by mouth Daily., Disp: , Rfl:     Diclofenac Sodium (VOLTAREN) 1 % gel gel, Apply 2 g topically to the appropriate area as directed 4 (Four) Times a  Day., Disp: , Rfl:     doxycycline (VIBRAMYCIN) 100 MG capsule, , Disp: , Rfl:     finasteride (PROSCAR) 5 MG tablet, Take 1 tablet by mouth Daily., Disp: 90 tablet, Rfl: 4    Fluticasone-Umeclidin-Vilant (Trelegy Ellipta) 100-62.5-25 MCG/ACT inhaler, Inhale 1 puff Daily., Disp: 1 each, Rfl: 11    ipratropium-albuterol (DUO-NEB) 0.5-2.5 mg/3 ml nebulizer, Inhale the contents of one vial ( 3mls) by nebulizer four times a day as needed for wheezing, Disp: 360 mL, Rfl: 3    isosorbide mononitrate (IMDUR) 30 MG 24 hr tablet, Take 1 tablet by mouth daily., Disp: 90 tablet, Rfl: 3    Livalo 1 MG tablet tablet, Take 1 tablet by mouth every night., Disp: 90 tablet, Rfl: 3    metoprolol succinate XL (TOPROL-XL) 25 MG 24 hr tablet, Take 1 tablet by mouth Every 12 (Twelve) Hours., Disp: 90 tablet, Rfl: 3    montelukast (SINGULAIR) 10 MG tablet, Take 1 tablet by mouth Every Night., Disp: 90 tablet, Rfl: 3    pantoprazole (PROTONIX) 40 MG EC tablet, Take 1 tablet by mouth Daily., Disp: 90 tablet, Rfl: 2    potassium chloride 10 MEQ CR tablet, Take 2 tablets by mouth Daily., Disp: 60 tablet, Rfl: 1    rivaroxaban (Xarelto) 15 MG tablet, Take 1 tablet by mouth Daily With Dinner., Disp: 90 tablet, Rfl: 1    sacubitril-valsartan (Entresto) 24-26 MG tablet, Take 1 tablet by mouth 2 (Two) Times a Day., Disp: 180 tablet, Rfl: 3    tamsulosin (FLOMAX) 0.4 MG capsule 24 hr capsule, Take 1 capsule by mouth Daily., Disp: 90 capsule, Rfl: 4    torsemide (DEMADEX) 20 MG tablet, Take 1 tablet by mouth 2 (Two) Times a Day., Disp: 90 tablet, Rfl: 1    VITAMIN B COMPLEX-C PO, Take 1 tablet by mouth Daily., Disp: , Rfl:     fluticasone (FLONASE) 50 MCG/ACT nasal spray, Administer 1 spray into the nostril(s) as directed by provider At Night As Needed for Allergies., Disp: , Rfl:     nitroglycerin (NITROSTAT) 0.4 MG SL tablet, Place 1 tablet under the tongue Every 5 (Five) Minutes As Needed for Chest Pain for up to 30 days. Take no more than 3  "doses in 15 minutes., Disp: 30 tablet, Rfl: 0    predniSONE (DELTASONE) 10 MG tablet, 50mg qday x 3 days, 40mg qday x 3 days, 30mg qday x 3 days, 20mg qday x 3 days, 10mg qday x 3 days, then stop, Disp: 45 tablet, Rfl: 0         Vital Signs   BP (!) 89/54 (BP Location: Left arm, Patient Position: Sitting, Cuff Size: Adult)   Pulse 82   Temp 97.6 °F (36.4 °C)   Resp 16   Ht 177.8 cm (70\")   Wt 102 kg (225 lb)   SpO2 95%   BMI 32.28 kg/m²       Objective     Physical Exam  Vitals reviewed.   Constitutional:       General: He is not in acute distress.     Appearance: Normal appearance. He is not ill-appearing.   HENT:      Head: Normocephalic and atraumatic.      Nose: Nose normal.      Mouth/Throat:      Mouth: Mucous membranes are moist.      Pharynx: Oropharynx is clear.   Eyes:      Extraocular Movements: Extraocular movements intact.      Conjunctiva/sclera: Conjunctivae normal.      Pupils: Pupils are equal, round, and reactive to light.   Cardiovascular:      Rate and Rhythm: Normal rate and regular rhythm.      Pulses: Normal pulses.      Heart sounds: Normal heart sounds.   Pulmonary:      Effort: Pulmonary effort is normal. No respiratory distress.      Breath sounds: Normal breath sounds. No stridor. No wheezing, rhonchi or rales.   Abdominal:      General: Bowel sounds are normal.   Musculoskeletal:         General: Normal range of motion.      Cervical back: Normal range of motion and neck supple.   Skin:     General: Skin is warm and dry.   Neurological:      Mental Status: He is alert and oriented to person, place, and time.   Psychiatric:         Behavior: Behavior normal.         Physical Exam  Lungs are clear.  Heart was examined.         Results Review  I have personally reviewed the prior office notes, hospital records, labs, and diagnostics.  [facility]  Outside Information  Results  XR Chest 2 View (Order [id number])     XR Chest 2 View  Order: [id number]  Impression        No active " disease.        [id number]  Narrative    XR CHEST PA/AP AND LATERAL    INDICATION:  85 years Male R05.1: Acute cough  J44.1: Chronic obstructive pulmonary disease with (acute) exacerbation    TECHNIQUE:  1 PA and Lateral    COMPARISON:  3/10/2025    FINDINGS:    Status post median sternotomy.    The lungs are clear and expanded.  Normal visualized pulmonary arteries.  There is no pleural abnormality.      There is cardiomegaly.  There is calcified atherosclerotic plaque of the aortic arch and descending thoracic aorta.  Normal mediastinum and rod.      Normal thoracic spine.  Normal visualized ribs, clavicles, and shoulders.    There is no abnormality of the visualized upper abdomen.  Exam End: 05/21/25 13:53    Specimen Collected: 05/21/25 14:05 Last Resulted: 05/21/25 14:05   Received From: [employer]  Result Received: 05/22/25 08:26     Results  Laboratory Studies  BNP levels indicate some heart failure and fluid retention.    Imaging  Chest x-ray from 5/21/2025 from [facility] shows lungs are clear and expanded normal visualized pulmonary arteries there is no pleural abnormality.    His last CT of chest 4/16/2025 reveals mild interlobular septal thickening with perihilar groundglass opacities favored to represent mild pulmonary edema, small bilateral pleural effusions with adjacent atelectasis and emphysema present.      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 pectoris    Class 2 obesity due to excess calories without serious comorbidity with body mass index (BMI) of 35.0 to 35.9 in adult    Gout    Typical atrial flutter    Essential hypertension    Frequent PVCs    Mixed hyperlipidemia    Lateral epicondylitis    Allergic rhinitis    Seasonal allergies    Tobacco abuse, in remission    Vitamin D deficiency    Gastroesophageal reflux disease with esophagitis without hemorrhage     Ischemic cardiomyopathy    Centrilobular emphysema    Gastroesophageal reflux disease    Atrial fibrillation, persistent    COPD (chronic obstructive pulmonary disease)    Chronic anticoagulation    Diastolic dysfunction    S/P CABG (coronary artery bypass graft)    Stage 3a chronic kidney disease    S/P ablation of atrial fibrillation    Atrial fibrillation, unspecified type    SOB (shortness of breath)    Chest pain    Mild aortic valve stenosis    Moderate mitral regurgitation    Benign prostatic hyperplasia with lower urinary tract symptoms    Chronic systolic congestive heart failure    Acute exacerbation of CHF (congestive heart failure)    Multifocal pneumonia    Pneumonia due to other gram-negative bacteria        Plan     Diagnoses and all orders for this visit:    1. Chronic obstructive pulmonary disease with acute exacerbation (Primary)  -     predniSONE (DELTASONE) 10 MG tablet; 50mg qday x 3 days, 40mg qday x 3 days, 30mg qday x 3 days, 20mg qday x 3 days, 10mg qday x 3 days, then stop  Dispense: 45 tablet; Refill: 0  -     Adult Transthoracic Echo Complete W/ Cont if Necessary Per Protocol; Future  -     CT Angiogram Chest; Future    2. Pulmonary emphysema, unspecified emphysema type  -     predniSONE (DELTASONE) 10 MG tablet; 50mg qday x 3 days, 40mg qday x 3 days, 30mg qday x 3 days, 20mg qday x 3 days, 10mg qday x 3 days, then stop  Dispense: 45 tablet; Refill: 0  -     Adult Transthoracic Echo Complete W/ Cont if Necessary Per Protocol; Future  -     CT Angiogram Chest; Future    3. Dyspnea, unspecified type  -     predniSONE (DELTASONE) 10 MG tablet; 50mg qday x 3 days, 40mg qday x 3 days, 30mg qday x 3 days, 20mg qday x 3 days, 10mg qday x 3 days, then stop  Dispense: 45 tablet; Refill: 0  -     Adult Transthoracic Echo Complete W/ Cont if Necessary Per Protocol; Future  -     CT Angiogram Chest; Future    4. Coronary artery disease of bypass graft of native heart with stable angina pectoris  -      Adult Transthoracic Echo Complete W/ Cont if Necessary Per Protocol; Future  -     CT Angiogram Chest; Future         Assessment & Plan  1. Cough.  The chest x-ray results do not indicate any significant abnormalities. The cough is likely due to inflammation, which can be managed with steroids. A steroid taper will be initiated, starting with 50 mg for 3 days, then reducing to 40 mg for 3 days, 30 mg for 3 days, 20 mg for 3 days, and finally 10 mg for 3 days before discontinuation. If symptoms persist, a bronchoscopy may be considered to further investigate the cause of the cough and shortness of breath.    2. Suspected pulmonary hypertension.  An echocardiogram conducted in January 2025 revealed elevated pressures, suggestive of potential pulmonary hypertension. A CT with contrast from 2022 did not show any embolism or coronary artery calcifications, nor did it reveal any dilatation of the arteries supplying the heart. BNP levels have improved compared to the previous month, but there is still evidence of heart failure and fluid retention, which could be contributing to the cough. An echocardiogram and a CT angiogram of the chest will be ordered to assess the condition of the valves and pressures in the heart, as well as the state of the vessels and any potential dilation of the pulmonary artery.  Discussed with patient and spouse need for right heart catheterization for diagnosis of pulmonary hypertension.  Patient has follow-up with Dr. Justin in 3 weeks and will discuss possible right heart cath with him at that time.    3. Medication management.  The patient has been experiencing weakness, which may be attributed to the increased dosage of metoprolol and his low blood pressure. The dosage of metoprolol will be reduced to once daily, and blood pressure will be monitored closely.  Follow-up with cardiology.    4. Chronic obstructive pulmonary disease (COPD).  He is advised to use his rescue inhaler more  frequently, taking 2 puffs every 4 hours as needed. Additionally, he should use his nebulizer every 6 hours while at home.  He is on maintenance Trelegy daily.    Follow-up  The patient will follow up in 1 month.      Smoking status:  reports that he quit smoking about 27 years ago. His smoking use included cigarettes. He started smoking about 67 years ago. He has a 20 pack-year smoking history. He has been exposed to tobacco smoke. He has never used smokeless tobacco.    Vaccination status: Reviewed  Immunization History   Administered Date(s) Administered    COVID-19 (NIKKY) 11/04/2021    Fluzone  >6mos 10/08/2019    Fluzone High-Dose 65+YRS 09/24/2024    Fluzone High-Dose 65+yrs 09/28/2021, 10/05/2022, 09/11/2023    Fluzone Quad >6mos (Multi-dose) 10/08/2019    Influenza, Unspecified 10/08/2019    Pneumococcal Conjugate 13-Valent (PCV13) 04/13/2018    Pneumococcal Polysaccharide (PPSV23) 06/07/2019    Shingrix 04/29/2021, 11/22/2021    Td (TDVAX) 01/09/2023        Medications personally reviewed    Follow Up  Return in about 4 weeks (around 6/27/2025) for with Dr. heaton or ash.    Patient was given instructions and counseling regarding his condition or for health maintenance advice. Please see specific information pulled into the AVS if appropriate.     I spent 27 minutes caring for Layo Cee on this date of service. This time includes time spent by me in the following activities:preparing for the visit, reviewing tests, obtaining and/or reviewing a separately obtained history, performing a medically appropriate examination and/or evaluation, counseling and educating the patient/family/caregiver, ordering medications, tests, or procedures, documenting information in the medical record, independently interpreting results and communicating that information with the patient/family/caregiver and answered questions family members, discuss medications.

## 2025-05-30 ENCOUNTER — TELEPHONE (OUTPATIENT)
Dept: PULMONOLOGY | Facility: CLINIC | Age: 85
End: 2025-05-30

## 2025-05-30 ENCOUNTER — OFFICE VISIT (OUTPATIENT)
Dept: PULMONOLOGY | Facility: CLINIC | Age: 85
End: 2025-05-30
Payer: MEDICARE

## 2025-05-30 VITALS
HEART RATE: 82 BPM | HEIGHT: 70 IN | SYSTOLIC BLOOD PRESSURE: 89 MMHG | BODY MASS INDEX: 32.21 KG/M2 | RESPIRATION RATE: 16 BRPM | TEMPERATURE: 97.6 F | DIASTOLIC BLOOD PRESSURE: 54 MMHG | WEIGHT: 225 LBS | OXYGEN SATURATION: 95 %

## 2025-05-30 DIAGNOSIS — I25.708 CORONARY ARTERY DISEASE OF BYPASS GRAFT OF NATIVE HEART WITH STABLE ANGINA PECTORIS: Chronic | ICD-10-CM

## 2025-05-30 DIAGNOSIS — J43.9 PULMONARY EMPHYSEMA, UNSPECIFIED EMPHYSEMA TYPE: ICD-10-CM

## 2025-05-30 DIAGNOSIS — J44.1 CHRONIC OBSTRUCTIVE PULMONARY DISEASE WITH ACUTE EXACERBATION: Primary | ICD-10-CM

## 2025-05-30 DIAGNOSIS — R06.00 DYSPNEA, UNSPECIFIED TYPE: ICD-10-CM

## 2025-05-30 RX ORDER — PREDNISONE 10 MG/1
TABLET ORAL
Qty: 45 TABLET | Refills: 0 | Status: SHIPPED | OUTPATIENT
Start: 2025-05-30

## 2025-05-30 RX ORDER — DOXYCYCLINE 100 MG/1
CAPSULE ORAL
COMMUNITY
Start: 2025-05-21

## 2025-05-30 RX ORDER — PREDNISONE 10 MG/1
TABLET ORAL
COMMUNITY
Start: 2025-05-21 | End: 2025-05-30

## 2025-05-30 NOTE — TELEPHONE ENCOUNTER
Caller: [facility] - [place], [place] - [address] - [phone] [facility] [phone] FX    Relationship to Patient: Pharmacy    Reason for Call: THE PHARMACY CALLED TO REQUEST A NEW PRESCRIPTION FOR THE PTS NEBULIZER. PLEASE ADVISE.

## 2025-06-02 DIAGNOSIS — J30.9 ALLERGIC RHINITIS, UNSPECIFIED SEASONALITY, UNSPECIFIED TRIGGER: ICD-10-CM

## 2025-06-02 DIAGNOSIS — J44.9 CHRONIC OBSTRUCTIVE PULMONARY DISEASE, UNSPECIFIED COPD TYPE: ICD-10-CM

## 2025-06-02 DIAGNOSIS — R06.00 DYSPNEA, UNSPECIFIED TYPE: ICD-10-CM

## 2025-06-02 DIAGNOSIS — K21.00 GASTROESOPHAGEAL REFLUX DISEASE WITH ESOPHAGITIS WITHOUT HEMORRHAGE: Chronic | ICD-10-CM

## 2025-06-02 DIAGNOSIS — J43.9 PULMONARY EMPHYSEMA, UNSPECIFIED EMPHYSEMA TYPE: ICD-10-CM

## 2025-06-02 DIAGNOSIS — R05.9 COUGH: ICD-10-CM

## 2025-06-02 DIAGNOSIS — J43.2 CENTRILOBULAR EMPHYSEMA: ICD-10-CM

## 2025-06-02 RX ORDER — IPRATROPIUM BROMIDE AND ALBUTEROL SULFATE 2.5; .5 MG/3ML; MG/3ML
3 SOLUTION RESPIRATORY (INHALATION)
Qty: 360 ML | Refills: 3 | Status: SHIPPED | OUTPATIENT
Start: 2025-06-02

## 2025-06-03 ENCOUNTER — TREATMENT (OUTPATIENT)
Dept: CARDIAC REHAB | Facility: HOSPITAL | Age: 85
End: 2025-06-03
Payer: MEDICARE

## 2025-06-03 ENCOUNTER — APPOINTMENT (OUTPATIENT)
Dept: CARDIAC REHAB | Facility: HOSPITAL | Age: 85
End: 2025-06-03
Payer: MEDICARE

## 2025-06-03 DIAGNOSIS — J43.2 CENTRILOBULAR EMPHYSEMA: Primary | ICD-10-CM

## 2025-06-03 PROCEDURE — G0239 OTH RESP PROC, GROUP: HCPCS

## 2025-06-05 ENCOUNTER — APPOINTMENT (OUTPATIENT)
Dept: CARDIAC REHAB | Facility: HOSPITAL | Age: 85
End: 2025-06-05
Payer: MEDICARE

## 2025-06-05 ENCOUNTER — TREATMENT (OUTPATIENT)
Dept: CARDIAC REHAB | Facility: HOSPITAL | Age: 85
End: 2025-06-05
Payer: MEDICARE

## 2025-06-05 DIAGNOSIS — J43.2 CENTRILOBULAR EMPHYSEMA: Primary | ICD-10-CM

## 2025-06-05 PROCEDURE — G0239 OTH RESP PROC, GROUP: HCPCS

## 2025-06-10 ENCOUNTER — APPOINTMENT (OUTPATIENT)
Dept: CARDIAC REHAB | Facility: HOSPITAL | Age: 85
End: 2025-06-10
Payer: MEDICARE

## 2025-06-10 ENCOUNTER — OFFICE VISIT (OUTPATIENT)
Dept: FAMILY MEDICINE CLINIC | Facility: CLINIC | Age: 85
End: 2025-06-10
Payer: MEDICARE

## 2025-06-10 ENCOUNTER — TREATMENT (OUTPATIENT)
Dept: CARDIAC REHAB | Facility: HOSPITAL | Age: 85
End: 2025-06-10
Payer: MEDICARE

## 2025-06-10 VITALS
OXYGEN SATURATION: 99 % | BODY MASS INDEX: 30.43 KG/M2 | HEIGHT: 70 IN | WEIGHT: 212.6 LBS | DIASTOLIC BLOOD PRESSURE: 42 MMHG | SYSTOLIC BLOOD PRESSURE: 116 MMHG | HEART RATE: 55 BPM

## 2025-06-10 DIAGNOSIS — Z71.89 COORDINATION OF COMPLEX CARE: Primary | ICD-10-CM

## 2025-06-10 DIAGNOSIS — J43.2 CENTRILOBULAR EMPHYSEMA: Primary | ICD-10-CM

## 2025-06-10 PROCEDURE — G0239 OTH RESP PROC, GROUP: HCPCS

## 2025-06-10 NOTE — PROGRESS NOTES
He    Subjective:       Layo Cee is a 85 y.o. male with a concurrent medical history of atrial fibrillation status post ablation, coronary artery disease status post bypass, heart failure with reduced ejection fraction secondary to ischemic cardiomyopathy, hypertension, gout, benign prostate hyperplasia, COPD, prediabetes, iron deficiency anemia and renal insufficiency who presents for coordination of care.    At our most recent visit, on 4/21/2025, Layo had been discharged from the hospital for heart failure exacerbation.  He was doing well at that time and I had requested follow-up to coordinate his care as at that time he had upcoming visits with cardiology, pulmonology, urology, and nephrology.        I have reviewed 5/12/2025 visit with urologist.  Cystoscopy was performed and was negative.  He was offered a transurethral resection of the prostate but declined that and they are continuing on Flomax and finasteride.    I have reviewed 5/15/2025 note from cardiologist.  Torsemide was increased to 20 mg twice daily - family tells me this was for five days. There is consideration for adding SGLT2 inhibitor.    Cardiology had requested 1 month follow-up.      I have reviewed pulmonology note from 5/30/2025.  They treated him  with steroids and ordered a repeat echocardiogram and CT angiogram of the chest to assess for pulmonary hypertension.  Metoprolol has been decreased to once daily.  Discussion that he may require right heart catheterization and that he might need to discuss this with cardiologist.    Pulmonology requested follow-up within 1 month.    I reviewed the nephrologist note from 6/6/2025. Repeat labs were ordered.    Today, while overall improved, Layo does tell me that more recently he has been having intermittent dizziness and lightheadedness.  Chart review shows hypotension at his nephrology visit with a blood pressure of 89/54.  Labs obtained also demonstrate increased creatinine and  decreased GFR compared to previous value.  Correlating with this is his weight, which is down from 225 pounds on 5/15/2025 to approximately 212 pounds today a weight loss of 13 pounds.    I suspect that he has been slightly overdiuresed and would recommend temporarily cutting the dose of metoprolol in half until he sees cardiologist next week to determine diuretic dosage going forward.    Follow-up in 6 weeks for continued coordination of care.        Past Medical Hx:  Past Medical History:   Diagnosis Date    Acute on chronic diastolic CHF (congestive heart failure) 6/6/2022    Arthritis     Atrial fibrillation, persistent 5/3/2022    Persistent atrial fibrillation status post extensive ablation and pulmonary vein isolation by Dr. Aguilar on 6/3/2022, with reoccurring rapid A. fib, and then successful cardioversion on 7/1/2022    BPH (benign prostatic hyperplasia)     CHF (congestive heart failure)     COPD (chronic obstructive pulmonary disease)     Coronary artery disease of bypass graft of native heart with stable angina pectoris     (1) Coronary artery disease, s/p CABG in the past. Cardiac catheterization in July 2016 showed patentLIMA graft to the LAD and occluded SVGs. Native LAD is 100% occluded in the mid portion. Native LCx has no significant disease. Native RCA is 100% occluded and it is a . Repeat cath in June, 2021, underwent stent placement to diagnonal branch.    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 Aguilar     Vertigo        Past  Surgical Hx:  Past Surgical History:   Procedure Laterality Date    BLADDER SURGERY      CARDIAC CATHETERIZATION  07/2016    CARDIAC CATHETERIZATION N/A 8/9/2021    Procedure: Left Heart Cath with possible angioplasty;  Surgeon: Jack Ladd MD;  Location: [facility] CATH INVASIVE LOCATION;  Service: Cardiovascular;  Laterality: N/A;  Please schedule the procedure with Dr. Jack Ladd MD on 8/9/2021    CARDIAC ELECTROPHYSIOLOGY PROCEDURE N/A 6/3/2022    Procedure: Ablation atrial fibrillation;  Surgeon: Irineo Aguilar MD;  Location: [facility] CATH INVASIVE LOCATION;  Service: Cardiovascular;  Laterality: N/A;    CARDIAC ELECTROPHYSIOLOGY PROCEDURE N/A 6/3/2022    Procedure: Ablation atrial flutter/CTI;  Surgeon: Irineo Aguilar MD;  Location: [facility] INVASIVE LOCATION;  Service: Cardiovascular;  Laterality: N/A;    CARDIAC SURGERY      HEART BYPASS    COLONOSCOPY  2011    CORONARY ARTERY BYPASS GRAFT      CYSTOSCOPY  03/19/2019    WITH BILATERAL RETROGRADE PYELOGRAPHY    ELECTRICAL CARDIOVERSION  5/12/2022         ENDOSCOPY  2016    PROSTATE SURGERY         Current Meds:    Current Outpatient Medications:     acetaminophen (TYLENOL) 325 MG tablet, Take 2 tablets by mouth As Needed for Mild Pain., Disp: , Rfl:     allopurinol 200 MG tablet, Take 200 mg by mouth Daily. (Patient taking differently: Take 200 mg by mouth Daily. 0.5 tablet), Disp: 90 tablet, Rfl: 2    amiodarone (PACERONE) 200 MG tablet, Take 1 tablet by mouth Daily., Disp: 90 tablet, Rfl: 3    aspirin (aspirin) 81 MG EC tablet, Take 1 tablet by mouth Daily., Disp: 30 tablet, Rfl: 1    azelastine (ASTELIN) 0.1 % nasal spray, Administer 2 sprays into the nostril(s) as directed by provider 2 (Two) Times a Day. Use in each nostril as directed, Disp: 30 mL, Rfl: 12    calcium carbonate (OS-LIANNA) 600 MG tablet, Take 1 tablet by mouth Every Night., Disp: , Rfl:     Coenzyme Q10 100 MG tablet, Take 1 tablet by mouth Daily., Disp: , Rfl:      Cranberry 400 MG tablet, Take 400 mg by mouth Daily., Disp: , Rfl:     Diclofenac Sodium (VOLTAREN) 1 % gel gel, Apply 2 g topically to the appropriate area as directed 4 (Four) Times a Day., Disp: , Rfl:     finasteride (PROSCAR) 5 MG tablet, Take 1 tablet by mouth Daily., Disp: 90 tablet, Rfl: 4    fluticasone (FLONASE) 50 MCG/ACT nasal spray, Administer 1 spray into the nostril(s) as directed by provider At Night As Needed for Allergies., Disp: , Rfl:     Fluticasone-Umeclidin-Vilant (Trelegy Ellipta) 100-62.5-25 MCG/ACT inhaler, Inhale 1 puff Daily., Disp: 1 each, Rfl: 11    ipratropium-albuterol (DUO-NEB) 0.5-2.5 mg/3 ml nebulizer, Take 3 mL by nebulization 4 (Four) Times a Day., Disp: 360 mL, Rfl: 3    isosorbide mononitrate (IMDUR) 30 MG 24 hr tablet, Take 1 tablet by mouth daily., Disp: 90 tablet, Rfl: 3    Livalo 1 MG tablet tablet, Take 1 tablet by mouth every night., Disp: 90 tablet, Rfl: 3    metoprolol succinate XL (TOPROL-XL) 25 MG 24 hr tablet, Take 1 tablet by mouth Every 12 (Twelve) Hours., Disp: 90 tablet, Rfl: 3    montelukast (SINGULAIR) 10 MG tablet, Take 1 tablet by mouth Every Night., Disp: 90 tablet, Rfl: 3    nitroglycerin (NITROSTAT) 0.4 MG SL tablet, Place 1 tablet under the tongue Every 5 (Five) Minutes As Needed for Chest Pain for up to 30 days. Take no more than 3 doses in 15 minutes., Disp: 30 tablet, Rfl: 0    pantoprazole (PROTONIX) 40 MG EC tablet, Take 1 tablet by mouth Daily., Disp: 90 tablet, Rfl: 2    potassium chloride 10 MEQ CR tablet, Take 2 tablets by mouth Daily., Disp: 60 tablet, Rfl: 1    predniSONE (DELTASONE) 10 MG tablet, 50mg qday x 3 days, 40mg qday x 3 days, 30mg qday x 3 days, 20mg qday x 3 days, 10mg qday x 3 days, then stop, Disp: 45 tablet, Rfl: 0    rivaroxaban (Xarelto) 15 MG tablet, Take 1 tablet by mouth Daily With Dinner., Disp: 90 tablet, Rfl: 1    sacubitril-valsartan (Entresto) 24-26 MG tablet, Take 1 tablet by mouth 2 (Two) Times a Day., Disp: 180  "tablet, Rfl: 3    tamsulosin (FLOMAX) 0.4 MG capsule 24 hr capsule, Take 1 capsule by mouth Daily., Disp: 90 capsule, Rfl: 4    torsemide (DEMADEX) 20 MG tablet, Take 1 tablet by mouth 2 (Two) Times a Day., Disp: 90 tablet, Rfl: 1    VITAMIN B COMPLEX-C PO, Take 1 tablet by mouth Daily., Disp: , Rfl:     Allergies:  Allergies   Allergen Reactions    Carvedilol Swelling and Anaphylaxis    Levaquin [Levofloxacin] Unknown - Low Severity       Family Hx:  Family History   Problem Relation Age of Onset    Hypertension Mother     Heart disease Father     Other Brother         GASTROINTESTINAL CANCER    Kidney cancer Brother         Social History:  Social History     Socioeconomic History    Marital status:    Tobacco Use    Smoking status: Former     Current packs/day: 0.00     Average packs/day: 0.5 packs/day for 40.0 years (20.0 ttl pk-yrs)     Types: Cigarettes     Start date:      Quit date:      Years since quittin.4     Passive exposure: Past    Smokeless tobacco: Never   Vaping Use    Vaping status: Never Used   Substance and Sexual Activity    Alcohol use: Not Currently    Drug use: Never    Sexual activity: Defer       Review of Systems  Review of Systems   Constitutional:  Negative for unexpected weight change.   Respiratory:  Positive for cough. Negative for shortness of breath.    Cardiovascular:  Negative for leg swelling.   Neurological:  Positive for dizziness and light-headedness.       Objective:     /42 (BP Location: Left arm, Patient Position: Sitting, Cuff Size: Large Adult)   Pulse 55   Ht 177.8 cm (70\")   Wt 96.4 kg (212 lb 9.6 oz)   SpO2 99%   BMI 30.50 kg/m²   Physical Exam  Constitutional:       General: He is not in acute distress.     Appearance: Normal appearance. He is not ill-appearing, toxic-appearing or diaphoretic.   Cardiovascular:      Rate and Rhythm: Normal rate.   Pulmonary:      Effort: Pulmonary effort is normal.      Breath sounds: Normal breath " sounds.   Musculoskeletal:      Right lower leg: No edema.      Left lower leg: No edema.   Neurological:      Mental Status: He is alert.   Psychiatric:         Mood and Affect: Mood normal.         Behavior: Behavior normal.          Assessment/Plan:     Diagnoses and all orders for this visit:    1. Coordination of complex care (Primary)    At our most recent visit, on 4/21/2025, Layo had been discharged from the hospital for heart failure exacerbation.  He was doing well at that time and I had requested follow-up to coordinate his care as at that time he had upcoming visits with cardiology, pulmonology, urology, and nephrology.        I have reviewed 5/12/2025 visit with urologist.  Cystoscopy was performed and was negative.  He was offered a transurethral resection of the prostate but declined that and they are continuing on Flomax and finasteride.    I have reviewed 5/15/2025 note from cardiologist.  Torsemide was increased to 20 mg twice daily - family tells me this was for five days. There is consideration for adding SGLT2 inhibitor.    Cardiology had requested 1 month follow-up.      I have reviewed pulmonology note from 5/30/2025.  They treated him  with steroids and ordered a repeat echocardiogram and CT angiogram of the chest to assess for pulmonary hypertension.  Metoprolol has been decreased to once daily.  Discussion that he may require right heart catheterization and that he might need to discuss this with cardiologist.    Pulmonology requested follow-up within 1 month.    I reviewed the nephrologist note from 6/6/2025. Repeat labs were ordered.    Today, while overall improved, Layo does tell me that more recently he has been having intermittent dizziness and lightheadedness.  Chart review shows hypotension at his nephrology visit with a blood pressure of 89/54.  Labs obtained also demonstrate increased creatinine and decreased GFR compared to previous value.  Correlating with this is his weight,  which is down from 225 pounds on 5/15/2025 to approximately 212 pounds today a weight loss of 13 pounds.    I suspect that he has been slightly overdiuresed and would recommend temporarily cutting the dose of metoprolol in half until he sees cardiologist next week to determine diuretic dosage going forward.    Follow-up in 6 weeks for continued coordination of care.        Follow-up:     Return in about 6 weeks (around 7/22/2025) for Coordination of Care .    Preventative:  Health Maintenance   Topic Date Due    ANNUAL WELLNESS VISIT  01/17/2025    COVID-19 Vaccine (2 - 2024-25 season) 05/30/2026 (Originally 9/1/2024)    RSV Vaccine - Adults (1 - 1-dose 75+ series) 05/30/2026 (Originally 3/17/2017)    INFLUENZA VACCINE  07/01/2025    LIPID PANEL  09/16/2025    TDAP/TD VACCINES (2 - Tdap) 01/09/2033    Pneumococcal Vaccine 50+  Completed    ZOSTER VACCINE  Completed         This document has been electronically signed by Tevin Zavala MD on Kelsi 10, 2025 13:56 EDT       Parts of this note are electronic transcriptions/translations of spoken language to printed text using the Dragon Dictation system.

## 2025-06-12 ENCOUNTER — HOSPITAL ENCOUNTER (OUTPATIENT)
Dept: CT IMAGING | Facility: HOSPITAL | Age: 85
Discharge: HOME | End: 2025-06-12
Admitting: NURSE PRACTITIONER
Payer: MEDICARE

## 2025-06-12 ENCOUNTER — APPOINTMENT (OUTPATIENT)
Dept: CARDIAC REHAB | Facility: HOSPITAL | Age: 85
End: 2025-06-12
Payer: MEDICARE

## 2025-06-12 ENCOUNTER — TREATMENT (OUTPATIENT)
Dept: CARDIAC REHAB | Facility: HOSPITAL | Age: 85
End: 2025-06-12
Payer: MEDICARE

## 2025-06-12 DIAGNOSIS — J44.1 CHRONIC OBSTRUCTIVE PULMONARY DISEASE WITH ACUTE EXACERBATION: ICD-10-CM

## 2025-06-12 DIAGNOSIS — J43.9 PULMONARY EMPHYSEMA, UNSPECIFIED EMPHYSEMA TYPE: ICD-10-CM

## 2025-06-12 DIAGNOSIS — J43.2 CENTRILOBULAR EMPHYSEMA: Primary | ICD-10-CM

## 2025-06-12 DIAGNOSIS — R06.00 DYSPNEA, UNSPECIFIED TYPE: ICD-10-CM

## 2025-06-12 DIAGNOSIS — I25.708 CORONARY ARTERY DISEASE OF BYPASS GRAFT OF NATIVE HEART WITH STABLE ANGINA PECTORIS: Chronic | ICD-10-CM

## 2025-06-12 PROCEDURE — G0239 OTH RESP PROC, GROUP: HCPCS

## 2025-06-12 PROCEDURE — 71275 CT ANGIOGRAPHY CHEST: CPT

## 2025-06-12 PROCEDURE — 25510000001 IOPAMIDOL PER 1 ML: Performed by: NURSE PRACTITIONER

## 2025-06-12 RX ORDER — IOPAMIDOL 755 MG/ML
100 INJECTION, SOLUTION INTRAVASCULAR
Status: COMPLETED | OUTPATIENT
Start: 2025-06-12 | End: 2025-06-12

## 2025-06-12 RX ADMIN — IOPAMIDOL 100 ML: 755 INJECTION, SOLUTION INTRAVENOUS at 13:43

## 2025-06-17 ENCOUNTER — HOSPITAL ENCOUNTER (OUTPATIENT)
Facility: HOSPITAL | Age: 85
Discharge: HOME | End: 2025-06-17
Admitting: NURSE PRACTITIONER
Payer: MEDICARE

## 2025-06-17 ENCOUNTER — TREATMENT (OUTPATIENT)
Dept: CARDIAC REHAB | Facility: HOSPITAL | Age: 85
End: 2025-06-17
Payer: MEDICARE

## 2025-06-17 DIAGNOSIS — J43.9 PULMONARY EMPHYSEMA, UNSPECIFIED EMPHYSEMA TYPE: ICD-10-CM

## 2025-06-17 DIAGNOSIS — J43.2 CENTRILOBULAR EMPHYSEMA: Primary | ICD-10-CM

## 2025-06-17 DIAGNOSIS — R06.00 DYSPNEA, UNSPECIFIED TYPE: ICD-10-CM

## 2025-06-17 DIAGNOSIS — I25.708 CORONARY ARTERY DISEASE OF BYPASS GRAFT OF NATIVE HEART WITH STABLE ANGINA PECTORIS: Chronic | ICD-10-CM

## 2025-06-17 DIAGNOSIS — J44.1 CHRONIC OBSTRUCTIVE PULMONARY DISEASE WITH ACUTE EXACERBATION: ICD-10-CM

## 2025-06-17 LAB
AORTIC DIMENSIONLESS INDEX: 0.23 (DI)
AV MEAN PRESS GRAD SYS DOP V1V2: 24 MMHG
AV VMAX SYS DOP: 313 CM/SEC
BH CV ECHO MEAS - AO MAX PG: 39.2 MMHG
BH CV ECHO MEAS - AO ROOT DIAM: 3 CM
BH CV ECHO MEAS - AO V2 VTI: 64 CM
BH CV ECHO MEAS - AVA(I,D): 0.73 CM2
BH CV ECHO MEAS - EDV(CUBED): 117.6 ML
BH CV ECHO MEAS - EDV(MOD-SP2): 93.2 ML
BH CV ECHO MEAS - EDV(MOD-SP4): 97.7 ML
BH CV ECHO MEAS - EF(MOD-SP2): 48.6 %
BH CV ECHO MEAS - EF(MOD-SP4): 50.8 %
BH CV ECHO MEAS - ESV(CUBED): 54.9 ML
BH CV ECHO MEAS - ESV(MOD-SP2): 47.9 ML
BH CV ECHO MEAS - ESV(MOD-SP4): 48.1 ML
BH CV ECHO MEAS - FS: 22.4 %
BH CV ECHO MEAS - IVS/LVPW: 1.07 CM
BH CV ECHO MEAS - IVSD: 1.5 CM
BH CV ECHO MEAS - LA DIMENSION: 5.4 CM
BH CV ECHO MEAS - LAT PEAK E' VEL: 14.8 CM/SEC
BH CV ECHO MEAS - LV DIASTOLIC VOL/BSA (35-75): 45.7 CM2
BH CV ECHO MEAS - LV MASS(C)D: 297.5 GRAMS
BH CV ECHO MEAS - LV MAX PG: 2.4 MMHG
BH CV ECHO MEAS - LV MEAN PG: 1 MMHG
BH CV ECHO MEAS - LV SYSTOLIC VOL/BSA (12-30): 22.5 CM2
BH CV ECHO MEAS - LV V1 MAX: 77.4 CM/SEC
BH CV ECHO MEAS - LV V1 VTI: 14.8 CM
BH CV ECHO MEAS - LVIDD: 4.9 CM
BH CV ECHO MEAS - LVIDS: 3.8 CM
BH CV ECHO MEAS - LVOT AREA: 3.1 CM2
BH CV ECHO MEAS - LVOT DIAM: 2 CM
BH CV ECHO MEAS - LVPWD: 1.4 CM
BH CV ECHO MEAS - MED PEAK E' VEL: 6.4 CM/SEC
BH CV ECHO MEAS - MR MAX PG: 79.6 MMHG
BH CV ECHO MEAS - MR MAX VEL: 433.8 CM/SEC
BH CV ECHO MEAS - MV A MAX VEL: 78.6 CM/SEC
BH CV ECHO MEAS - MV DEC SLOPE: 607 CM/SEC2
BH CV ECHO MEAS - MV DEC TIME: 0.14 SEC
BH CV ECHO MEAS - MV E MAX VEL: 81.8 CM/SEC
BH CV ECHO MEAS - MV E/A: 1.04
BH CV ECHO MEAS - RAP SYSTOLE: 8 MMHG
BH CV ECHO MEAS - RVDD: 2.7 CM
BH CV ECHO MEAS - RVSP: 43 MMHG
BH CV ECHO MEAS - SV(LVOT): 46.5 ML
BH CV ECHO MEAS - SV(MOD-SP2): 45.3 ML
BH CV ECHO MEAS - SV(MOD-SP4): 49.6 ML
BH CV ECHO MEAS - SVI(LVOT): 21.7 ML/M2
BH CV ECHO MEAS - SVI(MOD-SP2): 21.2 ML/M2
BH CV ECHO MEAS - SVI(MOD-SP4): 23.2 ML/M2
BH CV ECHO MEAS - TR MAX PG: 35.3 MMHG
BH CV ECHO MEAS - TR MAX VEL: 297 CM/SEC
BH CV ECHO MEASUREMENTS AVERAGE E/E' RATIO: 7.72
BH CV XLRA - TDI S': 9.8 CM/SEC
LEFT ATRIUM VOLUME INDEX: 41.6 ML/M2
LV EF BIPLANE MOD: 48.5 %

## 2025-06-17 PROCEDURE — 93306 TTE W/DOPPLER COMPLETE: CPT

## 2025-06-17 PROCEDURE — G0239 OTH RESP PROC, GROUP: HCPCS

## 2025-06-19 ENCOUNTER — TREATMENT (OUTPATIENT)
Dept: CARDIAC REHAB | Facility: HOSPITAL | Age: 85
End: 2025-06-19
Payer: MEDICARE

## 2025-06-19 DIAGNOSIS — J43.2 CENTRILOBULAR EMPHYSEMA: Primary | ICD-10-CM

## 2025-06-19 PROCEDURE — G0239 OTH RESP PROC, GROUP: HCPCS

## 2025-06-24 ENCOUNTER — TREATMENT (OUTPATIENT)
Dept: CARDIAC REHAB | Facility: HOSPITAL | Age: 85
End: 2025-06-24
Payer: MEDICARE

## 2025-06-24 DIAGNOSIS — J43.2 CENTRILOBULAR EMPHYSEMA: Primary | ICD-10-CM

## 2025-06-24 PROCEDURE — G0239 OTH RESP PROC, GROUP: HCPCS

## 2025-06-25 ENCOUNTER — TELEPHONE (OUTPATIENT)
Dept: PULMONOLOGY | Facility: CLINIC | Age: 85
End: 2025-06-25
Payer: MEDICARE

## 2025-06-25 NOTE — TELEPHONE ENCOUNTER
Pt is using nocturnal oxygen.  Will you please addend your note to state pt is using and benefiting from oxygen.

## 2025-06-26 ENCOUNTER — TREATMENT (OUTPATIENT)
Dept: CARDIAC REHAB | Facility: HOSPITAL | Age: 85
End: 2025-06-26
Payer: MEDICARE

## 2025-06-26 ENCOUNTER — TRANSCRIBE ORDERS (OUTPATIENT)
Dept: ADMINISTRATIVE | Facility: HOSPITAL | Age: 85
End: 2025-06-26
Payer: MEDICARE

## 2025-06-26 ENCOUNTER — OFFICE VISIT (OUTPATIENT)
Dept: CARDIOLOGY | Facility: CLINIC | Age: 85
End: 2025-06-26
Payer: MEDICARE

## 2025-06-26 ENCOUNTER — LAB (OUTPATIENT)
Facility: HOSPITAL | Age: 85
End: 2025-06-26
Payer: MEDICARE

## 2025-06-26 VITALS
DIASTOLIC BLOOD PRESSURE: 81 MMHG | HEART RATE: 57 BPM | BODY MASS INDEX: 30.35 KG/M2 | HEIGHT: 70 IN | SYSTOLIC BLOOD PRESSURE: 124 MMHG | WEIGHT: 212 LBS

## 2025-06-26 DIAGNOSIS — I48.19 ATRIAL FIBRILLATION, PERSISTENT: ICD-10-CM

## 2025-06-26 DIAGNOSIS — N18.32 STAGE 3B CHRONIC KIDNEY DISEASE: ICD-10-CM

## 2025-06-26 DIAGNOSIS — I50.20 HEART FAILURE WITH REDUCED LEFT VENTRICULAR FUNCTION: ICD-10-CM

## 2025-06-26 DIAGNOSIS — I10 ESSENTIAL (PRIMARY) HYPERTENSION: ICD-10-CM

## 2025-06-26 DIAGNOSIS — I25.5 ISCHEMIC CARDIOMYOPATHY: Primary | ICD-10-CM

## 2025-06-26 DIAGNOSIS — I25.708 CORONARY ARTERY DISEASE OF BYPASS GRAFT OF NATIVE HEART WITH STABLE ANGINA PECTORIS: Chronic | ICD-10-CM

## 2025-06-26 DIAGNOSIS — I48.0 PAROXYSMAL ATRIAL FIBRILLATION: ICD-10-CM

## 2025-06-26 DIAGNOSIS — M10.00 IDIOPATHIC GOUT, UNSPECIFIED CHRONICITY, UNSPECIFIED SITE: ICD-10-CM

## 2025-06-26 DIAGNOSIS — N18.32 STAGE 3B CHRONIC KIDNEY DISEASE: Primary | ICD-10-CM

## 2025-06-26 DIAGNOSIS — J43.2 CENTRILOBULAR EMPHYSEMA: Primary | ICD-10-CM

## 2025-06-26 LAB
ANION GAP SERPL CALCULATED.3IONS-SCNC: 14.9 MMOL/L (ref 5–15)
BUN SERPL-MCNC: 14 MG/DL (ref 8–23)
BUN/CREAT SERPL: 8 (ref 7–25)
CALCIUM SPEC-SCNC: 8.9 MG/DL (ref 8.6–10.5)
CHLORIDE SERPL-SCNC: 100 MMOL/L (ref 98–107)
CO2 SERPL-SCNC: 24.1 MMOL/L (ref 22–29)
CREAT SERPL-MCNC: 1.74 MG/DL (ref 0.76–1.27)
EGFRCR SERPLBLD CKD-EPI 2021: 38.4 ML/MIN/1.73
GLUCOSE SERPL-MCNC: 107 MG/DL (ref 65–99)
POTASSIUM SERPL-SCNC: 4 MMOL/L (ref 3.5–5.2)
SODIUM SERPL-SCNC: 139 MMOL/L (ref 136–145)

## 2025-06-26 PROCEDURE — 1159F MED LIST DOCD IN RCRD: CPT | Performed by: INTERNAL MEDICINE

## 2025-06-26 PROCEDURE — 1160F RVW MEDS BY RX/DR IN RCRD: CPT | Performed by: INTERNAL MEDICINE

## 2025-06-26 PROCEDURE — 99213 OFFICE O/P EST LOW 20 MIN: CPT | Performed by: INTERNAL MEDICINE

## 2025-06-26 PROCEDURE — 80048 BASIC METABOLIC PNL TOTAL CA: CPT

## 2025-06-26 PROCEDURE — G0239 OTH RESP PROC, GROUP: HCPCS

## 2025-06-26 PROCEDURE — 36415 COLL VENOUS BLD VENIPUNCTURE: CPT

## 2025-06-26 PROCEDURE — 3079F DIAST BP 80-89 MM HG: CPT | Performed by: INTERNAL MEDICINE

## 2025-06-26 PROCEDURE — 3074F SYST BP LT 130 MM HG: CPT | Performed by: INTERNAL MEDICINE

## 2025-06-27 RX ORDER — PITAVASTATIN CALCIUM 1.04 MG/1
1 TABLET, FILM COATED ORAL NIGHTLY
Qty: 90 TABLET | Refills: 3 | Status: SHIPPED | OUTPATIENT
Start: 2025-06-27

## 2025-07-01 ENCOUNTER — OFFICE VISIT (OUTPATIENT)
Dept: PULMONOLOGY | Facility: CLINIC | Age: 85
End: 2025-07-01
Payer: MEDICARE

## 2025-07-01 ENCOUNTER — TREATMENT (OUTPATIENT)
Dept: CARDIAC REHAB | Facility: HOSPITAL | Age: 85
End: 2025-07-01
Payer: MEDICARE

## 2025-07-01 VITALS
WEIGHT: 213 LBS | DIASTOLIC BLOOD PRESSURE: 71 MMHG | TEMPERATURE: 97.8 F | HEIGHT: 70 IN | BODY MASS INDEX: 30.49 KG/M2 | SYSTOLIC BLOOD PRESSURE: 98 MMHG | HEART RATE: 87 BPM | OXYGEN SATURATION: 98 % | RESPIRATION RATE: 18 BRPM

## 2025-07-01 DIAGNOSIS — E66.812 CLASS 2 OBESITY DUE TO EXCESS CALORIES WITHOUT SERIOUS COMORBIDITY WITH BODY MASS INDEX (BMI) OF 35.0 TO 35.9 IN ADULT: ICD-10-CM

## 2025-07-01 DIAGNOSIS — J43.9 PULMONARY EMPHYSEMA, UNSPECIFIED EMPHYSEMA TYPE: Primary | ICD-10-CM

## 2025-07-01 DIAGNOSIS — R06.02 SOB (SHORTNESS OF BREATH): ICD-10-CM

## 2025-07-01 DIAGNOSIS — Z95.1 S/P CABG (CORONARY ARTERY BYPASS GRAFT): ICD-10-CM

## 2025-07-01 DIAGNOSIS — J44.9 CHRONIC OBSTRUCTIVE PULMONARY DISEASE, UNSPECIFIED COPD TYPE: ICD-10-CM

## 2025-07-01 DIAGNOSIS — Z23 ENCOUNTER FOR VACCINATION: ICD-10-CM

## 2025-07-01 DIAGNOSIS — K21.00 GASTROESOPHAGEAL REFLUX DISEASE WITH ESOPHAGITIS WITHOUT HEMORRHAGE: Chronic | ICD-10-CM

## 2025-07-01 DIAGNOSIS — R05.9 COUGH: ICD-10-CM

## 2025-07-01 DIAGNOSIS — J43.2 CENTRILOBULAR EMPHYSEMA: Primary | ICD-10-CM

## 2025-07-01 DIAGNOSIS — J43.2 CENTRILOBULAR EMPHYSEMA: ICD-10-CM

## 2025-07-01 DIAGNOSIS — F17.201 TOBACCO ABUSE, IN REMISSION: ICD-10-CM

## 2025-07-01 DIAGNOSIS — R06.00 DYSPNEA, UNSPECIFIED TYPE: ICD-10-CM

## 2025-07-01 DIAGNOSIS — I51.89 DIASTOLIC DYSFUNCTION: ICD-10-CM

## 2025-07-01 DIAGNOSIS — K21.9 GASTROESOPHAGEAL REFLUX DISEASE, UNSPECIFIED WHETHER ESOPHAGITIS PRESENT: ICD-10-CM

## 2025-07-01 DIAGNOSIS — J30.1 SEASONAL ALLERGIC RHINITIS DUE TO POLLEN: ICD-10-CM

## 2025-07-01 DIAGNOSIS — J30.2 SEASONAL ALLERGIES: ICD-10-CM

## 2025-07-01 DIAGNOSIS — E66.09 CLASS 2 OBESITY DUE TO EXCESS CALORIES WITHOUT SERIOUS COMORBIDITY WITH BODY MASS INDEX (BMI) OF 35.0 TO 35.9 IN ADULT: ICD-10-CM

## 2025-07-01 DIAGNOSIS — J30.9 ALLERGIC RHINITIS, UNSPECIFIED SEASONALITY, UNSPECIFIED TRIGGER: ICD-10-CM

## 2025-07-01 PROCEDURE — G0239 OTH RESP PROC, GROUP: HCPCS

## 2025-07-01 RX ORDER — REVEFENACIN 175 UG/3ML
175 SOLUTION RESPIRATORY (INHALATION)
Qty: 90 ML | Refills: 5 | Status: SHIPPED | OUTPATIENT
Start: 2025-07-01

## 2025-07-01 NOTE — PROGRESS NOTES
Primary Care Provider  Tevin Zavala MD   Referring Provider  No ref. provider found      Patient Complaint  Follow-up (1 month), Results (CT 06/12/25/Echo- 06/17/2025), and COPD      Subjective          Layo Cee presents to [facility]      History of Presenting Illness  Layo Cee is a 85 y.o. male with history of COPD, diastolic heart failure, restrictive lung disease here for follow-up.    Reports feeling worse as of late, burt since hotter weather  He continues on Trelegy 100mg and duonebs 2x/day.  No fevers, chills, chest pain. No cough or sputum production.   2D echo show EF 46 to 50% with grade 2 diastolic dysfunction  He recently seen his cardiologist to talk about aortic stenosis.  He continues torsemide 20 mg daily  He also feels lightheaded sometimes, especially when standing up.  I told him his blood pressure is on the lower side today 98/77.  He may need to adjust his blood pressure medications with PCP or cardiology.  He is on a couple medication for blood pressure, A-fib  I have personally reviewed the review of systems, past family, social, medical and surgical histories; and agree with their findings.      Review of Systems  Constitutional:  No fever. No chills. No weakness.  ENT:  No sore throat, URI symptoms.   Cardiovascular:  No chest pain. No palpitations. No lower extremity edema.  Respiratory:  No shortness of breath, cough, pleuritic chest pain. No hemoptysis. + dyspnea.   GI:  Normal appetite. No nausea, vomiting, diarrhea. No melena.  Musculoskeletal:  No arthralgias or myalgias.  Neurologic:  No weakness    Family History   Problem Relation Age of Onset    Hypertension Mother     Heart disease Father     Other Brother         GASTROINTESTINAL CANCER    Kidney cancer Brother         Social History     Socioeconomic History    Marital status:    Tobacco Use    Smoking status: Former     Current packs/day: 0.00      Average packs/day: 0.5 packs/day for 40.0 years (20.0 ttl pk-yrs)     Types: Cigarettes     Start date:      Quit date:      Years since quittin.5     Passive exposure: Past    Smokeless tobacco: Never   Vaping Use    Vaping status: Never Used   Substance and Sexual Activity    Alcohol use: Not Currently    Drug use: Never    Sexual activity: Defer        Past Medical History:   Diagnosis Date    Acute on chronic diastolic CHF (congestive heart failure) 2022    Arthritis     Atrial fibrillation, persistent 5/3/2022    Persistent atrial fibrillation status post extensive ablation and pulmonary vein isolation by Dr. Aguilar on 6/3/2022, with reoccurring rapid A. fib, and then successful cardioversion on 2022    BPH (benign prostatic hyperplasia)     CHF (congestive heart failure)     COPD (chronic obstructive pulmonary disease)     Coronary artery disease of bypass graft of native heart with stable angina pectoris     (1) Coronary artery disease, s/p CABG in the past. Cardiac catheterization in 2016 showed patentLIMA graft to the LAD and occluded SVGs. Native LAD is 100% occluded in the mid portion. Native LCx has no significant disease. Native RCA is 100% occluded and it is a . Repeat cath in , underwent stent placement to diagnonal branch.    COVID-19 2021    Diverticulitis 10/11/2019    Dysphagia     Essential hypertension 2020    Frequent PVCs 2021    GERD (gastroesophageal reflux disease)     Gross hematuria     Long-term use of high-risk medication     Lung disease     Mixed hyperlipidemia 2021    Myocardial infarction 2016    OCD (obsessive compulsive disorder)     Renal insufficiency 2020    Rhinitis, allergic 2018    Sore throat     Stable angina     Typical atrial flutter 2018    s/p ablation by Dr. Aguilar     Vertigo         Immunization History   Administered Date(s) Administered    COVID-19 ([employer]) 2021    Fluzone   >6mos 10/08/2019    Fluzone High-Dose 65+YRS 09/24/2024    Fluzone High-Dose 65+yrs 09/28/2021, 10/05/2022, 09/11/2023    Fluzone Quad >6mos (Multi-dose) 10/08/2019    Influenza, Unspecified 10/08/2019    Pneumococcal Conjugate 13-Valent (PCV13) 04/13/2018    Pneumococcal Polysaccharide (PPSV23) 06/07/2019    Shingrix 04/29/2021, 11/22/2021    Td (TDVAX) 01/09/2023       Allergies   Allergen Reactions    Carvedilol Swelling and Anaphylaxis    Levaquin [Levofloxacin] Unknown - Low Severity          Current Outpatient Medications:     acetaminophen (TYLENOL) 325 MG tablet, Take 2 tablets by mouth As Needed for Mild Pain., Disp: , Rfl:     allopurinol 200 MG tablet, Take 200 mg by mouth Daily. (Patient taking differently: Take 200 mg by mouth Daily. 0.5 tablet), Disp: 90 tablet, Rfl: 2    amiodarone (PACERONE) 200 MG tablet, Take 1 tablet by mouth Daily., Disp: 90 tablet, Rfl: 3    aspirin (aspirin) 81 MG EC tablet, Take 1 tablet by mouth Daily., Disp: 30 tablet, Rfl: 1    azelastine (ASTELIN) 0.1 % nasal spray, Administer 2 sprays into the nostril(s) as directed by provider 2 (Two) Times a Day. Use in each nostril as directed, Disp: 30 mL, Rfl: 12    calcium carbonate (OS-LIANNA) 600 MG tablet, Take 1 tablet by mouth Every Night., Disp: , Rfl:     Coenzyme Q10 100 MG tablet, Take 1 tablet by mouth Daily., Disp: , Rfl:     Cranberry 400 MG tablet, Take 400 mg by mouth Daily., Disp: , Rfl:     Diclofenac Sodium (VOLTAREN) 1 % gel gel, Apply 2 g topically to the appropriate area as directed 4 (Four) Times a Day., Disp: , Rfl:     finasteride (PROSCAR) 5 MG tablet, Take 1 tablet by mouth Daily., Disp: 90 tablet, Rfl: 4    fluticasone (FLONASE) 50 MCG/ACT nasal spray, Administer 1 spray into the nostril(s) as directed by provider At Night As Needed for Allergies., Disp: , Rfl:     Fluticasone-Umeclidin-Vilant (Trelegy Ellipta) 100-62.5-25 MCG/ACT inhaler, Inhale 1 puff Daily., Disp: 1 each, Rfl: 11    ipratropium-albuterol  (DUO-NEB) 0.5-2.5 mg/3 ml nebulizer, Take 3 mL by nebulization 4 (Four) Times a Day., Disp: 360 mL, Rfl: 3    isosorbide mononitrate (IMDUR) 30 MG 24 hr tablet, Take 1 tablet by mouth daily. (Patient taking differently: Take 0.5 tablets by mouth Daily.), Disp: 90 tablet, Rfl: 3    Livalo 1 MG tablet tablet, Take 1 tablet by mouth Every Night., Disp: 90 tablet, Rfl: 3    metoprolol succinate XL (TOPROL-XL) 25 MG 24 hr tablet, Take 1 tablet by mouth Every 12 (Twelve) Hours. (Patient taking differently: Take 0.5 tablets by mouth Every Night.), Disp: 90 tablet, Rfl: 3    montelukast (SINGULAIR) 10 MG tablet, Take 1 tablet by mouth Every Night., Disp: 90 tablet, Rfl: 3    nitroglycerin (NITROSTAT) 0.4 MG SL tablet, Place 1 tablet under the tongue Every 5 (Five) Minutes As Needed for Chest Pain for up to 30 days. Take no more than 3 doses in 15 minutes., Disp: 30 tablet, Rfl: 0    pantoprazole (PROTONIX) 40 MG EC tablet, Take 1 tablet by mouth Daily., Disp: 90 tablet, Rfl: 2    potassium chloride 10 MEQ CR tablet, Take 2 tablets by mouth Daily., Disp: 60 tablet, Rfl: 1    predniSONE (DELTASONE) 10 MG tablet, 50mg qday x 3 days, 40mg qday x 3 days, 30mg qday x 3 days, 20mg qday x 3 days, 10mg qday x 3 days, then stop, Disp: 45 tablet, Rfl: 0    rivaroxaban (Xarelto) 15 MG tablet, Take 1 tablet by mouth Daily With Dinner., Disp: 90 tablet, Rfl: 1    sacubitril-valsartan (Entresto) 24-26 MG tablet, Take 1 tablet by mouth 2 (Two) Times a Day., Disp: 180 tablet, Rfl: 3    tamsulosin (FLOMAX) 0.4 MG capsule 24 hr capsule, Take 1 capsule by mouth Daily., Disp: 90 capsule, Rfl: 4    torsemide (DEMADEX) 20 MG tablet, Take 1 tablet by mouth 2 (Two) Times a Day., Disp: 90 tablet, Rfl: 1    VITAMIN B COMPLEX-C PO, Take 1 tablet by mouth Daily., Disp: , Rfl:      Objective     Vital Signs:   BP 98/71 (BP Location: Left arm, Patient Position: Sitting, Cuff Size: Adult)   Pulse 87   Temp 97.8 °F (36.6 °C) (Temporal)   Resp 18   Ht  "177.8 cm (70\")   Wt 96.6 kg (213 lb)   SpO2 98% Comment: room air  BMI 30.56 kg/m²     Physical Exam  Vital Signs Reviewed   WDWN, Alert, NAD.    HEENT:  EOMI.  nares clear  Neck:  Supple, no JVD, no thyromegaly  Chest:  clear to auscultation bilaterally, no wheezes, crackles; no work of breathing noted  CV: RRR, no M/G/R, pulses 2+, equal.  Abd:  Soft, NT, ND, +BS  EXT:  no clubbing, no cyanosis, no edema  Neuro:  A&Ox3, CN grossly intact, no focal deficits.  Skin: No rashes or lesions noted       Result Review :   I have personally reviewed patient's labs and images.              Patient Active Problem List   Diagnosis    Unstable angina    Coronary artery disease of bypass graft of native heart with stable angina pectoris    Class 2 obesity due to excess calories without serious comorbidity with body mass index (BMI) of 35.0 to 35.9 in adult    Gout    Typical atrial flutter    Essential hypertension    Frequent PVCs    Mixed hyperlipidemia    Lateral epicondylitis    Allergic rhinitis    Seasonal allergies    Tobacco abuse, in remission    Vitamin D deficiency    Gastroesophageal reflux disease with esophagitis without hemorrhage    Ischemic cardiomyopathy    Centrilobular emphysema    Gastroesophageal reflux disease    Atrial fibrillation, persistent    COPD (chronic obstructive pulmonary disease)    Chronic anticoagulation    Diastolic dysfunction    S/P CABG (coronary artery bypass graft)    Stage 3a chronic kidney disease    S/P ablation of atrial fibrillation    Atrial fibrillation, unspecified type    SOB (shortness of breath)    Chest pain    Mild aortic valve stenosis    Moderate mitral regurgitation    Benign prostatic hyperplasia with lower urinary tract symptoms    Chronic systolic congestive heart failure    Acute exacerbation of CHF (congestive heart failure)    Multifocal pneumonia    Pneumonia due to other gram-negative bacteria       Impression and Plan    Pulmonary emphysema  Heart failure " reduced EF and diastolic dysfunction; EF 46 to 50% with grade 2 diastolic dysfunction  Seasonal allergies  Obesity, BMI 30.56  Former tobacco user quit 1998  Aortic stenosis    CT angio 6/12/2025 showed no PE.  Emphysematous and scarring changes noted.  Pulmonary nodules mostly previous granulomatous infections.  PFTs done 11/2024 showed FEV1 56% predicted, reduced lung volume with TLC 70% predicted, severe reduction in DLCO.  Study consistent with severe obstructive and mild restrictive lung disease  2D echo showed EF 46 to 50% with grade 2 diastolic dysfunction    Patient's worsening symptoms likely a combination of heart failure, COPD, aortic stenosis.  Will increase Trelegy to 200 mg daily.  Continue DuoNeb as needed  Will add Yupelri to his regimen daily  Continue Singulair  Needs to follow-up with PCP or cardiology to adjust blood pressure medications.  He may be having orthostatic hypotension.    Smoking status: Reviewed  Vaccination status: Up-to-date with vaccinations at this  Medications personally reviewed    Follow Up   No follow-ups on file.  Patient was given instructions and counseling regarding his condition or for health maintenance advice. Please see specific information pulled into the AVS if appropriate.

## 2025-07-01 NOTE — ASSESSMENT & PLAN NOTE
Echocardiogram earlier this year shows LVEF of 46 to 50%.  Since last office visit, he ended up back in the hospital, due to worsening shortness of breath.  Currently doing well with current dose of torsemide 20 mg daily.  We will continue this, Entresto and metoprolol.  Will recheck his chemistry panel and BNP level in 2 weeks, as long as this does not cause any worsening renal function, think it is reasonable to continue.  If kidney function worsens, we could try alternating 20 mg and 10 mg of torsemide.   no body aches/no chest pain/no diaphoresis/no headache/no hemoptysis

## 2025-07-05 NOTE — PROGRESS NOTES
CARDIOLOGY FOLLOW-UP PROGRESS NOTE        Chief Complaint  Follow-up, Atrial Fibrillation, Congestive Heart Failure, and Coronary Artery Disease    Subjective            Layo Cee presents to [facility] CARDIOLOGY  History of Present Illness      Ms Cee is here for a 1 month follow-up visit.  Overall, he feels better.  Shortness of breath stable.  Pedal edema improved.  He lost around 10 pounds during the past month.  Wheezing and cough better.  No recent hospitalizations.      Past History:    (1) Coronary artery disease, status post coronary artery bypass grafting in the past. Cardiac catheterization done in July 2016 showed patent left internal mammary graft to the LAD and occluded saphenous venous graft. Native LAD is 100% occluded in the mid portion. Native circumflex artery has no significant disease. Native right coronary artery is also 100% occluded and it is a chronic total occlusion. The right coronary artery is reconstituted with collaterals from the left side.  Patient underwent repeat cardiac catheterization in June, 2021 and underwent angioplasty stent placement to diagonal branch.       (2) Ischemic cardiomyopathy with recovery of cardiac function.  (3) Chronic kidney disease, stage 3.   (4) Chronic obstructive pulmonary disease  (5) Hypertension. (6) Hyperlipidemia.   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   (8) Typical atrial flutter status post radiofrequency ablation by Dr. Aguilar on 11/30/2018   (9) Persistent atrial fibrillation status post extensive ablation and pulmonary vein isolation by Dr. Aguilar on 6/3/2022    Medical History:  Past Medical History:   Diagnosis Date    Acute on chronic diastolic CHF (congestive heart failure) 6/6/2022    Arthritis     Atrial fibrillation, persistent 5/3/2022    Persistent atrial fibrillation status post extensive ablation and pulmonary vein isolation by Dr. Aguilar  on 6/3/2022, with reoccurring rapid A. fib, and then successful cardioversion on 7/1/2022    BPH (benign prostatic hyperplasia)     CHF (congestive heart failure)     COPD (chronic obstructive pulmonary disease)     Coronary artery disease of bypass graft of native heart with stable angina pectoris     (1) Coronary artery disease, s/p CABG in the past. Cardiac catheterization in July 2016 showed patentLIMA graft to the LAD and occluded SVGs. Native LAD is 100% occluded in the mid portion. Native LCx has no significant disease. Native RCA is 100% occluded and it is a . Repeat cath in June, 2021, underwent stent placement to diagnonal branch.    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 Lauren Valencia        Family History: family history includes Heart disease in his father; Hypertension in his mother; Kidney cancer in his brother; Other in his brother.     Social History: reports that he quit smoking about 27 years ago. His smoking use included cigarettes. He started smoking about 67 years ago. He has a 20 pack-year smoking history. He has been exposed to tobacco smoke. He has never used smokeless tobacco. He reports that he does not currently use alcohol. He reports that he does not use drugs.    Allergies: Carvedilol and Levaquin [levofloxacin]    Current Outpatient Medications on File Prior to Visit   Medication Sig    acetaminophen (TYLENOL) 325 MG tablet Take 2 tablets by mouth As Needed for Mild Pain.    allopurinol 200 MG tablet Take  200 mg by mouth Daily. (Patient taking differently: Take 200 mg by mouth Daily. 0.5 tablet)    amiodarone (PACERONE) 200 MG tablet Take 1 tablet by mouth Daily.    aspirin (aspirin) 81 MG EC tablet Take 1 tablet by mouth Daily.    azelastine (ASTELIN) 0.1 % nasal spray Administer 2 sprays into the nostril(s) as directed by provider 2 (Two) Times a Day. Use in each nostril as directed    calcium carbonate (OS-LIANNA) 600 MG tablet Take 1 tablet by mouth Every Night.    Coenzyme Q10 100 MG tablet Take 1 tablet by mouth Daily.    Cranberry 400 MG tablet Take 400 mg by mouth Daily.    Diclofenac Sodium (VOLTAREN) 1 % gel gel Apply 2 g topically to the appropriate area as directed 4 (Four) Times a Day.    finasteride (PROSCAR) 5 MG tablet Take 1 tablet by mouth Daily.    ipratropium-albuterol (DUO-NEB) 0.5-2.5 mg/3 ml nebulizer Take 3 mL by nebulization 4 (Four) Times a Day.    isosorbide mononitrate (IMDUR) 30 MG 24 hr tablet Take 1 tablet by mouth daily. (Patient taking differently: Take 0.5 tablets by mouth Daily.)    metoprolol succinate XL (TOPROL-XL) 25 MG 24 hr tablet Take 1 tablet by mouth Every 12 (Twelve) Hours. (Patient taking differently: Take 0.5 tablets by mouth Every Night.)    montelukast (SINGULAIR) 10 MG tablet Take 1 tablet by mouth Every Night.    pantoprazole (PROTONIX) 40 MG EC tablet Take 1 tablet by mouth Daily.    potassium chloride 10 MEQ CR tablet Take 2 tablets by mouth Daily.    predniSONE (DELTASONE) 10 MG tablet 50mg qday x 3 days, 40mg qday x 3 days, 30mg qday x 3 days, 20mg qday x 3 days, 10mg qday x 3 days, then stop    rivaroxaban (Xarelto) 15 MG tablet Take 1 tablet by mouth Daily With Dinner.    sacubitril-valsartan (Entresto) 24-26 MG tablet Take 1 tablet by mouth 2 (Two) Times a Day.    tamsulosin (FLOMAX) 0.4 MG capsule 24 hr capsule Take 1 capsule by mouth Daily.    torsemide (DEMADEX) 20 MG tablet Take 1 tablet by mouth 2 (Two) Times a Day.    VITAMIN B COMPLEX-C PO Take 1 tablet by  "mouth Daily.    fluticasone (FLONASE) 50 MCG/ACT nasal spray Administer 1 spray into the nostril(s) as directed by provider At Night As Needed for Allergies.    nitroglycerin (NITROSTAT) 0.4 MG SL tablet Place 1 tablet under the tongue Every 5 (Five) Minutes As Needed for Chest Pain for up to 30 days. Take no more than 3 doses in 15 minutes.    [DISCONTINUED] dilTIAZem CD (CARDIZEM CD) 180 MG 24 hr capsule Take 1 capsule by mouth Daily for 30 days. (Patient not taking: Reported on 4/21/2025)     No current facility-administered medications on file prior to visit.          Review of Systems   Constitutional:  Positive for fatigue.   Respiratory:  Positive for cough and shortness of breath. Negative for wheezing.    Cardiovascular:  Negative for chest pain, palpitations and leg swelling.   Gastrointestinal:  Negative for nausea and vomiting.   Neurological:  Negative for dizziness and syncope.        Objective     /81   Pulse 57   Ht 177.8 cm (70\")   Wt 96.2 kg (212 lb)   BMI 30.42 kg/m²       Physical Exam    General : Alert, awake, no acute distress  Neck : Supple, no carotid bruit, no jugular venous distention  CVS : Regular rate and rhythm, no murmur, rubs or gallops  Lungs: Bilateral wheezing heard, no crackles  Abdomen: Soft, nontender, bowel sounds heard in all 4 quadrants  Extremities: Warm, well-perfused, 1+ bilaterally    Result Review :     The following data was reviewed by: Darnell Stevenson MD on 06/26/2025:    CMP          5/12/2025    13:32 5/27/2025    13:26 6/26/2025    11:48   CMP   Glucose 102  112  107    BUN 11  30.0  14.0    Creatinine 1.44  1.91  1.74    EGFR 48.2  34.4  38.4    Sodium 142  144  139    Potassium 3.7  4.0  4.0    Chloride 104  104  100    Calcium 8.6  9.1  8.9    Albumin  3.9     BUN/Creatinine Ratio 7.6  15.7  8.0    Anion Gap 13.1  13.9  14.9      CBC          4/18/2025    05:07 4/19/2025    04:42 5/27/2025    13:26   CBC   WBC 19.39  11.21  12.52    RBC 4.05  3.93  " 4.30    Hemoglobin 12.0  11.6  13.1    Hematocrit 37.5  36.6  40.2    MCV 92.6  93.1  93.5    MCH 29.6  29.5  30.5    MCHC 32.0  31.7  32.6    RDW 16.3  17.0  14.6    Platelets 197  176  241        Lipid Panel          9/16/2024    11:21   Lipid Panel   Total Cholesterol 113    Triglycerides 127    HDL Cholesterol 33    VLDL Cholesterol 23    LDL Cholesterol  57    LDL/HDL Ratio 1.65           Data reviewed: Cardiology studies        Results for orders placed during the hospital encounter of 06/17/25    Adult Transthoracic Echo Complete W/ Cont if Necessary Per Protocol    Interpretation Summary    Left ventricular systolic function is mildly decreased. Left ventricular ejection fraction appears to be 46 - 50%.    Left ventricular wall thickness is consistent with mild concentric hypertrophy.    Left ventricular diastolic function is consistent with (grade II w/high LAP) pseudonormalization.    The left atrial cavity is mild to moderately dilated.    Aortic valve is calcified with restricted opening.  Moderate aortic valve stenosis is present.  The peak and mean gradients across the aortic valve are 39/24 mmHg.    Mild mitral annular calcification noted.  There is mild mitral regurgitation.    Estimated right ventricular systolic pressure from tricuspid regurgitation is mildly elevated (35-45 mmHg).      Results for orders placed during the hospital encounter of 07/08/21    Stress Test With Myocardial Perfusion One Day 07/09/2021  1:52 PM    Interpretation Summary  · Myocardial perfusion imaging indicates a small-sized infarct located in the inferior wall with mild mansoor-infarct ischemia.  · Left ventricular ejection fraction is borderline normal. (Calculated EF = 49%).  · Diaphragmatic attenuation artifact is present.  · Findings consistent with a normal ECG stress test.  · Occasional isolated PVCs are noted at rest and also during stress.  · Impressions are consistent with an intermediate risk study.                Assessment and Plan        Diagnoses and all orders for this visit:    1. Ischemic cardiomyopathy (Primary)  Assessment & Plan:  Weight is down by more than 10 pounds during the past month.  Pedal edema is better.  Shortness of breath is stable.  He is currently taking torsemide on a daily basis.  Recommend taking additional second dose twice every week.      2. Atrial fibrillation, persistent  Assessment & Plan:  In regular rhythm on auscultation, occasional ectopy present.  Currently not on Cardizem CD.  We will continue amiodarone, metoprolol along with Xarelto.  Will do thyroid panel with next lab draw.      3. 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  Continue aspirin, low-dose beta-blockers, statins, long-acting nitrates.                Follow Up     Return in about 3 months (around 9/26/2025) for Next scheduled follow up.    Patient was given instructions and counseling regarding his condition or for health maintenance advice. Please see specific information pulled into the AVS if appropriate.

## 2025-07-05 NOTE — ASSESSMENT & PLAN NOTE
Weight is down by more than 10 pounds during the past month.  Pedal edema is better.  Shortness of breath is stable.  He is currently taking torsemide on a daily basis.  Recommend taking additional second dose twice every week.

## 2025-07-05 NOTE — ASSESSMENT & PLAN NOTE
In regular rhythm on auscultation, occasional ectopy present.  Currently not on Cardizem CD.  We will continue amiodarone, metoprolol along with Xarelto.  Will do thyroid panel with next lab draw.

## 2025-07-22 ENCOUNTER — OFFICE VISIT (OUTPATIENT)
Dept: FAMILY MEDICINE CLINIC | Facility: CLINIC | Age: 85
End: 2025-07-22
Payer: MEDICARE

## 2025-07-22 VITALS
SYSTOLIC BLOOD PRESSURE: 112 MMHG | OXYGEN SATURATION: 97 % | HEIGHT: 70 IN | BODY MASS INDEX: 31.26 KG/M2 | DIASTOLIC BLOOD PRESSURE: 50 MMHG | HEART RATE: 83 BPM | WEIGHT: 218.4 LBS

## 2025-07-22 DIAGNOSIS — I95.1 ORTHOSTATIC HYPOTENSION: Primary | ICD-10-CM

## 2025-07-22 PROCEDURE — 3074F SYST BP LT 130 MM HG: CPT | Performed by: STUDENT IN AN ORGANIZED HEALTH CARE EDUCATION/TRAINING PROGRAM

## 2025-07-22 PROCEDURE — 99214 OFFICE O/P EST MOD 30 MIN: CPT | Performed by: STUDENT IN AN ORGANIZED HEALTH CARE EDUCATION/TRAINING PROGRAM

## 2025-07-22 PROCEDURE — 3078F DIAST BP <80 MM HG: CPT | Performed by: STUDENT IN AN ORGANIZED HEALTH CARE EDUCATION/TRAINING PROGRAM

## 2025-07-22 PROCEDURE — 1126F AMNT PAIN NOTED NONE PRSNT: CPT | Performed by: STUDENT IN AN ORGANIZED HEALTH CARE EDUCATION/TRAINING PROGRAM

## 2025-07-22 NOTE — PROGRESS NOTES
Subjective:       Layo Cee is a 85 y.o. male with a concurrent medical history of atrial fibrillation status post ablation, coronary artery disease status post bypass, heart failure with reduced ejection fraction secondary to ischemic cardiomyopathy, hypertension, gout, benign prostate hyperplasia, COPD, prediabetes, iron deficiency anemia and renal insufficiency who presents for coordination of care.    Layo had recently reported dizziness and lightheadedness at our previous appointment in June.  Given complex medical comorbidities requiring specialist involvement by multiple specialist including cardiology and pulmonology, I had requested a close follow-up for continued coordination of care.  At that visit, I suspected hypertension was playing a role in symptoms and we did decrease the dose of metoprolol from 25 mg to 12.5 mg.  Today, symptoms have improved but he does have some lingering fatigue, lightheadedness, and dizziness, primarily when going from sitting to standing.    I have reviewed the note from his pulmonologist who recommended that he follow-up with either us or cardiology to discuss potential for orthostatic hypotension.  Orthostatic vital signs were obtained today and were positive.  Heart rate increased from 43-83 when going from sitting to standing and systolic blood pressure decreased from 132-112 when going from sitting to standing.    Normally in a situation like this, I would recommend decreasing the dose or discontinuing antihypertensive to titrate to effective dose to control blood pressure.  However, Layo is already on the lowest dose of metoprolol I am aware of -he is taking one half of a 25 mg tablet for a total of 12.5 mg daily, and he does need this medicine in the setting of atrial fibrillation, heart failure, and history of myocardial infarction.  He is also on Entresto, but he is also on the lowest dose of this medication as well at the 24 mg, 26 mg combination dosage  and he tells me this has already been decreased by cardiologist.  I do see that he is on a long-acting nitrate, isosorbide mononitrate and takes one half of a 30 mg pill for a total of 15 mg.  We did discuss that this could potentially be adjusted to 5 mg twice daily of the immediate release rather than the extended release version he is already on which would lower his overall dose from 15 mg to 10 mg.  However I would recommend he discuss this with cardiologist first.    Ultimately, as his heart function recovers from history of myocardial infarction and heart failure exacerbation, I suspect symptoms will improve.  He is approximately 6 months out from his December and January admission and new echocardiogram demonstrates that ejection fraction remains at 46 to 50%, which is not an improvement from his previous value.  I do think he may take more time ultimately to recover given his age and other comorbidities.  He has not had a syncopal episodes and symptoms have actually improved since we cut the dose of metoprolol.  I think for the time being, it would be better to try to remain on the current dosage of his medicines to protect his heart but I did let him know that if symptoms were to worsen acutely that could be a reason to revisit this.  In the meantime, emphasized preventative measures such as minimizing position changes speed - would additionally recommend compression stockings.  Although we could consider medication such as midodrine or Florinef to increase blood pressure, I do not believe that is warranted in this case at this time.         Past Medical Hx:  Past Medical History:   Diagnosis Date    Acute on chronic diastolic CHF (congestive heart failure) 6/6/2022    Arthritis     Atrial fibrillation, persistent 5/3/2022    Persistent atrial fibrillation status post extensive ablation and pulmonary vein isolation by Dr. Aguilar on 6/3/2022, with reoccurring rapid A. fib, and then successful cardioversion on  7/1/2022    BPH (benign prostatic hyperplasia)     CHF (congestive heart failure)     COPD (chronic obstructive pulmonary disease)     Coronary artery disease of bypass graft of native heart with stable angina pectoris     (1) Coronary artery disease, s/p CABG in the past. Cardiac catheterization in July 2016 showed patentLIMA graft to the LAD and occluded SVGs. Native LAD is 100% occluded in the mid portion. Native LCx has no significant disease. Native RCA is 100% occluded and it is a . Repeat cath in June, 2021, underwent stent placement to diagnonal branch.    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 Lauren Valencia        Past Surgical Hx:  Past Surgical History:   Procedure Laterality Date    BLADDER SURGERY      CARDIAC CATHETERIZATION  07/2016    CARDIAC CATHETERIZATION N/A 8/9/2021    Procedure: Left Heart Cath with possible angioplasty;  Surgeon: Jack Ladd MD;  Location: [facility] CATH INVASIVE LOCATION;  Service: Cardiovascular;  Laterality: N/A;  Please schedule the procedure with Dr. Jack Ladd MD on 8/9/2021    CARDIAC ELECTROPHYSIOLOGY PROCEDURE N/A 6/3/2022    Procedure: Ablation atrial fibrillation;  Surgeon: Irineo Aguilar MD;  Location: [facility] CATH INVASIVE LOCATION;  Service: Cardiovascular;  Laterality: N/A;    CARDIAC ELECTROPHYSIOLOGY PROCEDURE N/A 6/3/2022    Procedure: Ablation atrial flutter/CTI;  Surgeon: Irineo Aguilar MD;  Location: [facility] CATH INVASIVE LOCATION;  Service: Cardiovascular;   Laterality: N/A;    CARDIAC SURGERY      HEART BYPASS    COLONOSCOPY  2011    CORONARY ARTERY BYPASS GRAFT      CYSTOSCOPY  03/19/2019    WITH BILATERAL RETROGRADE PYELOGRAPHY    ELECTRICAL CARDIOVERSION  5/12/2022         ENDOSCOPY  2016    PROSTATE SURGERY         Current Meds:    Current Outpatient Medications:     acetaminophen (TYLENOL) 325 MG tablet, Take 2 tablets by mouth As Needed for Mild Pain., Disp: , Rfl:     allopurinol 200 MG tablet, Take 200 mg by mouth Daily. (Patient taking differently: Take 200 mg by mouth Daily. 0.5 tablet), Disp: 90 tablet, Rfl: 2    amiodarone (PACERONE) 200 MG tablet, Take 1 tablet by mouth Daily. (Patient taking differently: Take 0.5 tablets by mouth Daily.), Disp: 90 tablet, Rfl: 3    aspirin (aspirin) 81 MG EC tablet, Take 1 tablet by mouth Daily., Disp: 30 tablet, Rfl: 1    azelastine (ASTELIN) 0.1 % nasal spray, Administer 2 sprays into the nostril(s) as directed by provider 2 (Two) Times a Day. Use in each nostril as directed, Disp: 30 mL, Rfl: 12    calcium carbonate (OS-LIANNA) 600 MG tablet, Take 1 tablet by mouth Every Night., Disp: , Rfl:     Coenzyme Q10 100 MG tablet, Take 1 tablet by mouth Daily., Disp: , Rfl:     Cranberry 400 MG tablet, Take 400 mg by mouth Daily., Disp: , Rfl:     Diclofenac Sodium (VOLTAREN) 1 % gel gel, Apply 2 g topically to the appropriate area as directed 4 (Four) Times a Day., Disp: , Rfl:     finasteride (PROSCAR) 5 MG tablet, Take 1 tablet by mouth Daily., Disp: 90 tablet, Rfl: 4    fluticasone (FLONASE) 50 MCG/ACT nasal spray, Administer 1 spray into the nostril(s) as directed by provider At Night As Needed for Allergies., Disp: , Rfl:     Fluticasone-Umeclidin-Vilant (TRELEGY ELLIPTA) 200-62.5-25 MCG/ACT inhaler, Inhale 1 puff Daily., Disp: 1 each, Rfl: 5    ipratropium-albuterol (DUO-NEB) 0.5-2.5 mg/3 ml nebulizer, Take 3 mL by nebulization 4 (Four) Times a Day., Disp: 360 mL, Rfl: 3    isosorbide mononitrate (IMDUR) 30 MG 24 hr  tablet, Take 1 tablet by mouth daily. (Patient taking differently: Take 0.5 tablets by mouth Daily.), Disp: 90 tablet, Rfl: 3    Livalo 1 MG tablet tablet, Take 1 tablet by mouth Every Night., Disp: 90 tablet, Rfl: 3    metoprolol succinate XL (TOPROL-XL) 25 MG 24 hr tablet, Take 1 tablet by mouth Every 12 (Twelve) Hours. (Patient taking differently: Take 0.5 tablets by mouth Every Night.), Disp: 90 tablet, Rfl: 3    montelukast (SINGULAIR) 10 MG tablet, Take 1 tablet by mouth Every Night., Disp: 90 tablet, Rfl: 3    nitroglycerin (NITROSTAT) 0.4 MG SL tablet, Place 1 tablet under the tongue Every 5 (Five) Minutes As Needed for Chest Pain for up to 30 days. Take no more than 3 doses in 15 minutes., Disp: 30 tablet, Rfl: 0    pantoprazole (PROTONIX) 40 MG EC tablet, Take 1 tablet by mouth Daily., Disp: 90 tablet, Rfl: 2    potassium chloride 10 MEQ CR tablet, Take 2 tablets by mouth Daily. (Patient taking differently: Take 1 tablet by mouth Daily.), Disp: 60 tablet, Rfl: 1    rivaroxaban (Xarelto) 15 MG tablet, Take 1 tablet by mouth Daily With Dinner., Disp: 90 tablet, Rfl: 1    sacubitril-valsartan (Entresto) 24-26 MG tablet, Take 1 tablet by mouth 2 (Two) Times a Day., Disp: 180 tablet, Rfl: 3    tamsulosin (FLOMAX) 0.4 MG capsule 24 hr capsule, Take 1 capsule by mouth Daily., Disp: 90 capsule, Rfl: 4    torsemide (DEMADEX) 20 MG tablet, Take 1 tablet by mouth 2 (Two) Times a Day., Disp: 90 tablet, Rfl: 1    VITAMIN B COMPLEX-C PO, Take 1 tablet by mouth Daily., Disp: , Rfl:     revefenacin (Yupelri) 175 MCG/3ML nebulizer solution, Take 3 mL by nebulization Daily. (Patient not taking: Reported on 7/22/2025), Disp: 90 mL, Rfl: 5    Allergies:  Allergies   Allergen Reactions    Carvedilol Swelling and Anaphylaxis    Levaquin [Levofloxacin] Unknown - Low Severity       Family Hx:  Family History   Problem Relation Age of Onset    Hypertension Mother     Heart disease Father     Other Brother         GASTROINTESTINAL  "CANCER    Kidney cancer Brother         Social History:  Social History     Socioeconomic History    Marital status:    Tobacco Use    Smoking status: Former     Current packs/day: 0.00     Average packs/day: 0.5 packs/day for 40.0 years (20.0 ttl pk-yrs)     Types: Cigarettes     Start date:      Quit date:      Years since quittin.5     Passive exposure: Past    Smokeless tobacco: Never   Vaping Use    Vaping status: Never Used   Substance and Sexual Activity    Alcohol use: Not Currently    Drug use: Never    Sexual activity: Defer       Review of Systems  Review of Systems   Constitutional:  Positive for fatigue.   Neurological:  Positive for dizziness (improved) and light-headedness (improved).       Objective:     /50 (BP Location: Left arm, Patient Position: Standing, Cuff Size: Large Adult)   Pulse 83   Ht 177.8 cm (70\")   Wt 99.1 kg (218 lb 6.4 oz)   SpO2 97%   BMI 31.34 kg/m²   Physical Exam  Constitutional:       General: He is not in acute distress.     Appearance: Normal appearance. He is not ill-appearing, toxic-appearing or diaphoretic.   Cardiovascular:      Rate and Rhythm: Normal rate.   Pulmonary:      Effort: Pulmonary effort is normal.   Neurological:      Mental Status: He is alert.   Psychiatric:         Mood and Affect: Mood normal.         Behavior: Behavior normal.          Assessment/Plan:     Diagnoses and all orders for this visit:    1. Orthostatic hypotension (Primary)      Layo had recently reported dizziness and lightheadedness at our previous appointment in .  Given complex medical comorbidities requiring specialist involvement by multiple specialist including cardiology and pulmonology, I had requested a close follow-up for continued coordination of care.  At that visit, I suspected hypertension was playing a role in symptoms and we did decrease the dose of metoprolol from 25 mg to 12.5 mg.  Today, symptoms have improved but he does have some " lingering fatigue, lightheadedness, and dizziness, primarily when going from sitting to standing.    I have reviewed the note from his pulmonologist who recommended that he follow-up with either us or cardiology to discuss potential for orthostatic hypotension.  Orthostatic vital signs were obtained today and were positive.  Heart rate increased from 43-83 when going from sitting to standing and systolic blood pressure decreased from 132-112 when going from sitting to standing.    Normally in a situation like this, I would recommend decreasing the dose or discontinuing antihypertensive to titrate to effective dose to control blood pressure.  However, Layo is already on the lowest dose of metoprolol I am aware of -he is taking one half of a 25 mg tablet for a total of 12.5 mg daily, and he does need this medicine in the setting of atrial fibrillation, heart failure, and history of myocardial infarction.  He is also on Entresto, but he is also on the lowest dose of this medication as well at the 24 mg, 26 mg combination dosage and he tells me this has already been decreased by cardiologist.  I do see that he is on a long-acting nitrate, isosorbide mononitrate and takes one half of a 30 mg pill for a total of 15 mg.  We did discuss that this could potentially be adjusted to 5 mg twice daily of the immediate release rather than the extended release version he is already on which would lower his overall dose from 15 mg to 10 mg.  However I would recommend he discuss this with cardiologist first.    Ultimately, as his heart function recovers from history of myocardial infarction and heart failure exacerbation, I suspect symptoms will improve.  He is approximately 6 months out from his December and January admission and new echocardiogram demonstrates that ejection fraction remains at 46 to 50%, which is not an improvement from his previous value.  I do think he may take more time ultimately to recover given his age and  other comorbidities.  He has not had a syncopal episodes and symptoms have actually improved since we cut the dose of metoprolol.  I think for the time being, it would be better to try to remain on the current dosage of his medicines to protect his heart but I did let him know that if symptoms were to worsen acutely that could be a reason to revisit this.  In the meantime, emphasized preventative measures such as minimizing position changes speed - would additionally recommend compression stockings.  Although we could consider medication such as midodrine or Florinef to increase blood pressure, I do not believe that is warranted in this case at this time.    Will have him follow-up in just under 3 months for recheck.      Follow-up:     Return in about 3 months (around 10/22/2025) for Recheck.    Preventative:  Health Maintenance   Topic Date Due    ANNUAL WELLNESS VISIT  01/17/2025    COVID-19 Vaccine (2 - 2024-25 season) 05/30/2026 (Originally 9/1/2024)    RSV Vaccine - Adults (1 - 1-dose 75+ series) 05/30/2026 (Originally 3/17/2017)    LIPID PANEL  09/16/2025    INFLUENZA VACCINE  10/01/2025    TDAP/TD VACCINES (2 - Tdap) 01/09/2033    Pneumococcal Vaccine 50+  Completed    ZOSTER VACCINE  Completed         This document has been electronically signed by Tevin Zavala MD on July 22, 2025 14:15 EDT       Parts of this note are electronic transcriptions/translations of spoken language to printed text using the Dragon Dictation system.

## 2025-08-05 ENCOUNTER — OFFICE VISIT (OUTPATIENT)
Dept: PULMONOLOGY | Facility: CLINIC | Age: 85
End: 2025-08-05
Payer: MEDICARE

## 2025-08-05 ENCOUNTER — LAB (OUTPATIENT)
Facility: HOSPITAL | Age: 85
End: 2025-08-05
Payer: MEDICARE

## 2025-08-05 VITALS
TEMPERATURE: 97.8 F | HEIGHT: 70 IN | BODY MASS INDEX: 31.5 KG/M2 | HEART RATE: 101 BPM | RESPIRATION RATE: 20 BRPM | DIASTOLIC BLOOD PRESSURE: 108 MMHG | OXYGEN SATURATION: 94 % | SYSTOLIC BLOOD PRESSURE: 129 MMHG | WEIGHT: 220 LBS

## 2025-08-05 DIAGNOSIS — I50.9 ACUTE ON CHRONIC CONGESTIVE HEART FAILURE, UNSPECIFIED HEART FAILURE TYPE: Primary | ICD-10-CM

## 2025-08-05 DIAGNOSIS — J43.9 PULMONARY EMPHYSEMA, UNSPECIFIED EMPHYSEMA TYPE: ICD-10-CM

## 2025-08-05 DIAGNOSIS — J43.2 CENTRILOBULAR EMPHYSEMA: ICD-10-CM

## 2025-08-05 DIAGNOSIS — F17.201 TOBACCO ABUSE, IN REMISSION: ICD-10-CM

## 2025-08-05 DIAGNOSIS — K21.9 GASTROESOPHAGEAL REFLUX DISEASE, UNSPECIFIED WHETHER ESOPHAGITIS PRESENT: ICD-10-CM

## 2025-08-05 DIAGNOSIS — J30.1 SEASONAL ALLERGIC RHINITIS DUE TO POLLEN: ICD-10-CM

## 2025-08-05 DIAGNOSIS — J15.69 PNEUMONIA DUE TO OTHER GRAM-NEGATIVE BACTERIA: ICD-10-CM

## 2025-08-05 DIAGNOSIS — J44.9 CHRONIC OBSTRUCTIVE PULMONARY DISEASE, UNSPECIFIED COPD TYPE: ICD-10-CM

## 2025-08-05 DIAGNOSIS — J18.9 MULTIFOCAL PNEUMONIA: ICD-10-CM

## 2025-08-05 DIAGNOSIS — K21.00 GASTROESOPHAGEAL REFLUX DISEASE WITH ESOPHAGITIS WITHOUT HEMORRHAGE: Chronic | ICD-10-CM

## 2025-08-05 DIAGNOSIS — R06.02 SOB (SHORTNESS OF BREATH): ICD-10-CM

## 2025-08-05 DIAGNOSIS — J30.9 ALLERGIC RHINITIS, UNSPECIFIED SEASONALITY, UNSPECIFIED TRIGGER: ICD-10-CM

## 2025-08-05 DIAGNOSIS — Z95.1 S/P CABG (CORONARY ARTERY BYPASS GRAFT): ICD-10-CM

## 2025-08-05 DIAGNOSIS — R06.00 DYSPNEA, UNSPECIFIED TYPE: ICD-10-CM

## 2025-08-05 DIAGNOSIS — Z23 ENCOUNTER FOR VACCINATION: ICD-10-CM

## 2025-08-05 DIAGNOSIS — I51.89 DIASTOLIC DYSFUNCTION: ICD-10-CM

## 2025-08-05 DIAGNOSIS — R05.9 COUGH: ICD-10-CM

## 2025-08-05 DIAGNOSIS — J30.2 SEASONAL ALLERGIES: ICD-10-CM

## 2025-08-05 DIAGNOSIS — E66.812 CLASS 2 OBESITY DUE TO EXCESS CALORIES WITHOUT SERIOUS COMORBIDITY WITH BODY MASS INDEX (BMI) OF 35.0 TO 35.9 IN ADULT: ICD-10-CM

## 2025-08-05 DIAGNOSIS — E66.09 CLASS 2 OBESITY DUE TO EXCESS CALORIES WITHOUT SERIOUS COMORBIDITY WITH BODY MASS INDEX (BMI) OF 35.0 TO 35.9 IN ADULT: ICD-10-CM

## 2025-08-05 DIAGNOSIS — I50.9 ACUTE ON CHRONIC CONGESTIVE HEART FAILURE, UNSPECIFIED HEART FAILURE TYPE: ICD-10-CM

## 2025-08-05 LAB
ALBUMIN SERPL-MCNC: 3.8 G/DL (ref 3.5–5.2)
ANION GAP SERPL CALCULATED.3IONS-SCNC: 11.9 MMOL/L (ref 5–15)
BUN SERPL-MCNC: 16 MG/DL (ref 8–23)
BUN/CREAT SERPL: 9.6 (ref 7–25)
CALCIUM SPEC-SCNC: 8.9 MG/DL (ref 8.6–10.5)
CHLORIDE SERPL-SCNC: 100 MMOL/L (ref 98–107)
CO2 SERPL-SCNC: 25.1 MMOL/L (ref 22–29)
CREAT SERPL-MCNC: 1.66 MG/DL (ref 0.76–1.27)
EGFRCR SERPLBLD CKD-EPI 2021: 40.7 ML/MIN/1.73
GLUCOSE SERPL-MCNC: 118 MG/DL (ref 65–99)
NT-PROBNP SERPL-MCNC: 2995 PG/ML (ref 0–1800)
PHOSPHATE SERPL-MCNC: 2.5 MG/DL (ref 2.5–4.5)
POTASSIUM SERPL-SCNC: 4.1 MMOL/L (ref 3.5–5.2)
SODIUM SERPL-SCNC: 137 MMOL/L (ref 136–145)

## 2025-08-05 PROCEDURE — 36415 COLL VENOUS BLD VENIPUNCTURE: CPT

## 2025-08-05 PROCEDURE — 83880 ASSAY OF NATRIURETIC PEPTIDE: CPT

## 2025-08-05 PROCEDURE — 80069 RENAL FUNCTION PANEL: CPT

## 2025-08-05 RX ORDER — MONTELUKAST SODIUM 10 MG/1
10 TABLET ORAL NIGHTLY
Qty: 90 TABLET | Refills: 3 | Status: SHIPPED | OUTPATIENT
Start: 2025-08-05

## 2025-08-07 ENCOUNTER — TELEPHONE (OUTPATIENT)
Dept: PULMONOLOGY | Facility: CLINIC | Age: 85
End: 2025-08-07
Payer: MEDICARE

## 2025-08-07 DIAGNOSIS — R06.02 SOB (SHORTNESS OF BREATH): ICD-10-CM

## 2025-08-07 DIAGNOSIS — J44.9 CHRONIC OBSTRUCTIVE PULMONARY DISEASE, UNSPECIFIED COPD TYPE: ICD-10-CM

## 2025-08-07 DIAGNOSIS — J43.9 PULMONARY EMPHYSEMA, UNSPECIFIED EMPHYSEMA TYPE: Primary | ICD-10-CM

## 2025-08-07 DIAGNOSIS — G47.34 NOCTURNAL HYPOXIA: ICD-10-CM

## 2025-08-13 ENCOUNTER — TELEPHONE (OUTPATIENT)
Dept: CARDIOLOGY | Facility: CLINIC | Age: 85
End: 2025-08-13
Payer: MEDICARE

## 2025-08-13 DIAGNOSIS — I25.5 ISCHEMIC CARDIOMYOPATHY: ICD-10-CM

## 2025-08-13 RX ORDER — ISOSORBIDE MONONITRATE 30 MG/1
30 TABLET, EXTENDED RELEASE ORAL DAILY
Qty: 90 TABLET | Refills: 3 | Status: SHIPPED | OUTPATIENT
Start: 2025-08-13

## 2025-08-13 RX ORDER — POTASSIUM CHLORIDE 750 MG/1
20 TABLET, EXTENDED RELEASE ORAL DAILY
Qty: 60 TABLET | Refills: 3 | Status: SHIPPED | OUTPATIENT
Start: 2025-08-13

## 2025-08-19 ENCOUNTER — OFFICE VISIT (OUTPATIENT)
Dept: CARDIOLOGY | Facility: CLINIC | Age: 85
End: 2025-08-19
Payer: MEDICARE

## 2025-08-19 VITALS
WEIGHT: 220 LBS | HEIGHT: 70 IN | HEART RATE: 88 BPM | DIASTOLIC BLOOD PRESSURE: 55 MMHG | BODY MASS INDEX: 31.5 KG/M2 | SYSTOLIC BLOOD PRESSURE: 130 MMHG

## 2025-08-19 DIAGNOSIS — I50.23 ACUTE ON CHRONIC HFREF (HEART FAILURE WITH REDUCED EJECTION FRACTION): Primary | ICD-10-CM

## 2025-08-19 PROCEDURE — 3075F SYST BP GE 130 - 139MM HG: CPT | Performed by: FAMILY MEDICINE

## 2025-08-19 PROCEDURE — 3078F DIAST BP <80 MM HG: CPT | Performed by: FAMILY MEDICINE

## 2025-08-19 PROCEDURE — 99214 OFFICE O/P EST MOD 30 MIN: CPT | Performed by: FAMILY MEDICINE

## 2025-08-19 RX ORDER — METOLAZONE 2.5 MG/1
2.5 TABLET ORAL DAILY
Qty: 2 TABLET | Refills: 0 | Status: SHIPPED | OUTPATIENT
Start: 2025-08-19

## 2025-08-25 ENCOUNTER — HOSPITAL ENCOUNTER (OUTPATIENT)
Facility: HOSPITAL | Age: 85
Discharge: HOME | End: 2025-08-25
Payer: MEDICARE

## 2025-08-25 ENCOUNTER — OFFICE VISIT (OUTPATIENT)
Dept: CARDIOLOGY | Facility: CLINIC | Age: 85
End: 2025-08-25
Payer: MEDICARE

## 2025-08-25 ENCOUNTER — LAB (OUTPATIENT)
Facility: HOSPITAL | Age: 85
End: 2025-08-25
Payer: MEDICARE

## 2025-08-25 ENCOUNTER — APPOINTMENT (OUTPATIENT)
Dept: GENERAL RADIOLOGY | Facility: HOSPITAL | Age: 85
End: 2025-08-25
Payer: MEDICARE

## 2025-08-25 VITALS
HEIGHT: 70 IN | HEART RATE: 89 BPM | BODY MASS INDEX: 29.35 KG/M2 | WEIGHT: 205 LBS | DIASTOLIC BLOOD PRESSURE: 66 MMHG | SYSTOLIC BLOOD PRESSURE: 102 MMHG

## 2025-08-25 DIAGNOSIS — I50.23 ACUTE ON CHRONIC HFREF (HEART FAILURE WITH REDUCED EJECTION FRACTION): ICD-10-CM

## 2025-08-25 DIAGNOSIS — I25.708 CORONARY ARTERY DISEASE OF BYPASS GRAFT OF NATIVE HEART WITH STABLE ANGINA PECTORIS: ICD-10-CM

## 2025-08-25 DIAGNOSIS — I50.20 HFREF (HEART FAILURE WITH REDUCED EJECTION FRACTION): ICD-10-CM

## 2025-08-25 DIAGNOSIS — I25.5 ISCHEMIC CARDIOMYOPATHY: ICD-10-CM

## 2025-08-25 DIAGNOSIS — I48.19 ATRIAL FIBRILLATION, PERSISTENT: ICD-10-CM

## 2025-08-25 DIAGNOSIS — I50.42 CHRONIC COMBINED SYSTOLIC AND DIASTOLIC CONGESTIVE HEART FAILURE: ICD-10-CM

## 2025-08-25 DIAGNOSIS — I48.19 ATRIAL FIBRILLATION, PERSISTENT: Primary | ICD-10-CM

## 2025-08-25 PROBLEM — I50.43 ACUTE ON CHRONIC COMBINED SYSTOLIC AND DIASTOLIC CONGESTIVE HEART FAILURE: Status: ACTIVE | Noted: 2025-01-29

## 2025-08-25 PROBLEM — I48.91 ATRIAL FIBRILLATION, UNSPECIFIED TYPE: Status: RESOLVED | Noted: 2022-06-06 | Resolved: 2025-08-25

## 2025-08-25 LAB
ANION GAP SERPL CALCULATED.3IONS-SCNC: 16.9 MMOL/L (ref 5–15)
BUN SERPL-MCNC: 44 MG/DL (ref 8–23)
BUN/CREAT SERPL: 14.1 (ref 7–25)
CALCIUM SPEC-SCNC: 9.3 MG/DL (ref 8.6–10.5)
CHLORIDE SERPL-SCNC: 96 MMOL/L (ref 98–107)
CO2 SERPL-SCNC: 23.1 MMOL/L (ref 22–29)
CREAT SERPL-MCNC: 3.12 MG/DL (ref 0.76–1.27)
EGFRCR SERPLBLD CKD-EPI 2021: 19.1 ML/MIN/1.73
GLUCOSE SERPL-MCNC: 87 MG/DL (ref 65–99)
MAGNESIUM SERPL-MCNC: 2.2 MG/DL (ref 1.6–2.4)
NT-PROBNP SERPL-MCNC: 1157 PG/ML (ref 0–1800)
POTASSIUM SERPL-SCNC: 4.1 MMOL/L (ref 3.5–5.2)
SODIUM SERPL-SCNC: 136 MMOL/L (ref 136–145)
TSH SERPL DL<=0.05 MIU/L-ACNC: 1.5 UIU/ML (ref 0.27–4.2)

## 2025-08-25 PROCEDURE — 84443 ASSAY THYROID STIM HORMONE: CPT

## 2025-08-25 PROCEDURE — 99214 OFFICE O/P EST MOD 30 MIN: CPT | Performed by: FAMILY MEDICINE

## 2025-08-25 PROCEDURE — 80048 BASIC METABOLIC PNL TOTAL CA: CPT

## 2025-08-25 PROCEDURE — 83735 ASSAY OF MAGNESIUM: CPT

## 2025-08-25 PROCEDURE — 3078F DIAST BP <80 MM HG: CPT | Performed by: FAMILY MEDICINE

## 2025-08-25 PROCEDURE — 71046 X-RAY EXAM CHEST 2 VIEWS: CPT

## 2025-08-25 PROCEDURE — 36415 COLL VENOUS BLD VENIPUNCTURE: CPT

## 2025-08-25 PROCEDURE — 83880 ASSAY OF NATRIURETIC PEPTIDE: CPT

## 2025-08-25 PROCEDURE — 3074F SYST BP LT 130 MM HG: CPT | Performed by: FAMILY MEDICINE

## 2025-08-25 PROCEDURE — 93000 ELECTROCARDIOGRAM COMPLETE: CPT | Performed by: FAMILY MEDICINE

## 2025-08-26 ENCOUNTER — RESULTS FOLLOW-UP (OUTPATIENT)
Dept: CARDIOLOGY | Facility: CLINIC | Age: 85
End: 2025-08-26
Payer: MEDICARE

## 2025-08-26 DIAGNOSIS — I50.23 ACUTE ON CHRONIC HFREF (HEART FAILURE WITH REDUCED EJECTION FRACTION): Primary | ICD-10-CM

## 2025-08-29 ENCOUNTER — HOSPITAL ENCOUNTER (OUTPATIENT)
Facility: HOSPITAL | Age: 85
Discharge: HOME | End: 2025-08-29
Payer: MEDICARE

## 2025-08-29 DIAGNOSIS — I25.708 CORONARY ARTERY DISEASE OF BYPASS GRAFT OF NATIVE HEART WITH STABLE ANGINA PECTORIS: ICD-10-CM

## 2025-08-29 LAB
BH CV REST NUCLEAR ISOTOPE DOSE: 8.9 MCI
MAXIMAL PREDICTED HEART RATE: 137 BPM
STRESS BASELINE BP: NORMAL MMHG
STRESS BASELINE HR: 68 BPM
STRESS O2 SAT REST: 96 %
STRESS TARGET HR: 116 BPM

## 2025-08-29 PROCEDURE — 93017 CV STRESS TEST TRACING ONLY: CPT

## 2025-08-29 PROCEDURE — 34310000005 TECHNETIUM TETROFOSMIN KIT: Performed by: FAMILY MEDICINE

## 2025-08-29 PROCEDURE — 78451 HT MUSCLE IMAGE SPECT SING: CPT

## 2025-08-29 PROCEDURE — 78452 HT MUSCLE IMAGE SPECT MULT: CPT

## 2025-08-29 PROCEDURE — A9502 TC99M TETROFOSMIN: HCPCS | Performed by: FAMILY MEDICINE

## 2025-08-29 RX ADMIN — TETROFOSMIN 1 DOSE: 1.38 INJECTION, POWDER, LYOPHILIZED, FOR SOLUTION INTRAVENOUS at 06:52

## (undated) DEVICE — BLD CLIP UNIV SURG GRY

## (undated) DEVICE — CATH EP STEER DUO-DECAPOLAR SUPERLNG 7F 2-5-2MM 95CM

## (undated) DEVICE — CATH EP IMG/CARD VIEWFLEX EXTRA/ICE VF04 120DEG 9F 90CM

## (undated) DEVICE — KT ELECTRD EP SURF ENSITE/X ADHS/PTCH 1P/U NS

## (undated) DEVICE — TBG ABL COOL PT 2.6M 100042049

## (undated) DEVICE — SHT AIR TRANSFR COMFRT GLIDE LAT 40X80IN

## (undated) DEVICE — Device: Brand: OMNIWIRE PRESSURE GUIDE WIRE

## (undated) DEVICE — DECANTER: Brand: UNBRANDED

## (undated) DEVICE — INTRO HEMO FASTCATH CATH/LOCK 7F 12CM

## (undated) DEVICE — CATH F6 ST JL 4 100CM: Brand: SUPERTORQUE

## (undated) DEVICE — INTRO SHEATH ART/FEM ENGAGE .035 4F12CM

## (undated) DEVICE — DEV INFL BASIXCOMPAK W/INTRO/20G

## (undated) DEVICE — GW FC FLOP/TP .035 260CM 3MM

## (undated) DEVICE — NC TREK CORONARY DILATATION CATHETER 3.0 MM X 20 MM / RAPID-EXCHANGE: Brand: NC TREK

## (undated) DEVICE — PINNACLE INTRODUCER SHEATH: Brand: PINNACLE

## (undated) DEVICE — CATH F6 ST IM 100CM: Brand: SUPERTORQUE

## (undated) DEVICE — LOU EP: Brand: MEDLINE INDUSTRIES, INC.

## (undated) DEVICE — CATH EP INQUIRY DECAPLR CRV 6F 2TO5TO2MM 110CM LG

## (undated) DEVICE — PERCLOSE™ PROSTYLE™ SUTURE-MEDIATED CLOSURE AND REPAIR SYSTEM: Brand: PERCLOSE™ PROSTYLE™

## (undated) DEVICE — CATH F6 ST JR 4 100CM: Brand: SUPERTORQUE

## (undated) DEVICE — CANNULA,OXY,ADULT,SUPER SOFT,W/14'TUB,UC: Brand: MEDLINE INDUSTRIES, INC.

## (undated) DEVICE — TREK CORONARY DILATATION CATHETER 2.50 MM X 20 MM / RAPID-EXCHANGE: Brand: TREK

## (undated) DEVICE — CVR PROB ULTRASND GLS STRL

## (undated) DEVICE — KT MANIFOLD CATHLAB CUST

## (undated) DEVICE — GW INQWIRE FC PTFE J/3MM .035 180

## (undated) DEVICE — CONTRST ISOVUE370 76PCT 100ML

## (undated) DEVICE — Device

## (undated) DEVICE — TOTAL TRAY, DB, 100% SILI FOLEY, 16FR 10: Brand: MEDLINE

## (undated) DEVICE — 6F .070 XB 3.5 100CM: Brand: VISTA BRITE TIP

## (undated) DEVICE — CATH LAB PACK: Brand: MEDLINE INDUSTRIES, INC.

## (undated) DEVICE — CATH F6 ST PIG 155 110CM 6SH: Brand: SUPER TORQUE

## (undated) DEVICE — SYS TRNSEP ACC BRK1 ACROSS A/ 98CM

## (undated) DEVICE — GLIDESHEATH SLENDER STAINLESS STEEL KIT: Brand: GLIDESHEATH SLENDER

## (undated) DEVICE — A2000 MULTI-USE SYRINGE KIT, P/N 701277-003KIT CONTENTS: 100ML CONTRAST RESERVOIR AND TUBING WITH CONTRAST SPIKE AND CLAMP: Brand: A2000 MULTI-USE SYRINGE KIT

## (undated) DEVICE — MODEL BT2000 P/N 700287-012KIT CONTENTS: MANIFOLD WITH SALINE AND CONTRAST PORTS, SALINE TUBING WITH SPIKE AND HAND SYRINGE, TRANSDUCER: Brand: BT2000 AUTOMATED MANIFOLD KIT

## (undated) DEVICE — CONTAINER,SPECIMEN,O.R.STRL,4.5OZ: Brand: MEDLINE

## (undated) DEVICE — CATH ABL IRR BIDIR TACTICATH/SE DF/CRV 8F 2-2-2MM 3.5MM 115

## (undated) DEVICE — DRSNG SURESITE WNDW 4X4.5

## (undated) DEVICE — CATH ADVISOR HD GRID MAP SENSR ENABLED

## (undated) DEVICE — SENSR O2 OXIMAX FNGR ADHS A/ 1P/U

## (undated) DEVICE — APPL CHLORAPREP HI/LITE TINTED 10.5ML ORNG

## (undated) DEVICE — MODEL AT P65, P/N 701554-001KIT CONTENTS: HAND CONTROLLER, 3-WAY HIGH-PRESSURE STOPCOCK WITH ROTATING END AND PREMIUM HIGH-PRESSURE TUBING: Brand: ANGIOTOUCH® KIT

## (undated) DEVICE — INTRO STEER AGILIS NXT MED/CURL 8.5F